# Patient Record
Sex: FEMALE | Race: BLACK OR AFRICAN AMERICAN | NOT HISPANIC OR LATINO | Employment: OTHER | URBAN - METROPOLITAN AREA
[De-identification: names, ages, dates, MRNs, and addresses within clinical notes are randomized per-mention and may not be internally consistent; named-entity substitution may affect disease eponyms.]

---

## 2017-02-06 ENCOUNTER — LAB VISIT (OUTPATIENT)
Dept: LAB | Facility: HOSPITAL | Age: 67
End: 2017-02-06
Attending: INTERNAL MEDICINE
Payer: COMMERCIAL

## 2017-02-06 DIAGNOSIS — E55.9 VITAMIN D INSUFFICIENCY: ICD-10-CM

## 2017-02-06 DIAGNOSIS — Z13.6 SCREENING FOR CARDIOVASCULAR CONDITION: ICD-10-CM

## 2017-02-06 DIAGNOSIS — R74.8 ELEVATED ALKALINE PHOSPHATASE LEVEL: ICD-10-CM

## 2017-02-06 LAB
25(OH)D3+25(OH)D2 SERPL-MCNC: 28 NG/ML
ALBUMIN SERPL BCP-MCNC: 3.3 G/DL
ALP SERPL-CCNC: 199 U/L
ALT SERPL W/O P-5'-P-CCNC: 21 U/L
ANION GAP SERPL CALC-SCNC: 7 MMOL/L
AST SERPL-CCNC: 41 U/L
BILIRUB SERPL-MCNC: 1 MG/DL
BUN SERPL-MCNC: 14 MG/DL
CALCIUM SERPL-MCNC: 8.9 MG/DL
CHLORIDE SERPL-SCNC: 109 MMOL/L
CHOLEST/HDLC SERPL: 3.2 {RATIO}
CO2 SERPL-SCNC: 26 MMOL/L
CREAT SERPL-MCNC: 0.8 MG/DL
ERYTHROCYTE [DISTWIDTH] IN BLOOD BY AUTOMATED COUNT: 14.8 %
EST. GFR  (AFRICAN AMERICAN): >60 ML/MIN/1.73 M^2
EST. GFR  (NON AFRICAN AMERICAN): >60 ML/MIN/1.73 M^2
GGT SERPL-CCNC: 21 U/L
GLUCOSE SERPL-MCNC: 92 MG/DL
HCT VFR BLD AUTO: 35.6 %
HDL/CHOLESTEROL RATIO: 31.2 %
HDLC SERPL-MCNC: 170 MG/DL
HDLC SERPL-MCNC: 53 MG/DL
HGB BLD-MCNC: 11.2 G/DL
LDLC SERPL CALC-MCNC: 108.6 MG/DL
MCH RBC QN AUTO: 27.8 PG
MCHC RBC AUTO-ENTMCNC: 31.5 %
MCV RBC AUTO: 88 FL
NONHDLC SERPL-MCNC: 117 MG/DL
PLATELET # BLD AUTO: 193 K/UL
PMV BLD AUTO: 11.2 FL
POTASSIUM SERPL-SCNC: 3.7 MMOL/L
PROT SERPL-MCNC: 6.8 G/DL
RBC # BLD AUTO: 4.03 M/UL
SODIUM SERPL-SCNC: 142 MMOL/L
TRIGL SERPL-MCNC: 42 MG/DL
WBC # BLD AUTO: 4.02 K/UL

## 2017-02-06 PROCEDURE — 82306 VITAMIN D 25 HYDROXY: CPT

## 2017-02-06 PROCEDURE — 85027 COMPLETE CBC AUTOMATED: CPT

## 2017-02-06 PROCEDURE — 36415 COLL VENOUS BLD VENIPUNCTURE: CPT

## 2017-02-06 PROCEDURE — 80061 LIPID PANEL: CPT

## 2017-02-06 PROCEDURE — 80053 COMPREHEN METABOLIC PANEL: CPT

## 2017-02-06 PROCEDURE — 82977 ASSAY OF GGT: CPT

## 2017-02-07 ENCOUNTER — TELEPHONE (OUTPATIENT)
Dept: INTERNAL MEDICINE | Facility: CLINIC | Age: 67
End: 2017-02-07

## 2017-02-13 ENCOUNTER — TELEPHONE (OUTPATIENT)
Dept: INTERNAL MEDICINE | Facility: CLINIC | Age: 67
End: 2017-02-13

## 2017-02-13 NOTE — TELEPHONE ENCOUNTER
----- Message from Kerline Galeana sent at 2/13/2017  8:12 AM CST -----  Contact: self/[phone]  Pt called in regards to rescheduling her appointment. She did not want the first available.        Please advise

## 2017-03-30 ENCOUNTER — HOSPITAL ENCOUNTER (OUTPATIENT)
Dept: RADIOLOGY | Facility: HOSPITAL | Age: 67
Discharge: HOME | End: 2017-03-30
Attending: INTERNAL MEDICINE
Payer: COMMERCIAL

## 2017-03-30 ENCOUNTER — TELEPHONE (OUTPATIENT)
Dept: INTERNAL MEDICINE | Facility: CLINIC | Age: 67
End: 2017-03-30

## 2017-03-30 ENCOUNTER — OFFICE VISIT (OUTPATIENT)
Dept: INTERNAL MEDICINE | Facility: CLINIC | Age: 67
End: 2017-03-30
Payer: COMMERCIAL

## 2017-03-30 VITALS
SYSTOLIC BLOOD PRESSURE: 116 MMHG | HEIGHT: 63 IN | HEART RATE: 66 BPM | WEIGHT: 155.19 LBS | BODY MASS INDEX: 27.5 KG/M2 | DIASTOLIC BLOOD PRESSURE: 66 MMHG

## 2017-03-30 DIAGNOSIS — Z84.89 FAMILY HISTORY OF HIP FRACTURE: ICD-10-CM

## 2017-03-30 DIAGNOSIS — M25.562 CHRONIC PAIN OF LEFT KNEE: ICD-10-CM

## 2017-03-30 DIAGNOSIS — L81.9 SKIN HYPOPIGMENTATION: ICD-10-CM

## 2017-03-30 DIAGNOSIS — Z78.0 ASYMPTOMATIC POSTMENOPAUSAL STATUS: ICD-10-CM

## 2017-03-30 DIAGNOSIS — L81.8 IDIOPATHIC GUTTATE HYPOMELANOSIS: ICD-10-CM

## 2017-03-30 DIAGNOSIS — R74.8 ELEVATED ALKALINE PHOSPHATASE LEVEL: ICD-10-CM

## 2017-03-30 DIAGNOSIS — Z00.00 ANNUAL PHYSICAL EXAM: Primary | ICD-10-CM

## 2017-03-30 DIAGNOSIS — G89.29 CHRONIC PAIN OF LEFT KNEE: ICD-10-CM

## 2017-03-30 PROCEDURE — 73560 X-RAY EXAM OF KNEE 1 OR 2: CPT | Mod: 26,59,RT, | Performed by: RADIOLOGY

## 2017-03-30 PROCEDURE — 73560 X-RAY EXAM OF KNEE 1 OR 2: CPT | Mod: TC,59,RT

## 2017-03-30 PROCEDURE — 99999 PR PBB SHADOW E&M-EST. PATIENT-LVL III: CPT | Mod: PBBFAC,,, | Performed by: INTERNAL MEDICINE

## 2017-03-30 PROCEDURE — 99396 PREV VISIT EST AGE 40-64: CPT | Mod: S$GLB,,, | Performed by: INTERNAL MEDICINE

## 2017-03-30 PROCEDURE — 73562 X-RAY EXAM OF KNEE 3: CPT | Mod: 26,LT,, | Performed by: RADIOLOGY

## 2017-03-30 NOTE — PROGRESS NOTES
Subjective:       Patient ID: Sharri Cline is a 66 y.o. female.    Chief Complaint: Annual Exam    HPI    Last visit with me 10/2016. Since then seen by Orthopedics. Notes right leg is improved. Having stiffness and pain in left leg, feels heavy. Doing exercise 3-5 days/week. Working with . Leg not giving out. Tried Aleve and it helped.    Reviewed PMH, PSH, SH, FH, allergies, and medications.     Review of Systems   All other systems reviewed and are negative.      Objective:      Physical Exam   Constitutional: She is oriented to person, place, and time. No distress.   -American woman whose Body mass index is 27.49 kg/(m^2).    HENT:   Head: Atraumatic.   Right Ear: Tympanic membrane normal.   Left Ear: Tympanic membrane normal.   Mouth/Throat: Oropharynx is clear and moist. No oropharyngeal exudate.   Eyes: Pupils are equal, round, and reactive to light. Right eye exhibits no discharge. Left eye exhibits no discharge.   Neck: Normal range of motion. No thyromegaly present.   Cardiovascular: Normal rate and regular rhythm.    Murmur heard.   Systolic (RUSB) murmur is present with a grade of 1/6   Pulmonary/Chest: Effort normal and breath sounds normal. No stridor. She has no wheezes. She has no rales.   Abdominal: Soft. She exhibits no distension and no mass. There is no hepatosplenomegaly. There is no tenderness. There is no guarding and negative Don's sign.   Musculoskeletal: She exhibits no edema or tenderness.   Mild effusion in left knee without tenderness to palpation or erythema.   Lymphadenopathy:     She has no cervical adenopathy.   Neurological: She is alert and oriented to person, place, and time.   Skin: Skin is warm and dry.   Hypopigmented macules diffusely throughout bilateral lower extremities distal to knee.   Psychiatric: She has a normal mood and affect. Her behavior is normal.   Nursing note and vitals reviewed.      Vitals:    03/30/17 1336   BP: 116/66   BP Location:  "Right arm   Patient Position: Sitting   BP Method: Manual   Pulse: 66   Weight: 70.4 kg (155 lb 3.3 oz)   Height: 5' 3" (1.6 m)     Body mass index is 27.49 kg/(m^2).    RESULTS: Reviewed labs from last 2 months    The 10-year ASCVD risk score (Santos DA SILVA Jr, et al., 2013) is: 5%    Values used to calculate the score:      Age: 66 years      Sex: Female      Is Non- : Yes      Diabetic: No      Tobacco smoker: No      Systolic Blood Pressure: 116 mmHg      Is BP treated: No      HDL Cholesterol: 53 mg/dL      Total Cholesterol: 170 mg/dL     Assessment:       1. Annual physical exam    2. Elevated alkaline phosphatase level    3. Chronic pain of left knee    4. Family history of hip fracture    5. Asymptomatic postmenopausal status    6. Skin hypopigmentation    7. Idiopathic guttate hypomelanosis        Plan:   Sharri was seen today for annual exam.    Diagnoses and all orders for this visit:    Annual physical exam:  Age-appropriate health screening reviewed, indicated tests ordered.   -     Comprehensive metabolic panel; Future  -     CBC Without Differential; Future  -     Vitamin D; Future  -     TSH; Future    Elevated alkaline phosphatase level:  Relatively stable last 5 years, refer back to Endocrinology for further eval and recommendations.  -     Ambulatory Referral to Endocrinology  -     DXA Bone Density Spine And Hip_Axial Skeleton; Future    Chronic pain of left knee:  Likely osteoarthritis, bursitis, and/or tendinitis. Check on xray, refer to Orthopedics.  -     X-ray Knee Ortho Left; Future    Family history of hip fracture  -     DXA Bone Density Spine And Hip_Axial Skeleton; Future    Asymptomatic postmenopausal status  -     DXA Bone Density Spine And Hip_Axial Skeleton; Future    Skin hypopigmentation:  Seen by Dermatology in past, dx with idiopathic guttate hypomelanosis. continue to monitor.    Return in about 1 year (around 3/30/2018) for EPP annual exam, fasting labs 1 " week prior.  Jhon Arndt MD  Internal Medicine    Portions of this note were completed using Medlert dictation software. Please excuse typographical or syntax errors.

## 2017-03-30 NOTE — TELEPHONE ENCOUNTER
Xray shows some mild degeneration that may be causing some osteoarthritis. Please keep follow up with Orthopedics as planned.

## 2017-03-30 NOTE — MR AVS SNAPSHOT
[facility] - Internal Medicine  [address]  [facility] [address]  Phone: [phone]  Fax: [phone]                  Sharri Cline   3/30/2017 1:20 PM   Office Visit    Description:  Female : 1950   Provider:  Jhon Arndt MD   Department:  [facility]           Reason for Visit     Annual Exam           Diagnoses this Visit        Comments    Annual physical exam    -  Primary     Elevated alkaline phosphatase level         Chronic pain of left knee         Family history of hip fracture         Asymptomatic postmenopausal status         Skin hypopigmentation                To Do List           Goals (5 Years of Data)     None      Follow-Up and Disposition     Return in about 1 year (around 3/30/2018) for EPP annual exam, fasting labs 1 week prior.      [facility]     [facility] On Call Nurse Care Line -  Assistance  Unless otherwise directed by your provider, please contact [facility] On[facility], our nurse care line that is available for  assistance.     Registered nurses in the [facility] On Call Center provide: appointment scheduling, clinical advisement, health education, and other advisory services.  Call: [phone] (toll free)               Medications           Message regarding Medications     Verify the changes and/or additions to your medication regime listed below are the same as discussed with your clinician today.  If any of these changes or additions are incorrect, please notify your healthcare provider.             Verify that the below list of medications is an accurate representation of the medications you are currently taking.  If none reported, the list may be blank. If incorrect, please contact your healthcare provider. Carry this list with you in case of emergency.           Current Medications     fish oil-omega-3 fatty acids 300-1,000 mg capsule Take 2 g by mouth once daily.    multivitamin with minerals tablet Take 1 tablet by mouth  "once daily.      b complex vitamins tablet Take 1 tablet by mouth once daily.    calcium carbonate-vit D3-min 600 mg calcium- 400 unit Tab Take 1 tablet by mouth 2 (two) times daily.           Clinical Reference Information           Your Vitals Were     BP Pulse Height Weight BMI    116/66 (BP Location: Right arm, Patient Position: Sitting, BP Method: Manual) 66 5' 3" (1.6 m) 70.4 kg (155 lb 3.3 oz) 27.49 kg/m2      Blood Pressure          Most Recent Value    BP  116/66      Allergies as of 3/30/2017     No Known Allergies      Immunizations Administered on Date of Encounter - 3/30/2017     None      Orders Placed During Today's Visit      Normal Orders This Visit    Ambulatory Referral to Endocrinology     Future Labs/Procedures Expected by Expires    CBC Without Differential  3/30/2017 (Approximate) 5/29/2018    Comprehensive metabolic panel  3/30/2017 3/30/2018    DXA Bone Density Spine And Hip_Axial Skeleton  3/30/2017 6/28/2017    TSH  3/30/2017 (Approximate) 3/30/2018    Vitamin D  3/30/2017 (Approximate) 5/29/2018    X-ray Knee Ortho Left  3/30/2017 (Approximate) 3/31/2018      [facility] Sign-Up     Activating your [facility] account is as easy as 1-2-3!     1) Visit [email or web address], select Sign Up Now, enter this activation code and your date of birth, then select Next.  [email or web address]  Expires: 5/14/2017  2:09 PM      2) Create a username and password to use when you visit [facility] in the future and select a security question in case you lose your password and select Next.    3) Enter your e-mail address and click Sign Up!    Additional Information  If you have questions, please e-mail [email or web address] or call [phone] to talk to our [facility] staff. Remember, [facility] is NOT to be used for urgent needs. For medical emergencies, dial 911.         Language Assistance Services     ATTENTION: Language assistance services are available, free of charge. Please call [phone].      ATENCIÓN: Si " habla suellenañol, tiene a maynard disposición servicios gratuitos de asistencia lingüística. Vasile al [phone].     RIGOBERTO Ý: N?u b?n nói Ti?ng Vi?t, có các d?ch v? h? tr? ngôn ng? mi?n phí dành cho b?n. G?i s? [phone].         [facility] complies with applicable Federal civil rights laws and does not discriminate on the basis of race, color, national origin, age, disability, or sex.

## 2017-04-13 ENCOUNTER — HOSPITAL ENCOUNTER (OUTPATIENT)
Dept: RADIOLOGY | Facility: CLINIC | Age: 67
Discharge: HOME | End: 2017-04-13
Attending: INTERNAL MEDICINE
Payer: COMMERCIAL

## 2017-04-13 DIAGNOSIS — R74.8 ELEVATED ALKALINE PHOSPHATASE LEVEL: ICD-10-CM

## 2017-04-13 DIAGNOSIS — Z84.89 FAMILY HISTORY OF HIP FRACTURE: ICD-10-CM

## 2017-04-13 DIAGNOSIS — Z78.0 ASYMPTOMATIC POSTMENOPAUSAL STATUS: ICD-10-CM

## 2017-04-13 PROCEDURE — 77080 DXA BONE DENSITY AXIAL: CPT | Mod: TC

## 2017-04-13 PROCEDURE — 77080 DXA BONE DENSITY AXIAL: CPT | Mod: 26,,, | Performed by: INTERNAL MEDICINE

## 2017-04-21 ENCOUNTER — TELEPHONE (OUTPATIENT)
Dept: INTERNAL MEDICINE | Facility: CLINIC | Age: 67
End: 2017-04-21

## 2017-04-21 ENCOUNTER — OFFICE VISIT (OUTPATIENT)
Dept: ENDOCRINOLOGY | Facility: CLINIC | Age: 67
End: 2017-04-21
Payer: COMMERCIAL

## 2017-04-21 VITALS
HEIGHT: 63 IN | BODY MASS INDEX: 27.19 KG/M2 | SYSTOLIC BLOOD PRESSURE: 124 MMHG | DIASTOLIC BLOOD PRESSURE: 80 MMHG | WEIGHT: 153.44 LBS | HEART RATE: 74 BPM

## 2017-04-21 DIAGNOSIS — R74.8 ELEVATED ALKALINE PHOSPHATASE LEVEL: Primary | ICD-10-CM

## 2017-04-21 DIAGNOSIS — M85.80 OSTEOPENIA, UNSPECIFIED LOCATION: ICD-10-CM

## 2017-04-21 PROCEDURE — 1160F RVW MEDS BY RX/DR IN RCRD: CPT | Mod: S$GLB,,, | Performed by: INTERNAL MEDICINE

## 2017-04-21 PROCEDURE — 99999 PR PBB SHADOW E&M-EST. PATIENT-LVL III: CPT | Mod: PBBFAC,,, | Performed by: INTERNAL MEDICINE

## 2017-04-21 PROCEDURE — 99214 OFFICE O/P EST MOD 30 MIN: CPT | Mod: S$GLB,,, | Performed by: INTERNAL MEDICINE

## 2017-04-21 NOTE — PROGRESS NOTES
Subjective:      Patient ID: Sharri Cline is a 67 y.o. female.    Chief Complaint:  Abnormal test result       History of Present Illness  Ms. Cline is a very pleasant 67 year old female patient here today for follow up visit. She is a prior patient of Dr. Koenig with initial consultation in 06/2015    She has chronically elevated alk phos dating back to 2008   Initial endocrine work up showed an elevated liver specific isoenzyme   Bone specific isoenzyme was negative     She has denies bone pain   Denies family history of Paget's disease     Denies personal history of liver disease     She has osteopenia, based on BMD from 04/2017 , frax calculation does not support treatment   She reports eating a calcium rich diet   She also takes calcium +vitamin D supplement daily     Denies recent falls or fractures   Exercises a few times per week     Review of Systems   Constitutional: Negative for fatigue and unexpected weight change.   HENT: Negative for hearing loss, trouble swallowing and voice change.    Eyes: Negative for visual disturbance.   Respiratory: Negative for apnea, cough, chest tightness and shortness of breath.    Cardiovascular: Negative for chest pain and palpitations.   Gastrointestinal: Negative for abdominal pain, constipation, diarrhea, nausea and vomiting.   Endocrine: Negative for cold intolerance, heat intolerance, polydipsia, polyphagia and polyuria.   Genitourinary: Negative for dysuria, frequency, menstrual problem and vaginal discharge.   Musculoskeletal: Negative for arthralgias, back pain and gait problem.   Skin: Negative for rash.   Neurological: Negative for dizziness, tremors, weakness, numbness and headaches.   Psychiatric/Behavioral: Negative for sleep disturbance. The patient is not nervous/anxious.        Objective:   Physical Exam   Constitutional: She is oriented to person, place, and time. She appears well-developed and well-nourished. No distress.   HENT:   Head:  Normocephalic and atraumatic.   Eyes: EOM are normal. Pupils are equal, round, and reactive to light. No scleral icterus.   Neck: Normal range of motion. Neck supple. No thyromegaly present.   Cardiovascular: Normal rate and regular rhythm.    Pulmonary/Chest: Effort normal and breath sounds normal. She has no wheezes. She has no rales. She exhibits no tenderness.   Abdominal: Soft. Bowel sounds are normal. She exhibits no mass. There is no tenderness.   Musculoskeletal: Normal range of motion. She exhibits no edema or tenderness.   Lymphadenopathy:     She has no cervical adenopathy.   Neurological: She is alert and oriented to person, place, and time. She displays normal reflexes. No cranial nerve deficit. Coordination normal.   Skin: Skin is warm and dry. No rash noted.   Psychiatric: She has a normal mood and affect. Her behavior is normal.   Nursing note and vitals reviewed.      Lab Review:   Results for orders placed or performed in visit on 02/06/17   Vitamin D   Result Value Ref Range    Vit D, 25-Hydroxy 28 (L) 30 - 96 ng/mL   Lipid panel   Result Value Ref Range    Cholesterol 170 120 - 199 mg/dL    Triglycerides 42 30 - 150 mg/dL    HDL 53 40 - 75 mg/dL    LDL Cholesterol 108.6 63.0 - 159.0 mg/dL    HDL/Chol Ratio 31.2 20.0 - 50.0 %    Total Cholesterol/HDL Ratio 3.2 2.0 - 5.0    Non-HDL Cholesterol 117 mg/dL   Comprehensive metabolic panel   Result Value Ref Range    Sodium 142 136 - 145 mmol/L    Potassium 3.7 3.5 - 5.1 mmol/L    Chloride 109 95 - 110 mmol/L    CO2 26 23 - 29 mmol/L    Glucose 92 70 - 110 mg/dL    BUN, Bld 14 8 - 23 mg/dL    Creatinine 0.8 0.5 - 1.4 mg/dL    Calcium 8.9 8.7 - 10.5 mg/dL    Total Protein 6.8 6.0 - 8.4 g/dL    Albumin 3.3 (L) 3.5 - 5.2 g/dL    Total Bilirubin 1.0 0.1 - 1.0 mg/dL    Alkaline Phosphatase 199 (H) 55 - 135 U/L    AST 41 (H) 10 - 40 U/L    ALT 21 10 - 44 U/L    Anion Gap 7 (L) 8 - 16 mmol/L    eGFR if African American >60.0 >60 mL/min/1.73 m^2    eGFR if non  African American >60.0 >60 mL/min/1.73 m^2   CBC Without Differential   Result Value Ref Range    WBC 4.02 3.90 - 12.70 K/uL    RBC 4.03 4.00 - 5.40 M/uL    Hemoglobin 11.2 (L) 12.0 - 16.0 g/dL    Hematocrit 35.6 (L) 37.0 - 48.5 %    MCV 88 82 - 98 fL    MCH 27.8 27.0 - 31.0 pg    MCHC 31.5 (L) 32.0 - 36.0 %    RDW 14.8 (H) 11.5 - 14.5 %    Platelets 193 150 - 350 K/uL    MPV 11.2 9.2 - 12.9 fL   Gamma GT   Result Value Ref Range    GGT 21 8 - 55 U/L     Results for GABRIELLA SMITH (MRN [id number]) as of 4/21/2017 16:12   Ref. Range 6/16/2015 08:53   Liver, Alk Phos Latest Ref Range: 5 - 93 U/L 112 (H)     Results for GABRIELLA SMITH (MRN [id number]) as of 4/21/2017 16:12   Ref. Range 6/16/2015 08:53   Liver 1, Alk Phos Latest Ref Range: 44 - 84 % 73     Results for GABRIELLA SMITH (MRN [id number]) as of 4/21/2017 16:12   Ref. Range 6/16/2015 08:53   Alkaline Phosphatase, Total Latest Ref Range: 33 - 130 U/L 154 (H)     Results for GABRIELLA SMITH (MRN [id number]) as of 4/21/2017 16:12   Ref. Range 6/16/2015 08:53   Bone %, Alk Phos Latest Ref Range: 16 - 56 % 27     Assessment:     1. Elevated alk phos  - chronically elevated   - prior work up from 2015 revealed elevated liver specific isoenzymes   - bone specific alk phos was negative   - we suspect elevated alk phos is due to the liver  - PCP may consider referral to Hepatology for further evaluation     2. Osteopenia   - RDA of calcium and vitamin D discussed, calcium from food sources are preferred   - continue weight bearing exercises as tolerated   - emphasized fall safety and fall precautions     Plan:   Discuss with PCP to determine if Hepatology referral is indicated         Case discussed in consultation with Dr. Sinha   Recommendations were discussed with the patient in detail   The patient verbalized understanding and agrees with the treatment plan as outlined above

## 2017-04-21 NOTE — LETTER
April 21, 2017      Jhon Arndt MD  [address]  [facility] [address]           [facility] End[facility]/Diab/Metab  [address]  [facility] [address]  Phone: [phone]  Fax: [phone]          Patient: Sharri Cline   MR Number: [id number]   YOB: 1950   Date of Visit: 4/21/2017       Dear Dr. Jhon Arndt:    Thank you for referring Sharri Cline to me for evaluation. Attached you will find relevant portions of my assessment and plan of care.    If you have questions, please do not hesitate to call me. I look forward to following Sharri Cline along with you.    Sincerely,    Mariluz Phelps, LEEANNA    Enclosure  CC:  No Recipients    If you would like to receive this communication electronically, please contact [email or web address] or [phone] to request more information on [employer] Link access.    For providers and/or their staff who would like to refer a patient to [facility], please contact us through our one-stop-shop provider referral line, [facility], at [phone].    If you feel you have received this communication in error or would no longer like to receive these types of communications, please e-mail [email or web address]

## 2017-04-21 NOTE — MR AVS SNAPSHOT
[facility] - Endo/Diab/Metab  [address]  [facility] [address]  Phone: [phone]  Fax: [phone]                  Sharri Cline   2017 11:30 AM   Office Visit    Description:  Female : 1950   Provider:  Mariluz Phelps NP   Department:  [facility] - Endo/Diab/Metab           Reason for Visit     Thyroid Problem                To Do List           Future Appointments        Provider Department Dept Phone    2017 8:00 AM Logan Yip MD [facility] - Orthopedics [phone]      Goals (5 Years of Data)     None      Follow-Up and Disposition     Return if symptoms worsen or fail to improve, for Follow up with Primary Care Provider as Instructed.      [facility] Call     [facility] On Call Nurse Care Line -  Assistance  Unless otherwise directed by your provider, please contact [facility], our nurse care line that is available for  assistance.     Registered nurses in the [facility] On Call Center provide: appointment scheduling, clinical advisement, health education, and other advisory services.  Call: [phone] (toll free)               Medications           Message regarding Medications     Verify the changes and/or additions to your medication regime listed below are the same as discussed with your clinician today.  If any of these changes or additions are incorrect, please notify your healthcare provider.             Verify that the below list of medications is an accurate representation of the medications you are currently taking.  If none reported, the list may be blank. If incorrect, please contact your healthcare provider. Carry this list with you in case of emergency.           Current Medications     b complex vitamins tablet Take 1 tablet by mouth once daily.    calcium carbonate-vit D3-min 600 mg calcium- 400 unit Tab Take 1 tablet by mouth 2 (two) times daily.    fish oil-omega-3 fatty acids 300-1,000 mg capsule Take 2 g by mouth once  "daily.    multivitamin with minerals tablet Take 1 tablet by mouth once daily.             Clinical Reference Information           Your Vitals Were     BP Pulse Height Weight BMI    124/80 74 5' 3" (1.6 m) 69.6 kg (153 lb 7 oz) 27.18 kg/m2      Blood Pressure          Most Recent Value    BP  124/80      Allergies as of 4/21/2017     No Known Allergies      Immunizations Administered on Date of Encounter - 4/21/2017     None      [facility] Sign-Up     Activating your [facility] account is as easy as 1-2-3!     1) Visit [email or web address], select Sign Up Now, enter this activation code and your date of birth, then select Next.  [email or web address]  Expires: 5/14/2017  2:09 PM      2) Create a username and password to use when you visit [facility] in the future and select a security question in case you lose your password and select Next.    3) Enter your e-mail address and click Sign Up!    Additional Information  If you have questions, please e-mail [email or web address] or call [phone] to talk to our [facility] staff. Remember, [facility] is NOT to be used for urgent needs. For medical emergencies, dial 911.         Language Assistance Services     ATTENTION: Language assistance services are available, free of charge. Please call [phone].      ATENCIÓN: Si habla español, tiene a maynard disposición servicios gratuitos de asistencia lingüística. Llame al [phone].     CHÚ Ý: N?u b?n nói Ti?ng Vi?t, có các d?ch v? h? tr? ngôn ng? mi?n phí dành cho b?n. G?i s? [phone].         [facility] complies with applicable Federal civil rights laws and does not discriminate on the basis of race, color, national origin, age, disability, or sex.        "

## 2017-04-22 NOTE — TELEPHONE ENCOUNTER
Please call the patient to notify that I reviewed the note from Endocrinology. They believe the elevated levels may be from the liver, so I am referring to Hepatology for further evaluation. Please schedule this appointment with the patient or provide the number for the scheduling desk.

## 2017-04-25 NOTE — TELEPHONE ENCOUNTER
Dr Alex I have tried pt twice yesterday and once today and had to leave a mssg   Do you want to send her a letter?    Flavio

## 2017-04-26 ENCOUNTER — DOCUMENTATION ONLY (OUTPATIENT)
Dept: TRANSPLANT | Facility: CLINIC | Age: 67
End: 2017-04-26

## 2017-04-26 NOTE — LETTER
April 26, 2017    Sharri Cline  [address]  [facility] [address]      Dear Sharri Cline:    Your doctor has referred you to the [facility]. You will be contacted by our office and an initial appointment will then be scheduled for you.    We look forward to seeing you soon. If you have any further questions, please contact us at [phone].       Sincerely,        [facility]   [address] [place] [id number] [phone]

## 2017-04-26 NOTE — LETTER
April 26, 2017    Jhon Arndt MD  [address]  [place] [address]      Dear Dr. Arndt    Patient: Sharri Cline   MR Number: [id number]   YOB: 1950     Thank you for the referral of Sharri Cline to the [facility]. An initial appointment will be scheduled for your patient with one of our Hepatologists.      Thank you again for your trust in our program.  If there is anything we can do for you or your staff, please feel free to contact us.        Sincerely,        [facility]  [employer] [place], [place] [id number] [phone]

## 2017-04-26 NOTE — NURSING
Pt records reviewed.   Pt will be referred to Hepatology.  Elevated alkaline phosphatase level  Initial referral received  from the workque.   Referring Provider/diagnosis  Jhon Arndt MD  Referral letter sent to provider and patient.

## 2017-04-27 ENCOUNTER — HOSPITAL ENCOUNTER (OUTPATIENT)
Dept: RADIOLOGY | Facility: HOSPITAL | Age: 67
Discharge: HOME | End: 2017-04-27
Attending: ORTHOPAEDIC SURGERY
Payer: COMMERCIAL

## 2017-04-27 ENCOUNTER — OFFICE VISIT (OUTPATIENT)
Dept: ORTHOPEDICS | Facility: CLINIC | Age: 67
End: 2017-04-27
Payer: COMMERCIAL

## 2017-04-27 VITALS — HEIGHT: 63 IN | BODY MASS INDEX: 27.19 KG/M2 | WEIGHT: 153.44 LBS

## 2017-04-27 DIAGNOSIS — M25.561 PAIN IN BOTH KNEES, UNSPECIFIED CHRONICITY: ICD-10-CM

## 2017-04-27 DIAGNOSIS — M17.0 BILATERAL PRIMARY OSTEOARTHRITIS OF KNEE: ICD-10-CM

## 2017-04-27 DIAGNOSIS — M25.562 PAIN IN BOTH KNEES, UNSPECIFIED CHRONICITY: Primary | ICD-10-CM

## 2017-04-27 DIAGNOSIS — M25.562 PAIN IN BOTH KNEES, UNSPECIFIED CHRONICITY: ICD-10-CM

## 2017-04-27 DIAGNOSIS — M25.561 PAIN IN BOTH KNEES, UNSPECIFIED CHRONICITY: Primary | ICD-10-CM

## 2017-04-27 PROCEDURE — 73560 X-RAY EXAM OF KNEE 1 OR 2: CPT | Mod: 26,50,, | Performed by: RADIOLOGY

## 2017-04-27 PROCEDURE — 73560 X-RAY EXAM OF KNEE 1 OR 2: CPT | Mod: 50,TC

## 2017-04-27 PROCEDURE — 99213 OFFICE O/P EST LOW 20 MIN: CPT | Mod: S$GLB,,, | Performed by: ORTHOPAEDIC SURGERY

## 2017-04-27 PROCEDURE — 99999 PR PBB SHADOW E&M-EST. PATIENT-LVL III: CPT | Mod: PBBFAC,,, | Performed by: ORTHOPAEDIC SURGERY

## 2017-04-27 PROCEDURE — 1160F RVW MEDS BY RX/DR IN RCRD: CPT | Mod: S$GLB,,, | Performed by: ORTHOPAEDIC SURGERY

## 2017-04-27 RX ORDER — DICLOFENAC SODIUM 10 MG/G
2 GEL TOPICAL 4 TIMES DAILY
Qty: 1 TUBE | Refills: 6 | Status: SHIPPED | OUTPATIENT
Start: 2017-04-27 | End: 2018-07-26

## 2017-04-27 NOTE — LETTER
April 27, 2017      Jhon Arndt MD  [address]  [place] [address]           [facility] - Orthopedics  [address]  [place] [address]  Phone: [phone]          Patient: Sahrri Cline   MR Number: [id number]   YOB: 1950   Date of Visit: 4/27/2017       Dear Dr. Jhon Arndt:    Thank you for referring Sharri Cline to me for evaluation. Attached you will find relevant portions of my assessment and plan of care.    If you have questions, please do not hesitate to call me. I look forward to following Sharri Cline along with you.    Sincerely,    Logan Yip MD    Enclosure  CC:  No Recipients    If you would like to receive this communication electronically, please contact [email or web address] or [phone] to request more information on [employer] Link access.    For providers and/or their staff who would like to refer a patient to [facility], please contact us through our one-stop-shop provider referral line, [facility] , at [phone].    If you feel you have received this communication in error or would no longer like to receive these types of communications, please e-mail [email or web address]

## 2017-04-27 NOTE — PROGRESS NOTES
CC:   leftt knee pain  HPI:  67 y.o. yo female who presents with left knee pain.    Located medially.  Started 2 months ago.  Mild and intermittent.  She has not been taking medications for relief.  Improving since onset.  Denies injury or other precipitating factors.  No apparent exacerbating or relieving factors.  Able to exercise effectively without pain or limitation.    Seen in clinic 10/2016 for similar mild R knee pain that resolved with stretching exercises.    PAST MEDICAL HISTORY:   Past Medical History:   Diagnosis Date    Helicobacter pylori antibody positive 5/27/13    treated with clarithromycin, metronidazole, and omeprazole x 14 days  (5/27-6/3); EGD normal in July 2013     PAST SURGICAL HISTORY:   Past Surgical History:   Procedure Laterality Date    TUBAL LIGATION       FAMILY HISTORY:   Family History   Problem Relation Age of Onset    Diabetes Father     Hypertension Mother     Miscarriages / Stillbirths Mother     ANGEL disease Mother     Eczema Mother     Parkinsonism Mother     Diabetes Brother     Diabetes Brother     Colon cancer Maternal Aunt     No Known Problems Sister     No Known Problems Maternal Uncle     No Known Problems Paternal Aunt     No Known Problems Paternal Uncle     No Known Problems Maternal Grandmother     No Known Problems Maternal Grandfather     No Known Problems Paternal Grandmother     No Known Problems Paternal Grandfather     Celiac disease Neg Hx     Cirrhosis Neg Hx     Colon polyps Neg Hx     Crohn's disease Neg Hx     Cystic fibrosis Neg Hx     Esophageal cancer Neg Hx     Hemochromatosis Neg Hx     Inflammatory bowel disease Neg Hx     Irritable bowel syndrome Neg Hx     Liver cancer Neg Hx     Liver disease Neg Hx     Rectal cancer Neg Hx     Stomach cancer Neg Hx     Ulcerative colitis Neg Hx     Anthony's disease Neg Hx     Psoriasis Neg Hx     Ovarian cancer Neg Hx     Breast cancer Neg Hx     Amblyopia Neg Hx      "Blindness Neg Hx     Cancer Neg Hx     Cataracts Neg Hx     Glaucoma Neg Hx     Macular degeneration Neg Hx     Retinal detachment Neg Hx     Strabismus Neg Hx     Stroke Neg Hx     Thyroid disease Neg Hx      SOCIAL HISTORY:   Social History     Social History    Marital status:      Spouse name: N/A    Number of children: N/A    Years of education: N/A     Occupational History     worker [employer]     Social History Main Topics    Smoking status: Never Smoker    Smokeless tobacco: Never Used    Alcohol use Yes      Comment: Social    Drug use: No    Sexual activity: Yes     Partners: Male     Birth control/ protection: Post-menopausal     Other Topics Concern    Are You Pregnant Or Think You May Be? No    Breast-Feeding No     Social History Narrative       MEDICATIONS:   Current Outpatient Prescriptions:     b complex vitamins tablet, Take 1 tablet by mouth once daily., Disp: , Rfl:     calcium carbonate-vit D3-min 600 mg calcium- 400 unit Tab, Take 1 tablet by mouth 2 (two) times daily., Disp: 60 tablet, Rfl: 3    fish oil-omega-3 fatty acids 300-1,000 mg capsule, Take 2 g by mouth once daily., Disp: , Rfl:     multivitamin with minerals tablet, Take 1 tablet by mouth once daily.  , Disp: , Rfl:     diclofenac sodium (VOLTAREN) 1 % Gel, Apply 2 g topically 4 (four) times daily. Apply to painful area up to four times a day., Disp: 1 Tube, Rfl: 6  ALLERGIES: Review of patient's allergies indicates:  No Known Allergies    VITAL SIGNS:   Ht 5' 3" (1.6 m)  Wt 69.6 kg (153 lb 7 oz)  BMI 27.18 kg/m2     Review of Systems   Constitution: Negative. Negative for chills, fever and night sweats.   HENT: Negative for congestion and headaches.    Eyes: Negative for blurred vision, left vision loss and right vision loss.   Cardiovascular: Negative for chest pain and syncope.   Respiratory: Negative for cough and shortness of breath.    Endocrine: Negative for polydipsia, " polyphagia and polyuria.   Hematologic/Lymphatic: Negative for bleeding problem. Does not bruise/bleed easily.   Skin: Negative for dry skin, itching and rash.   Musculoskeletal: Negative for falls and muscle weakness.  right knee pain  Gastrointestinal: Negative for abdominal pain and bowel incontinence.   Genitourinary: Negative for bladder incontinence and nocturia.   Neurological: Negative for disturbances in coordination, loss of balance and seizures.   Psychiatric/Behavioral: Negative for depression. The patient does not have insomnia.    Allergic/Immunologic: Negative for hives and persistent infections.       Physical Exam     General appearance    This is a well-developed, Well nourished patient No obvious acute distress.  Orientation: Patient is alert and oriented x4    Mood and affect: Normal, pleasant and conversant  Gait is coordinated. Patient can toe walk and heel walk without difficulty.    Cardiovascular: swelling or varicosities present.     Lymphatic: Negative for adenopathy     Deep Tendon Reflexes are normal; negative babinki  Coordination and Balance: Normal   Musculoskeletal   Neck    ROM shows normal flexion and extension and lateral rotation    Palpation: Non-tender    Stability is normal    Strength is normal    Skin is normal without masses and lesions    Sensation intact to light touch   Back    ROM shows normal flexion, extension and rotation    Palpation shows no masses    Stability is normal    Strength to flexion and extension well maintained     Core strength is diminished    Skin shows no rashes or cafe au lait spots    Sensation intact to light touch     Left Lower Extremity    Alignment: Neutral    ROM to 120    Palpation shows no masses;     Tenderness: mild medial joint line    Hip ROM: Normal without pain    Knee stability: Stable to varus and valgus force    Skin shows no rashes, lesions or cafe au lait spots    Sensation intact to light touch     Right Lower Extremity      Alignment: Neutral    ROM due to 120    Palpation shows no masses    Tenderness: none    Hip ROM: Normal without pain    Knee stability: Stable to varus and valgus force    Skin shows no rashes, lesions or cafe au lait spots    Sensation intact to light touch        Xrays:  Flexion AP X-rays the bilateral knees were ordered and reviewed and show mild DJD of both knees.  There are no osteophytes.  There is very mild joint space narrowing.  There is no subchondral sclerosis and no subchondral cysts.    Assessment:  Encounter Diagnoses   Name Primary?    Pain in both knees, unspecified chronicity Yes    Bilateral primary osteoarthritis of knee        Plan:    Orders Placed This Encounter    X-Ray Knee 1 or 2 View Bilateral    diclofenac sodium (VOLTAREN) 1 % Gel       Return in about 3 months (around 7/27/2017), or if symptoms worsen or fail to improve.      I called in a Voltaren cream.  She'll continue with this.  She will follow-up as needed.

## 2017-04-27 NOTE — MR AVS SNAPSHOT
[facility]  [address]  [facility] [address]  Phone: [phone]                  Sharri Cline   2017 8:00 AM   Office Visit    Description:  Female : 1950   Provider:  Logan Yip MD   Department:  [facility]           Reason for Visit     left knee           Diagnoses this Visit        Comments    Pain in both knees, unspecified chronicity    -  Primary            To Do List           Future Appointments        Provider Department Dept Phone    2017 2:20 PM MD [facility] [phone]      Goals (5 Years of Data)     None       These Medications        Disp Refills Start End    diclofenac sodium (VOLTAREN) 1 % Gel 1 Tube 6 2017    Apply 2 g topically 4 (four) times daily. Apply to painful area up to four times a day. - Topical    Pharmacy: [facility] [facility] [address] [place] [address] AT [facility] Ph #: [phone]         [facility]     [facility] Nurse Care Line -  Assistance  Unless otherwise directed by your provider, please contact [facility], our nurse care line that is available for  assistance.     Registered nurses in the [facility] provide: appointment scheduling, clinical advisement, health education, and other advisory services.  Call: [phone] (toll free)               Medications           Message regarding Medications     Verify the changes and/or additions to your medication regime listed below are the same as discussed with your clinician today.  If any of these changes or additions are incorrect, please notify your healthcare provider.        START taking these NEW medications        Refills    diclofenac sodium (VOLTAREN) 1 % Gel 6    Sig: Apply 2 g topically 4 (four) times daily. Apply to painful area up to four times a day.    Class: Normal    Route: Topical           Verify that the below list of  "medications is an accurate representation of the medications you are currently taking.  If none reported, the list may be blank. If incorrect, please contact your healthcare provider. Carry this list with you in case of emergency.           Current Medications     b complex vitamins tablet Take 1 tablet by mouth once daily.    calcium carbonate-vit D3-min 600 mg calcium- 400 unit Tab Take 1 tablet by mouth 2 (two) times daily.    fish oil-omega-3 fatty acids 300-1,000 mg capsule Take 2 g by mouth once daily.    multivitamin with minerals tablet Take 1 tablet by mouth once daily.      diclofenac sodium (VOLTAREN) 1 % Gel Apply 2 g topically 4 (four) times daily. Apply to painful area up to four times a day.           Clinical Reference Information           Your Vitals Were     Height Weight BMI          5' 3" (1.6 m) 69.6 kg (153 lb 7 oz) 27.18 kg/m2        Allergies as of 4/27/2017     No Known Allergies      Immunizations Administered on Date of Encounter - 4/27/2017     None      Orders Placed During Today's Visit     Future Labs/Procedures Expected by Expires    X-Ray Knee 1 or 2 View Bilateral  4/27/2017 4/27/2018      [facility] Sign-Up     Activating your [facility] account is as easy as 1-2-3!     1) Visit [email or web address], select Sign Up Now, enter this activation code and your date of birth, then select Next.  [email or web address]  Expires: 5/14/2017  2:09 PM      2) Create a username and password to use when you visit [facility] in the future and select a security question in case you lose your password and select Next.    3) Enter your e-mail address and click Sign Up!    Additional Information  If you have questions, please e-mail [email or web address] or call [phone] to talk to our [facility] staff. Remember, [facility] is NOT to be used for urgent needs. For medical emergencies, dial 911.         Language Assistance Services     ATTENTION: Language assistance services are available, free of charge. Please " call [phone].      ATENCIÓN: Si habla español, tiene a maynard disposición servicios gratuitos de asistencia lingüística. Llame al [phone].     CHÚ Ý: N?u b?n nói Ti?ng Vi?t, có các d?ch v? h? tr? ngôn ng? mi?n phí dành cho b?n. G?i s? [phone].         [facility] complies with applicable Federal civil rights laws and does not discriminate on the basis of race, color, national origin, age, disability, or sex.

## 2017-05-28 NOTE — LETTER
May 28, 2017    Sharri Cline  [id number]  [facility] [address]      Dear Sharri Cline:         We are sorry that you missed your appointment in the Hepatology Clinic.  Your health and follow-up medical care are important to us. Please call our office at [phone] as  soon as possible so that we may reschedule your appointment. If you have already rescheduled your appointment, please disregard this letter.     Sincerely,         Hepatology Staff

## 2017-06-19 ENCOUNTER — PROCEDURE VISIT (OUTPATIENT)
Dept: HEPATOLOGY | Facility: CLINIC | Age: 67
End: 2017-06-19
Attending: INTERNAL MEDICINE
Payer: COMMERCIAL

## 2017-06-19 ENCOUNTER — OFFICE VISIT (OUTPATIENT)
Dept: HEPATOLOGY | Facility: CLINIC | Age: 67
End: 2017-06-19
Payer: COMMERCIAL

## 2017-06-19 ENCOUNTER — LAB VISIT (OUTPATIENT)
Dept: LAB | Facility: HOSPITAL | Age: 67
End: 2017-06-19
Attending: INTERNAL MEDICINE
Payer: COMMERCIAL

## 2017-06-19 VITALS
BODY MASS INDEX: 27.7 KG/M2 | TEMPERATURE: 97 F | DIASTOLIC BLOOD PRESSURE: 63 MMHG | HEART RATE: 68 BPM | SYSTOLIC BLOOD PRESSURE: 136 MMHG | HEIGHT: 63 IN | WEIGHT: 156.31 LBS | OXYGEN SATURATION: 99 %

## 2017-06-19 DIAGNOSIS — R79.89 ELEVATED LIVER FUNCTION TESTS: ICD-10-CM

## 2017-06-19 DIAGNOSIS — R79.89 ELEVATED LIVER FUNCTION TESTS: Primary | ICD-10-CM

## 2017-06-19 LAB
ALBUMIN SERPL BCP-MCNC: 3.5 G/DL
ALP SERPL-CCNC: 203 U/L
ALT SERPL W/O P-5'-P-CCNC: 14 U/L
AST SERPL-CCNC: 23 U/L
BILIRUB DIRECT SERPL-MCNC: 0.5 MG/DL
BILIRUB SERPL-MCNC: 1.4 MG/DL
PROT SERPL-MCNC: 7.4 G/DL

## 2017-06-19 PROCEDURE — 82164 ANGIOTENSIN I ENZYME TEST: CPT

## 2017-06-19 PROCEDURE — 91200 LIVER ELASTOGRAPHY: CPT | Mod: S$GLB,,, | Performed by: INTERNAL MEDICINE

## 2017-06-19 PROCEDURE — 80076 HEPATIC FUNCTION PANEL: CPT

## 2017-06-19 PROCEDURE — 99204 OFFICE O/P NEW MOD 45 MIN: CPT | Mod: S$GLB,,, | Performed by: INTERNAL MEDICINE

## 2017-06-19 PROCEDURE — 99999 PR PBB SHADOW E&M-EST. PATIENT-LVL III: CPT | Mod: PBBFAC,,, | Performed by: INTERNAL MEDICINE

## 2017-06-19 NOTE — PROGRESS NOTES
Hepatology Consult Note    Referring provider: Dr. Jhon Arndt    Chief complaint: elevated liver tests     HPI:  Sharri Cline is a 67 y.o. female that presents to hepatology clinic for consultation of elevated liver enzymes. She is alone.    The patient has had mild elevations of AP since 2012 but reports that she was first aware of this abnormality last year.  Patient has had isoenzymes which show elevation of liver component and normal fractionation of bone despite issues with osteopenia.  AMA negative.  The patient has had no symptoms of chronic liver disease during this time.  There is no family history of liver disease.  She only takes vitamin D, beta-carotene in addition to listed medications.  No recent changes or herbal meds.  She has had 20 lb intentional weight loss over the last 8 months with change in diet.  The patient reports minimal alcohol consumption.      US from 2015 performed for abnormal liver tests and unremarkable.        Patient Active Problem List   Diagnosis    Menopause    Atrophic vaginitis    Elevated alkaline phosphatase level    GERD (gastroesophageal reflux disease)    Idiopathic guttate hypomelanosis    Osteopenia       Past Medical History:   Diagnosis Date    Helicobacter pylori antibody positive 5/27/13    treated with clarithromycin, metronidazole, and omeprazole x 14 days  (5/27-6/3); EGD normal in July 2013       Past Surgical History:   Procedure Laterality Date    TUBAL LIGATION         Family History   Problem Relation Age of Onset    Diabetes Father     Hypertension Mother     Miscarriages / Stillbirths Mother     ANGEL disease Mother     Eczema Mother     Parkinsonism Mother     Diabetes Brother     Diabetes Brother     Colon cancer Maternal Aunt     No Known Problems Sister     No Known Problems Maternal Uncle     No Known Problems Paternal Aunt     No Known Problems Paternal Uncle     No Known Problems Maternal Grandmother     No Known  Problems Maternal Grandfather     No Known Problems Paternal Grandmother     No Known Problems Paternal Grandfather     Celiac disease Neg Hx     Cirrhosis Neg Hx     Colon polyps Neg Hx     Crohn's disease Neg Hx     Cystic fibrosis Neg Hx     Esophageal cancer Neg Hx     Hemochromatosis Neg Hx     Inflammatory bowel disease Neg Hx     Irritable bowel syndrome Neg Hx     Liver cancer Neg Hx     Liver disease Neg Hx     Rectal cancer Neg Hx     Stomach cancer Neg Hx     Ulcerative colitis Neg Hx     Anthony's disease Neg Hx     Psoriasis Neg Hx     Ovarian cancer Neg Hx     Breast cancer Neg Hx     Amblyopia Neg Hx     Blindness Neg Hx     Cancer Neg Hx     Cataracts Neg Hx     Glaucoma Neg Hx     Macular degeneration Neg Hx     Retinal detachment Neg Hx     Strabismus Neg Hx     Stroke Neg Hx     Thyroid disease Neg Hx        Social History     Social History    Marital status:      Spouse name: N/A    Number of children: N/A    Years of education: N/A     Occupational History     worker [employer]     Social History Main Topics    Smoking status: Never Smoker    Smokeless tobacco: Never Used    Alcohol use Yes      Comment: Social    Drug use: No    Sexual activity: Yes     Partners: Male     Birth control/ protection: Post-menopausal     Other Topics Concern    Are You Pregnant Or Think You May Be? No    Breast-Feeding No     Social History Narrative    No narrative on file       Current Outpatient Prescriptions   Medication Sig Dispense Refill    b complex vitamins tablet Take 1 tablet by mouth once daily.      calcium carbonate-vit D3-min 600 mg calcium- 400 unit Tab Take 1 tablet by mouth 2 (two) times daily. 60 tablet 3    fish oil-omega-3 fatty acids 300-1,000 mg capsule Take 2 g by mouth once daily.      multivitamin with minerals tablet Take 1 tablet by mouth once daily.        diclofenac sodium (VOLTAREN) 1 % Gel Apply 2 g topically 4  "(four) times daily. Apply to painful area up to four times a day. 1 Tube 6     No current facility-administered medications for this visit.        Review of patient's allergies indicates:  No Known Allergies    Review of Systems   Constitutional: Negative for chills, fever, malaise/fatigue and weight loss.   Eyes: Negative.    Respiratory: Negative for cough and shortness of breath.    Cardiovascular: Negative for chest pain and leg swelling.   Gastrointestinal: Negative for abdominal pain, heartburn, nausea and vomiting.   Musculoskeletal: Negative for joint pain and myalgias.   Skin: Negative for itching and rash.   Neurological: Negative for dizziness, focal weakness and headaches.   Endo/Heme/Allergies: Does not bruise/bleed easily.   Psychiatric/Behavioral: Negative for depression.       Vitals:    06/19/17 0915   BP: 136/63   Pulse: 68   Temp: 96.6 °F (35.9 °C)   TempSrc: Oral   SpO2: 99%   Weight: 70.9 kg (156 lb 4.9 oz)   Height: 5' 3" (1.6 m)       Physical Exam   Constitutional: She is oriented to person, place, and time. She appears well-developed and well-nourished. No distress.   HENT:   Head: Normocephalic and atraumatic.   Mouth/Throat: Oropharynx is clear and moist.   Eyes: EOM are normal. Pupils are equal, round, and reactive to light. No scleral icterus.   Neck: Normal range of motion. Neck supple. No thyromegaly present.   Cardiovascular: Normal rate, regular rhythm and normal heart sounds.  Exam reveals no gallop and no friction rub.    No murmur heard.  Pulmonary/Chest: Effort normal. No respiratory distress. She has no wheezes. She has no rales.   Abdominal: Soft. Bowel sounds are normal. She exhibits no distension. There is no tenderness.   Musculoskeletal: Normal range of motion. She exhibits no edema.   Lymphadenopathy:     She has no cervical adenopathy.   Neurological: She is alert and oriented to person, place, and time.   Skin: Skin is warm and dry. No rash noted.   Psychiatric: She has " a normal mood and affect. Her behavior is normal.   Vitals reviewed.      Lab Results   Component Value Date    ALT 21 02/06/2017    AST 41 (H) 02/06/2017    GGT 21 02/06/2017    ALKPHOS 199 (H) 02/06/2017    BILITOT 1.0 02/06/2017       Lab Results   Component Value Date    WBC 4.02 02/06/2017    HGB 11.2 (L) 02/06/2017    HCT 35.6 (L) 02/06/2017    MCV 88 02/06/2017     02/06/2017       Lab Results   Component Value Date     02/06/2017    K 3.7 02/06/2017     02/06/2017    CO2 26 02/06/2017    BUN 14 02/06/2017    CREATININE 0.8 02/06/2017    CALCIUM 8.9 02/06/2017    ANIONGAP 7 (L) 02/06/2017    ESTGFRAFRICA >60.0 02/06/2017    EGFRNONAA >60.0 02/06/2017       Imaging: US report reviewed with patient     Assessment:  67 y.o. female presenting with elevated liver tests for unclear etiology.  No evidence of PBC or BARRAZA based on prior work-up.  Given that US done for same indication, will proceed with MRI with MRCP for evaluation of biliary tree in consideration of PSC.  We will also obtain ACE level with consideration of sarcoidosis.  Fibroscan for fibrosis staging today given chronicity of elevations.  If no etiology determined and suggestion of significant fibrosis, would consider liver biopsy.  Discussed indications, risks and benefits of the procedure with the patient and she is amenable if deemed necessary.      RTC to be determined by results

## 2017-06-19 NOTE — PATIENT INSTRUCTIONS
You have elevated liver tests for unclear cause.  We will obtain lab work, MRI and fibroscan.    Fibroscan for today.      We will determine if liver biopsy is necessary.      Return to clinic based on test results.

## 2017-06-19 NOTE — LETTER
June 19, 2017      Jhon Arndt MD  [address]  [place] [address]           [facility] - Hepatology  [address]  [facility] [address]  Phone: [phone]  Fax: [phone]          Patient: Sharri Cline   MR Number: [id number]   YOB: 1950   Date of Visit: 6/19/2017       Dear Dr. Jhon Arndt:    Thank you for referring Sharri Cline to me for evaluation. Attached you will find relevant portions of my assessment and plan of care.    If you have questions, please do not hesitate to call me. I look forward to following Sharri Cline along with you.    Sincerely,    Mai Villanueva MD    Enclosure  CC:  No Recipients    If you would like to receive this communication electronically, please contact [email or web address] or [phone] to request more information on [employer] access.    For providers and/or their staff who would like to refer a patient to [facility], please contact us through our one-stop-shop provider referral line, [facility], at [phone].    If you feel you have received this communication in error or would no longer like to receive these types of communications, please e-mail [email or web address]

## 2017-06-20 LAB — ACE SERPL-CCNC: 37 U/L

## 2017-06-20 NOTE — PROCEDURES
Procedures     Fibroscan Procedure     Name: Sharri Cline  Date of Procedure : 2017   :: Mai Villanueva MD  Diagnosis: CRYPTOGENIC  Probe: M    Fibroscan readin.6 KPa    IQR/med:9 %    Fibrosis:F 0-1

## 2017-06-22 ENCOUNTER — TELEPHONE (OUTPATIENT)
Dept: HEPATOLOGY | Facility: CLINIC | Age: 67
End: 2017-06-22

## 2017-06-22 NOTE — TELEPHONE ENCOUNTER
Called patient to discuss results.  Fibroscan consistent with minimal to no fibrosis but liver tests more elevated.  We will proceed with MRI as planned.  If there is no explanation, would repeat liver tests in 1 month and if there is persistent rise, recommendation for liver biopsy.  Patient expressed understanding and no questions at this time.

## 2017-07-05 ENCOUNTER — TELEPHONE (OUTPATIENT)
Dept: HEPATOLOGY | Facility: CLINIC | Age: 67
End: 2017-07-05

## 2017-07-05 ENCOUNTER — HOSPITAL ENCOUNTER (OUTPATIENT)
Dept: RADIOLOGY | Facility: HOSPITAL | Age: 67
Discharge: HOME | End: 2017-07-05
Attending: INTERNAL MEDICINE
Payer: COMMERCIAL

## 2017-07-05 DIAGNOSIS — R79.89 ELEVATED LIVER FUNCTION TESTS: ICD-10-CM

## 2017-07-05 LAB
CREAT SERPL-MCNC: 0.7 MG/DL (ref 0.5–1.4)
SAMPLE: NORMAL

## 2017-07-05 PROCEDURE — 74183 MRI ABD W/O CNTR FLWD CNTR: CPT | Mod: TC

## 2017-07-05 PROCEDURE — 74183 MRI ABD W/O CNTR FLWD CNTR: CPT | Mod: 26,,, | Performed by: RADIOLOGY

## 2017-07-05 PROCEDURE — A9585 GADOBUTROL INJECTION: HCPCS | Performed by: INTERNAL MEDICINE

## 2017-07-05 PROCEDURE — 76376 3D RENDER W/INTRP POSTPROCES: CPT | Mod: TC

## 2017-07-05 PROCEDURE — 76376 3D RENDER W/INTRP POSTPROCES: CPT | Mod: 26,,, | Performed by: RADIOLOGY

## 2017-07-05 PROCEDURE — 25500020 PHARM REV CODE 255: Performed by: INTERNAL MEDICINE

## 2017-07-05 RX ORDER — GADOBUTROL 604.72 MG/ML
10 INJECTION INTRAVENOUS
Status: COMPLETED | OUTPATIENT
Start: 2017-07-05 | End: 2017-07-05

## 2017-07-05 RX ADMIN — GADOBUTROL 10 ML: 604.72 INJECTION INTRAVENOUS at 09:07

## 2017-07-05 NOTE — TELEPHONE ENCOUNTER
MA attempted to call patient to inform her of her MRI results. Patient unable to reached left her VM to please give us a callback. KATHY

## 2017-07-05 NOTE — TELEPHONE ENCOUNTER
----- Message from Mai Villanueva MD sent at 7/5/2017 11:26 AM CDT -----  Please inform the patient that MRI is normal.  At this time we will continue to monitor liver tests and she should return to clinic in 3 months for follow-up.

## 2017-07-05 NOTE — TELEPHONE ENCOUNTER
Patient called back, relay message from MD about her MRI results. Patient understood and verbalized understanding.Adan

## 2017-08-02 ENCOUNTER — TELEPHONE (OUTPATIENT)
Dept: INTERNAL MEDICINE | Facility: CLINIC | Age: 67
End: 2017-08-02

## 2017-08-02 DIAGNOSIS — Z11.1 SCREENING FOR TUBERCULOSIS: Primary | ICD-10-CM

## 2017-08-02 NOTE — TELEPHONE ENCOUNTER
----- Message from Louis Newell sent at 8/2/2017  2:28 PM CDT -----  Contact: self [phone]  Patient would like a call back to discuss getting an order for TB test . Please call and advise , Thanks !

## 2017-08-03 NOTE — TELEPHONE ENCOUNTER
----- Message from Karrie Rincon sent at 8/2/2017  4:19 PM CDT -----  Contact: Self/ [phone]   Type: Returning a call    Who left a message? Nuvia     When did the practice call? 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 Please call and advise     Thank you

## 2017-08-14 ENCOUNTER — TELEPHONE (OUTPATIENT)
Dept: INTERNAL MEDICINE | Facility: CLINIC | Age: 67
End: 2017-08-14

## 2017-08-14 NOTE — TELEPHONE ENCOUNTER
----- Message from Leslye Olson sent at 8/14/2017 11:14 AM CDT -----  Contact: Self/[phone] cell   Pt said that she is calling in regards to needing to schedule a Tb skin test for work. Please call and advise                Thanks!!!!

## 2017-08-15 ENCOUNTER — CLINICAL SUPPORT (OUTPATIENT)
Dept: INTERNAL MEDICINE | Facility: CLINIC | Age: 67
End: 2017-08-15
Payer: COMMERCIAL

## 2017-08-15 PROCEDURE — 86580 TB INTRADERMAL TEST: CPT | Mod: S$GLB,,, | Performed by: INTERNAL MEDICINE

## 2017-08-17 ENCOUNTER — CLINICAL SUPPORT (OUTPATIENT)
Dept: INTERNAL MEDICINE | Facility: CLINIC | Age: 67
End: 2017-08-17
Payer: COMMERCIAL

## 2018-03-14 ENCOUNTER — TELEPHONE (OUTPATIENT)
Dept: INTERNAL MEDICINE | Facility: CLINIC | Age: 68
End: 2018-03-14

## 2018-03-14 NOTE — TELEPHONE ENCOUNTER
----- Message from Florina Saldana sent at 3/14/2018  8:48 AM CDT -----  Contact: Patient [phone]  Patient has a Physical scheduled on: 05/30/2018    Please call and advise for lab appt.    Thank you

## 2018-03-16 ENCOUNTER — TELEPHONE (OUTPATIENT)
Dept: INTERNAL MEDICINE | Facility: CLINIC | Age: 68
End: 2018-03-16

## 2018-03-16 NOTE — TELEPHONE ENCOUNTER
----- Message from Kathryn Roa sent at 3/14/2018  3:17 PM CDT -----  Contact: self/[phone]  Pt is returning a call from someone in the office. Please call and advise.        Thank You

## 2018-07-26 ENCOUNTER — OFFICE VISIT (OUTPATIENT)
Dept: INTERNAL MEDICINE | Facility: CLINIC | Age: 68
End: 2018-07-26
Payer: MEDICARE

## 2018-07-26 VITALS
SYSTOLIC BLOOD PRESSURE: 106 MMHG | WEIGHT: 145.5 LBS | BODY MASS INDEX: 25.78 KG/M2 | DIASTOLIC BLOOD PRESSURE: 62 MMHG | HEART RATE: 68 BPM | HEIGHT: 63 IN

## 2018-07-26 DIAGNOSIS — Z01.00 ROUTINE EYE EXAM: ICD-10-CM

## 2018-07-26 DIAGNOSIS — Z00.00 ANNUAL PHYSICAL EXAM: Primary | ICD-10-CM

## 2018-07-26 DIAGNOSIS — M17.12 PRIMARY OSTEOARTHRITIS OF LEFT KNEE: ICD-10-CM

## 2018-07-26 DIAGNOSIS — Z23 NEED FOR 23-POLYVALENT PNEUMOCOCCAL POLYSACCHARIDE VACCINE: ICD-10-CM

## 2018-07-26 DIAGNOSIS — H61.21 IMPACTED CERUMEN OF RIGHT EAR: ICD-10-CM

## 2018-07-26 DIAGNOSIS — R20.2 PARESTHESIA OF UPPER EXTREMITY: ICD-10-CM

## 2018-07-26 DIAGNOSIS — Z01.419 WELL WOMAN EXAM: ICD-10-CM

## 2018-07-26 DIAGNOSIS — K21.9 GASTROESOPHAGEAL REFLUX DISEASE, ESOPHAGITIS PRESENCE NOT SPECIFIED: ICD-10-CM

## 2018-07-26 DIAGNOSIS — R74.8 ELEVATED ALKALINE PHOSPHATASE LEVEL: ICD-10-CM

## 2018-07-26 PROCEDURE — 99999 PR PBB SHADOW E&M-EST. PATIENT-LVL III: CPT | Mod: PBBFAC,,, | Performed by: INTERNAL MEDICINE

## 2018-07-26 PROCEDURE — 99397 PER PM REEVAL EST PAT 65+ YR: CPT | Mod: S$GLB,,, | Performed by: INTERNAL MEDICINE

## 2018-07-26 NOTE — PROGRESS NOTES
Subjective:       Patient ID: Sharri Cline is a 68 y.o. female.    Chief Complaint: Annual Exam    HPI    Last visit with me one year ago.  Since then seen by Endocrinology, Orthopedics, and hepatology.    Pins and needles sensations in both arms for last 2-3 mo. Initially more often. L>R. Lasts about 1 min then goes away. No loss of . Past elbows. No similar sensation in legs. No pain in neck or shoulder.    Reports the left knee is still hurting. Going to return to Orthopedics.    Reviewed PMH, PSH, SH, FH, allergies, and medications.     Review of Systems   All other systems reviewed and are negative.      Objective:      Physical Exam   Constitutional: She is oriented to person, place, and time. No distress.   HENT:   Head: Atraumatic.   Right Ear: No tenderness.   Left Ear: Tympanic membrane normal. No tenderness.   Mouth/Throat: Oropharynx is clear and moist. No oropharyngeal exudate.   tympanic membrane obscured by cerumen on right    Eyes: Pupils are equal, round, and reactive to light. Right eye exhibits no discharge. Left eye exhibits no discharge.   Neck: Normal range of motion. No thyromegaly present.   Cardiovascular: Normal rate, regular rhythm and normal heart sounds.    Pulses:       Radial pulses are 2+ on the right side, and 2+ on the left side.   Pulmonary/Chest: Effort normal and breath sounds normal. No stridor. She has no wheezes. She has no rales.   Abdominal: Soft. She exhibits no distension and no mass. There is no tenderness. There is no guarding.   Musculoskeletal: She exhibits no edema (in bilateral ankles) or tenderness (in bilateral calf muscles).        Cervical back: She exhibits normal range of motion, no tenderness and no bony tenderness.   Lymphadenopathy:     She has no cervical adenopathy.   Neurological: She is alert and oriented to person, place, and time.   Reflex Scores:       Bicep reflexes are 2+ on the right side and 2+ on the left side.  Skin: Skin is warm and  "dry. No rash noted.   Skin on hands normal bilaterally    Psychiatric: She has a normal mood and affect. Her behavior is normal.   Nursing note and vitals reviewed.      Vitals:    07/26/18 1459   BP: 106/62   BP Location: Right arm   Patient Position: Sitting   BP Method: Large (Manual)   Pulse: 68   Weight: 66 kg (145 lb 8.1 oz)   Height: 5' 3" (1.6 m)     Body mass index is 25.77 kg/m².    RESULTS: Reviewed labs from last 18 months    The 10-year ASCVD risk score ([place] DA SILVA Jr., et al., 2013) is: 4.9%    Values used to calculate the score:      Age: 68 years      Sex: Female      Is Non- : Yes      Diabetic: No      Tobacco smoker: No      Systolic Blood Pressure: 106 mmHg      Is BP treated: No      HDL Cholesterol: 53 mg/dL      Total Cholesterol: 170 mg/dL     Assessment:       1. Annual physical exam    2. Need for 23-polyvalent pneumococcal polysaccharide vaccine    3. Primary osteoarthritis of left knee    4. Paresthesia of upper extremity    5. Well woman exam    6. Routine eye exam    7. Elevated alkaline phosphatase level    8. Gastroesophageal reflux disease, esophagitis presence not specified    9. Impacted cerumen of right ear        Plan:   Sharri was seen today for annual exam.    Diagnoses and all orders for this visit:    Annual physical exam:  Age-appropriate health screening reviewed, indicated tests ordered.   -     Comprehensive metabolic panel; Future  -     CBC Without Differential; Future  -     Vitamin B12; Future  -     TSH; Future    Need for 23-polyvalent pneumococcal polysaccharide vaccine    Primary osteoarthritis of left knee:  Prior dx, still active, continue follow up with Orthopedics.  -     Ambulatory referral to Orthopedics    Paresthesia of upper extremity:  New problem, bilateral upper extremities distal to elbows but not in ulnar distribution. possibly pulling of brachial plexus due to posture, exercise back muscles and watch posture, Please notify the " office if the symptoms persist or worsen.   -     Comprehensive metabolic panel; Future  -     CBC Without Differential; Future  -     Vitamin B12; Future  -     TSH; Future    Well woman exam    Routine eye exam  -     Ambulatory Referral to Optometry    Elevated alkaline phosphatase level:  Prior dx, stable, seen by Endocrinology and Hepatology in past.  -     Comprehensive metabolic panel; Future    Gastroesophageal reflux disease, esophagitis presence not specified  -     CBC Without Differential; Future    Impacted cerumen of right ear:  recommended oil followed by water irrigation.    Follow-up in about 1 year (around 7/26/2019) for EPP annual exam. labs today.  Jhon Arndt MD  Internal Medicine    Portions of this note were completed using medical dictation software. Please excuse typographical or syntax errors that were missed on review.

## 2018-08-09 ENCOUNTER — TELEPHONE (OUTPATIENT)
Dept: INTERNAL MEDICINE | Facility: CLINIC | Age: 68
End: 2018-08-09

## 2018-08-09 DIAGNOSIS — R79.89 LOW TSH LEVEL: Primary | ICD-10-CM

## 2018-08-10 NOTE — TELEPHONE ENCOUNTER
----- Message from Monik Lemus sent at 8/10/2018 12:52 PM CDT -----  Contact: self /[phone]      ----- Message -----  From: Kathryn Roa  Sent: 8/10/2018  12:13 PM  To: [facility] Staff    Type: Returning a call    Who left a message? Nuvia    When did the practice call? Today    Comments: Please advise.        Thanks

## 2018-08-10 NOTE — TELEPHONE ENCOUNTER
Please call the patient to notify that I reviewed her labs.  Her thyroid is overactive.  I recommend we pursue an ultrasound to see if there any thyroid abnormalities.  Please schedule this with the patient or refer to scheduling desk.  All her other labs are within normal limits.  I have sent the result in a letter.

## 2018-08-20 ENCOUNTER — HOSPITAL ENCOUNTER (OUTPATIENT)
Dept: RADIOLOGY | Facility: HOSPITAL | Age: 68
Discharge: HOME | End: 2018-08-20
Attending: INTERNAL MEDICINE
Payer: MEDICARE

## 2018-08-20 DIAGNOSIS — R79.89 LOW TSH LEVEL: ICD-10-CM

## 2018-08-20 PROCEDURE — 76536 US EXAM OF HEAD AND NECK: CPT | Mod: TC

## 2018-08-20 PROCEDURE — 76536 US EXAM OF HEAD AND NECK: CPT | Mod: 26,,, | Performed by: INTERNAL MEDICINE

## 2018-08-26 ENCOUNTER — TELEPHONE (OUTPATIENT)
Dept: INTERNAL MEDICINE | Facility: CLINIC | Age: 68
End: 2018-08-26

## 2018-08-26 DIAGNOSIS — E04.1 THYROID NODULE: Primary | ICD-10-CM

## 2018-08-26 NOTE — TELEPHONE ENCOUNTER
Please call the patient to notify that there is a thyroid nodule present on ultrasound that requires biopsy for further evaluation. I am referring to Endocrinology to coordinate with the biopsy. Please schedule this appointment with the patient or provide the number for the scheduling desk.

## 2018-08-28 ENCOUNTER — TELEPHONE (OUTPATIENT)
Dept: ENDOCRINOLOGY | Facility: CLINIC | Age: 68
End: 2018-08-28

## 2018-08-28 NOTE — TELEPHONE ENCOUNTER
----- Message from Robina Fontenot sent at 8/28/2018  2:20 PM CDT -----  Pt is in need of an apt with Mariluz Armendariz

## 2018-08-30 ENCOUNTER — OFFICE VISIT (OUTPATIENT)
Dept: ORTHOPEDICS | Facility: CLINIC | Age: 68
End: 2018-08-30
Payer: MEDICARE

## 2018-08-30 ENCOUNTER — HOSPITAL ENCOUNTER (OUTPATIENT)
Dept: RADIOLOGY | Facility: HOSPITAL | Age: 68
Discharge: HOME | End: 2018-08-30
Attending: PHYSICIAN ASSISTANT
Payer: MEDICARE

## 2018-08-30 VITALS
HEIGHT: 63 IN | RESPIRATION RATE: 18 BRPM | TEMPERATURE: 97 F | WEIGHT: 155.63 LBS | DIASTOLIC BLOOD PRESSURE: 77 MMHG | HEART RATE: 75 BPM | BODY MASS INDEX: 27.57 KG/M2 | SYSTOLIC BLOOD PRESSURE: 126 MMHG

## 2018-08-30 DIAGNOSIS — M25.562 PAIN IN BOTH KNEES, UNSPECIFIED CHRONICITY: Primary | ICD-10-CM

## 2018-08-30 DIAGNOSIS — M25.562 PAIN IN BOTH KNEES, UNSPECIFIED CHRONICITY: ICD-10-CM

## 2018-08-30 DIAGNOSIS — M17.0 BILATERAL PRIMARY OSTEOARTHRITIS OF KNEE: ICD-10-CM

## 2018-08-30 DIAGNOSIS — M25.561 PAIN IN BOTH KNEES, UNSPECIFIED CHRONICITY: ICD-10-CM

## 2018-08-30 DIAGNOSIS — M25.561 PAIN IN BOTH KNEES, UNSPECIFIED CHRONICITY: Primary | ICD-10-CM

## 2018-08-30 PROCEDURE — 99213 OFFICE O/P EST LOW 20 MIN: CPT | Mod: S$GLB,,, | Performed by: PHYSICIAN ASSISTANT

## 2018-08-30 PROCEDURE — 73562 X-RAY EXAM OF KNEE 3: CPT | Mod: 26,LT,, | Performed by: RADIOLOGY

## 2018-08-30 PROCEDURE — 73562 X-RAY EXAM OF KNEE 3: CPT | Mod: TC,50

## 2018-08-30 PROCEDURE — 99999 PR PBB SHADOW E&M-EST. PATIENT-LVL III: CPT | Mod: PBBFAC,,, | Performed by: PHYSICIAN ASSISTANT

## 2018-08-30 PROCEDURE — 73562 X-RAY EXAM OF KNEE 3: CPT | Mod: 26,RT,, | Performed by: RADIOLOGY

## 2018-08-30 NOTE — LETTER
August 30, 2018      Jhon Arndt MD  [address]  [facility] [address]           [facility] Orthoped[facility]  [address]  [facility] [address]  Phone: [phone]          Patient: Sharri Cline   MR Number: [id number]   YOB: 1950   Date of Visit: 8/30/2018       Dear Dr. Jhon Arndt:    Thank you for referring Sharri Cline to me for evaluation. Attached you will find relevant portions of my assessment and plan of care.    If you have questions, please do not hesitate to call me. I look forward to following Sharri Cline along with you.    Sincerely,    Keron Holcomb PA-C    Enclosure  CC:  No Recipients    If you would like to receive this communication electronically, please contact [email or web address] or [phone] to request more information on [employer] access.    For providers and/or their staff who would like to refer a patient to [facility], please contact us through our one-stop-shop provider referral line, [facility], at [phone].    If you feel you have received this communication in error or would no longer like to receive these types of communications, please e-mail [email or web address]

## 2018-08-30 NOTE — PROGRESS NOTES
SUBJECTIVE:     Chief Complaint & History of Present Illness:  Sharri Cline is a Established patient 68 y.o. female who is seen here today with a complaint of  bilateral knee pain .  She is here today for evaluation treatment of pain and soreness in bilateral knees for the past several months.  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  She complains specifically of start-up pain especially associated after periods of prolonged riding in the car.  She has tried on both oral and topical anti-inflammatories activity modification diet modification physical therapy and exercise but continues to struggle with soreness and pain in the knees  On a scale of 1-10, with 10 being worst pain imaginable, he rates this pain as 2 on good days and 3 on bad days.  she describes the pain as a sore and achy.    Review of patient's allergies indicates:  No Known Allergies      Current Outpatient Medications   Medication Sig Dispense Refill    b complex vitamins tablet Take 1 tablet by mouth once daily.      calcium carbonate-vit D3-min 600 mg calcium- 400 unit Tab Take 1 tablet by mouth 2 (two) times daily. 60 tablet 3    fish oil-omega-3 fatty acids 300-1,000 mg capsule Take 2 g by mouth once daily.      multivitamin with minerals tablet Take 1 tablet by mouth once daily.         No current facility-administered medications for this visit.        Past Medical History:   Diagnosis Date    Helicobacter pylori antibody positive 5/27/13    treated with clarithromycin, metronidazole, and omeprazole x 14 days  (5/27-6/3); EGD normal in July 2013       Past Surgical History:   Procedure Laterality Date    TUBAL LIGATION         Vital Signs (Most Recent)  Vitals:    08/30/18 1507   BP: 126/77   Pulse: 75   Resp: 18   Temp: 97.3 °F (36.3 °C)           Review of  "Systems:  ROS:  Constitutional: no fever or chills  Eyes: no visual changes  ENT: no nasal congestion or sore throat  Respiratory: no cough or shortness of breath  Cardiovascular: no chest pain or palpitations  Gastrointestinal: no nausea or vomiting, tolerating diet, Positive for GERD  Genitourinary: no hematuria or dysuria  Integument/Breast: no rash or pruritis  Hematologic/Lymphatic: no easy bruising or lymphadenopathy  Musculoskeletal: no arthralgias or myalgias  Neurological: no seizures or tremors  Behavioral/Psych: no auditory or visual hallucinations  Endocrine: no heat or cold intolerance                OBJECTIVE:     PHYSICAL EXAM:  Height: 5' 3" (160 cm) Weight: 70.6 kg (155 lb 10.3 oz), General Appearance: Well nourished, well developed, in no acute distress.  Neurological: Mood & affect are normal.  left  Knee Exam:  Knee Range of Motion:0-120 degrees flexion   Effusion:none  Condition of skin:intact  Location of tenderness:Medial joint line   Strength:5 of 5  Stability:  Lachman: stable, LCL: stable, MCL: stable, PCL: stable and posteromedial (dial): stable  Varus /Valgus stress:  normal  Aleyda:   negative/negative    right  Knee Exam:  Knee Range of Motion:0-120 degrees flexion   Effusion:none  Condition of skin:intact  Location of tenderness:Medial joint line   Strength:5 of 5  Stability:  Lachman: stable, LCL: stable, MCL: stable, PCL: stable and posteromedial (dial): stable  Varus /Valgus stress:  normal  Aleyda:   negative/negative      Hip Examination:  normal    RADIOGRAPHS:  X-rays taken today films reviewed by me demonstrate mild medial joint space narrowing bilateral knees with early sclerotic changes no osteophytic spurring fracture dislocation or other bony abnormalities    ASSESSMENT/PLAN:     Plan: We discussed with the patient at length all the different treatment options available for  the knee including anti-inflammatories, acetaminophen, rest, ice, knee strengthening exercise, " occasional cortisone injections for temporary relief, Viscosupplimentation injections, arthroscopic debridement osteotomy, and finally knee arthroplasty.   The patient like to begin with conservative care she is going to adjust her diet and begin a period of low impact exercise.  She continues to have problems and soreness she may want to consider viscosupplementation injections in the future

## 2018-09-12 ENCOUNTER — OFFICE VISIT (OUTPATIENT)
Dept: OPTOMETRY | Facility: CLINIC | Age: 68
End: 2018-09-12
Payer: MEDICARE

## 2018-09-12 DIAGNOSIS — H25.13 NUCLEAR SCLEROTIC CATARACT OF BOTH EYES: Primary | ICD-10-CM

## 2018-09-12 DIAGNOSIS — H04.123 BILATERAL DRY EYES: ICD-10-CM

## 2018-09-12 DIAGNOSIS — D31.01 NEVUS OF CONJUNCTIVA, RIGHT: ICD-10-CM

## 2018-09-12 DIAGNOSIS — H52.4 HYPEROPIA WITH ASTIGMATISM AND PRESBYOPIA, BILATERAL: ICD-10-CM

## 2018-09-12 DIAGNOSIS — H52.203 HYPEROPIA WITH ASTIGMATISM AND PRESBYOPIA, BILATERAL: ICD-10-CM

## 2018-09-12 DIAGNOSIS — H52.03 HYPEROPIA WITH ASTIGMATISM AND PRESBYOPIA, BILATERAL: ICD-10-CM

## 2018-09-12 PROCEDURE — 99213 OFFICE O/P EST LOW 20 MIN: CPT | Mod: PBBFAC | Performed by: OPTOMETRIST

## 2018-09-12 PROCEDURE — 92015 DETERMINE REFRACTIVE STATE: CPT | Mod: ,,, | Performed by: OPTOMETRIST

## 2018-09-12 PROCEDURE — 99999 PR PBB SHADOW E&M-EST. PATIENT-LVL III: CPT | Mod: PBBFAC,,, | Performed by: OPTOMETRIST

## 2018-09-12 PROCEDURE — 92004 COMPRE OPH EXAM NEW PT 1/>: CPT | Mod: S$PBB,,, | Performed by: OPTOMETRIST

## 2018-09-12 NOTE — PROGRESS NOTES
HPI     Ms. Sharri Cline was referred by Jhon Arndt MD for a routine eye   exam.    Patient reports no complaints about eyes, vision, or glasses today.   Would patient like a refraction today? yes    (-)drops  (-)flashes  (-)floaters  (-)diplopia    Diabetic no   Hemoglobin A1C       Date                     Value               Ref Range             Status           07/22/2014               5.5                 4.5 - 6.2 %         Final    HTN: no  BP Readings from Last 3 Encounters:  08/30/18 : 126/77  07/26/18 : 106/62  06/19/17 : 136/63      OCULAR HISTORY  Last Eye Exam: 04/10/15 with Dr. Kellogg  (-)eye surgery   Cataracts OU  Dry eyes OU  Vitreous floaters OU    FAMILY HISTORY  (-)Glaucoma none         Last edited by Magi Aguila, OD on 9/12/2018  4:06 PM. (History)            Assessment /Plan     For exam results, see Encounter Report.    Nuclear sclerotic cataract of both eyes   Mild and asymptomatic. Monitor.    Bilateral dry eyes   Recommended artificial tears BID OU.    Nevus of conjunctiva, right   Longstanding, stable, and asymptomatic per pt. Monitor.    Hyperopia with astigmatism and presbyopia, bilateral   Relatively stable OU. New glasses prescription released, adaptation expected.  New glasses optional.   Eyeglass Final Rx     Eyeglass Final Rx       Sphere Cylinder Axis Add    Right +0.75 +1.00 100 +2.50    Left +0.75 +0.50 075 +2.50    Expiration Date:  9/13/2019                 RTC 1 year

## 2018-09-12 NOTE — LETTER
September 12, 2018      Jhon Arndt MD  [address]  [place] [address]           [facility]  [address]  [place] [address]  Phone: [phone]          Patient: Sharri Cline   MR Number: [id number]   YOB: 1950   Date of Visit: 9/12/2018       Dear Dr. Jhon Arndt:    Thank you for referring Sharri Cline to me for evaluation. Attached you will find relevant portions of my assessment and plan of care.    If you have questions, please do not hesitate to call me. I look forward to following Sharri Cline along with you.    Sincerely,    Magi Aguila, OD    Enclosure  CC:  No Recipients    If you would like to receive this communication electronically, please contact [email or web address] or [phone] to request more information on [email or web address] Link access.    For providers and/or their staff who would like to refer a patient to [facility], please contact us through our one-stop-shop provider referral line, [facility], at [phone].    If you feel you have received this communication in error or would no longer like to receive these types of communications, please e-mail [email or web address]

## 2018-09-26 ENCOUNTER — OFFICE VISIT (OUTPATIENT)
Dept: ENDOCRINOLOGY | Facility: CLINIC | Age: 68
End: 2018-09-26
Payer: MEDICARE

## 2018-09-26 ENCOUNTER — TELEPHONE (OUTPATIENT)
Dept: ENDOCRINOLOGY | Facility: CLINIC | Age: 68
End: 2018-09-26

## 2018-09-26 VITALS
HEIGHT: 63 IN | WEIGHT: 152.56 LBS | BODY MASS INDEX: 27.03 KG/M2 | RESPIRATION RATE: 16 BRPM | HEART RATE: 60 BPM | DIASTOLIC BLOOD PRESSURE: 68 MMHG | SYSTOLIC BLOOD PRESSURE: 112 MMHG

## 2018-09-26 DIAGNOSIS — N95.8 OTHER SPECIFIED MENOPAUSAL AND PERIMENOPAUSAL DISORDERS: ICD-10-CM

## 2018-09-26 DIAGNOSIS — M85.80 OSTEOPENIA, UNSPECIFIED LOCATION: ICD-10-CM

## 2018-09-26 DIAGNOSIS — R79.89 ABNORMAL TSH: ICD-10-CM

## 2018-09-26 DIAGNOSIS — E04.1 THYROID NODULE: Primary | ICD-10-CM

## 2018-09-26 DIAGNOSIS — R74.8 ELEVATED ALKALINE PHOSPHATASE LEVEL: ICD-10-CM

## 2018-09-26 PROCEDURE — 1101F PT FALLS ASSESS-DOCD LE1/YR: CPT | Mod: CPTII,,, | Performed by: INTERNAL MEDICINE

## 2018-09-26 PROCEDURE — 99999 PR PBB SHADOW E&M-EST. PATIENT-LVL III: CPT | Mod: PBBFAC,,, | Performed by: INTERNAL MEDICINE

## 2018-09-26 PROCEDURE — 99213 OFFICE O/P EST LOW 20 MIN: CPT | Mod: PBBFAC | Performed by: INTERNAL MEDICINE

## 2018-09-26 PROCEDURE — 99214 OFFICE O/P EST MOD 30 MIN: CPT | Mod: S$PBB,,, | Performed by: INTERNAL MEDICINE

## 2018-09-26 NOTE — PROGRESS NOTES
Subjective:      Patient ID: Sharri Cline is a 68 y.o. female.    Chief Complaint:  Abnormal test result       History of Present Illness  Ms. Cline is a very pleasant 67 year old female patient here today for follow up visit. Last office visit in 04/2017     She has chronically elevated alk phos dating back to 2008   Initial endocrine work up showed an elevated liver specific isoenzyme   Bone specific isoenzyme was negative     She denies bone pain   Denies family history of Paget's disease     Denies personal history of liver disease     Evaluated by Hepatology in 2017   Had MRI of abdomen in July 2017 which noted:   Liver normal in size, contour, and parenchymal signal. Portal vein is patent     Patient was instructed to RTC in 3 months for follow up   She is overdue for follow up at this time       She has osteopenia, based on BMD from 04/2017 , frax calculation does not support treatment   She reports eating a calcium rich diet   She stopped taking calcium and vitamin D supplements recently     Denies recent falls or fractures   Exercises a few times per week     Patient had abnormal TSH result in July 2018 per PCP which then prompted a thyroid USS. This was done on 08/20/2018:   Impression       There is a 3.2 cm right lobe nodule that meets TIRADS-4 criteria and FNA biopsy is recommended.    There is a subcentimeter left lobe nodule that does not meet criteria for FNA biopsy or follow-up as detailed above.     The patient denies dysphagia   She endorses occasional hoarseness of voice   Denies shortness of breath in the recumbent position     She denies family history of  thyroid cancer   States her younger brother had Hyperthyroidism     Denies personal history of radiation exposure to her head, face or neck     She denies anterior neck pain or pressure     Denies changes in her neck size     The patient endorses occasional palpitations  She denies heat intolerance, diarrhea, unexpected weight loss,  fatigue or tremors     Review of Systems   Constitutional: Negative for fatigue and unexpected weight change.   HENT: Positive for voice change. Negative for hearing loss and trouble swallowing.    Eyes: Negative for visual disturbance.   Respiratory: Negative for shortness of breath.    Cardiovascular: Positive for palpitations. Negative for chest pain.   Gastrointestinal: Negative for abdominal pain, constipation, diarrhea and nausea.   Endocrine: Negative for cold intolerance and heat intolerance.   Musculoskeletal: Negative for gait problem.   Skin: Negative for rash.   Neurological: Negative for tremors, weakness and headaches.   Psychiatric/Behavioral: Negative for sleep disturbance. The patient is not nervous/anxious.      Objective:   Physical Exam   Constitutional: She is oriented to person, place, and time. She appears well-developed and well-nourished. No distress.   Eyes: No scleral icterus.   Neck: Normal range of motion. Neck supple. No thyromegaly present.   Right thyroid nodule    Cardiovascular: Normal rate.   Pulmonary/Chest: Effort normal.   Musculoskeletal: Normal range of motion. She exhibits no edema.   Lymphadenopathy:     She has no cervical adenopathy.   Neurological: She is alert and oriented to person, place, and time. She displays normal reflexes.   Skin: Skin is warm and dry. No rash noted.   Psychiatric: She has a normal mood and affect.   Nursing note and vitals reviewed.      Lab Review:   Results for orders placed or performed in visit on 07/26/18   Comprehensive metabolic panel   Result Value Ref Range    Sodium 140 136 - 145 mmol/L    Potassium 3.7 3.5 - 5.1 mmol/L    Chloride 105 95 - 110 mmol/L    CO2 27 23 - 29 mmol/L    Glucose 88 70 - 110 mg/dL    BUN, Bld 7 (L) 8 - 23 mg/dL    Creatinine 0.9 0.5 - 1.4 mg/dL    Calcium 10.1 8.7 - 10.5 mg/dL    Total Protein 7.7 6.0 - 8.4 g/dL    Albumin 4.0 3.5 - 5.2 g/dL    Total Bilirubin 1.7 (H) 0.1 - 1.0 mg/dL    Alkaline Phosphatase 170  (H) 55 - 135 U/L    AST 30 10 - 40 U/L    ALT 11 10 - 44 U/L    Anion Gap 8 8 - 16 mmol/L    eGFR if African American >60.0 >60 mL/min/1.73 m^2    eGFR if non African American >60.0 >60 mL/min/1.73 m^2   CBC Without Differential   Result Value Ref Range    WBC 4.05 3.90 - 12.70 K/uL    RBC 4.40 4.00 - 5.40 M/uL    Hemoglobin 12.5 12.0 - 16.0 g/dL    Hematocrit 38.4 37.0 - 48.5 %    MCV 87 82 - 98 fL    MCH 28.4 27.0 - 31.0 pg    MCHC 32.6 32.0 - 36.0 g/dL    RDW 13.8 11.5 - 14.5 %    Platelets 149 (L) 150 - 350 K/uL    MPV 10.7 9.2 - 12.9 fL   Vitamin B12   Result Value Ref Range    Vitamin B-12 428 210 - 950 pg/mL   TSH   Result Value Ref Range    TSH 0.079 (L) 0.400 - 4.000 uIU/mL   T4, free   Result Value Ref Range    Free T4 0.98 0.71 - 1.51 ng/dL     BMD from 04/2017:   Impression       Osteopenia of the lumbar spine, normal BMD of the hip.  The BMDs of the lumbar spine and hip have decreased -8.2% and -10.0% respectively since 2009.  FRAX calculation does not support treatment for osteoporosis      RECOMMENDATIONS:  1. Adequate Calcium and Vitamin D, 1500 mg/day and 400-800 units/days, respectively.  2. Repeat BMD in 2 years.     Assessment:     1. Thyroid nodule     2. Abnormal TSH     3. Elevated alkaline phosphatase level     4. Osteopenia, unspecified location       Plan:     1. Thyroid nodule   -- reviewed thyroid USS images   -- discussed indications for FNA: size, calcification  -- reviewed possible FNA biopsy results including benign, malignancy, suspicious for malignancy, FLUS or non diagnostic   -- explained that FLUS or non diagnostic results will require repeat FNA   -- all of the patient's questions were answered   -- will proceed with FNA biopsy if TSH is normal     2. Abnormal TSH   -- repeat labs today   -- if TSH remains suppressed, will plan for NM thyroid uptake and scan first to determine if the dominant nodule is hot or cold     3. Elevated alk phos  -- chronically elevated   -- continue  to follow up with Hepatology as advised     4. Osteopenia   -- reassuring she is not fracturing   -- repeat BMD due 04/2019   -- RDA of calcium and vitamin D discussed, calcium from food sources are preferred   -- continue weight bearing exercises as tolerated   -- emphasized fall safety and fall precautions

## 2018-09-26 NOTE — LETTER
September 26, 2018      Jhon Arndt MD  [address]  [place] [address]           [facility]  [address]  [facility] [address]  Phone: [phone]          Patient: Sharri Cline   MR Number: [id number]   YOB: 1950   Date of Visit: 9/26/2018       Dear Dr. Jhon Arndt:    Thank you for referring Sharri Cline to me for evaluation. Attached you will find relevant portions of my assessment and plan of care.    If you have questions, please do not hesitate to call me. I look forward to following Sharri Cline along with you.    Sincerely,    Mariluz Phelps, NP    Enclosure  CC:  No Recipients    If you would like to receive this communication electronically, please contact [email or web address] or [phone] to request more information on [employer] access.    For providers and/or their staff who would like to refer a patient to [facility], please contact us through our one-stop-shop provider referral line, [facility], at [phone].    If you feel you have received this communication in error or would no longer like to receive these types of communications, please e-mail [email or web address]

## 2018-10-18 ENCOUNTER — TELEPHONE (OUTPATIENT)
Dept: ENDOCRINOLOGY | Facility: CLINIC | Age: 68
End: 2018-10-18

## 2018-10-18 NOTE — TELEPHONE ENCOUNTER
----- Message from Latoya Ledesma sent at 10/18/2018 12:27 PM CDT -----  Contact: patient  Called to reschedule 10.19.18 biopsy    She can be reached at [phone]    Thanks  KB

## 2018-10-19 ENCOUNTER — HOSPITAL ENCOUNTER (OUTPATIENT)
Dept: ENDOCRINOLOGY | Facility: CLINIC | Age: 68
Discharge: HOME | End: 2018-10-19
Attending: INTERNAL MEDICINE
Payer: COMMERCIAL

## 2018-10-19 DIAGNOSIS — E04.1 THYROID NODULE: ICD-10-CM

## 2018-10-19 PROCEDURE — 10022 US FINE NEEDLE ASPIRATION WITH IMAGING: CPT | Mod: ,,, | Performed by: INTERNAL MEDICINE

## 2018-10-19 PROCEDURE — 88173 CYTOPATH EVAL FNA REPORT: CPT | Performed by: PATHOLOGY

## 2018-10-19 PROCEDURE — 76942 ECHO GUIDE FOR BIOPSY: CPT | Mod: 26,,, | Performed by: INTERNAL MEDICINE

## 2018-10-24 ENCOUNTER — TELEPHONE (OUTPATIENT)
Dept: ENDOCRINOLOGY | Facility: CLINIC | Age: 68
End: 2018-10-24

## 2018-10-24 ENCOUNTER — TELEPHONE (OUTPATIENT)
Dept: HEPATOLOGY | Facility: CLINIC | Age: 68
End: 2018-10-24

## 2018-10-24 ENCOUNTER — OFFICE VISIT (OUTPATIENT)
Dept: HEPATOLOGY | Facility: CLINIC | Age: 68
End: 2018-10-24
Payer: COMMERCIAL

## 2018-10-24 VITALS
SYSTOLIC BLOOD PRESSURE: 162 MMHG | BODY MASS INDEX: 27.89 KG/M2 | OXYGEN SATURATION: 94 % | HEIGHT: 63 IN | WEIGHT: 157.44 LBS | HEART RATE: 81 BPM | DIASTOLIC BLOOD PRESSURE: 66 MMHG

## 2018-10-24 DIAGNOSIS — E04.1 THYROID NODULE: Primary | ICD-10-CM

## 2018-10-24 DIAGNOSIS — R79.89 ELEVATED LIVER FUNCTION TESTS: Primary | ICD-10-CM

## 2018-10-24 PROCEDURE — 99214 OFFICE O/P EST MOD 30 MIN: CPT | Mod: S$PBB,,, | Performed by: INTERNAL MEDICINE

## 2018-10-24 PROCEDURE — 1101F PT FALLS ASSESS-DOCD LE1/YR: CPT | Mod: CPTII,,, | Performed by: INTERNAL MEDICINE

## 2018-10-24 PROCEDURE — 99214 OFFICE O/P EST MOD 30 MIN: CPT | Mod: PBBFAC | Performed by: INTERNAL MEDICINE

## 2018-10-24 PROCEDURE — 99999 PR PBB SHADOW E&M-EST. PATIENT-LVL IV: CPT | Mod: PBBFAC,,, | Performed by: INTERNAL MEDICINE

## 2018-10-24 NOTE — TELEPHONE ENCOUNTER
Patient seen in clinic today 10/24/18 with Dr Mai Villanueva. The patient will need an U/S Guided Liver Biopsy. Pt agreed to do the Biopsy.  Pre and Post Procedure teaching done.  Written instructions given to the patient.  Stopped Fish Oil and Vitamin on today 10/24/18 and will resume after the biopsy.    Pre Procedure labs scheduled for today 10/24/185  U/S scheduled for Sat 10/27/18.  The patient would like to do the Liver Biopsy on Mon 11/19/18 early am.  Verbalized understanding and agreed.

## 2018-10-24 NOTE — PATIENT INSTRUCTIONS
Your liver tests remain elevated.  The cause is not clear.    We will proceed with liver biopsy.      US and labs within 1 month with plan for biopsy the week of Thanksgiving.    Return to clinic in early December 2018 following biopsy.

## 2018-10-24 NOTE — TELEPHONE ENCOUNTER
Patient notified via phone to discuss thyroid FNA biopsy results. She did not answer. I recorded a voicemail message stating the thyroid biopsy is benign. Only normal thyroid cells identified. Also stated that we recommend a repeat thyroid USS and TSH in 1 year. Patient advised to return my call if she has any additional questions or concerns. Office number provided.

## 2018-10-24 NOTE — PROGRESS NOTES
Hepatology Follow-up Note    Chief complaint: elevated liver tests     HPI:  Sharri Cline is a 68 y.o. female that presents to hepatology clinic for follow-up of elevated liver enzymes. She is alone.    The patient was last seen in clinic on 6/19/2017.  She has had clinical stability during this time.      The patient has had mild elevations of AP since 2012 but reports that she was first aware of this abnormality 2 years ago.  Patient has had isoenzymes which show elevation of liver component and normal fractionation of bone despite issues with osteopenia.  AMA negative.  The patient has had no symptoms of chronic liver disease during this time.  There is no family history of liver disease.  She only takes vitamin D, beta-carotene in addition to listed medications.  No recent changes or herbal meds.  She has had 20 lb intentional weight loss over the last 8 months with change in diet.  The patient reports minimal alcohol consumption.      US with reported hepatic steatosis, no other abnormalities.  Patient without significant risk factors for BARRAZA.        Patient Active Problem List   Diagnosis    Menopause    Atrophic vaginitis    Elevated alkaline phosphatase level    GERD (gastroesophageal reflux disease)    Idiopathic guttate hypomelanosis    Osteopenia    Thyroid nodule    Abnormal TSH       Past Medical History:   Diagnosis Date    Helicobacter pylori antibody positive 5/27/13    treated with clarithromycin, metronidazole, and omeprazole x 14 days  (5/27-6/3); EGD normal in July 2013       Past Surgical History:   Procedure Laterality Date    COLONOSCOPY N/A 7/9/2013    Performed by Ha [facility]rington [facility] at [facility] ENDO (4TH FLR)    EGD (ESOPHAGOGASTRODUODENOSCOPY) N/A 7/9/2013    Performed by Ha [facility]rington [facility] at [facility] ENDO (4TH FLR)    EXCISION-LYMPH NODES Right 1/8/2016    Performed by Sanya Farah MD at [facility] OR 2ND FLR    PAROTIDECTOMY Right 1/8/2016    Performed by  [facility] at [facility] OR [facility]    TUBAL LIGATION         Family History   Problem Relation Age of Onset    Diabetes Father     Hypertension Mother     Miscarriages / Stillbirths Mother     ANGEL disease Mother     Eczema Mother     Parkinsonism Mother     Diabetes Brother     Diabetes Brother     Colon cancer Maternal Aunt     No Known Problems Sister     No Known Problems Maternal Uncle     No Known Problems Paternal Aunt     No Known Problems Paternal Uncle     No Known Problems Maternal Grandmother     No Known Problems Maternal Grandfather     No Known Problems Paternal Grandmother     No Known Problems Paternal Grandfather     Celiac disease Neg Hx     Cirrhosis Neg Hx     Colon polyps Neg Hx     Crohn's disease Neg Hx     Cystic fibrosis Neg Hx     Esophageal cancer Neg Hx     Hemochromatosis Neg Hx     Inflammatory bowel disease Neg Hx     Irritable bowel syndrome Neg Hx     Liver cancer Neg Hx     Liver disease Neg Hx     Rectal cancer Neg Hx     Stomach cancer Neg Hx     Ulcerative colitis Neg Hx     Anthony's disease Neg Hx     Psoriasis Neg Hx     Ovarian cancer Neg Hx     Breast cancer Neg Hx     Amblyopia Neg Hx     Blindness Neg Hx     Cancer Neg Hx     Cataracts Neg Hx     Glaucoma Neg Hx     Macular degeneration Neg Hx     Retinal detachment Neg Hx     Strabismus Neg Hx     Stroke Neg Hx     Thyroid disease Neg Hx        Social History     Socioeconomic History    Marital status:      Spouse name: None    Number of children: None    Years of education: None    Highest education level: None   Social Needs    Financial resource strain: None    Food insecurity - worry: None    Food insecurity - inability: None    Transportation needs - medical: None    Transportation needs - non-medical: None   Occupational History    Occupation:  worker     Employer: [employer]   Tobacco Use    Smoking status: Never Smoker     "Smokeless tobacco: Never Used   Substance and Sexual Activity    Alcohol use: Yes     Comment: Social    Drug use: No    Sexual activity: Yes     Partners: Male     Birth control/protection: Post-menopausal   Other Topics Concern    Are you pregnant or think you may be? No    Breast-feeding No   Social History Narrative    None       Current Outpatient Medications   Medication Sig Dispense Refill    b complex vitamins tablet Take 1 tablet by mouth once daily.      calcium carbonate-vit D3-min 600 mg calcium- 400 unit Tab Take 1 tablet by mouth 2 (two) times daily. 60 tablet 3    fish oil-omega-3 fatty acids 300-1,000 mg capsule Take 2 g by mouth once daily.      multivitamin with minerals tablet Take 1 tablet by mouth once daily.         No current facility-administered medications for this visit.        Review of patient's allergies indicates:  No Known Allergies    Review of Systems   Constitutional: Negative for chills, fever, malaise/fatigue and weight loss.   Eyes: Negative.    Respiratory: Negative for cough and shortness of breath.    Cardiovascular: Negative for chest pain and leg swelling.   Gastrointestinal: Negative for abdominal pain, heartburn, nausea and vomiting.   Musculoskeletal: Negative for joint pain and myalgias.   Skin: Negative for itching and rash.   Neurological: Negative for dizziness, focal weakness and headaches.   Endo/Heme/Allergies: Does not bruise/bleed easily.   Psychiatric/Behavioral: Negative for depression.       Vitals:    10/24/18 1519   BP: (!) 162/66   Pulse: 81   SpO2: (!) 94%   Weight: 71.4 kg (157 lb 6.5 oz)   Height: 5' 3" (1.6 m)       Physical Exam   Constitutional: She is oriented to person, place, and time. She appears well-developed and well-nourished. No distress.   HENT:   Head: Normocephalic and atraumatic.   Mouth/Throat: Oropharynx is clear and moist.   Eyes: EOM are normal. Pupils are equal, round, and reactive to light. No scleral icterus.   Neck: " Normal range of motion. Neck supple. No thyromegaly present.   Cardiovascular: Normal rate, regular rhythm and normal heart sounds. Exam reveals no gallop and no friction rub.   No murmur heard.  Pulmonary/Chest: Effort normal. No respiratory distress. She has no wheezes. She has no rales.   Abdominal: Soft. Bowel sounds are normal. She exhibits no distension. There is no tenderness.   Musculoskeletal: Normal range of motion. She exhibits no edema.   Lymphadenopathy:     She has no cervical adenopathy.   Neurological: She is alert and oriented to person, place, and time.   Skin: Skin is warm and dry. No rash noted.   Psychiatric: She has a normal mood and affect. Her behavior is normal.   Vitals reviewed.      Lab Results   Component Value Date    ALT 11 07/26/2018    AST 30 07/26/2018    GGT 21 02/06/2017    ALKPHOS 170 (H) 07/26/2018    BILITOT 1.7 (H) 07/26/2018       Lab Results   Component Value Date    WBC 4.05 07/26/2018    HGB 12.5 07/26/2018    HCT 38.4 07/26/2018    MCV 87 07/26/2018     (L) 07/26/2018       Lab Results   Component Value Date     07/26/2018    K 3.7 07/26/2018     07/26/2018    CO2 27 07/26/2018    BUN 7 (L) 07/26/2018    CREATININE 0.9 07/26/2018    CALCIUM 10.1 07/26/2018    ANIONGAP 8 07/26/2018    ESTGFRAFRICA >60.0 07/26/2018    EGFRNONAA >60.0 07/26/2018       Imaging: EMR reviewed     Assessment:  68 y.o. female presenting with elevated AP and intermittent t bili without clear etiology.  Given negative serologic work-up with persistent abnormality, recommend liver biopsy.  Patient amenable.    *  Updated labs and US  *  Percutaneous liver biopsy within 1 month     RTC in early December 2018 to review results

## 2018-10-25 ENCOUNTER — TELEPHONE (OUTPATIENT)
Dept: HEPATOLOGY | Facility: CLINIC | Age: 68
End: 2018-10-25

## 2018-10-25 NOTE — TELEPHONE ENCOUNTER
----- Message from Mai Villanueva MD sent at 10/25/2018  7:52 AM CDT -----  Please inform patient that labs mildly improved but remain abnormal.  Proceed to liver biopsy as discussed.

## 2018-10-27 ENCOUNTER — HOSPITAL ENCOUNTER (OUTPATIENT)
Dept: RADIOLOGY | Facility: HOSPITAL | Age: 68
Discharge: HOME | End: 2018-10-27
Attending: INTERNAL MEDICINE
Payer: COMMERCIAL

## 2018-10-27 DIAGNOSIS — R79.89 ELEVATED LIVER FUNCTION TESTS: ICD-10-CM

## 2018-10-27 PROCEDURE — 76705 ECHO EXAM OF ABDOMEN: CPT | Mod: 26,XS,, | Performed by: INTERNAL MEDICINE

## 2018-10-27 PROCEDURE — 93975 VASCULAR STUDY: CPT | Mod: TC

## 2018-10-27 PROCEDURE — 93975 VASCULAR STUDY: CPT | Mod: 26,,, | Performed by: INTERNAL MEDICINE

## 2018-10-29 ENCOUNTER — TELEPHONE (OUTPATIENT)
Dept: HEPATOLOGY | Facility: CLINIC | Age: 68
End: 2018-10-29

## 2018-10-29 NOTE — TELEPHONE ENCOUNTER
Patient completed her Pre Procedure testing for the U/S Guided Liver Biopsy.   Request faxed to Radiology for an appt date. The patient is requesting Mon 11/19/18 with a 7 am check in time.

## 2018-10-30 ENCOUNTER — TELEPHONE (OUTPATIENT)
Dept: HEPATOLOGY | Facility: CLINIC | Age: 68
End: 2018-10-30

## 2018-10-30 NOTE — TELEPHONE ENCOUNTER
Patient called this morning and message left with the patient from Dr Villanueva that your recent Labs and U/S are stable and proceed with the Liver Biopsy within 1 month.    Radiology called this morning. Request was faxed on 10/25/18 for an appointment for the Liver Biopsy. The pt requested Mon 11/19/18 at 7 am.  [facility] stated they do not have the schedule for the 19th yet.  Pt informed I will get back with her about the appt date.

## 2018-10-30 NOTE — TELEPHONE ENCOUNTER
----- Message from Mai Villanueva MD sent at 10/29/2018  3:50 PM CDT -----  Ultrasound and labs stable, proceed with liver biopsy within 1 month

## 2018-11-12 ENCOUNTER — TELEPHONE (OUTPATIENT)
Dept: HEPATOLOGY | Facility: CLINIC | Age: 68
End: 2018-11-12

## 2018-11-12 DIAGNOSIS — R79.89 ELEVATED LIVER FUNCTION TESTS: Primary | ICD-10-CM

## 2018-11-12 NOTE — TELEPHONE ENCOUNTER
Received call from Radiology with a approval of the patients requested date for the U/S Guided Liver Biopsy. The date is for Monday 11/19/18 with Dosc at 7 am and Procedure at 8 am.    Patient called. Date and time is acceptable.  Patient started Holding the Omega 3 Fatty Acid, Fish Oil and Vitamin E on 10/24/18.  May resume after the procedure.  Pre and Post Procedure teaching done with the patient.   F/U appt with Dr Villanueva will be on 12/12/18 at 4 pm.  Verbalized understanding and agreed.

## 2018-11-19 ENCOUNTER — HOSPITAL ENCOUNTER (OUTPATIENT)
Facility: HOSPITAL | Age: 68
Discharge: HOME | End: 2018-11-19
Attending: INTERNAL MEDICINE | Admitting: INTERNAL MEDICINE
Payer: COMMERCIAL

## 2018-11-19 VITALS
BODY MASS INDEX: 27.46 KG/M2 | TEMPERATURE: 98 F | HEART RATE: 64 BPM | DIASTOLIC BLOOD PRESSURE: 64 MMHG | WEIGHT: 155 LBS | OXYGEN SATURATION: 100 % | SYSTOLIC BLOOD PRESSURE: 127 MMHG | RESPIRATION RATE: 16 BRPM | HEIGHT: 63 IN

## 2018-11-19 DIAGNOSIS — R79.89 ELEVATED LIVER FUNCTION TESTS: ICD-10-CM

## 2018-11-19 DIAGNOSIS — R79.89 ELEVATED LFTS: ICD-10-CM

## 2018-11-19 PROCEDURE — 88313 SPECIAL STAINS GROUP 2: CPT | Mod: 26,,, | Performed by: PATHOLOGY

## 2018-11-19 PROCEDURE — 88307 TISSUE EXAM BY PATHOLOGIST: CPT | Performed by: PATHOLOGY

## 2018-11-19 PROCEDURE — 88307 TISSUE EXAM BY PATHOLOGIST: CPT | Mod: 26,,, | Performed by: PATHOLOGY

## 2018-11-19 PROCEDURE — 63600175 PHARM REV CODE 636 W HCPCS: Performed by: STUDENT IN AN ORGANIZED HEALTH CARE EDUCATION/TRAINING PROGRAM

## 2018-11-19 PROCEDURE — 25000003 PHARM REV CODE 250: Performed by: STUDENT IN AN ORGANIZED HEALTH CARE EDUCATION/TRAINING PROGRAM

## 2018-11-19 PROCEDURE — 88313 SPECIAL STAINS GROUP 2: CPT | Performed by: PATHOLOGY

## 2018-11-19 RX ORDER — MIDAZOLAM HYDROCHLORIDE 1 MG/ML
INJECTION INTRAMUSCULAR; INTRAVENOUS CODE/TRAUMA/SEDATION MEDICATION
Status: COMPLETED | OUTPATIENT
Start: 2018-11-19 | End: 2018-11-19

## 2018-11-19 RX ORDER — FENTANYL CITRATE 50 UG/ML
INJECTION, SOLUTION INTRAMUSCULAR; INTRAVENOUS CODE/TRAUMA/SEDATION MEDICATION
Status: COMPLETED | OUTPATIENT
Start: 2018-11-19 | End: 2018-11-19

## 2018-11-19 RX ORDER — SODIUM CHLORIDE 9 MG/ML
INJECTION, SOLUTION INTRAVENOUS CONTINUOUS
Status: DISCONTINUED | OUTPATIENT
Start: 2018-11-19 | End: 2018-11-19 | Stop reason: HOSPADM

## 2018-11-19 RX ADMIN — SODIUM CHLORIDE 500 ML: 0.9 INJECTION, SOLUTION INTRAVENOUS at 08:11

## 2018-11-19 RX ADMIN — FENTANYL CITRATE 25 MCG: 50 INJECTION, SOLUTION INTRAMUSCULAR; INTRAVENOUS at 08:11

## 2018-11-19 RX ADMIN — FENTANYL CITRATE 50 MCG: 50 INJECTION, SOLUTION INTRAMUSCULAR; INTRAVENOUS at 08:11

## 2018-11-19 RX ADMIN — MIDAZOLAM HYDROCHLORIDE 0.5 MG: 1 INJECTION, SOLUTION INTRAMUSCULAR; INTRAVENOUS at 08:11

## 2018-11-19 RX ADMIN — MIDAZOLAM HYDROCHLORIDE 1 MG: 1 INJECTION, SOLUTION INTRAMUSCULAR; INTRAVENOUS at 08:11

## 2018-11-19 NOTE — DISCHARGE SUMMARY
Radiology Discharge Summary      Hospital Course: No complications    Admit Date: 11/19/2018  Discharge Date: 11/19/2018     Instructions Given to Patient: Yes  Diet: Resume prior diet  Activity: activity as tolerated and no driving for today    Description of Condition on Discharge: Stable  Vital Signs (Most Recent): Temp: 97.7 °F (36.5 °C) (11/19/18 0752)  Pulse: (!) 59 (11/19/18 0900)  Resp: 16 (11/19/18 0900)  BP: 139/79 (11/19/18 0900)  SpO2: 100 % (11/19/18 0900)    Discharge Disposition: Home    Discharge Diagnosis: Concern for liver dysfunction    Procedure performed: Liver biopsy    Followup: No dedicated follow up with radiology is required. Patient instructed to call if there is any complication, post procedural pain, or signs of infection. Return to PCP for results, as previously scheduled.      Roberto Robertson MD  Radiology

## 2018-11-19 NOTE — PROCEDURES
Radiology Post-Procedure Note    Pre Op Diagnosis: Abnormal LFTs    Post Op Diagnosis: Same    Procedure: Ultrasound guided liver biopsy    Procedure performed by: Ria Perea MD    Written Informed Consent Obtained: Yes    Specimen Removed: Yes: Single 16 gauge core biopsy of liver    Estimated Blood Loss: Minimal    Findings: Moderate sedation and local anesthesia were used.    A 16-gauge Monopty biopsy device was used to remove 1 random left hepatic lobe specimen.  This specimen was sent to pathology for further evaluation.      The patient tolerated the procedure well and there were no complications.  Please see Imaging report for further details.      Roberto Robertson MD  Radiology

## 2018-11-19 NOTE — DISCHARGE INSTRUCTIONS
For scheduling: Call Alisia at [phone]    For questions or concerns call: MATEO MON-FRI 8 AM- 5PM [phone].   Radiology resident on call [phone].    For immediate concerns that are not emergent, you may call our radiology clinic at: [phone]

## 2018-11-19 NOTE — PROGRESS NOTES
Patient arrived in [facility] bay 4 via stretcher post procedure.  Patient denies pain or discomfort. Dressing to right abdomen is clean, dry and intact.

## 2018-11-19 NOTE — PROGRESS NOTES
US guided liver biopsy procedure complete. Patient tolerated well, no acute signs of distress noted. VSS. Dressing clean, dry and intact. Sandbag in place until 0950. Patient ready for transfer to [facility].

## 2018-11-19 NOTE — H&P
Radiology History & Physical      SUBJECTIVE:     Chief Complaint: Concern for liver dysfunction    History of Present Illness:  Sharri Cline is a 68 y.o. female who presents for Liver biopsy  Past Medical History:   Diagnosis Date    Helicobacter pylori antibody positive 5/27/13    treated with clarithromycin, metronidazole, and omeprazole x 14 days  (5/27-6/3); EGD normal in July 2013     Past Surgical History:   Procedure Laterality Date    COLONOSCOPY N/A 7/9/2013    [facility] (4TH FLR)    EGD (ESOPHAGOGASTRODUODENOSCOPY) N/A 7/9/2013    Performed by Ha Harrington MD at [facility] ENDO (4TH FLR)    EXCISION-LYMPH NODES Right 1/8/2016    Performed by Sanya [facility]ah MD at [facility] OR 2ND FLR    PAROTIDECTOMY Right 1/8/2016    Performed by S[facility] MD at [facility] OR 2ND FLR    TUBAL LIGATION         Home Meds:   Prior to Admission medications    Medication Sig Start Date End Date Taking? Authorizing Provider   b complex vitamins tablet Take 1 tablet by mouth once daily.    Historical Provider, MD   calcium carbonate-vit D3-min 600 mg calcium- 400 unit Tab Take 1 tablet by mouth 2 (two) times daily. 11/26/12   nAdre Owens MD   fish oil-omega-3 fatty acids 300-1,000 mg capsule Take 2 g by mouth once daily.    Historical Provider, MD   multivitamin with minerals tablet Take 1 tablet by mouth once daily.      Historical Provider, MD     Anticoagulants/Antiplatelets: no anticoagulation. Fish oil held for more than 5 days.    Allergies: Review of patient's allergies indicates:  No Known Allergies  Sedation History:  no adverse reactions    Review of Systems:   Hematological: no known coagulopathies  Respiratory: no shortness of breath  Cardiovascular: no chest pain  Gastrointestinal: no abdominal pain  Genito-Urinary: no dysuria  Musculoskeletal: negative  Neurological: no TIA or stroke symptoms         OBJECTIVE:     Vital Signs (Most  Recent)       Physical Exam:  ASA: 3  Mallampati: 3    General: no acute distress  Mental Status: alert and oriented to person, place and time  HEENT: normocephalic, atraumatic  Chest: unlabored breathing  Heart: no heaves  Abdomen: nondistended  Extremity: moves all extremities    Laboratory  Lab Results   Component Value Date    INR 1.1 10/24/2018       Lab Results   Component Value Date    WBC 5.20 10/24/2018    HGB 12.0 10/24/2018    HCT 37.7 10/24/2018    MCV 91 10/24/2018     10/24/2018      Lab Results   Component Value Date    GLU 70 10/24/2018     10/24/2018    K 4.2 10/24/2018     10/24/2018    CO2 23 10/24/2018    BUN 17 10/24/2018    CREATININE 0.9 10/24/2018    CALCIUM 9.4 10/24/2018    ALT 13 10/24/2018    AST 25 10/24/2018    ALBUMIN 3.5 10/24/2018    BILITOT 1.1 (H) 10/24/2018    BILIDIR 0.5 (H) 06/19/2017       ASSESSMENT/PLAN:     Sedation Plan: moderate  Patient will undergo US guided liver biopsy.    Roberto Robertson MD  Radiology

## 2018-11-27 ENCOUNTER — TELEPHONE (OUTPATIENT)
Dept: HEPATOLOGY | Facility: CLINIC | Age: 68
End: 2018-11-27

## 2018-12-12 ENCOUNTER — OFFICE VISIT (OUTPATIENT)
Dept: HEPATOLOGY | Facility: CLINIC | Age: 68
End: 2018-12-12
Payer: COMMERCIAL

## 2018-12-12 VITALS
RESPIRATION RATE: 18 BRPM | OXYGEN SATURATION: 100 % | HEIGHT: 63 IN | HEART RATE: 69 BPM | WEIGHT: 157.63 LBS | BODY MASS INDEX: 27.93 KG/M2 | DIASTOLIC BLOOD PRESSURE: 64 MMHG | SYSTOLIC BLOOD PRESSURE: 134 MMHG

## 2018-12-12 DIAGNOSIS — R79.89 ELEVATED LIVER FUNCTION TESTS: Primary | ICD-10-CM

## 2018-12-12 PROCEDURE — 99999 PR PBB SHADOW E&M-EST. PATIENT-LVL IV: CPT | Mod: PBBFAC,,, | Performed by: INTERNAL MEDICINE

## 2018-12-12 PROCEDURE — 1101F PT FALLS ASSESS-DOCD LE1/YR: CPT | Mod: CPTII,S$GLB,, | Performed by: INTERNAL MEDICINE

## 2018-12-12 PROCEDURE — 99214 OFFICE O/P EST MOD 30 MIN: CPT | Mod: S$GLB,,, | Performed by: INTERNAL MEDICINE

## 2018-12-12 NOTE — PATIENT INSTRUCTIONS
Your liver biopsy results are reassuring.      There is no fatty liver or significant damage.  There is no scar tissue in your liver.    We will continue to monitor liver tests at least once per year.    Return to clinic in 12 months with liver tests for the same day

## 2018-12-12 NOTE — PROGRESS NOTES
Hepatology Follow-up Note    Chief complaint: elevated liver tests     HPI:  Sharri Cline is a 68 y.o. female that presents to hepatology clinic for follow-up of elevated liver enzymes. She is alone.    The patient was last seen in clinic on 10/2018.  She has had clinical stability during this time.  Comes in to discuss liver biopsy results.      The patient has had mild elevations of AP since 2012 but reports that she was first aware of this abnormality 2 years ago.  Patient has had isoenzymes which show elevation of liver component and normal fractionation of bone despite issues with osteopenia.  AMA negative.  The patient has had no symptoms of chronic liver disease during this time.  There is no family history of liver disease.  She only takes vitamin D, beta-carotene in addition to listed medications.  No recent changes or herbal meds.  She has had 20 lb intentional weight loss over the last 8 months with change in diet.  The patient reports minimal alcohol consumption.      US with reported hepatic steatosis, no other abnormalities.  Patient without significant risk factors for BARRAZA.        Patient Active Problem List   Diagnosis    Menopause    Atrophic vaginitis    Elevated alkaline phosphatase level    GERD (gastroesophageal reflux disease)    Idiopathic guttate hypomelanosis    Osteopenia    Thyroid nodule    Abnormal TSH    Elevated LFTs       Past Medical History:   Diagnosis Date    Helicobacter pylori antibody positive 5/27/13    treated with clarithromycin, metronidazole, and omeprazole x 14 days  (5/27-6/3); EGD normal in July 2013       Past Surgical History:   Procedure Laterality Date    BIOPSY OF LIVER WITH ULTRASOUND GUIDANCE N/A 11/19/2018    Procedure: BIOPSY, LIVER, WITH US GUIDANCE;  Surgeon: Gordon Diagnostic Provider;  Location: [facility] OR [address] [place] [address];  Service: Radiology;  Laterality: N/A;  U/S GUIDED LIVER ([id number]).  11/19/2018  DOSC 7 AM  PROCEDURE 8 AM.  DR CARITO LAGOS.  DB  11/12/18 3:55P    BIOPSY, LIVER, WITH US GUIDANCE N/A 11/19/2018    Performed by [facility] at [facility] OR 2ND FLR    COLONOSCOPY N/A 7/9/2013    Performed by Ha Harrington MD at [facility] ENDO (4TH FLR)    EGD (ESOPHAGOGASTRODUODENOSCOPY) N/A 7/9/2013    Performed by Ha Harrington MD at [facility] ENDO (4TH FLR)    EXCISION-LYMPH NODES Right 1/8/2016    Performed by Sanya Farah MD at [facility] OR 2ND FLR    PAROTIDECTOMY Right 1/8/2016    Performed by Sanya Farah MD at [facility] OR 2ND FLR    TUBAL LIGATION         Family History   Problem Relation Age of Onset    Diabetes Father     Hypertension Mother     Miscarriages / Stillbirths Mother     ANGEL disease Mother     Eczema Mother     Parkinsonism Mother     Diabetes Brother     Diabetes Brother     Colon cancer Maternal Aunt     No Known Problems Sister     No Known Problems Maternal Uncle     No Known Problems Paternal Aunt     No Known Problems Paternal Uncle     No Known Problems Maternal Grandmother     No Known Problems Maternal Grandfather     No Known Problems Paternal Grandmother     No Known Problems Paternal Grandfather     Celiac disease Neg Hx     Cirrhosis Neg Hx     Colon polyps Neg Hx     Crohn's disease Neg Hx     Cystic fibrosis Neg Hx     Esophageal cancer Neg Hx     Hemochromatosis Neg Hx     Inflammatory bowel disease Neg Hx     Irritable bowel syndrome Neg Hx     Liver cancer Neg Hx     Liver disease Neg Hx     Rectal cancer Neg Hx     Stomach cancer Neg Hx     Ulcerative colitis Neg Hx     Anthony's disease Neg Hx     Psoriasis Neg Hx     Ovarian cancer Neg Hx     Breast cancer Neg Hx     Amblyopia Neg Hx     Blindness Neg Hx     Cancer Neg Hx     Cataracts Neg Hx     Glaucoma Neg Hx     Macular degeneration Neg Hx     Retinal detachment Neg Hx     Strabismus Neg Hx     Stroke Neg Hx     Thyroid disease Neg Hx        Social History     Socioeconomic History    Marital  status:      Spouse name: None    Number of children: None    Years of education: None    Highest education level: None   Social Needs    Financial resource strain: None    Food insecurity - worry: None    Food insecurity - inability: None    Transportation needs - medical: None    Transportation needs - non-medical: None   Occupational History    Occupation:  worker     Employer: [employer]   Tobacco Use    Smoking status: Never Smoker    Smokeless tobacco: Never Used   Substance and Sexual Activity    Alcohol use: Yes     Comment: Social    Drug use: No    Sexual activity: Yes     Partners: Male     Birth control/protection: Post-menopausal   Other Topics Concern    Are you pregnant or think you may be? No    Breast-feeding No   Social History Narrative    None       Current Outpatient Medications   Medication Sig Dispense Refill    b complex vitamins tablet Take 1 tablet by mouth once daily.      calcium carbonate-vit D3-min 600 mg calcium- 400 unit Tab Take 1 tablet by mouth 2 (two) times daily. 60 tablet 3    fish oil-omega-3 fatty acids 300-1,000 mg capsule Take 2 g by mouth once daily.      multivitamin with minerals tablet Take 1 tablet by mouth once daily.         No current facility-administered medications for this visit.        Review of patient's allergies indicates:  No Known Allergies    Review of Systems   Constitutional: Negative for chills, fever, malaise/fatigue and weight loss.   Eyes: Negative.    Respiratory: Negative for cough and shortness of breath.    Cardiovascular: Negative for chest pain and leg swelling.   Gastrointestinal: Negative for abdominal pain, heartburn, nausea and vomiting.   Musculoskeletal: Negative for joint pain and myalgias.   Skin: Negative for itching and rash.   Neurological: Negative for dizziness, focal weakness and headaches.   Endo/Heme/Allergies: Does not bruise/bleed easily.   Psychiatric/Behavioral: Negative for  "depression.       Vitals:    12/12/18 1538   BP: 134/64   Pulse: 69   Resp: 18   SpO2: 100%   Weight: 71.5 kg (157 lb 10.1 oz)   Height: 5' 3" (1.6 m)       Physical Exam   Constitutional: She is oriented to person, place, and time. She appears well-developed and well-nourished. No distress.   HENT:   Head: Normocephalic and atraumatic.   Mouth/Throat: Oropharynx is clear and moist.   Eyes: EOM are normal. Pupils are equal, round, and reactive to light. No scleral icterus.   Neck: Normal range of motion. Neck supple. No thyromegaly present.   Cardiovascular: Normal rate, regular rhythm and normal heart sounds. Exam reveals no gallop and no friction rub.   No murmur heard.  Pulmonary/Chest: Effort normal. No respiratory distress. She has no wheezes. She has no rales.   Abdominal: Soft. Bowel sounds are normal. She exhibits no distension. There is no tenderness.   Musculoskeletal: Normal range of motion. She exhibits no edema.   Lymphadenopathy:     She has no cervical adenopathy.   Neurological: She is alert and oriented to person, place, and time.   Skin: Skin is warm and dry. No rash noted.   Psychiatric: She has a normal mood and affect. Her behavior is normal.   Vitals reviewed.      Lab Results   Component Value Date    ALT 13 10/24/2018    AST 25 10/24/2018    GGT 21 02/06/2017    ALKPHOS 155 (H) 10/24/2018    BILITOT 1.1 (H) 10/24/2018       Lab Results   Component Value Date    WBC 5.20 10/24/2018    HGB 12.0 10/24/2018    HCT 37.7 10/24/2018    MCV 91 10/24/2018     10/24/2018       Lab Results   Component Value Date     10/24/2018    K 4.2 10/24/2018     10/24/2018    CO2 23 10/24/2018    BUN 17 10/24/2018    CREATININE 0.9 10/24/2018    CALCIUM 9.4 10/24/2018    ANIONGAP 9 10/24/2018    ESTGFRAFRICA >60.0 10/24/2018    EGFRNONAA >60.0 10/24/2018       Imaging: EMR reviewed     Assessment:  68 y.o. female presenting with elevated AP and intermittent t bili without clear etiology.  Given " negative serologic work-up with persistent abnormality, recommended liver biopsy.  Mild portal inflammation with no other significant abnormalities.  No clear diagnosis at this time but reassuring that despite 6 years of elevated tests, there is absence of fibrosis.    *  No evidence of steatosis/fatty liver  *  Patient reassured based on current biopsy results   *  Monitor liver tests in 1 year    RTC in 12 months with LFTs for same day

## 2019-03-25 ENCOUNTER — PES CALL (OUTPATIENT)
Dept: ADMINISTRATIVE | Facility: CLINIC | Age: 69
End: 2019-03-25

## 2019-04-26 ENCOUNTER — HOSPITAL ENCOUNTER (OUTPATIENT)
Dept: RADIOLOGY | Facility: CLINIC | Age: 69
Discharge: HOME | End: 2019-04-26
Attending: INTERNAL MEDICINE
Payer: MEDICARE

## 2019-04-26 DIAGNOSIS — N95.8 OTHER SPECIFIED MENOPAUSAL AND PERIMENOPAUSAL DISORDERS: ICD-10-CM

## 2019-04-26 PROCEDURE — 77080 DEXA BONE DENSITY SPINE HIP: ICD-10-PCS | Mod: 26,,, | Performed by: INTERNAL MEDICINE

## 2019-04-26 PROCEDURE — 77080 DXA BONE DENSITY AXIAL: CPT | Mod: 26,,, | Performed by: INTERNAL MEDICINE

## 2019-04-26 PROCEDURE — 77080 DXA BONE DENSITY AXIAL: CPT | Mod: TC

## 2019-05-10 ENCOUNTER — OFFICE VISIT (OUTPATIENT)
Dept: INTERNAL MEDICINE | Facility: CLINIC | Age: 69
End: 2019-05-10
Payer: MEDICARE

## 2019-05-10 VITALS
OXYGEN SATURATION: 100 % | BODY MASS INDEX: 27.69 KG/M2 | DIASTOLIC BLOOD PRESSURE: 72 MMHG | HEART RATE: 74 BPM | WEIGHT: 156.31 LBS | SYSTOLIC BLOOD PRESSURE: 124 MMHG

## 2019-05-10 DIAGNOSIS — R79.89 ELEVATED LFTS: ICD-10-CM

## 2019-05-10 DIAGNOSIS — M85.80 OSTEOPENIA, UNSPECIFIED LOCATION: ICD-10-CM

## 2019-05-10 DIAGNOSIS — Z12.39 BREAST CANCER SCREENING: ICD-10-CM

## 2019-05-10 DIAGNOSIS — L81.8 IDIOPATHIC GUTTATE HYPOMELANOSIS: ICD-10-CM

## 2019-05-10 DIAGNOSIS — E04.1 THYROID NODULE: ICD-10-CM

## 2019-05-10 DIAGNOSIS — Z00.00 ENCOUNTER FOR PREVENTIVE HEALTH EXAMINATION: Primary | ICD-10-CM

## 2019-05-10 PROCEDURE — 99499 RISK ADDL DX/OHS AUDIT: ICD-10-PCS | Mod: S$GLB,,, | Performed by: PHYSICIAN ASSISTANT

## 2019-05-10 PROCEDURE — G0439 PPPS, SUBSEQ VISIT: HCPCS | Mod: S$GLB,,, | Performed by: PHYSICIAN ASSISTANT

## 2019-05-10 PROCEDURE — 99999 PR PBB SHADOW E&M-EST. PATIENT-LVL III: ICD-10-PCS | Mod: PBBFAC,,, | Performed by: PHYSICIAN ASSISTANT

## 2019-05-10 PROCEDURE — 99499 UNLISTED E&M SERVICE: CPT | Mod: S$GLB,,, | Performed by: PHYSICIAN ASSISTANT

## 2019-05-10 PROCEDURE — 99999 PR PBB SHADOW E&M-EST. PATIENT-LVL III: CPT | Mod: PBBFAC,,, | Performed by: PHYSICIAN ASSISTANT

## 2019-05-10 PROCEDURE — G0439 PR MEDICARE ANNUAL WELLNESS SUBSEQUENT VISIT: ICD-10-PCS | Mod: S$GLB,,, | Performed by: PHYSICIAN ASSISTANT

## 2019-05-10 NOTE — PATIENT INSTRUCTIONS
Counseling and Referral of Other Preventative  (Italic type indicates deductible and co-insurance are waived)    Patient Name: Sharri Cline  Today's Date: 5/10/2019    Health Maintenance       Date Due Completion Date    Shingles Vaccine (2 of 3) 05/19/2015 - Check with local pharmacy 3/24/2015    Mammogram 10/12/2017 -Ordered today 10/12/2016    Influenza Vaccine 08/01/2019 10/12/2016    Override on 10/1/2014: Done    Override on 11/26/2012: Done    DEXA SCAN 04/26/2021 4/26/2019    Lipid Panel 02/06/2022 2/6/2017    Override on 11/22/2003: Done (Repeat recommended in seven years)    Colonoscopy 07/09/2023 7/9/2013    TETANUS VACCINE 05/17/2026 5/17/2016        No orders of the defined types were placed in this encounter.    The following information is provided to all patients.  This information is to help you find resources for any of the problems found today that may be affecting your health:                Living healthy guide: www.[facility].[place].gov      Understanding Diabetes: www.diabetes.org      Eating healthy: www.cdc.gov/healthyweight      CDC home safety checklist: www.cdc.gov/steadi/patient.html      Agency on Aging: www.goea.[place].gov      Alcoholics anonymous (AA): www.aa.org      Physical Activity: www.marichuy.nih.gov/tl7xhfy      Tobacco use: www.quitwithusla.org

## 2019-05-10 NOTE — PROGRESS NOTES
Sharri Cline presented for a  Medicare AWV and comprehensive Health Risk Assessment today. The following components were reviewed and updated:    · Medical history  · Family History  · Social history  · Allergies and Current Medications  · Health Risk Assessment  · Health Maintenance  · Care Team     ** See Completed Assessments for Annual Wellness Visit within the encounter summary.**       The following assessments were completed:  · Living Situation  · CAGE  · Depression Screening  · Timed Get Up and Go  · Whisper Test  · Cognitive Function Screening    · Nutrition Screening  · ADL Screening  · PAQ Screening    Vitals:    05/10/19 0917   BP: 124/72   Pulse: 74   SpO2: 100%   Weight: 70.9 kg (156 lb 4.9 oz)     Body mass index is 27.69 kg/m².  Physical Exam   Constitutional: She appears well-developed and well-nourished. No distress.   HENT:   Head: Normocephalic and atraumatic.   Right Ear: Tympanic membrane, external ear and ear canal normal.   Left Ear: Tympanic membrane, external ear and ear canal normal.   Mouth/Throat: Oropharynx is clear and moist.   Eyes: Pupils are equal, round, and reactive to light.   Cardiovascular: Normal rate and regular rhythm. Exam reveals no friction rub.   No murmur heard.  Pulmonary/Chest: Effort normal and breath sounds normal. She has no wheezes. She has no rales.   Abdominal: Soft. Bowel sounds are normal. There is no tenderness.   Lymphadenopathy:     She has no cervical adenopathy.   Neurological: She is alert.   Skin: Skin is warm and dry. Capillary refill takes less than 2 seconds. No rash noted.   Psychiatric: She has a normal mood and affect.   Vitals reviewed.        Diagnoses and health risks identified today and associated recommendations/orders:    1. Encounter for preventive health examination  Exam and assessment performed  Chart review complete  Health Maintenance reviewed    2. Breast cancer screening  - Mammo Digital Screening Bilateral With CAD; Future    3.  Thyroid nodule  Stable  S/p FNA (benign)  Followed by Endo    4. Osteopenia, unspecified location  Stable  On OTC Calcium -Vitamin D supplements  Last Dexa 4/2019    5. Idiopathic guttate hypomelanosis  Stable  Has been eval'd by Dermatology    6. Elevated LFTs  Stable  Followed by Hepatology      Provided Sharri with a 5-10 year written screening schedule and personal prevention plan. Recommendations were developed using the USPSTF age appropriate recommendations. Education, counseling, and referrals were provided as needed. After Visit Summary printed and given to patient which includes a list of additional screenings\tests needed.    Follow up in about 10 days (around 5/20/2019) for PCP follow up and 1 year for next Medicare AWV.    Destiny Sow PA-C     Future Appointments   Date Time Provider Department Center   5/15/2019  8:15 AM [facility]MAMMO[facility] MAMMO[facility] Imaging Ctr   5/20/2019  2:00 PM Jhon Arndt MD [facility] [place] [facility]AL

## 2019-05-15 ENCOUNTER — TELEPHONE (OUTPATIENT)
Dept: INTERNAL MEDICINE | Facility: CLINIC | Age: 69
End: 2019-05-15

## 2019-05-15 ENCOUNTER — HOSPITAL ENCOUNTER (OUTPATIENT)
Dept: RADIOLOGY | Facility: HOSPITAL | Age: 69
Discharge: HOME | End: 2019-05-15
Attending: PHYSICIAN ASSISTANT
Payer: MEDICARE

## 2019-05-15 DIAGNOSIS — Z12.39 BREAST CANCER SCREENING: ICD-10-CM

## 2019-05-15 DIAGNOSIS — R92.8 ABNORMAL SCREENING MAMMOGRAM: Primary | ICD-10-CM

## 2019-05-15 PROCEDURE — 77063 BREAST TOMOSYNTHESIS BI: CPT | Mod: 26,,, | Performed by: RADIOLOGY

## 2019-05-15 PROCEDURE — 77063 MAMMO DIGITAL SCREENING BILAT WITH TOMOSYNTHESIS_CAD: ICD-10-PCS | Mod: 26,,, | Performed by: RADIOLOGY

## 2019-05-15 PROCEDURE — 77067 SCR MAMMO BI INCL CAD: CPT | Mod: TC

## 2019-05-15 PROCEDURE — 77067 SCR MAMMO BI INCL CAD: CPT | Mod: 26,,, | Performed by: RADIOLOGY

## 2019-05-15 PROCEDURE — 77067 MAMMO DIGITAL SCREENING BILAT WITH TOMOSYNTHESIS_CAD: ICD-10-PCS | Mod: 26,,, | Performed by: RADIOLOGY

## 2019-05-15 NOTE — TELEPHONE ENCOUNTER
Mammo Digital Screening 5/15/19    Impression:  Left  Focal Asymmetry: Left breast focal asymmetry. Assessment: 0 - Incomplete. Diagnostic Mammogram and/or Ultrasound is recommended.      Right  Mass: Right breast mass at the lower inner position. Assessment: 0 - Incomplete. Diagnostic Mammogram and/or Ultrasound is recommended.      BI-RADS Category:   Overall: 0 - Incomplete: Needs Additional Imaging Evaluation     Recommendation:  Diagnostic mammogram with possible ultrasound (if indicated) is recommended.  Diagnostic mammogram with possible ultrasound (if indicated) is recommended.        Discussed abnormal screening MMG with patient  Diagnostic MMG ordered.   Informed that MMG/Radiology dept with contact her to schedule  Pt voiced understanding.       Destiny Sow PA-C

## 2019-05-16 ENCOUNTER — TELEPHONE (OUTPATIENT)
Dept: RADIOLOGY | Facility: HOSPITAL | Age: 69
End: 2019-05-16

## 2019-05-16 ENCOUNTER — HOSPITAL ENCOUNTER (OUTPATIENT)
Dept: RADIOLOGY | Facility: HOSPITAL | Age: 69
Discharge: HOME | End: 2019-05-16
Attending: PHYSICIAN ASSISTANT
Payer: MEDICARE

## 2019-05-16 DIAGNOSIS — R92.8 ABNORMAL MAMMOGRAM: ICD-10-CM

## 2019-05-16 PROCEDURE — 76642 ULTRASOUND BREAST LIMITED: CPT | Mod: 26,RT,, | Performed by: RADIOLOGY

## 2019-05-16 PROCEDURE — 77066 MAMMO DIGITAL DIAGNOSTIC BILAT WITH TOMOSYNTHESIS_CAD: ICD-10-PCS | Mod: 26,,, | Performed by: RADIOLOGY

## 2019-05-16 PROCEDURE — 77066 DX MAMMO INCL CAD BI: CPT | Mod: 26,,, | Performed by: RADIOLOGY

## 2019-05-16 PROCEDURE — 77062 MAMMO DIGITAL DIAGNOSTIC BILAT WITH TOMOSYNTHESIS_CAD: ICD-10-PCS | Mod: 26,,, | Performed by: RADIOLOGY

## 2019-05-16 PROCEDURE — 76642 US BREAST RIGHT LIMITED: ICD-10-PCS | Mod: 26,RT,, | Performed by: RADIOLOGY

## 2019-05-16 PROCEDURE — 77062 BREAST TOMOSYNTHESIS BI: CPT | Mod: TC,PO

## 2019-05-16 PROCEDURE — 77062 BREAST TOMOSYNTHESIS BI: CPT | Mod: 26,,, | Performed by: RADIOLOGY

## 2019-05-16 PROCEDURE — 76642 ULTRASOUND BREAST LIMITED: CPT | Mod: TC,PO,RT

## 2019-05-16 NOTE — TELEPHONE ENCOUNTER
Called patient in reference to her breast imaging and scheduling a abnormal mammogram follow up. No answer. Left the patient a message with my direct phone number.

## 2019-05-16 NOTE — TELEPHONE ENCOUNTER
Spoke with patient and explained mammogram findings.Patient expressed understanding of results. Patient scheduled abnormal mammogram follow up appointment at [facility] [place] on 5/16/2019.

## 2019-05-17 ENCOUNTER — TELEPHONE (OUTPATIENT)
Dept: RADIOLOGY | Facility: HOSPITAL | Age: 69
End: 2019-05-17

## 2019-05-17 NOTE — TELEPHONE ENCOUNTER
Spoke with patient. Reviewed breast biopsy procedure and reviewed instructions for breast biopsy. Patient expressed understanding and all questions were answered. Provided patient with my phone number to call for any further concerns or questions.   Patient scheduled breast biopsy at the [facility] on 5/24/2019.

## 2019-05-20 ENCOUNTER — HOSPITAL ENCOUNTER (OUTPATIENT)
Dept: RADIOLOGY | Facility: HOSPITAL | Age: 69
Discharge: HOME | End: 2019-05-20
Attending: INTERNAL MEDICINE
Payer: MEDICARE

## 2019-05-20 ENCOUNTER — TELEPHONE (OUTPATIENT)
Dept: INTERNAL MEDICINE | Facility: CLINIC | Age: 69
End: 2019-05-20

## 2019-05-20 ENCOUNTER — OFFICE VISIT (OUTPATIENT)
Dept: INTERNAL MEDICINE | Facility: CLINIC | Age: 69
End: 2019-05-20
Payer: MEDICARE

## 2019-05-20 VITALS
HEIGHT: 63 IN | WEIGHT: 156 LBS | BODY MASS INDEX: 27.64 KG/M2 | HEART RATE: 78 BPM | SYSTOLIC BLOOD PRESSURE: 104 MMHG | DIASTOLIC BLOOD PRESSURE: 66 MMHG

## 2019-05-20 DIAGNOSIS — E04.1 THYROID NODULE: ICD-10-CM

## 2019-05-20 DIAGNOSIS — M25.471 ANKLE EDEMA, BILATERAL: ICD-10-CM

## 2019-05-20 DIAGNOSIS — M79.672 LEFT FOOT PAIN: Primary | ICD-10-CM

## 2019-05-20 DIAGNOSIS — R79.89 ELEVATED LFTS: ICD-10-CM

## 2019-05-20 DIAGNOSIS — R74.8 ELEVATED ALKALINE PHOSPHATASE LEVEL: ICD-10-CM

## 2019-05-20 DIAGNOSIS — R79.89 ABNORMAL TSH: ICD-10-CM

## 2019-05-20 DIAGNOSIS — M79.672 LEFT FOOT PAIN: ICD-10-CM

## 2019-05-20 DIAGNOSIS — M25.472 ANKLE EDEMA, BILATERAL: ICD-10-CM

## 2019-05-20 PROCEDURE — 99999 PR PBB SHADOW E&M-EST. PATIENT-LVL III: CPT | Mod: PBBFAC,,, | Performed by: INTERNAL MEDICINE

## 2019-05-20 PROCEDURE — 73630 X-RAY EXAM OF FOOT: CPT | Mod: TC,LT

## 2019-05-20 PROCEDURE — 1101F PT FALLS ASSESS-DOCD LE1/YR: CPT | Mod: CPTII,S$GLB,, | Performed by: INTERNAL MEDICINE

## 2019-05-20 PROCEDURE — 1101F PR PT FALLS ASSESS DOC 0-1 FALLS W/OUT INJ PAST YR: ICD-10-PCS | Mod: CPTII,S$GLB,, | Performed by: INTERNAL MEDICINE

## 2019-05-20 PROCEDURE — 73630 X-RAY EXAM OF FOOT: CPT | Mod: 26,LT,, | Performed by: RADIOLOGY

## 2019-05-20 PROCEDURE — 73630 XR FOOT COMPLETE 3 VIEW LEFT: ICD-10-PCS | Mod: 26,LT,, | Performed by: RADIOLOGY

## 2019-05-20 PROCEDURE — 99999 PR PBB SHADOW E&M-EST. PATIENT-LVL III: ICD-10-PCS | Mod: PBBFAC,,, | Performed by: INTERNAL MEDICINE

## 2019-05-20 PROCEDURE — 99214 PR OFFICE/OUTPT VISIT, EST, LEVL IV, 30-39 MIN: ICD-10-PCS | Mod: S$GLB,,, | Performed by: INTERNAL MEDICINE

## 2019-05-20 PROCEDURE — 99214 OFFICE O/P EST MOD 30 MIN: CPT | Mod: S$GLB,,, | Performed by: INTERNAL MEDICINE

## 2019-05-20 NOTE — PROGRESS NOTES
Subjective:       Patient ID: Sharri Cline is a 69 y.o. female.    Chief Complaint: Follow-up    HPI    Last visit with me July 2018. Since then patient seen by Endocrinology, Orthopedics, hepatology, and Internal Medicine.  Patient underwent FNA October 2018 and liver biopsy November 2018, both negative for malignancy or other concerning pathology.    Patient reports for the last few weeks having pain in the dorsum of the midfoot on her left side.  She has been more active with walking and jumping jacks.  Denies any trauma.  She is able to ambulate without limitation, but later after resting she experiences pain in the foot.  Has not noted any skin change.    Patient reports swelling in the bilateral ankles.  Improves with elevating her legs.  No tenderness or erythema noted. No shortness of breath.    Patient has been doing regular walking exercise without lightheadedness or orthostasis.    Review of Systems   All other systems reviewed and are negative.      Objective:      Physical Exam   Constitutional: She is oriented to person, place, and time. No distress.   Cardiovascular: Normal rate, regular rhythm and normal heart sounds.   Pulmonary/Chest: Effort normal and breath sounds normal. No stridor. She has no wheezes. She has no rales.   Abdominal: Soft. There is no tenderness. There is no guarding.   Musculoskeletal: She exhibits edema (mild nonpitting edema in b/L ankles) and tenderness (to deep palpation in dorsum and sides of midfoot on L).        Right ankle: She exhibits normal range of motion, no ecchymosis and no deformity. No tenderness.        Left ankle: She exhibits normal range of motion, no ecchymosis and no deformity. No tenderness.        Right foot: There is normal range of motion, no tenderness and no bony tenderness.   Neurological: She is alert and oriented to person, place, and time.   Nursing note and vitals reviewed.      Vitals:    05/20/19 1402   BP: 104/66   BP Location: Right  "arm   Patient Position: Sitting   BP Method: Large (Manual)   Pulse: 78   Weight: 70.8 kg (156 lb)   Height: 5' 3" (1.6 m)     Body mass index is 27.63 kg/m².    RESULTS: Reviewed labs from last 12 months    Assessment:       1. Left foot pain    2. Elevated alkaline phosphatase level    3. Thyroid nodule    4. Abnormal TSH    5. Elevated LFTs    6. Ankle edema, bilateral        Plan:   Sharri was seen today for follow-up.    Diagnoses and all orders for this visit:    Left foot pain:  New problem, stable. Likely tendinopathy or arthralgia from exercise, no trauma. rule out fracture and/or DJD on xray. Modify exercise and use ice PRN.  -     X-Ray Foot Complete Left; Future  -     Uric acid; Future    Elevated alkaline phosphatase level:  Prior dx, improved last check, continue follow up with Hepatology.  -     Comprehensive metabolic panel; Future  -     CBC Without Differential; Future    Thyroid nodule:  Prior dx, FNA normal in Nov, pt is asymptomatic.  -     TSH; Future  -     T4, free; Future    Abnormal TSH:  Prior dx, normal at recheck, reassess along with FT4 levels.  -     TSH; Future  -     T4, free; Future    Elevated LFTs:  Prior dx, resolved. continue follow up with Hepatology.    Ankle edema, bilateral:  New problem, consistent with chronic venous insufficiency, at this time I do not think CHF or rheumatologic conditions are going on.    Follow up in about 1 year (around 5/20/2020) for EPP annual exam. Labs and xray today.  Jhon Arndt MD  Internal Medicine    Portions of this note were completed using medical dictation software. Please excuse typographical or syntax errors that were missed on review.    "

## 2019-05-20 NOTE — TELEPHONE ENCOUNTER
Please call the patient to notify that there is some osteoarthritis in the foot that may be causing some of her pain, but no other abnormal bone findings. No sign of fracture. If pain worsens in future please notify the office.

## 2019-05-21 ENCOUNTER — TELEPHONE (OUTPATIENT)
Dept: INTERNAL MEDICINE | Facility: CLINIC | Age: 69
End: 2019-05-21

## 2019-05-21 NOTE — TELEPHONE ENCOUNTER
Please call the patient to notify that her thyroid levels are normal and her liver function is stable. I have sent the results in a letter.

## 2019-05-24 ENCOUNTER — HOSPITAL ENCOUNTER (OUTPATIENT)
Dept: RADIOLOGY | Facility: HOSPITAL | Age: 69
Discharge: HOME | End: 2019-05-24
Attending: PHYSICIAN ASSISTANT
Payer: MEDICARE

## 2019-05-24 DIAGNOSIS — R92.8 ABNORMAL MAMMOGRAM: ICD-10-CM

## 2019-05-24 DIAGNOSIS — N63.0 BREAST MASS: Primary | ICD-10-CM

## 2019-05-24 PROCEDURE — 27201068 US BREAST BIOPSY WITH IMAGING 1ST SITE RIGHT: Mod: PO

## 2019-05-24 PROCEDURE — 19083 US BREAST BIOPSY WITH IMAGING 1ST SITE RIGHT: ICD-10-PCS | Mod: RT,,, | Performed by: RADIOLOGY

## 2019-05-24 PROCEDURE — 77065 MAMMO DIGITAL DIAGNOSTIC RIGHT WITH CAD: ICD-10-PCS | Mod: 26,RT,, | Performed by: RADIOLOGY

## 2019-05-24 PROCEDURE — 88305 TISSUE EXAM BY PATHOLOGIST: CPT | Performed by: PATHOLOGY

## 2019-05-24 PROCEDURE — 77065 DX MAMMO INCL CAD UNI: CPT | Mod: TC,PO,RT

## 2019-05-24 PROCEDURE — 88360 TISSUE SPECIMEN TO PATHOLOGY, RADIOLOGY: ICD-10-PCS | Mod: 26,,, | Performed by: PATHOLOGY

## 2019-05-24 PROCEDURE — 77065 DX MAMMO INCL CAD UNI: CPT | Mod: 26,RT,, | Performed by: RADIOLOGY

## 2019-05-24 PROCEDURE — 88305 TISSUE EXAM BY PATHOLOGIST: CPT | Mod: 26,,, | Performed by: PATHOLOGY

## 2019-05-24 PROCEDURE — 88305 TISSUE SPECIMEN TO PATHOLOGY, RADIOLOGY: ICD-10-PCS | Mod: 26,,, | Performed by: PATHOLOGY

## 2019-05-24 PROCEDURE — 19083 BX BREAST 1ST LESION US IMAG: CPT | Mod: RT,,, | Performed by: RADIOLOGY

## 2019-05-24 PROCEDURE — 88360 TUMOR IMMUNOHISTOCHEM/MANUAL: CPT | Mod: 26,,, | Performed by: PATHOLOGY

## 2019-05-24 PROCEDURE — 25000003 PHARM REV CODE 250: Mod: PO | Performed by: PHYSICIAN ASSISTANT

## 2019-05-24 PROCEDURE — 88360 TUMOR IMMUNOHISTOCHEM/MANUAL: CPT | Performed by: PATHOLOGY

## 2019-05-24 RX ORDER — LIDOCAINE HYDROCHLORIDE 10 MG/ML
1.5 INJECTION, SOLUTION EPIDURAL; INFILTRATION; INTRACAUDAL; PERINEURAL ONCE
Status: COMPLETED | OUTPATIENT
Start: 2019-05-24 | End: 2019-05-24

## 2019-05-24 RX ORDER — LIDOCAINE HYDROCHLORIDE AND EPINEPHRINE 20; 10 MG/ML; UG/ML
6 INJECTION, SOLUTION INFILTRATION; PERINEURAL ONCE
Status: COMPLETED | OUTPATIENT
Start: 2019-05-24 | End: 2019-05-24

## 2019-05-24 RX ADMIN — LIDOCAINE HYDROCHLORIDE,EPINEPHRINE BITARTRATE 6 ML: 20; .01 INJECTION, SOLUTION INFILTRATION; PERINEURAL at 01:05

## 2019-05-24 RX ADMIN — LIDOCAINE HYDROCHLORIDE 1.5 ML: 10 INJECTION, SOLUTION EPIDURAL; INFILTRATION; INTRACAUDAL; PERINEURAL at 01:05

## 2019-05-28 ENCOUNTER — TELEPHONE (OUTPATIENT)
Dept: SURGERY | Facility: CLINIC | Age: 69
End: 2019-05-28

## 2019-05-29 ENCOUNTER — TELEPHONE (OUTPATIENT)
Dept: SURGERY | Facility: CLINIC | Age: 69
End: 2019-05-29

## 2019-05-29 NOTE — TELEPHONE ENCOUNTER
Patient returned my call, discussed her biopsy results. Explained that biopsy showed invasive breast cancer. We discussed what this means, and that the next step is to meet with a breast surgeon to discuss treatment options. Patient would like to wait until next week, traveling to [place] this weekend to see her granddaughter graduate from college. She will not be back until 6/4. Scheduled for June 4th with Dr. Hernandez. Reviewed the location of the breast center, patient verbalized understanding of all information

## 2019-06-04 ENCOUNTER — OFFICE VISIT (OUTPATIENT)
Dept: SURGERY | Facility: CLINIC | Age: 69
End: 2019-06-04
Payer: MEDICARE

## 2019-06-04 VITALS
WEIGHT: 157.31 LBS | HEART RATE: 68 BPM | SYSTOLIC BLOOD PRESSURE: 140 MMHG | DIASTOLIC BLOOD PRESSURE: 78 MMHG | TEMPERATURE: 98 F | BODY MASS INDEX: 27.87 KG/M2 | HEIGHT: 63 IN

## 2019-06-04 DIAGNOSIS — C50.311 MALIGNANT NEOPLASM OF LOWER-INNER QUADRANT OF RIGHT BREAST OF FEMALE, ESTROGEN RECEPTOR NEGATIVE: Primary | ICD-10-CM

## 2019-06-04 DIAGNOSIS — Z17.1 MALIGNANT NEOPLASM OF LOWER-INNER QUADRANT OF RIGHT BREAST OF FEMALE, ESTROGEN RECEPTOR NEGATIVE: Primary | ICD-10-CM

## 2019-06-04 PROCEDURE — 99205 OFFICE O/P NEW HI 60 MIN: CPT | Mod: S$GLB,,, | Performed by: SURGERY

## 2019-06-04 PROCEDURE — 99999 PR PBB SHADOW E&M-EST. PATIENT-LVL III: ICD-10-PCS | Mod: PBBFAC,,, | Performed by: SURGERY

## 2019-06-04 PROCEDURE — 99205 PR OFFICE/OUTPT VISIT, NEW, LEVL V, 60-74 MIN: ICD-10-PCS | Mod: S$GLB,,, | Performed by: SURGERY

## 2019-06-04 PROCEDURE — 1101F PR PT FALLS ASSESS DOC 0-1 FALLS W/OUT INJ PAST YR: ICD-10-PCS | Mod: CPTII,S$GLB,, | Performed by: SURGERY

## 2019-06-04 PROCEDURE — 1101F PT FALLS ASSESS-DOCD LE1/YR: CPT | Mod: CPTII,S$GLB,, | Performed by: SURGERY

## 2019-06-04 PROCEDURE — 99999 PR PBB SHADOW E&M-EST. PATIENT-LVL III: CPT | Mod: PBBFAC,,, | Performed by: SURGERY

## 2019-06-04 NOTE — H&P (VIEW-ONLY)
Breast Surgery  [facility]  Department of Surgery      REFERRING PROVIDER: Destiny Sow PA-C  [address] [place] [address]    Chief Complaint: Breast Cancer      Subjective:      Patient ID: Sharri Cline is a 69 y.o. 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     Follow-up mammogram (19) and ultrasound (19) showed [id number] irregularly shaped hypoechoic mass with spiculated margins seen in right breast at 4 oclock 5cm from nipple . A ultrasound guided biopsy was performed on 19 with pathology revealing infiltrating ductal carcinoma of the breast. Has lost 40 pounds over last year with diet and exercise.     Patient does routinely do self breast exams.  Patient has not noted a change on breast exam.  Patient denies nipple discharge. Patient denies to previous breast biopsy. Patient denies a personal history of breast cancer.    Findings at that time were the following:   Tumor size: 1.5cm x 1.1cm x 1.2cm  Tumor thgthrthathdtheth:th th4th Estrogen Receptor: negative   Progesterone Receptor: negative   Her-2 cash: negative   Lymph node status: negative   Lymphatic invasion: negative     GYN History:  Age of menarche was 13. Age of menopause was [age].  Last menstrual period was 58. Patient denies hormonal therapy. Patient is . Age of first live birth was 20. Patient did not breast feed.    Past Medical History:   Diagnosis Date    Helicobacter pylori antibody positive 13    treated with clarithromycin, metronidazole, and omeprazole x 14 days  (-6/3); EGD normal in 2013     Past Surgical History:   Procedure Laterality Date    BIOPSY, LIVER, WITH US GUIDANCE N/A 2018    Performed by [facility] OR 2ND FLR    COLONOSCOPY N/A 2013    Performed by Ha Harrington [facility] at [facility] ENDO (4TH FLR)    EGD  (ESOPHAGOGASTRODUODENOSCOPY) N/A 7/9/2013    Performed by [facility] ENDO (4TH FLR)    EXCISION-LYMPH NODES Right 1/8/2016    Performed by Sanya [facility]ah [facility] at [facility] OR 2ND FLR    PAROTIDECTOMY Right 1/8/2016    Performed by [facility] at [facility] OR 2ND FLR    TUBAL LIGATION       Current Outpatient Medications on File Prior to Visit   Medication Sig Dispense Refill    b complex vitamins tablet Take 1 tablet by mouth once daily.      calcium carbonate-vit D3-min 600 mg calcium- 400 unit Tab Take 1 tablet by mouth 2 (two) times daily. 60 tablet 3    fish oil-omega-3 fatty acids 300-1,000 mg capsule Take 2 g by mouth once daily.      multivitamin with minerals tablet Take 1 tablet by mouth once daily.         No current facility-administered medications on file prior to visit.      Social History     Socioeconomic History    Marital status:      Spouse name: Not on file    Number of children: Not on file    Years of education: Not on file    Highest education level: Not on file   Occupational History    Occupation:  worker     Employer: [employer]   Social Needs    Financial resource strain: Not on file    Food insecurity:     Worry: Not on file     Inability: Not on file    Transportation needs:     Medical: Not on file     Non-medical: Not on file   Tobacco Use    Smoking status: Never Smoker    Smokeless tobacco: Never Used   Substance and Sexual Activity    Alcohol use: Yes     Comment: Social    Drug use: No    Sexual activity: Yes     Partners: Male     Birth control/protection: Post-menopausal   Lifestyle    Physical activity:     Days per week: Not on file     Minutes per session: Not on file    Stress: Not on file   Relationships    Social connections:     Talks on phone: Not on file     Gets together: Not on file     Attends [employer] service: Not on file     Active member of club or organization: Not on file     Attends  meetings of clubs or organizations: Not on file     Relationship status: Not on file   Other Topics Concern    Are you pregnant or think you may be? No    Breast-feeding No   Social History Narrative    Not on file     Family History   Problem Relation Age of Onset    Diabetes Father     Hypertension Mother     Miscarriages / Stillbirths Mother     ANGEL disease Mother     Eczema Mother     Parkinsonism Mother     Diabetes Brother     Diabetes Brother     Colon cancer Maternal Aunt     No Known Problems Sister     No Known Problems Maternal Uncle     No Known Problems Paternal Aunt     No Known Problems Paternal Uncle     No Known Problems Maternal Grandmother     No Known Problems Maternal Grandfather     No Known Problems Paternal Grandmother     No Known Problems Paternal Grandfather     Celiac disease Neg Hx     Cirrhosis Neg Hx     Colon polyps Neg Hx     Crohn's disease Neg Hx     Cystic fibrosis Neg Hx     Esophageal cancer Neg Hx     Hemochromatosis Neg Hx     Inflammatory bowel disease Neg Hx     Irritable bowel syndrome Neg Hx     Liver cancer Neg Hx     Liver disease Neg Hx     Rectal cancer Neg Hx     Stomach cancer Neg Hx     Ulcerative colitis Neg Hx     Anthony's disease Neg Hx     Psoriasis Neg Hx     Ovarian cancer Neg Hx     Breast cancer Neg Hx     Amblyopia Neg Hx     Blindness Neg Hx     Cancer Neg Hx     Cataracts Neg Hx     Glaucoma Neg Hx     Macular degeneration Neg Hx     Retinal detachment Neg Hx     Strabismus Neg Hx     Stroke Neg Hx     Thyroid disease Neg Hx       Review of Systems   Constitutional: Negative for chills and fever.   HENT: Negative for sore throat.    Eyes: Negative for redness.   Respiratory: Negative for shortness of breath.    Cardiovascular: Negative for chest pain.   Gastrointestinal: Negative for abdominal pain.   Endocrine: Negative for polyuria.   Genitourinary: Negative for dysuria.   Musculoskeletal: Negative for  "back pain.   Skin: Negative for wound.   Neurological: Negative for headaches.   Psychiatric/Behavioral: Negative for agitation.     Objective:   BP (!) 140/78 (BP Location: Right arm, Patient Position: Sitting, BP Method: Medium (Automatic))   Pulse 68   Temp 98.1 °F (36.7 °C) (Oral)   Ht 5' 3" (1.6 m)   Wt 71.3 kg (157 lb 4.8 oz)   BMI 27.86 kg/m²     Physical Exam   Nursing note and vitals reviewed.  Constitutional: She appears well-developed and well-nourished. No distress.   HENT:   Head: Normocephalic and atraumatic.   Eyes: Conjunctivae are normal. No scleral icterus.   Neck: Normal range of motion. Neck supple. No tracheal deviation present.   Cardiovascular: Normal rate and regular rhythm.    Pulmonary/Chest: Effort normal and breath sounds normal. No respiratory distress. Right breast exhibits mass. Right breast exhibits no inverted nipple, no nipple discharge and no skin change. Left breast exhibits no inverted nipple, no mass, no nipple discharge and no skin change.       Abdominal: Soft. She exhibits no mass. There is no tenderness.   Musculoskeletal: She exhibits no edema.   Lymphadenopathy:     She has no cervical adenopathy.   Neurological: She is alert.   Skin: Skin is warm and dry. No rash noted. No erythema.     Psychiatric: She has a normal mood and affect. Her behavior is normal.     Radiology review: Images personally reviewed by me in the clinic.   Mammogram and US  05/16/19  Findings:  This procedure was performed using tomosynthesis.  Computer-aided detection was utilized in the interpretation of this examination.  The breasts have scattered areas of fibroglandular density.      Right  Mammo Digital Diagnostic Bilat w/ Elias  There is an equal density, irregularly shaped mass with spiculated margins seen in the lower inner quadrant of the right breast. The mass correlates with the screening mammogram finding.      US Breast Right Limited  There is a 15 mm x 11 mm x 12 mm irregularly " shaped, hypoechoic mass with spiculated margins seen in the right breast at 4 o'clock, 5 cm from the nipple. The mass correlates with the mammogram finding.      Normal lymph nodes seen in the right axilla.      Left  Mammo Digital Diagnostic Bilat w/ Elias  There is no evidence of suspicious masses, calcifications, or other abnormal findings. Additional evaluation was performed for the focal asymmetry  in the left breast, lower central region seen on 05/15/2019 exam. On today's exam , the focal asymmetry effaces to normal fibroglandular tissue and duct ectasia, unchanged dating back to 2012 exam.     Impression:  Right  Mass: Right breast mass at the lower inner position. Assessment: 4 - Suspicious finding. Biopsy is recommended.      Left  There is no mammographic or sonographic evidence of malignancy.     BI-RADS Category:   Overall: 4 - Suspicious    Pathology Review:  FINAL PATHOLOGIC DIAGNOSIS  BREAST, RIGHT, 04:00 MASS, ULTRASOUND-GUIDED BIOPSY:  Invasive ductal carcinoma of breast.  Size of invasive carcinoma: 7 MM in greatest linear dimension within a single core biopsy fragment.  Garcia Histologic Score: Grade 3 of 3.  Tubule Formation: 3  Nuclear Pleomorphism: 3  Mitotic Activity: 2  No associated in-situ component identified.  Focus suspicious for lymphovascular invasion.  Microcalcifications: Not identified.  Breast biomarkers are pending and will be included in a supplemental report.    Assessment:       69-year-old female with triple negative invasive ductal carcinoma of the right breast.       Plan:     Options for management were discussed with the patient and her family. We reviewed the existing data noting the equivalency of breast conserving surgery with radiation therapy and mastectomy. We also reviewed the guidelines of the National Comprehensive Cancer Network for Stage 1B breast carcinoma. We discussed the need for lumpectomy margins to be negative for carcinoma, the necessity for  postoperative radiation therapy after breast conservation in most cases, the possibility of a failed or false negative sentinel lymph node biopsy and the potential need for complete lymphadenectomy for a failed or positive sentinel lymph node biopsy were fully discussed. In the setting of mastectomy, delayed or immediate reconstruction options are available and were discussed.     In the setting of lumpectomy, radiation therapy would be recommended majority of the time.  The duration and treatment side effects were discussed with the patient.  This will coordinated with the radiation oncologist pending final pathology.    We also discussed the role of systemic therapy in the treatment of early stage breast cancer.  We discussed that this is based on tumor biology and jl status and will be determined based on final pathology.  We discussed that if the cancer is hormone positive, endocrine therapy would be recommended in most cases and its use can reduce the risk of recurrence as well as improve survival. Side effects of treatment were briefly discussed. We also discussed the potential role for chemotherapy based on a number of factors such as tumor phenotype (ER+ vs. triple negative vs. Xso0acp+) and this would be determined in coordination with the medical oncologist.    The patient, in consultation with her family, has elected to first meet with medical oncology to discuss neoadjuvant chemotherapy. Will need breast MRI first to track response to chemotherapy if we proceed neoadjuvantly.   WIll need a port as well.  Following chemotherapy patient will return to clinic to discuss further surgical planning, sooner if she desires.  Informed DNA    Patient was educated on breast cancer, receptors, wire localization lumpectomy, mastectomy, sentinel lymph node mapping and biopsy, axillary lymph node dissection, reconstruction, breast prosthesis with post-mastectomy bra and radiation therapy. Patient was given patient  information binder [facility].  All her questions were answered.    Total time spent with the patient: 60 minutes.  45 minutes of face to face consultation and 15 minutes of chart review and coordination of care.

## 2019-06-04 NOTE — PROGRESS NOTES
Breast Surgery  [facility]  Department of Surgery      REFERRING PROVIDER: Destiny Sow PA-C  [address] [place] [address]    Chief Complaint: Breast Cancer      Subjective:      Patient ID: Sharri Cline is a 69 y.o. 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     Follow-up mammogram (19) and ultrasound (19) showed [id number] irregularly shaped hypoechoic mass with spiculated margins seen in right breast at 4 oclock 5cm from nipple . A ultrasound guided biopsy was performed on 19 with pathology revealing infiltrating ductal carcinoma of the breast. Has lost 40 pounds over last year with diet and exercise.     Patient does routinely do self breast exams.  Patient has not noted a change on breast exam.  Patient denies nipple discharge. Patient denies to previous breast biopsy. Patient denies a personal history of breast cancer.    Findings at that time were the following:   Tumor size: 1.5cm x 1.1cm x 1.2cm  Tumor thgthrthathdtheth:th th4th Estrogen Receptor: negative   Progesterone Receptor: negative   Her-2 cash: negative   Lymph node status: negative   Lymphatic invasion: negative     GYN History:  Age of menarche was 13. Age of menopause was [age].  Last menstrual period was 58. Patient denies hormonal therapy. Patient is . Age of first live birth was 20. Patient did not breast feed.    Past Medical History:   Diagnosis Date    Helicobacter pylori antibody positive 13    treated with clarithromycin, metronidazole, and omeprazole x 14 days  (-6/3); EGD normal in 2013     Past Surgical History:   Procedure Laterality Date    BIOPSY, LIVER, WITH US GUIDANCE N/A 2018    Performed by [facility] Provider at [facility] OR 2ND FLR    COLONOSCOPY N/A 2013    Performed by Ha Harrington MD at [facility] ENDO (4TH FLR)    EGD  (ESOPHAGOGASTRODUODENOSCOPY) N/A 7/9/2013    Performed by [facility] ENDO (4TH FLR)    EXCISION-LYMPH NODES Right 1/8/2016    Performed by S[facility]ah [facility] at [facility] OR 2ND FLR    PAROTIDECTOMY Right 1/8/2016    Performed by [facility] at [facility] OR 2ND FLR    TUBAL LIGATION       Current Outpatient Medications on File Prior to Visit   Medication Sig Dispense Refill    b complex vitamins tablet Take 1 tablet by mouth once daily.      calcium carbonate-vit D3-min 600 mg calcium- 400 unit Tab Take 1 tablet by mouth 2 (two) times daily. 60 tablet 3    fish oil-omega-3 fatty acids 300-1,000 mg capsule Take 2 g by mouth once daily.      multivitamin with minerals tablet Take 1 tablet by mouth once daily.         No current facility-administered medications on file prior to visit.      Social History     Socioeconomic History    Marital status:      Spouse name: Not on file    Number of children: Not on file    Years of education: Not on file    Highest education level: Not on file   Occupational History    Occupation:  worker     Employer: [employer]   Social Needs    Financial resource strain: Not on file    Food insecurity:     Worry: Not on file     Inability: Not on file    Transportation needs:     Medical: Not on file     Non-medical: Not on file   Tobacco Use    Smoking status: Never Smoker    Smokeless tobacco: Never Used   Substance and Sexual Activity    Alcohol use: Yes     Comment: Social    Drug use: No    Sexual activity: Yes     Partners: Male     Birth control/protection: Post-menopausal   Lifestyle    Physical activity:     Days per week: Not on file     Minutes per session: Not on file    Stress: Not on file   Relationships    Social connections:     Talks on phone: Not on file     Gets together: Not on file     Attends Tenriism service: Not on file     Active member of club or organization: Not on file     Attends  meetings of clubs or organizations: Not on file     Relationship status: Not on file   Other Topics Concern    Are you pregnant or think you may be? No    Breast-feeding No   Social History Narrative    Not on file     Family History   Problem Relation Age of Onset    Diabetes Father     Hypertension Mother     Miscarriages / Stillbirths Mother     ANGEL disease Mother     Eczema Mother     Parkinsonism Mother     Diabetes Brother     Diabetes Brother     Colon cancer Maternal Aunt     No Known Problems Sister     No Known Problems Maternal Uncle     No Known Problems Paternal Aunt     No Known Problems Paternal Uncle     No Known Problems Maternal Grandmother     No Known Problems Maternal Grandfather     No Known Problems Paternal Grandmother     No Known Problems Paternal Grandfather     Celiac disease Neg Hx     Cirrhosis Neg Hx     Colon polyps Neg Hx     Crohn's disease Neg Hx     Cystic fibrosis Neg Hx     Esophageal cancer Neg Hx     Hemochromatosis Neg Hx     Inflammatory bowel disease Neg Hx     Irritable bowel syndrome Neg Hx     Liver cancer Neg Hx     Liver disease Neg Hx     Rectal cancer Neg Hx     Stomach cancer Neg Hx     Ulcerative colitis Neg Hx     Anthony's disease Neg Hx     Psoriasis Neg Hx     Ovarian cancer Neg Hx     Breast cancer Neg Hx     Amblyopia Neg Hx     Blindness Neg Hx     Cancer Neg Hx     Cataracts Neg Hx     Glaucoma Neg Hx     Macular degeneration Neg Hx     Retinal detachment Neg Hx     Strabismus Neg Hx     Stroke Neg Hx     Thyroid disease Neg Hx       Review of Systems   Constitutional: Negative for chills and fever.   HENT: Negative for sore throat.    Eyes: Negative for redness.   Respiratory: Negative for shortness of breath.    Cardiovascular: Negative for chest pain.   Gastrointestinal: Negative for abdominal pain.   Endocrine: Negative for polyuria.   Genitourinary: Negative for dysuria.   Musculoskeletal: Negative for  "back pain.   Skin: Negative for wound.   Neurological: Negative for headaches.   Psychiatric/Behavioral: Negative for agitation.     Objective:   BP (!) 140/78 (BP Location: Right arm, Patient Position: Sitting, BP Method: Medium (Automatic))   Pulse 68   Temp 98.1 °F (36.7 °C) (Oral)   Ht 5' 3" (1.6 m)   Wt 71.3 kg (157 lb 4.8 oz)   BMI 27.86 kg/m²     Physical Exam   Nursing note and vitals reviewed.  Constitutional: She appears well-developed and well-nourished. No distress.   HENT:   Head: Normocephalic and atraumatic.   Eyes: Conjunctivae are normal. No scleral icterus.   Neck: Normal range of motion. Neck supple. No tracheal deviation present.   Cardiovascular: Normal rate and regular rhythm.    Pulmonary/Chest: Effort normal and breath sounds normal. No respiratory distress. Right breast exhibits mass. Right breast exhibits no inverted nipple, no nipple discharge and no skin change. Left breast exhibits no inverted nipple, no mass, no nipple discharge and no skin change.       Abdominal: Soft. She exhibits no mass. There is no tenderness.   Musculoskeletal: She exhibits no edema.   Lymphadenopathy:     She has no cervical adenopathy.   Neurological: She is alert.   Skin: Skin is warm and dry. No rash noted. No erythema.     Psychiatric: She has a normal mood and affect. Her behavior is normal.     Radiology review: Images personally reviewed by me in the clinic.   Mammogram and US  05/16/19  Findings:  This procedure was performed using tomosynthesis.  Computer-aided detection was utilized in the interpretation of this examination.  The breasts have scattered areas of fibroglandular density.      Right  Mammo Digital Diagnostic Bilat w/ Elias  There is an equal density, irregularly shaped mass with spiculated margins seen in the lower inner quadrant of the right breast. The mass correlates with the screening mammogram finding.      US Breast Right Limited  There is a 15 mm x 11 mm x 12 mm irregularly " shaped, hypoechoic mass with spiculated margins seen in the right breast at 4 o'clock, 5 cm from the nipple. The mass correlates with the mammogram finding.      Normal lymph nodes seen in the right axilla.      Left  Mammo Digital Diagnostic Bilat w/ Elias  There is no evidence of suspicious masses, calcifications, or other abnormal findings. Additional evaluation was performed for the focal asymmetry  in the left breast, lower central region seen on 05/15/2019 exam. On today's exam , the focal asymmetry effaces to normal fibroglandular tissue and duct ectasia, unchanged dating back to 2012 exam.     Impression:  Right  Mass: Right breast mass at the lower inner position. Assessment: 4 - Suspicious finding. Biopsy is recommended.      Left  There is no mammographic or sonographic evidence of malignancy.     BI-RADS Category:   Overall: 4 - Suspicious    Pathology Review:  FINAL PATHOLOGIC DIAGNOSIS  BREAST, RIGHT, 04:00 MASS, ULTRASOUND-GUIDED BIOPSY:  Invasive ductal carcinoma of breast.  Size of invasive carcinoma: 7 MM in greatest linear dimension within a single core biopsy fragment.  Garcia Histologic Score: Grade 3 of 3.  Tubule Formation: 3  Nuclear Pleomorphism: 3  Mitotic Activity: 2  No associated in-situ component identified.  Focus suspicious for lymphovascular invasion.  Microcalcifications: Not identified.  Breast biomarkers are pending and will be included in a supplemental report.    Assessment:       69-year-old female with triple negative invasive ductal carcinoma of the right breast.       Plan:     Options for management were discussed with the patient and her family. We reviewed the existing data noting the equivalency of breast conserving surgery with radiation therapy and mastectomy. We also reviewed the guidelines of the National Comprehensive Cancer Network for Stage 1B breast carcinoma. We discussed the need for lumpectomy margins to be negative for carcinoma, the necessity for  postoperative radiation therapy after breast conservation in most cases, the possibility of a failed or false negative sentinel lymph node biopsy and the potential need for complete lymphadenectomy for a failed or positive sentinel lymph node biopsy were fully discussed. In the setting of mastectomy, delayed or immediate reconstruction options are available and were discussed.     In the setting of lumpectomy, radiation therapy would be recommended majority of the time.  The duration and treatment side effects were discussed with the patient.  This will coordinated with the radiation oncologist pending final pathology.    We also discussed the role of systemic therapy in the treatment of early stage breast cancer.  We discussed that this is based on tumor biology and jl status and will be determined based on final pathology.  We discussed that if the cancer is hormone positive, endocrine therapy would be recommended in most cases and its use can reduce the risk of recurrence as well as improve survival. Side effects of treatment were briefly discussed. We also discussed the potential role for chemotherapy based on a number of factors such as tumor phenotype (ER+ vs. triple negative vs. [id number]+) and this would be determined in coordination with the medical oncologist.    The patient, in consultation with her family, has elected to first meet with medical oncology to discuss neoadjuvant chemotherapy. Will need breast MRI first to track response to chemotherapy if we proceed neoadjuvantly.   WIll need a port as well.  Following chemotherapy patient will return to clinic to discuss further surgical planning, sooner if she desires.  Informed DNA    Patient was educated on breast cancer, receptors, wire localization lumpectomy, mastectomy, sentinel lymph node mapping and biopsy, axillary lymph node dissection, reconstruction, breast prosthesis with post-mastectomy bra and radiation therapy. Patient was given patient  information binder including [facility].  All her questions were answered.    Total time spent with the patient: 60 minutes.  45 minutes of face to face consultation and 15 minutes of chart review and coordination of care.

## 2019-06-04 NOTE — LETTER
Indigo 10, 2019      Destiny Sow PA-C  [address]  [facility] [address]           [facility]  [address]  [facility] [address]  Phone: [phone]  Fax: [phone]          Patient: Sharri Cline   MR Number: [id number]   YOB: 1950   Date of Visit: 6/4/2019       Dear Destiny Sow:    Thank you for referring Sharri Cline to me for evaluation. Attached you will find relevant portions of my assessment and plan of care.    If you have questions, please do not hesitate to call me. I look forward to following Sharri Cline along with you.    Sincerely,    Libertad Hernandez MD    Enclosure  CC:  No Recipients    If you would like to receive this communication electronically, please contact [email or web address] or [phone] to request more information on [employer] Link access.    For providers and/or their staff who would like to refer a patient to [facility], please contact us through our one-stop-shop provider referral line, [facility] [place], at [phone].    If you feel you have received this communication in error or would no longer like to receive these types of communications, please e-mail [email or web address]

## 2019-06-05 ENCOUNTER — TELEPHONE (OUTPATIENT)
Dept: HEMATOLOGY/ONCOLOGY | Facility: CLINIC | Age: 69
End: 2019-06-05

## 2019-06-05 ENCOUNTER — TELEPHONE (OUTPATIENT)
Dept: SURGERY | Facility: CLINIC | Age: 69
End: 2019-06-05

## 2019-06-05 ENCOUNTER — RESEARCH ENCOUNTER (OUTPATIENT)
Dept: RESEARCH | Facility: HOSPITAL | Age: 69
End: 2019-06-05

## 2019-06-05 ENCOUNTER — OFFICE VISIT (OUTPATIENT)
Dept: HEMATOLOGY/ONCOLOGY | Facility: CLINIC | Age: 69
End: 2019-06-05
Payer: MEDICARE

## 2019-06-05 ENCOUNTER — CLINICAL SUPPORT (OUTPATIENT)
Dept: HEMATOLOGY/ONCOLOGY | Facility: CLINIC | Age: 69
End: 2019-06-05
Payer: MEDICARE

## 2019-06-05 VITALS — WEIGHT: 155.63 LBS | BODY MASS INDEX: 27.57 KG/M2 | HEIGHT: 63 IN

## 2019-06-05 VITALS
DIASTOLIC BLOOD PRESSURE: 69 MMHG | TEMPERATURE: 98 F | BODY MASS INDEX: 27.57 KG/M2 | WEIGHT: 155.63 LBS | HEIGHT: 63 IN | HEART RATE: 72 BPM | RESPIRATION RATE: 20 BRPM | SYSTOLIC BLOOD PRESSURE: 155 MMHG

## 2019-06-05 DIAGNOSIS — Z17.1 MALIGNANT NEOPLASM OF LOWER-INNER QUADRANT OF RIGHT BREAST OF FEMALE, ESTROGEN RECEPTOR NEGATIVE: Primary | ICD-10-CM

## 2019-06-05 DIAGNOSIS — C50.919 BREAST CANCER IN FEMALE: Primary | ICD-10-CM

## 2019-06-05 DIAGNOSIS — R74.8 ELEVATED ALKALINE PHOSPHATASE LEVEL: ICD-10-CM

## 2019-06-05 DIAGNOSIS — C50.311 MALIGNANT NEOPLASM OF LOWER-INNER QUADRANT OF RIGHT BREAST OF FEMALE, ESTROGEN RECEPTOR NEGATIVE: Primary | ICD-10-CM

## 2019-06-05 DIAGNOSIS — R17 ELEVATED BILIRUBIN: Chronic | ICD-10-CM

## 2019-06-05 DIAGNOSIS — Z71.3 NUTRITIONAL COUNSELING: Primary | ICD-10-CM

## 2019-06-05 PROBLEM — C50.411 MALIGNANT NEOPLASM OF UPPER-OUTER QUADRANT OF RIGHT BREAST IN FEMALE, ESTROGEN RECEPTOR NEGATIVE: Status: ACTIVE | Noted: 2019-06-05

## 2019-06-05 PROCEDURE — 99999 PR PBB SHADOW E&M-EST. PATIENT-LVL III: ICD-10-PCS | Mod: PBBFAC,,, | Performed by: INTERNAL MEDICINE

## 2019-06-05 PROCEDURE — 99999 PR PBB SHADOW E&M-EST. PATIENT-LVL III: CPT | Mod: PBBFAC,,, | Performed by: INTERNAL MEDICINE

## 2019-06-05 PROCEDURE — 1101F PR PT FALLS ASSESS DOC 0-1 FALLS W/OUT INJ PAST YR: ICD-10-PCS | Mod: CPTII,S$GLB,, | Performed by: INTERNAL MEDICINE

## 2019-06-05 PROCEDURE — 99499 UNLISTED E&M SERVICE: CPT | Mod: S$GLB,,, | Performed by: INTERNAL MEDICINE

## 2019-06-05 PROCEDURE — 97802 MEDICAL NUTRITION INDIV IN: CPT | Mod: S$GLB,,, | Performed by: DIETITIAN, REGISTERED

## 2019-06-05 PROCEDURE — 99999 PR PBB SHADOW E&M-EST. PATIENT-LVL II: ICD-10-PCS | Mod: PBBFAC,,, | Performed by: DIETITIAN, REGISTERED

## 2019-06-05 PROCEDURE — 99499 RISK ADDL DX/OHS AUDIT: ICD-10-PCS | Mod: S$GLB,,, | Performed by: INTERNAL MEDICINE

## 2019-06-05 PROCEDURE — 99205 OFFICE O/P NEW HI 60 MIN: CPT | Mod: S$GLB,,, | Performed by: INTERNAL MEDICINE

## 2019-06-05 PROCEDURE — 99999 PR PBB SHADOW E&M-EST. PATIENT-LVL II: CPT | Mod: PBBFAC,,, | Performed by: DIETITIAN, REGISTERED

## 2019-06-05 PROCEDURE — 97802 PR MED NUTR THER, 1ST, INDIV, EA 15 MIN: ICD-10-PCS | Mod: S$GLB,,, | Performed by: DIETITIAN, REGISTERED

## 2019-06-05 PROCEDURE — 1101F PT FALLS ASSESS-DOCD LE1/YR: CPT | Mod: CPTII,S$GLB,, | Performed by: INTERNAL MEDICINE

## 2019-06-05 PROCEDURE — 99205 PR OFFICE/OUTPT VISIT, NEW, LEVL V, 60-74 MIN: ICD-10-PCS | Mod: S$GLB,,, | Performed by: INTERNAL MEDICINE

## 2019-06-05 RX ORDER — DEXAMETHASONE 4 MG/1
TABLET ORAL
Qty: 12 TABLET | Refills: 1 | Status: SHIPPED | OUTPATIENT
Start: 2019-06-05 | End: 2019-09-05

## 2019-06-05 RX ORDER — LIDOCAINE AND PRILOCAINE 25; 25 MG/G; MG/G
CREAM TOPICAL
Qty: 30 G | Refills: 0 | Status: SHIPPED | OUTPATIENT
Start: 2019-06-05 | End: 2019-07-08 | Stop reason: SDUPTHER

## 2019-06-05 RX ORDER — ONDANSETRON 8 MG/1
8 TABLET, ORALLY DISINTEGRATING ORAL EVERY 8 HOURS PRN
Qty: 45 TABLET | Refills: 1 | Status: SHIPPED | OUTPATIENT
Start: 2019-06-05 | End: 2020-04-27

## 2019-06-05 NOTE — LETTER
June 5, 2019      Libertad Hernandez MD  [address]  [facility] [address]           [facility] Hematology Oncology  [address]  [facility] [address]  Phone: [phone]          Patient: Sharri Cline   MR Number: [id number]   YOB: 1950   Date of Visit: 6/5/2019       Dear Dr. Libertad Hernandez:    Thank you for referring Sharir Cline to me for evaluation. Attached you will find relevant portions of my assessment and plan of care.    If you have questions, please do not hesitate to call me. I look forward to following Sharri Cline along with you.    Sincerely,    Dominique Ayala MD    Enclosure  CC:  No Recipients    If you would like to receive this communication electronically, please contact [email or web address] or [phone] to request more information on [employer] Link access.    For providers and/or their staff who would like to refer a patient to [facility], please contact us through our one-stop-shop provider referral line, [facility], at [phone].    If you feel you have received this communication in error or would no longer like to receive these types of communications, please e-mail [email or web address]

## 2019-06-05 NOTE — PLAN OF CARE
START ON PATHWAY REGIMEN - Breast    [id number]        Doxorubicin (Adriamycin(R))       Cyclophosphamide (Cytoxan(R))       [id number]-xxxx     **Always confirm dose/schedule in your pharmacy ordering system**        Paclitaxel (Taxol(R))     **Always confirm dose/schedule in your pharmacy ordering system**    Patient Characteristics:  Preoperative or Nonsurgical Candidate (Clinical Staging), Neoadjuvant Therapy   followed by Surgery, Invasive Disease, Chemotherapy, HER2   Negative/Unknown/Equivocal, ER Negative/Unknown, Platinum Therapy Not Indicated  Therapeutic Status: Preoperative or Nonsurgical Candidate (Clinical Staging)  AJCC M Category: cM0  AJCC Grade: G3  Breast Surgical Plan: Neoadjuvant Therapy followed by Surgery  ER Status: Negative (-)  AJCC 8 Stage Grouping: IB  HER2 Status: Negative (-)  AJCC T Category: cT1c  AJCC N Category: cN0  TX Status: Negative (-)  Type of Therapy: Platinum Therapy Not Indicated  Intent of Therapy:  Curative Intent, Discussed with Patient

## 2019-06-05 NOTE — TELEPHONE ENCOUNTER
Call to pt to schedule port placement requested by Dr Ayala. Pt scheduled to have port on 6/13/19. General preop instructions given to pt to be NPO past midnight and to stop any aspirin 7 days prior to surgery. Pt to shower with an antibacterial soap the night before surgery ans the morning of surgery prior to arrival to hospital for port placement. Pt to arrange to have someone with her to drive her home. Pt verbalized understanding.

## 2019-06-05 NOTE — PROGRESS NOTES
Pt and  were approached in Hematology Oncology clinic regarding participation in IRB protocol #2015.101.C, breast cancer study. Pt was agreeable.     The Informed Consent Form (ICF) was reviewed with pt. The discussion included:   - participation is voluntary  - pt can change her mind about participating  - pt was informed that participation in this study would not preclude her from participating in any other research if offered  - specimens may be used by [facility] researchers, community researchers or research companies  - specimens collected include only those discussed with the patient at the time of consent and are indicated on the ICF   - specimens may be used for DNA, RNA or protein studies investigating biomarkers for diagnostic, prognostic or treatment purposes  - blood specimen will be collected from Heme Onc. lab after routine collections have been conducted  - all specimens released to researchers will be stripped of identifiers  - no personal medical information will be released to any parties outside of this research study  - there will be no other physical risks outside of those involved in standard of care procedure     Dr. Ayala approved of patient's participation in the Biobank study.  Pt did not have any questions. Pt willingly and independently signed ICF.    A copy of signed ICF was given to pt with instructions to call with any questions that may arise or if she should change her mind regarding participation in Biobank study.

## 2019-06-05 NOTE — PROGRESS NOTES
History:     Reason For Consultation:   1. Stage 1B ([id number]) invasive ductal carcinoma of right breast, lower outer quadrant, ER neg, VT neg, Her2 neg, Grade 3, Ki67 70%    Referring Provider:   Libertad Hernandez MD  [address] [place] [address]    Records Obtained: Records of the patients history including those obtained from the referring provider were reviewed and summarized in detail.    HPI:   Sharri Cline presents for consultation breast cancer. She is postmenopausal. She presented for screening mammo in 5/15/2019 which showed focal asymmetries in both left and right breast. Diagnostic mammogram and US showed no significant abn of left breast, and right breast 15 mm x 11 mm x 12 mm mass at 4:00, 5 CFN. Biopsy on 5/24/19 showed grade 3 IDC, triple negative, Ki67 70%.     Labs on 5/20 showed increased alk phos but this has been chronic along with chronically elevated total bilirubin since 2012. She follows with hepatology and had a liver biopsy showed portal inflammation.     She has lost 45 lbs through exercise and diet. Feels well. No bony pain. Recently started working at [employer] three days per week.       Past Medical   Past Medical History:   Diagnosis Date    Helicobacter pylori antibody positive 5/27/13    treated with clarithromycin, metronidazole, and omeprazole x 14 days  (5/27-6/3); EGD normal in July 2013     Patient Active Problem List   Diagnosis    Menopause    Atrophic vaginitis    Elevated alkaline phosphatase level    GERD (gastroesophageal reflux disease)    Idiopathic guttate hypomelanosis    Osteopenia    Thyroid nodule    Abnormal TSH    Elevated LFTs    Malignant neoplasm of lower-inner quadrant of right breast of female, estrogen receptor negative    Elevated bilirubin     Social History   Social History     Socioeconomic History    Marital status:      Spouse name: Not on file    Number of children: Not on file    Years of education: Not on file     Highest education level: Not on file   Occupational History    Occupation:  worker     Employer: [employer]   Social Needs    Financial resource strain: Not on file    Food insecurity:     Worry: Not on file     Inability: Not on file    Transportation needs:     Medical: Not on file     Non-medical: Not on file   Tobacco Use    Smoking status: Never Smoker    Smokeless tobacco: Never Used   Substance and Sexual Activity    Alcohol use: Yes     Comment: Social    Drug use: No    Sexual activity: Yes     Partners: Male     Birth control/protection: Post-menopausal   Lifestyle    Physical activity:     Days per week: Not on file     Minutes per session: Not on file    Stress: Not on file   Relationships    Social connections:     Talks on phone: Not on file     Gets together: Not on file     Attends Zoroastrianism service: Not on file     Active member of club or organization: Not on file     Attends meetings of clubs or organizations: Not on file     Relationship status: Not on file   Other Topics Concern    Are you pregnant or think you may be? No    Breast-feeding No   Social History Narrative    Not on file     Family History  Family History   Problem Relation Age of Onset    Diabetes Father     Hypertension Mother     Miscarriages / Stillbirths Mother     ANGEL disease Mother     Eczema Mother     Parkinsonism Mother     Diabetes Brother     Diabetes Brother     Colon cancer Maternal Aunt     No Known Problems Sister     No Known Problems Maternal Uncle     No Known Problems Paternal Aunt     No Known Problems Paternal Uncle     No Known Problems Maternal Grandmother     No Known Problems Maternal Grandfather     No Known Problems Paternal Grandmother     No Known Problems Paternal Grandfather     Diabetes Brother     No Known Problems Son     No Known Problems Son     Celiac disease Neg Hx     Cirrhosis Neg Hx     Colon polyps Neg Hx     Crohn's disease Neg  Hx     Cystic fibrosis Neg Hx     Esophageal cancer Neg Hx     Hemochromatosis Neg Hx     Inflammatory bowel disease Neg Hx     Irritable bowel syndrome Neg Hx     Liver cancer Neg Hx     Liver disease Neg Hx     Rectal cancer Neg Hx     Stomach cancer Neg Hx     Ulcerative colitis Neg Hx     Anthony's disease Neg Hx     Psoriasis Neg Hx     Ovarian cancer Neg Hx     Breast cancer Neg Hx     Amblyopia Neg Hx     Blindness Neg Hx     Cancer Neg Hx     Cataracts Neg Hx     Glaucoma Neg Hx     Macular degeneration Neg Hx     Retinal detachment Neg Hx     Strabismus Neg Hx     Stroke Neg Hx     Thyroid disease Neg Hx      Medications    Current Outpatient Medications:     b complex vitamins tablet, Take 1 tablet by mouth once daily., Disp: , Rfl:     calcium carbonate-vit D3-min 600 mg calcium- 400 unit Tab, Take 1 tablet by mouth 2 (two) times daily., Disp: 60 tablet, Rfl: 3    fish oil-omega-3 fatty acids 300-1,000 mg capsule, Take 2 g by mouth once daily., Disp: , Rfl:     multivitamin with minerals tablet, Take 1 tablet by mouth once daily.  , Disp: , Rfl:     dexamethasone (DECADRON) 4 MG Tab, Take one tablet twice a day days 2,3, and 4 of each chemotherapy cycle., Disp: 12 tablet, Rfl: 1    lidocaine-prilocaine (EMLA) cream, Apply topically as needed. 45 minutes before port access, Disp: 30 g, Rfl: 0    ondansetron (ZOFRAN-ODT) 8 MG TbDL, Take 1 tablet (8 mg total) by mouth every 8 (eight) hours as needed (nausea)., Disp: 45 tablet, Rfl: 1  Allergies  Review of patient's allergies indicates:  No Known Allergies  Review of Systems  Review of Systems   Constitutional: Negative for activity change, appetite change, chills, fatigue, fever and unexpected weight change.   HENT: Negative for congestion, hearing loss, nosebleeds, sinus pressure and trouble swallowing.    Eyes: Negative for pain, discharge and itching.   Respiratory: Negative for cough, chest tightness and shortness of breath.  "   Cardiovascular: Negative for chest pain, palpitations and leg swelling.   Gastrointestinal: Negative for abdominal distention, abdominal pain, blood in stool, diarrhea, nausea, rectal pain and vomiting.   Endocrine: Negative for heat intolerance and polydipsia.   Genitourinary: Negative for difficulty urinating, dysuria, flank pain, frequency, hematuria and urgency.   Musculoskeletal: Negative for arthralgias, back pain and neck pain.   Skin: Negative for color change, pallor and rash.   Neurological: Negative for dizziness, numbness and headaches.   Hematological: Negative for adenopathy. Does not bruise/bleed easily.   Psychiatric/Behavioral: Negative for confusion and decreased concentration. The patient is not nervous/anxious.         Objective     Objective:     Vitals:    06/05/19 0953   BP: (!) 155/69   BP Location: Right arm   Patient Position: Sitting   Pulse: 72   Resp: 20   Temp: 98.1 °F (36.7 °C)   TempSrc: Oral   Weight: 70.6 kg (155 lb 10.3 oz)   Height: 5' 3" (1.6 m)     Body surface area is 1.77 meters squared.  Physical Exam   Constitutional: She is oriented to person, place, and time. She appears well-developed and well-nourished. No distress.   HENT:   Head: Normocephalic and atraumatic.   Mouth/Throat: Oropharynx is clear and moist and mucous membranes are normal. No oral lesions.   Eyes: Conjunctivae and EOM are normal. Right eye exhibits no discharge. Left eye exhibits no discharge. No scleral icterus.   Neck: Normal range of motion. Neck supple. No thyroid mass and no thyromegaly present.   Cardiovascular: Normal rate, regular rhythm, S1 normal, S2 normal and normal heart sounds.   Pulmonary/Chest: Effort normal and breath sounds normal. No respiratory distress. She has no wheezes. She has no rhonchi. She has no rales. Chest wall is not dull to percussion.   Right breast with indention of skin at 4:00 with palpable 2 x 1.5 cm mass, freely mobile   Abdominal: Soft. Normal appearance and " bowel sounds are normal. There is no hepatosplenomegaly. There is no tenderness.   Lymphadenopathy:     She has no cervical adenopathy.     She has no axillary adenopathy.        Right: No inguinal and no supraclavicular adenopathy present.        Left: No inguinal and no supraclavicular adenopathy present.   Neurological: She is alert and oriented to person, place, and time. She has normal strength.   Skin: Skin is warm, dry and intact. No pallor.   Psychiatric: Her behavior is normal. Thought content normal.         Labs and Imaging  Results for orders placed or performed in visit on 05/20/19   TSH   Result Value Ref Range    TSH 0.481 0.400 - 4.000 uIU/mL   Comprehensive metabolic panel   Result Value Ref Range    Sodium 142 136 - 145 mmol/L    Potassium 4.2 3.5 - 5.1 mmol/L    Chloride 108 95 - 110 mmol/L    CO2 28 23 - 29 mmol/L    Glucose 83 70 - 110 mg/dL    BUN, Bld 11 8 - 23 mg/dL    Creatinine 0.8 0.5 - 1.4 mg/dL    Calcium 9.9 8.7 - 10.5 mg/dL    Total Protein 7.9 6.0 - 8.4 g/dL    Albumin 4.1 3.5 - 5.2 g/dL    Total Bilirubin 1.2 (H) 0.1 - 1.0 mg/dL    Alkaline Phosphatase 184 (H) 55 - 135 U/L    AST 23 10 - 40 U/L    ALT 11 10 - 44 U/L    Anion Gap 6 (L) 8 - 16 mmol/L    eGFR if African American >60 >60 mL/min/1.73 m^2    eGFR if non African American >60 >60 mL/min/1.73 m^2   CBC Without Differential   Result Value Ref Range    WBC 4.70 3.90 - 12.70 K/uL    RBC 4.43 4.00 - 5.40 M/uL    Hemoglobin 12.5 12.0 - 16.0 g/dL    Hematocrit 40.4 37.0 - 48.5 %    Mean Corpuscular Volume 91 82 - 98 fL    Mean Corpuscular Hemoglobin 28.2 27.0 - 31.0 pg    Mean Corpuscular Hemoglobin Conc 30.9 (L) 32.0 - 36.0 g/dL    RDW 14.3 11.5 - 14.5 %    Platelets 198 150 - 350 K/uL    MPV 11.0 9.2 - 12.9 fL   T4, free   Result Value Ref Range    Free T4 0.80 0.71 - 1.51 ng/dL   Uric acid   Result Value Ref Range    Uric Acid 5.0 2.4 - 5.7 mg/dL     Breast pathology and imaging as above.     Assessment     Assessment:      Problem List Items Addressed This Visit        Oncology    Malignant neoplasm of lower-inner quadrant of right breast of female, estrogen receptor negative - Primary    Overview     1. Stage 1B ([id number]) invasive ductal carcinoma of right breast, lower outer quadrant, ER neg, [place] neg, Her2 neg, Grade 3, Ki67 70%   * Plan neoadjuvant chemotherapy with four cycles of dose-dense Adriamycin and cyclophosphamide with Neulasta support followed by twelve doses of weekly paclitaxel.           Current Assessment & Plan      * We discussed the prognosis for her breast cancer, particularly in terms of risks of local and distant recurrences. We reviewed treatment recommendations as detailed below.    * We discussed neoadjuvant chemotherapy with four cycles of dose-dense Adriamycin and cyclophosphamide with Neulasta support followed by twelve doses of weekly paclitaxel. We reviewed the risks, benefits and significant side effects of chemotherapy, including but not limited to cytopenias, nausea/emesis, peripheral neuropathy, cardiotoxicity, arthralgias, alopecia and rarely secondary malignancies and death. Patient asked multiple appropriate questions which I answered to patient satisfaction. Consent signed.   * Needs echo, port, chemo education,.    * Anti-emetics sent to pharmacy         Relevant Medications    ondansetron (ZOFRAN-ODT) 8 MG TbDL    dexamethasone (DECADRON) 4 MG Tab    lidocaine-prilocaine (EMLA) cream    Other Relevant Orders    Transthoracic echo (TTE) 2D with Color Flow       GI    Elevated bilirubin (Chronic)    Overview     Chronic, prior liver biopsy showed mild portal inflammation         Current Assessment & Plan     Monitor with chemo.             Other    Elevated alkaline phosphatase level    Overview     Chronic; follows with Hepatology; prior liver biopsy 11/2018 with mild portal inflammation           Current Assessment & Plan     Since chronic in nature and without bony pain, I do not think  staging scans for her breast cancer are needed.                Plan:     1. Echo, port, chemo education, meds to pharmacy.   2. Start neoadjuvant ddAC followed by Taxol.      If postmenopausal with high risk features, based on ASCO guidelines, we will consider adding adjuvant Zometa every 6 months for two years based on data that has shown improvement in disease free survival.       Follow-up in 1.5 to 2 weeks to start chemotherapy. I asked the patient to call for any questions, concerns, or new symptoms.    Distress Screening Results: Psychosocial Distress screening score of Distress Score: 0 noted and reviewed. No intervention indicated.   I spent 65 minutes with patient and family with 60 spent face to face counseling on diagnosis, goals of therapy, prognosis, side effects.

## 2019-06-05 NOTE — ASSESSMENT & PLAN NOTE
* We discussed the prognosis for her breast cancer, particularly in terms of risks of local and distant recurrences. We reviewed treatment recommendations as detailed below.    * We discussed neoadjuvant chemotherapy with four cycles of dose-dense Adriamycin and cyclophosphamide with Neulasta support followed by twelve doses of weekly paclitaxel. We reviewed the risks, benefits and significant side effects of chemotherapy, including but not limited to cytopenias, nausea/emesis, peripheral neuropathy, cardiotoxicity, arthralgias, alopecia and rarely secondary malignancies and death. Patient asked multiple appropriate questions which I answered to patient satisfaction. Consent signed.   * Needs echo, port, chemo education,.    * Anti-emetics sent to pharmacy

## 2019-06-05 NOTE — TELEPHONE ENCOUNTER
Called patient to schedule chemo class and echo for Monday 6/10. Mailed appt slip.          ----- Message from Dominique Ayala MD sent at 6/5/2019 10:31 AM CDT -----  1. Echo  2. Prior auth for ddAC with Neulasta followed by Taxol  3. MD or NP on first day of chemo with cbc, cmp from port

## 2019-06-05 NOTE — Clinical Note
1. Echo2. Prior auth for ddAC with Neulasta followed by Taxol3. MD or NP on first day of chemo with cbc, cmp from port

## 2019-06-05 NOTE — ASSESSMENT & PLAN NOTE
Since chronic in nature and without bony pain, I do not think staging scans for her breast cancer are needed.

## 2019-06-05 NOTE — PROGRESS NOTES
"Medical Nutrition Therapy Oncology Progress Note    Name: Sharri Cline MRN: [id number]  : 1950    Age: 69 y.o.  Ethnicity: /Black Language: English    Diagnosis: No diagnosis found.    Chemo Regimen: AC--Taxol   Referring MD: Dr. Hernandez Frequency: Every 2 weeks--weekly Radiation:            Goal of Cancer treatment n/a         Nutrition Assessment     Chief Complaint:   Chief Complaint   Patient presents with    Nutrition Counseling    Breast Cancer        Anthropometric Measurements:  Height: 5' 3" (1.6 m)  Current Weight: 70.6 kg (155 lb 10.3 oz)  Ideal Body Weight: 115#  Percent Ideal Body Weight:: 134%  BMI: Body mass index is 27.57 kg/m².     Weight History:   Wt Readings from Last 8 Encounters:   19 70.6 kg (155 lb 10.3 oz)   19 70.6 kg (155 lb 10.3 oz)   19 71.3 kg (157 lb 4.8 oz)   19 70.8 kg (156 lb)   05/10/19 70.9 kg (156 lb 4.9 oz)   18 71.5 kg (157 lb 10.1 oz)   18 70.3 kg (155 lb)   10/24/18 71.4 kg (157 lb 6.5 oz)     Medical Health History:  Feeding tube placed: No  Pre-treatment: Yes    Past Surgical History:   Procedure Laterality Date    BIOPSY, LIVER, WITH US GUIDANCE N/A 2018    Performed by [facility] at [facility] OR 2ND FLR    COLONOSCOPY N/A 2013    Performed by Ha Harrington MD at [facility] ENDO (4TH FLR)    EGD (ESOPHAGOGASTRODUODENOSCOPY) N/A 2013    Performed by Ha [facility]rington MD at [facility] ENDO (4TH FLR)    EXCISION-LYMPH NODES Right 2016    Performed by Sanya Farah MD at [facility] OR 2ND FLR    PAROTIDECTOMY Right 2016    Performed by Sanya Farah MD at [facility] OR 2ND FLR    TUBAL LIGATION          Medications:   Current Outpatient Medications:     b complex vitamins tablet, Take 1 tablet by mouth once daily., Disp: , Rfl:     calcium carbonate-vit D3-min 600 mg calcium- 400 unit Tab, Take 1 tablet by mouth 2 (two) times daily., Disp: 60 tablet, Rfl: 3    " dexamethasone (DECADRON) 4 MG Tab, Take one tablet twice a day days 2,3, and 4 of each chemotherapy cycle., Disp: 12 tablet, Rfl: 1    fish oil-omega-3 fatty acids 300-1,000 mg capsule, Take 2 g by mouth once daily., Disp: , Rfl:     lidocaine-prilocaine (EMLA) cream, Apply topically as needed. 45 minutes before port access, Disp: 30 g, Rfl: 0    multivitamin with minerals tablet, Take 1 tablet by mouth once daily.  , Disp: , Rfl:     ondansetron (ZOFRAN-ODT) 8 MG TbDL, Take 1 tablet (8 mg total) by mouth every 8 (eight) hours as needed (nausea)., Disp: 45 tablet, Rfl: 1    Last Labs:  Last Labs:  Glucose   Date Value Ref Range Status   05/20/2019 83 70 - 110 mg/dL Final   10/24/2018 70 70 - 110 mg/dL Final     BUN, Bld   Date Value Ref Range Status   05/20/2019 11 8 - 23 mg/dL Final   10/24/2018 17 8 - 23 mg/dL Final     Creatinine   Date Value Ref Range Status   05/20/2019 0.8 0.5 - 1.4 mg/dL Final   10/24/2018 0.9 0.5 - 1.4 mg/dL Final     Sodium   Date Value Ref Range Status   05/20/2019 142 136 - 145 mmol/L Final   10/24/2018 141 136 - 145 mmol/L Final     Potassium   Date Value Ref Range Status   05/20/2019 4.2 3.5 - 5.1 mmol/L Final   10/24/2018 4.2 3.5 - 5.1 mmol/L Final     Phosphorus   Date Value Ref Range Status   06/16/2015 3.5 2.7 - 4.5 mg/dL Final     Calcium   Date Value Ref Range Status   05/20/2019 9.9 8.7 - 10.5 mg/dL Final   10/24/2018 9.4 8.7 - 10.5 mg/dL Final     No results found for: PREALBUMIN  Total Protein   Date Value Ref Range Status   05/20/2019 7.9 6.0 - 8.4 g/dL Final   10/24/2018 7.2 6.0 - 8.4 g/dL Final     Cholesterol   Date Value Ref Range Status   02/06/2017 170 120 - 199 mg/dL Final     Comment:     The National Cholesterol Education Program (NCEP) has set the  following guidelines (reference ranges) for Cholesterol:  Optimal.....................<200 mg/dL  Borderline High.............200-239 mg/dL  High........................> or = 240 mg/dL     07/22/2014 176 120 - 199  mg/dL Final     Comment:     The National Cholesterol Education Program (NCEP) has set the  following guidelines (reference ranges) for Cholesterol:  Optimal.....................<200 mg/dL  Borderline High.............200-239 mg/dL  High........................> or = 240 mg/dL       Hemoglobin A1C   Date Value Ref Range Status   07/22/2014 5.5 4.5 - 6.2 % Final     Hemoglobin   Date Value Ref Range Status   05/20/2019 12.5 12.0 - 16.0 g/dL Final   10/24/2018 12.0 12.0 - 16.0 g/dL Final     Hematocrit   Date Value Ref Range Status   05/20/2019 40.4 37.0 - 48.5 % Final   10/24/2018 37.7 37.0 - 48.5 % Final     No results found for: IRON  No components found for: FROLATE  Vit D, 25-Hydroxy   Date Value Ref Range Status   09/26/2018 20 (L) 30 - 96 ng/mL Final     Comment:     Vitamin D deficiency.........<10 ng/mL                              Vitamin D insufficiency......10-29 ng/mL       Vitamin D sufficiency........> or equal to 30 ng/mL  Vitamin D toxicity............>100 ng/mL     02/06/2017 28 (L) 30 - 96 ng/mL Final     Comment:     Vitamin D deficiency.........<10 ng/mL                              Vitamin D insufficiency......10-29 ng/mL       Vitamin D sufficiency........> or equal to 30 ng/mL  Vitamin D toxicity............>100 ng/mL       WBC   Date Value Ref Range Status   05/20/2019 4.70 3.90 - 12.70 K/uL Final   10/24/2018 5.20 3.90 - 12.70 K/uL Final         Client History/Food Access:    Living Situation: Lives with spouse   Who: Shops for Groceries? Patient and Spouse   Who : Prepares meals? Patient and Spouse   Who: Fills prescriptions? Patient and Spouse   Are there financial difficulties purchasing food? No   Personal History (occupation, physical activity level, exercise): Stays active around the house     Baseline for Outcomes Monitoring  Food and Nutrition History: Pt here with  for nutrition counseling before starting chemo for breast cancer. Current weight of 155# which is stable. Pt  states she intentionally lost 30# over the past 2 years and likes to weigh in the 140s. Discussed difference between intentional vs unintentional weight loss as related to cancer/chemo. Discussed possible nutrition related side effects of treatment and ways to manage those side effects. Stressed importance of adequate intake and symptom management. Encouraged pt to continue healthy diet with whole grains, lean protein, and fruits and vegetables. Pt eats 2 meals/day and tries not to eat past 3pm. Explained that throughout treatment, pt will need to eat more frequently throughout the day. Reviewed medications, supplement/herbal intake and no food/med interactions noted. Lab results noted. Compliance is good. Comprehension is good.  24-hour recall:  Breakfast: waffle with syrup, avocado  Lunch: salmon with vegetables  Dinner: skips    Nutritional Needs:  Estimated Needs Method Use    1700 kcal/day [] Predictive Equation: Cohn-Billings   [x]  30 kcal/kg (adjusted)   Protein 70 g 1.2 gm/kg/day   Fluid 1700 ml 30 ml/kg/day     Food/Nutrient Intake (oral, enteral or parenteral) and Patient Behaviors     Calorie intake: Inadequate   Protein intake: Inadequate     Yes/No    N/A Uses medical food supplements   Yes Cooking techniques to minimize fatigue   N/A Currently modifying food textures   Yes Able to maintain usual physical actiivty     Nutrition Diagnosis     Nutrition Diagnosis Related to (Etiology) As Evidenced By (Signs/Symptoms)   Inadequate energy intake Food and nutrition related knowledge deficit 2 meals/day and not eating past 3pm with intentional weight loss                 Nutritional Intervention and Prescription        Nutrition Intervention General/healthful diet   Goals/Expected Outcomes Pt to follow healthy diet with whole grains, lean protein, fruits and vegetables.   Progress Progressing towards goal     Nutrition Intervention Strategies  Self-monitoring   Goals/Expected Outcomes Pt to manage  nutrition related side effects of treatment with strategies discussed today.   Progress Initial     Pt needs education? yes (see intervention)    Coordination of Nutrition Care: Comments:   Collaboration with other providers MD         Monitoring and Evaluation     Monitor: diet education needs    Next Visit: PRN    Materials Provided:  1. RD contact information 2. Meal planning for cancer patients   3. Araceli Mcclure recommendations            Total time: 30 minutes    Nutrition Score:      ©2010 [employer] Oncology Toolkit

## 2019-06-10 ENCOUNTER — CLINICAL SUPPORT (OUTPATIENT)
Dept: HEMATOLOGY/ONCOLOGY | Facility: CLINIC | Age: 69
End: 2019-06-10
Payer: MEDICARE

## 2019-06-10 ENCOUNTER — HOSPITAL ENCOUNTER (OUTPATIENT)
Dept: CARDIOLOGY | Facility: CLINIC | Age: 69
Discharge: HOME | End: 2019-06-10
Attending: INTERNAL MEDICINE
Payer: MEDICARE

## 2019-06-10 VITALS
HEART RATE: 60 BPM | WEIGHT: 156 LBS | DIASTOLIC BLOOD PRESSURE: 80 MMHG | SYSTOLIC BLOOD PRESSURE: 140 MMHG | HEIGHT: 63 IN | BODY MASS INDEX: 27.64 KG/M2

## 2019-06-10 DIAGNOSIS — Z17.1 MALIGNANT NEOPLASM OF LOWER-INNER QUADRANT OF RIGHT BREAST OF FEMALE, ESTROGEN RECEPTOR NEGATIVE: ICD-10-CM

## 2019-06-10 DIAGNOSIS — C50.311 MALIGNANT NEOPLASM OF LOWER-INNER QUADRANT OF RIGHT BREAST OF FEMALE, ESTROGEN RECEPTOR NEGATIVE: ICD-10-CM

## 2019-06-10 LAB
ASCENDING AORTA: 2.57 CM
AV INDEX (PROSTH): 0.72
AV MEAN GRADIENT: 4.79 MMHG
AV PEAK GRADIENT: 9.36 MMHG
AV VALVE AREA: 2.27 CM2
AV VELOCITY RATIO: 0.73
BSA FOR ECHO PROCEDURE: 1.77 M2
CV ECHO LV RWT: 0.33 CM
DOP CALC AO PEAK VEL: 1.53 M/S
DOP CALC AO VTI: 34.46 CM
DOP CALC LVOT AREA: 3.17 CM2
DOP CALC LVOT DIAMETER: 2.01 CM
DOP CALC LVOT PEAK VEL: 1.11 M/S
DOP CALC LVOT STROKE VOLUME: 78.24 CM3
DOP CALCLVOT PEAK VEL VTI: 24.67 CM
E WAVE DECELERATION TIME: 257.56 MSEC
E/A RATIO: 1.01
E/E' RATIO: 7
ECHO LV POSTERIOR WALL: 0.69 CM (ref 0.6–1.1)
FRACTIONAL SHORTENING: 30 % (ref 28–44)
INTERVENTRICULAR SEPTUM: 0.88 CM (ref 0.6–1.1)
LA MAJOR: 4.53 CM
LA MINOR: 4.53 CM
LA WIDTH: 3.14 CM
LEFT ATRIUM SIZE: 3.12 CM
LEFT ATRIUM VOLUME INDEX: 21.7 ML/M2
LEFT ATRIUM VOLUME: 37.72 CM3
LEFT INTERNAL DIMENSION IN SYSTOLE: 2.91 CM (ref 2.1–4)
LEFT VENTRICLE DIASTOLIC VOLUME INDEX: 44.56 ML/M2
LEFT VENTRICLE DIASTOLIC VOLUME: 77.53 ML
LEFT VENTRICLE MASS INDEX: 56.3 G/M2
LEFT VENTRICLE SYSTOLIC VOLUME INDEX: 18.6 ML/M2
LEFT VENTRICLE SYSTOLIC VOLUME: 32.41 ML
LEFT VENTRICULAR INTERNAL DIMENSION IN DIASTOLE: 4.18 CM (ref 3.5–6)
LEFT VENTRICULAR MASS: 98.01 G
LV LATERAL E/E' RATIO: 5.83
LV SEPTAL E/E' RATIO: 8.75
MV PEAK A VEL: 0.69 M/S
MV PEAK E VEL: 0.7 M/S
PISA TR MAX VEL: 2.18 M/S
PULM VEIN S/D RATIO: 1.64
PV PEAK D VEL: 0.33 M/S
PV PEAK S VEL: 0.54 M/S
RA MAJOR: 4.24 CM
RA PRESSURE: 3 MMHG
RA WIDTH: 3.06 CM
RIGHT VENTRICULAR END-DIASTOLIC DIMENSION: 2.81 CM
SINUS: 2.75 CM
STJ: 2.63 CM
TDI LATERAL: 0.12
TDI SEPTAL: 0.08
TDI: 0.1
TR MAX PG: 19.01 MMHG
TRICUSPID ANNULAR PLANE SYSTOLIC EXCURSION: 2.25 CM
TV REST PULMONARY ARTERY PRESSURE: 22 MMHG

## 2019-06-10 PROCEDURE — 93306 TRANSTHORACIC ECHO (TTE) COMPLETE (CUPID ONLY): ICD-10-PCS | Mod: S$GLB,,, | Performed by: INTERNAL MEDICINE

## 2019-06-10 PROCEDURE — 93306 TTE W/DOPPLER COMPLETE: CPT | Mod: S$GLB,,, | Performed by: INTERNAL MEDICINE

## 2019-06-10 NOTE — PROGRESS NOTES
Patient completed chemo class:  She received binder that has medication information specific to the patient, participated in Q&A.    Speakers included the oncology dietician (Jaclyn).   Navigator had one-on-one discussion of patient's treatment regimen.  The group was oriented to the [facility].

## 2019-06-12 ENCOUNTER — ANESTHESIA EVENT (OUTPATIENT)
Dept: SURGERY | Facility: HOSPITAL | Age: 69
End: 2019-06-12
Payer: MEDICARE

## 2019-06-12 ENCOUNTER — TELEPHONE (OUTPATIENT)
Dept: SURGERY | Facility: CLINIC | Age: 69
End: 2019-06-12

## 2019-06-12 NOTE — ANESTHESIA PREPROCEDURE EVALUATION
[facility]  Anesthesia Pre-Operative Evaluation         Patient Name: Sharri Cline  YOB: 1950  MRN: [id number]    SUBJECTIVE:     Pre-operative evaluation for Procedure(s) (LRB):  XJKERHLCO-WKKW-N-CATH-neck or chest Fluoro needed (Left)     06/12/2019    Sharri Cline is a 69 y.o. female w/ a significant PMHx of DM, diabetic retinopathy, HTN, sickle cell trait/anemia. 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 she will need a port placed for chemotherapy.     Patient now presents for the above procedure(s).      LDA: None documented.       Peripheral IV - Single Lumen 01/08/16 0641 Left Forearm (Active)   Number of days: 1251            Peripheral IV - Single Lumen 11/19/18 0812 Left Forearm (Active)   Number of days: 205            Closed/Suction Drain 01/08/16 Right;Medial Neck Bulb (Active)   Number of days: 1251       Prev airway:   Direct laryngoscopy  Endotracheal Tube; Mask Ventilation: Easy - oral; Intubated: Postinduction; Blade: Gonzalez #2; Airway Device Size: 8.0; Style: Cuffed; Cuff Inflation: Minimal occlusive pressure; Inflation Amount: 7; Placement Verified By: Auscultation, Capnometry; Grade: Grade I; Complicating Factors: None;  Depth of Insertion: 22; Securment: Lips; Complications: None; Breath Sounds: Equal Bilateral; Insertion Attempts: 1; Removal Date: 01/08/16;  Removal Time: 0959    Drips: None documented.      Patient Active Problem List   Diagnosis    Menopause    Atrophic vaginitis    Elevated alkaline phosphatase level    GERD (gastroesophageal reflux disease)    Idiopathic guttate hypomelanosis    Osteopenia    Thyroid nodule    Abnormal TSH    Elevated LFTs    Malignant neoplasm of lower-inner quadrant of right breast of female, estrogen receptor negative    Elevated bilirubin       Review of patient's allergies indicates:  No  Known Allergies    Current Inpatient Medications:      No current facility-administered medications on file prior to encounter.      Current Outpatient Medications on File Prior to Encounter   Medication Sig Dispense Refill    b complex vitamins tablet Take 1 tablet by mouth once daily.      calcium carbonate-vit D3-min 600 mg calcium- 400 unit Tab Take 1 tablet by mouth 2 (two) times daily. 60 tablet 3    dexamethasone (DECADRON) 4 MG Tab Take one tablet twice a day days 2,3, and 4 of each chemotherapy cycle. 12 tablet 1    fish oil-omega-3 fatty acids 300-1,000 mg capsule Take 2 g by mouth once daily.      lidocaine-prilocaine (EMLA) cream Apply topically as needed. 45 minutes before port access 30 g 0    multivitamin with minerals tablet Take 1 tablet by mouth once daily.        ondansetron (ZOFRAN-ODT) 8 MG TbDL Take 1 tablet (8 mg total) by mouth every 8 (eight) hours as needed (nausea). 45 tablet 1       Past Surgical History:   Procedure Laterality Date    BIOPSY, LIVER, WITH US GUIDANCE N/A 11/19/2018    Performed by [facility] at [facility] OR 2ND FLR    COLONOSCOPY N/A 7/9/2013    Performed by Ha Harrington MD at [facility] ENDO (4TH FLR)    EGD (ESOPHAGOGASTRODUODENOSCOPY) N/A 7/9/2013    Performed by Ha Harrington MD at [facility] ENDO (4TH FLR)    EXCISION-LYMPH NODES Right 1/8/2016    Performed by Sanya Farah MD at [facility] OR 2ND FLR    PAROTIDECTOMY Right 1/8/2016    Performed by Sanya Farah MD at [facility] OR 2ND FLR    TUBAL LIGATION         Social History     Socioeconomic History    Marital status:      Spouse name: Not on file    Number of children: Not on file    Years of education: Not on file    Highest education level: Not on file   Occupational History    Occupation:  worker     Employer: [employer]   Social Needs    Financial resource strain: Not on file    Food insecurity:     Worry: Not on file     Inability: Not on file     Transportation needs:     Medical: Not on file     Non-medical: Not on file   Tobacco Use    Smoking status: Never Smoker    Smokeless tobacco: Never Used   Substance and Sexual Activity    Alcohol use: Yes     Comment: Social    Drug use: No    Sexual activity: Yes     Partners: Male     Birth control/protection: Post-menopausal   Lifestyle    Physical activity:     Days per week: Not on file     Minutes per session: Not on file    Stress: Not on file   Relationships    Social connections:     Talks on phone: Not on file     Gets together: Not on file     Attends Shinto service: Not on file     Active member of club or organization: Not on file     Attends meetings of clubs or organizations: Not on file     Relationship status: Not on file   Other Topics Concern    Are you pregnant or think you may be? No    Breast-feeding No   Social History Narrative    Not on file       OBJECTIVE:     Vital Signs Range (Last 24H):         Significant Labs:  Lab Results   Component Value Date    WBC 4.70 05/20/2019    HGB 12.5 05/20/2019    HCT 40.4 05/20/2019     05/20/2019    CHOL 170 02/06/2017    TRIG 42 02/06/2017    HDL 53 02/06/2017    ALT 11 05/20/2019    AST 23 05/20/2019     05/20/2019    K 4.2 05/20/2019     05/20/2019    CREATININE 0.8 05/20/2019    BUN 11 05/20/2019    CO2 28 05/20/2019    TSH 0.481 05/20/2019    INR 1.1 10/24/2018    HGBA1C 5.5 07/22/2014       Diagnostic Studies: No relevant studies.    EKG: No recent studies available.    2D ECHO:  · Normal left ventricular systolic function. The estimated ejection fraction is 55%  · No wall motion abnormalities.  · Strain imaging performed in left ventricle. Global longitudinal strain is -18%.  · Normal LV diastolic function.  · Normal right ventricular systolic function.  · Mild mitral regurgitation.  · The estimated PA systolic pressure is 22 mm Hg  · Normal central venous pressure (3 mm Hg).      ASSESSMENT/PLAN:                                                                                                                 06/12/2019  Sharri Cline is a 69 y.o., female.    Anesthesia Evaluation    I have reviewed the Patient Summary Reports.    I have reviewed the Nursing Notes.   I have reviewed the Medications.     Review of Systems  Anesthesia Hx:  No problems with previous Anesthesia Denies Hx of Anesthetic complications  History of prior surgery of interest to airway management or planning: Denies Family Hx of Anesthesia complications.   Denies Personal Hx of Anesthesia complications.   Hematology/Oncology:  Hematology Normal   Oncology Normal     EENT/Dental:EENT/Dental Normal   Cardiovascular:   Exercise tolerance: good Denies Hypertension.  Denies MI.    Denies Angina.  Functional Capacity good / => 4 METS  Carotoid Artery Disease    Pulmonary:  Pulmonary Normal    Renal/:  Renal/ Normal     Hepatic/GI:   GERD, well controlled    Neurological:   Denies Headaches.  Denies Pain    Endocrine:  Endocrine Normal  Diabetes    Psych:  Psychiatric Normal   Phobia and Claustrophobia.         Physical Exam   Airway/Jaw/Neck:  Airway Findings: Mouth Opening: Normal Tongue: Normal  General Airway Assessment: Adult  Mallampati: I  TM Distance: Normal, at least 6 cm  Jaw/Neck Findings:  Neck ROM: Normal ROM      Dental:  Dental Findings: Edentulous   Chest/Lungs:  Chest/Lungs Findings: Clear to auscultation, Normal Respiratory Rate     Heart/Vascular:  Heart Findings: Rate: Normal  Rhythm: Regular Rhythm  Sounds: Normal             Anesthesia Plan  Type of Anesthesia, risks & benefits discussed:  Anesthesia Type:  general  Patient's Preference:   Intra-op Monitoring Plan: standard ASA monitors  Intra-op Monitoring Plan Comments:   Post Op Pain Control Plan: multimodal analgesia and per primary service following discharge from PACU  Post Op Pain Control Plan Comments:   Induction:   IV  Beta Blocker:  Patient is not currently on a  Beta-Blocker (No further documentation required).       Informed Consent: Patient understands risks and agrees with Anesthesia plan.  Questions answered. Anesthesia consent signed with patient.  ASA Score: 2     Day of Surgery Review of History & Physical:    H&P update referred to the surgeon.         Ready For Surgery From Anesthesia Perspective.

## 2019-06-13 ENCOUNTER — ANESTHESIA (OUTPATIENT)
Dept: SURGERY | Facility: HOSPITAL | Age: 69
End: 2019-06-13
Payer: MEDICARE

## 2019-06-13 ENCOUNTER — HOSPITAL ENCOUNTER (OUTPATIENT)
Facility: HOSPITAL | Age: 69
Discharge: HOME | End: 2019-06-13
Attending: SURGERY | Admitting: SURGERY
Payer: MEDICARE

## 2019-06-13 VITALS
WEIGHT: 155 LBS | HEART RATE: 58 BPM | SYSTOLIC BLOOD PRESSURE: 146 MMHG | BODY MASS INDEX: 27.46 KG/M2 | DIASTOLIC BLOOD PRESSURE: 73 MMHG | HEIGHT: 63 IN | OXYGEN SATURATION: 100 % | TEMPERATURE: 97 F | RESPIRATION RATE: 17 BRPM

## 2019-06-13 DIAGNOSIS — Z17.1 MALIGNANT NEOPLASM OF LOWER-OUTER QUADRANT OF RIGHT BREAST OF FEMALE, ESTROGEN RECEPTOR NEGATIVE: Primary | ICD-10-CM

## 2019-06-13 DIAGNOSIS — Z17.1 MALIGNANT NEOPLASM OF LOWER-INNER QUADRANT OF RIGHT BREAST OF FEMALE, ESTROGEN RECEPTOR NEGATIVE: ICD-10-CM

## 2019-06-13 DIAGNOSIS — C50.311 MALIGNANT NEOPLASM OF LOWER-INNER QUADRANT OF RIGHT BREAST OF FEMALE, ESTROGEN RECEPTOR NEGATIVE: ICD-10-CM

## 2019-06-13 DIAGNOSIS — C50.511 MALIGNANT NEOPLASM OF LOWER-OUTER QUADRANT OF RIGHT BREAST OF FEMALE, ESTROGEN RECEPTOR NEGATIVE: Primary | ICD-10-CM

## 2019-06-13 PROCEDURE — 77001 CHG FLUOROGUIDE CNTRL VEN ACCESS,PLACE,REPLACE,REMOVE: ICD-10-PCS | Mod: 26,,, | Performed by: SURGERY

## 2019-06-13 PROCEDURE — 36561 PR INSERT TUNNELED CV CATH WITH PORT: ICD-10-PCS | Mod: RT,,, | Performed by: SURGERY

## 2019-06-13 PROCEDURE — 37000008 HC ANESTHESIA 1ST 15 MINUTES: Performed by: SURGERY

## 2019-06-13 PROCEDURE — 77001 FLUOROGUIDE FOR VEIN DEVICE: CPT | Mod: 26,,, | Performed by: SURGERY

## 2019-06-13 PROCEDURE — 36000706: Performed by: SURGERY

## 2019-06-13 PROCEDURE — 25000003 PHARM REV CODE 250: Performed by: STUDENT IN AN ORGANIZED HEALTH CARE EDUCATION/TRAINING PROGRAM

## 2019-06-13 PROCEDURE — 25000003 PHARM REV CODE 250: Performed by: SURGERY

## 2019-06-13 PROCEDURE — D9220A PRA ANESTHESIA: ICD-10-PCS | Mod: ,,, | Performed by: ANESTHESIOLOGY

## 2019-06-13 PROCEDURE — C1788 PORT, INDWELLING, IMP: HCPCS | Performed by: SURGERY

## 2019-06-13 PROCEDURE — 63600175 PHARM REV CODE 636 W HCPCS: Performed by: SURGERY

## 2019-06-13 PROCEDURE — D9220A PRA ANESTHESIA: Mod: ,,, | Performed by: ANESTHESIOLOGY

## 2019-06-13 PROCEDURE — 71000015 HC POSTOP RECOV 1ST HR: Performed by: SURGERY

## 2019-06-13 PROCEDURE — 71000044 HC DOSC ROUTINE RECOVERY FIRST HOUR: Performed by: SURGERY

## 2019-06-13 PROCEDURE — 36000707: Performed by: SURGERY

## 2019-06-13 PROCEDURE — 36561 INSERT TUNNELED CV CATH: CPT | Mod: RT,,, | Performed by: SURGERY

## 2019-06-13 PROCEDURE — 63600175 PHARM REV CODE 636 W HCPCS: Performed by: STUDENT IN AN ORGANIZED HEALTH CARE EDUCATION/TRAINING PROGRAM

## 2019-06-13 PROCEDURE — 37000009 HC ANESTHESIA EA ADD 15 MINS: Performed by: SURGERY

## 2019-06-13 PROCEDURE — 27200651 HC AIRWAY, LMA: Performed by: STUDENT IN AN ORGANIZED HEALTH CARE EDUCATION/TRAINING PROGRAM

## 2019-06-13 DEVICE — KIT POWERPORT SINGLE 8FR: Type: IMPLANTABLE DEVICE | Site: CHEST | Status: FUNCTIONAL

## 2019-06-13 RX ORDER — BUPIVACAINE HYDROCHLORIDE 2.5 MG/ML
INJECTION, SOLUTION EPIDURAL; INFILTRATION; INTRACAUDAL
Status: DISCONTINUED | OUTPATIENT
Start: 2019-06-13 | End: 2019-06-13 | Stop reason: HOSPADM

## 2019-06-13 RX ORDER — SODIUM CHLORIDE 9 MG/ML
INJECTION, SOLUTION INTRAVENOUS CONTINUOUS
Status: DISCONTINUED | OUTPATIENT
Start: 2019-06-13 | End: 2019-06-17 | Stop reason: HOSPADM

## 2019-06-13 RX ORDER — PHENYLEPHRINE HYDROCHLORIDE 10 MG/ML
INJECTION INTRAVENOUS
Status: DISCONTINUED | OUTPATIENT
Start: 2019-06-13 | End: 2019-06-13

## 2019-06-13 RX ORDER — MIDAZOLAM HYDROCHLORIDE 1 MG/ML
INJECTION, SOLUTION INTRAMUSCULAR; INTRAVENOUS
Status: DISCONTINUED | OUTPATIENT
Start: 2019-06-13 | End: 2019-06-13

## 2019-06-13 RX ORDER — SODIUM CHLORIDE 0.9 % (FLUSH) 0.9 %
10 SYRINGE (ML) INJECTION
Status: DISCONTINUED | OUTPATIENT
Start: 2019-06-13 | End: 2019-06-17 | Stop reason: HOSPADM

## 2019-06-13 RX ORDER — FENTANYL CITRATE 50 UG/ML
INJECTION, SOLUTION INTRAMUSCULAR; INTRAVENOUS
Status: DISCONTINUED | OUTPATIENT
Start: 2019-06-13 | End: 2019-06-13

## 2019-06-13 RX ORDER — EPHEDRINE SULFATE 50 MG/ML
INJECTION, SOLUTION INTRAVENOUS
Status: DISCONTINUED | OUTPATIENT
Start: 2019-06-13 | End: 2019-06-13

## 2019-06-13 RX ORDER — PROPOFOL 10 MG/ML
VIAL (ML) INTRAVENOUS
Status: DISCONTINUED | OUTPATIENT
Start: 2019-06-13 | End: 2019-06-13

## 2019-06-13 RX ORDER — HEPARIN SODIUM (PORCINE) LOCK FLUSH IV SOLN 100 UNIT/ML 100 UNIT/ML
SOLUTION INTRAVENOUS
Status: DISCONTINUED | OUTPATIENT
Start: 2019-06-13 | End: 2019-06-13 | Stop reason: HOSPADM

## 2019-06-13 RX ORDER — CEFAZOLIN SODIUM 1 G/3ML
2 INJECTION, POWDER, FOR SOLUTION INTRAMUSCULAR; INTRAVENOUS
Status: COMPLETED | OUTPATIENT
Start: 2019-06-13 | End: 2019-06-13

## 2019-06-13 RX ORDER — LIDOCAINE HCL/PF 100 MG/5ML
SYRINGE (ML) INTRAVENOUS
Status: DISCONTINUED | OUTPATIENT
Start: 2019-06-13 | End: 2019-06-13

## 2019-06-13 RX ORDER — ONDANSETRON 2 MG/ML
INJECTION INTRAMUSCULAR; INTRAVENOUS
Status: DISCONTINUED | OUTPATIENT
Start: 2019-06-13 | End: 2019-06-13

## 2019-06-13 RX ORDER — DEXAMETHASONE SODIUM PHOSPHATE 4 MG/ML
INJECTION, SOLUTION INTRA-ARTICULAR; INTRALESIONAL; INTRAMUSCULAR; INTRAVENOUS; SOFT TISSUE
Status: DISCONTINUED | OUTPATIENT
Start: 2019-06-13 | End: 2019-06-13

## 2019-06-13 RX ORDER — LIDOCAINE HYDROCHLORIDE 10 MG/ML
1 INJECTION, SOLUTION EPIDURAL; INFILTRATION; INTRACAUDAL; PERINEURAL ONCE
Status: DISCONTINUED | OUTPATIENT
Start: 2019-06-13 | End: 2019-06-17 | Stop reason: HOSPADM

## 2019-06-13 RX ADMIN — PHENYLEPHRINE HYDROCHLORIDE 100 MCG: 10 INJECTION INTRAVENOUS at 12:06

## 2019-06-13 RX ADMIN — PROPOFOL 100 MG: 10 INJECTION, EMULSION INTRAVENOUS at 12:06

## 2019-06-13 RX ADMIN — SODIUM CHLORIDE: 0.9 INJECTION, SOLUTION INTRAVENOUS at 11:06

## 2019-06-13 RX ADMIN — FENTANYL CITRATE 25 MCG: 50 INJECTION, SOLUTION INTRAMUSCULAR; INTRAVENOUS at 12:06

## 2019-06-13 RX ADMIN — ONDANSETRON 4 MG: 2 INJECTION INTRAMUSCULAR; INTRAVENOUS at 12:06

## 2019-06-13 RX ADMIN — CEFAZOLIN 2 G: 330 INJECTION, POWDER, FOR SOLUTION INTRAMUSCULAR; INTRAVENOUS at 12:06

## 2019-06-13 RX ADMIN — EPHEDRINE SULFATE 10 MG: 50 INJECTION, SOLUTION INTRAMUSCULAR; INTRAVENOUS; SUBCUTANEOUS at 12:06

## 2019-06-13 RX ADMIN — SODIUM CHLORIDE, SODIUM GLUCONATE, SODIUM ACETATE, POTASSIUM CHLORIDE, MAGNESIUM CHLORIDE, SODIUM PHOSPHATE, DIBASIC, AND POTASSIUM PHOSPHATE: .53; .5; .37; .037; .03; .012; .00082 INJECTION, SOLUTION INTRAVENOUS at 12:06

## 2019-06-13 RX ADMIN — MIDAZOLAM HYDROCHLORIDE 1 MG: 1 INJECTION, SOLUTION INTRAMUSCULAR; INTRAVENOUS at 11:06

## 2019-06-13 RX ADMIN — LIDOCAINE HYDROCHLORIDE 100 MG: 20 INJECTION, SOLUTION INTRAVENOUS at 12:06

## 2019-06-13 RX ADMIN — DEXAMETHASONE SODIUM PHOSPHATE 4 MG: 4 INJECTION, SOLUTION INTRAMUSCULAR; INTRAVENOUS at 12:06

## 2019-06-13 NOTE — PLAN OF CARE
Patient tolerated procedure/anesthesia well, vss, no complications. Patient denies pain, patient denies nausea, and tolerates PO intake. Consents with chart. RN reviewed discharge instructions with patient and spouse at bedside, verbalized understanding.

## 2019-06-13 NOTE — BRIEF OP NOTE
[facility]  Brief Operative Note     SUMMARY     Surgery Date: 6/13/2019     Surgeon(s) and Role:     * Libertad Hernandez MD - Primary    Assisting Surgeon: None    Pre-op Diagnosis:  Malignant neoplasm of lower-inner quadrant of right breast of female, estrogen receptor negative [C50.311, Z17.1]    Post-op Diagnosis:  Post-Op Diagnosis Codes:     * Malignant neoplasm of lower-inner quadrant of right breast of female, estrogen receptor negative [C50.311, Z17.1]    Procedure(s) (LRB):  [id number]-[id number]-V-CATH - CHEST (Left)    Anesthesia: General    Description of the findings of the procedure: left IJ port placed under US and fluoro guidance.    Findings/Key Components: Adequate position of catheter on fluoro.    Estimated Blood Loss: minimal         Specimens:   Specimen (12h ago, onward)    None          Discharge Note    SUMMARY     Admit Date: 6/13/2019    Discharge Date and Time:  06/13/2019 1:43 PM    Hospital Course (synopsis of major diagnoses, care, treatment, and services provided during the course of the hospital stay): TO OR for procedure then to recovery. Discharged when criteria met.     Final Diagnosis: Post-Op Diagnosis Codes:     * Malignant neoplasm of lower-inner quadrant of right breast of female, estrogen receptor negative [C50.311, Z17.1]    Disposition: Home or Self Care    Follow Up/Patient Instructions:     Medications:  Reconciled Home Medications:      Medication List      ASK your doctor about these medications    b complex vitamins tablet  Take 1 tablet by mouth once daily.     calcium carbonate-vit D3-min 600 mg calcium- 400 unit Tab  Take 1 tablet by mouth 2 (two) times daily.     dexAMETHasone 4 MG Tab  Commonly known as:  DECADRON  Take one tablet twice a day days 2,3, and 4 of each chemotherapy cycle.     fish oil-omega-3 fatty acids 300-1,000 mg capsule  Take 2 g by mouth once daily.     lidocaine-prilocaine cream  Commonly known as:  EMLA  Apply topically as needed.  45 minutes before port access     multivitamin with minerals tablet  Take 1 tablet by mouth once daily.     ondansetron 8 MG Tbdl  Commonly known as:  ZOFRAN-ODT  Take 1 tablet (8 mg total) by mouth every 8 (eight) hours as needed (nausea).          No discharge procedures on file.     Return to Dr. Hernandez at completion of chemotherapy.  Follow up with oncology.

## 2019-06-13 NOTE — TRANSFER OF CARE
"Anesthesia Transfer of Care Note    Patient: Sharri Cline    Procedure(s) Performed: Procedure(s) (LRB):  KESEOLCJQ-WFSF-G-CATH - CHEST (Left)    Patient location: [facility]    Anesthesia Type: general    Transport from OR: Transported from OR on 6-10 L/min O2 by face mask with adequate spontaneous ventilation    Post pain: adequate analgesia    Post assessment: no apparent anesthetic complications    Post vital signs: stable    Level of consciousness: awake    Nausea/Vomiting: no nausea/vomiting    Complications: none    Transfer of care protocol was followed      Last vitals:   Visit Vitals  /76 (BP Location: Left arm, Patient Position: Lying)   Pulse 72   Temp 36.4 °C (97.5 °F) (Temporal)   Resp 15   Ht 5' 3" (1.6 m)   Wt 70.3 kg (155 lb)   SpO2 100%   Breastfeeding? No   BMI 27.46 kg/m²     "

## 2019-06-13 NOTE — DISCHARGE INSTRUCTIONS
Discharge Instructions: Changing the Dressing on Your Central Line  You are going home with a central line. Its also called a central venous access device (CVAD) or central venous catheter (CVC). A small, soft tube called a catheter has been put in a vein in that leads to your heart. This will be used  for a short time (temporary). It provides medicine during your treatment. Where the catheter enters your body, its covered with a bandage (dressing). The dressing is often made of clear (transparent) plastic. This helps keep the area germ-free (sterile). To prevent infection, you need to keep the dressing clean and dry. Only change the dressing if you or a caregiver have been told to do so. This sheet explains the process. Also follow any specific instructions from your healthcare provider.  Prevent infection with good hand hygiene  A central line can let germs into your body. This can lead to serious and sometimes fatal infections. To prevent infection, its very important that you, your caregivers, and others around you use good hand hygiene. This means washing your hands well with soap and water, and cleaning them with alcohol-based hand gel as directed. Never touch the central line or dressing without first using one of these methods.  To wash your hands with soap and water:  1. Wet your hands with warm water. (Avoid hot water. It can cause skin irritation when you wash your hands often.)  2. Apply enough soap to cover the entire surface of your hands, including your fingers.  3. Rub your hands together briskly for at least 15 seconds. Make sure to rub the front and back of each hand up to the wrist, your fingers and fingernails, between the fingers, and each thumb.  4. Rinse your hands with warm water.  5. Dry your hands completely with a new, unused paper towel. Dont use a cloth towel or other reusable towel. These can harbor germs.  6. Use the paper towel to turn off the faucet, then throw it away. If  youre in a bathroom, also use a paper towel to open the door instead of touching the handle.  When you dont have access to soap and water: Use alcohol-based hand gel to clean your hands. The gel should have at least 60% alcohol. Follow the instructions on the package. Your healthcare team can answer any questions you have about when to use hand gel, or when its better to wash with soap and water.   When to change the dressing  · If you have a transparent bandage (dressing), change it every 7 days (or more often if instructed).  · If you have a gauze dressing, change it every 2 days. This includes gauze under a transparent dressing.  · If the dressing becomes loose, dirty, or wet, change it right away. Report this to your healthcare provider as soon as possible.  Avoiding infection while changing the dressing  The central line provides a direct path into your bloodstream. So the chance of infection is high as you change the dressing. Dont touch the catheter where it enters the skin. And be very careful to keep your work area and supplies clean. Following the steps on this sheet will help. Keep in mind that some supplies come in sterile (germ free) packaging. Make sure to keep these sterile during the dressing change.  Supplies for changing the dressing  A general list of supplies is below. Your healthcare team will provide you with a list of specific items and brands to use. Or you may get a kit that has everything you need. Your supplies may include:  · Gauze dressing  · Transparent dressing  · Antimicrobial sponge disc  · Antiseptic supplies to clean the skin and around the catheter exit site (such as chlorhexidine plus alcohol)  · Sterile gloves  · Non-sterile gloves and a mask  · Medical tape, adhesive strips, or a securement device  Before you start, review the steps below and make sure you understand them. If youre not sure what to do or how to use your supplies, ask a member of your healthcare team before  you try to change the dressing.  Step 1. Wash your hands  Wash your hands well with soap and water. Use the method described above.  Step 2. Prepare your work area  · Choose an area with a hard, flat surface where you can easily spread out the supplies, such as a desk or table. Dont use the bathroom. There are too many germs.  · Put pets and children out of the work area. Keep them out until the dressing change is done.  · Clean washable surfaces with soap and water. Dry with a clean, unused paper towel. Then throw the paper towel away.  · Spread clean, unused paper towels over your work surface. Use as many as you need to cover it.  · If you need to cough or sneeze, move away from your work surface first.  Step 3. Lay out your supplies  · Clean your hands with soap and water or hand gel. Do this before you lay out your supplies on the work surface.  · After cleaning your hands, only touch your supplies. If you do touch anything else, such as furniture or your clothes, clean your hands again. This is very important for preventing infection.  · Place your supplies on the cleaned and dried work surface. Lay them out in the order you will be using them.  · If youre using a kit with a sterile tray, take off the plastic covering. Remove the covering only. Don't open the tray. Keep the plastic covering to dispose of the old dressing.  Step 4. Remove the old dressing  · Put on clean, nonsterile gloves and a mask. Anyone who is helping you change the dressing should do this, too. (Dont use your sterile gloves for this step. Sterile gloves are used in Step 5.)  · Take off the old dressing by gently pulling the edges. Carefully peel off the dressing in the direction of the catheter site. While doing this, hold the end of the catheter (the lumen) so it doesnt pull out of your body.  · If a securement device is in place, carefully remove it using the method your nurse showed you.  · Check the catheter site for signs of  infection such as redness, swelling, drainage, or a bad odor. If you notice any, call your healthcare team when youre finished changing the dressing.  · Wrap the old dressing in plastic (if available) and put it aside.  Step 5. Prepare sterile supplies  · Remove your nonsterile gloves. Clean your hands with hand gel. You should still be wearing the mask.  · Open any sterile supplies, such as the transparent dressing and sterile gloves. Follow the directions on the package and your healthcare teams instructions.  · Put on sterile gloves. Follow the directions on the package or as you were shown in the hospital.  Step 6. Clean the catheter site  · If the skin under the dressing has dried blood or a lot of drainage, clean it as directed by your healthcare team.  · Clean the catheter exit site using the antiseptic supplies your healthcare team recommends. Its very important to follow the package directions and your providers instructions exactly.  Step 7. Apply the new dressing  · If youre using a gauze dressing, apply it over the catheter exit site the way your nurse showed you.  · If youre using an antimicrobial sponge disc, place it around the catheter with the correct side touching your skin. Line up the slit on the sponge disc with the catheter.  · Apply the transparent dressing over the catheter exit site and over the gauze or sponge disc (if used). Put the top end down first. Then smooth out the rest across the area where the catheter exits your skin. Make sure the dressing covers the entire catheter.  · Take off and discard the sterile gloves and mask.  · Tape the end of the catheter (lumen) to your skin.  · Throw away the old dressing and used supplies.  · Wash your hands.  Changing the injection caps  The catheters injection caps need to be changed every 3 to 7 days. This is often done at the same time as the dressing change. Your healthcare provider will give you instructions.  Risk of blood clot  If a  blood clot forms, it can block blood flow through the vein where the catheter is placed. Signs of a blood clot include pain or swelling in your neck, face, check, or arm. If you have any of these symptoms, call your healthcare provider right away. You may need an ultrasound exam to find the blood clot and be treated with a blood thinner.  When to seek medical care  Call your healthcare provider right away if you have any of the following:  · Pain or burning in your shoulder, chest, back, arm, or leg  · Fever of 100.4°F (38.0°C) or higher  · Chills  · Signs of infection at the catheter site (pain, redness, drainage, burning, or stinging)  · Coughing, wheezing, or shortness of breath  · A racing or irregular heartbeat  · Muscle stiffness or trouble moving  · Gurgling noises coming from the catheter  · The catheter falls out, breaks, cracks, leaks, or has other damage   Date Last Reviewed: 7/1/2016  © 9712-5143 [employer]. [address] [place] [address]. All rights reserved. This information is not intended as a substitute for professional medical care. Always follow your healthcare professional's instructions.                Recovery After Procedural Sedation (Adult)  You have been given medicine by vein to make you sleep during your surgery. This may have included both a pain medicine and sleeping medicine. Most of the effects have worn off. But you may still have some drowsiness for the next 6 to 8 hours.  Home care  Follow these guidelines when you get home:  · For the next 8 hours, you should be watched by a responsible adult. This person should make sure your condition is not getting worse.  · Don't drink any alcohol for the next 24 hours.  · Don't drive, operate dangerous machinery, or make important business or personal decisions during the next 24 hours.  Note: Your healthcare provider may tell you not to take any medicine by mouth for pain or sleep in the next 4 hours. These  medicines may react with the medicines you were given in the hospital. This could cause a much stronger response than usual.  Follow-up care  Follow up with your healthcare provider if you are not alert and back to your usual level of activity within 12 hours.  When to seek medical advice  Call your healthcare provider right away if any of these occur:  · Drowsiness gets worse  · Weakness or dizziness gets worse  · Repeated vomiting  · You can't be awakened   Date Last Reviewed: 10/18/2016  © 1555-3642 [employer]. [address] [place] [address]. All rights reserved. This information is not intended as a substitute for professional medical care. Always follow your healthcare professional's instructions.      Caring for Your Central Vein Access    Its important to care for your central venous access device properly. If you dont, it may become infected. Then it cant be used. The tube (catheter) will have to be removed and a new one placed in another vein. Your nurse will show you how to care for your access to help it last.  Follow these tips  · Wash your hands often.  · Try not to touch the catheter.  · Don't let anything (such as clothing) rub or pull on the catheter.  · Don't get your catheter wet.  Watching for problems  Call your healthcare provider right away if you have any of these problems:  · You see a break in the tubing.   · The skin around your access bleeds, oozes, or becomes red or sore.  · You have a fever of 100.4°F (38.0°C) or higher.  · The stitches (sutures) become loose or the catheter falls out.  · An arm becomes swollen.  Remember. . .  A central vein access is often used only for a short time. Take good care of it.      Important numbers  Healthcare provider:  Name _______________________________ Phone _______________________________  Nurse:  Name _______________________________ Phone _______________________________   Date Last Reviewed: 7/1/2016 © 2000-2017 [employer]. [address] [place] [address], [place] [address]. All rights reserved. This information is not intended as a substitute for professional medical care. Always follow your healthcare professional's instructions.

## 2019-06-13 NOTE — OP NOTE
Central Line Placement Procedure Note        Physician: Surgeon(s) and Role:     * Libertad Hernandez MD - Primary    Procedure:   1.Central Line Placement port a cath left with ultrasound and fluoroscopy    Date: 6/13/2019    Location: left internal jugular vein    Anesthesia: General with LMA and local anesthetic    Pre-Op Diagnosis:  Right malignant neoplasm, breast, estrogen negative, lower inner quadrant    Post-Op Diagnosis: Same      Full Drape Used: Yes    Procedure:  After informed consent and timeout was performed, the patient was taken to the operating room. Patient was placed supine on the table and arms were tucked by her side. Ultrasound was used to ensure the left internal jugular vein was patent. Next, local anesthesia was injected and a small nick was made in the skin in the left neck. Ultrasound was used for guidance and an 18-gauge hollow needle was used to access the vessel. Wire was inserted through the access needle and fluoroscopy and ultrasound were used to confirm the wire was in the correct position. Next, a counter incision was made.  Following this local anesthetic was injected on the anterior left chest.  A small incision was made and the port pocket was created using electrocautery. Next,  the catheter was tunneled from the anterior left chest. Then over the wire, the vessel was dilated and the catheter was passed into the vessel via the Seldinger technique under direct fluoroscopic guidance. The catheter was confirmed to be in proper position in the SVC using fluoroscopy. The catheter was aspirated and blood return was good. Next, the catheter was cut to the appropriate length and attached to the port. Again, blood return was noted, and we then flushed the catheter with heparinized saline. The port was sutured in place using a 2-0 vicryl. The port incision was closed using interuppted 3-0 vicryl followed by running 4-0 vicryl subcuticular.  All skin incisions were closed with a 4-0 vicryl.  Dermabond was applied and all counts were correct at the end of the procedure. Patient tolerated the procedure well. A chest x-ray will be performed in the recovery room.    Confirmation: Fluoroscopy intraop    Complications/Comments:  none    Estimated Blood Loss: 2 cc    Disposition: PACU in stable condition    Attestation: I was present for the entire procedure assisted by a qualified resident.

## 2019-06-13 NOTE — PROGRESS NOTES
RN placed 3rd a call to  contact Dr. Hernandez. At 1439 RN received a call back from and said he would relay the message to Dr. Hernandez.

## 2019-06-14 DIAGNOSIS — C50.311 MALIGNANT NEOPLASM OF LOWER-INNER QUADRANT OF RIGHT BREAST OF FEMALE, ESTROGEN RECEPTOR NEGATIVE: Primary | ICD-10-CM

## 2019-06-14 DIAGNOSIS — Z17.1 MALIGNANT NEOPLASM OF LOWER-INNER QUADRANT OF RIGHT BREAST OF FEMALE, ESTROGEN RECEPTOR NEGATIVE: Primary | ICD-10-CM

## 2019-06-15 NOTE — ANESTHESIA POSTPROCEDURE EVALUATION
Anesthesia Post Evaluation    Patient: Sharri Cline    Procedure(s) Performed: Procedure(s) (LRB):  [id number] - CHEST (Left)    Final Anesthesia Type: general  Patient location during evaluation: OPS (by phone)  Patient participation: Yes- Able to Participate  Level of consciousness: awake and alert  Post-procedure vital signs: reviewed and stable  Pain management: adequate  Airway patency: patent  PONV status at discharge: No PONV  Anesthetic complications: no      Cardiovascular status: stable  Respiratory status: unassisted  Hydration status: euvolemic  Follow-up not needed.          Vitals Value Taken Time   /73 6/13/2019  1:51 PM   Temp 36.3 °C (97.3 °F) 6/13/2019  1:51 PM   Pulse 58 6/13/2019  1:51 PM   Resp 17 6/13/2019  1:51 PM   SpO2 100 % 6/13/2019  1:51 PM         No case tracking events are documented in the log.      Pain/Debby Score: No data recorded

## 2019-06-19 ENCOUNTER — TELEPHONE (OUTPATIENT)
Dept: HEMATOLOGY/ONCOLOGY | Facility: CLINIC | Age: 69
End: 2019-06-19

## 2019-06-19 NOTE — TELEPHONE ENCOUNTER
Left message that I received the approval for the chemo. Number provided to call the clinic.          ----- Message from Shikha Topete sent at 6/19/2019  7:42 AM CDT -----  She is approved now!    ----- Message -----  From: Shikha Topete  Sent: 6/5/2019  10:33 AM  To: MD Shikha Mccormick         1. Echo   2. Prior auth for ddAC with Neulasta followed by Taxol   3. MD or NP on first day of chemo with cbc, cmp from port

## 2019-06-20 ENCOUNTER — TELEPHONE (OUTPATIENT)
Dept: HEMATOLOGY/ONCOLOGY | Facility: CLINIC | Age: 69
End: 2019-06-20

## 2019-06-20 NOTE — TELEPHONE ENCOUNTER
----- Message from Doris Rajput sent at 6/20/2019 12:34 PM CDT -----  Type:  Patient Returning Call    Who Called: pt   Who Left Message for Patient: Diana Ibarra   Does the patient know what this is regarding?: appt   Best Call Back Number:[phone]  Additional Information:  Thank You  SAM Rajput

## 2019-06-21 ENCOUNTER — TELEPHONE (OUTPATIENT)
Dept: HEMATOLOGY/ONCOLOGY | Facility: CLINIC | Age: 69
End: 2019-06-21

## 2019-06-21 DIAGNOSIS — Z51.11 ENCOUNTER FOR CHEMOTHERAPY MANAGEMENT: Primary | ICD-10-CM

## 2019-06-21 NOTE — TELEPHONE ENCOUNTER
"Left message to call the office about scheduling.          ----- Message from Rosy Roque sent at 6/20/2019  4:06 PM CDT -----  Pt missed the scheduling call, please attempt to contact her again at [phone]  "Thank you for all that you do for our patients'"    "

## 2019-06-21 NOTE — TELEPHONE ENCOUNTER
Left message to call the office about scheduling infusion.  Number was provided.    ----- Message from Diana Ibarra sent at 6/5/2019 11:47 AM CDT -----  Regarding: Pending chemo 6/5  MD Diana Lester         1. Echo ~ 6/10 & Chemo class~ 6/10    2. Prior auth for ddAC with Neulasta followed by Taxol     3. MD or NP on first day of chemo with cbc, cmp from port

## 2019-06-21 NOTE — PROGRESS NOTES
OUTPATIENT PHYSICAL THERAPY  PRE-OP EVALUATION    Name: Sharri Cline  Clinic Number: [id number]    Therapy Diagnosis:   Encounter Diagnoses   Name Primary?    Malignant neoplasm of lower-outer quadrant of right breast of female, estrogen receptor negative Yes    Malignant neoplasm of lower-inner quadrant of right breast of female, estrogen receptor negative     At risk for lymphedema      Physician: Libertad Hernandez MD    Physician Orders: PT Eval and Treat   Medical Diagnosis: Right breast cancer  Evaluation Date: 6/24/2019  Authorization period Expiration: 8/24/19  Plan of Care Certification Period: 6/24/19  Insurance: [employer]  Authorization #: [id number] (per Gladys Pat)    Visit #: 1/ Visits authorized: 1  Time In:8:35 AM  Time Out: 9:25 AM  Total Billable Time: 50 minutes    Precautions: cancer    History   History of Present Illness: Sharri is a 69 y.o. female that presents to  [facility] at the [facility] secondary to dx of right breast cancer.    Dx: Right breast IDC, Stage 1B, Grade 3, Triple negative  Surgery date: 6/13/19 for insertion of Port-A-Cath  Chemotherapy: Demetrius-adjuvant chemotherapy with AC and paclitaxel begins 7/3/19      Pt presents today for baseline measurements to aid in the early detection of lymphedema, UE muscle testing, postural and ROM assessment along with education of risk of lymphedema and surgical precautions post surgery. Circumferential measurements will be taken today of BL UEs for early detection of lymphedema post surgery. Pt will also be instructed in exercises to perform pre and post-surgery to insure best outcomes.     Past Medical History:   Past Medical History:   Diagnosis Date    Helicobacter pylori antibody positive 5/27/13    treated with clarithromycin, metronidazole, and omeprazole x 14 days  (5/27-6/3); EGD normal in July 2013       Past Surgical History:   Sharri Cline  has a past surgical  "history that includes Tubal ligation; Biopsy of liver with ultrasound guidance (N/A, 11/19/2018); and Insertion of tunneled central venous catheter (CVC) with subcutaneous port (Left, 6/13/2019).    Medications:  Sharri has a current medication list which includes the following prescription(s): b complex vitamins, calcium carbonate-vit d3-min, dexamethasone, fish oil-omega-3 fatty acids, lidocaine-prilocaine, multivitamin with minerals, and ondansetron.    Allergies:  Review of patient's allergies indicates:  No Known Allergies       Hand dominance: Right handed  Prior Therapy: for her back  Nutrition:  Normal  Social History: Lives with   Place of Residence (Steps/Adaptations): one story home  Current functional status:  Independent  Exercise routine prior to onset : Walking 5 days a week  DME owned: None  Work:  At [employer]                         Subjective   Pt states: having some soreness in port site  Pain: 1/10 on VAS.     Objective   Mental status :oriented    Posture/Alignment   Postural examination/scapula alignment: Forward head and shoulders  Joint integrity: WFLs  Skin integrity: intact  Edema: none noted    Sensation: Light Touch: Intact           Proprioception: Intact  - appearance: well groomed     ROM:   UPPER EXTREMITY--AROM/PROM  (R) UE: WNLs  (L) UE: WNLs     Shoulder Range of Motion:   ACTIVE ROM LEFT RIGHT   Flexion 180 180   Abduction 180 180   Horizontal Abd 50 50   Extension 60 60   IR/90deg 85 85   ER/90deg 90 90     Strength: manual muscle test grades below   Upper Extremity Strength   (L) UE (R) UE   Shoulder flexion: 5/5 5/5   Shoulder Abduction: 5/5 5/5   Shoulder IR 5/5 5/5   Shoulder ER 5/5 5/5   Elbow flexion: 5/5 5/5   Elbow extension: 5/5 5/5   Lower Trap: 5/5 5/5   Middle Trap: 5/5 5/5    5/5 5/5       Baseline Measurements of BL UE's for early detection of Lymphedema:     LANDMARK RIGHT UE LEFT UE DIFFERENCE   E + 8" 34 cm 34 cm 0 cm   E + 6" 31 cm 32 " "cm 1 cm   E + 4" 28.5 cm 29.5 cm 1 cm   E + 2" 27 cm 27.5 cm 0.5 cm   Elbow 26 cm 26 cm 0 cm   W+ 8" 25.5 cm 25 cm 0.5 cm   W +  6" 23.5 cm 23 cm 0.5 cm   W + 4" 19.5 cm 19 cm 0.5 cm   Wrist 16.5 cm 17 cm 0.5 cm   DPC 19 cm 20 cm 1 cm   IP Thumb 7 cm 7 cm 0 cm         Coordination:   - fine motor: WFL  - UE coordination: intact     - LE coordination:  Not tested     Functional Mobility (Bed mobility, transfers)  Bed mobility: I =  independent   Roll to left: I  Roll to right: I  Supine to prone: I  Scooting to edge of bed: I  Supine to sit: I  Sit to supine: I  Transfers to bed: I  Transfers to toilet: I  Sit to stand:  I  Stand pivot:  I  Car transfers: I      ADL's:  Feeding: I = independent   Grooming: I  Hygiene: I  UB Dressing: I  LB Dressing: I  Toileting: I  Bathing: I    Gait Assessment:   - AD used: none  - Assistance: independent  - Distance: community distances       Endurance Deficit: none      Patient Education   - role of PT in multi - disciplinary team, goals for PT  - Pt was educated in lymphedema etiology and management plans.    - Pt was provided with written risk reductions and precautions for managing lymphedema.   - Reviewed FABIANA drain care instructions.     ROM/lifting Precautions post surgery discussed -  until drains have been removed:  - do not lift affected arm above 90 degrees of shoulder flexion  - do not lift over 5 lbs  - do not pull or push heavy objects  - do not sleep on your stomach or surgery side     Written Home Exercises Provided and Patient Education: Handouts given   Pt was instructed in and performed therapeutic exercise for postural correction and alignment, stretching and soft tissue mobility, and strengthening.     Exercises included: handout given    - exaggerated deep breathing and relaxation  - scapular retractions  - wrist circles  - elbow flexion/extension    Pt was able to demonstrate and report understanding and performance    Pt has no cultural, educational or " language barriers to learning provided.    Functional Limitations Reporting     Category: Carrying anf lifting  Score: 0% Limitation   Current/ : CH = 0 % impaired, limited or restricted  Goal/ : CH = 0 % impaired, limited or restricted   Discharge/: CH = 0% impaired, limited or resricted       Modifier  Impairment Limitation Restriction    CH  0 % impaired, limited or restricted    CI  @ least 1% but less than 20% impaired, limited or restricted    CJ  @ least 20%<40% impaired, limited or restricted    CK  @ least 40%<60% impaired, limited or restricted    CL  @ least 60% <80% impaired, limited or restricted    CM  @ least 80%<100% impaired limited or restricted    CN  100% impaired, limited or restricted     Assessment   This is a 69 y.o. female referred to outpatient physical therapy and presents with a medical diagnosis of right breast cancer and was seen today pre-operatively to assess strength and ROM of BL UEs, to take baseline circumferential measurements of BL UEs to aid in the early detection of lymphedema and provide pt education on exercises/precations post breast surgery. Pt does not exhibit any ROM impairments  Pt educated in lymphedema risks/precautions as well as ROM/lifting precautions post surgery - pt demonstrated/verbalized understanding. No goals established this visit as goals for PT will be established post surgery at follow up.      Anticipated barriers to physical therapy: None     Pt's spiritual, cultural and educational needs considered and pt agreeable to plan of care and goals as stated below:     Medical necessity is demonstrated by the following IMPAIRMENTS/PROMBLEM LIST:  History  Co-morbidities and personal factors that may impact the plan of care Co-morbidities:   history of cancer    Personal Factors:   no deficits     moderate   Examination  Body Structures and Functions, activity limitations and participation restrictions that may impact the plan of care Body  Regions:   No Deficits    Body Systems:    No Deficits    Participation Restrictions:   No Deficits    Activity limitations:   Learning and applying knowledge  no deficits    General Tasks and Commands  no deficits    Communication  no deficits    Mobility  no deficits    Self care  no deficits    Domestic Life  no deficits    Interactions/Relationships  no deficits    Life Areas  no deficits    Community and Social Life  no deficits         low   Clinical Presentation stable and uncomplicated low   Decision Making/ Complexity Score: low           Plan   Schedule patient for follow up with Physical therapy post surgery. Goals for therapy post surgery will be established at that time.   Patient is discharged from physical therapy at this time.    Therapist: Katia Peterson, PT  6/24/2019

## 2019-06-21 NOTE — TELEPHONE ENCOUNTER
Returned call to patient scheduled the infusion for 7/3 the follow up with [facility] on 7/2 with labs.  Scheduled the cycle 2 with DR Ayala.  Mailed appt slips.      ----- Message from Diana Ibarra sent at 6/5/2019 11:47 AM CDT -----  Regarding: Pending chemo 6/5  MD Diana Lester         1. Echo ~ 6/10 & Chemo class~ 6/10    2. Prior auth for ddAC with Neulasta followed by Taxol     3. MD or NP on first day of chemo with cbc, cmp from port

## 2019-06-24 ENCOUNTER — CLINICAL SUPPORT (OUTPATIENT)
Dept: REHABILITATION | Facility: HOSPITAL | Age: 69
End: 2019-06-24
Payer: MEDICARE

## 2019-06-24 DIAGNOSIS — Z17.1 MALIGNANT NEOPLASM OF LOWER-INNER QUADRANT OF RIGHT BREAST OF FEMALE, ESTROGEN RECEPTOR NEGATIVE: ICD-10-CM

## 2019-06-24 DIAGNOSIS — Z91.89 AT RISK FOR LYMPHEDEMA: ICD-10-CM

## 2019-06-24 DIAGNOSIS — C50.311 MALIGNANT NEOPLASM OF LOWER-INNER QUADRANT OF RIGHT BREAST OF FEMALE, ESTROGEN RECEPTOR NEGATIVE: ICD-10-CM

## 2019-06-24 DIAGNOSIS — C50.511 MALIGNANT NEOPLASM OF LOWER-OUTER QUADRANT OF RIGHT BREAST OF FEMALE, ESTROGEN RECEPTOR NEGATIVE: Primary | ICD-10-CM

## 2019-06-24 DIAGNOSIS — Z17.1 MALIGNANT NEOPLASM OF LOWER-OUTER QUADRANT OF RIGHT BREAST OF FEMALE, ESTROGEN RECEPTOR NEGATIVE: Primary | ICD-10-CM

## 2019-06-24 PROCEDURE — G8986 CARRY D/C STATUS: HCPCS | Mod: CH,PO | Performed by: PHYSICAL MEDICINE & REHABILITATION

## 2019-06-24 PROCEDURE — 97161 PT EVAL LOW COMPLEX 20 MIN: CPT | Mod: PO | Performed by: PHYSICAL MEDICINE & REHABILITATION

## 2019-06-24 PROCEDURE — G8985 CARRY GOAL STATUS: HCPCS | Mod: CH,PO | Performed by: PHYSICAL MEDICINE & REHABILITATION

## 2019-06-24 PROCEDURE — G8984 CARRY CURRENT STATUS: HCPCS | Mod: CH,PO | Performed by: PHYSICAL MEDICINE & REHABILITATION

## 2019-06-24 NOTE — PATIENT INSTRUCTIONS
PRE/POST OP PATIENT EDUCATION    Post Operative Instructions     Range of Motion/lifting Precautions post surgery  The following activities should be avoided:  - do not lift over 5 lbs  - do not pull or push heavy objects  - do not sleep on your stomach or surgery side       After surgery, you may begin self-care tasks including grooming, dressing, feeding and simple hygiene as soon as you feel up to it.    Schedule your post-op therapy follow-up after your drains have been removed     When to call your doctor   - if any part of your affected arm or axilla feels hot, is reddened or has increased swelling   - if you develop a temperature over 101 degrees Fahrenheit      Lymphedema - Identification and Prevention     Lymphedema - is the swelling of a body area or extremity caused by the accumulation of lymphatic fluid.  There is a risk for lymphedema with the removal of lymph nodes, trauma or radiation therapy.  Treatment of breast cancer often involves surgery: mastectomy or lumpectomy. Some of the lymph nodes in the underarm (called axillary lymph nodes) may be removed and checked to see if they contain cancer cells.     During breast surgery when axillary lymph nodes are removed (with sentinel node biopsy or axillary dissection) or are treated with radiation therapy, the lymphatic system may become impaired. This may prevent lymphatic fluid from leaving the area therefore, causing lymphedema.     Lymphatic fluid is a normal part of the circulatory system. Its function is to remove waste products and to produce cells vital to fighting infection. Swelling occurs when the vessels become restricted and the lymphatic fluid is unable to freely flow through them.  If lymphedema is left untreated, the affected limb could progressively become more swollen, which could lead to hardening of the skin, bulkiness in the limb, infection and impaired wound healing.         There are things  you can do to decrease the chance of developing lymphedema.                                          [email or web address]                                                                                                                                            The information presented is intended for general information and educational purposes. It is not intended to replace the  advice of your health care provider. Contact your health care provider if you believe you have a health problem.                                                    POST OP EXERCISES - SAFE TO DO THE FIRST 2 WEEKS AFTER SURGERY UNTIL YOUR FOLLOW UP APPOINTMENT WITH PHYSICAL/OCCUPATIONAL THERAPY    Scapular Retraction (Standing)    With arms at sides, squeeze shoulder blades together. Do not shrug shoulders and do not hold your breath. Hold 5 seconds. Repeat 10 times 1 sessions 1-2 x day.       Exaggerated Breathing and Relaxation      Practice deep breathing frequently in the first few days following surgery even before you begin exercising. This exercise helps with tissue extensibility in the chest wall.  Inhale slowly and deeply through the nose and exhale through pursed lips. Concentrate on relaxing as you let the air out of your lungs. Repeat three (3) to four (4) times, remembering to breath in deeply and then relaxing. This exercise helps to ease the sensation of pulling and discomfort that may be experienced while exercising.      Ball Squeeze OR Hand pumps       Perform this exercise three (3) times a day for 1-3 minutes each time.    The ball squeeze or hand pumps helps to prevent or reduce temporary swelling that may occur in the affected arm. This exercise may be performed standing, sitting or while lying in bed. During this exercise the affected arm should be slightly bent and held upward. Support your arm with a pillow if you are uncomfortable holding it up.    a. Hold a rubber ball in your hand on the affected side  and lift your arm upward.  b. Alternate squeezing and relaxing the ball.              AROM: Elbow Flexion / Extension        With left hand palm up, gently bend elbow as far as possible. Then straighten arm as far as possible. Do this in standing.   Repeat 10 times per set. Do 1 sets per session. Do 1-3 sessions per day.    Contract / Relax: External Rotation    Place right arm, elbow bent, beside head on pillow. Use 1-3 pillows to be comfortable.  REST AND RELAX. Hold 30 seconds. Repeat 5 x.  Do 1-2 times per day.

## 2019-07-02 ENCOUNTER — OFFICE VISIT (OUTPATIENT)
Dept: HEMATOLOGY/ONCOLOGY | Facility: CLINIC | Age: 69
End: 2019-07-02
Payer: MEDICARE

## 2019-07-02 ENCOUNTER — INFUSION (OUTPATIENT)
Dept: INFUSION THERAPY | Facility: HOSPITAL | Age: 69
End: 2019-07-02
Attending: INTERNAL MEDICINE
Payer: MEDICARE

## 2019-07-02 VITALS
DIASTOLIC BLOOD PRESSURE: 73 MMHG | RESPIRATION RATE: 18 BRPM | OXYGEN SATURATION: 100 % | TEMPERATURE: 99 F | HEART RATE: 70 BPM | WEIGHT: 156.5 LBS | SYSTOLIC BLOOD PRESSURE: 153 MMHG | BODY MASS INDEX: 27.73 KG/M2 | HEIGHT: 63 IN

## 2019-07-02 DIAGNOSIS — C50.511 MALIGNANT NEOPLASM OF LOWER-OUTER QUADRANT OF RIGHT BREAST OF FEMALE, ESTROGEN RECEPTOR NEGATIVE: ICD-10-CM

## 2019-07-02 DIAGNOSIS — C50.311 MALIGNANT NEOPLASM OF LOWER-INNER QUADRANT OF RIGHT BREAST OF FEMALE, ESTROGEN RECEPTOR NEGATIVE: Primary | ICD-10-CM

## 2019-07-02 DIAGNOSIS — Z17.1 MALIGNANT NEOPLASM OF LOWER-INNER QUADRANT OF RIGHT BREAST OF FEMALE, ESTROGEN RECEPTOR NEGATIVE: Primary | ICD-10-CM

## 2019-07-02 DIAGNOSIS — R74.8 ELEVATED ALKALINE PHOSPHATASE LEVEL: ICD-10-CM

## 2019-07-02 DIAGNOSIS — D64.9 ANEMIA, UNSPECIFIED TYPE: ICD-10-CM

## 2019-07-02 DIAGNOSIS — Z17.1 MALIGNANT NEOPLASM OF LOWER-OUTER QUADRANT OF RIGHT BREAST OF FEMALE, ESTROGEN RECEPTOR NEGATIVE: ICD-10-CM

## 2019-07-02 LAB
ALBUMIN SERPL BCP-MCNC: 3.7 G/DL (ref 3.5–5.2)
ALP SERPL-CCNC: 178 U/L (ref 55–135)
ALT SERPL W/O P-5'-P-CCNC: 11 U/L (ref 10–44)
ANION GAP SERPL CALC-SCNC: 9 MMOL/L (ref 8–16)
AST SERPL-CCNC: 21 U/L (ref 10–40)
BILIRUB SERPL-MCNC: 0.9 MG/DL (ref 0.1–1)
BUN SERPL-MCNC: 12 MG/DL (ref 8–23)
CALCIUM SERPL-MCNC: 9.5 MG/DL (ref 8.7–10.5)
CHLORIDE SERPL-SCNC: 108 MMOL/L (ref 95–110)
CO2 SERPL-SCNC: 24 MMOL/L (ref 23–29)
CREAT SERPL-MCNC: 0.7 MG/DL (ref 0.5–1.4)
ERYTHROCYTE [DISTWIDTH] IN BLOOD BY AUTOMATED COUNT: 13.4 % (ref 11.5–14.5)
EST. GFR  (AFRICAN AMERICAN): >60 ML/MIN/1.73 M^2
EST. GFR  (NON AFRICAN AMERICAN): >60 ML/MIN/1.73 M^2
GLUCOSE SERPL-MCNC: 91 MG/DL (ref 70–110)
HCT VFR BLD AUTO: 36.8 % (ref 37–48.5)
HGB BLD-MCNC: 11.2 G/DL (ref 12–16)
IMM GRANULOCYTES # BLD AUTO: 0.01 K/UL (ref 0–0.04)
MCH RBC QN AUTO: 27.9 PG (ref 27–31)
MCHC RBC AUTO-ENTMCNC: 30.4 G/DL (ref 32–36)
MCV RBC AUTO: 92 FL (ref 82–98)
NEUTROPHILS # BLD AUTO: 1.8 K/UL (ref 1.8–7.7)
PLATELET # BLD AUTO: 183 K/UL (ref 150–350)
PMV BLD AUTO: 10.2 FL (ref 9.2–12.9)
POTASSIUM SERPL-SCNC: 3.9 MMOL/L (ref 3.5–5.1)
PROT SERPL-MCNC: 7.2 G/DL (ref 6–8.4)
RBC # BLD AUTO: 4.02 M/UL (ref 4–5.4)
SODIUM SERPL-SCNC: 141 MMOL/L (ref 136–145)
WBC # BLD AUTO: 4.63 K/UL (ref 3.9–12.7)

## 2019-07-02 PROCEDURE — 36591 DRAW BLOOD OFF VENOUS DEVICE: CPT

## 2019-07-02 PROCEDURE — 25000003 PHARM REV CODE 250: Performed by: INTERNAL MEDICINE

## 2019-07-02 PROCEDURE — A4216 STERILE WATER/SALINE, 10 ML: HCPCS | Performed by: INTERNAL MEDICINE

## 2019-07-02 PROCEDURE — 63600175 PHARM REV CODE 636 W HCPCS: Performed by: INTERNAL MEDICINE

## 2019-07-02 PROCEDURE — 80053 COMPREHEN METABOLIC PANEL: CPT

## 2019-07-02 PROCEDURE — 99214 PR OFFICE/OUTPT VISIT, EST, LEVL IV, 30-39 MIN: ICD-10-PCS | Mod: S$GLB,,, | Performed by: NURSE PRACTITIONER

## 2019-07-02 PROCEDURE — 99214 OFFICE O/P EST MOD 30 MIN: CPT | Mod: S$GLB,,, | Performed by: NURSE PRACTITIONER

## 2019-07-02 PROCEDURE — 1101F PT FALLS ASSESS-DOCD LE1/YR: CPT | Mod: CPTII,S$GLB,, | Performed by: NURSE PRACTITIONER

## 2019-07-02 PROCEDURE — 99999 PR PBB SHADOW E&M-EST. PATIENT-LVL III: ICD-10-PCS | Mod: PBBFAC,,, | Performed by: NURSE PRACTITIONER

## 2019-07-02 PROCEDURE — 1101F PR PT FALLS ASSESS DOC 0-1 FALLS W/OUT INJ PAST YR: ICD-10-PCS | Mod: CPTII,S$GLB,, | Performed by: NURSE PRACTITIONER

## 2019-07-02 PROCEDURE — 85027 COMPLETE CBC AUTOMATED: CPT

## 2019-07-02 PROCEDURE — 99999 PR PBB SHADOW E&M-EST. PATIENT-LVL III: CPT | Mod: PBBFAC,,, | Performed by: NURSE PRACTITIONER

## 2019-07-02 RX ORDER — SODIUM CHLORIDE 0.9 % (FLUSH) 0.9 %
10 SYRINGE (ML) INJECTION
Status: CANCELLED | OUTPATIENT
Start: 2019-07-02

## 2019-07-02 RX ORDER — SODIUM CHLORIDE 0.9 % (FLUSH) 0.9 %
10 SYRINGE (ML) INJECTION
Status: COMPLETED | OUTPATIENT
Start: 2019-07-02 | End: 2019-07-02

## 2019-07-02 RX ORDER — HEPARIN 100 UNIT/ML
500 SYRINGE INTRAVENOUS
Status: COMPLETED | OUTPATIENT
Start: 2019-07-02 | End: 2019-07-02

## 2019-07-02 RX ORDER — HEPARIN 100 UNIT/ML
500 SYRINGE INTRAVENOUS
Status: CANCELLED | OUTPATIENT
Start: 2019-07-02

## 2019-07-02 RX ADMIN — Medication 10 ML: at 03:07

## 2019-07-02 RX ADMIN — HEPARIN 500 UNITS: 100 SYRINGE at 03:07

## 2019-07-02 NOTE — NURSING
Patient arrived for lab draw from port. Tolerated well. Labs drawn for study also. Patient discharged to MD appt. Plan for first chemo tomorrow.

## 2019-07-02 NOTE — PROGRESS NOTES
Distress Screening Results: Psychosocial Distress screening score of Distress Score: 2 noted and reviewed. No intervention indicated.     History:       Sharri Cline is a 68 yo female with Stage 1B ([id number]) invasive ductal carcinoma of right breast, lower outer quadrant, ER neg, [place] neg, Her2 neg, Grade 3, Ki67 70% who presents for a follow up and to start chemotherapy.   Feels good today.   No complaints.  No pain.     Oncology History:    68 yo postmenopausal female. She presented for screening mammo in 5/15/2019 which showed focal asymmetries in both left and right breast. Diagnostic mammogram and US showed no significant abn of left breast, and right breast 15 mm x 11 mm x 12 mm mass at 4:00, 5 CFN. Biopsy on 5/24/19 showed grade 3 IDC, triple negative, Ki67 70%.     Labs on 5/20 showed increased alk phos but this has been chronic along with chronically elevated total bilirubin since 2012. She follows with hepatology and had a liver biopsy showed portal inflammation.     She has lost 45 lbs through exercise and diet.  Recently started working at [employer] three days per week.     Review of Systems  Review of Systems   Constitutional: Negative for activity change, appetite change, chills, fatigue, fever and unexpected weight change.   HENT: Negative for congestion, hearing loss, nosebleeds, sinus pressure and trouble swallowing.    Eyes: Negative for pain, discharge and itching.   Respiratory: Negative for cough, chest tightness and shortness of breath.    Cardiovascular: Negative for chest pain, palpitations and leg swelling.   Gastrointestinal: Negative for abdominal distention, abdominal pain, blood in stool, diarrhea, nausea, rectal pain and vomiting.   Endocrine: Negative for heat intolerance and polydipsia.   Genitourinary: Negative for difficulty urinating, dysuria, flank pain, frequency, hematuria and urgency.   Musculoskeletal: Negative for arthralgias, back pain and neck pain.   Skin: Negative for  "color change, pallor and rash.   Neurological: Negative for dizziness, numbness and headaches.   Hematological: Negative for adenopathy. Does not bruise/bleed easily.   Psychiatric/Behavioral: Negative for confusion and decreased concentration. The patient is not nervous/anxious.             Objective:     Vitals:    07/02/19 1536   BP: (!) 153/73   BP Location: Right arm   Patient Position: Sitting   BP Method: Large (Automatic)   Pulse: 70   Resp: 18   Temp: 98.6 °F (37 °C)   TempSrc: Oral   SpO2: 100%   Weight: 71 kg (156 lb 8.4 oz)   Height: 5' 3" (1.6 m)     Body surface area is 1.78 meters squared.     Physical Exam   Constitutional: She is oriented to person, place, and time. She appears well-developed and well-nourished. No distress.   Presents with .   HENT:   Head: Normocephalic and atraumatic.   Mouth/Throat: Oropharynx is clear and moist and mucous membranes are normal. No oral lesions.   Eyes: Conjunctivae and EOM are normal. Right eye exhibits no discharge. Left eye exhibits no discharge. No scleral icterus.   Neck: Normal range of motion. Neck supple. No thyroid mass and no thyromegaly present.   Cardiovascular: Normal rate, regular rhythm, S1 normal, S2 normal and normal heart sounds.   Pulmonary/Chest: Effort normal and breath sounds normal. No respiratory distress. She has no wheezes. She has no rhonchi. She has no rales. Chest wall is not dull to percussion.   Right breast with indention of skin at 4:00 with palpable 2 x 1.5 cm mass, freely mobile   Abdominal: Soft. Normal appearance and bowel sounds are normal. There is no hepatosplenomegaly. There is no tenderness.   Lymphadenopathy:     She has no cervical adenopathy.     She has no axillary adenopathy. No inguinal adenopathy noted on the right or left side.        Right: No supraclavicular adenopathy present.        Left: No supraclavicular adenopathy present.   Neurological: She is alert and oriented to person, place, and time. She has " normal strength.   Skin: Skin is warm, dry and intact. No pallor.   Psychiatric: Her behavior is normal. Thought content normal.         Labs and Imaging  Results for orders placed or performed in visit on 07/02/19   CBC Oncology   Result Value Ref Range    WBC 4.63 3.90 - 12.70 K/uL    RBC 4.02 4.00 - 5.40 M/uL    Hemoglobin 11.2 (L) 12.0 - 16.0 g/dL    Hematocrit 36.8 (L) 37.0 - 48.5 %    Mean Corpuscular Volume 92 82 - 98 fL    Mean Corpuscular Hemoglobin 27.9 27.0 - 31.0 pg    Mean Corpuscular Hemoglobin Conc 30.4 (L) 32.0 - 36.0 g/dL    RDW 13.4 11.5 - 14.5 %    Platelets 183 150 - 350 K/uL    MPV 10.2 9.2 - 12.9 fL    Gran # (ANC) 1.8 1.8 - 7.7 K/uL    Immature Grans (Abs) 0.01 0.00 - 0.04 K/uL   Comprehensive metabolic panel   Result Value Ref Range    Sodium 141 136 - 145 mmol/L    Potassium 3.9 3.5 - 5.1 mmol/L    Chloride 108 95 - 110 mmol/L    CO2 24 23 - 29 mmol/L    Glucose 91 70 - 110 mg/dL    BUN, Bld 12 8 - 23 mg/dL    Creatinine 0.7 0.5 - 1.4 mg/dL    Calcium 9.5 8.7 - 10.5 mg/dL    Total Protein 7.2 6.0 - 8.4 g/dL    Albumin 3.7 3.5 - 5.2 g/dL    Total Bilirubin 0.9 0.1 - 1.0 mg/dL    Alkaline Phosphatase 178 (H) 55 - 135 U/L    AST 21 10 - 40 U/L    ALT 11 10 - 44 U/L    Anion Gap 9 8 - 16 mmol/L    eGFR if African American >60.0 >60 mL/min/1.73 m^2    eGFR if non African American >60.0 >60 mL/min/1.73 m^2     Breast pathology and imaging as above.     ECHO 6/10/19:  · Normal left ventricular systolic function. The estimated ejection fraction is 55%  · No wall motion abnormalities.  · Strain imaging performed in left ventricle. Global longitudinal strain is -18%.  · Normal LV diastolic function.  · Normal right ventricular systolic function.  · Mild mitral regurgitation.  · The estimated PA systolic pressure is 22 mm Hg  · Normal central venous pressure (3 mm Hg).         Assessment:     Problem List Items Addressed This Visit        Oncology    Malignant neoplasm of lower-inner quadrant of right  breast of female, estrogen receptor negative - Primary    Overview     1. Stage 1B ([id number]) invasive ductal carcinoma of right breast, lower outer quadrant, ER neg, [place] neg, Her2 neg, Grade 3, Ki67 70%   * Plan neoadjuvant chemotherapy with four cycles of dose-dense Adriamycin and cyclophosphamide with Neulasta support followed by twelve doses of weekly paclitaxel.           Anemia, unspecified       Other    Elevated alkaline phosphatase level    Overview     Chronic; follows with Hepatology; prior liver biopsy 11/2018 with mild portal inflammation                 Plan:     Doing well, clinically stable.   Labs reviewed and adequate to proceed.   Anemia parameters noted- will monitor.  Proceed with C1 neoadjuvant ddAC.   Discussed dexamethasone, Zofran, Claritin.   RTC in 2 weeks to see Dr. Ayala with labs and for C2 ddAC as scheduled.        Patient is in agreement with the proposed treatment plan. All questions were answered to the patient's satisfaction. Pt knows to call clinic for any new or worsening symptoms and if anything is needed before the next clinic visit.      Maria Liu, LITAP-C  Hematology & Oncology  [facility] [place] [address]  ph. [phone]  Fax. [phone]     I spent 30 minutes (face to face) with the patient, more than 50% was in counseling and coordination of care as detailed above.

## 2019-07-03 ENCOUNTER — TELEPHONE (OUTPATIENT)
Dept: HEMATOLOGY/ONCOLOGY | Facility: CLINIC | Age: 69
End: 2019-07-03

## 2019-07-03 ENCOUNTER — INFUSION (OUTPATIENT)
Dept: INFUSION THERAPY | Facility: HOSPITAL | Age: 69
End: 2019-07-03
Attending: INTERNAL MEDICINE
Payer: MEDICARE

## 2019-07-03 VITALS
RESPIRATION RATE: 18 BRPM | HEART RATE: 59 BPM | DIASTOLIC BLOOD PRESSURE: 70 MMHG | TEMPERATURE: 98 F | SYSTOLIC BLOOD PRESSURE: 150 MMHG

## 2019-07-03 DIAGNOSIS — C50.311 MALIGNANT NEOPLASM OF LOWER-INNER QUADRANT OF RIGHT BREAST OF FEMALE, ESTROGEN RECEPTOR NEGATIVE: Primary | ICD-10-CM

## 2019-07-03 DIAGNOSIS — Z17.1 MALIGNANT NEOPLASM OF LOWER-INNER QUADRANT OF RIGHT BREAST OF FEMALE, ESTROGEN RECEPTOR NEGATIVE: Primary | ICD-10-CM

## 2019-07-03 PROCEDURE — 63600175 PHARM REV CODE 636 W HCPCS: Performed by: INTERNAL MEDICINE

## 2019-07-03 PROCEDURE — 96411 CHEMO IV PUSH ADDL DRUG: CPT

## 2019-07-03 PROCEDURE — 96413 CHEMO IV INFUSION 1 HR: CPT

## 2019-07-03 PROCEDURE — 63600175 PHARM REV CODE 636 W HCPCS: Mod: JG | Performed by: INTERNAL MEDICINE

## 2019-07-03 PROCEDURE — 96367 TX/PROPH/DG ADDL SEQ IV INF: CPT

## 2019-07-03 PROCEDURE — 25000003 PHARM REV CODE 250: Performed by: INTERNAL MEDICINE

## 2019-07-03 PROCEDURE — 96377 APPLICATON ON-BODY INJECTOR: CPT

## 2019-07-03 RX ORDER — HEPARIN 100 UNIT/ML
500 SYRINGE INTRAVENOUS
Status: DISCONTINUED | OUTPATIENT
Start: 2019-07-03 | End: 2019-07-03 | Stop reason: HOSPADM

## 2019-07-03 RX ORDER — SODIUM CHLORIDE 0.9 % (FLUSH) 0.9 %
10 SYRINGE (ML) INJECTION
Status: DISCONTINUED | OUTPATIENT
Start: 2019-07-03 | End: 2019-07-03 | Stop reason: HOSPADM

## 2019-07-03 RX ORDER — DOXORUBICIN HYDROCHLORIDE 2 MG/ML
60 INJECTION, SOLUTION INTRAVENOUS
Status: COMPLETED | OUTPATIENT
Start: 2019-07-03 | End: 2019-07-03

## 2019-07-03 RX ORDER — DOXORUBICIN HYDROCHLORIDE 2 MG/ML
60 INJECTION, SOLUTION INTRAVENOUS
Status: CANCELLED | OUTPATIENT
Start: 2019-07-03

## 2019-07-03 RX ORDER — SODIUM CHLORIDE 0.9 % (FLUSH) 0.9 %
10 SYRINGE (ML) INJECTION
Status: CANCELLED | OUTPATIENT
Start: 2019-07-03

## 2019-07-03 RX ORDER — HEPARIN 100 UNIT/ML
500 SYRINGE INTRAVENOUS
Status: CANCELLED | OUTPATIENT
Start: 2019-07-03

## 2019-07-03 RX ADMIN — SODIUM CHLORIDE: 0.9 INJECTION, SOLUTION INTRAVENOUS at 03:07

## 2019-07-03 RX ADMIN — HEPARIN 500 UNITS: 100 SYRINGE at 06:07

## 2019-07-03 RX ADMIN — APREPITANT 130 MG: 130 INJECTION, EMULSION INTRAVENOUS at 04:07

## 2019-07-03 RX ADMIN — PALONOSETRON: 0.05 INJECTION, SOLUTION INTRAVENOUS at 04:07

## 2019-07-03 RX ADMIN — DOXORUBICIN HYDROCHLORIDE 106 MG: 2 INJECTION, SOLUTION INTRAVENOUS at 04:07

## 2019-07-03 RX ADMIN — PEGFILGRASTIM 6 MG: KIT SUBCUTANEOUS at 05:07

## 2019-07-03 RX ADMIN — CYCLOPHOSPHAMIDE 1060 MG: 1 INJECTION, POWDER, FOR SOLUTION INTRAVENOUS; ORAL at 05:07

## 2019-07-03 NOTE — TELEPHONE ENCOUNTER
Returned call to pt.   Pt stated she wasn't sure which 3rd medication she was supposed to get- pt stated has anti-nausea, steroid already.   Informed pt per notes yesterday needs claritin as well- informed pt can get OTC.   Pt verbalized understanding and thanked nurse.           ----- Message from Rosy Roque sent at 7/3/2019 12:57 PM CDT -----  Pt requesting to speak with a NP, please contact her at [phone].  Prescriptions she was expecting to  needs a pre-authorization and she's not sure which one it is.

## 2019-07-03 NOTE — PLAN OF CARE
Problem: Anemia (Chemotherapy Effects)  Goal: Anemia Symptom Improvement    Intervention: Monitor and Manage Anemia  Pt educated on medications administered. Literature printed from [facility] signs and symptom reviewed viewed neulasta obi instructional video. All questions answered to pt satisfaction. Tolerated infusion well no adverse reactions noted, vitals remained stable. PAC flushed hep locked de accessed, site covered with band aide, leaves clinic ambulatory with  no distress. neulasta obi applied scheduled to remove device at approx. 10pm instructions given to pt and care given on proper disposal and use of device. Instructed to contact MD office with questions or concerns.

## 2019-07-08 DIAGNOSIS — C50.311 MALIGNANT NEOPLASM OF LOWER-INNER QUADRANT OF RIGHT BREAST OF FEMALE, ESTROGEN RECEPTOR NEGATIVE: ICD-10-CM

## 2019-07-08 DIAGNOSIS — Z17.1 MALIGNANT NEOPLASM OF LOWER-INNER QUADRANT OF RIGHT BREAST OF FEMALE, ESTROGEN RECEPTOR NEGATIVE: ICD-10-CM

## 2019-07-08 RX ORDER — LIDOCAINE AND PRILOCAINE 25; 25 MG/G; MG/G
CREAM TOPICAL
Qty: 30 G | Refills: 0 | Status: ON HOLD | OUTPATIENT
Start: 2019-07-08 | End: 2019-09-02 | Stop reason: HOSPADM

## 2019-07-12 ENCOUNTER — TELEPHONE (OUTPATIENT)
Dept: HEMATOLOGY/ONCOLOGY | Facility: CLINIC | Age: 69
End: 2019-07-12

## 2019-07-12 ENCOUNTER — TELEPHONE (OUTPATIENT)
Dept: SURGERY | Facility: CLINIC | Age: 69
End: 2019-07-12

## 2019-07-12 NOTE — TELEPHONE ENCOUNTER
Return call to pt. Informed pt that any problems while on chemo should be addressed with her medical oncologist, Dr Ayala. Pt states she was not sure who to call. Pt to contact Dr Ayala for any further recommendations.

## 2019-07-12 NOTE — TELEPHONE ENCOUNTER
----- Message from Magi Johnson sent at 7/12/2019  9:22 AM CDT -----  Contact: Pt.Self   Needs Advice    Reason for call:  Pt. States she would like to speak with nurse in reference to finding out what medication she can take for cold/cough         Communication Preference:  [phone]     Additional Information:

## 2019-07-12 NOTE — TELEPHONE ENCOUNTER
----- Message from Rajwinder Quiroga sent at 7/12/2019  1:12 PM CDT -----  Contact: Patient  Pt says that she woke up this morning with a cold, wants to know what she can take.      Contact:: [phone]

## 2019-07-12 NOTE — TELEPHONE ENCOUNTER
"Contacted patient to discuss her symptoms. Patient reports a cough that developed this morning after awakening. She reports that the cough is productive with light green mucous. She denies any change in respiratory efforts, localized chest pain, and currently denies fevers.   Advised patient to trend temperatures a couple times through out day or more specifically if she felt feverish (I.e. Chills, body aches, malaise.) educated patient that if one temp >101 or temp >100.4 for 4 hours that she should report to her nearest emergency room (however, to stress "actively on chemo with last infusion on 7/3")  Also advised patient to avoid antipyretic medications, tylenol and ibuprofen to prevent masking of temperature.   Discussed symptom management by advising vaseline/neosporin on nostril nares for rawness prevention, vicks vapor rub on chest for congestion and sinus opening, mucinex for expectoration and decongestion, claritin for sinus control. Patient voiced understanding.   Advised patient if any other questions needing answers, to contact our clinic number to reach on call oncology provider over the weekend.   She voiced understanding and expressed gratitude.   No further questions/concerns at this time.     "

## 2019-07-16 ENCOUNTER — TELEPHONE (OUTPATIENT)
Dept: HEMATOLOGY/ONCOLOGY | Facility: CLINIC | Age: 69
End: 2019-07-16

## 2019-07-16 NOTE — PROGRESS NOTES
History:     Reason For Follow Up/Onc History:   1. Stage 1B ([id number]) invasive ductal carcinoma of right breast, lower outer quadrant, ER neg, [place] neg, Her2 neg, Grade 3, Ki67 70%      HPI:   Sharri Cline presents for follow up of breast cancer and continuation of neoadjuvant ddAC. She tolerated her first dose quite well. Minimal nausea. Complaints of light headedness, worse last week after doing jumping jacks. Appetite is down and she's changed how she eats.  Constipation after she stopped senna S.       Onc history:   - She presented for screening mammo in 5/15/2019 which showed focal asymmetries in both left and right breast. Diagnostic mammogram and US showed no significant abn of left breast, and right breast 15 mm x 11 mm x 12 mm mass at 4:00, 5 CFN. Biopsy on 5/24/19 showed grade 3 IDC, triple negative, Ki67 70%.    - 7/3/19 started neoadjuvant chemotherapy with ddAC to be followed by Taxol.     History:  I reviewed, confirmed and updated history (past medical, social, family) as necessary.    Medications    Current Outpatient Medications:     b complex vitamins tablet, Take 1 tablet by mouth once daily., Disp: , Rfl:     calcium carbonate-vit D3-min 600 mg calcium- 400 unit Tab, Take 1 tablet by mouth 2 (two) times daily., Disp: 60 tablet, Rfl: 3    dexamethasone (DECADRON) 4 MG Tab, Take one tablet twice a day days 2,3, and 4 of each chemotherapy cycle., Disp: 12 tablet, Rfl: 1    fish oil-omega-3 fatty acids 300-1,000 mg capsule, Take 2 g by mouth once daily., Disp: , Rfl:     lidocaine-prilocaine (EMLA) cream, Apply topically as needed 45 minutes before port access, Disp: 30 g, Rfl: 0    multivitamin with minerals tablet, Take 1 tablet by mouth once daily.  , Disp: , Rfl:     ondansetron (ZOFRAN-ODT) 8 MG TbDL, Take 1 tablet (8 mg total) by mouth every 8 (eight) hours as needed (nausea)., Disp: 45 tablet, Rfl: 1  No current facility-administered medications for this visit.  "  Allergies  Review of patient's allergies indicates:  No Known Allergies  Review of Systems  Review of Systems   Constitutional: Positive for appetite change. Negative for activity change, chills, fatigue, fever and unexpected weight change.   HENT: Negative for congestion, hearing loss, nosebleeds, sinus pressure and trouble swallowing.    Eyes: Negative for pain, discharge and itching.   Respiratory: Negative for cough, chest tightness and shortness of breath.    Cardiovascular: Negative for chest pain, palpitations and leg swelling.   Gastrointestinal: Negative for abdominal distention, abdominal pain, blood in stool, diarrhea, nausea, rectal pain and vomiting.   Endocrine: Negative for heat intolerance and polydipsia.   Genitourinary: Negative for difficulty urinating, dysuria, flank pain, frequency, hematuria and urgency.   Musculoskeletal: Negative for arthralgias, back pain and neck pain.   Skin: Negative for color change, pallor and rash.   Neurological: Positive for light-headedness. Negative for dizziness, numbness and headaches.   Hematological: Negative for adenopathy. Does not bruise/bleed easily.   Psychiatric/Behavioral: Negative for confusion and decreased concentration. The patient is not nervous/anxious.         Objective     Objective:     Vitals:    07/17/19 1341   BP: 120/65   BP Location: Right arm   Patient Position: Sitting   BP Method: Large (Automatic)   Pulse: 77   Resp: 16   Temp: 98.2 °F (36.8 °C)   TempSrc: Oral   SpO2: 100%   Weight: 69.3 kg (152 lb 12.5 oz)   Height: 5' 3" (1.6 m)     Body surface area is 1.76 meters squared.  Physical Exam   Constitutional: She is oriented to person, place, and time. She appears well-developed and well-nourished. No distress.   HENT:   Head: Normocephalic and atraumatic.   Mouth/Throat: Oropharynx is clear and moist and mucous membranes are normal. No oral lesions.   Eyes: Conjunctivae and EOM are normal. Right eye exhibits no discharge. Left eye " exhibits no discharge. No scleral icterus.   Neck: Normal range of motion. Neck supple. No thyroid mass and no thyromegaly present.   Cardiovascular: Normal rate, regular rhythm, S1 normal, S2 normal and normal heart sounds.   Pulmonary/Chest: Effort normal and breath sounds normal. No respiratory distress. She has no wheezes. She has no rhonchi. She has no rales. Chest wall is not dull to percussion.   Right breast with indention of skin at 4:00 with palpable 2 x 1.5 cm mass, freely mobile unchanged  mediport   Abdominal: Soft. Normal appearance and bowel sounds are normal. There is no hepatosplenomegaly. There is no tenderness.   Lymphadenopathy:     She has no cervical adenopathy.     She has no axillary adenopathy. No inguinal adenopathy noted on the right or left side.        Right: No supraclavicular adenopathy present.        Left: No supraclavicular adenopathy present.   Neurological: She is alert and oriented to person, place, and time. She has normal strength.   Skin: Skin is warm, dry and intact. No pallor.   Psychiatric: Her behavior is normal. Thought content normal.     Labs and Imaging  Results for orders placed or performed in visit on 07/17/19   CBC Oncology   Result Value Ref Range    WBC 9.50 3.90 - 12.70 K/uL    RBC 3.50 (L) 4.00 - 5.40 M/uL    Hemoglobin 9.8 (L) 12.0 - 16.0 g/dL    Hematocrit 32.8 (L) 37.0 - 48.5 %    Mean Corpuscular Volume 94 82 - 98 fL    Mean Corpuscular Hemoglobin 28.0 27.0 - 31.0 pg    Mean Corpuscular Hemoglobin Conc 29.9 (L) 32.0 - 36.0 g/dL    RDW 13.7 11.5 - 14.5 %    Platelets 181 150 - 350 K/uL    MPV 10.9 9.2 - 12.9 fL    Gran # (ANC) 6.1 1.8 - 7.7 K/uL    Immature Grans (Abs) 0.50 (H) 0.00 - 0.04 K/uL     Breast pathology and imaging as above.     Assessment     Assessment:     1. Stage 1B ([id number]) invasive ductal carcinoma of right breast, lower outer quadrant, ER neg, [place] neg, Her2 neg, Grade 3, Ki67 70%   * 7/3/19 started neoadjuvant chemotherapy with four  cycles of dose-dense Adriamycin and cyclophosphamide with Neulasta support followed by twelve doses of weekly paclitaxel.   * Can consider adding Zometa every 6 months x 2 years in adjuvant setting.    * Cycle 2 neoadjuvant ddAC with neulasta    2. Chronically elevated alk phos and bilirubin   * Chronic; follows with Hepatology; prior liver biopsy 11/2018 with mild portal inflammation    3. Chemo anemia   * Monitor.    * Likely contributing to light headedness. Monitor.     4. Drug constipation   * Encouraged Senna S two nightly.     Plan:     1. Cycle 2 neoadjuvant ddAC with Neualsta.  2. Continue antiemetics.   3. Senna s  4. Referral to [facility] [employer] with [facility]  5. Continue exercising     RTC every 2 weeks for treatment.  I asked the patient to call for any questions, concerns, or new symptoms.  In addition to chemo monitoring, I eval'd tumor response.    Distress Screening Results: Psychosocial Distress screening score of Distress Score: 0 noted and reviewed. No intervention indicated.   Future Appointments   Date Time Provider Department Center   7/17/2019  2:00 PM Dominique Ayala MD [facility] HEM ONC [facility]   7/17/2019  3:00 PM [facility], CHEMO [facility] CHEMO [facility]   7/31/2019  2:00 PM [facility], [facility]

## 2019-07-16 NOTE — TELEPHONE ENCOUNTER
Contacted patient and verbally confirmed tomorrow afternoon's appointment. She agreed and expressed gratitude.

## 2019-07-17 ENCOUNTER — OFFICE VISIT (OUTPATIENT)
Dept: HEMATOLOGY/ONCOLOGY | Facility: CLINIC | Age: 69
End: 2019-07-17
Payer: MEDICARE

## 2019-07-17 ENCOUNTER — INFUSION (OUTPATIENT)
Dept: INFUSION THERAPY | Facility: HOSPITAL | Age: 69
End: 2019-07-17
Attending: INTERNAL MEDICINE
Payer: MEDICARE

## 2019-07-17 VITALS
DIASTOLIC BLOOD PRESSURE: 65 MMHG | OXYGEN SATURATION: 100 % | TEMPERATURE: 98 F | WEIGHT: 152.75 LBS | BODY MASS INDEX: 27.07 KG/M2 | SYSTOLIC BLOOD PRESSURE: 120 MMHG | HEIGHT: 63 IN | RESPIRATION RATE: 16 BRPM | HEART RATE: 77 BPM

## 2019-07-17 VITALS
DIASTOLIC BLOOD PRESSURE: 71 MMHG | SYSTOLIC BLOOD PRESSURE: 123 MMHG | TEMPERATURE: 98 F | RESPIRATION RATE: 18 BRPM | HEART RATE: 72 BPM

## 2019-07-17 DIAGNOSIS — Z51.11 ENCOUNTER FOR CHEMOTHERAPY MANAGEMENT: Primary | ICD-10-CM

## 2019-07-17 DIAGNOSIS — Z17.1 MALIGNANT NEOPLASM OF LOWER-INNER QUADRANT OF RIGHT BREAST OF FEMALE, ESTROGEN RECEPTOR NEGATIVE: Primary | ICD-10-CM

## 2019-07-17 DIAGNOSIS — Z17.1 MALIGNANT NEOPLASM OF LOWER-INNER QUADRANT OF RIGHT BREAST OF FEMALE, ESTROGEN RECEPTOR NEGATIVE: ICD-10-CM

## 2019-07-17 DIAGNOSIS — Z17.1 MALIGNANT NEOPLASM OF LOWER-OUTER QUADRANT OF RIGHT BREAST OF FEMALE, ESTROGEN RECEPTOR NEGATIVE: ICD-10-CM

## 2019-07-17 DIAGNOSIS — C50.311 MALIGNANT NEOPLASM OF LOWER-INNER QUADRANT OF RIGHT BREAST OF FEMALE, ESTROGEN RECEPTOR NEGATIVE: Primary | ICD-10-CM

## 2019-07-17 DIAGNOSIS — C50.511 MALIGNANT NEOPLASM OF LOWER-OUTER QUADRANT OF RIGHT BREAST OF FEMALE, ESTROGEN RECEPTOR NEGATIVE: ICD-10-CM

## 2019-07-17 DIAGNOSIS — R74.8 ELEVATED ALKALINE PHOSPHATASE LEVEL: ICD-10-CM

## 2019-07-17 DIAGNOSIS — T45.1X5A ANTINEOPLASTIC CHEMOTHERAPY INDUCED ANEMIA: ICD-10-CM

## 2019-07-17 DIAGNOSIS — D64.81 ANTINEOPLASTIC CHEMOTHERAPY INDUCED ANEMIA: ICD-10-CM

## 2019-07-17 DIAGNOSIS — C50.311 MALIGNANT NEOPLASM OF LOWER-INNER QUADRANT OF RIGHT BREAST OF FEMALE, ESTROGEN RECEPTOR NEGATIVE: ICD-10-CM

## 2019-07-17 LAB
ALBUMIN SERPL BCP-MCNC: 3.3 G/DL (ref 3.5–5.2)
ALP SERPL-CCNC: 181 U/L (ref 55–135)
ALT SERPL W/O P-5'-P-CCNC: 9 U/L (ref 10–44)
ANION GAP SERPL CALC-SCNC: 8 MMOL/L (ref 8–16)
AST SERPL-CCNC: 19 U/L (ref 10–40)
BILIRUB SERPL-MCNC: 0.4 MG/DL (ref 0.1–1)
BUN SERPL-MCNC: 5 MG/DL (ref 8–23)
CALCIUM SERPL-MCNC: 9.3 MG/DL (ref 8.7–10.5)
CHLORIDE SERPL-SCNC: 106 MMOL/L (ref 95–110)
CO2 SERPL-SCNC: 28 MMOL/L (ref 23–29)
CREAT SERPL-MCNC: 0.7 MG/DL (ref 0.5–1.4)
ERYTHROCYTE [DISTWIDTH] IN BLOOD BY AUTOMATED COUNT: 13.7 % (ref 11.5–14.5)
EST. GFR  (AFRICAN AMERICAN): >60 ML/MIN/1.73 M^2
EST. GFR  (NON AFRICAN AMERICAN): >60 ML/MIN/1.73 M^2
GLUCOSE SERPL-MCNC: 93 MG/DL (ref 70–110)
HCT VFR BLD AUTO: 32.8 % (ref 37–48.5)
HGB BLD-MCNC: 9.8 G/DL (ref 12–16)
IMM GRANULOCYTES # BLD AUTO: 0.5 K/UL (ref 0–0.04)
MCH RBC QN AUTO: 28 PG (ref 27–31)
MCHC RBC AUTO-ENTMCNC: 29.9 G/DL (ref 32–36)
MCV RBC AUTO: 94 FL (ref 82–98)
NEUTROPHILS # BLD AUTO: 6.1 K/UL (ref 1.8–7.7)
PLATELET # BLD AUTO: 181 K/UL (ref 150–350)
PMV BLD AUTO: 10.9 FL (ref 9.2–12.9)
POTASSIUM SERPL-SCNC: 3.9 MMOL/L (ref 3.5–5.1)
PROT SERPL-MCNC: 6.7 G/DL (ref 6–8.4)
RBC # BLD AUTO: 3.5 M/UL (ref 4–5.4)
SODIUM SERPL-SCNC: 142 MMOL/L (ref 136–145)
WBC # BLD AUTO: 9.5 K/UL (ref 3.9–12.7)

## 2019-07-17 PROCEDURE — 99215 PR OFFICE/OUTPT VISIT, EST, LEVL V, 40-54 MIN: ICD-10-PCS | Mod: S$GLB,,, | Performed by: INTERNAL MEDICINE

## 2019-07-17 PROCEDURE — 63600175 PHARM REV CODE 636 W HCPCS: Performed by: INTERNAL MEDICINE

## 2019-07-17 PROCEDURE — 36593 DECLOT VASCULAR DEVICE: CPT

## 2019-07-17 PROCEDURE — 25000003 PHARM REV CODE 250: Performed by: INTERNAL MEDICINE

## 2019-07-17 PROCEDURE — 1101F PR PT FALLS ASSESS DOC 0-1 FALLS W/OUT INJ PAST YR: ICD-10-PCS | Mod: CPTII,S$GLB,, | Performed by: INTERNAL MEDICINE

## 2019-07-17 PROCEDURE — 99499 RISK ADDL DX/OHS AUDIT: ICD-10-PCS | Mod: S$GLB,,, | Performed by: INTERNAL MEDICINE

## 2019-07-17 PROCEDURE — 99215 OFFICE O/P EST HI 40 MIN: CPT | Mod: S$GLB,,, | Performed by: INTERNAL MEDICINE

## 2019-07-17 PROCEDURE — 1101F PT FALLS ASSESS-DOCD LE1/YR: CPT | Mod: CPTII,S$GLB,, | Performed by: INTERNAL MEDICINE

## 2019-07-17 PROCEDURE — 96411 CHEMO IV PUSH ADDL DRUG: CPT

## 2019-07-17 PROCEDURE — 99999 PR PBB SHADOW E&M-EST. PATIENT-LVL III: CPT | Mod: PBBFAC,,, | Performed by: INTERNAL MEDICINE

## 2019-07-17 PROCEDURE — 63600175 PHARM REV CODE 636 W HCPCS: Mod: JG | Performed by: INTERNAL MEDICINE

## 2019-07-17 PROCEDURE — 85027 COMPLETE CBC AUTOMATED: CPT

## 2019-07-17 PROCEDURE — 96367 TX/PROPH/DG ADDL SEQ IV INF: CPT

## 2019-07-17 PROCEDURE — 99999 PR PBB SHADOW E&M-EST. PATIENT-LVL III: ICD-10-PCS | Mod: PBBFAC,,, | Performed by: INTERNAL MEDICINE

## 2019-07-17 PROCEDURE — 99499 UNLISTED E&M SERVICE: CPT | Mod: S$GLB,,, | Performed by: INTERNAL MEDICINE

## 2019-07-17 PROCEDURE — 96413 CHEMO IV INFUSION 1 HR: CPT

## 2019-07-17 PROCEDURE — A4216 STERILE WATER/SALINE, 10 ML: HCPCS | Performed by: INTERNAL MEDICINE

## 2019-07-17 PROCEDURE — 96377 APPLICATON ON-BODY INJECTOR: CPT

## 2019-07-17 PROCEDURE — 80053 COMPREHEN METABOLIC PANEL: CPT

## 2019-07-17 RX ORDER — SODIUM CHLORIDE 0.9 % (FLUSH) 0.9 %
10 SYRINGE (ML) INJECTION
Status: CANCELLED | OUTPATIENT
Start: 2019-07-17

## 2019-07-17 RX ORDER — HEPARIN 100 UNIT/ML
500 SYRINGE INTRAVENOUS
Status: COMPLETED | OUTPATIENT
Start: 2019-07-17 | End: 2019-07-17

## 2019-07-17 RX ORDER — DOXORUBICIN HYDROCHLORIDE 2 MG/ML
60 INJECTION, SOLUTION INTRAVENOUS
Status: CANCELLED | OUTPATIENT
Start: 2019-07-17

## 2019-07-17 RX ORDER — HEPARIN 100 UNIT/ML
500 SYRINGE INTRAVENOUS
Status: CANCELLED | OUTPATIENT
Start: 2019-07-17

## 2019-07-17 RX ORDER — SODIUM CHLORIDE 0.9 % (FLUSH) 0.9 %
10 SYRINGE (ML) INJECTION
Status: COMPLETED | OUTPATIENT
Start: 2019-07-17 | End: 2019-07-17

## 2019-07-17 RX ORDER — DOXORUBICIN HYDROCHLORIDE 2 MG/ML
60 INJECTION, SOLUTION INTRAVENOUS
Status: COMPLETED | OUTPATIENT
Start: 2019-07-17 | End: 2019-07-17

## 2019-07-17 RX ORDER — SODIUM CHLORIDE 0.9 % (FLUSH) 0.9 %
10 SYRINGE (ML) INJECTION
Status: DISCONTINUED | OUTPATIENT
Start: 2019-07-17 | End: 2019-07-17 | Stop reason: HOSPADM

## 2019-07-17 RX ORDER — HEPARIN 100 UNIT/ML
500 SYRINGE INTRAVENOUS
Status: DISCONTINUED | OUTPATIENT
Start: 2019-07-17 | End: 2019-07-17 | Stop reason: HOSPADM

## 2019-07-17 RX ADMIN — PEGFILGRASTIM 6 MG: KIT SUBCUTANEOUS at 05:07

## 2019-07-17 RX ADMIN — APREPITANT 130 MG: 130 INJECTION, EMULSION INTRAVENOUS at 03:07

## 2019-07-17 RX ADMIN — DEXAMETHASONE SODIUM PHOSPHATE: 4 INJECTION, SOLUTION INTRAMUSCULAR; INTRAVENOUS at 03:07

## 2019-07-17 RX ADMIN — Medication 10 ML: at 12:07

## 2019-07-17 RX ADMIN — CYCLOPHOSPHAMIDE 1060 MG: 1 INJECTION, POWDER, FOR SOLUTION INTRAVENOUS; ORAL at 04:07

## 2019-07-17 RX ADMIN — DOXORUBICIN HYDROCHLORIDE 106 MG: 2 INJECTION, SOLUTION INTRAVENOUS at 04:07

## 2019-07-17 RX ADMIN — HEPARIN 500 UNITS: 100 SYRINGE at 05:07

## 2019-07-17 RX ADMIN — SODIUM CHLORIDE: 0.9 INJECTION, SOLUTION INTRAVENOUS at 03:07

## 2019-07-17 RX ADMIN — HEPARIN 500 UNITS: 100 SYRINGE at 12:07

## 2019-07-17 RX ADMIN — ALTEPLASE 2 MG: 2.2 INJECTION, POWDER, LYOPHILIZED, FOR SOLUTION INTRAVENOUS at 01:07

## 2019-07-17 NOTE — Clinical Note
1. MD in 2 and 4 weeks with ddAc, cbc, cmp. Please add her to my schedule on 7/31 - ok to overbook 2. Referral to [employer] [facility] program (see email)

## 2019-07-17 NOTE — PLAN OF CARE
Problem: Adult Inpatient Plan of Care  Goal: Plan of Care Review  Outcome: Ongoing (interventions implemented as appropriate)  Pt tolerated AC with no complications. VSS. Pt instructed to call MD with any problems. Neulasta OBI in place with green flashing light. NAD. Pt discharged home independently.

## 2019-07-17 NOTE — NURSING
Pt here today for lab draw from port. Port flushed easily, does not draw back blood. Repositioned several times, flushed several times, no blood return, cath-flow instilled at 1315. Labs obtained via venipuncture from L. AC. Pt discharged to Physicians office and will return for cath-flow removal with chemo appointment. No questions at this time.

## 2019-07-25 ENCOUNTER — TELEPHONE (OUTPATIENT)
Dept: HEMATOLOGY/ONCOLOGY | Facility: CLINIC | Age: 69
End: 2019-07-25

## 2019-07-25 NOTE — TELEPHONE ENCOUNTER
Call to pt and informed to stay hydrated, rest, and monitor symptoms for worsening/fevers (and if does to contact office immediately) and that will keep scheduled 7/31 appt.   Pt verbalized understanding.       Dominique Ayala MD  You 3 minutes ago (10:40 AM)      Yes, thanks    Routing comment       You  Dominique Ayala MD 56 minutes ago (9:48 AM)      Do you want to monitor symptoms for now and if fever starts have her call asap? Sees us again on 7/31    Routing comment       You 58 minutes ago (9:45 AM)

## 2019-07-25 NOTE — TELEPHONE ENCOUNTER
Returned call to pt.   Pt stated that she has the following symptoms:  -Productive cough with clear/white sputum  -Sore throat   -Fatigue  -Tongue tingling (not painful)    Pt denies any fevers or mouth sores at this time.   Pt has been gargling with warm salt water.     Informed pt that would check with Dr. Ayala for recommendations and f/u.   Pt verbalized understanding.           ----- Message from Rosy Roque sent at 7/25/2019  9:32 AM CDT -----  Pt having issue with sitting up she can be reached at [phone], she also is experiencing coughs and no energy. Thank you

## 2019-07-29 NOTE — PROGRESS NOTES
History:     Reason For Follow Up/Onc History:   1. Stage 1B ([id number]) invasive ductal carcinoma of right breast, lower outer quadrant, ER neg, [place] neg, Her2 neg, Grade 3, Ki67 70%      HPI:   Sharri Cline presents for follow up of breast cancer and continuation of neoadjuvant ddAC. She's due for cycle 3 ddAC.   Constipation is still present - forgets to take nightly senna S  Light headedness is improving but she reports it more when she's out in the hear  Complaints of coughing and white sputum production. Afebrile. Lost voice, but its improving.     Onc history:   - She presented for screening mammo in 5/15/2019 which showed focal asymmetries in both left and right breast. Diagnostic mammogram and US showed no significant abn of left breast, and right breast 15 mm x 11 mm x 12 mm mass at 4:00, 5 CFN. Biopsy on 5/24/19 showed grade 3 IDC, triple negative, Ki67 70%.    - 7/3/19 started neoadjuvant chemotherapy with ddAC to be followed by Taxol.     History:  I reviewed, confirmed and updated history (past medical, social, family) as necessary.    Medications    Current Outpatient Medications:     b complex vitamins tablet, Take 1 tablet by mouth once daily., Disp: , Rfl:     calcium carbonate-vit D3-min 600 mg calcium- 400 unit Tab, Take 1 tablet by mouth 2 (two) times daily., Disp: 60 tablet, Rfl: 3    dexamethasone (DECADRON) 4 MG Tab, Take one tablet twice a day days 2,3, and 4 of each chemotherapy cycle., Disp: 12 tablet, Rfl: 1    fish oil-omega-3 fatty acids 300-1,000 mg capsule, Take 2 g by mouth once daily., Disp: , Rfl:     lidocaine-prilocaine (EMLA) cream, Apply topically as needed 45 minutes before port access, Disp: 30 g, Rfl: 0    multivitamin with minerals tablet, Take 1 tablet by mouth once daily.  , Disp: , Rfl:     ondansetron (ZOFRAN-ODT) 8 MG TbDL, Take 1 tablet (8 mg total) by mouth every 8 (eight) hours as needed (nausea)., Disp: 45 tablet, Rfl: 1  No current facility-administered  medications for this visit.     Facility-Administered Medications Ordered in Other Visits:     alteplase injection 2 mg, 2 mg, Intra-Catheter, PRN, Dominique Ayala MD, 2 mg at 07/31/19 1253    heparin, porcine (PF) 100 unit/mL injection flush 500 Units, 500 Units, Intravenous, 1 time in Clinic/HOD, Dominique Ayala MD    sodium chloride 0.9% flush 10 mL, 10 mL, Intravenous, 1 time in Clinic/HOD, Dominique Ayala MD  Allergies  Review of patient's allergies indicates:  No Known Allergies  Review of Systems  Review of Systems   Constitutional: Positive for appetite change. Negative for activity change, chills, fatigue, fever and unexpected weight change.   HENT: Negative for congestion, hearing loss, nosebleeds, sinus pressure and trouble swallowing.    Eyes: Negative for pain, discharge and itching.   Respiratory: Positive for cough. Negative for chest tightness and shortness of breath.    Cardiovascular: Negative for chest pain, palpitations and leg swelling.   Gastrointestinal: Negative for abdominal distention, abdominal pain, blood in stool, diarrhea, nausea, rectal pain and vomiting.   Endocrine: Negative for heat intolerance and polydipsia.   Genitourinary: Negative for difficulty urinating, dysuria, flank pain, frequency, hematuria and urgency.   Musculoskeletal: Negative for arthralgias, back pain and neck pain.   Skin: Negative for color change, pallor and rash.   Neurological: Positive for light-headedness. Negative for dizziness, numbness and headaches.   Hematological: Negative for adenopathy. Does not bruise/bleed easily.   Psychiatric/Behavioral: Negative for confusion and decreased concentration. The patient is not nervous/anxious.         Objective     Objective:     Vitals:    07/31/19 1305   BP: 123/68   BP Location: Right arm   Patient Position: Sitting   BP Method: Large (Automatic)   Pulse: 90   Resp: 16   Temp: 97.7 °F (36.5 °C)   TempSrc: Oral   SpO2: 100%   Weight: 65.1 kg (143 lb 8.3 oz)  "  Height: 5' 3" (1.6 m)     Body surface area is 1.7 meters squared.  Physical Exam   Constitutional: She is oriented to person, place, and time. She appears well-developed and well-nourished. No distress.   HENT:   Head: Normocephalic and atraumatic.   Mouth/Throat: Oropharynx is clear and moist and mucous membranes are normal. No oral lesions.   Eyes: Conjunctivae and EOM are normal. Right eye exhibits no discharge. Left eye exhibits no discharge. No scleral icterus.   Neck: Normal range of motion. Neck supple. No thyroid mass and no thyromegaly present.   Cardiovascular: Normal rate, regular rhythm, S1 normal, S2 normal and normal heart sounds.   Pulmonary/Chest: Effort normal and breath sounds normal. No respiratory distress. She has no wheezes. She has no rhonchi. She has no rales. Chest wall is not dull to percussion.   Right breast with indention of skin at 4:00 with palpable 1 x 1.5 cm mass, freely mobile  mediport   Abdominal: Soft. Normal appearance and bowel sounds are normal. There is no hepatosplenomegaly. There is no tenderness.   Lymphadenopathy:     She has no cervical adenopathy.     She has no axillary adenopathy. No inguinal adenopathy noted on the right or left side.        Right: No supraclavicular adenopathy present.        Left: No supraclavicular adenopathy present.   Neurological: She is alert and oriented to person, place, and time. She has normal strength.   Skin: Skin is warm, dry and intact. No pallor.   Psychiatric: Her behavior is normal. Thought content normal.     Labs and Imaging  Results for orders placed or performed in visit on 07/31/19   CBC Oncology   Result Value Ref Range    WBC 9.80 3.90 - 12.70 K/uL    RBC 3.11 (L) 4.00 - 5.40 M/uL    Hemoglobin 8.8 (L) 12.0 - 16.0 g/dL    Hematocrit 28.0 (L) 37.0 - 48.5 %    Mean Corpuscular Volume 90 82 - 98 fL    Mean Corpuscular Hemoglobin 28.3 27.0 - 31.0 pg    Mean Corpuscular Hemoglobin Conc 31.4 (L) 32.0 - 36.0 g/dL    RDW 13.6 11.5 " - 14.5 %    Platelets 268 150 - 350 K/uL    MPV 10.0 9.2 - 12.9 fL    Gran # (ANC) 6.4 1.8 - 7.7 K/uL    Immature Grans (Abs) 0.62 (H) 0.00 - 0.04 K/uL     Breast pathology and imaging as above.     Assessment     Assessment:     1. Stage 1B ([id number]) invasive ductal carcinoma of right breast, lower outer quadrant, ER neg, [place] neg, Her2 neg, Grade 3, Ki67 70%   * 7/3/19 started neoadjuvant chemotherapy with four cycles of dose-dense Adriamycin and cyclophosphamide with Neulasta support followed by twelve doses of weekly paclitaxel.   * Can consider adding Zometa every 6 months x 2 years in adjuvant setting.    * Cycle 3 neoadjuvant ddAC with Neulasta   * Tumor is shrinking.     2. Chronically elevated alk phos and bilirubin   * Chronic; follows with Hepatology; prior liver biopsy 11/2018 with mild portal inflammation    3. Chemo anemia   * Monitor.    * Likely contributing to light headedness.   * Progressive, monitor.     4. Drug constipation   * Encouraged Senna S two nightly.     5. Cough - suspect she's recovering from viral URI. Monitor  6. Hypokalemia,. KCL with chemo today  Plan:     1. Cycle 3 neoadjuvant ddAC with Neualsta.  2. Continue antiemetics.   3. Senna s  4. Continue exercising     RTC every 2 weeks for treatment.  I asked the patient to call for any questions, concerns, or new symptoms.  In addition to chemo monitoring, I eval'd tumor response.      Future Appointments   Date Time Provider Department Center   7/31/2019  2:00 PM [facility]   8/14/2019 12:30 PM [facility]JE[facility]   8/14/2019  1:30 PM Dominique Ayala MD [facility] HEM ONC [facility]   8/14/2019  2:30 PM [facility]       Distress Screening Results: Psychosocial Distress screening score of Distress Score: 3 noted and reviewed. No intervention indicated.

## 2019-07-31 ENCOUNTER — INFUSION (OUTPATIENT)
Dept: INFUSION THERAPY | Facility: HOSPITAL | Age: 69
End: 2019-07-31
Attending: INTERNAL MEDICINE
Payer: MEDICARE

## 2019-07-31 ENCOUNTER — OFFICE VISIT (OUTPATIENT)
Dept: HEMATOLOGY/ONCOLOGY | Facility: CLINIC | Age: 69
End: 2019-07-31
Payer: MEDICARE

## 2019-07-31 VITALS
HEIGHT: 63 IN | WEIGHT: 143.5 LBS | OXYGEN SATURATION: 100 % | RESPIRATION RATE: 16 BRPM | SYSTOLIC BLOOD PRESSURE: 123 MMHG | TEMPERATURE: 98 F | HEART RATE: 90 BPM | BODY MASS INDEX: 25.43 KG/M2 | DIASTOLIC BLOOD PRESSURE: 68 MMHG

## 2019-07-31 VITALS
SYSTOLIC BLOOD PRESSURE: 131 MMHG | TEMPERATURE: 98 F | DIASTOLIC BLOOD PRESSURE: 65 MMHG | RESPIRATION RATE: 18 BRPM | HEART RATE: 83 BPM

## 2019-07-31 DIAGNOSIS — Z17.1 MALIGNANT NEOPLASM OF LOWER-INNER QUADRANT OF RIGHT BREAST OF FEMALE, ESTROGEN RECEPTOR NEGATIVE: ICD-10-CM

## 2019-07-31 DIAGNOSIS — D64.81 ANTINEOPLASTIC CHEMOTHERAPY INDUCED ANEMIA: ICD-10-CM

## 2019-07-31 DIAGNOSIS — C50.311 MALIGNANT NEOPLASM OF LOWER-INNER QUADRANT OF RIGHT BREAST OF FEMALE, ESTROGEN RECEPTOR NEGATIVE: ICD-10-CM

## 2019-07-31 DIAGNOSIS — R05.9 COUGH: ICD-10-CM

## 2019-07-31 DIAGNOSIS — T45.1X5A ANTINEOPLASTIC CHEMOTHERAPY INDUCED ANEMIA: ICD-10-CM

## 2019-07-31 DIAGNOSIS — K59.00 CONSTIPATION, UNSPECIFIED CONSTIPATION TYPE: ICD-10-CM

## 2019-07-31 DIAGNOSIS — E87.6 HYPOKALEMIA: ICD-10-CM

## 2019-07-31 DIAGNOSIS — Z51.11 ENCOUNTER FOR CHEMOTHERAPY MANAGEMENT: Primary | ICD-10-CM

## 2019-07-31 DIAGNOSIS — Z17.1 MALIGNANT NEOPLASM OF LOWER-INNER QUADRANT OF RIGHT BREAST OF FEMALE, ESTROGEN RECEPTOR NEGATIVE: Primary | ICD-10-CM

## 2019-07-31 DIAGNOSIS — R74.8 ELEVATED ALKALINE PHOSPHATASE LEVEL: ICD-10-CM

## 2019-07-31 DIAGNOSIS — E87.6 HYPOKALEMIA: Primary | ICD-10-CM

## 2019-07-31 DIAGNOSIS — C50.311 MALIGNANT NEOPLASM OF LOWER-INNER QUADRANT OF RIGHT BREAST OF FEMALE, ESTROGEN RECEPTOR NEGATIVE: Primary | ICD-10-CM

## 2019-07-31 LAB
ALBUMIN SERPL BCP-MCNC: 3.3 G/DL (ref 3.5–5.2)
ALP SERPL-CCNC: 138 U/L (ref 55–135)
ALT SERPL W/O P-5'-P-CCNC: 16 U/L (ref 10–44)
ANION GAP SERPL CALC-SCNC: 11 MMOL/L (ref 8–16)
AST SERPL-CCNC: 31 U/L (ref 10–40)
BILIRUB SERPL-MCNC: 0.4 MG/DL (ref 0.1–1)
BUN SERPL-MCNC: 9 MG/DL (ref 8–23)
CALCIUM SERPL-MCNC: 9.3 MG/DL (ref 8.7–10.5)
CHLORIDE SERPL-SCNC: 99 MMOL/L (ref 95–110)
CO2 SERPL-SCNC: 25 MMOL/L (ref 23–29)
CREAT SERPL-MCNC: 0.7 MG/DL (ref 0.5–1.4)
ERYTHROCYTE [DISTWIDTH] IN BLOOD BY AUTOMATED COUNT: 13.6 % (ref 11.5–14.5)
EST. GFR  (AFRICAN AMERICAN): >60 ML/MIN/1.73 M^2
EST. GFR  (NON AFRICAN AMERICAN): >60 ML/MIN/1.73 M^2
GLUCOSE SERPL-MCNC: 101 MG/DL (ref 70–110)
HCT VFR BLD AUTO: 28 % (ref 37–48.5)
HGB BLD-MCNC: 8.8 G/DL (ref 12–16)
IMM GRANULOCYTES # BLD AUTO: 0.62 K/UL (ref 0–0.04)
MCH RBC QN AUTO: 28.3 PG (ref 27–31)
MCHC RBC AUTO-ENTMCNC: 31.4 G/DL (ref 32–36)
MCV RBC AUTO: 90 FL (ref 82–98)
NEUTROPHILS # BLD AUTO: 6.4 K/UL (ref 1.8–7.7)
PLATELET # BLD AUTO: 268 K/UL (ref 150–350)
PMV BLD AUTO: 10 FL (ref 9.2–12.9)
POTASSIUM SERPL-SCNC: 3.2 MMOL/L (ref 3.5–5.1)
PROT SERPL-MCNC: 6.9 G/DL (ref 6–8.4)
RBC # BLD AUTO: 3.11 M/UL (ref 4–5.4)
SODIUM SERPL-SCNC: 135 MMOL/L (ref 136–145)
WBC # BLD AUTO: 9.8 K/UL (ref 3.9–12.7)

## 2019-07-31 PROCEDURE — 96413 CHEMO IV INFUSION 1 HR: CPT

## 2019-07-31 PROCEDURE — 96367 TX/PROPH/DG ADDL SEQ IV INF: CPT

## 2019-07-31 PROCEDURE — 99999 PR PBB SHADOW E&M-EST. PATIENT-LVL III: ICD-10-PCS | Mod: PBBFAC,,, | Performed by: INTERNAL MEDICINE

## 2019-07-31 PROCEDURE — 63600175 PHARM REV CODE 636 W HCPCS: Mod: JG | Performed by: INTERNAL MEDICINE

## 2019-07-31 PROCEDURE — 1101F PR PT FALLS ASSESS DOC 0-1 FALLS W/OUT INJ PAST YR: ICD-10-PCS | Mod: CPTII,S$GLB,, | Performed by: INTERNAL MEDICINE

## 2019-07-31 PROCEDURE — 25000003 PHARM REV CODE 250: Performed by: INTERNAL MEDICINE

## 2019-07-31 PROCEDURE — 85027 COMPLETE CBC AUTOMATED: CPT

## 2019-07-31 PROCEDURE — 63600175 PHARM REV CODE 636 W HCPCS: Performed by: INTERNAL MEDICINE

## 2019-07-31 PROCEDURE — A4216 STERILE WATER/SALINE, 10 ML: HCPCS | Performed by: INTERNAL MEDICINE

## 2019-07-31 PROCEDURE — 36593 DECLOT VASCULAR DEVICE: CPT

## 2019-07-31 PROCEDURE — 80053 COMPREHEN METABOLIC PANEL: CPT

## 2019-07-31 PROCEDURE — 96411 CHEMO IV PUSH ADDL DRUG: CPT

## 2019-07-31 PROCEDURE — 99999 PR PBB SHADOW E&M-EST. PATIENT-LVL III: CPT | Mod: PBBFAC,,, | Performed by: INTERNAL MEDICINE

## 2019-07-31 PROCEDURE — 1101F PT FALLS ASSESS-DOCD LE1/YR: CPT | Mod: CPTII,S$GLB,, | Performed by: INTERNAL MEDICINE

## 2019-07-31 PROCEDURE — 96377 APPLICATON ON-BODY INJECTOR: CPT | Mod: 59

## 2019-07-31 PROCEDURE — 99215 OFFICE O/P EST HI 40 MIN: CPT | Mod: S$GLB,,, | Performed by: INTERNAL MEDICINE

## 2019-07-31 PROCEDURE — 99215 PR OFFICE/OUTPT VISIT, EST, LEVL V, 40-54 MIN: ICD-10-PCS | Mod: S$GLB,,, | Performed by: INTERNAL MEDICINE

## 2019-07-31 RX ORDER — SODIUM CHLORIDE 0.9 % (FLUSH) 0.9 %
10 SYRINGE (ML) INJECTION
Status: DISCONTINUED | OUTPATIENT
Start: 2019-07-31 | End: 2019-07-31 | Stop reason: HOSPADM

## 2019-07-31 RX ORDER — POTASSIUM CHLORIDE 750 MG/1
40 TABLET, EXTENDED RELEASE ORAL
Status: CANCELLED
Start: 2019-07-31

## 2019-07-31 RX ORDER — DOXORUBICIN HYDROCHLORIDE 2 MG/ML
60 INJECTION, SOLUTION INTRAVENOUS
Status: COMPLETED | OUTPATIENT
Start: 2019-07-31 | End: 2019-07-31

## 2019-07-31 RX ORDER — SODIUM CHLORIDE 0.9 % (FLUSH) 0.9 %
10 SYRINGE (ML) INJECTION
Status: CANCELLED | OUTPATIENT
Start: 2019-07-31

## 2019-07-31 RX ORDER — HEPARIN 100 UNIT/ML
500 SYRINGE INTRAVENOUS
Status: DISCONTINUED | OUTPATIENT
Start: 2019-07-31 | End: 2019-07-31 | Stop reason: HOSPADM

## 2019-07-31 RX ORDER — HEPARIN 100 UNIT/ML
500 SYRINGE INTRAVENOUS
Status: CANCELLED | OUTPATIENT
Start: 2019-07-31

## 2019-07-31 RX ORDER — POTASSIUM CHLORIDE 20 MEQ/1
40 TABLET, EXTENDED RELEASE ORAL
Status: COMPLETED | OUTPATIENT
Start: 2019-07-31 | End: 2019-07-31

## 2019-07-31 RX ORDER — DOXORUBICIN HYDROCHLORIDE 2 MG/ML
60 INJECTION, SOLUTION INTRAVENOUS
Status: CANCELLED | OUTPATIENT
Start: 2019-07-31

## 2019-07-31 RX ADMIN — POTASSIUM CHLORIDE 40 MEQ: 1500 TABLET, EXTENDED RELEASE ORAL at 02:07

## 2019-07-31 RX ADMIN — APREPITANT 130 MG: 130 INJECTION, EMULSION INTRAVENOUS at 03:07

## 2019-07-31 RX ADMIN — ALTEPLASE 2 MG: 2.2 INJECTION, POWDER, LYOPHILIZED, FOR SOLUTION INTRAVENOUS at 02:07

## 2019-07-31 RX ADMIN — DOXORUBICIN HYDROCHLORIDE 106 MG: 2 INJECTION, SOLUTION INTRAVENOUS at 03:07

## 2019-07-31 RX ADMIN — PEGFILGRASTIM 6 MG: KIT SUBCUTANEOUS at 04:07

## 2019-07-31 RX ADMIN — CYCLOPHOSPHAMIDE 1060 MG: 1 INJECTION, POWDER, FOR SOLUTION INTRAVENOUS; ORAL at 03:07

## 2019-07-31 RX ADMIN — ALTEPLASE 2 MG: 2.2 INJECTION, POWDER, LYOPHILIZED, FOR SOLUTION INTRAVENOUS at 12:07

## 2019-07-31 RX ADMIN — DEXAMETHASONE SODIUM PHOSPHATE: 4 INJECTION, SOLUTION INTRAMUSCULAR; INTRAVENOUS at 02:07

## 2019-08-08 ENCOUNTER — TELEPHONE (OUTPATIENT)
Dept: HEMATOLOGY/ONCOLOGY | Facility: CLINIC | Age: 69
End: 2019-08-08

## 2019-08-08 ENCOUNTER — HOSPITAL ENCOUNTER (INPATIENT)
Facility: HOSPITAL | Age: 69
LOS: 4 days | Discharge: HOME | DRG: 809 | End: 2019-08-12
Attending: EMERGENCY MEDICINE | Admitting: INTERNAL MEDICINE
Payer: MEDICARE

## 2019-08-08 DIAGNOSIS — R00.0 TACHYCARDIA: ICD-10-CM

## 2019-08-08 DIAGNOSIS — R50.9 FEVER: ICD-10-CM

## 2019-08-08 DIAGNOSIS — C50.919 MALIGNANT NEOPLASM OF FEMALE BREAST, UNSPECIFIED ESTROGEN RECEPTOR STATUS, UNSPECIFIED LATERALITY, UNSPECIFIED SITE OF BREAST: ICD-10-CM

## 2019-08-08 DIAGNOSIS — D70.9 NEUTROPENIC FEVER: Primary | ICD-10-CM

## 2019-08-08 DIAGNOSIS — R50.81 NEUTROPENIC FEVER: Primary | ICD-10-CM

## 2019-08-08 PROBLEM — E87.1 HYPONATREMIA: Status: ACTIVE | Noted: 2019-08-08

## 2019-08-08 PROBLEM — T45.1X5A ANTINEOPLASTIC CHEMOTHERAPY INDUCED ANEMIA: Status: ACTIVE | Noted: 2019-08-08

## 2019-08-08 PROBLEM — D69.59 DRUG-INDUCED THROMBOCYTOPENIA: Status: ACTIVE | Noted: 2019-08-08

## 2019-08-08 PROBLEM — D64.81 ANTINEOPLASTIC CHEMOTHERAPY INDUCED ANEMIA: Status: ACTIVE | Noted: 2019-08-08

## 2019-08-08 PROBLEM — T45.1X5A PANCYTOPENIA DUE TO ANTINEOPLASTIC CHEMOTHERAPY: Status: ACTIVE | Noted: 2019-08-08

## 2019-08-08 PROBLEM — D61.810 PANCYTOPENIA DUE TO ANTINEOPLASTIC CHEMOTHERAPY: Status: ACTIVE | Noted: 2019-08-08

## 2019-08-08 PROBLEM — T50.905A DRUG-INDUCED THROMBOCYTOPENIA: Status: ACTIVE | Noted: 2019-08-08

## 2019-08-08 LAB
ALBUMIN SERPL BCP-MCNC: 3 G/DL (ref 3.5–5.2)
ALP SERPL-CCNC: 112 U/L (ref 55–135)
ALT SERPL W/O P-5'-P-CCNC: 17 U/L (ref 10–44)
ANION GAP SERPL CALC-SCNC: 13 MMOL/L (ref 8–16)
ANISOCYTOSIS BLD QL SMEAR: SLIGHT
AST SERPL-CCNC: 25 U/L (ref 10–40)
BASOPHILS # BLD AUTO: ABNORMAL K/UL (ref 0–0.2)
BASOPHILS NFR BLD: 0 % (ref 0–1.9)
BILIRUB DIRECT SERPL-MCNC: 0.7 MG/DL (ref 0.1–0.3)
BILIRUB SERPL-MCNC: 1.3 MG/DL (ref 0.1–1)
BILIRUB UR QL STRIP: NEGATIVE
BUN SERPL-MCNC: 16 MG/DL (ref 8–23)
CALCIUM SERPL-MCNC: 9.9 MG/DL (ref 8.7–10.5)
CHLORIDE SERPL-SCNC: 101 MMOL/L (ref 95–110)
CLARITY UR REFRACT.AUTO: ABNORMAL
CO2 SERPL-SCNC: 20 MMOL/L (ref 23–29)
COLOR UR AUTO: YELLOW
CREAT SERPL-MCNC: 0.7 MG/DL (ref 0.5–1.4)
DEPRECATED S PYO AG THROAT QL EIA: NEGATIVE
DIFFERENTIAL METHOD: ABNORMAL
EOSINOPHIL # BLD AUTO: ABNORMAL K/UL (ref 0–0.5)
EOSINOPHIL NFR BLD: 0 % (ref 0–8)
ERYTHROCYTE [DISTWIDTH] IN BLOOD BY AUTOMATED COUNT: 14.6 % (ref 11.5–14.5)
EST. GFR  (AFRICAN AMERICAN): >60 ML/MIN/1.73 M^2
EST. GFR  (NON AFRICAN AMERICAN): >60 ML/MIN/1.73 M^2
GLUCOSE SERPL-MCNC: 116 MG/DL (ref 70–110)
GLUCOSE UR QL STRIP: NEGATIVE
HCT VFR BLD AUTO: 24.9 % (ref 37–48.5)
HGB BLD-MCNC: 7.8 G/DL (ref 12–16)
HGB UR QL STRIP: NEGATIVE
IMM GRANULOCYTES # BLD AUTO: ABNORMAL K/UL (ref 0–0.04)
IMM GRANULOCYTES NFR BLD AUTO: ABNORMAL % (ref 0–0.5)
INFLUENZA A, MOLECULAR: NEGATIVE
INFLUENZA B, MOLECULAR: NEGATIVE
KETONES UR QL STRIP: ABNORMAL
LACTATE SERPL-SCNC: 0.8 MMOL/L (ref 0.5–2.2)
LACTATE SERPL-SCNC: 1.7 MMOL/L (ref 0.5–2.2)
LEUKOCYTE ESTERASE UR QL STRIP: NEGATIVE
LYMPHOCYTES # BLD AUTO: ABNORMAL K/UL (ref 1–4.8)
LYMPHOCYTES NFR BLD: 70 % (ref 18–48)
MCH RBC QN AUTO: 28.7 PG (ref 27–31)
MCHC RBC AUTO-ENTMCNC: 31.3 G/DL (ref 32–36)
MCV RBC AUTO: 92 FL (ref 82–98)
MONOCYTES # BLD AUTO: ABNORMAL K/UL (ref 0.3–1)
MONOCYTES NFR BLD: 10 % (ref 4–15)
NEUTROPHILS NFR BLD: 20 % (ref 38–73)
NITRITE UR QL STRIP: NEGATIVE
NRBC BLD-RTO: 0 /100 WBC
PH UR STRIP: 5 [PH] (ref 5–8)
PLATELET # BLD AUTO: 94 K/UL (ref 150–350)
PLATELET BLD QL SMEAR: ABNORMAL
PMV BLD AUTO: 11.2 FL (ref 9.2–12.9)
POCT GLUCOSE: 91 MG/DL (ref 70–110)
POTASSIUM SERPL-SCNC: 3.6 MMOL/L (ref 3.5–5.1)
PROT SERPL-MCNC: 7.2 G/DL (ref 6–8.4)
PROT UR QL STRIP: NEGATIVE
RBC # BLD AUTO: 2.72 M/UL (ref 4–5.4)
SODIUM SERPL-SCNC: 134 MMOL/L (ref 136–145)
SP GR UR STRIP: 1.02 (ref 1–1.03)
SPECIMEN SOURCE: NORMAL
URN SPEC COLLECT METH UR: ABNORMAL
WBC # BLD AUTO: 0.22 K/UL (ref 3.9–12.7)

## 2019-08-08 PROCEDURE — 93010 ELECTROCARDIOGRAM REPORT: CPT | Mod: ,,, | Performed by: INTERNAL MEDICINE

## 2019-08-08 PROCEDURE — 83605 ASSAY OF LACTIC ACID: CPT | Mod: 91

## 2019-08-08 PROCEDURE — 63600175 PHARM REV CODE 636 W HCPCS: Performed by: STUDENT IN AN ORGANIZED HEALTH CARE EDUCATION/TRAINING PROGRAM

## 2019-08-08 PROCEDURE — 63600175 PHARM REV CODE 636 W HCPCS: Performed by: PHYSICIAN ASSISTANT

## 2019-08-08 PROCEDURE — 87502 INFLUENZA DNA AMP PROBE: CPT

## 2019-08-08 PROCEDURE — 85007 BL SMEAR W/DIFF WBC COUNT: CPT

## 2019-08-08 PROCEDURE — 85027 COMPLETE CBC AUTOMATED: CPT

## 2019-08-08 PROCEDURE — 96365 THER/PROPH/DIAG IV INF INIT: CPT

## 2019-08-08 PROCEDURE — 82248 BILIRUBIN DIRECT: CPT

## 2019-08-08 PROCEDURE — 93010 EKG 12-LEAD: ICD-10-PCS | Mod: ,,, | Performed by: INTERNAL MEDICINE

## 2019-08-08 PROCEDURE — 99285 EMERGENCY DEPT VISIT HI MDM: CPT | Mod: 25

## 2019-08-08 PROCEDURE — 96366 THER/PROPH/DIAG IV INF ADDON: CPT

## 2019-08-08 PROCEDURE — 99223 1ST HOSP IP/OBS HIGH 75: CPT | Mod: AI,GC,, | Performed by: INTERNAL MEDICINE

## 2019-08-08 PROCEDURE — 87081 CULTURE SCREEN ONLY: CPT

## 2019-08-08 PROCEDURE — 87040 BLOOD CULTURE FOR BACTERIA: CPT

## 2019-08-08 PROCEDURE — 80053 COMPREHEN METABOLIC PANEL: CPT

## 2019-08-08 PROCEDURE — 96361 HYDRATE IV INFUSION ADD-ON: CPT

## 2019-08-08 PROCEDURE — 99223 PR INITIAL HOSPITAL CARE,LEVL III: ICD-10-PCS | Mod: AI,GC,, | Performed by: INTERNAL MEDICINE

## 2019-08-08 PROCEDURE — 93005 ELECTROCARDIOGRAM TRACING: CPT

## 2019-08-08 PROCEDURE — 96367 TX/PROPH/DG ADDL SEQ IV INF: CPT

## 2019-08-08 PROCEDURE — 20600001 HC STEP DOWN PRIVATE ROOM

## 2019-08-08 PROCEDURE — 25000003 PHARM REV CODE 250: Performed by: PHYSICIAN ASSISTANT

## 2019-08-08 PROCEDURE — 99285 PR EMERGENCY DEPT VISIT,LEVEL V: ICD-10-PCS | Mod: ,,, | Performed by: PHYSICIAN ASSISTANT

## 2019-08-08 PROCEDURE — 87880 STREP A ASSAY W/OPTIC: CPT

## 2019-08-08 PROCEDURE — 99285 EMERGENCY DEPT VISIT HI MDM: CPT | Mod: ,,, | Performed by: PHYSICIAN ASSISTANT

## 2019-08-08 PROCEDURE — 81003 URINALYSIS AUTO W/O SCOPE: CPT

## 2019-08-08 RX ORDER — ONDANSETRON 8 MG/1
8 TABLET, ORALLY DISINTEGRATING ORAL EVERY 8 HOURS PRN
Status: DISCONTINUED | OUTPATIENT
Start: 2019-08-08 | End: 2019-08-12 | Stop reason: HOSPADM

## 2019-08-08 RX ORDER — IBUPROFEN 200 MG
24 TABLET ORAL
Status: DISCONTINUED | OUTPATIENT
Start: 2019-08-08 | End: 2019-08-12 | Stop reason: HOSPADM

## 2019-08-08 RX ORDER — SODIUM CHLORIDE 0.9 % (FLUSH) 0.9 %
10 SYRINGE (ML) INJECTION
Status: DISCONTINUED | OUTPATIENT
Start: 2019-08-08 | End: 2019-08-12 | Stop reason: HOSPADM

## 2019-08-08 RX ORDER — PROCHLORPERAZINE EDISYLATE 5 MG/ML
5 INJECTION INTRAMUSCULAR; INTRAVENOUS EVERY 6 HOURS PRN
Status: DISCONTINUED | OUTPATIENT
Start: 2019-08-08 | End: 2019-08-12 | Stop reason: HOSPADM

## 2019-08-08 RX ORDER — VANCOMYCIN 2 GRAM/500 ML IN 0.9 % SODIUM CHLORIDE INTRAVENOUS
2000
Status: COMPLETED | OUTPATIENT
Start: 2019-08-08 | End: 2019-08-08

## 2019-08-08 RX ORDER — VANCOMYCIN HCL IN 5 % DEXTROSE 1G/250ML
1000 PLASTIC BAG, INJECTION (ML) INTRAVENOUS
Status: DISCONTINUED | OUTPATIENT
Start: 2019-08-09 | End: 2019-08-11

## 2019-08-08 RX ORDER — ACETAMINOPHEN 500 MG
1000 TABLET ORAL
Status: COMPLETED | OUTPATIENT
Start: 2019-08-08 | End: 2019-08-08

## 2019-08-08 RX ORDER — AMOXICILLIN 250 MG
2 CAPSULE ORAL 2 TIMES DAILY
Status: DISCONTINUED | OUTPATIENT
Start: 2019-08-08 | End: 2019-08-12 | Stop reason: HOSPADM

## 2019-08-08 RX ORDER — GLUCAGON 1 MG
1 KIT INJECTION
Status: DISCONTINUED | OUTPATIENT
Start: 2019-08-08 | End: 2019-08-12 | Stop reason: HOSPADM

## 2019-08-08 RX ORDER — ACETAMINOPHEN 325 MG/1
650 TABLET ORAL EVERY 8 HOURS PRN
Status: DISCONTINUED | OUTPATIENT
Start: 2019-08-08 | End: 2019-08-12 | Stop reason: HOSPADM

## 2019-08-08 RX ORDER — IBUPROFEN 200 MG
16 TABLET ORAL
Status: DISCONTINUED | OUTPATIENT
Start: 2019-08-08 | End: 2019-08-12 | Stop reason: HOSPADM

## 2019-08-08 RX ORDER — ENOXAPARIN SODIUM 100 MG/ML
40 INJECTION SUBCUTANEOUS EVERY 24 HOURS
Status: DISCONTINUED | OUTPATIENT
Start: 2019-08-08 | End: 2019-08-10

## 2019-08-08 RX ADMIN — PIPERACILLIN AND TAZOBACTAM 4.5 G: 4; .5 INJECTION, POWDER, LYOPHILIZED, FOR SOLUTION INTRAVENOUS; PARENTERAL at 01:08

## 2019-08-08 RX ADMIN — ACETAMINOPHEN 1000 MG: 500 TABLET ORAL at 11:08

## 2019-08-08 RX ADMIN — PIPERACILLIN AND TAZOBACTAM 4.5 G: 4; .5 INJECTION, POWDER, LYOPHILIZED, FOR SOLUTION INTRAVENOUS; PARENTERAL at 10:08

## 2019-08-08 RX ADMIN — ENOXAPARIN SODIUM 40 MG: 100 INJECTION SUBCUTANEOUS at 04:08

## 2019-08-08 RX ADMIN — SODIUM CHLORIDE 1905 ML: 0.9 INJECTION, SOLUTION INTRAVENOUS at 12:08

## 2019-08-08 RX ADMIN — VANCOMYCIN HYDROCHLORIDE 2000 MG: 100 INJECTION, POWDER, LYOPHILIZED, FOR SOLUTION INTRAVENOUS at 01:08

## 2019-08-08 NOTE — ED NOTES
Neutropenic precautions maintained. Sign on the door. Pt resting comfortably and independently repositioned in stretcher with bed locked in lowest position for safety. NAD noted at this time. Respirations even and unlabored and visible chest rise noted. Pt instructed to call if assistance is needed. Pt on continuous cardiac, BP, and O2 monitoring. Call light within reach. Family at bedside. No needs at this time. Will continue to monitor.

## 2019-08-08 NOTE — ED NOTES
Pt instructed that RN will have I/O cath her. Pt resting comfortably and independently repositioned in stretcher with bed locked in lowest position for safety. NAD noted at this time. Respirations even and unlabored and visible chest rise noted.  Pt instructed to call if assistance is needed. Pt on continuous cardiac, BP, and O2 monitoring. Call light within reach. Family at bedside. No needs at this time. Will continue to monitor.

## 2019-08-08 NOTE — TELEPHONE ENCOUNTER
Spoke to patient's niece, discussed symptoms. /69, recent temperature 101.2. Patient has had decreased PO intake and increased fatigue over the last 3 days. Niece states that she does not think she has been running a fever till now. Let her know I discussed this with Dr. Ayala and she would like for the patient to go to the ED for work up, she may be neutropenic. Niece states that they will go to ED.

## 2019-08-08 NOTE — ED NOTES
Patient identifiers verified and correct for Sharri Cline.    LOC: The patient is awake, alert and aware of environment with an appropriate affect, the patient is oriented x 3 and speaking appropriately with a hoarse voice.  APPEARANCE: Patient resting comfortably and in no acute distress, patient is clean and well groomed, patient's clothing is properly fastened.  SKIN: The skin is warm and dry, color consistent with ethnicity, patient has normal skin turgor and moist mucus membranes, skin intact, no breakdown or bruising noted.  MUSCULOSKELETAL: Patient moving all extremities spontaneously, no obvious swelling or deformities noted. Pt c/o generalized weakness.  RESPIRATORY: Airway is open and patent, respirations are spontaneous, patient has a normal effort and rate, no accessory muscle use noted, bilateral breath sounds diminished bilaterally.  Pt has a sore throat and is coughing up clear mucus.  CARDIAC: Patient has a normal rate and regular rhythm, no periphreal edema noted, capillary refill < 3 seconds.  ABDOMEN: Soft and non tender to palpation, no distention noted, normoactive bowel sounds present in all four quadrants.  NEUROLOGIC: PERRL, eyes open spontaneously, behavior appropriate to situation, follows commands, facial expression symmetrical, bilateral hand grasp equal and even, purposeful motor response noted, normal sensation in all extremities when touched with a finger. Febrile.

## 2019-08-08 NOTE — ED NOTES
Pt resting comfortably and independently repositioned in stretcher with bed locked in lowest position for safety. NAD noted at this time. Respirations even and unlabored and visible chest rise noted.  Patient offered bathroom assistance and denies need at this time. Pt states that she can not urinate at this time. Pt instructed to call if assistance is needed. Pt on continuous cardiac, BP, and O2 monitoring. Call light within reach. Family at bedside. No needs at this time. Will continue to monitor.

## 2019-08-08 NOTE — ED NOTES
Neutropenic precautions maintained. Sign on the door. Pt resting comfortably and independently repositioned in stretcher with bed locked in lowest position for safety. NAD noted at this time. Respirations even and unlabored and visible chest rise noted.  Patient offered bathroom assistance and denies need at this time. Pt instructed to call if assistance is needed. Pt on continuous cardiac, BP, and O2 monitoring. Call light within reach. Family at bedside. No needs at this time. Will continue to monitor.

## 2019-08-08 NOTE — ED TRIAGE NOTES
Sharri Cline, a 69 y.o. female presents to the ED w/ complaint of generalized weakness, sore throat and fever of 101 since Tuesday.  Pt denies CP, SOB, and dysuria. Last chemo July 31st. MD stated to come in to ER.       Triage note:  Chief Complaint   Patient presents with    Fever Post Chemo     Review of patient's allergies indicates:  No Known Allergies  Past Medical History:   Diagnosis Date    Helicobacter pylori antibody positive 5/27/13    treated with clarithromycin, metronidazole, and omeprazole x 14 days  (5/27-6/3); EGD normal in July 2013

## 2019-08-08 NOTE — HOSPITAL COURSE
70 yo F with invasive ductal carcinoma of R breast (currently on chemo, last cycle 7/31) admitted to Medical Oncology for neutropenic fever. On admission, her temp was 102.3F. Her ANC was <100. CXR negative for acute infectious process. UCx and BCx pending. She was started on vanc and zosyn.    Ms. Sharri Cline is a 69 year old female with stage 1B invasive ductal carcinoma of the R breast (ER neg, [place] neg, HER2 neg) that presents with cough, sore throat, and fever (Tmax 101). She started neoadjuvant chemotherapy with dose-dense adriamycin and cyclophosphamide followed by paclitaxel. She completed cycle 3 on 7/31/19. She first noticed a productive cough within a couple of days after the infusion. She has had this cough for about a week. The past 2 days, she has experienced fevers (Tmax 101 at home), chills, fatigue, and sore throat.  In the ED, she was febrile (Tmax 102.3), slightly tachycardic, tachypnic, but blood pressure was stable. Sepsis order set was initiated and was given IVFs. UA, UCx, and BCx were ordered. On admission, she was found to be pancytopenic ( with ANC 40, Hb 7.8, PLT 94). Lactate was normal. CXR was negative for any acute infectious causes. EKG was negative for acute ischemic changes. The patient was admitted for eval/management of neutropenic fever. She was started on vanc and zosyn. Subjectively she started feeling better but struggled with PO intake. She was able to swallow liquids. Denied odynophagia. Ordered CT neck. Constipated for 5 days now. Enema is contraindicated with neutropenia. XR abdo negative for obstruction. CT neck negative for any obstruction. Advanced to full liquid today. She was transitioned to doxycycline 8/12. The patient UA, UCx, BCx were all negative, CT neck and chest showed no obstruction, swallow study with SLP functional, her dysphagia and throat pain improved on [facility]'s soln and fluconazole and she is swallowing fluids well. The patient WBC and ANC have  improved over the last 48 hours. The patient ANC today is 3000. The patient is scheduled for chemotherapy on 8/14 but requests delaying the appointment because of the hospital visit as she feels not ready yet. The patient feels well 8/12 and will be discharged.

## 2019-08-08 NOTE — TELEPHONE ENCOUNTER
----- Message from Rajwinder Quiroga sent at 8/8/2019  9:31 AM CDT -----  Contact: Kezia garcia)  Who is calling:: Kezia  Provider treating:: Dr Ayala  Current symptom:: Fatigued, sore throat, w/ a temp of 101.2, Bp 14/69, Pulse 102  Are you currently receiving treatment for your diagnosis:: Yes   When was your last treatment:: 07/31  How long have you had the symptom:: 3 day  Contact:: [phone]

## 2019-08-08 NOTE — SUBJECTIVE & OBJECTIVE
Oncology Treatment Plan:   OP BREAST DOSE-DENSE AC - DOXORUBICIN CYCLOPHOSPHAMIDE Q2W    Medications:  Continuous Infusions:  Scheduled Meds:   enoxaparin  40 mg Subcutaneous Daily    senna-docusate 8.6-50 mg  2 tablet Oral BID     PRN Meds:acetaminophen, Dextrose 10% Bolus, Dextrose 10% Bolus, glucagon (human recombinant), glucose, glucose, ondansetron, prochlorperazine, sodium chloride 0.9%     Review of patient's allergies indicates:  No Known Allergies     Past Medical History:   Diagnosis Date    Helicobacter pylori antibody positive 5/27/13    treated with clarithromycin, metronidazole, and omeprazole x 14 days  (5/27-6/3); EGD normal in July 2013     Past Surgical History:   Procedure Laterality Date    BIOPSY, LIVER, WITH US GUIDANCE N/A 11/19/2018    Performed by [facility] Provider at [facility] OR 2ND FLR    COLONOSCOPY N/A 7/9/2013    Performed by Ha Harrington MD at [facility] ENDO (4TH FLR)    EGD (ESOPHAGOGASTRODUODENOSCOPY) N/A 7/9/2013    Performed by Ha Harrington MD at [facility] ENDO (4TH FLR)    EXCISION-LYMPH NODES Right 1/8/2016    Performed by Sanya Farah MD at [facility] OR 2ND FLR    EXTOWIAUF-ABND-I-CATH - CHEST Left 6/13/2019    Performed by Liebrtad Hernandez MD at [facility] OR 2ND FLR    PAROTIDECTOMY Right 1/8/2016    Performed by Sanya Farah MD at [facility] OR 2ND FLR    TUBAL LIGATION       Family History     Problem Relation (Age of Onset)    Colon cancer Maternal Aunt    Diabetes Father, Brother, Brother, Brother    Eczema Mother    ANGEL disease Mother    Hypertension Mother    Miscarriages / Stillbirths Mother    No Known Problems Sister, Maternal Uncle, Paternal Aunt, Paternal Uncle, Maternal Grandmother, Maternal Grandfather, Paternal Grandmother, Paternal Grandfather, Son, Son    Parkinsonism Mother        Tobacco Use    Smoking status: Never Smoker    Smokeless tobacco: Never Used   Substance and Sexual Activity    Alcohol use: Yes     Comment: Social    Drug  use: No    Sexual activity: Yes     Partners: Male     Birth control/protection: Post-menopausal       Review of Systems   Constitutional: Positive for activity change (decreased), appetite change (decreased), chills and fever (Tmax 102.3).   HENT: Positive for sore throat, trouble swallowing and voice change. Negative for congestion and ear pain.    Eyes: Negative for visual disturbance.   Respiratory: Positive for cough (productive) and shortness of breath (mild). Negative for wheezing.    Cardiovascular: Negative for chest pain, palpitations and leg swelling.   Gastrointestinal: Positive for constipation (chronic, last BM was 5 days ago). Negative for abdominal pain, blood in stool, diarrhea, nausea and vomiting.   Genitourinary: Positive for decreased urine volume. Negative for dysuria and hematuria.   Musculoskeletal: Negative for arthralgias and myalgias.   Skin: Negative for rash.   Allergic/Immunologic: Positive for immunocompromised state.   Neurological: Negative for dizziness, light-headedness, numbness and headaches.   Psychiatric/Behavioral: Negative for agitation and confusion.     Objective:     Vital Signs (Most Recent):  Temp: 99.1 °F (37.3 °C) (08/08/19 1359)  Pulse: 94 (08/08/19 1429)  Resp: (!) 30 (08/08/19 1044)  BP: (!) 122/59 (08/08/19 1429)  SpO2: 100 % (08/08/19 1429) Vital Signs (24h Range):  Temp:  [99.1 °F (37.3 °C)-102.3 °F (39.1 °C)] 99.1 °F (37.3 °C)  Pulse:  [] 94  Resp:  [30] 30  SpO2:  [99 %-100 %] 100 %  BP: (118-137)/(56-63) 122/59     Weight: 63.5 kg (140 lb)  Body mass index is 24.8 kg/m².  Body surface area is 1.68 meters squared.      Intake/Output Summary (Last 24 hours) at 8/8/2019 1713  Last data filed at 8/8/2019 1543  Gross per 24 hour   Intake 2505 ml   Output --   Net 2505 ml       Physical Exam   Constitutional: She appears well-developed and well-nourished. She appears distressed.   Ill-appearing  L chest port site without erythema or tenderness to palpation    HENT:   Head: Normocephalic and atraumatic.   Mouth/Throat: No oropharyngeal exudate.   Mild pharyngeal erythema   Eyes: Conjunctivae are normal.   Neck: Normal range of motion. Neck supple.   Cardiovascular: Normal rate and regular rhythm.   Murmur (systolic murmur at LSB in 2nd IC space) heard.  Pulmonary/Chest: Effort normal and breath sounds normal. No respiratory distress. She has no wheezes. She has no rales.   RR 30   Abdominal: Soft. Bowel sounds are normal. She exhibits no distension. There is no tenderness. There is no guarding.   Musculoskeletal: She exhibits no edema or tenderness.   Neurological: She is alert. She exhibits normal muscle tone.   Skin: Skin is warm and dry. No rash noted.   Psychiatric: She has a normal mood and affect. Her behavior is normal.       Significant Labs:   CBC:   Recent Labs   Lab 08/08/19  1147   WBC 0.22*   HGB 7.8*   HCT 24.9*   PLT 94*    and CMP:   Recent Labs   Lab 08/08/19  1147   *   K 3.6      CO2 20*   *   BUN 16   CREATININE 0.7   CALCIUM 9.9   PROT 7.2   ALBUMIN 3.0*   BILITOT 1.3*   ALKPHOS 112   AST 25   ALT 17   ANIONGAP 13   EGFRNONAA >60.0       Diagnostic Results:    XR Chest  Left-sided chest port is in place with the catheter tip in the SVC.  Heart size and pulmonary vascularity are within normal limits.  Mild tortuosity of the thoracic aorta with atherosclerotic calcification in the wall of the aortic arch.  Lungs are satisfactorily expanded and appear free of active disease.  No pleural fluid or pneumothorax.  Soft tissues and osseous structures are unremarkable  Impression: No active cardiopulmonary disease

## 2019-08-08 NOTE — HPI
Ms. Sharri Cline is a 69 year old female with stage 1B invasive ductal carcinoma of the R breast (ER neg, [place] neg, HER2 neg) that presents with cough, sore throat, and fever (Tmax 101). She started neoadjuvant chemotherapy with dose-dense adriamycin and cyclophosphamide followed by paclitaxel. She completed cycle 3 on 7/31/19. She first noticed a productive cough within a couple of days after the infusion. She has had this cough for about a week. The past 2 days, she has experienced fevers (Tmax 101 at home), chills, fatigue, and sore throat. Today, she has felt a little SOB. She has not taken anything to alleviate the cough or fevers.     In the ED, she was febrile (Tmax 102.3), slightly tachycardic, tachypnic, but blood pressure was stable. Sepsis order set was initiated and was given IVFs. UA, UCx, and BCx were ordered. On admission, she was found to be pancytopenic ( with ANC 40, Hb 7.8, PLT 94). Lactate was normal. CXR was negative for any acute infectious causes. EKG negative for acute ischemic changes.       Oncology History:  Oncologist = Dr. Ayala  Presented for screening mammo in 5/15/2019 which showed focal asymmetries in both left and right breast  - Diagnostic mammogram and US showed no significant abn of left breast, and right breast 15 mm x 11 mm x 12 mm mass at 4:00, 5 CFN.   - Biopsy on 5/24/19 showed grade 3 IDC, triple negative, Ki67 70%.   7/3/19 started neoadjuvant chemotherapy with ddAC to be followed by Taxol.

## 2019-08-08 NOTE — ED PROVIDER NOTES
"Encounter Date: 8/8/2019       History     Chief Complaint   Patient presents with    Fever Post Chemo     Ms Cline is a 69yoF who presents with fever on chemotherapy; pertinent PMHx primary breast cancer.  Last chemotherapy infusion 07/31/19.  She presents today for 10 days of productive cough of white sputum with associated pleuritic chest pain while coughing.  Also reports "losing her voice" today with mild throat irritation.  First reported fever last night of 101° F. also reports urinary urgency. She has not taken any antipyretics PTA.  She denies ear pain, sinus pain, chest pain not while coughing, abdominal pain, nausea/vomiting/diarrhea, dysuria, hematuria, focal weakness or numbness, neck pain, headache, vision change, new back pain. History constipation with chemotherapy, last BM 5 days prior.  The patients available PMH, PSH, Social History, medications, allergies, and triage vital signs were reviewed just prior to their medical evaluation.  A ten point review of systems was completed and is negative except as documented above.  Patient denies any other acute medical complaint.    Please be advised this text was dictated with [employer] software and may contain errors due to translation.           Review of patient's allergies indicates:  No Known Allergies  Past Medical History:   Diagnosis Date    Helicobacter pylori antibody positive 5/27/13    treated with clarithromycin, metronidazole, and omeprazole x 14 days  (5/27-6/3); EGD normal in July 2013     Past Surgical History:   Procedure Laterality Date    BIOPSY, LIVER, WITH US GUIDANCE N/A 11/19/2018    Performed by [facility] Diagnostic Provider at [facility] OR 2ND FLR    COLONOSCOPY N/A 7/9/2013    Performed by Ha Harrington MD at [facility] ENDO (4TH FLR)    EGD (ESOPHAGOGASTRODUODENOSCOPY) N/A 7/9/2013    Performed by Ha Harrington MD at [facility] ENDO (4TH FLR)    EXCISION-LYMPH NODES Right 1/8/2016    Performed by Sanya Farah MD at [facility] " OR 2ND FLR    IVRMHYYNM-LCCI-M-CATH - CHEST Left 6/13/2019    Performed by Libertad Hernandez MD at [facility] OR 2ND FLR    PAROTIDECTOMY Right 1/8/2016    Performed by Sanya Farah MD at [facility] OR 2ND FLR    TUBAL LIGATION       Family History   Problem Relation Age of Onset    Diabetes Father     Hypertension Mother     Miscarriages / Stillbirths Mother     ANGEL disease Mother     Eczema Mother     Parkinsonism Mother     Diabetes Brother     Diabetes Brother     Colon cancer Maternal Aunt     No Known Problems Sister     No Known Problems Maternal Uncle     No Known Problems Paternal Aunt     No Known Problems Paternal Uncle     No Known Problems Maternal Grandmother     No Known Problems Maternal Grandfather     No Known Problems Paternal Grandmother     No Known Problems Paternal Grandfather     Diabetes Brother     No Known Problems Son     No Known Problems Son     Celiac disease Neg Hx     Cirrhosis Neg Hx     Colon polyps Neg Hx     Crohn's disease Neg Hx     Cystic fibrosis Neg Hx     Esophageal cancer Neg Hx     Hemochromatosis Neg Hx     Inflammatory bowel disease Neg Hx     Irritable bowel syndrome Neg Hx     Liver cancer Neg Hx     Liver disease Neg Hx     Rectal cancer Neg Hx     Stomach cancer Neg Hx     Ulcerative colitis Neg Hx     Anthony's disease Neg Hx     Psoriasis Neg Hx     Ovarian cancer Neg Hx     Breast cancer Neg Hx     Amblyopia Neg Hx     Blindness Neg Hx     Cancer Neg Hx     Cataracts Neg Hx     Glaucoma Neg Hx     Macular degeneration Neg Hx     Retinal detachment Neg Hx     Strabismus Neg Hx     Stroke Neg Hx     Thyroid disease Neg Hx      Social History     Tobacco Use    Smoking status: Never Smoker    Smokeless tobacco: Never Used   Substance Use Topics    Alcohol use: Yes     Comment: Social    Drug use: No     Review of Systems   Constitutional: Positive for fatigue and fever. Negative for chills.   HENT: Positive for sore  throat and voice change. Negative for congestion, ear pain, facial swelling, postnasal drip, sinus pressure, sinus pain and trouble swallowing.    Eyes: Negative for visual disturbance.   Respiratory: Positive for cough. Negative for chest tightness, shortness of breath, wheezing and stridor.    Cardiovascular: Negative for palpitations and leg swelling. Chest pain: while coughing.   Gastrointestinal: Positive for constipation. Negative for abdominal pain, blood in stool, diarrhea, nausea and vomiting.   Genitourinary: Positive for urgency. Negative for dysuria, flank pain, frequency and hematuria.   Musculoskeletal: Negative for back pain and neck pain.   Skin: Negative for pallor, rash and wound.   Neurological: Positive for weakness (Global). Negative for dizziness, light-headedness and numbness.   Psychiatric/Behavioral: Negative for confusion.       Physical Exam     Initial Vitals [08/08/19 1044]   BP Pulse Resp Temp SpO2   (!) 118/56 103 (!) 30 (!) 102.3 °F (39.1 °C) 100 %      MAP       --         Physical Exam    Vitals reviewed.  Constitutional: She appears well-developed and well-nourished. She is not diaphoretic. No distress.   Chronically ill-appearing female in NAD, VSS, T-max 102.3, 99% on RA.     HENT:   Head: Normocephalic and atraumatic.   Right Ear: Tympanic membrane and external ear normal.   Left Ear: Tympanic membrane and external ear normal.   Nose: Nose normal.   Mouth/Throat: Uvula is midline and mucous membranes are normal. Posterior oropharyngeal erythema (Mild) present. No oropharyngeal exudate, posterior oropharyngeal edema or tonsillar abscesses.   Eyes: Conjunctivae and EOM are normal. Pupils are equal, round, and reactive to light. No scleral icterus.   Neck: Normal range of motion. Neck supple.   Cardiovascular: Normal rate, regular rhythm and intact distal pulses.   Pulmonary/Chest: No stridor. No respiratory distress. She has no wheezes. She has rhonchi ( left lung). She has no  rales.   Abdominal: Soft. There is no tenderness. There is no rebound and no guarding.   Musculoskeletal: Normal range of motion. She exhibits no edema.   Lymphadenopathy:     She has no cervical adenopathy.   Neurological: She is alert and oriented to person, place, and time. She has normal strength. No cranial nerve deficit or sensory deficit.   Skin: Skin is warm and dry. Capillary refill takes less than 2 seconds. No rash noted. No erythema. No pallor.   Psychiatric: She has a normal mood and affect. Her behavior is normal. Judgment and thought content normal.         ED Course   Procedures  Labs Reviewed   CBC W/ AUTO DIFFERENTIAL - Abnormal; Notable for the following components:       Result Value    WBC 0.22 (*)     RBC 2.72 (*)     Hemoglobin 7.8 (*)     Hematocrit 24.9 (*)     Mean Corpuscular Hemoglobin Conc 31.3 (*)     RDW 14.6 (*)     Platelets 94 (*)     Gran% 20.0 (*)     Lymph% 70.0 (*)     Platelet Estimate Decreased (*)     All other components within normal limits    Narrative:     WBC critical result(s) called and verbal readback obtained from   Nya Samuel RN, 08/08/2019 12:47   COMPREHENSIVE METABOLIC PANEL - Abnormal; Notable for the following components:    Sodium 134 (*)     CO2 20 (*)     Glucose 116 (*)     Albumin 3.0 (*)     Total Bilirubin 1.3 (*)     All other components within normal limits   URINALYSIS, REFLEX TO URINE CULTURE - Abnormal; Notable for the following components:    Appearance, UA Hazy (*)     Ketones, UA 1+ (*)     All other components within normal limits    Narrative:     Preferred Collection Type->Urine, Clean Catch   BILIRUBIN, DIRECT - Abnormal; Notable for the following components:    Bilirubin, Direct 0.7 (*)     All other components within normal limits    Narrative:     ADD-ON BILID #[id number] PER MADAY CARBAJAL DO 13:32  08/08/2019    INFLUENZA A & B BY MOLECULAR   THROAT SCREEN, RAPID   CULTURE, BLOOD   CULTURE, BLOOD   CULTURE, STREP A,  THROAT    LACTIC ACID, PLASMA   BILIRUBIN, DIRECT   LACTIC ACID, PLASMA   POCT RAPID STREP A        ECG Results          EKG 12-lead (Final result)  Result time 08/08/19 12:52:34    Final result by Interface, Lab In [facility] (08/08/19 12:52:34)                 Narrative:    Test Reason : R00.0,    Vent. Rate : 096 BPM     Atrial Rate : 093 BPM     P-R Int : 128 ms          QRS Dur : 064 ms      QT Int : 332 ms       P-R-T Axes : -51 063 042 degrees     QTc Int : 420 ms    Normal sinus rhythm  Abnormal ECG  When compared with ECG of 28-DEC-2015 07:37,  No significant change was found  Confirmed by WHIT ROJO MD ([id number]) on 8/8/2019 12:52:24 PM    Referred By: [email or web address]   SELF           Confirmed By:WHIT ROJO MD                            Imaging Results          X-Ray Chest PA And Lateral (Final result)  Result time 08/08/19 12:11:05    Final result by Olman Durand MD (08/08/19 12:11:05)                 Impression:      No active cardiopulmonary disease      Electronically signed by: Olman Durand MD  Date:    08/08/2019  Time:    12:11             Narrative:    EXAMINATION:  XR CHEST PA AND LATERAL    CLINICAL HISTORY:  Fever, unspecified    TECHNIQUE:  PA and lateral views of the chest were performed.    COMPARISON:  None    FINDINGS:  Left-sided chest port is in place with the catheter tip in the SVC.  Heart size and pulmonary vascularity are within normal limits.  Mild tortuosity of the thoracic aorta with atherosclerotic calcification in the wall of the aortic arch.  Lungs are satisfactorily expanded and appear free of active disease.  No pleural fluid or pneumothorax.  Soft tissues and osseous structures are unremarkable.                                 Medical Decision Making:   History:   Old Medical Records: I decided to obtain old medical records.  Old Records Summarized: records from previous admission(s) and records from clinic visits.  Initial Assessment:   Patient primary breast cancer presents for fever  7 days post chemo infusion, + rhonchi in left lung field, mild erythema posterior oropharynx, exam otherwise unremarkable for infectious source, T-max 102.3°, mild tachycardia  Differential Diagnosis:   DDx PNA, bronchitis/laryngitis, cystitis. Physical exam and history taking lower clinical suspicion for bacterial pharyngitis, bacterial sinusitis, otitis media/externa, acute abdomen, meningitis, septic shock.  Independently Interpreted Test(s):   I have ordered and independently interpreted X-rays - see prior notes.  I have ordered and independently interpreted EKG Reading(s) - see prior notes  Clinical Tests:   Lab Tests: Ordered and Reviewed  Radiological Study: Ordered and Reviewed  Medical Tests: Ordered and Reviewed  ED Management:  Sepsis order set initiated, given IVF bolus.  Exam is consistent with LRI source, will treat with antibiotics tailored towards PNA with healthcare exposure, started on vancomycin and Zosyn.  Labs show acute pancytopenia, direct hyperbilirubinemia, mild metabolic acidosis, BC pending.  Lactic WNL-clinically I do not suspect shock.  X-rays unremarkable for acute findings, suspect she may need repeat imaging per exam.  EKG without acute ischemic changes, normal interval, normal axis.  Oncology paged and will see the patient at bedside.  Given Tylenol for fever.  UA unremarkable for infection.  Update:  Oncology to admit for neutropenic fever.  Patient has been work wiring about possible immune boosters for neutropenia, will defer to Oncology. Patient agreed to plan of care and voiced understanding.  Vital stable throughout ED stay.    Melody Chaparro PA-C  08/08/2019    I discussed the following case, diagnosis and plan of care with attending physician.    Other:   I have discussed this case with another health care provider.                      Clinical Impression:       ICD-10-CM ICD-9-CM   1. Neutropenic fever D70.9 288.00    R50.81 780.61   2. Tachycardia R00.0 785.0   3. Fever  R50.9 780.60   4. Malignant neoplasm of female breast, unspecified estrogen receptor status, unspecified laterality, unspecified site of breast C50.919 174.9         Disposition:   Disposition: Admitted  Condition: Serious                        Melody Chaparro PA-C  08/08/19 163

## 2019-08-09 PROBLEM — K59.00 CONSTIPATION: Status: ACTIVE | Noted: 2019-08-09

## 2019-08-09 PROBLEM — R13.10 DYSPHAGIA: Status: ACTIVE | Noted: 2019-08-09

## 2019-08-09 LAB
ABO + RH BLD: NORMAL
ALBUMIN SERPL BCP-MCNC: 2.2 G/DL (ref 3.5–5.2)
ALP SERPL-CCNC: 86 U/L (ref 55–135)
ALT SERPL W/O P-5'-P-CCNC: 12 U/L (ref 10–44)
ANION GAP SERPL CALC-SCNC: 9 MMOL/L (ref 8–16)
ANISOCYTOSIS BLD QL SMEAR: SLIGHT
AST SERPL-CCNC: 15 U/L (ref 10–40)
BASOPHILS # BLD AUTO: 0 K/UL (ref 0–0.2)
BASOPHILS NFR BLD: 0 % (ref 0–1.9)
BILIRUB SERPL-MCNC: 0.8 MG/DL (ref 0.1–1)
BLD GP AB SCN CELLS X3 SERPL QL: NORMAL
BLD PROD TYP BPU: NORMAL
BLOOD UNIT EXPIRATION DATE: NORMAL
BLOOD UNIT TYPE CODE: 6200
BLOOD UNIT TYPE: NORMAL
BUN SERPL-MCNC: 12 MG/DL (ref 8–23)
CALCIUM SERPL-MCNC: 8.6 MG/DL (ref 8.7–10.5)
CHLORIDE SERPL-SCNC: 109 MMOL/L (ref 95–110)
CO2 SERPL-SCNC: 20 MMOL/L (ref 23–29)
CODING SYSTEM: NORMAL
CREAT SERPL-MCNC: 0.6 MG/DL (ref 0.5–1.4)
DIFFERENTIAL METHOD: ABNORMAL
DISPENSE STATUS: NORMAL
EOSINOPHIL # BLD AUTO: 0 K/UL (ref 0–0.5)
EOSINOPHIL NFR BLD: 0 % (ref 0–8)
ERYTHROCYTE [DISTWIDTH] IN BLOOD BY AUTOMATED COUNT: 14.6 % (ref 11.5–14.5)
EST. GFR  (AFRICAN AMERICAN): >60 ML/MIN/1.73 M^2
EST. GFR  (NON AFRICAN AMERICAN): >60 ML/MIN/1.73 M^2
GLUCOSE SERPL-MCNC: 84 MG/DL (ref 70–110)
HCT VFR BLD AUTO: 20 % (ref 37–48.5)
HGB BLD-MCNC: 6.4 G/DL (ref 12–16)
IMM GRANULOCYTES # BLD AUTO: 0.01 K/UL (ref 0–0.04)
IMM GRANULOCYTES NFR BLD AUTO: 4.5 % (ref 0–0.5)
LYMPHOCYTES # BLD AUTO: 0.1 K/UL (ref 1–4.8)
LYMPHOCYTES NFR BLD: 59.1 % (ref 18–48)
MAGNESIUM SERPL-MCNC: 2.3 MG/DL (ref 1.6–2.6)
MCH RBC QN AUTO: 28.8 PG (ref 27–31)
MCHC RBC AUTO-ENTMCNC: 32 G/DL (ref 32–36)
MCV RBC AUTO: 90 FL (ref 82–98)
MONOCYTES # BLD AUTO: 0 K/UL (ref 0.3–1)
MONOCYTES NFR BLD: 9.1 % (ref 4–15)
NEUTROPHILS # BLD AUTO: 0.1 K/UL (ref 1.8–7.7)
NEUTROPHILS NFR BLD: 27.3 % (ref 38–73)
NRBC BLD-RTO: 0 /100 WBC
OVALOCYTES BLD QL SMEAR: ABNORMAL
PHOSPHATE SERPL-MCNC: 2.2 MG/DL (ref 2.7–4.5)
PLATELET # BLD AUTO: 67 K/UL (ref 150–350)
PLATELET BLD QL SMEAR: ABNORMAL
PMV BLD AUTO: 11.4 FL (ref 9.2–12.9)
POIKILOCYTOSIS BLD QL SMEAR: SLIGHT
POTASSIUM SERPL-SCNC: 3.1 MMOL/L (ref 3.5–5.1)
PROT SERPL-MCNC: 5.6 G/DL (ref 6–8.4)
RBC # BLD AUTO: 2.22 M/UL (ref 4–5.4)
SODIUM SERPL-SCNC: 138 MMOL/L (ref 136–145)
TRANS ERYTHROCYTES VOL PATIENT: NORMAL ML
WBC # BLD AUTO: 0.22 K/UL (ref 3.9–12.7)

## 2019-08-09 PROCEDURE — 86901 BLOOD TYPING SEROLOGIC RH(D): CPT

## 2019-08-09 PROCEDURE — 99233 PR SUBSEQUENT HOSPITAL CARE,LEVL III: ICD-10-PCS | Mod: GC,,, | Performed by: INTERNAL MEDICINE

## 2019-08-09 PROCEDURE — 36415 COLL VENOUS BLD VENIPUNCTURE: CPT

## 2019-08-09 PROCEDURE — 36430 TRANSFUSION BLD/BLD COMPNT: CPT

## 2019-08-09 PROCEDURE — 80053 COMPREHEN METABOLIC PANEL: CPT

## 2019-08-09 PROCEDURE — 85025 COMPLETE CBC W/AUTO DIFF WBC: CPT

## 2019-08-09 PROCEDURE — 63600175 PHARM REV CODE 636 W HCPCS: Performed by: INTERNAL MEDICINE

## 2019-08-09 PROCEDURE — P9021 RED BLOOD CELLS UNIT: HCPCS

## 2019-08-09 PROCEDURE — 83735 ASSAY OF MAGNESIUM: CPT

## 2019-08-09 PROCEDURE — 99233 SBSQ HOSP IP/OBS HIGH 50: CPT | Mod: GC,,, | Performed by: INTERNAL MEDICINE

## 2019-08-09 PROCEDURE — 84100 ASSAY OF PHOSPHORUS: CPT

## 2019-08-09 PROCEDURE — 63600175 PHARM REV CODE 636 W HCPCS: Performed by: STUDENT IN AN ORGANIZED HEALTH CARE EDUCATION/TRAINING PROGRAM

## 2019-08-09 PROCEDURE — 25000003 PHARM REV CODE 250: Performed by: STUDENT IN AN ORGANIZED HEALTH CARE EDUCATION/TRAINING PROGRAM

## 2019-08-09 PROCEDURE — 20600001 HC STEP DOWN PRIVATE ROOM

## 2019-08-09 PROCEDURE — 86920 COMPATIBILITY TEST SPIN: CPT

## 2019-08-09 RX ORDER — SODIUM,POTASSIUM PHOSPHATES 280-250MG
2 POWDER IN PACKET (EA) ORAL
Status: DISPENSED | OUTPATIENT
Start: 2019-08-09 | End: 2019-08-09

## 2019-08-09 RX ORDER — FLUCONAZOLE 40 MG/ML
200 POWDER, FOR SUSPENSION ORAL DAILY
Status: DISCONTINUED | OUTPATIENT
Start: 2019-08-10 | End: 2019-08-12 | Stop reason: HOSPADM

## 2019-08-09 RX ORDER — POTASSIUM CHLORIDE 20 MEQ/15ML
40 SOLUTION ORAL
Status: DISPENSED | OUTPATIENT
Start: 2019-08-09 | End: 2019-08-09

## 2019-08-09 RX ORDER — HYDROCODONE BITARTRATE AND ACETAMINOPHEN 500; 5 MG/1; MG/1
TABLET ORAL
Status: DISCONTINUED | OUTPATIENT
Start: 2019-08-09 | End: 2019-08-12 | Stop reason: HOSPADM

## 2019-08-09 RX ADMIN — PIPERACILLIN AND TAZOBACTAM 4.5 G: 4; .5 INJECTION, POWDER, LYOPHILIZED, FOR SOLUTION INTRAVENOUS; PARENTERAL at 05:08

## 2019-08-09 RX ADMIN — POTASSIUM CHLORIDE 40 MEQ: 20 SOLUTION ORAL at 09:08

## 2019-08-09 RX ADMIN — Medication 10 ML: at 06:08

## 2019-08-09 RX ADMIN — PIPERACILLIN AND TAZOBACTAM 4.5 G: 4; .5 INJECTION, POWDER, LYOPHILIZED, FOR SOLUTION INTRAVENOUS; PARENTERAL at 11:08

## 2019-08-09 RX ADMIN — VANCOMYCIN HYDROCHLORIDE 1000 MG: 1 INJECTION, POWDER, LYOPHILIZED, FOR SOLUTION INTRAVENOUS at 03:08

## 2019-08-09 RX ADMIN — POTASSIUM & SODIUM PHOSPHATES POWDER PACK 280-160-250 MG 2 PACKET: 280-160-250 PACK at 09:08

## 2019-08-09 RX ADMIN — ENOXAPARIN SODIUM 40 MG: 100 INJECTION SUBCUTANEOUS at 05:08

## 2019-08-09 RX ADMIN — PIPERACILLIN AND TAZOBACTAM 4.5 G: 4; .5 INJECTION, POWDER, LYOPHILIZED, FOR SOLUTION INTRAVENOUS; PARENTERAL at 03:08

## 2019-08-09 RX ADMIN — PIPERACILLIN AND TAZOBACTAM 4.5 G: 4; .5 INJECTION, POWDER, LYOPHILIZED, FOR SOLUTION INTRAVENOUS; PARENTERAL at 09:08

## 2019-08-09 NOTE — ASSESSMENT & PLAN NOTE
Stage 1B ([id number]) invasive ductal carcinoma of right breast, lower outer quadrant, ER neg, [place] neg, Her2 neg  Started neoadjuvant chemotherapy with dose-dense adriamycin and cyclophosphamide followed by paclitaxel   Completed cycle 3/4 on 7/31/19  - Continue care with Dr. Ayala as outpatient

## 2019-08-09 NOTE — PLAN OF CARE
MDRs completed with Dr. Baumann and the team. Patient of Dr. Ayala with a history of breast cancer. Patient completed cycle 3 of ddAC on 7/31/19. Patient admitted on 8/8/19 with c/o productive cough, sore throat, and fever. T max in the ED was 102.3. Infectious work-up started in ED. Cultures pending. Rapid strep (-) and Influenza (-). Labs today: ANC 44, WBC 0.22, Hgb 6.4 (down from 7.8 yesterday), Plts 67. MD plans to transfuse a unit of PRBCs today. VSS. Patient is currently afebrile. O2 sats % on room air. Patient is currently receiving IV Zosyn and IV Vancomycin. Patient c/o difficulty swallowing and episodes of emesis during rounds. MD to order Xray abdomen flat and erect, CT neck, and bedside swallow study. Electrolytes being replaced as needed. MD discussed the plan of care with the patient and the patient's family. Discharge needs to be determined. CM to continue to follow with the team.    Future Appointments   Date Time Provider Department Center   8/14/2019 12:30 PM INJECTION, [facility] INFUSION [facility]   8/14/2019  1:30 PM Dominique Ayala MD [facility] HEM ONC [facility]   8/14/2019  2:30 PM [facility], [facility]   8/29/2019  2:00 PM LAB, HEMON CANCER DG [facility]   8/29/2019  3:00 PM Dominique Ayala MD [facility] HEM ONC [facility]   8/29/2019  3:30 PM [facility], CHEMO [facility]     Michelle San, RN, BSN, CM  [facility]  Nurse - Med Onc/Gyn Onc

## 2019-08-09 NOTE — ASSESSMENT & PLAN NOTE
Unable to tolerate PO liquids or medications  Describes sensation as severe acid reflux, denies odynophagia    Plan:  - NPO  - CT neck/chest  - Bedside swallow study, may advance to barium if well tolerated

## 2019-08-09 NOTE — ASSESSMENT & PLAN NOTE
Patient with productive cough, fever/chills, and sore throat in setting of recent chemo (7/31)  On admission, Tmax 102.3 and ANC <100  CXR negative for acute infectious process  UA negative, UCx and BCx pending  Influenza negative, rapid strep negative  Received Neulasta with last chemo, so doesn't qualify for Granix    Plan:  - Started on vanc and zosyn  - Tylenol for fevers  - F/u on UCx and BCx

## 2019-08-09 NOTE — PROGRESS NOTES
[facility]  Hematology/Oncology  Progress Note    Patient Name: Sharri Cline  Admission Date: 8/8/2019  Hospital Length of Stay: 1 days  Code Status: Full Code     Subjective:     HPI:  Ms. Sharri Cline is a 69 year old female with stage 1B invasive ductal carcinoma of the R breast (ER neg, [place] neg, HER2 neg) that presents with cough, sore throat, and fever (Tmax 101). She started neoadjuvant chemotherapy with dose-dense adriamycin and cyclophosphamide followed by paclitaxel. She completed cycle 3 on 7/31/19. She first noticed a productive cough within a couple of days after the infusion. She has had this cough for about a week. The past 2 days, she has experienced fevers (Tmax 101 at home), chills, fatigue, and sore throat. Today, she has felt a little SOB. She has not taken anything to alleviate the cough or fevers.     In the ED, she was febrile (Tmax 102.3), slightly tachycardic, tachypnic, but blood pressure was stable. Sepsis order set was initiated and was given IVFs. UA, UCx, and BCx were ordered. On admission, she was found to be pancytopenic ( with ANC 40, Hb 7.8, PLT 94). Lactate was normal. CXR was negative for any acute infectious causes. EKG negative for acute ischemic changes.       Oncology History:  Oncologist = Dr. Ayala  Presented for screening mammo in 5/15/2019 which showed focal asymmetries in both left and right breast  - Diagnostic mammogram and US showed no significant abn of left breast, and right breast 15 mm x 11 mm x 12 mm mass at 4:00, 5 CFN.   - Biopsy on 5/24/19 showed grade 3 IDC, triple negative, Ki67 70%.   7/3/19 started neoadjuvant chemotherapy with ddAC to be followed by Taxol.    Interval History: No acute events overnight. Subjectively, she feels much better today. Her cough has improved, still productive. She tried to drink some water but gagged on it. Unable to take PO medications. Denies odynophagia. She describes the sensation as severe acid  reflux. She continues to have loss of voice. She has not had a BM since admission. She has no abdominal pain, continues to pass gas intermittently.     Oncology Treatment Plan:   OP BREAST DOSE-DENSE AC - DOXORUBICIN CYCLOPHOSPHAMIDE Q2W    Medications:  Continuous Infusions:  Scheduled Meds:   enoxaparin  40 mg Subcutaneous Daily    piperacillin-tazobactam (ZOSYN) IVPB  4.5 g Intravenous Q6H    senna-docusate 8.6-50 mg  2 tablet Oral BID    vancomycin (VANCOCIN) IVPB  1,000 mg Intravenous Q24H     PRN Meds:acetaminophen, Dextrose 10% Bolus, Dextrose 10% Bolus, glucagon (human recombinant), glucose, glucose, ondansetron, prochlorperazine, sodium chloride 0.9%     Review of Systems   Constitutional: Positive for activity change (decreased), appetite change (decreased) and fatigue. Negative for chills and fever (Tmax 99.9).   HENT: Positive for sore throat, trouble swallowing and voice change. Negative for congestion and ear pain.    Eyes: Negative for visual disturbance.   Respiratory: Positive for cough (productive, improved from yesterday). Negative for shortness of breath and wheezing.    Cardiovascular: Negative for chest pain, palpitations and leg swelling.   Gastrointestinal: Positive for constipation (chronic, last BM was 6 days ago). Negative for abdominal pain, blood in stool, diarrhea, nausea and vomiting.   Genitourinary: Positive for decreased urine volume. Negative for dysuria and hematuria.   Musculoskeletal: Negative for arthralgias and myalgias.   Skin: Negative for rash.   Allergic/Immunologic: Positive for immunocompromised state.   Neurological: Negative for dizziness, light-headedness, numbness and headaches.   Psychiatric/Behavioral: Negative for agitation and confusion.     Objective:     Vital Signs (Most Recent):  Temp: 99.4 °F (37.4 °C) (08/09/19 0300)  Pulse: 80 (08/09/19 0300)  Resp: (!) 24 (08/09/19 0300)  BP: (!) 101/58 (08/09/19 0300)  SpO2: 100 % (08/09/19 0300) Vital Signs (24h  Range):  Temp:  [98.9 °F (37.2 °C)-102.3 °F (39.1 °C)] 99.4 °F (37.4 °C)  Pulse:  [] 80  Resp:  [24-30] 24  SpO2:  [95 %-100 %] 100 %  BP: (101-137)/(56-71) 101/58     Weight: 64.8 kg (142 lb 13.7 oz)  Body mass index is 25.31 kg/m².  Body surface area is 1.7 meters squared.      Intake/Output Summary (Last 24 hours) at 8/9/2019 0717  Last data filed at 8/9/2019 0400  Gross per 24 hour   Intake 2705 ml   Output 0 ml   Net 2705 ml       Physical Exam   Constitutional: She appears well-developed and well-nourished. No distress.   L chest port site without erythema or tenderness to palpation   HENT:   Head: Normocephalic and atraumatic.   Mouth/Throat: No oropharyngeal exudate.   No thrush or ulcers. Difficulty visualizing posterior pharynx   Eyes: Conjunctivae are normal.   Neck: Normal range of motion. Neck supple.   Cardiovascular: Normal rate and regular rhythm.   Murmur (systolic murmur at LSB in 2nd IC space) heard.  Pulmonary/Chest: Effort normal and breath sounds normal. No respiratory distress. She has no wheezes. She has no rales.   Abdominal: Soft. Bowel sounds are normal. She exhibits no distension. There is no tenderness. There is no guarding.   Musculoskeletal: She exhibits no edema or tenderness.   Neurological: She is alert. She exhibits normal muscle tone.   Skin: Skin is warm and dry. No rash noted.   Psychiatric: She has a normal mood and affect. Her behavior is normal.       Significant Labs:   CBC:   Recent Labs   Lab 08/08/19  1147 08/09/19  0631   WBC 0.22* 0.22*   HGB 7.8* 6.4*   HCT 24.9* 20.0*   PLT 94* 67*   , CMP:   Recent Labs   Lab 08/08/19  1147 08/09/19  0631   * 138   K 3.6 3.1*    109   CO2 20* 20*   * 84   BUN 16 12   CREATININE 0.7 0.6   CALCIUM 9.9 8.6*   PROT 7.2 5.6*   ALBUMIN 3.0* 2.2*   BILITOT 1.3* 0.8   ALKPHOS 112 86   AST 25 15   ALT 17 12   ANIONGAP 13 9   EGFRNONAA >60.0 >60.0    and Uric Acid No results for input(s): URICACID in the last 48  hours.    Diagnostic Results:  I have reviewed all pertinent imaging results/findings within the past 24 hours.    Assessment/Plan:     * Neutropenic fever  Patient with productive cough, fever/chills, and sore throat in setting of recent chemo (7/31)  On admission, Tmax 102.3 and ANC <100  CXR negative for acute infectious process  UA negative, UCx and BCx pending  Influenza negative, rapid strep negative  Received Neulasta with last chemo, so doesn't qualify for Granix  Today, Tmax 99.9, ANC < 100    Plan:  - Continue vanc and zosyn  - Tylenol for fevers  - F/u on UCx and BCx    Malignant neoplasm of lower-inner quadrant of right breast of female, estrogen receptor negative  Stage 1B ([id number]) invasive ductal carcinoma of right breast, lower outer quadrant, ER neg, [place] neg, Her2 neg  Started neoadjuvant chemotherapy with dose-dense adriamycin and cyclophosphamide followed by paclitaxel   Completed cycle 3/4 on 7/31/19  - Continue care with Dr. Ayala as outpatient     Constipation  Last BM was 5 days prior to admission in setting of decreased PO intake  Non-compliant with home bowel regimen  Enema contraindicated with neutropenia    Plan:  - XR abdomen flat/erect to r/o obstruction  - Senna-docusate    Dysphagia  Unable to tolerate PO liquids or medications  Describes sensation as severe acid reflux, denies odynophagia    Plan:  - NPO  - CT neck/chest  - Bedside swallow study, may advance to barium if well tolerated    Pancytopenia due to antineoplastic chemotherapy  On admission,  with ANC 40, Hb 7.8, PLT 94  - CBCs daily  - Hb 6.4 today, will transfuse 1 unit of pRBCs  - Hold lovenox if PLT < 50    Hyponatremia  Na 134 on admission  Today, Na 138  - CMPs daily           Chico Sarmiento MD  Hematology/Oncology  [facility]    STAFF NOTE:  I have personally taken the history and examined this patient and agree with residents Note as stated above

## 2019-08-09 NOTE — ASSESSMENT & PLAN NOTE
Last BM was 5 days prior to admission in setting of decreased PO intake  Non-compliant with home bowel regimen  Enema contraindicated with neutropenia    Plan:  - XR abdomen flat/erect to r/o obstruction  - Senna-docusate

## 2019-08-09 NOTE — PROGRESS NOTES
Notified Torsten ZAMORA of the following:  WBC 0.22  Patient is having difficulty swallowing anything but water, unable to take potassium and phos supplement.   Will continue to monitor.

## 2019-08-09 NOTE — ASSESSMENT & PLAN NOTE
Patient with productive cough, fever/chills, and sore throat in setting of recent chemo (7/31)  On admission, Tmax 102.3 and ANC <100  CXR negative for acute infectious process  UA negative, UCx and BCx pending  Influenza negative, rapid strep negative  Received Neulasta with last chemo, so doesn't qualify for Granix  Today, Tmax 99.9, ANC < 100    Plan:  - Continue vanc and zosyn  - Tylenol for fevers  - F/u on UCx and BCx

## 2019-08-09 NOTE — ASSESSMENT & PLAN NOTE
Stage 1B ([id number]) invasive ductal carcinoma of right breast, lower outer quadrant, ER neg, [place] neg, Her2 neg  Started neoadjuvant chemotherapy with dose-dense adriamycin and cyclophosphamide followed by paclitaxel   Completed cycle 3/4 on 7/31/19  - Continue care with Dr. Ayala as outpatient

## 2019-08-09 NOTE — ASSESSMENT & PLAN NOTE
On admission,  with ANC 40, Hb 7.8, PLT 94  - CBCs daily  - Hb 6.4 today, will transfuse 1 unit of pRBCs  - Hold lovenox if PLT < 50

## 2019-08-09 NOTE — SUBJECTIVE & OBJECTIVE
Interval History: No acute events overnight. Subjectively, she feels much better today. Her cough has improved, still productive. She tried to drink some water but gagged on it. Unable to take PO medications. Denies odynophagia. She describes the sensation as severe acid reflux. She continues to have loss of voice. She has not had a BM since admission. She has no abdominal pain, continues to pass gas intermittently.     Oncology Treatment Plan:   OP BREAST DOSE-DENSE AC - DOXORUBICIN CYCLOPHOSPHAMIDE Q2W    Medications:  Continuous Infusions:  Scheduled Meds:   enoxaparin  40 mg Subcutaneous Daily    piperacillin-tazobactam (ZOSYN) IVPB  4.5 g Intravenous Q6H    senna-docusate 8.6-50 mg  2 tablet Oral BID    vancomycin (VANCOCIN) IVPB  1,000 mg Intravenous Q24H     PRN Meds:acetaminophen, Dextrose 10% Bolus, Dextrose 10% Bolus, glucagon (human recombinant), glucose, glucose, ondansetron, prochlorperazine, sodium chloride 0.9%     Review of Systems   Constitutional: Positive for activity change (decreased), appetite change (decreased) and fatigue. Negative for chills and fever (Tmax 99.9).   HENT: Positive for sore throat, trouble swallowing and voice change. Negative for congestion and ear pain.    Eyes: Negative for visual disturbance.   Respiratory: Positive for cough (productive, improved from yesterday). Negative for shortness of breath and wheezing.    Cardiovascular: Negative for chest pain, palpitations and leg swelling.   Gastrointestinal: Positive for constipation (chronic, last BM was 6 days ago). Negative for abdominal pain, blood in stool, diarrhea, nausea and vomiting.   Genitourinary: Positive for decreased urine volume. Negative for dysuria and hematuria.   Musculoskeletal: Negative for arthralgias and myalgias.   Skin: Negative for rash.   Allergic/Immunologic: Positive for immunocompromised state.   Neurological: Negative for dizziness, light-headedness, numbness and headaches.    Psychiatric/Behavioral: Negative for agitation and confusion.     Objective:     Vital Signs (Most Recent):  Temp: 99.4 °F (37.4 °C) (08/09/19 0300)  Pulse: 80 (08/09/19 0300)  Resp: (!) 24 (08/09/19 0300)  BP: (!) 101/58 (08/09/19 0300)  SpO2: 100 % (08/09/19 0300) Vital Signs (24h Range):  Temp:  [98.9 °F (37.2 °C)-102.3 °F (39.1 °C)] 99.4 °F (37.4 °C)  Pulse:  [] 80  Resp:  [24-30] 24  SpO2:  [95 %-100 %] 100 %  BP: (101-137)/(56-71) 101/58     Weight: 64.8 kg (142 lb 13.7 oz)  Body mass index is 25.31 kg/m².  Body surface area is 1.7 meters squared.      Intake/Output Summary (Last 24 hours) at 8/9/2019 0717  Last data filed at 8/9/2019 0400  Gross per 24 hour   Intake 2705 ml   Output 0 ml   Net 2705 ml       Physical Exam   Constitutional: She appears well-developed and well-nourished. No distress.   L chest port site without erythema or tenderness to palpation   HENT:   Head: Normocephalic and atraumatic.   Mouth/Throat: No oropharyngeal exudate.   No thrush or ulcers. Difficulty visualizing posterior pharynx   Eyes: Conjunctivae are normal.   Neck: Normal range of motion. Neck supple.   Cardiovascular: Normal rate and regular rhythm.   Murmur (systolic murmur at LSB in 2nd IC space) heard.  Pulmonary/Chest: Effort normal and breath sounds normal. No respiratory distress. She has no wheezes. She has no rales.   Abdominal: Soft. Bowel sounds are normal. She exhibits no distension. There is no tenderness. There is no guarding.   Musculoskeletal: She exhibits no edema or tenderness.   Neurological: She is alert. She exhibits normal muscle tone.   Skin: Skin is warm and dry. No rash noted.   Psychiatric: She has a normal mood and affect. Her behavior is normal.       Significant Labs:   CBC:   Recent Labs   Lab 08/08/19  1147 08/09/19  0631   WBC 0.22* 0.22*   HGB 7.8* 6.4*   HCT 24.9* 20.0*   PLT 94* 67*   , CMP:   Recent Labs   Lab 08/08/19  1147 08/09/19  0631   * 138   K 3.6 3.1*    109    CO2 20* 20*   * 84   BUN 16 12   CREATININE 0.7 0.6   CALCIUM 9.9 8.6*   PROT 7.2 5.6*   ALBUMIN 3.0* 2.2*   BILITOT 1.3* 0.8   ALKPHOS 112 86   AST 25 15   ALT 17 12   ANIONGAP 13 9   EGFRNONAA >60.0 >60.0    and Uric Acid No results for input(s): URICACID in the last 48 hours.    Diagnostic Results:  I have reviewed all pertinent imaging results/findings within the past 24 hours.

## 2019-08-09 NOTE — PROGRESS NOTES
Pharmacokinetic Initial Assessment: IV Vancomycin    Assessment/Plan:    Initiate intravenous vancomycin with loading dose of 2000 mg x1 (already given in ED at 13:45 today.  followed by a maintenance dose of vancomycin 1000mg IV every 24 hours  Desired empiric serum trough concentration is 15 to 20 mcg/mL  Draw vancomycin trough level 30 min prior to third dose on 8/10/19 at approximately 13:15  Pharmacy will continue to follow and monitor vancomycin.      Please contact pharmacy at extension [phone] with any questions regarding this assessment.     Thank you for the consult,   Juliano Terrell       Patient brief summary:  Sharri Cline is a 69 y.o. female initiated on antimicrobial therapy with IV Vancomycin for treatment of suspected neutropenic fever    Drug Allergies:   Review of patient's allergies indicates:  No Known Allergies    Actual Body Weight:   63.5 kg    Renal Function:   Estimated Creatinine Clearance: 69.9 mL/min (based on SCr of 0.7 mg/dL).,     Dialysis Method (if applicable):  none    CBC (last 72 hours):  Recent Labs   Lab Result Units 08/08/19  1147   WBC K/uL 0.22*   Hemoglobin g/dL 7.8*   Hematocrit % 24.9*   Platelets K/uL 94*   Gran% % 20.0*   Lymph% % 70.0*   Mono% % 10.0   Eosinophil% % 0.0   Basophil% % 0.0   Differential Method  Manual       Metabolic Panel (last 72 hours):  Recent Labs   Lab Result Units 08/08/19  1147 08/08/19  1400   Sodium mmol/L 134*  --    Potassium mmol/L 3.6  --    Chloride mmol/L 101  --    CO2 mmol/L 20*  --    Glucose mg/dL 116*  --    Glucose, UA   --  Negative   BUN, Bld mg/dL 16  --    Creatinine mg/dL 0.7  --    Albumin g/dL 3.0*  --    Total Bilirubin mg/dL 1.3*  --    Alkaline Phosphatase U/L 112  --    AST U/L 25  --    ALT U/L 17  --        Drug levels (last 3 results):  No results for input(s): VANCOMYCINRA, VANCOMYCINPE, VANCOMYCINTR in the last 72 hours.    Microbiologic Results:  Microbiology Results (last 7 days)     Procedure Component Value Units  Date/Time    Blood culture x two cultures. Draw prior to antibiotics. [[id number]] Collected:  08/08/19 1140    Order Status:  Completed Specimen:  Blood from Peripheral, Forearm, Right Updated:  08/08/19 1945     Blood Culture, Routine No Growth to date    Narrative:       Aerobic and anaerobic    Blood culture x two cultures. Draw prior to antibiotics. [[id number]] Collected:  08/08/19 1216    Order Status:  Completed Specimen:  Blood from Peripheral, Hand, Right Updated:  08/08/19 1945     Blood Culture, Routine No Growth to date    Narrative:       Aerobic and anaerobic    Strep A culture, throat [[id number]] Collected:  08/08/19 1433    Order Status:  No result Specimen:  Throat Updated:  08/08/19 1454    Rapid strep screen [[id number]] Collected:  08/08/19 1433    Order Status:  Completed Specimen:  Throat Updated:  08/08/19 1454     Rapid Strep A Screen Negative     Comment: See Micro for reflexed Strep culture.       Influenza A & B by Molecular [[id number]] Collected:  08/08/19 1246    Order Status:  Completed Specimen:  Nasopharyngeal Swab Updated:  08/08/19 1313     Influenza A, Molecular Negative     Influenza B, Molecular Negative     Flu A & B Source NP

## 2019-08-09 NOTE — PLAN OF CARE
"Problem: Adult Inpatient Plan of Care  Goal: Plan of Care Review  Outcome: Ongoing (interventions implemented as appropriate)  Patient AAO today. T max 100.0. Patient complaining of "burning" when swallowing, Currently NPO except ice chips.  Abd xray complete, awaiting CT of neck.  Patient currently receiving 1 unit PRBC, tolerating well. WBC 0.22 same as yesterday, neutropenic precautions maintained.  Vitals stable.      "

## 2019-08-09 NOTE — PLAN OF CARE
Problem: Adult Inpatient Plan of Care  Goal: Plan of Care Review  Outcome: Ongoing (interventions implemented as appropriate)  AAOx4, VSS, tmax- 99.7; neutropenic precautions in place. WBC= 0.22 ANC <100  IV abx zoysn + vanc continued. Strep test, influenza, UA= negative. BC + UC pending. Lactic levels WNL.   Pt remains hoarse + coughing up thick white sputum. Pt reports to nurse that she is having trouble swallowing since admission. MD Sarmiento notified + patient placed NPO overnight.   Pt reports not having BM since last week but cannot swallow PO medication until swallow issue resolves. Needs suppository vs enema?  No acute changes overnight, sleeping between care. Sister to remain @ bedside. Bed in lowest position, non skid socks worn, call light within reach. Will cont to monitor.

## 2019-08-10 PROBLEM — B37.0 ORAL CANDIDIASIS: Status: ACTIVE | Noted: 2019-08-10

## 2019-08-10 LAB
ALBUMIN SERPL BCP-MCNC: 2.2 G/DL (ref 3.5–5.2)
ALP SERPL-CCNC: 84 U/L (ref 55–135)
ALT SERPL W/O P-5'-P-CCNC: 13 U/L (ref 10–44)
ANION GAP SERPL CALC-SCNC: 10 MMOL/L (ref 8–16)
ANISOCYTOSIS BLD QL SMEAR: SLIGHT
AST SERPL-CCNC: 14 U/L (ref 10–40)
BACTERIA THROAT CULT: NORMAL
BASOPHILS NFR BLD: 0 % (ref 0–1.9)
BILIRUB SERPL-MCNC: 1.3 MG/DL (ref 0.1–1)
BUN SERPL-MCNC: 13 MG/DL (ref 8–23)
CALCIUM SERPL-MCNC: 8.9 MG/DL (ref 8.7–10.5)
CHLORIDE SERPL-SCNC: 107 MMOL/L (ref 95–110)
CO2 SERPL-SCNC: 24 MMOL/L (ref 23–29)
CREAT SERPL-MCNC: 0.5 MG/DL (ref 0.5–1.4)
DIFFERENTIAL METHOD: ABNORMAL
DOHLE BOD BLD QL SMEAR: PRESENT
EOSINOPHIL NFR BLD: 0 % (ref 0–8)
ERYTHROCYTE [DISTWIDTH] IN BLOOD BY AUTOMATED COUNT: 14.6 % (ref 11.5–14.5)
EST. GFR  (AFRICAN AMERICAN): >60 ML/MIN/1.73 M^2
EST. GFR  (NON AFRICAN AMERICAN): >60 ML/MIN/1.73 M^2
GLUCOSE SERPL-MCNC: 89 MG/DL (ref 70–110)
HCT VFR BLD AUTO: 25.1 % (ref 37–48.5)
HGB BLD-MCNC: 7.8 G/DL (ref 12–16)
IMM GRANULOCYTES # BLD AUTO: ABNORMAL K/UL (ref 0–0.04)
IMM GRANULOCYTES NFR BLD AUTO: ABNORMAL % (ref 0–0.5)
LYMPHOCYTES NFR BLD: 31.4 % (ref 18–48)
MAGNESIUM SERPL-MCNC: 2.3 MG/DL (ref 1.6–2.6)
MCH RBC QN AUTO: 28.4 PG (ref 27–31)
MCHC RBC AUTO-ENTMCNC: 31.1 G/DL (ref 32–36)
MCV RBC AUTO: 91 FL (ref 82–98)
METAMYELOCYTES NFR BLD MANUAL: 3.9 %
MONOCYTES NFR BLD: 5.9 % (ref 4–15)
MYELOCYTES NFR BLD MANUAL: 1.9 %
NEUTROPHILS NFR BLD: 56.9 % (ref 38–73)
NRBC BLD-RTO: 0 /100 WBC
PHOSPHATE SERPL-MCNC: 2.2 MG/DL (ref 2.7–4.5)
PLATELET # BLD AUTO: 45 K/UL (ref 150–350)
PLATELET BLD QL SMEAR: ABNORMAL
PMV BLD AUTO: 10.6 FL (ref 9.2–12.9)
POIKILOCYTOSIS BLD QL SMEAR: SLIGHT
POTASSIUM SERPL-SCNC: 2.9 MMOL/L (ref 3.5–5.1)
PROT SERPL-MCNC: 5.8 G/DL (ref 6–8.4)
RBC # BLD AUTO: 2.75 M/UL (ref 4–5.4)
SODIUM SERPL-SCNC: 141 MMOL/L (ref 136–145)
VANCOMYCIN TROUGH SERPL-MCNC: 3.7 UG/ML (ref 10–22)
VANCOMYCIN TROUGH SERPL-MCNC: 3.8 UG/ML (ref 10–22)
WBC # BLD AUTO: 0.8 K/UL (ref 3.9–12.7)

## 2019-08-10 PROCEDURE — 85027 COMPLETE CBC AUTOMATED: CPT

## 2019-08-10 PROCEDURE — 25500020 PHARM REV CODE 255: Performed by: INTERNAL MEDICINE

## 2019-08-10 PROCEDURE — 63600175 PHARM REV CODE 636 W HCPCS: Performed by: STUDENT IN AN ORGANIZED HEALTH CARE EDUCATION/TRAINING PROGRAM

## 2019-08-10 PROCEDURE — 36415 COLL VENOUS BLD VENIPUNCTURE: CPT

## 2019-08-10 PROCEDURE — 84100 ASSAY OF PHOSPHORUS: CPT

## 2019-08-10 PROCEDURE — 85007 BL SMEAR W/DIFF WBC COUNT: CPT

## 2019-08-10 PROCEDURE — 80202 ASSAY OF VANCOMYCIN: CPT

## 2019-08-10 PROCEDURE — 99233 PR SUBSEQUENT HOSPITAL CARE,LEVL III: ICD-10-PCS | Mod: GC,,, | Performed by: INTERNAL MEDICINE

## 2019-08-10 PROCEDURE — 63600175 PHARM REV CODE 636 W HCPCS: Performed by: INTERNAL MEDICINE

## 2019-08-10 PROCEDURE — 25000003 PHARM REV CODE 250: Performed by: STUDENT IN AN ORGANIZED HEALTH CARE EDUCATION/TRAINING PROGRAM

## 2019-08-10 PROCEDURE — 80053 COMPREHEN METABOLIC PANEL: CPT

## 2019-08-10 PROCEDURE — 83735 ASSAY OF MAGNESIUM: CPT

## 2019-08-10 PROCEDURE — 20600001 HC STEP DOWN PRIVATE ROOM

## 2019-08-10 PROCEDURE — 99233 SBSQ HOSP IP/OBS HIGH 50: CPT | Mod: GC,,, | Performed by: INTERNAL MEDICINE

## 2019-08-10 RX ORDER — SODIUM,POTASSIUM PHOSPHATES 280-250MG
2 POWDER IN PACKET (EA) ORAL EVERY 4 HOURS
Status: ACTIVE | OUTPATIENT
Start: 2019-08-10 | End: 2019-08-11

## 2019-08-10 RX ORDER — POLYETHYLENE GLYCOL 3350 17 G/17G
17 POWDER, FOR SOLUTION ORAL DAILY
Status: DISCONTINUED | OUTPATIENT
Start: 2019-08-10 | End: 2019-08-12 | Stop reason: HOSPADM

## 2019-08-10 RX ORDER — LACTULOSE 10 G/15ML
10 SOLUTION ORAL EVERY 6 HOURS
Status: DISCONTINUED | OUTPATIENT
Start: 2019-08-10 | End: 2019-08-11

## 2019-08-10 RX ORDER — POTASSIUM CHLORIDE 7.45 MG/ML
10 INJECTION INTRAVENOUS
Status: COMPLETED | OUTPATIENT
Start: 2019-08-10 | End: 2019-08-10

## 2019-08-10 RX ORDER — ONDANSETRON HYDROCHLORIDE 4 MG/5ML
8 SOLUTION ORAL EVERY 6 HOURS
Status: DISCONTINUED | OUTPATIENT
Start: 2019-08-10 | End: 2019-08-12 | Stop reason: HOSPADM

## 2019-08-10 RX ADMIN — POTASSIUM CHLORIDE 10 MEQ: 10 INJECTION, SOLUTION INTRAVENOUS at 08:08

## 2019-08-10 RX ADMIN — POTASSIUM CHLORIDE 10 MEQ: 10 INJECTION, SOLUTION INTRAVENOUS at 09:08

## 2019-08-10 RX ADMIN — PIPERACILLIN AND TAZOBACTAM 4.5 G: 4; .5 INJECTION, POWDER, LYOPHILIZED, FOR SOLUTION INTRAVENOUS; PARENTERAL at 11:08

## 2019-08-10 RX ADMIN — POTASSIUM CHLORIDE 10 MEQ: 10 INJECTION, SOLUTION INTRAVENOUS at 07:08

## 2019-08-10 RX ADMIN — LACTULOSE 10 G: 20 SOLUTION ORAL at 05:08

## 2019-08-10 RX ADMIN — POTASSIUM CHLORIDE 10 MEQ: 10 INJECTION, SOLUTION INTRAVENOUS at 10:08

## 2019-08-10 RX ADMIN — FLUCONAZOLE 200 MG: 40 POWDER, FOR SUSPENSION ORAL at 09:08

## 2019-08-10 RX ADMIN — POTASSIUM & SODIUM PHOSPHATES POWDER PACK 280-160-250 MG 2 PACKET: 280-160-250 PACK at 08:08

## 2019-08-10 RX ADMIN — LACTULOSE 10 G: 20 SOLUTION ORAL at 12:08

## 2019-08-10 RX ADMIN — Medication 10 ML: at 08:08

## 2019-08-10 RX ADMIN — POTASSIUM CHLORIDE 10 MEQ: 10 INJECTION, SOLUTION INTRAVENOUS at 02:08

## 2019-08-10 RX ADMIN — PIPERACILLIN AND TAZOBACTAM 4.5 G: 4; .5 INJECTION, POWDER, LYOPHILIZED, FOR SOLUTION INTRAVENOUS; PARENTERAL at 02:08

## 2019-08-10 RX ADMIN — POTASSIUM & SODIUM PHOSPHATES POWDER PACK 280-160-250 MG 2 PACKET: 280-160-250 PACK at 05:08

## 2019-08-10 RX ADMIN — POLYETHYLENE GLYCOL 3350 17 G: 17 POWDER, FOR SOLUTION ORAL at 12:08

## 2019-08-10 RX ADMIN — ONDANSETRON HYDROCHLORIDE 8 MG: 4 SOLUTION ORAL at 12:08

## 2019-08-10 RX ADMIN — POTASSIUM CHLORIDE 10 MEQ: 10 INJECTION, SOLUTION INTRAVENOUS at 04:08

## 2019-08-10 RX ADMIN — VANCOMYCIN HYDROCHLORIDE 1000 MG: 1 INJECTION, POWDER, LYOPHILIZED, FOR SOLUTION INTRAVENOUS at 02:08

## 2019-08-10 RX ADMIN — POTASSIUM CHLORIDE 10 MEQ: 10 INJECTION, SOLUTION INTRAVENOUS at 03:08

## 2019-08-10 RX ADMIN — PIPERACILLIN AND TAZOBACTAM 4.5 G: 4; .5 INJECTION, POWDER, LYOPHILIZED, FOR SOLUTION INTRAVENOUS; PARENTERAL at 05:08

## 2019-08-10 RX ADMIN — POTASSIUM CHLORIDE 10 MEQ: 10 INJECTION, SOLUTION INTRAVENOUS at 05:08

## 2019-08-10 RX ADMIN — IOHEXOL 100 ML: 350 INJECTION, SOLUTION INTRAVENOUS at 09:08

## 2019-08-10 RX ADMIN — ONDANSETRON HYDROCHLORIDE 8 MG: 4 SOLUTION ORAL at 05:08

## 2019-08-10 NOTE — PT/OT/SLP PROGRESS
Speech Language Pathology      Sharri Cline  MRN: [id number]    SLP Swallow Assessment orders received.  Patient not seen today secondary to patient BALDO for testing upon SLP attempt. Patient reviewed with RN.  SLP unable to re-attempt later service day. ST to continue to monitor and re-attempt 8/11/19.    ALESSIO Kohler., [facility]-SLP  Speech-Language Pathology  Pager: [phone]  8/10/2019

## 2019-08-10 NOTE — ASSESSMENT & PLAN NOTE
Unable to tolerate PO liquids or medications  Describes sensation as severe acid reflux, denies odynophagia  CT neck/chest negative for obstruction    Plan:  - NPO  - Bedside swallow study, may advance to barium if well tolerated

## 2019-08-10 NOTE — ASSESSMENT & PLAN NOTE
Stage 1B ([id number]) invasive ductal carcinoma of right breast, lower outer quadrant, ER neg, OR neg, Her2 neg  Started neoadjuvant chemotherapy with dose-dense adriamycin and cyclophosphamide followed by paclitaxel   Completed cycle 3/4 on 7/31/19  - Continue care with Dr. Ayala as outpatient

## 2019-08-10 NOTE — PLAN OF CARE
Problem: Adult Inpatient Plan of Care  Goal: Plan of Care Review  Outcome: Ongoing (interventions implemented as appropriate)  Pt has call bell in reach, non slip socks on, and bedrails up x2. Pt encouraged to wash hands. Pt temps being monitored.

## 2019-08-10 NOTE — ASSESSMENT & PLAN NOTE
Patient with productive cough, fever/chills, and sore throat in setting of recent chemo (7/31)  On admission, Tmax 102.3 and ANC <100  CXR negative for acute infectious process  UA negative, TCx and BCx negative  Influenza negative, rapid strep negative  Received Neulasta with last chemo, so doesn't qualify for Granix  Today, Tmax 100.1, ANC < 455    Plan:  - Continue vanc and zosyn  - Tylenol for fevers

## 2019-08-10 NOTE — PROGRESS NOTES
"Pharmacokinetic Assessment Follow Up: IV Vancomycin    Vancomycin serum concentration assessment(s):    The vancomycin trough resulted as 3.7/3.8 mcg/ml  This doesn't seem to be an accurate level, but patient did receive her scheduled dose at 14:27 so any level drawn now would be closer to a "peak".  Will draw level with morning labs and dose from there.    Vancomycin Regimen Plan:    Will draw level with morning labs and dose from there.    Drug levels (last 3 results):  Recent Labs   Lab Result Units 08/10/19  1328   Vancomycin-Trough ug/mL 3.7*  3.8*       Pharmacy will continue to follow and monitor vancomycin.    Please contact pharmacy at extension [phone] for questions regarding this assessment.    Thank you for the consult,   Vivian Bravo       Patient brief summary:  Sharri Cline is a 69 y.o. female initiated on antimicrobial therapy with IV Vancomycin for treatment of neutropenic fever.     The patient's current regimen is 1000 mg q24h.    Drug Allergies:   Review of patient's allergies indicates:  No Known Allergies    Actual Body Weight:   65 kg    Renal Function:   Estimated Creatinine Clearance: 98.9 mL/min (based on SCr of 0.5 mg/dL).,     CBC (last 72 hours):  Recent Labs   Lab Result Units 08/08/19  1147 08/09/19  0631 08/10/19  0640   WBC K/uL 0.22* 0.22* 0.80*   Hemoglobin g/dL 7.8* 6.4* 7.8*   Hematocrit % 24.9* 20.0* 25.1*   Platelets K/uL 94* 67* 45*   Gran% % 20.0* 27.3* 56.9   Lymph% % 70.0* 59.1* 31.4   Mono% % 10.0 9.1 5.9   Eosinophil% % 0.0 0.0 0.0   Basophil% % 0.0 0.0 0.0   Differential Method  Manual Automated Manual       Metabolic Panel (last 72 hours):  Recent Labs   Lab Result Units 08/08/19  1147 08/08/19  1400 08/09/19  0631 08/10/19  0634   Sodium mmol/L 134*  --  138 141   Potassium mmol/L 3.6  --  3.1* 2.9*   Chloride mmol/L 101  --  109 107   CO2 mmol/L 20*  --  20* 24   Glucose mg/dL 116*  --  84 89   Glucose, UA   --  Negative  --   --    BUN, Bld mg/dL 16  --  12 13 "   Creatinine mg/dL 0.7  --  0.6 0.5   Albumin g/dL 3.0*  --  2.2* 2.2*   Total Bilirubin mg/dL 1.3*  --  0.8 1.3*   Alkaline Phosphatase U/L 112  --  86 84   AST U/L 25  --  15 14   ALT U/L 17  --  12 13   Magnesium mg/dL  --   --  2.3 2.3   Phosphorus mg/dL  --   --  2.2* 2.2*       Vancomycin Administrations:  vancomycin given in the last 96 hours                   vancomycin in dextrose 5 % 1 gram/250 mL IVPB 1,000 mg (mg) 1,000 mg New Bag 08/10/19 1427     1,000 mg New Bag 08/09/19 1511                Microbiologic Results:  Microbiology Results (last 7 days)     Procedure Component Value Units Date/Time    Blood culture x two cultures. Draw prior to antibiotics. [[id number]] Collected:  08/08/19 1216    Order Status:  Completed Specimen:  Blood from Peripheral, Hand, Right Updated:  08/10/19 1412     Blood Culture, Routine No Growth to date      No Growth to date      No Growth to date    Narrative:       Aerobic and anaerobic    Blood culture x two cultures. Draw prior to antibiotics. [[id number]] Collected:  08/08/19 1140    Order Status:  Completed Specimen:  Blood from Peripheral, Forearm, Right Updated:  08/10/19 1412     Blood Culture, Routine No Growth to date      No Growth to date      No Growth to date    Narrative:       Aerobic and anaerobic    Strep A culture, throat [[id number]] Collected:  08/08/19 1433    Order Status:  Completed Specimen:  Throat Updated:  08/10/19 1125     Strep A Culture No  Group A  Streptococcus isolated    Rapid strep screen [[id number]] Collected:  08/08/19 1433    Order Status:  Completed Specimen:  Throat Updated:  08/08/19 1454     Rapid Strep A Screen Negative     Comment: See Micro for reflexed Strep culture.       Influenza A & B by Molecular [[id number]] Collected:  08/08/19 1246    Order Status:  Completed Specimen:  Nasopharyngeal Swab Updated:  08/08/19 1313     Influenza A, Molecular Negative     Influenza B, Molecular Negative     Flu A & B Source NP

## 2019-08-10 NOTE — SUBJECTIVE & OBJECTIVE
Interval History: No acute events overnight. She is still unable to tolerate PO intake. Flat/erect abdominal XRs were negative for any obstruction. She remains constipated. Last BM was 7 days ago. CT neck did not show any obstruction or acute findings. She was able to tolerate [employer]'s soln and water this morning. She was afebrile overnight (Tmax 100.1).    Oncology Treatment Plan:   OP BREAST DOSE-DENSE AC - DOXORUBICIN CYCLOPHOSPHAMIDE Q2W    Medications:  Continuous Infusions:  Scheduled Meds:   duke's soln (benadryl 30 mL, mylanta 30 mL, lidocaine 30 mL, nystatin 30 mL) 120 mL  10 mL Oral QID    enoxaparin  40 mg Subcutaneous Daily    fluconazole 40 mg/ml  200 mg Oral Daily    piperacillin-tazobactam (ZOSYN) IVPB  4.5 g Intravenous Q8H    senna-docusate 8.6-50 mg  2 tablet Oral BID    vancomycin (VANCOCIN) IVPB  1,000 mg Intravenous Q24H     PRN Meds:sodium chloride, acetaminophen, Dextrose 10% Bolus, Dextrose 10% Bolus, glucagon (human recombinant), glucose, glucose, ondansetron, prochlorperazine, sodium chloride 0.9%     Review of Systems   Constitutional: Positive for activity change (decreased), appetite change (decreased) and fatigue. Negative for chills and fever (Tmax 100.1).   HENT: Positive for sore throat, trouble swallowing and voice change (improved from yesterday). Negative for congestion and ear pain.    Eyes: Negative for visual disturbance.   Respiratory: Positive for cough (productive, improved from yesterday). Negative for shortness of breath and wheezing.    Cardiovascular: Negative for chest pain, palpitations and leg swelling.   Gastrointestinal: Positive for constipation (chronic, last BM was 7 days ago). Negative for abdominal pain, blood in stool, diarrhea, nausea and vomiting.   Genitourinary: Positive for decreased urine volume. Negative for dysuria and hematuria.   Musculoskeletal: Negative for arthralgias and myalgias.   Skin: Negative for rash.   Allergic/Immunologic: Positive  for immunocompromised state.   Neurological: Negative for dizziness, light-headedness, numbness and headaches.   Psychiatric/Behavioral: Negative for agitation and confusion.     Objective:     Vital Signs (Most Recent):  Temp: 99.7 °F (37.6 °C) (08/09/19 2000)  Pulse: 63 (08/10/19 0400)  Resp: 20 (08/10/19 0400)  BP: 106/61 (08/10/19 0400)  SpO2: 98 % (08/10/19 0400) Vital Signs (24h Range):  Temp:  [98.6 °F (37 °C)-100.1 °F (37.8 °C)] 99.7 °F (37.6 °C)  Pulse:  [63-90] 63  Resp:  [11-28] 20  SpO2:  [98 %-100 %] 98 %  BP: (104-128)/(60-74) 106/61     Weight: 65 kg (143 lb 4.8 oz)  Body mass index is 25.38 kg/m².  Body surface area is 1.7 meters squared.      Intake/Output Summary (Last 24 hours) at 8/10/2019 0643  Last data filed at 8/10/2019 0600  Gross per 24 hour   Intake 670 ml   Output 952 ml   Net -282 ml       Physical Exam   Constitutional: She appears well-developed and well-nourished. No distress.   L chest port site without erythema or tenderness to palpation   HENT:   Head: Normocephalic and atraumatic.   Mouth/Throat: No oropharyngeal exudate.   Mild thrush on posterior hard palate. No ulcers.    Eyes: Conjunctivae are normal.   Neck: Normal range of motion. Neck supple.   L submandibular tenderness   Cardiovascular: Normal rate and regular rhythm.   Murmur (systolic murmur at LSB in 2nd IC space) heard.  Pulmonary/Chest: Effort normal and breath sounds normal. No respiratory distress. She has no wheezes. She has no rales.   Abdominal: Soft. Bowel sounds are normal. She exhibits no distension. There is no tenderness. There is no guarding.   Musculoskeletal: She exhibits no edema or tenderness.   Neurological: She is alert. She exhibits normal muscle tone.   Skin: Skin is warm and dry. No rash noted.   Psychiatric: She has a normal mood and affect. Her behavior is normal.       Significant Labs:   CBC:   Recent Labs   Lab 08/08/19  1147 08/09/19  0631 08/10/19  0640   WBC 0.22* 0.22* 0.80*   HGB 7.8*  6.4* 7.8*   HCT 24.9* 20.0* 25.1*   PLT 94* 67* 45*    and CMP:   Recent Labs   Lab 08/08/19  1147 08/09/19  0631 08/10/19  0634   * 138 141   K 3.6 3.1* 2.9*    109 107   CO2 20* 20* 24   * 84 89   BUN 16 12 13   CREATININE 0.7 0.6 0.5   CALCIUM 9.9 8.6* 8.9   PROT 7.2 5.6* 5.8*   ALBUMIN 3.0* 2.2* 2.2*   BILITOT 1.3* 0.8 1.3*   ALKPHOS 112 86 84   AST 25 15 14   ALT 17 12 13   ANIONGAP 13 9 10   EGFRNONAA >60.0 >60.0 >60.0       Diagnostic Results:  I have reviewed all pertinent imaging results/findings within the past 24 hours.     CT Neck with Contrast (8/10)  The visualized intracranial content is unremarkable.  There is postoperative change with multiple surgical clips within the expected region of the right parotid gland.  There are several subcentimeter hypodensities within the right lobe of the thyroid too small to characterize.  There is no lymphadenopathy.  The visualized portion of the lungs is unremarkable.  The osseous structures are unremarkable.  Impression: No acute findings.    XR Abdomen (8/9)  Lung bases are clear.  No obstruction, ileus, or perforation seen.

## 2019-08-10 NOTE — PROGRESS NOTES
[facility]  Hematology/Oncology  Progress Note    Patient Name: Sharri Cline  Admission Date: 8/8/2019  Hospital Length of Stay: 2 days  Code Status: Full Code     Subjective:     HPI:  Ms. Sharri Cline is a 69 year old female with stage 1B invasive ductal carcinoma of the R breast (ER neg, PA neg, HER2 neg) that presents with cough, sore throat, and fever (Tmax 101). She started neoadjuvant chemotherapy with dose-dense adriamycin and cyclophosphamide followed by paclitaxel. She completed cycle 3 on 7/31/19. She first noticed a productive cough within a couple of days after the infusion. She has had this cough for about a week. The past 2 days, she has experienced fevers (Tmax 101 at home), chills, fatigue, and sore throat. Today, she has felt a little SOB. She has not taken anything to alleviate the cough or fevers.     In the ED, she was febrile (Tmax 102.3), slightly tachycardic, tachypnic, but blood pressure was stable. Sepsis order set was initiated and was given IVFs. UA, UCx, and BCx were ordered. On admission, she was found to be pancytopenic ( with ANC 40, Hb 7.8, PLT 94). Lactate was normal. CXR was negative for any acute infectious causes. EKG negative for acute ischemic changes.       Oncology History:  Oncologist = Dr. Ayala  Presented for screening mammo in 5/15/2019 which showed focal asymmetries in both left and right breast  - Diagnostic mammogram and US showed no significant abn of left breast, and right breast 15 mm x 11 mm x 12 mm mass at 4:00, 5 CFN.   - Biopsy on 5/24/19 showed grade 3 IDC, triple negative, Ki67 70%.   7/3/19 started neoadjuvant chemotherapy with ddAC to be followed by Taxol.    Interval History: No acute events overnight. She is still unable to tolerate PO intake. Flat/erect abdominal XRs were negative for any obstruction. She remains constipated. Last BM was 7 days ago. CT neck did not show any obstruction or acute findings. She was able to  tolerate Duke's soln and water this morning. She was afebrile overnight (Tmax 100.1).    Oncology Treatment Plan:   OP BREAST DOSE-DENSE AC - DOXORUBICIN CYCLOPHOSPHAMIDE Q2W    Medications:  Continuous Infusions:  Scheduled Meds:   duke's soln (benadryl 30 mL, mylanta 30 mL, lidocaine 30 mL, nystatin 30 mL) 120 mL  10 mL Oral QID    enoxaparin  40 mg Subcutaneous Daily    fluconazole 40 mg/ml  200 mg Oral Daily    piperacillin-tazobactam (ZOSYN) IVPB  4.5 g Intravenous Q8H    senna-docusate 8.6-50 mg  2 tablet Oral BID    vancomycin (VANCOCIN) IVPB  1,000 mg Intravenous Q24H     PRN Meds:sodium chloride, acetaminophen, Dextrose 10% Bolus, Dextrose 10% Bolus, glucagon (human recombinant), glucose, glucose, ondansetron, prochlorperazine, sodium chloride 0.9%     Review of Systems   Constitutional: Positive for activity change (decreased), appetite change (decreased) and fatigue. Negative for chills and fever (Tmax 100.1).   HENT: Positive for sore throat, trouble swallowing and voice change (improved from yesterday). Negative for congestion and ear pain.    Eyes: Negative for visual disturbance.   Respiratory: Positive for cough (productive, improved from yesterday). Negative for shortness of breath and wheezing.    Cardiovascular: Negative for chest pain, palpitations and leg swelling.   Gastrointestinal: Positive for constipation (chronic, last BM was 7 days ago). Negative for abdominal pain, blood in stool, diarrhea, nausea and vomiting.   Genitourinary: Positive for decreased urine volume. Negative for dysuria and hematuria.   Musculoskeletal: Negative for arthralgias and myalgias.   Skin: Negative for rash.   Allergic/Immunologic: Positive for immunocompromised state.   Neurological: Negative for dizziness, light-headedness, numbness and headaches.   Psychiatric/Behavioral: Negative for agitation and confusion.     Objective:     Vital Signs (Most Recent):  Temp: 99.7 °F (37.6 °C) (08/09/19 2000)  Pulse:  63 (08/10/19 0400)  Resp: 20 (08/10/19 0400)  BP: 106/61 (08/10/19 0400)  SpO2: 98 % (08/10/19 0400) Vital Signs (24h Range):  Temp:  [98.6 °F (37 °C)-100.1 °F (37.8 °C)] 99.7 °F (37.6 °C)  Pulse:  [63-90] 63  Resp:  [11-28] 20  SpO2:  [98 %-100 %] 98 %  BP: (104-128)/(60-74) 106/61     Weight: 65 kg (143 lb 4.8 oz)  Body mass index is 25.38 kg/m².  Body surface area is 1.7 meters squared.      Intake/Output Summary (Last 24 hours) at 8/10/2019 0643  Last data filed at 8/10/2019 0600  Gross per 24 hour   Intake 670 ml   Output 952 ml   Net -282 ml       Physical Exam   Constitutional: She appears well-developed and well-nourished. No distress.   L chest port site without erythema or tenderness to palpation   HENT:   Head: Normocephalic and atraumatic.   Mouth/Throat: No oropharyngeal exudate.   Mild thrush on posterior hard palate. No ulcers.    Eyes: Conjunctivae are normal.   Neck: Normal range of motion. Neck supple.   L submandibular tenderness   Cardiovascular: Normal rate and regular rhythm.   Murmur (systolic murmur at LSB in 2nd IC space) heard.  Pulmonary/Chest: Effort normal and breath sounds normal. No respiratory distress. She has no wheezes. She has no rales.   Abdominal: Soft. Bowel sounds are normal. She exhibits no distension. There is no tenderness. There is no guarding.   Musculoskeletal: She exhibits no edema or tenderness.   Neurological: She is alert. She exhibits normal muscle tone.   Skin: Skin is warm and dry. No rash noted.   Psychiatric: She has a normal mood and affect. Her behavior is normal.       Significant Labs:   CBC:   Recent Labs   Lab 08/08/19  1147 08/09/19  0631 08/10/19  0640   WBC 0.22* 0.22* 0.80*   HGB 7.8* 6.4* 7.8*   HCT 24.9* 20.0* 25.1*   PLT 94* 67* 45*    and CMP:   Recent Labs   Lab 08/08/19  1147 08/09/19  0631 08/10/19  0634   * 138 141   K 3.6 3.1* 2.9*    109 107   CO2 20* 20* 24   * 84 89   BUN 16 12 13   CREATININE 0.7 0.6 0.5   CALCIUM 9.9  8.6* 8.9   PROT 7.2 5.6* 5.8*   ALBUMIN 3.0* 2.2* 2.2*   BILITOT 1.3* 0.8 1.3*   ALKPHOS 112 86 84   AST 25 15 14   ALT 17 12 13   ANIONGAP 13 9 10   EGFRNONAA >60.0 >60.0 >60.0       Diagnostic Results:  I have reviewed all pertinent imaging results/findings within the past 24 hours.     CT Neck with Contrast (8/10)  The visualized intracranial content is unremarkable.  There is postoperative change with multiple surgical clips within the expected region of the right parotid gland.  There are several subcentimeter hypodensities within the right lobe of the thyroid too small to characterize.  There is no lymphadenopathy.  The visualized portion of the lungs is unremarkable.  The osseous structures are unremarkable.  Impression: No acute findings.    XR Abdomen (8/9)  Lung bases are clear.  No obstruction, ileus, or perforation seen.      Assessment/Plan:     * Neutropenic fever  Patient with productive cough, fever/chills, and sore throat in setting of recent chemo (7/31)  On admission, Tmax 102.3 and ANC <100  CXR negative for acute infectious process  UA negative, TCx and BCx negative  Influenza negative, rapid strep negative  Received Neulasta with last chemo, so doesn't qualify for Granix  Today, Tmax 100.1, ANC < 455    Plan:  - Continue vanc and zosyn  - Tylenol for fevers    Malignant neoplasm of lower-inner quadrant of right breast of female, estrogen receptor negative  Stage 1B ([id number]) invasive ductal carcinoma of right breast, lower outer quadrant, ER neg, CO neg, Her2 neg  Started neoadjuvant chemotherapy with dose-dense adriamycin and cyclophosphamide followed by paclitaxel   Completed cycle 3/4 on 7/31/19  - Continue care with Dr. Ayala as outpatient     Constipation  Last BM was 5 days prior to admission in setting of decreased PO intake  Non-compliant with home bowel regimen  Enema contraindicated with neutropenia  XR abdomen negative for obstruction    Plan:  - Miralax q4hr  - Lactulose 15mL q6hr  until BM    Dysphagia  Unable to tolerate PO liquids or medications  Describes sensation as severe acid reflux, denies odynophagia  CT neck/chest negative for obstruction    Plan:  - NPO  - Bedside swallow study, may advance to barium if well tolerated    Pancytopenia due to antineoplastic chemotherapy  On admission,  with ANC 40, Hb 7.8, PLT 94  - CBCs daily  - Hold lovenox as PLT 45 today    Hyponatremia  Na 134 on admission  Today, Na 141  - CMPs daily    Oral candidiasis  Duke's soln and PO fluconazole liquid suspension             Chico Sarmiento MD  Hematology/Oncology  [facility]    STAFF NOTE:  I have personally taken the history and examined this patient and agree with residents Note as stated above

## 2019-08-10 NOTE — ASSESSMENT & PLAN NOTE
Last BM was 5 days prior to admission in setting of decreased PO intake  Non-compliant with home bowel regimen  Enema contraindicated with neutropenia  XR abdomen negative for obstruction    Plan:  - Miralax q4hr  - Lactulose 15mL q6hr until BM

## 2019-08-10 NOTE — PLAN OF CARE
Problem: Adult Inpatient Plan of Care  Goal: Plan of Care Review  Outcome: Ongoing (interventions implemented as appropriate)  Patient AAO I today. Voice seems stronger today, patient able to take in a little water without discomfort, no pills, speech consult in, diet changed to clear liquid this afternoon. Treating empirically for bacterial and fungal infection. Vanc trough 3.7 today. Patient responded to to 1 unit of PRBC given yesterday with a rise of H/H.  WBC also improved today to 0.8, neutropenic precautions maintained. K replaced per IV K, placed on tele K 2.9 today. CT of neck performed today. Lactulose given for bm this afternoon to encourage bm.

## 2019-08-11 PROBLEM — K59.00 CONSTIPATION: Status: RESOLVED | Noted: 2019-08-09 | Resolved: 2019-08-11

## 2019-08-11 PROBLEM — E87.1 HYPONATREMIA: Status: RESOLVED | Noted: 2019-08-08 | Resolved: 2019-08-11

## 2019-08-11 LAB
ALBUMIN SERPL BCP-MCNC: 2.3 G/DL (ref 3.5–5.2)
ALP SERPL-CCNC: 88 U/L (ref 55–135)
ALT SERPL W/O P-5'-P-CCNC: 14 U/L (ref 10–44)
ANION GAP SERPL CALC-SCNC: 7 MMOL/L (ref 8–16)
ANISOCYTOSIS BLD QL SMEAR: SLIGHT
AST SERPL-CCNC: 13 U/L (ref 10–40)
BASOPHILS NFR BLD: 0 % (ref 0–1.9)
BILIRUB SERPL-MCNC: 0.8 MG/DL (ref 0.1–1)
BUN SERPL-MCNC: 8 MG/DL (ref 8–23)
CALCIUM SERPL-MCNC: 8.9 MG/DL (ref 8.7–10.5)
CHLORIDE SERPL-SCNC: 105 MMOL/L (ref 95–110)
CO2 SERPL-SCNC: 25 MMOL/L (ref 23–29)
CREAT SERPL-MCNC: 0.6 MG/DL (ref 0.5–1.4)
DIFFERENTIAL METHOD: ABNORMAL
DOHLE BOD BLD QL SMEAR: PRESENT
EOSINOPHIL NFR BLD: 0 % (ref 0–8)
ERYTHROCYTE [DISTWIDTH] IN BLOOD BY AUTOMATED COUNT: 14.7 % (ref 11.5–14.5)
EST. GFR  (AFRICAN AMERICAN): >60 ML/MIN/1.73 M^2
EST. GFR  (NON AFRICAN AMERICAN): >60 ML/MIN/1.73 M^2
GLUCOSE SERPL-MCNC: 81 MG/DL (ref 70–110)
HCT VFR BLD AUTO: 25.2 % (ref 37–48.5)
HGB BLD-MCNC: 8.2 G/DL (ref 12–16)
IMM GRANULOCYTES # BLD AUTO: ABNORMAL K/UL (ref 0–0.04)
IMM GRANULOCYTES NFR BLD AUTO: ABNORMAL % (ref 0–0.5)
LYMPHOCYTES NFR BLD: 20 % (ref 18–48)
MAGNESIUM SERPL-MCNC: 1.9 MG/DL (ref 1.6–2.6)
MCH RBC QN AUTO: 29.4 PG (ref 27–31)
MCHC RBC AUTO-ENTMCNC: 32.5 G/DL (ref 32–36)
MCV RBC AUTO: 90 FL (ref 82–98)
MONOCYTES NFR BLD: 13 % (ref 4–15)
NEUTROPHILS NFR BLD: 62 % (ref 38–73)
NEUTS BAND NFR BLD MANUAL: 5 %
NRBC BLD-RTO: 0 /100 WBC
OVALOCYTES BLD QL SMEAR: ABNORMAL
PHOSPHATE SERPL-MCNC: 2.1 MG/DL (ref 2.7–4.5)
PLATELET # BLD AUTO: 40 K/UL (ref 150–350)
PLATELET BLD QL SMEAR: ABNORMAL
PMV BLD AUTO: 11.4 FL (ref 9.2–12.9)
POIKILOCYTOSIS BLD QL SMEAR: ABNORMAL
POTASSIUM SERPL-SCNC: 4.1 MMOL/L (ref 3.5–5.1)
PROT SERPL-MCNC: 5.7 G/DL (ref 6–8.4)
RBC # BLD AUTO: 2.79 M/UL (ref 4–5.4)
SODIUM SERPL-SCNC: 137 MMOL/L (ref 136–145)
VANCOMYCIN SERPL-MCNC: 4.6 UG/ML
WBC # BLD AUTO: 2.21 K/UL (ref 3.9–12.7)

## 2019-08-11 PROCEDURE — 80053 COMPREHEN METABOLIC PANEL: CPT

## 2019-08-11 PROCEDURE — 99233 PR SUBSEQUENT HOSPITAL CARE,LEVL III: ICD-10-PCS | Mod: GC,,, | Performed by: INTERNAL MEDICINE

## 2019-08-11 PROCEDURE — 63600175 PHARM REV CODE 636 W HCPCS: Performed by: INTERNAL MEDICINE

## 2019-08-11 PROCEDURE — 25000003 PHARM REV CODE 250: Performed by: STUDENT IN AN ORGANIZED HEALTH CARE EDUCATION/TRAINING PROGRAM

## 2019-08-11 PROCEDURE — 92610 EVALUATE SWALLOWING FUNCTION: CPT

## 2019-08-11 PROCEDURE — 85007 BL SMEAR W/DIFF WBC COUNT: CPT

## 2019-08-11 PROCEDURE — 99233 SBSQ HOSP IP/OBS HIGH 50: CPT | Mod: GC,,, | Performed by: INTERNAL MEDICINE

## 2019-08-11 PROCEDURE — 20600001 HC STEP DOWN PRIVATE ROOM

## 2019-08-11 PROCEDURE — 80202 ASSAY OF VANCOMYCIN: CPT

## 2019-08-11 PROCEDURE — 83735 ASSAY OF MAGNESIUM: CPT

## 2019-08-11 PROCEDURE — 85027 COMPLETE CBC AUTOMATED: CPT

## 2019-08-11 PROCEDURE — 84100 ASSAY OF PHOSPHORUS: CPT

## 2019-08-11 RX ORDER — DOXYCYCLINE 50 MG/1
100 CAPSULE ORAL EVERY 12 HOURS
Status: DISCONTINUED | OUTPATIENT
Start: 2019-08-11 | End: 2019-08-12 | Stop reason: HOSPADM

## 2019-08-11 RX ORDER — SODIUM,POTASSIUM PHOSPHATES 280-250MG
2 POWDER IN PACKET (EA) ORAL EVERY 4 HOURS
Status: COMPLETED | OUTPATIENT
Start: 2019-08-11 | End: 2019-08-11

## 2019-08-11 RX ADMIN — ONDANSETRON HYDROCHLORIDE 8 MG: 4 SOLUTION ORAL at 12:08

## 2019-08-11 RX ADMIN — LACTULOSE 10 G: 20 SOLUTION ORAL at 05:08

## 2019-08-11 RX ADMIN — DOXYCYCLINE 100 MG: 50 CAPSULE ORAL at 12:08

## 2019-08-11 RX ADMIN — Medication 10 ML: at 08:08

## 2019-08-11 RX ADMIN — POTASSIUM & SODIUM PHOSPHATES POWDER PACK 280-160-250 MG 2 PACKET: 280-160-250 PACK at 12:08

## 2019-08-11 RX ADMIN — POLYETHYLENE GLYCOL 3350 17 G: 17 POWDER, FOR SOLUTION ORAL at 10:08

## 2019-08-11 RX ADMIN — DOXYCYCLINE 100 MG: 50 CAPSULE ORAL at 08:08

## 2019-08-11 RX ADMIN — ONDANSETRON HYDROCHLORIDE 8 MG: 4 SOLUTION ORAL at 06:08

## 2019-08-11 RX ADMIN — POTASSIUM & SODIUM PHOSPHATES POWDER PACK 280-160-250 MG 2 PACKET: 280-160-250 PACK at 08:08

## 2019-08-11 RX ADMIN — POTASSIUM & SODIUM PHOSPHATES POWDER PACK 280-160-250 MG 2 PACKET: 280-160-250 PACK at 04:08

## 2019-08-11 RX ADMIN — PIPERACILLIN AND TAZOBACTAM 4.5 G: 4; .5 INJECTION, POWDER, LYOPHILIZED, FOR SOLUTION INTRAVENOUS; PARENTERAL at 05:08

## 2019-08-11 RX ADMIN — FLUCONAZOLE 200 MG: 40 POWDER, FOR SUSPENSION ORAL at 10:08

## 2019-08-11 NOTE — PT/OT/SLP EVAL
Speech Language Pathology Evaluation  Bedside Swallow  Discharge Summary    Patient Name:  Sharri Cline   MRN:  [id number]  Admitting Diagnosis: Neutropenic fever    Recommendations:                 General Recommendations:  Follow-up not indicated  Diet recommendations:  Dental Soft, Thin   Aspiration Precautions: Meds whole 1 at a time and Standard aspiration precautions   General Precautions: Standard, aspiration, fall  Communication strategies:  none    History:     Past Medical History:   Diagnosis Date    Helicobacter pylori antibody positive 5/27/13    treated with clarithromycin, metronidazole, and omeprazole x 14 days  (5/27-6/3); EGD normal in July 2013       Past Surgical History:   Procedure Laterality Date    BIOPSY, LIVER, WITH US GUIDANCE N/A 11/19/2018    Performed by [facility] OR 2ND FLR    COLONOSCOPY N/A 7/9/2013    Performed by Ha Harrington MD at [facility] ENDO (4TH FLR)    EGD (ESOPHAGOGASTRODUODENOSCOPY) N/A 7/9/2013    Performed by Ha Harrington MD at [facility] ENDO (4TH FLR)    EXCISION-LYMPH NODES Right 1/8/2016    Performed by Sanya Farah MD at [facility] OR 2ND FLR    [id number]-CATH - CHEST Left 6/13/2019    Performed by Libertad Hernandez MD at [facility] OR 2ND FLR    PAROTIDECTOMY Right 1/8/2016    Performed by Sanya Farah MD at [facility] OR 2ND FLR    TUBAL LIGATION         Social History: Patient lives with family.    Prior Intubation HX:  None this admission    Modified Barium Swallow: None this admission    Chest X-Rays: 8/8/19:  No active cardiopulmonary disease    Prior diet: regular with thin liquids.    Occupation/hobbies/homemaking: none expressed.    Subjective     Pt was awake and alert seated upright seen eating lunch meal.  Patient goals: none expressed     Pain/Comfort:  · Pain Rating 1: 0/10  · Pain Rating Post-Intervention 1: 0/10    Objective:     Oral Musculature Evaluation  · Oral Musculature: WNL  · Dentition: uses  dentures to eat, edentulous  · Secretion Management: adequate  · Mucosal Quality: adequate  · Mandibular Strength and Mobility: WNL  · Oral Labial Strength and Mobility: WNL  · Lingual Strength and Mobility: WNL  · Velar Elevation: WNL  · Buccal Strength and Mobility: WNL  · Volitional Cough: adequate  · Volitional Swallow: present  · Voice Prior to PO Intake: clear    Bedside Swallow Eval:   Consistencies Assessed:  · Thin liquids tsp, cup and straw sips  · Puree tsp bites  · Soft solids self fed bite x1     Oral Phase:   · WNL    Pharyngeal Phase:   · WNL  · no overt clinical signs/symptoms of aspiration  · no overt clinical signs/symptoms of pharyngeal dysphagia    Compensatory Strategies  · None    Treatment: Education was provided to pt re: SLP role, eval results, diet recs and basic aspiration precautions.  She indicated good understanding and agreed with recommendations.    Assessment:     Sharri Cline is a 69 y.o. female with an SLP diagnosis of Dysphagia.  She presents with a functional oropharyngeal swallow at this time.  Further Skilled Speech services are not indicated at this time.    Goals:   Multidisciplinary Problems     SLP Goals     Not on file          Multidisciplinary Problems (Resolved)        Problem: SLP Goal    Goal Priority Disciplines Outcome   SLP Goal   (Resolved)     SLP Outcome(s) achieved                   Plan:     · Patient to be seen:      · Plan of Care expires:     · Plan of Care reviewed with:  patient, sibling   · SLP Follow-Up:  No       Discharge recommendations:  home   Barriers to Discharge:  None    Time Tracking:     SLP Treatment Date:   08/11/19  Speech Start Time:  1233  Speech Stop Time:  1247     Speech Total Time (min):  14 min    Billable Minutes: Eval Swallow and Oral Function 14    Mary Grace Ware CCC-SLP  08/11/2019

## 2019-08-11 NOTE — ASSESSMENT & PLAN NOTE
Unable to tolerate PO liquids or medications on admission  Describes sensation as severe acid reflux, denies odynophagia  CT neck/chest negative for obstruction  Tolerating CLD    Plan:  - Full liquid diet, advance as tolerated  - Bedside swallow study, may advance to barium if well tolerated

## 2019-08-11 NOTE — SUBJECTIVE & OBJECTIVE
"Interval History: No acute events overnight. She remained afebrile. She continues to improve subjectively. CT neck was negative for any obstruction. She is currently on clear liquid diet. She has only been able to tolerate water. She does state that her swallowing has improved since yesterday. Her constipation was resolved with lactulose. She has had 2 BMs in last 12 hours.    Oncology Treatment Plan:   OP BREAST DOSE-DENSE AC - DOXORUBICIN CYCLOPHOSPHAMIDE Q2W    Medications:  Continuous Infusions:  Scheduled Meds:   duke's soln (benadryl 30 mL, mylanta 30 mL, lidocaine 30 mL, nystatin 30 mL) 120 mL  10 mL Oral QID    fluconazole 40 mg/ml  200 mg Oral Daily    lactulose  10 g Oral Q6H    ondansetron  8 mg Oral Q6H    piperacillin-tazobactam (ZOSYN) IVPB  4.5 g Intravenous Q8H    polyethylene glycol  17 g Oral Daily    senna-docusate 8.6-50 mg  2 tablet Oral BID    vancomycin (VANCOCIN) IVPB  1,000 mg Intravenous Q24H     PRN Meds:sodium chloride, acetaminophen, Dextrose 10% Bolus, Dextrose 10% Bolus, glucagon (human recombinant), glucose, glucose, ondansetron, prochlorperazine, sodium chloride 0.9%     Review of Systems   Constitutional: Positive for activity change (decreased), appetite change (decreased) and fatigue. Negative for chills and fever (Tmax 100.1).   HENT: Positive for sore throat, trouble swallowing and voice change (improved from yesterday). Negative for congestion and ear pain.         "sore gums"   Eyes: Negative for visual disturbance.   Respiratory: Positive for cough (productive, continues to improve). Negative for shortness of breath and wheezing.    Cardiovascular: Negative for chest pain, palpitations and leg swelling.   Gastrointestinal: Negative for abdominal pain, blood in stool, constipation, diarrhea, nausea and vomiting.   Genitourinary: Negative for decreased urine volume, dysuria and hematuria.   Musculoskeletal: Negative for arthralgias and myalgias.   Skin: Negative for " rash.   Allergic/Immunologic: Positive for immunocompromised state.   Neurological: Negative for dizziness, light-headedness, numbness and headaches.   Psychiatric/Behavioral: Negative for agitation and confusion.     Objective:     Vital Signs (Most Recent):  Temp: 98.5 °F (36.9 °C) (08/10/19 1945)  Pulse: 87 (08/11/19 0400)  Resp: (!) 25 (08/11/19 0400)  BP: 133/61 (08/11/19 0400)  SpO2: 100 % (08/11/19 0400) Vital Signs (24h Range):  Temp:  [98.5 °F (36.9 °C)-99.1 °F (37.3 °C)] 98.5 °F (36.9 °C)  Pulse:  [] 87  Resp:  [17-25] 25  SpO2:  [100 %] 100 %  BP: (110-137)/(59-65) 133/61     Weight: 65 kg (143 lb 4.8 oz)  Body mass index is 25.38 kg/m².  Body surface area is 1.7 meters squared.      Intake/Output Summary (Last 24 hours) at 8/11/2019 0620  Last data filed at 8/10/2019 2203  Gross per 24 hour   Intake 1670 ml   Output 250 ml   Net 1420 ml       Physical Exam   Constitutional: She appears well-developed and well-nourished. No distress.   L chest port site without erythema or tenderness to palpation   HENT:   Head: Normocephalic and atraumatic.   Mouth/Throat: No oropharyngeal exudate.   Mild thrush on posterior hard palate. No ulcers.    Eyes: Conjunctivae are normal.   Neck: Normal range of motion. Neck supple.   L submandibular tenderness   Cardiovascular: Normal rate and regular rhythm.   Murmur (systolic murmur at LSB in 2nd IC space) heard.  Pulmonary/Chest: Effort normal and breath sounds normal. No respiratory distress. She has no wheezes. She has no rales.   Abdominal: Soft. Bowel sounds are normal. She exhibits no distension. There is no tenderness. There is no guarding.   Musculoskeletal: She exhibits no edema or tenderness.   Neurological: She is alert. She exhibits normal muscle tone.   Skin: Skin is warm and dry. No rash noted.   Psychiatric: She has a normal mood and affect. Her behavior is normal.       Significant Labs:   CBC:   Recent Labs   Lab 08/10/19  0640 08/11/19  0727   WBC  0.80* 2.21*   HGB 7.8* 8.2*   HCT 25.1* 25.2*   PLT 45* 40*    and CMP:   Recent Labs   Lab 08/10/19  0634 08/11/19  0727    137   K 2.9* 4.1    105   CO2 24 25   GLU 89 81   BUN 13 8   CREATININE 0.5 0.6   CALCIUM 8.9 8.9   PROT 5.8* 5.7*   ALBUMIN 2.2* 2.3*   BILITOT 1.3* 0.8   ALKPHOS 84 88   AST 14 13   ALT 13 14   ANIONGAP 10 7*   EGFRNONAA >60.0 >60.0       Diagnostic Results:  I have reviewed all pertinent imaging results/findings within the past 24 hours.     CT Neck (8/11/19)  The visualized intracranial content is unremarkable.  There is postoperative change with multiple surgical clips within the expected region of the right parotid gland.  There are several subcentimeter hypodensities within the right lobe of the thyroid too small to characterize.  There is no lymphadenopathy.  The visualized portion of the lungs is unremarkable.  The osseous structures are unremarkable.  Impression: No acute findings.

## 2019-08-11 NOTE — PROGRESS NOTES
[facility]  Hematology/Oncology  Progress Note    Patient Name: Sharri Cline  Admission Date: 8/8/2019  Hospital Length of Stay: 3 days  Code Status: Full Code     Subjective:     HPI:  Ms. Sharri Cline is a 69 year old female with stage 1B invasive ductal carcinoma of the R breast (ER neg, [place] neg, HER2 neg) that presents with cough, sore throat, and fever (Tmax 101). She started neoadjuvant chemotherapy with dose-dense adriamycin and cyclophosphamide followed by paclitaxel. She completed cycle 3 on 7/31/19. She first noticed a productive cough within a couple of days after the infusion. She has had this cough for about a week. The past 2 days, she has experienced fevers (Tmax 101 at home), chills, fatigue, and sore throat. Today, she has felt a little SOB. She has not taken anything to alleviate the cough or fevers.     In the ED, she was febrile (Tmax 102.3), slightly tachycardic, tachypnic, but blood pressure was stable. Sepsis order set was initiated and was given IVFs. UA, UCx, and BCx were ordered. On admission, she was found to be pancytopenic ( with ANC 40, Hb 7.8, PLT 94). Lactate was normal. CXR was negative for any acute infectious causes. EKG negative for acute ischemic changes.       Oncology History:  Oncologist = Dr. Ayala  Presented for screening mammo in 5/15/2019 which showed focal asymmetries in both left and right breast  - Diagnostic mammogram and US showed no significant abn of left breast, and right breast 15 mm x 11 mm x 12 mm mass at 4:00, 5 CFN.   - Biopsy on 5/24/19 showed grade 3 IDC, triple negative, Ki67 70%.   7/3/19 started neoadjuvant chemotherapy with ddAC to be followed by Taxol.    Interval History: No acute events overnight. She remained afebrile. She continues to improve subjectively. CT neck was negative for any obstruction. She is currently on clear liquid diet. She has only been able to tolerate water. She does state that her swallowing has  "improved since yesterday. Her constipation was resolved with lactulose. She has had 2 BMs in last 12 hours.    Oncology Treatment Plan:   OP BREAST DOSE-DENSE AC - DOXORUBICIN CYCLOPHOSPHAMIDE Q2W    Medications:  Continuous Infusions:  Scheduled Meds:   duke's soln (benadryl 30 mL, mylanta 30 mL, lidocaine 30 mL, nystatin 30 mL) 120 mL  10 mL Oral QID    fluconazole 40 mg/ml  200 mg Oral Daily    lactulose  10 g Oral Q6H    ondansetron  8 mg Oral Q6H    piperacillin-tazobactam (ZOSYN) IVPB  4.5 g Intravenous Q8H    polyethylene glycol  17 g Oral Daily    senna-docusate 8.6-50 mg  2 tablet Oral BID    vancomycin (VANCOCIN) IVPB  1,000 mg Intravenous Q24H     PRN Meds:sodium chloride, acetaminophen, Dextrose 10% Bolus, Dextrose 10% Bolus, glucagon (human recombinant), glucose, glucose, ondansetron, prochlorperazine, sodium chloride 0.9%     Review of Systems   Constitutional: Positive for activity change (decreased), appetite change (decreased) and fatigue. Negative for chills and fever (Tmax 100.1).   HENT: Positive for sore throat, trouble swallowing and voice change (improved from yesterday). Negative for congestion and ear pain.         "sore gums"   Eyes: Negative for visual disturbance.   Respiratory: Positive for cough (productive, continues to improve). Negative for shortness of breath and wheezing.    Cardiovascular: Negative for chest pain, palpitations and leg swelling.   Gastrointestinal: Negative for abdominal pain, blood in stool, constipation, diarrhea, nausea and vomiting.   Genitourinary: Negative for decreased urine volume, dysuria and hematuria.   Musculoskeletal: Negative for arthralgias and myalgias.   Skin: Negative for rash.   Allergic/Immunologic: Positive for immunocompromised state.   Neurological: Negative for dizziness, light-headedness, numbness and headaches.   Psychiatric/Behavioral: Negative for agitation and confusion.     Objective:     Vital Signs (Most Recent):  Temp: 98.5 " °F (36.9 °C) (08/10/19 1945)  Pulse: 87 (08/11/19 0400)  Resp: (!) 25 (08/11/19 0400)  BP: 133/61 (08/11/19 0400)  SpO2: 100 % (08/11/19 0400) Vital Signs (24h Range):  Temp:  [98.5 °F (36.9 °C)-99.1 °F (37.3 °C)] 98.5 °F (36.9 °C)  Pulse:  [] 87  Resp:  [17-25] 25  SpO2:  [100 %] 100 %  BP: (110-137)/(59-65) 133/61     Weight: 65 kg (143 lb 4.8 oz)  Body mass index is 25.38 kg/m².  Body surface area is 1.7 meters squared.      Intake/Output Summary (Last 24 hours) at 8/11/2019 0620  Last data filed at 8/10/2019 2203  Gross per 24 hour   Intake 1670 ml   Output 250 ml   Net 1420 ml       Physical Exam   Constitutional: She appears well-developed and well-nourished. No distress.   L chest port site without erythema or tenderness to palpation   HENT:   Head: Normocephalic and atraumatic.   Mouth/Throat: No oropharyngeal exudate.   Mild thrush on posterior hard palate. No ulcers.    Eyes: Conjunctivae are normal.   Neck: Normal range of motion. Neck supple.   L submandibular tenderness   Cardiovascular: Normal rate and regular rhythm.   Murmur (systolic murmur at LSB in 2nd IC space) heard.  Pulmonary/Chest: Effort normal and breath sounds normal. No respiratory distress. She has no wheezes. She has no rales.   Abdominal: Soft. Bowel sounds are normal. She exhibits no distension. There is no tenderness. There is no guarding.   Musculoskeletal: She exhibits no edema or tenderness.   Neurological: She is alert. She exhibits normal muscle tone.   Skin: Skin is warm and dry. No rash noted.   Psychiatric: She has a normal mood and affect. Her behavior is normal.       Significant Labs:   CBC:   Recent Labs   Lab 08/10/19  0640 08/11/19  0727   WBC 0.80* 2.21*   HGB 7.8* 8.2*   HCT 25.1* 25.2*   PLT 45* 40*    and CMP:   Recent Labs   Lab 08/10/19  0634 08/11/19  0727    137   K 2.9* 4.1    105   CO2 24 25   GLU 89 81   BUN 13 8   CREATININE 0.5 0.6   CALCIUM 8.9 8.9   PROT 5.8* 5.7*   ALBUMIN 2.2* 2.3*    BILITOT 1.3* 0.8   ALKPHOS 84 88   AST 14 13   ALT 13 14   ANIONGAP 10 7*   EGFRNONAA >60.0 >60.0       Diagnostic Results:  I have reviewed all pertinent imaging results/findings within the past 24 hours.     CT Neck (8/11/19)  The visualized intracranial content is unremarkable.  There is postoperative change with multiple surgical clips within the expected region of the right parotid gland.  There are several subcentimeter hypodensities within the right lobe of the thyroid too small to characterize.  There is no lymphadenopathy.  The visualized portion of the lungs is unremarkable.  The osseous structures are unremarkable.  Impression: No acute findings.    Assessment/Plan:     * Neutropenic fever  Patient with productive cough, fever/chills, and sore throat in setting of recent chemo (7/31)  On admission, Tmax 102.3 and ANC <100  CXR negative for acute infectious process  UA negative, TCx and BCx negative  Influenza negative, rapid strep negative  Received Neulasta with last chemo, so doesn't qualify for Granix  Today, Tmax 99.1, ANC 1370    Plan:  - Transition to doxycycline   - Tylenol for fevers    Malignant neoplasm of lower-inner quadrant of right breast of female, estrogen receptor negative  Stage 1B ([id number]) invasive ductal carcinoma of right breast, lower outer quadrant, ER neg, [place] neg, Her2 neg  Started neoadjuvant chemotherapy with dose-dense adriamycin and cyclophosphamide followed by paclitaxel   Completed cycle 3/4 on 7/31/19  - Continue care with Dr. Ayala as outpatient     Dysphagia  Unable to tolerate PO liquids or medications on admission  Describes sensation as severe acid reflux, denies odynophagia  CT neck/chest negative for obstruction  Tolerating CLD    Plan:  - Full liquid diet, advance as tolerated  - Bedside swallow study, may advance to barium if well tolerated    Pancytopenia due to antineoplastic chemotherapy  On admission,  with ANC 40, Hb 7.8, PLT 94  - CBCs daily  - Hold  lovenox as PLT 40 today    Oral candidiasis  Duke's soln and PO fluconazole liquid suspension          Chico Sarmiento MD  Hematology/Oncology  [facility]    STAFF NOTE:  I have personally taken the history and examined this patient and agree with residents Note as stated above

## 2019-08-11 NOTE — ASSESSMENT & PLAN NOTE
Stage 1B (T3sL4R3) invasive ductal carcinoma of right breast, lower outer quadrant, ER neg, [place] neg, Her2 neg  Started neoadjuvant chemotherapy with dose-dense adriamycin and cyclophosphamide followed by paclitaxel   Completed cycle 3/4 on 7/31/19  - Continue care with Dr. Ayala as outpatient

## 2019-08-11 NOTE — PLAN OF CARE
"Problem: Adult Inpatient Plan of Care  Goal: Plan of Care Review  Outcome: Ongoing (interventions implemented as appropriate)  Patient AAO I with ambulation. Afebrile today, WBC improved to 2.21, IV anti bxs dcd transitioned to oral.  H/H improving. Phos replaced. Vitals stable. Voice seems even stronger today, seen by speech, okay to transition from full clear to pureed to dental soft.  Patient states she "feels much better".  Patients  at the bedside attentive to patient involved in patients care.      "

## 2019-08-11 NOTE — PLAN OF CARE
Problem: Adult Inpatient Plan of Care  Goal: Plan of Care Review  Outcome: Ongoing (interventions implemented as appropriate)  Pt has call bell in reach, non slip socks on, and bedrails up x2. Pt encouraged to wash hands. Pt temps being monitored. Pt receiving iv potassium replacement.

## 2019-08-11 NOTE — ASSESSMENT & PLAN NOTE
Patient with productive cough, fever/chills, and sore throat in setting of recent chemo (7/31)  On admission, Tmax 102.3 and ANC <100  CXR negative for acute infectious process  UA negative, TCx and BCx negative  Influenza negative, rapid strep negative  Received Neulasta with last chemo, so doesn't qualify for Granix  Today, Tmax 99.1, ANC 1370    Plan:  - Transition to doxycycline   - Tylenol for fevers

## 2019-08-11 NOTE — PLAN OF CARE
Problem: SLP Goal  Goal: SLP Goal  Outcome: Outcome(s) achieved Date Met: 08/11/19  Clinical Swallow Evaluation completed.  Pt with normal oropharyngeal swallow at this time.  REC:  Pt cont with current diet advanced as tolerated to Dental Soft with thin liquids, oral meds whole, aspiration precautions.  Recs reviewed w/ RN.  Further Skilled Speech services are not indicated at this time.  ST to s/o.  Thank you.    Mary Grace Ware, SELAM-SLP  8/11/2019

## 2019-08-12 VITALS
SYSTOLIC BLOOD PRESSURE: 124 MMHG | OXYGEN SATURATION: 100 % | HEIGHT: 63 IN | WEIGHT: 143.94 LBS | RESPIRATION RATE: 20 BRPM | HEART RATE: 85 BPM | TEMPERATURE: 99 F | DIASTOLIC BLOOD PRESSURE: 61 MMHG | BODY MASS INDEX: 25.5 KG/M2

## 2019-08-12 LAB
ALBUMIN SERPL BCP-MCNC: 2.5 G/DL (ref 3.5–5.2)
ALP SERPL-CCNC: 93 U/L (ref 55–135)
ALT SERPL W/O P-5'-P-CCNC: 13 U/L (ref 10–44)
ANION GAP SERPL CALC-SCNC: 12 MMOL/L (ref 8–16)
ANISOCYTOSIS BLD QL SMEAR: SLIGHT
AST SERPL-CCNC: 14 U/L (ref 10–40)
BASOPHILS # BLD AUTO: 0.05 K/UL (ref 0–0.2)
BASOPHILS NFR BLD: 1.1 % (ref 0–1.9)
BILIRUB SERPL-MCNC: 0.5 MG/DL (ref 0.1–1)
BUN SERPL-MCNC: 7 MG/DL (ref 8–23)
CALCIUM SERPL-MCNC: 9.4 MG/DL (ref 8.7–10.5)
CHLORIDE SERPL-SCNC: 103 MMOL/L (ref 95–110)
CO2 SERPL-SCNC: 22 MMOL/L (ref 23–29)
CREAT SERPL-MCNC: 0.6 MG/DL (ref 0.5–1.4)
DIFFERENTIAL METHOD: ABNORMAL
EOSINOPHIL # BLD AUTO: 0 K/UL (ref 0–0.5)
EOSINOPHIL NFR BLD: 0 % (ref 0–8)
ERYTHROCYTE [DISTWIDTH] IN BLOOD BY AUTOMATED COUNT: 14.9 % (ref 11.5–14.5)
EST. GFR  (AFRICAN AMERICAN): >60 ML/MIN/1.73 M^2
EST. GFR  (NON AFRICAN AMERICAN): >60 ML/MIN/1.73 M^2
GLUCOSE SERPL-MCNC: 73 MG/DL (ref 70–110)
HCT VFR BLD AUTO: 28 % (ref 37–48.5)
HGB BLD-MCNC: 8.8 G/DL (ref 12–16)
IMM GRANULOCYTES # BLD AUTO: 0.22 K/UL (ref 0–0.04)
IMM GRANULOCYTES NFR BLD AUTO: 4.8 % (ref 0–0.5)
LYMPHOCYTES # BLD AUTO: 0.6 K/UL (ref 1–4.8)
LYMPHOCYTES NFR BLD: 13.6 % (ref 18–48)
MAGNESIUM SERPL-MCNC: 1.7 MG/DL (ref 1.6–2.6)
MCH RBC QN AUTO: 29.2 PG (ref 27–31)
MCHC RBC AUTO-ENTMCNC: 31.4 G/DL (ref 32–36)
MCV RBC AUTO: 93 FL (ref 82–98)
MONOCYTES # BLD AUTO: 0.6 K/UL (ref 0.3–1)
MONOCYTES NFR BLD: 13.6 % (ref 4–15)
NEUTROPHILS # BLD AUTO: 3.1 K/UL (ref 1.8–7.7)
NEUTROPHILS NFR BLD: 66.9 % (ref 38–73)
NRBC BLD-RTO: 0 /100 WBC
OVALOCYTES BLD QL SMEAR: ABNORMAL
PHOSPHATE SERPL-MCNC: 2.8 MG/DL (ref 2.7–4.5)
PLATELET # BLD AUTO: 58 K/UL (ref 150–350)
PLATELET BLD QL SMEAR: ABNORMAL
PMV BLD AUTO: 12.8 FL (ref 9.2–12.9)
POIKILOCYTOSIS BLD QL SMEAR: SLIGHT
POTASSIUM SERPL-SCNC: 3.8 MMOL/L (ref 3.5–5.1)
PROT SERPL-MCNC: 6 G/DL (ref 6–8.4)
RBC # BLD AUTO: 3.01 M/UL (ref 4–5.4)
SODIUM SERPL-SCNC: 137 MMOL/L (ref 136–145)
WBC # BLD AUTO: 4.56 K/UL (ref 3.9–12.7)

## 2019-08-12 PROCEDURE — 25000003 PHARM REV CODE 250: Performed by: STUDENT IN AN ORGANIZED HEALTH CARE EDUCATION/TRAINING PROGRAM

## 2019-08-12 PROCEDURE — 84100 ASSAY OF PHOSPHORUS: CPT

## 2019-08-12 PROCEDURE — 99239 HOSP IP/OBS DSCHRG MGMT >30: CPT | Mod: GC,,, | Performed by: INTERNAL MEDICINE

## 2019-08-12 PROCEDURE — 36415 COLL VENOUS BLD VENIPUNCTURE: CPT

## 2019-08-12 PROCEDURE — 80053 COMPREHEN METABOLIC PANEL: CPT

## 2019-08-12 PROCEDURE — 85025 COMPLETE CBC W/AUTO DIFF WBC: CPT

## 2019-08-12 PROCEDURE — 83735 ASSAY OF MAGNESIUM: CPT

## 2019-08-12 PROCEDURE — 99239 PR HOSPITAL DISCHARGE DAY,>30 MIN: ICD-10-PCS | Mod: GC,,, | Performed by: INTERNAL MEDICINE

## 2019-08-12 RX ORDER — AMOXICILLIN 250 MG
2 CAPSULE ORAL 2 TIMES DAILY
Status: ON HOLD | COMMUNITY
Start: 2019-08-12 | End: 2019-09-02 | Stop reason: HOSPADM

## 2019-08-12 RX ORDER — FLUCONAZOLE 40 MG/ML
200 POWDER, FOR SUSPENSION ORAL DAILY
Qty: 35 ML | Refills: 0 | Status: SHIPPED | OUTPATIENT
Start: 2019-08-14 | End: 2019-08-21

## 2019-08-12 RX ORDER — POLYETHYLENE GLYCOL 3350 17 G/17G
17 POWDER, FOR SOLUTION ORAL DAILY
Qty: 238 G | Refills: 0 | Status: ON HOLD | OUTPATIENT
Start: 2019-08-13 | End: 2019-09-02 | Stop reason: HOSPADM

## 2019-08-12 RX ORDER — DOXYCYCLINE 100 MG/1
100 CAPSULE ORAL EVERY 12 HOURS
Qty: 14 CAPSULE | Refills: 0 | Status: SHIPPED | OUTPATIENT
Start: 2019-08-12 | End: 2019-08-19

## 2019-08-12 RX ADMIN — DOXYCYCLINE 100 MG: 50 CAPSULE ORAL at 08:08

## 2019-08-12 RX ADMIN — POLYETHYLENE GLYCOL 3350 17 G: 17 POWDER, FOR SOLUTION ORAL at 08:08

## 2019-08-12 RX ADMIN — FLUCONAZOLE 200 MG: 40 POWDER, FOR SUSPENSION ORAL at 08:08

## 2019-08-12 NOTE — NURSING
AVS given and explained to patient. Pt had no questions and is aware of upcoming appointments and new medications. Pt with no signs of acute distress. PIV dcd, port deaccessed, visi dcd. Pt left unit with spouse. All pt belongings sent with pt.

## 2019-08-12 NOTE — PLAN OF CARE
Future Appointments   Date Time Provider Department Center   8/21/2019  1:15 PM INJECTION, [facility]   8/21/2019  2:30 PM Dominique Ayala MD [facility] HEM ONC [facility]   8/21/2019  3:00 PM [facility], [facility]     Appointment rescheduled as listed above. CM added these appointments to the patient's AVS.    Michelle San, RN, BSN, CM [facility]  Nurse - Med Onc/Gyn Onc

## 2019-08-12 NOTE — PLAN OF CARE
MDRs completed with Dr. Baumann and the team. Plans for patient to discharge home today on oral doxycycline and oral fluconazole. VSS. Patient is currently afebrile. Patient is tolerating her diet. MD discussed the discharge plan with the patient. Patient requested to have her next visit with Dr. Ayala with chemo delayed by one week (scheduled for 8/14/19). Dr. Baumann spoke with Dr. Ayala and Dr. Ayala ok'd the delay.  messaged Dr. Ayala's clinic and requested the appointment change. No other discharge needs identified. Discharge and follow-up instructions to be completed by the bedside nurse.    Future Appointments   Date Time Provider Department Center   8/14/2019 12:30 PM INJECTION, [facility]   8/14/2019  2:30 PM [facility], [facility]   8/21/2019  2:30 PM Dominique Ayala MD [facility] HEM ONC [facility]   8/29/2019  2:00 PM LAB, HEMONC CANCER BLDG [facility] LAB HO [facility]   8/29/2019  3:00 PM Dominique Ayala MD [facility] HEM ONC [facility]   8/29/2019  3:30 PM [facility], [facility]MO [facility]        08/12/19 1035   Final Note   Assessment Type Final Discharge Note   Anticipated Discharge Disposition Home   What phone number can be called within the next 1-3 days to see how you are doing after discharge?   ([phone])   Hospital Follow Up  Appt(s) scheduled? Yes   Discharge plans and expectations educations in teach back method with documentation complete? Yes  (per bedside nurse)

## 2019-08-12 NOTE — ASSESSMENT & PLAN NOTE
On admission,  with ANC 40, Hb 7.8, PLT 94  - CBCs daily  - hosptial stay VTE prophylaxis lovenox held as PLT 40 yesterday and 58 today

## 2019-08-12 NOTE — ASSESSMENT & PLAN NOTE
Patient with productive cough, fever/chills, and sore throat in setting of recent chemo (7/31)  On admission, Tmax 102.3 and ANC <100  CXR negative for acute infectious process  UA negative, TCx and BCx negative  Influenza negative, rapid strep negative  Received Neulasta with last chemo, so doesn't qualify for Granix  Today, Tmax 98.9, ANC 3000    Plan:  - Tylenol for fevers  - Doxycycline 100 mg po bid x 7 days

## 2019-08-12 NOTE — ASSESSMENT & PLAN NOTE
Stage 1B (O2kM1H0) invasive ductal carcinoma of right breast, lower outer quadrant, ER neg, [place] neg, Her2 neg  Started neoadjuvant chemotherapy with dose-dense adriamycin and cyclophosphamide followed by paclitaxel   Completed cycle 3/4 on 7/31/19  - Continue care with Dr. Ayala as outpatient

## 2019-08-12 NOTE — DISCHARGE SUMMARY
[facility]  Hematology/Oncology  Discharge Summary      Patient Name: Sharri Cline  MRN: [id number]  Admission Date: 8/8/2019  Hospital Length of Stay: 4 days  Discharge Date and Time:  08/12/2019 12:06 PM  Attending Physician: Nayeli Baumann MD   Discharging Provider: Ahsan Chambers DO  Primary Care Provider: Jhon Arndt MD    HPI: Ms. Sharri Cline is a 69 year old female with stage 1B invasive ductal carcinoma of the R breast (ER neg, [place] neg, HER2 neg) that presents with cough, sore throat, and fever (Tmax 101). She started neoadjuvant chemotherapy with dose-dense adriamycin and cyclophosphamide followed by paclitaxel. She completed cycle 3 on 7/31/19. She first noticed a productive cough within a couple of days after the infusion. She has had this cough for about a week. The past 2 days, she has experienced fevers (Tmax 101 at home), chills, fatigue, and sore throat. Today, she has felt a little SOB. She has not taken anything to alleviate the cough or fevers.     In the ED, she was febrile (Tmax 102.3), slightly tachycardic, tachypnic, but blood pressure was stable. Sepsis order set was initiated and was given IVFs. UA, UCx, and BCx were ordered. On admission, she was found to be pancytopenic ( with ANC 40, Hb 7.8, PLT 94). Lactate was normal. CXR was negative for any acute infectious causes. EKG negative for acute ischemic changes.       Oncology History:  Oncologist = Dr. Ayala  Presented for screening mammo in 5/15/2019 which showed focal asymmetries in both left and right breast  - Diagnostic mammogram and US showed no significant abn of left breast, and right breast 15 mm x 11 mm x 12 mm mass at 4:00, 5 CFN.   - Biopsy on 5/24/19 showed grade 3 IDC, triple negative, Ki67 70%.   7/3/19 started neoadjuvant chemotherapy with ddAC to be followed by Taxol.    * No surgery found *     Hospital Course: 70 yo F with invasive ductal carcinoma of R breast (currently on chemo, last cycle  7/31) admitted to Medical Oncology for neutropenic fever. On admission, her temp was 102.3F. Her ANC was <100. CXR negative for acute infectious process. UCx and BCx pending. She was started on vanc and zosyn.    Ms. Sharri Cline is a 69 year old female with stage 1B invasive ductal carcinoma of the R breast (ER neg, [place] neg, HER2 neg) that presents with  cough, sore throat, and fever (Tmax 101). She started neoadjuvant chemotherapy with dose-dense adriamycin and cyclophosphamide followed by paclitaxel. She completed cycle 3 on 7/31/19. She first noticed a productive cough within a couple of days after the infusion. She has had this cough for about a week. The past 2 days, she has experienced fevers (Tmax 101 at home), chills, fatigue, and sore throat.  In the ED, she was febrile (Tmax 102.3), slightly tachycardic, tachypnic, but blood pressure was stable. Sepsis order set was initiated and was given IVFs. UA, UCx, and BCx were ordered. On admission, she was found to be pancytopenic ( with ANC 40, Hb 7.8, PLT 94). Lactate was normal. CXR was negative for any acute infectious causes. EKG was negative for acute ischemic changes. The patient was admitted for eval/management of neutropenic fever. She was started on vanc and zosyn. Subjectively she started feeling better but struggled with PO intake. She was able to swallow liquids. Denied odynophagia. Ordered CT neck. Constipated for 5 days now. Enema is contraindicated with neutropenia. XR abdo negative for obstruction. CT neck negative for any obstruction. Advanced to full liquid today. She was transitioned to doxycycline 8/12. The patient UA, UCx, BCx were all negative, CT neck and chest showed no obstruction, swallow study with SLP functional, her dysphagia and throat pain improved on Duke's soln and fluconazole and she is swallowing fluids well. The patient WBC and ANC have improved over the last 48 hours. The patient ANC today is 3000. The patient is  scheduled for chemotherapy on 8/14 but requests delaying the appointment because of the hospital visit as she feels not ready yet. The patient feels well 8/12 and will be discharged to follow up with Dr. Ayala on 8/21/19. The patient is table for discharge.    Consults:   Consults (From admission, onward)        Status Ordering Provider     Inpatient consult to Oncology  Once     Provider:  (Not yet assigned)    Acknowledged ELIZ MENDOZA        Review of Systems   Constitutional: Negative for chills and fever.   HENT: Negative for ear pain and sore throat.    Eyes: Negative for blurred vision and double vision.   Respiratory: Negative for cough and shortness of breath.    Cardiovascular: Negative for chest pain and palpitations.   Gastrointestinal: Negative for heartburn and nausea.   Musculoskeletal: Negative for falls and myalgias.   Skin: Negative for itching and rash.   Neurological: Negative for dizziness and headaches.   Psychiatric/Behavioral: Negative for hallucinations. The patient is not nervous/anxious.          Physical Exam   Constitutional: She is well-developed, well-nourished, and in no distress. No distress.   HENT:   Head: Normocephalic and atraumatic.   Eyes: Conjunctivae and EOM are normal.   Neck: Neck supple. No tracheal deviation present. No thyromegaly present.   Cardiovascular: Normal rate and regular rhythm.   Pulmonary/Chest: Effort normal and breath sounds normal. She has no wheezes.   Abdominal: Soft. Bowel sounds are normal. She exhibits no distension. There is no tenderness.   Musculoskeletal: She exhibits no edema or deformity.   Neurological: She is alert. She exhibits normal muscle tone.         Significant Diagnostic Studies: Labs:   BMP:   Recent Labs   Lab 08/11/19  0727 08/12/19  0638   GLU 81 73    137   K 4.1 3.8    103   CO2 25 22*   BUN 8 7*   CREATININE 0.6 0.6   CALCIUM 8.9 9.4   MG 1.9 1.7   , CMP   Recent Labs   Lab 08/11/19  0727 08/12/19  0638     137   K 4.1 3.8    103   CO2 25 22*   GLU 81 73   BUN 8 7*   CREATININE 0.6 0.6   CALCIUM 8.9 9.4   PROT 5.7* 6.0   ALBUMIN 2.3* 2.5*   BILITOT 0.8 0.5   ALKPHOS 88 93   AST 13 14   ALT 14 13   ANIONGAP 7* 12   ESTGFRAFRICA >60.0 >60.0   EGFRNONAA >60.0 >60.0   , CBC   Recent Labs   Lab 08/11/19  0727 08/12/19  0638   WBC 2.21* 4.56   HGB 8.2* 8.8*   HCT 25.2* 28.0*   PLT 40* 58*   , INR   Lab Results   Component Value Date    INR 1.1 10/24/2018   , Lipid Panel   Lab Results   Component Value Date    CHOL 170 02/06/2017    HDL 53 02/06/2017    LDLCALC 108.6 02/06/2017    TRIG 42 02/06/2017    CHOLHDL 31.2 02/06/2017   , Troponin No results for input(s): TROPONINI in the last 168 hours., A1C: No results for input(s): HGBA1C in the last 4320 hours. and All labs within the past 24 hours have been reviewed  Radiology: CT scan: CT ABDOMEN PELVIS WITH CONTRAST: No results found for this visit on 08/08/19. and CT ABDOMEN PELVIS WITHOUT CONTRAST: No results found for this visit on 08/08/19.    Pending Diagnostic Studies:     None        Final Active Diagnoses:    Diagnosis Date Noted POA    PRINCIPAL PROBLEM:  Neutropenic fever [D70.9, R50.81] 08/08/2019 Yes    Oral candidiasis [B37.0] 08/10/2019 Clinically Undetermined    Dysphagia [R13.10] 08/09/2019 Yes    Pancytopenia due to antineoplastic chemotherapy [D61.810, T45.1X5A] 08/08/2019 Yes    Malignant neoplasm of lower-inner quadrant of right breast of female, estrogen receptor negative [C50.311, Z17.1] 06/05/2019 Not Applicable      Problems Resolved During this Admission:    Diagnosis Date Noted Date Resolved POA    Constipation [K59.00] 08/09/2019 08/11/2019 Yes    Hyponatremia [E87.1] 08/08/2019 08/11/2019 Yes      Discharged Condition: stable    Disposition: Home or Self Care    Follow Up:  Follow-up Information     INJECTION, [facility] INFUSION. Go on 8/21/2019.    Why:  Labs at 1:15 PM           Dominique Ayala MD. Go on 8/21/2019.    Specialty:  Hematology  and Oncology  Why:  Follow-Up Appointment at 2:30 PM  Contact information:  [address]  [facility] [address]  [phone]             [facility], CHEMO. Go on 8/21/2019.    Why:  Chemo at 3:00 PM               Patient Instructions:   No discharge procedures on file.  Medications:  Reconciled Home Medications:      Medication List      START taking these medications    doxycycline 100 MG capsule  Commonly known as:  MONODOX  Take 1 capsule (100 mg total) by mouth every 12 (twelve) hours for 7 days     fluconazole 40 mg/ml 40 mg/mL suspension  Commonly known as:  DIFLUCAN  Take 5 mLs (200 mg total) by mouth once daily for 7 days  Start taking on:  8/14/2019     magic mouthwash diphen/antac/lidoc/nysta  Take 10 mLs by mouth 4 (four) times daily.     polyethylene glycol 17 gram/dose powder  Commonly known as:  GLYCOLAX  Mix 1 capful (17 g) with liquid and take by mouth once daily.  Start taking on:  8/13/2019     senna-docusate 8.6-50 mg 8.6-50 mg per tablet  Commonly known as:  PERICOLACE  Take 2 tablets by mouth 2 (two) times daily.        CONTINUE taking these medications    b complex vitamins tablet  Take 1 tablet by mouth once daily.     calcium carbonate-vit D3-min 600 mg calcium- 400 unit Tab  Take 1 tablet by mouth 2 (two) times daily.     dexAMETHasone 4 MG Tab  Commonly known as:  DECADRON  Take one tablet twice a day days 2,3, and 4 of each chemotherapy cycle.     fish oil-omega-3 fatty acids 300-1,000 mg capsule  Take 2 g by mouth once daily.     lidocaine-prilocaine cream  Commonly known as:  EMLA  Apply topically as needed 45 minutes before port access     multivitamin with minerals tablet  Take 1 tablet by mouth once daily.     ondansetron 8 MG Tbdl  Commonly known as:  ZOFRAN-ODT  Take 1 tablet (8 mg total) by mouth every 8 (eight) hours as needed (nausea).            Ahsan Chambers DO  Hematology/Oncology  [facility]    STAFF NOTE:  I have personally taken the history and examined  this patient and agree with residents Note as stated above

## 2019-08-12 NOTE — ASSESSMENT & PLAN NOTE
Unable to tolerate PO liquids or medications on admission  Describes sensation as severe acid reflux, denies odynophagia  CT neck/chest negative for obstruction  Tolerating CLD  Bedside swallow study showed functional oropharyngeal swallowing    Plan:  - advance diet as tolerated at home

## 2019-08-13 LAB
BACTERIA BLD CULT: NORMAL
BACTERIA BLD CULT: NORMAL

## 2019-08-21 ENCOUNTER — INFUSION (OUTPATIENT)
Dept: INFUSION THERAPY | Facility: HOSPITAL | Age: 69
End: 2019-08-21
Attending: INTERNAL MEDICINE
Payer: MEDICARE

## 2019-08-21 ENCOUNTER — OFFICE VISIT (OUTPATIENT)
Dept: HEMATOLOGY/ONCOLOGY | Facility: CLINIC | Age: 69
End: 2019-08-21
Payer: MEDICARE

## 2019-08-21 VITALS
DIASTOLIC BLOOD PRESSURE: 72 MMHG | HEART RATE: 75 BPM | RESPIRATION RATE: 16 BRPM | TEMPERATURE: 98 F | SYSTOLIC BLOOD PRESSURE: 134 MMHG

## 2019-08-21 VITALS
HEART RATE: 73 BPM | OXYGEN SATURATION: 100 % | HEIGHT: 63 IN | RESPIRATION RATE: 18 BRPM | WEIGHT: 132.94 LBS | TEMPERATURE: 98 F | DIASTOLIC BLOOD PRESSURE: 76 MMHG | SYSTOLIC BLOOD PRESSURE: 126 MMHG | BODY MASS INDEX: 23.55 KG/M2

## 2019-08-21 DIAGNOSIS — T45.1X5A ANTINEOPLASTIC CHEMOTHERAPY INDUCED ANEMIA: ICD-10-CM

## 2019-08-21 DIAGNOSIS — Z17.1 MALIGNANT NEOPLASM OF LOWER-INNER QUADRANT OF RIGHT BREAST OF FEMALE, ESTROGEN RECEPTOR NEGATIVE: Primary | ICD-10-CM

## 2019-08-21 DIAGNOSIS — R50.81 NEUTROPENIC FEVER: ICD-10-CM

## 2019-08-21 DIAGNOSIS — Z51.11 ENCOUNTER FOR CHEMOTHERAPY MANAGEMENT: Primary | ICD-10-CM

## 2019-08-21 DIAGNOSIS — C50.311 MALIGNANT NEOPLASM OF LOWER-INNER QUADRANT OF RIGHT BREAST OF FEMALE, ESTROGEN RECEPTOR NEGATIVE: Primary | ICD-10-CM

## 2019-08-21 DIAGNOSIS — D70.9 NEUTROPENIC FEVER: ICD-10-CM

## 2019-08-21 DIAGNOSIS — Z17.1 MALIGNANT NEOPLASM OF LOWER-INNER QUADRANT OF RIGHT BREAST OF FEMALE, ESTROGEN RECEPTOR NEGATIVE: ICD-10-CM

## 2019-08-21 DIAGNOSIS — C50.311 MALIGNANT NEOPLASM OF LOWER-INNER QUADRANT OF RIGHT BREAST OF FEMALE, ESTROGEN RECEPTOR NEGATIVE: ICD-10-CM

## 2019-08-21 DIAGNOSIS — D64.81 ANTINEOPLASTIC CHEMOTHERAPY INDUCED ANEMIA: ICD-10-CM

## 2019-08-21 DIAGNOSIS — R42 LIGHT HEADEDNESS: ICD-10-CM

## 2019-08-21 PROBLEM — D64.9 ANEMIA, UNSPECIFIED: Status: RESOLVED | Noted: 2019-07-02 | Resolved: 2019-08-21

## 2019-08-21 LAB
ALBUMIN SERPL BCP-MCNC: 3.3 G/DL (ref 3.5–5.2)
ALP SERPL-CCNC: 102 U/L (ref 55–135)
ALT SERPL W/O P-5'-P-CCNC: 17 U/L (ref 10–44)
ANION GAP SERPL CALC-SCNC: 12 MMOL/L (ref 8–16)
AST SERPL-CCNC: 35 U/L (ref 10–40)
BILIRUB SERPL-MCNC: 0.5 MG/DL (ref 0.1–1)
BUN SERPL-MCNC: 8 MG/DL (ref 8–23)
CALCIUM SERPL-MCNC: 9.8 MG/DL (ref 8.7–10.5)
CHLORIDE SERPL-SCNC: 105 MMOL/L (ref 95–110)
CO2 SERPL-SCNC: 21 MMOL/L (ref 23–29)
CREAT SERPL-MCNC: 0.7 MG/DL (ref 0.5–1.4)
ERYTHROCYTE [DISTWIDTH] IN BLOOD BY AUTOMATED COUNT: 16.7 % (ref 11.5–14.5)
EST. GFR  (AFRICAN AMERICAN): >60 ML/MIN/1.73 M^2
EST. GFR  (NON AFRICAN AMERICAN): >60 ML/MIN/1.73 M^2
GLUCOSE SERPL-MCNC: 83 MG/DL (ref 70–110)
HCT VFR BLD AUTO: 31.1 % (ref 37–48.5)
HGB BLD-MCNC: 10 G/DL (ref 12–16)
IMM GRANULOCYTES # BLD AUTO: 0.06 K/UL (ref 0–0.04)
MCH RBC QN AUTO: 29.8 PG (ref 27–31)
MCHC RBC AUTO-ENTMCNC: 32.2 G/DL (ref 32–36)
MCV RBC AUTO: 93 FL (ref 82–98)
NEUTROPHILS # BLD AUTO: 2.8 K/UL (ref 1.8–7.7)
PLATELET # BLD AUTO: 374 K/UL (ref 150–350)
PMV BLD AUTO: 10 FL (ref 9.2–12.9)
POTASSIUM SERPL-SCNC: 4.3 MMOL/L (ref 3.5–5.1)
PROT SERPL-MCNC: 6.7 G/DL (ref 6–8.4)
RBC # BLD AUTO: 3.36 M/UL (ref 4–5.4)
SODIUM SERPL-SCNC: 138 MMOL/L (ref 136–145)
WBC # BLD AUTO: 5.31 K/UL (ref 3.9–12.7)

## 2019-08-21 PROCEDURE — 96411 CHEMO IV PUSH ADDL DRUG: CPT

## 2019-08-21 PROCEDURE — 36593 DECLOT VASCULAR DEVICE: CPT

## 2019-08-21 PROCEDURE — 99999 PR PBB SHADOW E&M-EST. PATIENT-LVL III: CPT | Mod: PBBFAC,,, | Performed by: INTERNAL MEDICINE

## 2019-08-21 PROCEDURE — 96413 CHEMO IV INFUSION 1 HR: CPT

## 2019-08-21 PROCEDURE — 80053 COMPREHEN METABOLIC PANEL: CPT

## 2019-08-21 PROCEDURE — 63600175 PHARM REV CODE 636 W HCPCS: Mod: JG | Performed by: INTERNAL MEDICINE

## 2019-08-21 PROCEDURE — 99215 OFFICE O/P EST HI 40 MIN: CPT | Mod: S$GLB,,, | Performed by: INTERNAL MEDICINE

## 2019-08-21 PROCEDURE — 1101F PT FALLS ASSESS-DOCD LE1/YR: CPT | Mod: CPTII,S$GLB,, | Performed by: INTERNAL MEDICINE

## 2019-08-21 PROCEDURE — 36415 COLL VENOUS BLD VENIPUNCTURE: CPT

## 2019-08-21 PROCEDURE — A4216 STERILE WATER/SALINE, 10 ML: HCPCS | Performed by: INTERNAL MEDICINE

## 2019-08-21 PROCEDURE — 99215 PR OFFICE/OUTPT VISIT, EST, LEVL V, 40-54 MIN: ICD-10-PCS | Mod: S$GLB,,, | Performed by: INTERNAL MEDICINE

## 2019-08-21 PROCEDURE — 99999 PR PBB SHADOW E&M-EST. PATIENT-LVL III: ICD-10-PCS | Mod: PBBFAC,,, | Performed by: INTERNAL MEDICINE

## 2019-08-21 PROCEDURE — 96377 APPLICATON ON-BODY INJECTOR: CPT | Mod: 59

## 2019-08-21 PROCEDURE — 96367 TX/PROPH/DG ADDL SEQ IV INF: CPT

## 2019-08-21 PROCEDURE — 25000003 PHARM REV CODE 250: Performed by: INTERNAL MEDICINE

## 2019-08-21 PROCEDURE — 85027 COMPLETE CBC AUTOMATED: CPT

## 2019-08-21 PROCEDURE — 1101F PR PT FALLS ASSESS DOC 0-1 FALLS W/OUT INJ PAST YR: ICD-10-PCS | Mod: CPTII,S$GLB,, | Performed by: INTERNAL MEDICINE

## 2019-08-21 RX ORDER — HEPARIN 100 UNIT/ML
500 SYRINGE INTRAVENOUS
Status: DISCONTINUED | OUTPATIENT
Start: 2019-08-21 | End: 2019-08-21 | Stop reason: HOSPADM

## 2019-08-21 RX ORDER — SODIUM CHLORIDE 0.9 % (FLUSH) 0.9 %
10 SYRINGE (ML) INJECTION
Status: CANCELLED | OUTPATIENT
Start: 2019-08-21

## 2019-08-21 RX ORDER — DOXORUBICIN HYDROCHLORIDE 2 MG/ML
60 INJECTION, SOLUTION INTRAVENOUS
Status: CANCELLED | OUTPATIENT
Start: 2019-08-21

## 2019-08-21 RX ORDER — DOXORUBICIN HYDROCHLORIDE 2 MG/ML
60 INJECTION, SOLUTION INTRAVENOUS
Status: COMPLETED | OUTPATIENT
Start: 2019-08-21 | End: 2019-08-21

## 2019-08-21 RX ORDER — HEPARIN 100 UNIT/ML
500 SYRINGE INTRAVENOUS
Status: CANCELLED | OUTPATIENT
Start: 2019-08-21

## 2019-08-21 RX ORDER — SODIUM CHLORIDE 0.9 % (FLUSH) 0.9 %
10 SYRINGE (ML) INJECTION
Status: DISCONTINUED | OUTPATIENT
Start: 2019-08-21 | End: 2019-08-21 | Stop reason: HOSPADM

## 2019-08-21 RX ORDER — SODIUM CHLORIDE 0.9 % (FLUSH) 0.9 %
10 SYRINGE (ML) INJECTION
Status: COMPLETED | OUTPATIENT
Start: 2019-08-21 | End: 2019-08-21

## 2019-08-21 RX ORDER — HEPARIN 100 UNIT/ML
500 SYRINGE INTRAVENOUS
Status: COMPLETED | OUTPATIENT
Start: 2019-08-21 | End: 2019-08-21

## 2019-08-21 RX ADMIN — PEGFILGRASTIM 6 MG: KIT SUBCUTANEOUS at 05:08

## 2019-08-21 RX ADMIN — DEXAMETHASONE SODIUM PHOSPHATE: 4 INJECTION, SOLUTION INTRA-ARTICULAR; INTRALESIONAL; INTRAMUSCULAR; INTRAVENOUS; SOFT TISSUE at 03:08

## 2019-08-21 RX ADMIN — SODIUM CHLORIDE: 0.9 INJECTION, SOLUTION INTRAVENOUS at 03:08

## 2019-08-21 RX ADMIN — APREPITANT 130 MG: 130 INJECTION, EMULSION INTRAVENOUS at 03:08

## 2019-08-21 RX ADMIN — HEPARIN 500 UNITS: 100 SYRINGE at 05:08

## 2019-08-21 RX ADMIN — HEPARIN SODIUM (PORCINE) LOCK FLUSH IV SOLN 100 UNIT/ML 500 UNITS: 100 SOLUTION at 01:08

## 2019-08-21 RX ADMIN — Medication 10 ML: at 01:08

## 2019-08-21 RX ADMIN — DOXORUBICIN HYDROCHLORIDE 106 MG: 2 INJECTION, SOLUTION INTRAVENOUS at 04:08

## 2019-08-21 RX ADMIN — CYCLOPHOSPHAMIDE 1060 MG: 1 INJECTION, POWDER, FOR SOLUTION INTRAVENOUS; ORAL at 04:08

## 2019-08-21 RX ADMIN — ALTEPLASE 2 MG: 2.2 INJECTION, POWDER, LYOPHILIZED, FOR SOLUTION INTRAVENOUS at 01:08

## 2019-08-21 NOTE — PROGRESS NOTES
History:     Reason For Follow Up/Onc History:   1. Stage 1B ([id number]) invasive ductal carcinoma of right breast, lower outer quadrant, ER neg, MA neg, Her2 neg, Grade 3, Ki67 70%      HPI:   Sharri Cline presents for follow up of breast cancer and continuation of neoadjuvant ddAC. She's due for cycle 4 ddAC. She was admitted for neutropenic fever after cycle 3. I discussed the case with Dr. Jerez and we agreed to delay her last dose of dose dense Adriamycin Cytoxan 1 week.   Her imaging and laboratory evaluation was unremarkable for source for her neutropenic fever. Feeling better since discharge; energy improved; minimal coughing; reports sputum that she spits up that is thick and white. Afebrile. + light headedness when going from sitting to standing; worse with heat.     Onc history:   - She presented for screening mammo in 5/15/2019 which showed focal asymmetries in both left and right breast. Diagnostic mammogram and US showed no significant abn of left breast, and right breast 15 mm x 11 mm x 12 mm mass at 4:00, 5 CFN. Biopsy on 5/24/19 showed grade 3 IDC, triple negative, Ki67 70%.    - 7/3/19 started neoadjuvant chemotherapy with ddAC to be followed by Taxol.   - 8/8/19 admitted for neutropenic fever     History: I reviewed, confirmed and updated history (past medical, social, family) as necessary.    Medications    Current Outpatient Medications:     b complex vitamins tablet, Take 1 tablet by mouth once daily., Disp: , Rfl:     calcium carbonate-vit D3-min 600 mg calcium- 400 unit Tab, Take 1 tablet by mouth 2 (two) times daily., Disp: 60 tablet, Rfl: 3    dexamethasone (DECADRON) 4 MG Tab, Take one tablet twice a day days 2,3, and 4 of each chemotherapy cycle., Disp: 12 tablet, Rfl: 1    fish oil-omega-3 fatty acids 300-1,000 mg capsule, Take 2 g by mouth once daily., Disp: , Rfl:     lidocaine-prilocaine (EMLA) cream, Apply topically as needed 45 minutes before port access, Disp: 30 g, Rfl:  0    magic mouthwash diphen/antac/lidoc/nysta, Take 10 mLs by mouth 4 (four) times daily., Disp: 120 mL, Rfl: 0    multivitamin with minerals tablet, Take 1 tablet by mouth once daily.  , Disp: , Rfl:     ondansetron (ZOFRAN-ODT) 8 MG TbDL, Take 1 tablet (8 mg total) by mouth every 8 (eight) hours as needed (nausea)., Disp: 45 tablet, Rfl: 1    polyethylene glycol (GLYCOLAX) 17 gram/dose powder, Mix 1 capful (17 g) with liquid and take by mouth once daily., Disp: 238 g, Rfl: 0    senna-docusate 8.6-50 mg (PERICOLACE) 8.6-50 mg per tablet, Take 2 tablets by mouth 2 (two) times daily., Disp: , Rfl:   No current facility-administered medications for this visit.     Facility-Administered Medications Ordered in Other Visits:     alteplase injection 2 mg, 2 mg, Intra-Catheter, PRN, Dominique Ayala MD, 2 mg at 08/21/19 1329  Allergies  Review of patient's allergies indicates:  No Known Allergies  Review of Systems  Review of Systems   Constitutional: Positive for fatigue. Negative for activity change, chills, fever and unexpected weight change.   HENT: Negative for congestion, hearing loss, nosebleeds, sinus pressure and trouble swallowing.         Dry mouth   Eyes: Negative for pain, discharge and itching.   Respiratory: Negative for cough, chest tightness and shortness of breath.    Cardiovascular: Negative for chest pain, palpitations and leg swelling.   Gastrointestinal: Negative for abdominal distention, abdominal pain, blood in stool, diarrhea, nausea, rectal pain and vomiting.   Endocrine: Negative for heat intolerance and polydipsia.   Genitourinary: Negative for difficulty urinating, dysuria, flank pain, frequency, hematuria and urgency.   Musculoskeletal: Negative for arthralgias, back pain and neck pain.   Skin: Negative for color change, pallor and rash.   Neurological: Positive for light-headedness. Negative for dizziness, numbness and headaches.   Hematological: Negative for adenopathy. Does not  "bruise/bleed easily.   Psychiatric/Behavioral: Negative for confusion and decreased concentration. The patient is not nervous/anxious.         Objective     Objective:     Vitals:    08/21/19 1349   BP: 126/76   BP Location: Right arm   Patient Position: Sitting   BP Method: Large (Automatic)   Pulse: 73   Resp: 18   Temp: 97.6 °F (36.4 °C)   TempSrc: Oral   SpO2: 100%   Weight: 60.3 kg (132 lb 15 oz)   Height: 5' 3" (1.6 m)     Body surface area is 1.64 meters squared.  Physical Exam   Constitutional: She is oriented to person, place, and time. She appears well-developed and well-nourished. No distress.   HENT:   Head: Normocephalic and atraumatic.   Mouth/Throat: Oropharynx is clear and moist and mucous membranes are normal. No oral lesions.   Eyes: Conjunctivae and EOM are normal. Right eye exhibits no discharge. Left eye exhibits no discharge. No scleral icterus.   Neck: Normal range of motion. Neck supple. No thyroid mass and no thyromegaly present.   Cardiovascular: Normal rate, regular rhythm, S1 normal, S2 normal and normal heart sounds.   Pulmonary/Chest: Effort normal and breath sounds normal. No respiratory distress. She has no wheezes. She has no rhonchi. She has no rales. Chest wall is not dull to percussion.   Right breast with indention of skin at 4:00 but without palpable mass where previously felt;  mediport   Abdominal: Soft. Normal appearance and bowel sounds are normal. There is no hepatosplenomegaly. There is no tenderness.   Musculoskeletal: She exhibits no tenderness.   Lymphadenopathy: No inguinal adenopathy noted on the right or left side.   Neurological: She is alert and oriented to person, place, and time. She has normal strength.   Skin: Skin is warm, dry and intact. No pallor.   Psychiatric: Her behavior is normal. Thought content normal.     Labs and Imaging  Results for orders placed or performed in visit on 08/21/19   CBC Oncology   Result Value Ref Range    WBC 5.31 3.90 - 12.70 K/uL "    RBC 3.36 (L) 4.00 - 5.40 M/uL    Hemoglobin 10.0 (L) 12.0 - 16.0 g/dL    Hematocrit 31.1 (L) 37.0 - 48.5 %    Mean Corpuscular Volume 93 82 - 98 fL    Mean Corpuscular Hemoglobin 29.8 27.0 - 31.0 pg    Mean Corpuscular Hemoglobin Conc 32.2 32.0 - 36.0 g/dL    RDW 16.7 (H) 11.5 - 14.5 %    Platelets 374 (H) 150 - 350 K/uL    MPV 10.0 9.2 - 12.9 fL    Gran # (ANC) 2.8 1.8 - 7.7 K/uL    Immature Grans (Abs) 0.06 (H) 0.00 - 0.04 K/uL   Comprehensive metabolic panel   Result Value Ref Range    Sodium 138 136 - 145 mmol/L    Potassium 4.3 3.5 - 5.1 mmol/L    Chloride 105 95 - 110 mmol/L    CO2 21 (L) 23 - 29 mmol/L    Glucose 83 70 - 110 mg/dL    BUN, Bld 8 8 - 23 mg/dL    Creatinine 0.7 0.5 - 1.4 mg/dL    Calcium 9.8 8.7 - 10.5 mg/dL    Total Protein 6.7 6.0 - 8.4 g/dL    Albumin 3.3 (L) 3.5 - 5.2 g/dL    Total Bilirubin 0.5 0.1 - 1.0 mg/dL    Alkaline Phosphatase 102 55 - 135 U/L    AST 35 10 - 40 U/L    ALT 17 10 - 44 U/L    Anion Gap 12 8 - 16 mmol/L    eGFR if African American >60.0 >60 mL/min/1.73 m^2    eGFR if non African American >60.0 >60 mL/min/1.73 m^2       Assessment     Assessment:     1. Stage 1B ([id number]) invasive ductal carcinoma of right breast, lower outer quadrant, ER neg, IN neg, Her2 neg, Grade 3, Ki67 70%   * 7/3/19 started neoadjuvant chemotherapy with four cycles of dose-dense Adriamycin and cyclophosphamide with Neulasta support followed by twelve doses of weekly paclitaxel.   * Can consider adding Zometa every 6 months x 2 years in adjuvant setting.    * Cycle 4 neoadjuvant ddAC with Neulasta   * Tumor is shrinking.     2. Chronically elevated alk phos and bilirubin   * Chronic; follows with Hepatology; prior liver biopsy 11/2018 with mild portal inflammation    3. Chemo anemia   * Monitor.    * Likely contributing to light headedness.   * Improved.     4. Drug constipation   * Encouraged Senna S two nightly.     5. Neutropenia with neutropenic fever after cycle 3 ddAC despite Neulasta.    *  Call if any fevers. Neutropenia improved.     6. Lightheadedness   * suspect due to dehydration - offered IV hydration, but she prefers to try to increase oral fluids.     Plan:     1. Cycle 4 neoadjuvant ddAC with Neualsta. RTC in 2 weeks to start Taxol.   2. Continue antiemetics.   3. Senna s  4. Increase hydration.      RTC every 2 weeks for treatment for weekly Taxol.   In addition to chemo monitoring, I eval'd tumor response and reviewed hospital notes.       Future Appointments   Date Time Provider Department Center   8/21/2019  2:30 PM Dominique Ayala MD [facility] HEM ONC [facility]   8/21/2019  3:00 PM [facility]   8/29/2019  2:00 PM LAB, HEMON CANCER BLDG [facility]   8/29/2019  3:00 PM Dominique Ayala MD [facility] HEM ONC [facility]   8/29/2019  3:30 PM [facility]

## 2019-08-21 NOTE — PLAN OF CARE
Problem: Adult Inpatient Plan of Care  Goal: Plan of Care Review  Outcome: Outcome(s) achieved Date Met: 08/21/19  Patient tolerated AC regimen with no complications. VS stable. Labs reviewed. Port flushed, heparin locked, and de-accessed prior to discharge. Discussed future chemotherapy appointments that include a new drug - Taxol. AVS provided. Discharged home.

## 2019-08-30 ENCOUNTER — HOSPITAL ENCOUNTER (INPATIENT)
Facility: HOSPITAL | Age: 69
LOS: 3 days | Discharge: HOME | DRG: 808 | End: 2019-09-02
Attending: EMERGENCY MEDICINE | Admitting: INTERNAL MEDICINE
Payer: MEDICARE

## 2019-08-30 ENCOUNTER — TELEPHONE (OUTPATIENT)
Dept: HEMATOLOGY/ONCOLOGY | Facility: CLINIC | Age: 69
End: 2019-08-30

## 2019-08-30 DIAGNOSIS — R40.20 LOC (LOSS OF CONSCIOUSNESS): ICD-10-CM

## 2019-08-30 DIAGNOSIS — Z00.00 ROUTINE MEDICAL EXAM: ICD-10-CM

## 2019-08-30 DIAGNOSIS — D70.9 NEUTROPENIC FEVER: Primary | ICD-10-CM

## 2019-08-30 DIAGNOSIS — Z91.89 AT RISK FOR LYMPHEDEMA: ICD-10-CM

## 2019-08-30 DIAGNOSIS — R55 SYNCOPE, UNSPECIFIED SYNCOPE TYPE: ICD-10-CM

## 2019-08-30 DIAGNOSIS — R74.8 ELEVATED ALKALINE PHOSPHATASE LEVEL: ICD-10-CM

## 2019-08-30 DIAGNOSIS — R07.9 CHEST PAIN: ICD-10-CM

## 2019-08-30 DIAGNOSIS — E87.3 METABOLIC ALKALOSIS: ICD-10-CM

## 2019-08-30 DIAGNOSIS — D69.6 THROMBOCYTOPENIA: ICD-10-CM

## 2019-08-30 DIAGNOSIS — A41.9 SEPSIS, DUE TO UNSPECIFIED ORGANISM: ICD-10-CM

## 2019-08-30 DIAGNOSIS — R50.81 NEUTROPENIC FEVER: Primary | ICD-10-CM

## 2019-08-30 LAB
ABO + RH BLD: NORMAL
ALBUMIN SERPL BCP-MCNC: 3.2 G/DL (ref 3.5–5.2)
ALLENS TEST: ABNORMAL
ALLENS TEST: ABNORMAL
ALP SERPL-CCNC: 102 U/L (ref 55–135)
ALT SERPL W/O P-5'-P-CCNC: 9 U/L (ref 10–44)
ANION GAP SERPL CALC-SCNC: 13 MMOL/L (ref 8–16)
ANISOCYTOSIS BLD QL SMEAR: SLIGHT
ANISOCYTOSIS BLD QL SMEAR: SLIGHT
APTT BLDCRRT: 27 SEC (ref 21–32)
AST SERPL-CCNC: 19 U/L (ref 10–40)
BASOPHILS # BLD AUTO: ABNORMAL K/UL (ref 0–0.2)
BASOPHILS # BLD AUTO: ABNORMAL K/UL (ref 0–0.2)
BASOPHILS NFR BLD: 0 % (ref 0–1.9)
BASOPHILS NFR BLD: 4 % (ref 0–1.9)
BILIRUB SERPL-MCNC: 1 MG/DL (ref 0.1–1)
BILIRUB UR QL STRIP: NEGATIVE
BLD GP AB SCN CELLS X3 SERPL QL: NORMAL
BLD PROD TYP BPU: NORMAL
BLOOD UNIT EXPIRATION DATE: NORMAL
BLOOD UNIT TYPE CODE: 6200
BLOOD UNIT TYPE: NORMAL
BNP SERPL-MCNC: 17 PG/ML (ref 0–99)
BUN SERPL-MCNC: 10 MG/DL (ref 8–23)
CALCIUM SERPL-MCNC: 9.2 MG/DL (ref 8.7–10.5)
CHLORIDE SERPL-SCNC: 99 MMOL/L (ref 95–110)
CLARITY UR REFRACT.AUTO: CLEAR
CO2 SERPL-SCNC: 22 MMOL/L (ref 23–29)
CODING SYSTEM: NORMAL
COLOR UR AUTO: YELLOW
CREAT SERPL-MCNC: 0.6 MG/DL (ref 0.5–1.4)
DACRYOCYTES BLD QL SMEAR: ABNORMAL
DACRYOCYTES BLD QL SMEAR: ABNORMAL
DELSYS: ABNORMAL
DELSYS: ABNORMAL
DIFFERENTIAL METHOD: ABNORMAL
DIFFERENTIAL METHOD: ABNORMAL
DISPENSE STATUS: NORMAL
EOSINOPHIL # BLD AUTO: ABNORMAL K/UL (ref 0–0.5)
EOSINOPHIL # BLD AUTO: ABNORMAL K/UL (ref 0–0.5)
EOSINOPHIL NFR BLD: 0 % (ref 0–8)
EOSINOPHIL NFR BLD: 4 % (ref 0–8)
ERYTHROCYTE [DISTWIDTH] IN BLOOD BY AUTOMATED COUNT: 15.6 % (ref 11.5–14.5)
ERYTHROCYTE [DISTWIDTH] IN BLOOD BY AUTOMATED COUNT: 15.7 % (ref 11.5–14.5)
ERYTHROCYTE [DISTWIDTH] IN BLOOD BY AUTOMATED COUNT: 15.9 % (ref 11.5–14.5)
EST. GFR  (AFRICAN AMERICAN): >60 ML/MIN/1.73 M^2
EST. GFR  (NON AFRICAN AMERICAN): >60 ML/MIN/1.73 M^2
ESTIMATED AVG GLUCOSE: 100 MG/DL (ref 68–131)
GIANT PLATELETS BLD QL SMEAR: PRESENT
GIANT PLATELETS BLD QL SMEAR: PRESENT
GLUCOSE SERPL-MCNC: 138 MG/DL (ref 70–110)
GLUCOSE UR QL STRIP: NEGATIVE
HBA1C MFR BLD HPLC: 5.1 % (ref 4–5.6)
HCO3 UR-SCNC: 23.2 MMOL/L (ref 24–28)
HCO3 UR-SCNC: 25.6 MMOL/L (ref 24–28)
HCT VFR BLD AUTO: 18.4 % (ref 37–48.5)
HCT VFR BLD AUTO: 20 % (ref 37–48.5)
HCT VFR BLD AUTO: 25.6 % (ref 37–48.5)
HGB BLD-MCNC: 6.1 G/DL (ref 12–16)
HGB BLD-MCNC: 6.8 G/DL (ref 12–16)
HGB BLD-MCNC: 8.4 G/DL (ref 12–16)
HGB UR QL STRIP: NEGATIVE
HYPOCHROMIA BLD QL SMEAR: ABNORMAL
IMM GRANULOCYTES # BLD AUTO: 0 K/UL (ref 0–0.04)
IMM GRANULOCYTES # BLD AUTO: ABNORMAL K/UL (ref 0–0.04)
IMM GRANULOCYTES # BLD AUTO: ABNORMAL K/UL (ref 0–0.04)
IMM GRANULOCYTES NFR BLD AUTO: ABNORMAL % (ref 0–0.5)
IMM GRANULOCYTES NFR BLD AUTO: ABNORMAL % (ref 0–0.5)
INR PPP: 1.1 (ref 0.8–1.2)
KETONES UR QL STRIP: ABNORMAL
LACTATE SERPL-SCNC: 0.5 MMOL/L (ref 0.5–2.2)
LACTATE SERPL-SCNC: 1.5 MMOL/L (ref 0.5–2.2)
LACTATE SERPL-SCNC: 1.5 MMOL/L (ref 0.5–2.2)
LEUKOCYTE ESTERASE UR QL STRIP: NEGATIVE
LYMPHOCYTES # BLD AUTO: ABNORMAL K/UL (ref 1–4.8)
LYMPHOCYTES # BLD AUTO: ABNORMAL K/UL (ref 1–4.8)
LYMPHOCYTES NFR BLD: 86 % (ref 18–48)
LYMPHOCYTES NFR BLD: 92 % (ref 18–48)
MAGNESIUM SERPL-MCNC: 1.9 MG/DL (ref 1.6–2.6)
MCH RBC QN AUTO: 30 PG (ref 27–31)
MCH RBC QN AUTO: 30.2 PG (ref 27–31)
MCH RBC QN AUTO: 30.2 PG (ref 27–31)
MCHC RBC AUTO-ENTMCNC: 32.8 G/DL (ref 32–36)
MCHC RBC AUTO-ENTMCNC: 33.2 G/DL (ref 32–36)
MCHC RBC AUTO-ENTMCNC: 34 G/DL (ref 32–36)
MCV RBC AUTO: 89 FL (ref 82–98)
MCV RBC AUTO: 91 FL (ref 82–98)
MCV RBC AUTO: 92 FL (ref 82–98)
MODE: ABNORMAL
MODE: ABNORMAL
MONOCYTES # BLD AUTO: ABNORMAL K/UL (ref 0.3–1)
MONOCYTES # BLD AUTO: ABNORMAL K/UL (ref 0.3–1)
MONOCYTES NFR BLD: 2 % (ref 4–15)
MONOCYTES NFR BLD: 4 % (ref 4–15)
NEUTROPHILS # BLD AUTO: 0.1 K/UL (ref 1.8–7.7)
NEUTROPHILS NFR BLD: 0 % (ref 38–73)
NEUTROPHILS NFR BLD: 8 % (ref 38–73)
NITRITE UR QL STRIP: NEGATIVE
NRBC BLD-RTO: 0 /100 WBC
NRBC BLD-RTO: 0 /100 WBC
OVALOCYTES BLD QL SMEAR: ABNORMAL
PCO2 BLDA: 23 MMHG (ref 35–45)
PCO2 BLDA: 25 MMHG (ref 35–45)
PH SMN: 7.61 [PH] (ref 7.35–7.45)
PH SMN: 7.62 [PH] (ref 7.35–7.45)
PH UR STRIP: 6 [PH] (ref 5–8)
PLATELET # BLD AUTO: 18 K/UL (ref 150–350)
PLATELET # BLD AUTO: 18 K/UL (ref 150–350)
PLATELET # BLD AUTO: 27 K/UL (ref 150–350)
PLATELET BLD QL SMEAR: ABNORMAL
PLATELET BLD QL SMEAR: ABNORMAL
PMV BLD AUTO: 10.9 FL (ref 9.2–12.9)
PMV BLD AUTO: 12.6 FL (ref 9.2–12.9)
PMV BLD AUTO: 12.9 FL (ref 9.2–12.9)
PO2 BLDA: 12 MMHG (ref 40–60)
PO2 BLDA: 12 MMHG (ref 40–60)
POC BE: 2 MMOL/L
POC BE: 4 MMOL/L
POC SATURATED O2: 19 % (ref 95–100)
POC SATURATED O2: 20 % (ref 95–100)
POC TCO2: 24 MMOL/L (ref 24–29)
POC TCO2: 26 MMOL/L (ref 24–29)
POIKILOCYTOSIS BLD QL SMEAR: SLIGHT
POIKILOCYTOSIS BLD QL SMEAR: SLIGHT
POTASSIUM SERPL-SCNC: 2.8 MMOL/L (ref 3.5–5.1)
PROCALCITONIN SERPL IA-MCNC: 0.54 NG/ML
PROT SERPL-MCNC: 7 G/DL (ref 6–8.4)
PROT UR QL STRIP: NEGATIVE
PROTHROMBIN TIME: 11.3 SEC (ref 9–12.5)
RBC # BLD AUTO: 2.03 M/UL (ref 4–5.4)
RBC # BLD AUTO: 2.25 M/UL (ref 4–5.4)
RBC # BLD AUTO: 2.78 M/UL (ref 4–5.4)
SAMPLE: ABNORMAL
SAMPLE: ABNORMAL
SITE: ABNORMAL
SITE: ABNORMAL
SODIUM SERPL-SCNC: 134 MMOL/L (ref 136–145)
SP GR UR STRIP: 1.01 (ref 1–1.03)
T4 FREE SERPL-MCNC: 0.68 NG/DL (ref 0.71–1.51)
TRANS ERYTHROCYTES VOL PATIENT: NORMAL ML
TROPONIN I SERPL DL<=0.01 NG/ML-MCNC: 0.02 NG/ML (ref 0–0.03)
TSH SERPL DL<=0.005 MIU/L-ACNC: 0.39 UIU/ML (ref 0.4–4)
URN SPEC COLLECT METH UR: ABNORMAL
WBC # BLD AUTO: 0.23 K/UL (ref 3.9–12.7)
WBC # BLD AUTO: 0.27 K/UL (ref 3.9–12.7)
WBC # BLD AUTO: 0.31 K/UL (ref 3.9–12.7)

## 2019-08-30 PROCEDURE — 36430 TRANSFUSION BLD/BLD COMPNT: CPT

## 2019-08-30 PROCEDURE — 99223 1ST HOSP IP/OBS HIGH 75: CPT | Mod: AI,GC,, | Performed by: INTERNAL MEDICINE

## 2019-08-30 PROCEDURE — 84484 ASSAY OF TROPONIN QUANT: CPT

## 2019-08-30 PROCEDURE — P9021 RED BLOOD CELLS UNIT: HCPCS

## 2019-08-30 PROCEDURE — 86920 COMPATIBILITY TEST SPIN: CPT

## 2019-08-30 PROCEDURE — 99291 CRITICAL CARE FIRST HOUR: CPT | Mod: 25

## 2019-08-30 PROCEDURE — 96375 TX/PRO/DX INJ NEW DRUG ADDON: CPT | Mod: 59

## 2019-08-30 PROCEDURE — 99900035 HC TECH TIME PER 15 MIN (STAT)

## 2019-08-30 PROCEDURE — 85027 COMPLETE CBC AUTOMATED: CPT | Mod: 91

## 2019-08-30 PROCEDURE — 99291 CRITICAL CARE FIRST HOUR: CPT | Mod: ,,, | Performed by: EMERGENCY MEDICINE

## 2019-08-30 PROCEDURE — 82803 BLOOD GASES ANY COMBINATION: CPT

## 2019-08-30 PROCEDURE — 84145 PROCALCITONIN (PCT): CPT

## 2019-08-30 PROCEDURE — 63600175 PHARM REV CODE 636 W HCPCS: Performed by: STUDENT IN AN ORGANIZED HEALTH CARE EDUCATION/TRAINING PROGRAM

## 2019-08-30 PROCEDURE — 85730 THROMBOPLASTIN TIME PARTIAL: CPT

## 2019-08-30 PROCEDURE — 84439 ASSAY OF FREE THYROXINE: CPT

## 2019-08-30 PROCEDURE — 84443 ASSAY THYROID STIM HORMONE: CPT

## 2019-08-30 PROCEDURE — 83735 ASSAY OF MAGNESIUM: CPT

## 2019-08-30 PROCEDURE — 96365 THER/PROPH/DIAG IV INF INIT: CPT | Mod: 59

## 2019-08-30 PROCEDURE — 63600175 PHARM REV CODE 636 W HCPCS: Performed by: INTERNAL MEDICINE

## 2019-08-30 PROCEDURE — 96361 HYDRATE IV INFUSION ADD-ON: CPT | Mod: 59

## 2019-08-30 PROCEDURE — 36415 COLL VENOUS BLD VENIPUNCTURE: CPT

## 2019-08-30 PROCEDURE — 99291 PR CRITICAL CARE, E/M 30-74 MINUTES: ICD-10-PCS | Mod: ,,, | Performed by: EMERGENCY MEDICINE

## 2019-08-30 PROCEDURE — 83036 HEMOGLOBIN GLYCOSYLATED A1C: CPT

## 2019-08-30 PROCEDURE — 81003 URINALYSIS AUTO W/O SCOPE: CPT

## 2019-08-30 PROCEDURE — 93010 ELECTROCARDIOGRAM REPORT: CPT | Mod: ,,, | Performed by: INTERNAL MEDICINE

## 2019-08-30 PROCEDURE — 85610 PROTHROMBIN TIME: CPT

## 2019-08-30 PROCEDURE — 83605 ASSAY OF LACTIC ACID: CPT | Mod: 91

## 2019-08-30 PROCEDURE — 87040 BLOOD CULTURE FOR BACTERIA: CPT | Mod: 59

## 2019-08-30 PROCEDURE — 63600175 PHARM REV CODE 636 W HCPCS: Performed by: EMERGENCY MEDICINE

## 2019-08-30 PROCEDURE — 25000003 PHARM REV CODE 250: Performed by: EMERGENCY MEDICINE

## 2019-08-30 PROCEDURE — 93010 EKG 12-LEAD: ICD-10-PCS | Mod: ,,, | Performed by: INTERNAL MEDICINE

## 2019-08-30 PROCEDURE — 86901 BLOOD TYPING SEROLOGIC RH(D): CPT

## 2019-08-30 PROCEDURE — 83880 ASSAY OF NATRIURETIC PEPTIDE: CPT

## 2019-08-30 PROCEDURE — 93005 ELECTROCARDIOGRAM TRACING: CPT

## 2019-08-30 PROCEDURE — 85007 BL SMEAR W/DIFF WBC COUNT: CPT

## 2019-08-30 PROCEDURE — 20600001 HC STEP DOWN PRIVATE ROOM

## 2019-08-30 PROCEDURE — 99223 PR INITIAL HOSPITAL CARE,LEVL III: ICD-10-PCS | Mod: AI,GC,, | Performed by: INTERNAL MEDICINE

## 2019-08-30 PROCEDURE — 80053 COMPREHEN METABOLIC PANEL: CPT

## 2019-08-30 RX ORDER — HYDROCODONE BITARTRATE AND ACETAMINOPHEN 500; 5 MG/1; MG/1
TABLET ORAL
Status: DISCONTINUED | OUTPATIENT
Start: 2019-08-30 | End: 2019-09-02 | Stop reason: HOSPADM

## 2019-08-30 RX ORDER — ACETAMINOPHEN 500 MG
1000 TABLET ORAL
Status: COMPLETED | OUTPATIENT
Start: 2019-08-30 | End: 2019-08-30

## 2019-08-30 RX ORDER — IBUPROFEN 200 MG
16 TABLET ORAL
Status: DISCONTINUED | OUTPATIENT
Start: 2019-08-30 | End: 2019-09-02 | Stop reason: HOSPADM

## 2019-08-30 RX ORDER — POLYETHYLENE GLYCOL 3350 17 G/17G
17 POWDER, FOR SOLUTION ORAL DAILY
Status: DISCONTINUED | OUTPATIENT
Start: 2019-08-31 | End: 2019-09-02 | Stop reason: HOSPADM

## 2019-08-30 RX ORDER — GLUCAGON 1 MG
1 KIT INJECTION
Status: DISCONTINUED | OUTPATIENT
Start: 2019-08-30 | End: 2019-09-02 | Stop reason: HOSPADM

## 2019-08-30 RX ORDER — POTASSIUM CHLORIDE 29.8 MG/ML
40 INJECTION INTRAVENOUS EVERY 4 HOURS
Status: DISCONTINUED | OUTPATIENT
Start: 2019-08-30 | End: 2019-08-30

## 2019-08-30 RX ORDER — POTASSIUM CHLORIDE 7.45 MG/ML
40 INJECTION INTRAVENOUS 3 TIMES DAILY
Status: DISCONTINUED | OUTPATIENT
Start: 2019-08-30 | End: 2019-08-30

## 2019-08-30 RX ORDER — ACETAMINOPHEN 160 MG/5ML
650 SOLUTION ORAL
Status: COMPLETED | OUTPATIENT
Start: 2019-08-30 | End: 2019-08-30

## 2019-08-30 RX ORDER — POTASSIUM CHLORIDE 14.9 MG/ML
40 INJECTION INTRAVENOUS EVERY 4 HOURS
Status: COMPLETED | OUTPATIENT
Start: 2019-08-30 | End: 2019-08-31

## 2019-08-30 RX ORDER — POTASSIUM CHLORIDE 7.45 MG/ML
10 INJECTION INTRAVENOUS ONCE
Status: DISCONTINUED | OUTPATIENT
Start: 2019-08-31 | End: 2019-08-30

## 2019-08-30 RX ORDER — POTASSIUM CHLORIDE 7.45 MG/ML
10 INJECTION INTRAVENOUS
Status: DISCONTINUED | OUTPATIENT
Start: 2019-08-30 | End: 2019-08-30

## 2019-08-30 RX ORDER — POTASSIUM CHLORIDE 7.45 MG/ML
10 INJECTION INTRAVENOUS
Status: COMPLETED | OUTPATIENT
Start: 2019-08-30 | End: 2019-08-30

## 2019-08-30 RX ORDER — ACETAMINOPHEN 650 MG/1
650 SUPPOSITORY RECTAL
Status: DISCONTINUED | OUTPATIENT
Start: 2019-08-30 | End: 2019-08-30

## 2019-08-30 RX ORDER — POTASSIUM CHLORIDE 20 MEQ/1
40 TABLET, EXTENDED RELEASE ORAL 2 TIMES DAILY
Status: DISCONTINUED | OUTPATIENT
Start: 2019-08-30 | End: 2019-08-30

## 2019-08-30 RX ORDER — SODIUM CHLORIDE 0.9 % (FLUSH) 0.9 %
10 SYRINGE (ML) INJECTION
Status: DISCONTINUED | OUTPATIENT
Start: 2019-08-30 | End: 2019-09-02 | Stop reason: HOSPADM

## 2019-08-30 RX ORDER — IBUPROFEN 200 MG
24 TABLET ORAL
Status: DISCONTINUED | OUTPATIENT
Start: 2019-08-30 | End: 2019-09-02 | Stop reason: HOSPADM

## 2019-08-30 RX ORDER — ONDANSETRON 8 MG/1
8 TABLET, ORALLY DISINTEGRATING ORAL EVERY 8 HOURS PRN
Status: DISCONTINUED | OUTPATIENT
Start: 2019-08-30 | End: 2019-09-02 | Stop reason: HOSPADM

## 2019-08-30 RX ADMIN — VANCOMYCIN HYDROCHLORIDE 500 MG: 1 INJECTION, POWDER, LYOPHILIZED, FOR SOLUTION INTRAVENOUS at 04:08

## 2019-08-30 RX ADMIN — POTASSIUM CHLORIDE 10 MEQ: 10 INJECTION, SOLUTION INTRAVENOUS at 05:08

## 2019-08-30 RX ADMIN — ACETAMINOPHEN 649.6 MG: 160 SUSPENSION ORAL at 12:08

## 2019-08-30 RX ADMIN — POTASSIUM CHLORIDE 40 MEQ: 29.8 INJECTION, SOLUTION INTRAVENOUS at 08:08

## 2019-08-30 RX ADMIN — VANCOMYCIN HYDROCHLORIDE 1250 MG: 1.25 INJECTION, POWDER, LYOPHILIZED, FOR SOLUTION INTRAVENOUS at 02:08

## 2019-08-30 RX ADMIN — ACETAMINOPHEN 1000 MG: 500 TABLET ORAL at 11:08

## 2019-08-30 RX ADMIN — PIPERACILLIN AND TAZOBACTAM 4.5 G: 4; .5 INJECTION, POWDER, LYOPHILIZED, FOR SOLUTION INTRAVENOUS; PARENTERAL at 01:08

## 2019-08-30 RX ADMIN — POTASSIUM CHLORIDE 10 MEQ: 10 INJECTION, SOLUTION INTRAVENOUS at 01:08

## 2019-08-30 RX ADMIN — Medication 10 ML: at 12:08

## 2019-08-30 RX ADMIN — POTASSIUM CHLORIDE 40 MEQ: 200 INJECTION, SOLUTION INTRAVENOUS at 09:08

## 2019-08-30 RX ADMIN — SODIUM CHLORIDE 1836 ML: 0.9 INJECTION, SOLUTION INTRAVENOUS at 11:08

## 2019-08-30 RX ADMIN — PIPERACILLIN AND TAZOBACTAM 4.5 G: 4; .5 INJECTION, POWDER, LYOPHILIZED, FOR SOLUTION INTRAVENOUS; PARENTERAL at 09:08

## 2019-08-30 NOTE — SUBJECTIVE & OBJECTIVE
Oncology Treatment Plan:   OP BREAST DOSE-DENSE AC - DOXORUBICIN CYCLOPHOSPHAMIDE Q2W    Medications:  Continuous Infusions:  Scheduled Meds:   magic mouthwash diphen/antac/lidoc/nysta  10 mL Oral QID    piperacillin-tazobactam (ZOSYN) IVPB  4.5 g Intravenous Q8H    potassium chloride in water  40 mEq Intravenous TID    vancomycin (VANCOCIN) IVPB  500 mg Intravenous ED 1 Time    vancomycin (VANCOCIN) IVPB  20 mg/kg Intravenous ED 1 Time    [START ON 8/31/2019] vancomycin (VANCOCIN) IVPB  750 mg Intravenous Q12H     PRN Meds:Dextrose 10% Bolus, Dextrose 10% Bolus, glucagon (human recombinant), glucose, glucose, ondansetron, sodium chloride 0.9%     Review of patient's allergies indicates:  No Known Allergies     Past Medical History:   Diagnosis Date    Helicobacter pylori antibody positive 5/27/13    treated with clarithromycin, metronidazole, and omeprazole x 14 days  (5/27-6/3); EGD normal in July 2013     Past Surgical History:   Procedure Laterality Date    BIOPSY, LIVER, WITH US GUIDANCE N/A 11/19/2018    Performed by [facility] at [facility] OR 2ND FLR    COLONOSCOPY N/A 7/9/2013    Performed by Ha Harrington MD at [facility] ENDO (4TH FLR)    EGD (ESOPHAGOGASTRODUODENOSCOPY) N/A 7/9/2013    Performed by Ha Harrington MD at [facility] ENDO (4TH FLR)    EXCISION-LYMPH NODES Right 1/8/2016    Performed by Sanya Farah MD at [facility] OR 2ND FLR    BFUFAIGXV-FCSK-O-CATH - CHEST Left 6/13/2019    Performed by Libertad Hernandez MD at [facility] OR 2ND FLR    PAROTIDECTOMY Right 1/8/2016    Performed by Sanya Farah MD at [facility] OR 2ND FLR    TUBAL LIGATION       Family History     Problem Relation (Age of Onset)    Colon cancer Maternal Aunt    Diabetes Father, Brother, Brother, Brother    Eczema Mother    ANGEL disease Mother    Hypertension Mother    Miscarriages / Stillbirths Mother    No Known Problems Sister, Maternal Uncle, Paternal Aunt, Paternal Uncle, Maternal Grandmother,  Maternal Grandfather, Paternal Grandmother, Paternal Grandfather, Son, Son    Parkinsonism Mother        Tobacco Use    Smoking status: Never Smoker    Smokeless tobacco: Never Used   Substance and Sexual Activity    Alcohol use: Yes     Comment: Social    Drug use: No    Sexual activity: Yes     Partners: Male     Birth control/protection: Post-menopausal       Review of Systems   Constitutional: Positive for appetite change and fever.   HENT: Positive for rhinorrhea and trouble swallowing. Negative for drooling.    Eyes: Positive for pain, discharge and redness. Negative for visual disturbance.   Respiratory: Negative for chest tightness and shortness of breath.    Cardiovascular: Negative for chest pain and leg swelling.   Gastrointestinal: Positive for constipation. Negative for abdominal pain.   Genitourinary: Negative for difficulty urinating and dysuria.   Musculoskeletal: Negative for arthralgias and myalgias.   Neurological: Positive for light-headedness. Negative for numbness.   Psychiatric/Behavioral: Negative for agitation and behavioral problems.     Objective:     Vital Signs (Most Recent):  Temp: (!) 100.6 °F (38.1 °C) (08/30/19 1327)  Pulse: 90 (08/30/19 1325)  Resp: 20 (08/30/19 1325)  BP: 108/60 (08/30/19 1325)  SpO2: 100 % (08/30/19 1046) Vital Signs (24h Range):  Temp:  [100.6 °F (38.1 °C)-101.3 °F (38.5 °C)] 100.6 °F (38.1 °C)  Pulse:  [] 90  Resp:  [18-30] 20  SpO2:  [100 %] 100 %  BP: (108-139)/(59-81) 108/60     Weight: 61.2 kg (135 lb)  Body mass index is 23.91 kg/m².  Body surface area is 1.65 meters squared.      Intake/Output Summary (Last 24 hours) at 8/30/2019 1503  Last data filed at 8/30/2019 1410  Gross per 24 hour   Intake 100 ml   Output --   Net 100 ml       Physical Exam   Constitutional: She is oriented to person, place, and time. She appears well-developed and well-nourished.   HENT:   Head: Normocephalic and atraumatic.   Eyes: Pupils are equal, round, and reactive  to light. EOM are normal.   Neck: Normal range of motion. Neck supple.   Cardiovascular: Normal rate and regular rhythm.   Pulmonary/Chest: Effort normal and breath sounds normal.   Abdominal: Bowel sounds are normal. There is no tenderness.   Musculoskeletal: Normal range of motion.   Neurological: She is alert and oriented to person, place, and time.   Skin: Skin is warm and dry.   Psychiatric: She has a normal mood and affect.       Significant Labs:   All pertinent labs from the last 24 hours have been reviewed.    Diagnostic Results:  I have reviewed all pertinent imaging results/findings within the past 24 hours.

## 2019-08-30 NOTE — PROGRESS NOTES
Pharmacokinetic Initial Assessment: IV Vancomycin    Assessment/Plan:    Vancomycin 1250 mg IVPB x 1 given in ED.  Administer additional vancomycin 500 mg IVPB x 1 for 1750 mg total load dose.  Continue with vancomycin 750 mg IVPB every 12 hours.  Desired empiric serum trough concentration is 10 to 20 mcg/mL  Draw vancomycin trough level prior to 4th dose on 09/01/2019 at 0200.  Pharmacy will continue to follow and monitor vancomycin.      Please contact pharmacy at extension [phone] with any questions regarding this assessment.     Thank you for the consult,   Ramirez Galindo       Patient brief summary:  Sharri Cline is a 69 y.o. female initiated on antimicrobial therapy with IV Vancomycin for treatment of suspected neutropenic fever.    Drug Allergies:   Review of patient's allergies indicates:  No Known Allergies    Actual Body Weight:   61.2 kg    Renal Function:   Estimated Creatinine Clearance: 75.3 mL/min (based on SCr of 0.6 mg/dL).,     CBC (last 72 hours):  Recent Labs   Lab Result Units 08/30/19  1137   WBC K/uL 0.27*   Hemoglobin g/dL 8.4*   Hematocrit % 25.6*   Platelets K/uL 27*   Gran% % 0.0*   Lymph% % 92.0*   Mono% % 4.0   Eosinophil% % 4.0   Basophil% % 0.0   Differential Method  Manual       Metabolic Panel (last 72 hours):  Recent Labs   Lab Result Units 08/30/19  1137   Sodium mmol/L 134*   Potassium mmol/L 2.8*   Chloride mmol/L 99   CO2 mmol/L 22*   Glucose mg/dL 138*   BUN, Bld mg/dL 10   Creatinine mg/dL 0.6   Albumin g/dL 3.2*   Total Bilirubin mg/dL 1.0   Alkaline Phosphatase U/L 102   AST U/L 19   ALT U/L 9*   Magnesium mg/dL 1.9       Drug levels (last 3 results):  No results for input(s): VANCOMYCINRA, VANCOMYCINPE, VANCOMYCINTR in the last 72 hours.    Microbiologic Results:  Microbiology Results (last 7 days)     Procedure Component Value Units Date/Time    Blood culture x two cultures. Draw prior to antibiotics. [[id number]] Collected:  08/30/19 7211    Order Status:  Sent  Specimen:  Blood from Peripheral, Hand, Right Updated:  08/30/19 1202    Blood culture x two cultures. Draw prior to antibiotics. [[id number]] Collected:  08/30/19 1137    Order Status:  Sent Specimen:  Blood from Peripheral, Antecubital, Left Updated:  08/30/19 1148

## 2019-08-30 NOTE — ED NOTES
Pt placed on continuous cardiac and pulse ox monitoring with blood pressure to cycle every 30 minutes.  ST rate.  Bed locked in lowest position; side rails up and locked x 2; call light, bedside table, and personal belongings within reach.  Pt instructed to alert nurse for assistance before attempting to get out of bed; verbalizes understanding.  Will continue to monitor pt.  at bedside.

## 2019-08-30 NOTE — TELEPHONE ENCOUNTER
"Returned call to pt.   Pt stated that she has been lightheaded lately and this morning was lightheaded and while standing up she "passed out and ended on the floor."   Pt stated she has been drinking fluids and eating okay.   Pt denies any lightheadedness/dizziness at this time but states that she is laying down currently.   Pt placed on hold to s/w MD.   Per MD, if pt passed out with recent lightheadedness needs to go to the ED.   Pt stated she currently does not have transportation to get to the ED but will go there for workup.       ----- Message from Apryl Walls sent at 8/30/2019  9:13 AM CDT -----  Contact: pt  Reason: Pt states she woke up this morning feeling a little light headed and almost passed out she ask if she should be concerned    Communication:[phone]    "

## 2019-08-30 NOTE — ED PROVIDER NOTES
Encounter Date: 8/30/2019       History     Chief Complaint   Patient presents with    Loss of Consciousness     denies injury    Fever Post Chemo     69 year old female with stage 1B invasive ductal carcinoma of the R breast on chemo presents with chief complaint of syncope.  Patient had a syncopal episode this morning at 6:45 a.m..  Patient was in the bathroom in the morning, felt lightheaded and then woke up on the floor.  Her  heard noise and saw her immediately.  Patient denies any pain from the fall.  No seizure activity.  No history of seizures. No loss of bowel or bladder control.  She denied any prodrome of palpitations, dyspnea, or chest pain. From an oncologic perspective, patient completed cycle 4 of dose dense Adriamycin and cyclophosphamide on August 14th.  She was recently hospitalized for neutropenic fever from August 8th to 12th.  Patient has no history of syncopal episodes.  She reports 1 week of conjunctival injection and foreign body sensation, right greater than left.  She has no changes in vision.  She reports drainage from the eye in the morning.  She also reports 2 days of dysuria.  She reports a productive cough for the past few weeks, intermittent of white sputum.  Patient was noted to be febrile in the ED but denied any fever prior to this.  She reports decreased p.o. intake since being on chemotherapy.    Patient reports dysphagia to solids and her deep throat but is able tolerate liquids without difficulty.        Review of patient's allergies indicates:  No Known Allergies  Past Medical History:   Diagnosis Date    Helicobacter pylori antibody positive 5/27/13    treated with clarithromycin, metronidazole, and omeprazole x 14 days  (5/27-6/3); EGD normal in July 2013     Past Surgical History:   Procedure Laterality Date    BIOPSY, LIVER, WITH US GUIDANCE N/A 11/19/2018    Performed by [facility] Diagnostic Provider at [facility] OR [address] [place] [address]    COLONOSCOPY N/A 7/9/2013    Performed by  Ha Harrington MD at [facility] ENDO (4TH FLR)    EGD (ESOPHAGOGASTRODUODENOSCOPY) N/A 7/9/2013    Performed by Ha Harrington MD at [facility] ENDO (4TH FLR)    EXCISION-LYMPH NODES Right 1/8/2016    Performed by Sanya Farah MD at [facility] OR 2ND FLR    [id number]-PUFG-Y-CATH - CHEST Left 6/13/2019    Performed by Libertad Hernandez MD at [facility] OR 2ND FLR    PAROTIDECTOMY Right 1/8/2016    Performed by Sanya Farah MD at [facility] OR 2ND FLR    TUBAL LIGATION       Family History   Problem Relation Age of Onset    Diabetes Father     Hypertension Mother     Miscarriages / Stillbirths Mother     ANGEL disease Mother     Eczema Mother     Parkinsonism Mother     Diabetes Brother     Diabetes Brother     Colon cancer Maternal Aunt     No Known Problems Sister     No Known Problems Maternal Uncle     No Known Problems Paternal Aunt     No Known Problems Paternal Uncle     No Known Problems Maternal Grandmother     No Known Problems Maternal Grandfather     No Known Problems Paternal Grandmother     No Known Problems Paternal Grandfather     Diabetes Brother     No Known Problems Son     No Known Problems Son     Celiac disease Neg Hx     Cirrhosis Neg Hx     Colon polyps Neg Hx     Crohn's disease Neg Hx     Cystic fibrosis Neg Hx     Esophageal cancer Neg Hx     Hemochromatosis Neg Hx     Inflammatory bowel disease Neg Hx     Irritable bowel syndrome Neg Hx     Liver cancer Neg Hx     Liver disease Neg Hx     Rectal cancer Neg Hx     Stomach cancer Neg Hx     Ulcerative colitis Neg Hx     Anthony's disease Neg Hx     Psoriasis Neg Hx     Ovarian cancer Neg Hx     Breast cancer Neg Hx     Amblyopia Neg Hx     Blindness Neg Hx     Cancer Neg Hx     Cataracts Neg Hx     Glaucoma Neg Hx     Macular degeneration Neg Hx     Retinal detachment Neg Hx     Strabismus Neg Hx     Stroke Neg Hx     Thyroid disease Neg Hx      Social History     Tobacco Use    Smoking  status: Never Smoker    Smokeless tobacco: Never Used   Substance Use Topics    Alcohol use: Yes     Comment: Social    Drug use: No     Review of Systems   Constitutional: Positive for fatigue and fever.   HENT: Negative for congestion.    Eyes: Negative for discharge.   Respiratory: Positive for cough. Negative for shortness of breath.    Cardiovascular: Negative for chest pain.   Gastrointestinal: Negative for abdominal pain.   Endocrine: Negative for polyuria.   Genitourinary: Positive for dysuria.   Musculoskeletal: Negative for back pain.   Skin: Negative for wound.   Allergic/Immunologic: Negative for immunocompromised state.   Neurological: Positive for syncope. Negative for headaches.   Hematological: Does not bruise/bleed easily.   Psychiatric/Behavioral: Negative for confusion.       Physical Exam     Initial Vitals [08/30/19 1046]   BP Pulse Resp Temp SpO2   (!) 113/59 103 (!) 24 (!) 100.8 °F (38.2 °C) 100 %      MAP       --         Physical Exam    Nursing note and vitals reviewed.  Constitutional: She appears well-developed. She is not diaphoretic. No distress.   thin   HENT:   Head: Normocephalic and atraumatic.   Dry mucous membranes  Some hyperemia to soft palate but unable to visualize posterior oropharynx secondary to patient intolerance   Eyes: EOM are normal. Pupils are equal, round, and reactive to light. Right eye exhibits chemosis. Right eye exhibits no discharge. Left eye exhibits chemosis. Left eye exhibits no discharge. Right conjunctiva is injected. Left conjunctiva is injected. No scleral icterus.   Neck: Normal range of motion. Neck supple. No JVD present.   Cardiovascular: Regular rhythm and intact distal pulses. Tachycardia present.  Exam reveals no gallop and no friction rub.    No murmur heard.  Pulmonary/Chest: Breath sounds normal. No respiratory distress. She has no wheezes. She has no rhonchi. She has no rales. She exhibits no tenderness.   Abdominal: Soft. Bowel sounds are  normal. She exhibits no distension and no mass. There is no tenderness. There is no rebound and no guarding.   Musculoskeletal: Normal range of motion. She exhibits no edema or tenderness.   Lymphadenopathy:     She has no cervical adenopathy.   Neurological: She is alert and oriented to person, place, and time. She has normal strength. No sensory deficit.   Skin: Skin is warm and dry. Capillary refill takes more than 3 seconds.   Psychiatric: She has a normal mood and affect.         ED Course   Procedures  Labs Reviewed   CBC W/ AUTO DIFFERENTIAL - Abnormal; Notable for the following components:       Result Value    WBC 0.27 (*)     RBC 2.78 (*)     Hemoglobin 8.4 (*)     Hematocrit 25.6 (*)     RDW 15.6 (*)     Platelets 27 (*)     Gran% 0.0 (*)     Lymph% 92.0 (*)     Platelet Estimate Decreased (*)     All other components within normal limits    Narrative:     PLT critical result(s) called and verbal readback obtained from NICOLASA OROZCO RN, 08/30/2019 13:49     plt critical result(s) called and verbal readback obtained from   nicolasa orozco rn, 08/30/2019 12:11   COMPREHENSIVE METABOLIC PANEL - Abnormal; Notable for the following components:    Sodium 134 (*)     Potassium 2.8 (*)     CO2 22 (*)     Glucose 138 (*)     Albumin 3.2 (*)     ALT 9 (*)     All other components within normal limits   PROCALCITONIN - Abnormal; Notable for the following components:    Procalcitonin 0.54 (*)     All other components within normal limits   TSH - Abnormal; Notable for the following components:    TSH 0.389 (*)     All other components within normal limits   T4, FREE - Abnormal; Notable for the following components:    Free T4 0.68 (*)     All other components within normal limits   CBC ONCOLOGY - Abnormal; Notable for the following components:    WBC 0.31 (*)     RBC 2.03 (*)     Hemoglobin 6.1 (*)     Hematocrit 18.4 (*)     RDW 15.7 (*)     All other components within normal limits    Narrative:     2nd lactic was  ran too early. Please repeat once pt received  all her IVF  WBC HCT   critical result(s) called and verbal readback obtained from    Serenity Matias RN, 08/30/2019 16:11   ISTAT PROCEDURE - Abnormal; Notable for the following components:    POC PH 7.612 (*)     POC PCO2 23.0 (*)     POC PO2 12 (*)     POC HCO3 23.2 (*)     POC SATURATED O2 20 (*)     All other components within normal limits   ISTAT PROCEDURE - Abnormal; Notable for the following components:    POC PH 7.620 (*)     POC PCO2 25.0 (*)     POC PO2 12 (*)     POC SATURATED O2 19 (*)     All other components within normal limits   CULTURE, BLOOD   CULTURE, BLOOD   APTT   LACTIC ACID, PLASMA   LACTIC ACID, PLASMA   PROTIME-INR   TROPONIN I   B-TYPE NATRIURETIC PEPTIDE   MAGNESIUM   LACTIC ACID, PLASMA    Narrative:     2nd lactic was ran too early. Please repeat once pt received  all her IVF   HEMOGLOBIN A1C   HEMOGLOBIN A1C    Narrative:     ADD ON HEMOGLOBIN A1C PER DR GARFIELD SALDANA  08/30/2019  15:26    URINALYSIS, REFLEX TO URINE CULTURE   TYPE & SCREEN     EKG Readings: (Independently Interpreted)   Rhythm: Sinus Tachycardia. Heart Rate: 102. ST Segments: Normal ST Segments. T Waves: Normal. Axis: Normal.     ECG Results          EKG 12-lead (In process)  Result time 08/30/19 10:55:59    In process by Interface, Lab In [facility] (08/30/19 10:55:59)                 Narrative:    Test Reason : R40.20,    Vent. Rate : 102 BPM     Atrial Rate : 102 BPM     P-R Int : 124 ms          QRS Dur : 064 ms      QT Int : 332 ms       P-R-T Axes : -07 050 053 degrees     QTc Int : 432 ms    Sinus tachycardia  Otherwise normal ECG  When compared with ECG of 08-AUG-2019 11:57,  No significant change was found    Referred By:             Confirmed By:                             Imaging Results          X-Ray Chest PA And Lateral (Final result)  Result time 08/30/19 12:31:29    Final result by Fercho Caldwell III, MD (08/30/19 12:31:29)                 Impression:       No acute process seen.      Electronically signed by: Fercho Caldwell MD  Date:    08/30/2019  Time:    12:31             Narrative:    EXAMINATION:  XR CHEST PA AND LATERAL    CLINICAL HISTORY:  Sepsis;    FINDINGS:  Central line mid SVC.  Heart mediastinum are normal lungs are clear and the bones show no abnormality.                                 Medical Decision Making:   Initial Assessment:   69 year old female with stage 1B invasive ductal carcinoma of the R breast on chemo presents with chief complaint of syncope.   Differential Diagnosis:   Syncope, vasovagal syncope, dehydration, sepsis, UTI, pneumonia, electrolyte abnormalities, infectious issues, arrhythmia, ACS, neutropenic fever  Clinical Tests:   Lab Tests: Ordered and Reviewed  Radiological Study: Ordered and Reviewed  Medical Tests: Ordered and Reviewed  Sepsis Perfusion Assessment: I attest, a sepsis perfusion exam was performed within 6 hours of Septic Shock presentation, following fluid resuscitation.  ED Management:  No symptoms of shortness of breath or signs of DVT to suggest PE.  Given 3/4 sirs criteria, will investigate for sepsis.  Will obtain blood work, urine, and cultures.  Will administer 30 cc/kg bolus.  Will administer Tylenol.  Will obtain chest x-ray.  Patient is mentating well and does not appear to be in shock.    Reassessment:  Initial VBG with severe alkalosis - possibly erroneous, will repeat.  Patient was unable to tolerate Tylenol pills secondary to dysphagia, will administer Magic mouthwash and liquid Tylenol.    Reassessment:  Patient with a critically low white blood cell count of 0.27.  Patient's presentation is concerning for neutropenic fever.  This being the case, will initiate coverage with vanc and Zosyn.  Heme Onc consulted for admission.  Patient also noted to have thrombocytopenia 27,000. Repeat VBG with a persistent alkalosis of 7.62.  Possibly contraction.  Will repeat VBG after fluid resuscitation.  Lactic  acid 1.5.    Reassessment:  CMP with hyponatremia of 134 and hypokalemia of 2.8.  Will replace IV.  Troponin normal. Procalcitonin elevated at 0.54.  TSH low.  Chest x-ray without acute process or evidence of infection.  Urinalysis pending.  Patient will be admitted to the Heme-Onc service.    Reassessment:  Patient remains normotensive.  Repeat lactic 0.5.  Patient will be admitted to Heme-Onc.              Attending Attestation:         Attending Critical Care:   Critical Care Times:   Direct Patient Care (initial evaluation, reassessments, and time considering the case)................................................................20 minutes.   Additional History from reviewing old medical records or taking additional history from the family, EMS, PCP, etc.......................5 minutes.   Ordering, Reviewing, and Interpreting Diagnostic Studies...............................................................................................................5 minutes.   Documentation..................................................................................................................................................................................5 minutes.   Consultation with other Physicians. .................................................................................................................................................5 minutes.   ==============================================================  · Total Critical Care Time - exclusive of procedural time: 40 minutes.  ==============================================================  Critical care was necessary to treat or prevent imminent or life-threatening deterioration of the following conditions: sepsis.                  Clinical Impression:       ICD-10-CM ICD-9-CM   1. Neutropenic fever D70.9 288.00    R50.81 780.61   2. LOC (loss of consciousness) R40.20 780.09   3. Metabolic alkalosis E87.3 276.3   4. Thrombocytopenia D69.6 287.5    5. Sepsis, due to unspecified organism A41.9 038.9     995.91   6. Elevated alkaline phosphatase level R74.8 790.5   7. At risk for lymphedema Z91.89 V49.89   8. Chest pain R07.9 786.50   9. Syncope, unspecified syncope type R55 780.2                                Axel Nino MD  08/30/19 1266

## 2019-08-30 NOTE — ASSESSMENT & PLAN NOTE
Patient was brought in with a new onset fever of 101.3 and a WBC count of 270.    Plan  -Vancomycin 750mg Q12  -Zosyn 4.5g q8h

## 2019-08-30 NOTE — ED TRIAGE NOTES
To the ED with c/o passing out earlier this am in her bathroom.  Pt states she felt weak and got herself to the floor.  Did not hit her head or injury to her body.  No pain.   Also states she has completed chemo on the 14 th and currently has a fever of 101.3.

## 2019-08-30 NOTE — ED NOTES
LOC: The patient is awake, alert, and oriented to place, time, situation. Affect is appropriate.  Speech is appropriate and clear.     APPEARANCE: Patient resting uncomfortably, reporting eye dryness,  fever, lightheadedness. Patient is clean and well groomed.    SKIN: The skin is warm and dry; color consistent with ethnicity.  Patient has normal skin turgor and dry mucus membranes.  Skin intact; no breakdown or bruising noted.  Eyes with redness and dryness  MUSCULOSKELETAL: Patient moving upper and lower extremities without difficulty.  Reports dizziness and weakness.      RESPIRATORY: Airway is open and patent. Respirations spontaneous, and non-labored.  Patient has a normal effort and rate.  No accessory muscle use noted. Productive cough.     CARDIAC:  Normal rhythm and rate noted.  No peripheral edema noted. No complaints of chest pain.      ABDOMEN: Soft and non tender to palpation.  No distention noted.     NEUROLOGIC: Eyes open spontaneously. Eyes with redness and dryness.   Behavior appropriate to situation.  Follows commands; facial expression symmetrical.  Purposeful motor response noted; normal sensation in all extremities.

## 2019-08-30 NOTE — ASSESSMENT & PLAN NOTE
Patient had potassium of 2.8 on admission. She began receiving IV potassium in the ED.    Plan  -administer 120mEq of K+  -continue to monitor levels

## 2019-08-30 NOTE — ASSESSMENT & PLAN NOTE
Patient presented with platelet count of 24K but no active bleeding    Plan  -monitor cell count and administer platelets if <10k

## 2019-08-30 NOTE — HPI
69 year old female with stage 1B invasive ductal carcinoma of the R breast on chemo presents with chief complaint of syncope.  Patient had a syncopal episode this morning at 6:45 a.m..  Patient was in the bathroom in the morning, felt lightheaded and then woke up on the floor.  Her  heard noise and saw her immediately.  Patient denies any pain from the fall.  No seizure activity.  No history of seizures. No loss of bowel or bladder control.  She denied any prodrome of palpitations, dyspnea, or chest pain. From an oncologic perspective, patient completed cycle 4 of dose dense Adriamycin and cyclophosphamide on August 14th.  She was recently hospitalized for neutropenic fever from August 8th to 12th.  Patient has no history of syncopal episodes.  She reports 1 week of conjunctival injection and foreign body sensation, right greater than left.  She has no changes in vision.  She reports drainage from the eye in the morning.  She also reports 2 days of dysuria.  She reports a productive cough for the past few weeks, intermittent of white sputum.  Patient was noted to be febrile in the ED but denied any fever prior to this.  She reports decreased p.o. intake since being on chemotherapy. Patient reports dysphagia to solids and her deep throat but is able tolerate liquids without difficulty.

## 2019-08-31 PROBLEM — H10.9 CONJUNCTIVITIS: Status: ACTIVE | Noted: 2019-08-31

## 2019-08-31 LAB
ALBUMIN SERPL BCP-MCNC: 2.4 G/DL (ref 3.5–5.2)
ALP SERPL-CCNC: 85 U/L (ref 55–135)
ALT SERPL W/O P-5'-P-CCNC: 11 U/L (ref 10–44)
ANION GAP SERPL CALC-SCNC: 6 MMOL/L (ref 8–16)
ANISOCYTOSIS BLD QL SMEAR: SLIGHT
AST SERPL-CCNC: 21 U/L (ref 10–40)
BASOPHILS # BLD AUTO: 0 K/UL (ref 0–0.2)
BASOPHILS NFR BLD: 0 % (ref 0–1.9)
BILIRUB SERPL-MCNC: 2.5 MG/DL (ref 0.1–1)
BLD PROD TYP BPU: NORMAL
BLOOD UNIT EXPIRATION DATE: NORMAL
BLOOD UNIT TYPE CODE: 6200
BLOOD UNIT TYPE: NORMAL
BUN SERPL-MCNC: 5 MG/DL (ref 8–23)
BURR CELLS BLD QL SMEAR: ABNORMAL
CALCIUM SERPL-MCNC: 8.3 MG/DL (ref 8.7–10.5)
CHLORIDE SERPL-SCNC: 108 MMOL/L (ref 95–110)
CO2 SERPL-SCNC: 23 MMOL/L (ref 23–29)
CODING SYSTEM: NORMAL
CREAT SERPL-MCNC: 0.5 MG/DL (ref 0.5–1.4)
DACRYOCYTES BLD QL SMEAR: ABNORMAL
DIFFERENTIAL METHOD: ABNORMAL
DISPENSE STATUS: NORMAL
EOSINOPHIL # BLD AUTO: 0 K/UL (ref 0–0.5)
EOSINOPHIL NFR BLD: 3.3 % (ref 0–8)
ERYTHROCYTE [DISTWIDTH] IN BLOOD BY AUTOMATED COUNT: 15.9 % (ref 11.5–14.5)
EST. GFR  (AFRICAN AMERICAN): >60 ML/MIN/1.73 M^2
EST. GFR  (NON AFRICAN AMERICAN): >60 ML/MIN/1.73 M^2
GLUCOSE SERPL-MCNC: 80 MG/DL (ref 70–110)
HCT VFR BLD AUTO: 24.8 % (ref 37–48.5)
HGB BLD-MCNC: 8.3 G/DL (ref 12–16)
IMM GRANULOCYTES # BLD AUTO: 0 K/UL (ref 0–0.04)
IMM GRANULOCYTES NFR BLD AUTO: 0 % (ref 0–0.5)
LYMPHOCYTES # BLD AUTO: 0.3 K/UL (ref 1–4.8)
LYMPHOCYTES NFR BLD: 83.3 % (ref 18–48)
MAGNESIUM SERPL-MCNC: 1.9 MG/DL (ref 1.6–2.6)
MCH RBC QN AUTO: 29.9 PG (ref 27–31)
MCHC RBC AUTO-ENTMCNC: 33.5 G/DL (ref 32–36)
MCV RBC AUTO: 89 FL (ref 82–98)
MONOCYTES # BLD AUTO: 0 K/UL (ref 0.3–1)
MONOCYTES NFR BLD: 3.3 % (ref 4–15)
NEUTROPHILS # BLD AUTO: 0 K/UL (ref 1.8–7.7)
NEUTROPHILS NFR BLD: 10.1 % (ref 38–73)
NRBC BLD-RTO: 0 /100 WBC
OVALOCYTES BLD QL SMEAR: ABNORMAL
PHOSPHATE SERPL-MCNC: 2.1 MG/DL (ref 2.7–4.5)
PLATELET # BLD AUTO: 16 K/UL (ref 150–350)
PLATELET BLD QL SMEAR: ABNORMAL
PMV BLD AUTO: 13.2 FL (ref 9.2–12.9)
POIKILOCYTOSIS BLD QL SMEAR: SLIGHT
POTASSIUM SERPL-SCNC: 4.1 MMOL/L (ref 3.5–5.1)
PROT SERPL-MCNC: 5.6 G/DL (ref 6–8.4)
RBC # BLD AUTO: 2.78 M/UL (ref 4–5.4)
SCHISTOCYTES BLD QL SMEAR: ABNORMAL
SODIUM SERPL-SCNC: 137 MMOL/L (ref 136–145)
TRANS PLATPHERESIS VOL PATIENT: NORMAL ML
WBC # BLD AUTO: 0.3 K/UL (ref 3.9–12.7)

## 2019-08-31 PROCEDURE — 25000003 PHARM REV CODE 250: Performed by: STUDENT IN AN ORGANIZED HEALTH CARE EDUCATION/TRAINING PROGRAM

## 2019-08-31 PROCEDURE — 20600001 HC STEP DOWN PRIVATE ROOM

## 2019-08-31 PROCEDURE — 63600175 PHARM REV CODE 636 W HCPCS: Performed by: INTERNAL MEDICINE

## 2019-08-31 PROCEDURE — 36430 TRANSFUSION BLD/BLD COMPNT: CPT

## 2019-08-31 PROCEDURE — 63600175 PHARM REV CODE 636 W HCPCS: Performed by: STUDENT IN AN ORGANIZED HEALTH CARE EDUCATION/TRAINING PROGRAM

## 2019-08-31 PROCEDURE — 99232 SBSQ HOSP IP/OBS MODERATE 35: CPT | Mod: GC,,, | Performed by: INTERNAL MEDICINE

## 2019-08-31 PROCEDURE — P9035 PLATELET PHERES LEUKOREDUCED: HCPCS

## 2019-08-31 PROCEDURE — 99232 PR SUBSEQUENT HOSPITAL CARE,LEVL II: ICD-10-PCS | Mod: GC,,, | Performed by: INTERNAL MEDICINE

## 2019-08-31 PROCEDURE — 85025 COMPLETE CBC W/AUTO DIFF WBC: CPT

## 2019-08-31 PROCEDURE — 80053 COMPREHEN METABOLIC PANEL: CPT

## 2019-08-31 PROCEDURE — 83735 ASSAY OF MAGNESIUM: CPT

## 2019-08-31 PROCEDURE — 84100 ASSAY OF PHOSPHORUS: CPT

## 2019-08-31 RX ORDER — CARBOXYMETHYLCELLULOSE SODIUM 10 MG/ML
GEL OPHTHALMIC NIGHTLY
Status: DISCONTINUED | OUTPATIENT
Start: 2019-08-31 | End: 2019-09-02 | Stop reason: HOSPADM

## 2019-08-31 RX ORDER — HYDROCODONE BITARTRATE AND ACETAMINOPHEN 500; 5 MG/1; MG/1
TABLET ORAL
Status: DISCONTINUED | OUTPATIENT
Start: 2019-08-31 | End: 2019-09-02 | Stop reason: HOSPADM

## 2019-08-31 RX ADMIN — VANCOMYCIN HYDROCHLORIDE 750 MG: 750 INJECTION, POWDER, LYOPHILIZED, FOR SOLUTION INTRAVENOUS at 02:08

## 2019-08-31 RX ADMIN — POLYETHYLENE GLYCOL 3350 17 G: 17 POWDER, FOR SOLUTION ORAL at 10:08

## 2019-08-31 RX ADMIN — PIPERACILLIN AND TAZOBACTAM 4.5 G: 4; .5 INJECTION, POWDER, LYOPHILIZED, FOR SOLUTION INTRAVENOUS; PARENTERAL at 12:08

## 2019-08-31 RX ADMIN — POTASSIUM CHLORIDE 40 MEQ: 200 INJECTION, SOLUTION INTRAVENOUS at 02:08

## 2019-08-31 RX ADMIN — PIPERACILLIN AND TAZOBACTAM 4.5 G: 4; .5 INJECTION, POWDER, LYOPHILIZED, FOR SOLUTION INTRAVENOUS; PARENTERAL at 09:08

## 2019-08-31 RX ADMIN — CARBOXYMETHYLCELLULOSE SODIUM: 10 GEL OPHTHALMIC at 09:08

## 2019-08-31 RX ADMIN — PIPERACILLIN AND TAZOBACTAM 4.5 G: 4; .5 INJECTION, POWDER, LYOPHILIZED, FOR SOLUTION INTRAVENOUS; PARENTERAL at 05:08

## 2019-08-31 RX ADMIN — HYPROMELLOSE 2910 1 DROP: 5 SOLUTION OPHTHALMIC at 09:08

## 2019-08-31 NOTE — NURSING TRANSFER
Nursing Transfer Note      8/31/2019     Transfer Pt was transferred form [facility] to [facility]    Transfer via Stretcher    Transfer with personal belonging    Transported by Tech    Medicines sent: N/A    Chart send with patient: In epic    Notified: Dr. Connell    Patient reassessed at: 19:30 8/30/29    Upon arrival to floor: Pt ambulated from stretcher to bed. The patient was alert. Admission completed. Potassium replacements initiated. Dr. Connell contacted.

## 2019-08-31 NOTE — PLAN OF CARE
Problem: Adult Inpatient Plan of Care  Goal: Plan of Care Review  Outcome: Ongoing (interventions implemented as appropriate)  Patient remained AAOx3 throughout the shift. Assessment completed & no alarming findings found.VS remained stable. All schedules/PRN medications administered as ordered. NPO diet. Activity done independently; with minimal assistance. Voids per toilet. One BM noted today.  side rails up x2; call bell in place; bed in lowest, locked position; skid proof socks on; no evidence of skin breakdown; care plan explained to patient; no additional complaints at this time. Will continue routine plan of care.

## 2019-08-31 NOTE — NURSING
Called into room by staff nurse- noted leaking at lurelock connection in patients line. Staff nurse reported vancomycin leak during infusion- unaware of amount /if any that patient received. - Called Pharmacy - spoke with Samra/pharmacist - since infused amount can not be determined, she suggest we give next dose as ordered for patient safety. I spoke with Dr Orantes, I documented not admined as per pharmacy and informed the staff nurse. Next dose of vancomycin will be given at 2:20 pm  Today.

## 2019-08-31 NOTE — NURSING
Dr. Connell notified. Pt stated she has not had a BM in 10 days. Does not appear to be having difficulties at this time.

## 2019-08-31 NOTE — CONSULTS
[facility]  Ophthalmology  Consult Note    Patient Name: Sharri Cline  MRN: [id number]  Admission Date: 8/30/2019  Hospital Length of Stay: 1 days  Attending Provider: Ahsan Alexandra MD   Primary Care Physician: Jhon Arndt MD  Principal Problem:<principal problem not specified>    Subjective:     HPI:     70yo female with PMH of invasive ductal carcinoma of R breast on chemotherapy who was admitted after episode of syncope. Ophthalmology consulted to evaluate red, irritated eyes.    The patient states she started noticing redness, grittiness, burning, tearing after starting chemotherapy in July. She states it initially was worse in the left eye, but now both eyes are equally involved. She has been using refresh artificial tears intermittently with relief of her symptoms. She Today, she continues to endorse redness, irritation, foreign body sensation/grittiness, tearing. She denies vision changes, photophobia, new floaters, flashes of light, curtain like loss of vision, diplopia, mucopurulent discharge.     Past Ocular History: wears glasses, old floater in R eye    Ocular Meds: refresh PRN    Family Ocular History: brother went blind from diabetes    Base Eye Exam     Visual Acuity       Right Left    Near cc 20/20 20/20          Tonometry (Tonopen, 12:04 PM)       Right Left    Pressure 8 8          Pupils       Dark Light Shape React APD    Right 3 2 Round Brisk None    Left 3 2 Round Brisk None          Visual Fields       Right Left     Full Full          Extraocular Movement       Right Left     Full, Ortho Full, Ortho          Dilation     Both eyes:  1.0% Mydriacyl, 2.5% Phenylephrine @ 11:55 AM            Slit Lamp and Fundus Exam     External Exam       Right Left    External Normal Normal          Pen Light Exam       Right Left    Lids/Lashes Normal Normal    Conjunctiva/Sclera diffuse injection, nevus in medial caruncle diffuse injection    Cornea scattered punctate epithelial  erosions scattered punctate epithelial erosions    Anterior Chamber Deep and formed Deep and formed    Iris Round and reactive Round and reactive    Lens NSC with cortical spoking NSC with cortical spoking    Vitreous clear media Clear media          Fundus Exam       Right Left    Disc Normal Normal    C/D Ratio 0.3 0.3    Macula Normal Normal    Vessels Normal Normal    Periphery Normal Normal              Assessment and Plan:     No notes have been filed under this hospital service.  Service: Ophthalmology    1. Dry Eye Syndrome both eyes  Likely secondary to chemotherapy. No signs of infectious process on exam today.  Recommendations:  - Start artificial tears QID in both eyes  - Start lubifresh ointment QHS in both eyes  - Patient instructed to follow up in [facility] on discharge to re-assess      Patient Phone Number: [phone]     Patient discussed with Dr. Forde PGY-3.    Chano Tomas MD  Ophthalmology PGY-2  [facility]

## 2019-08-31 NOTE — PROGRESS NOTES
This note also relates to the following rows which could not be included:  Rate - Cannot attach notes to extension rows  Line - Cannot attach notes to extension rows       08/31/19 1646   Vitals   Transfusion Vitals Start (pre-transfusion)   /77   Temp (!) 100.8 °F (38.2 °C)   Temp src Oral   Pulse 97   Resp 18   SpO2 98 %   Pulse Oximetry Type Intermittent   O2 Device (Oxygen Therapy) room air   Pre-Transfusion Documentation   Previous Transfusion? Yes   Pre-Meds Given? No  (not given per order by Jared REYNA MD)   Informed Consent Obtained Yes   Patient Education Patient informed of transfusion reaction signs/symptoms   Blood Tubing Primed? No   Transfuse Platelets 1 Dose   Volume 288       Reported Vital signs to Servando Knott MD prior to administering platelets. Per verbal order from Servando Knott MD one unit of platelets was administered despite the patients elevated temperature.

## 2019-08-31 NOTE — PLAN OF CARE
Problem: Adult Inpatient Plan of Care  Goal: Plan of Care Review  Outcome: Ongoing (interventions implemented as appropriate)  Plan of care reviewed with the patient upon arrival to the unit. The patient is alert and oriented. GCS 15. Denying complaints at this time. Potassium was replaced during the night. 1 unit of blood administered. ABX continued. Tmax 100.3. Unsustained. VSS. Bed in low locked position. Call bell within reach. Will continue to monitor.

## 2019-08-31 NOTE — HOSPITAL COURSE
Patient presented with syncopal episode and neutropenic fever. Pancytopenic secondary to chemotherapy. Cultures sent, started on broad spec abx. CXR with no acute process seen. Hb low at 6.2, transfused 1U PRBC (8/30). Tmax overnight 100.3. Cultures with no growth. D/c vanc. Patient complaining of bilateral conjunctival injection. Ophthalmology not concerned for infectious process and will follow o/p. 1U platelet transfused (8/31). Patient then continued on IV antibiotics vanc and zosyn, and was eventually switched to only zosyn. Patient continued to have some dysphagia and was evaluated by speech and placed on a pureed and nectar thick liquid diet. Patient began feeling much better by 9/2 and was transitioned to oral levofloxacin and prepared for discharge.

## 2019-09-01 LAB
ALBUMIN SERPL BCP-MCNC: 2.3 G/DL (ref 3.5–5.2)
ALP SERPL-CCNC: 80 U/L (ref 55–135)
ALT SERPL W/O P-5'-P-CCNC: 10 U/L (ref 10–44)
ANION GAP SERPL CALC-SCNC: 8 MMOL/L (ref 8–16)
ANISOCYTOSIS BLD QL SMEAR: SLIGHT
AST SERPL-CCNC: 18 U/L (ref 10–40)
BASOPHILS # BLD AUTO: 0.01 K/UL (ref 0–0.2)
BASOPHILS NFR BLD: 2.9 % (ref 0–1.9)
BILIRUB SERPL-MCNC: 0.9 MG/DL (ref 0.1–1)
BUN SERPL-MCNC: 5 MG/DL (ref 8–23)
CALCIUM SERPL-MCNC: 8.5 MG/DL (ref 8.7–10.5)
CHLORIDE SERPL-SCNC: 107 MMOL/L (ref 95–110)
CO2 SERPL-SCNC: 23 MMOL/L (ref 23–29)
CREAT SERPL-MCNC: 0.5 MG/DL (ref 0.5–1.4)
DIFFERENTIAL METHOD: ABNORMAL
EOSINOPHIL # BLD AUTO: 0 K/UL (ref 0–0.5)
EOSINOPHIL NFR BLD: 2.9 % (ref 0–8)
ERYTHROCYTE [DISTWIDTH] IN BLOOD BY AUTOMATED COUNT: 16.1 % (ref 11.5–14.5)
EST. GFR  (AFRICAN AMERICAN): >60 ML/MIN/1.73 M^2
EST. GFR  (NON AFRICAN AMERICAN): >60 ML/MIN/1.73 M^2
GLUCOSE SERPL-MCNC: 65 MG/DL (ref 70–110)
HCT VFR BLD AUTO: 22.8 % (ref 37–48.5)
HGB BLD-MCNC: 7.3 G/DL (ref 12–16)
IMM GRANULOCYTES # BLD AUTO: 0 K/UL (ref 0–0.04)
IMM GRANULOCYTES NFR BLD AUTO: 0 % (ref 0–0.5)
LYMPHOCYTES # BLD AUTO: 0.2 K/UL (ref 1–4.8)
LYMPHOCYTES NFR BLD: 52.9 % (ref 18–48)
MAGNESIUM SERPL-MCNC: 1.9 MG/DL (ref 1.6–2.6)
MCH RBC QN AUTO: 29.1 PG (ref 27–31)
MCHC RBC AUTO-ENTMCNC: 32 G/DL (ref 32–36)
MCV RBC AUTO: 91 FL (ref 82–98)
MONOCYTES # BLD AUTO: 0 K/UL (ref 0.3–1)
MONOCYTES NFR BLD: 8.8 % (ref 4–15)
NEUTROPHILS # BLD AUTO: 0.1 K/UL (ref 1.8–7.7)
NEUTROPHILS NFR BLD: 32.5 % (ref 38–73)
NRBC BLD-RTO: 0 /100 WBC
PHOSPHATE SERPL-MCNC: 2.9 MG/DL (ref 2.7–4.5)
PLATELET # BLD AUTO: 61 K/UL (ref 150–350)
PLATELET BLD QL SMEAR: ABNORMAL
PMV BLD AUTO: 10.9 FL (ref 9.2–12.9)
POTASSIUM SERPL-SCNC: 3.3 MMOL/L (ref 3.5–5.1)
PROT SERPL-MCNC: 5.3 G/DL (ref 6–8.4)
RBC # BLD AUTO: 2.51 M/UL (ref 4–5.4)
SODIUM SERPL-SCNC: 138 MMOL/L (ref 136–145)
WBC # BLD AUTO: 0.34 K/UL (ref 3.9–12.7)

## 2019-09-01 PROCEDURE — 63600175 PHARM REV CODE 636 W HCPCS: Performed by: STUDENT IN AN ORGANIZED HEALTH CARE EDUCATION/TRAINING PROGRAM

## 2019-09-01 PROCEDURE — 99232 PR SUBSEQUENT HOSPITAL CARE,LEVL II: ICD-10-PCS | Mod: GC,,, | Performed by: INTERNAL MEDICINE

## 2019-09-01 PROCEDURE — 83735 ASSAY OF MAGNESIUM: CPT

## 2019-09-01 PROCEDURE — 84100 ASSAY OF PHOSPHORUS: CPT

## 2019-09-01 PROCEDURE — 99232 SBSQ HOSP IP/OBS MODERATE 35: CPT | Mod: GC,,, | Performed by: INTERNAL MEDICINE

## 2019-09-01 PROCEDURE — 25000003 PHARM REV CODE 250: Performed by: STUDENT IN AN ORGANIZED HEALTH CARE EDUCATION/TRAINING PROGRAM

## 2019-09-01 PROCEDURE — 80053 COMPREHEN METABOLIC PANEL: CPT

## 2019-09-01 PROCEDURE — 85025 COMPLETE CBC W/AUTO DIFF WBC: CPT

## 2019-09-01 PROCEDURE — 20600001 HC STEP DOWN PRIVATE ROOM

## 2019-09-01 PROCEDURE — 92610 EVALUATE SWALLOWING FUNCTION: CPT

## 2019-09-01 RX ORDER — SODIUM CHLORIDE 9 MG/ML
INJECTION, SOLUTION INTRAVENOUS CONTINUOUS
Status: ACTIVE | OUTPATIENT
Start: 2019-09-01 | End: 2019-09-01

## 2019-09-01 RX ORDER — POTASSIUM CHLORIDE 7.45 MG/ML
10 INJECTION INTRAVENOUS
Status: COMPLETED | OUTPATIENT
Start: 2019-09-01 | End: 2019-09-01

## 2019-09-01 RX ORDER — POTASSIUM CHLORIDE 750 MG/1
40 CAPSULE, EXTENDED RELEASE ORAL ONCE
Status: DISCONTINUED | OUTPATIENT
Start: 2019-09-01 | End: 2019-09-01

## 2019-09-01 RX ADMIN — HYPROMELLOSE 2910 1 DROP: 5 SOLUTION OPHTHALMIC at 12:09

## 2019-09-01 RX ADMIN — POTASSIUM CHLORIDE 10 MEQ: 10 INJECTION, SOLUTION INTRAVENOUS at 10:09

## 2019-09-01 RX ADMIN — HYPROMELLOSE 2910 1 DROP: 5 SOLUTION OPHTHALMIC at 08:09

## 2019-09-01 RX ADMIN — PIPERACILLIN AND TAZOBACTAM 4.5 G: 4; .5 INJECTION, POWDER, LYOPHILIZED, FOR SOLUTION INTRAVENOUS; PARENTERAL at 08:09

## 2019-09-01 RX ADMIN — HYPROMELLOSE 2910 1 DROP: 5 SOLUTION OPHTHALMIC at 06:09

## 2019-09-01 RX ADMIN — PIPERACILLIN AND TAZOBACTAM 4.5 G: 4; .5 INJECTION, POWDER, LYOPHILIZED, FOR SOLUTION INTRAVENOUS; PARENTERAL at 05:09

## 2019-09-01 RX ADMIN — Medication 10 ML: at 06:09

## 2019-09-01 RX ADMIN — Medication 10 ML: at 08:09

## 2019-09-01 RX ADMIN — SODIUM CHLORIDE: 0.9 INJECTION, SOLUTION INTRAVENOUS at 10:09

## 2019-09-01 RX ADMIN — CARBOXYMETHYLCELLULOSE SODIUM: 10 GEL OPHTHALMIC at 08:09

## 2019-09-01 RX ADMIN — Medication 10 ML: at 12:09

## 2019-09-01 RX ADMIN — POTASSIUM CHLORIDE 10 MEQ: 10 INJECTION, SOLUTION INTRAVENOUS at 12:09

## 2019-09-01 RX ADMIN — PIPERACILLIN AND TAZOBACTAM 4.5 G: 4; .5 INJECTION, POWDER, LYOPHILIZED, FOR SOLUTION INTRAVENOUS; PARENTERAL at 12:09

## 2019-09-01 RX ADMIN — POTASSIUM CHLORIDE 10 MEQ: 10 INJECTION, SOLUTION INTRAVENOUS at 11:09

## 2019-09-01 RX ADMIN — POTASSIUM CHLORIDE 10 MEQ: 10 INJECTION, SOLUTION INTRAVENOUS at 01:09

## 2019-09-01 NOTE — PLAN OF CARE
Problem: SLP Goal  Goal: SLP Goal  Swallow eval completed. Recommend pureed diet and nectar thick liquids. Strict aspiration precautions.     JUAN Soto, CCC-SLP  9/1/2019

## 2019-09-01 NOTE — ASSESSMENT & PLAN NOTE
Bilateral conjunctival injection with discharge x 2 weeks. Improved irritation on drops. Not concerning for infxn    PLAN  -- consulted ophthalmology - likely 2/2 dry eyes from chemotherapy, Appreciate recommendations  -- carboxymethylcellulose and artificial tear drops, per ophthal recs  -- Recommended following up with optho in clinic.

## 2019-09-01 NOTE — ASSESSMENT & PLAN NOTE
HgB 8.4 on admission  Hb dropped to 6.1 (8/30) - received 1U PRBC     Plan  -Hb stable. Continue to monitor  -Transfuse if below 7, or if bleeding and below 8

## 2019-09-01 NOTE — ASSESSMENT & PLAN NOTE
Nursing bedside evaluation to be performed. Patient had cough with some water. Will recommend second nursing evaluation this afternoon. Will advance diet as tolerated.     PLAN  -- Swallow study with speech ordered

## 2019-09-01 NOTE — ASSESSMENT & PLAN NOTE
Patient was brought in with a new onset fever of 101.3 and a WBC count of 270. Started on vanc/zosyn. Cultures sent. May be secondary to continued chemotherapy.  -Continuing to have low grade fevers (Tmax 100.8)  -CXR with no acute changes seen  -Cultures NGTD    Plan  -d/c vancomycin  -continue Zosyn 4.5g q8h

## 2019-09-01 NOTE — PROGRESS NOTES
[facility]  Hematology/Oncology  Progress Note    Patient Name: Sharri Cline  Admission Date: 8/30/2019  Hospital Length of Stay: 2 days  Code Status: Full Code     Subjective:     HPI:  69 year old female with stage 1B invasive ductal carcinoma of the R breast on chemo presents with chief complaint of syncope.  Patient had a syncopal episode this morning at 6:45 a.m..  Patient was in the bathroom in the morning, felt lightheaded and then woke up on the floor.  Her  heard noise and saw her immediately.  Patient denies any pain from the fall.  No seizure activity.  No history of seizures. No loss of bowel or bladder control.  She denied any prodrome of palpitations, dyspnea, or chest pain. From an oncologic perspective, patient completed cycle 4 of dose dense Adriamycin and cyclophosphamide on August 14th.  She was recently hospitalized for neutropenic fever from August 8th to 12th.  Patient has no history of syncopal episodes.  She reports 1 week of conjunctival injection and foreign body sensation, right greater than left.  She has no changes in vision.  She reports drainage from the eye in the morning.  She also reports 2 days of dysuria.  She reports a productive cough for the past few weeks, intermittent of white sputum.  Patient was noted to be febrile in the ED but denied any fever prior to this.  She reports decreased p.o. intake since being on chemotherapy. Patient reports dysphagia to solids and her deep throat but is able tolerate liquids without difficulty.    Interval History: Low grade fever 100.8 overnight. Patient is sitting up in chair and ambulating to the bathroom. Eye irritation has improved since drops started. SOB is about the same as on admission. BM yesterday, small and solid. Urinating without issue.     Oncology Treatment Plan:   OP BREAST DOSE-DENSE AC - DOXORUBICIN CYCLOPHOSPHAMIDE Q2W    Medications:  Continuous Infusions:  Scheduled Meds:   artificial  tears  1 drop Both Eyes QID    carboxymethylcellulose sodium   Both Eyes QHS    duke's soln (benadryl 30 mL, mylanta 30 mL, lidocaine 30 mL, nystatin 30 mL) 120 mL  10 mL Oral QID    piperacillin-tazobactam (ZOSYN) IVPB  4.5 g Intravenous Q8H    polyethylene glycol  17 g Oral Daily    potassium chloride  40 mEq Oral Once     PRN Meds:sodium chloride, sodium chloride, Dextrose 10% Bolus, Dextrose 10% Bolus, glucagon (human recombinant), glucose, glucose, ondansetron, sodium chloride 0.9%     Review of Systems   Constitutional: Negative for chills and fever.   Eyes: Positive for redness and itching (mild). Negative for photophobia, pain, discharge and visual disturbance.   Respiratory: Positive for cough (productive of white sputum) and shortness of breath (same as on admission ). Negative for chest tightness.    Cardiovascular: Negative for chest pain.   Gastrointestinal: Negative for abdominal pain, nausea and vomiting.   Genitourinary: Negative for difficulty urinating and dysuria.   Neurological: Negative for dizziness and headaches.     Objective:     Vital Signs (Most Recent):  Temp: 98.8 °F (37.1 °C) (09/01/19 0753)  Pulse: 80 (09/01/19 0753)  Resp: 19 (09/01/19 0753)  BP: (!) 107/53 (09/01/19 0753)  SpO2: 100 % (09/01/19 0753) Vital Signs (24h Range):  Temp:  [98.7 °F (37.1 °C)-100.8 °F (38.2 °C)] 98.8 °F (37.1 °C)  Pulse:  [78-97] 80  Resp:  [16-19] 19  SpO2:  [95 %-100 %] 100 %  BP: (107-134)/(53-83) 107/53     Weight: 62.3 kg (137 lb 5.6 oz)  Body mass index is 24.33 kg/m².  Body surface area is 1.66 meters squared.      Intake/Output Summary (Last 24 hours) at 9/1/2019 0813  Last data filed at 8/31/2019 1719  Gross per 24 hour   Intake 837.45 ml   Output 300 ml   Net 537.45 ml       Physical Exam   Constitutional: She is oriented to person, place, and time. She appears well-developed. No distress.   HENT:   Head: Normocephalic and atraumatic.   Eyes: Pupils are equal, round, and reactive to light. EOM  are normal.   Conjunctiva red bilaterally with clear discharge (R>L)   Cardiovascular: Normal rate, regular rhythm and intact distal pulses.   No murmur heard.  Pulmonary/Chest: Effort normal. No respiratory distress.   Abdominal: Soft. She exhibits no distension. There is no tenderness.   Neurological: She is alert and oriented to person, place, and time.   Skin: Skin is warm and dry. She is not diaphoretic.   Psychiatric: She has a normal mood and affect.       Significant Labs:   CBC:   Recent Labs   Lab 08/30/19 2054 08/31/19 0520 09/01/19  0448   WBC 0.23* 0.30* 0.34*   HGB 6.8* 8.3* 7.3*   HCT 20.0* 24.8* 22.8*   PLT 18* 16* 61*    and CMP:   Recent Labs   Lab 08/30/19  1137 08/31/19 0520 09/01/19  0448   * 137 138   K 2.8* 4.1 3.3*   CL 99 108 107   CO2 22* 23 23   * 80 65*   BUN 10 5* 5*   CREATININE 0.6 0.5 0.5   CALCIUM 9.2 8.3* 8.5*   PROT 7.0 5.6* 5.3*   ALBUMIN 3.2* 2.4* 2.3*   BILITOT 1.0 2.5* 0.9   ALKPHOS 102 85 80   AST 19 21 18   ALT 9* 11 10   ANIONGAP 13 6* 8   EGFRNONAA >60.0 >60.0 >60.0       Diagnostic Results:  I have reviewed all pertinent imaging results/findings within the past 24 hours.    Assessment/Plan:     Conjunctivitis  Bilateral conjunctival injection with discharge x 2 weeks. Improved irritation on drops. Not concerning for infxn    PLAN  -- consulted ophthalmology - likely 2/2 dry eyes from chemotherapy, Appreciate recommendations  -- carboxymethylcellulose and artificial tear drops, per ophthal recs  -- Recommended following up with optho in clinic.      Thrombocytopenia  Patient presented with platelet count of 24K but no active bleeding  Plt count trending downward to 16k. Given 1U platelet (8/31) and improved to 61k (9/1)    Plan  -monitor cell count and administer platelets if <10k    Neutropenic fever  Patient was brought in with a new onset fever of 101.3 and a WBC count of 270. Started on vanc/zosyn. Cultures sent. May be secondary to continued  chemotherapy.  -Continuing to have low grade fevers (Tmax 100.8)  -CXR with no acute changes seen, Repeat CXR ordered  -Cultures NGTD    Plan  -d/c vancomycin  -continue Zosyn 4.5g q8h    Dysphagia  Nursing bedside evaluation to be performed. Patient had cough with some water. Will recommend second nursing evaluation this afternoon. Will advance diet as tolerated.     PLAN  -- Swallow study with speech ordered    Hypokalemia  Patient had potassium of 2.8 on admission. She began receiving IV potassium in the ED.  Given 120mEq of K+ on admission    Plan  -continue to monitor levels    Anemia  HgB 8.4 on admission  Hb dropped to 6.1 (8/30) - received 1U PRBC     Plan  -Hb stable. Continue to monitor  -Transfuse if below 7, or if bleeding and below 8           Baltazar Tong MD  Hematology/Oncology  [facility]    Attending Attestation:  I have reviewed resident's history, examination, assessment and plan. Patient seen and examined. No thrush. Feeling better. Has dysphagia which resolved after taking magic mouthwash, likely 2/2 mucositis. Swallow eval then advanced diet as tolerated. IVF. C/w antibiotics. Monitor. F/u on cultures.

## 2019-09-01 NOTE — ASSESSMENT & PLAN NOTE
Patient was brought in with a new onset fever of 101.3 and a WBC count of 270. Started on vanc/zosyn. Cultures sent. May be secondary to continued chemotherapy.  -Continuing to have low grade fevers (Tmax 100.8)  -CXR with no acute changes seen, Repeat CXR ordered  -Cultures NGTD    Plan  -d/c vancomycin  -continue Zosyn 4.5g q8h

## 2019-09-01 NOTE — ASSESSMENT & PLAN NOTE
Patient was brought in with a new onset fever of 101.3 and a WBC count of 270. Started on vanc/zosyn. Cultures sent  -Continuing to have low grade fevers (Tmax 100.3)  -CXR with no acute changes seen  -Cultures NGTD    Plan  -d/c vancomycin  -continue Zosyn 4.5g q8h

## 2019-09-01 NOTE — ASSESSMENT & PLAN NOTE
Bilateral conjunctival injection with discharge x 2 weeks  -consulted ophthalmology - likely 2/2 dry eyes from chemotherapy  -carboxymethylcellulose and artificial tear drops, per ophthal recs

## 2019-09-01 NOTE — PLAN OF CARE
Problem: Adult Inpatient Plan of Care  Goal: Plan of Care Review  Outcome: Ongoing (interventions implemented as appropriate)  Pt involved in plan of care and communicating needs throughout shift.  Up in room independently; no c/o pain or discomfort today.  SLP eval done for pt with swallowing complaints and coughing after eating; dysphagia pureed diet with nectar thick liquids ordered for pt; pt tolerating small amounts with encouragement; poor appetite noted.  Pt is voiding without difficulty; had BM this afternoon.  1L NS infused over 4 hours this am while pt remained NPO; total of 40mEq KCl infused for K=3.3.  IV zosyn admin as ordered.  CXR done as ordered.  Neutropenic precautions maintained for WBC=0.34.  Telemetry monitoring maintained; NSR with HR 80s-90s.  Pt has remained afebrile throughout shift; All VSS; no acute events so far this shift.  Pt remaining free from falls or injury throughout shift; bed in lowest position; call light within reach.  Pt instructed to call for assistance as needed.  Q1H rounding done on pt.

## 2019-09-01 NOTE — PROGRESS NOTES
[facility]  Hematology/Oncology  Progress Note    Patient Name: Sharri Cline  Admission Date: 8/30/2019  Hospital Length of Stay: 1 days  Code Status: Full Code     Subjective:     HPI:  69 year old female with stage 1B invasive ductal carcinoma of the R breast on chemo presents with chief complaint of syncope.  Patient had a syncopal episode this morning at 6:45 a.m..  Patient was in the bathroom in the morning, felt lightheaded and then woke up on the floor.  Her  heard noise and saw her immediately.  Patient denies any pain from the fall.  No seizure activity.  No history of seizures. No loss of bowel or bladder control.  She denied any prodrome of palpitations, dyspnea, or chest pain. From an oncologic perspective, patient completed cycle 4 of dose dense Adriamycin and cyclophosphamide on August 14th.  She was recently hospitalized for neutropenic fever from August 8th to 12th.  Patient has no history of syncopal episodes.  She reports 1 week of conjunctival injection and foreign body sensation, right greater than left.  She has no changes in vision.  She reports drainage from the eye in the morning.  She also reports 2 days of dysuria.  She reports a productive cough for the past few weeks, intermittent of white sputum.  Patient was noted to be febrile in the ED but denied any fever prior to this.  She reports decreased p.o. intake since being on chemotherapy. Patient reports dysphagia to solids and her deep throat but is able tolerate liquids without difficulty.    Interval History:  Low grade fever to 100.3 overnight. Cultures with no growth. D/c vanc. Platelets trending downward to 16k. 1U platelet transfused. Pt reports feeling better/stronger today but c/o worsening bilateral conjunctival injection. Ophthalmology consulted.     Oncology Treatment Plan:   OP BREAST DOSE-DENSE AC - DOXORUBICIN CYCLOPHOSPHAMIDE Q2W    Medications:  Continuous Infusions:  Scheduled Meds:    artificial tears  1 drop Both Eyes QID    carboxymethylcellulose sodium   Both Eyes QHS    duke's soln (benadryl 30 mL, mylanta 30 mL, lidocaine 30 mL, nystatin 30 mL) 120 mL  10 mL Oral QID    piperacillin-tazobactam (ZOSYN) IVPB  4.5 g Intravenous Q8H    polyethylene glycol  17 g Oral Daily     PRN Meds:sodium chloride, sodium chloride, Dextrose 10% Bolus, Dextrose 10% Bolus, glucagon (human recombinant), glucose, glucose, ondansetron, sodium chloride 0.9%     Review of Systems   Constitutional: Negative for chills and fever.   Eyes: Positive for discharge, redness and itching (mild). Negative for photophobia, pain and visual disturbance.   Respiratory: Positive for cough (productive of white sputum). Negative for chest tightness and shortness of breath.    Cardiovascular: Negative for chest pain.   Gastrointestinal: Negative for abdominal pain, nausea and vomiting.   Neurological: Negative for dizziness and headaches.     Objective:     Vital Signs (Most Recent):  Temp: 99.9 °F (37.7 °C) (08/31/19 1719)  Pulse: 90 (08/31/19 1719)  Resp: 18 (08/31/19 1719)  BP: 131/63 (08/31/19 1719)  SpO2: 99 % (08/31/19 1719) Vital Signs (24h Range):  Temp:  [98.7 °F (37.1 °C)-100.8 °F (38.2 °C)] 99.9 °F (37.7 °C)  Pulse:  [78-97] 90  Resp:  [16-18] 18  SpO2:  [98 %-100 %] 99 %  BP: (120-134)/(58-77) 131/63     Weight: 61.2 kg (135 lb)  Body mass index is 23.91 kg/m².  Body surface area is 1.65 meters squared.      Intake/Output Summary (Last 24 hours) at 8/31/2019 1931  Last data filed at 8/31/2019 1719  Gross per 24 hour   Intake 837.45 ml   Output 300 ml   Net 537.45 ml       Physical Exam   Constitutional: She appears well-developed. No distress.   HENT:   Head: Normocephalic and atraumatic.   Eyes: Pupils are equal, round, and reactive to light. EOM are normal.   Conjunctiva red bilaterally with clear discharge (R>L)   Cardiovascular: Normal rate and regular rhythm.   Pulmonary/Chest: Effort normal. No respiratory  distress.   Abdominal: Soft. She exhibits no distension. There is no tenderness.   Neurological: She is alert.   Skin: She is not diaphoretic.   Psychiatric: She has a normal mood and affect.       Significant Labs:   All pertinent labs from the last 24 hours have been reviewed.    Diagnostic Results:  I have reviewed all pertinent imaging results/findings within the past 24 hours.    Assessment/Plan:     Conjunctivitis  Bilateral conjunctival injection with discharge x 2 weeks  -consulted ophthalmology - likely 2/2 dry eyes from chemotherapy  -carboxymethylcellulose and artificial tear drops, per ophthal recs      Thrombocytopenia  Patient presented with platelet count of 24K but no active bleeding  Plt count trending downward to 16k. Given 1U platelet (8/31)    Plan  -monitor cell count and administer platelets if <10k    Neutropenic fever  Patient was brought in with a new onset fever of 101.3 and a WBC count of 270. Started on vanc/zosyn. Cultures sent  -Continuing to have low grade fevers (Tmax 100.3)  -CXR with no acute changes seen  -Cultures NGTD    Plan  -d/c vancomycin  -continue Zosyn 4.5g q8h    Hypokalemia  Patient had potassium of 2.8 on admission. She began receiving IV potassium in the ED.  Given 120mEq of K+ on admission    Plan  -continue to monitor levels    Anemia  HgB 8.4 on admission  Hb dropped to 6.1 (8/30) - received 1U PRBC     Plan  -Hb stable. Continue to monitor  -Transfuse if below 7, or if bleeding and below 8             Hedy Al MD  Hematology/Oncology  [facility]

## 2019-09-01 NOTE — SUBJECTIVE & OBJECTIVE
Interval History:  Low grade fever to 100.3 overnight. Cultures with no growth. D/c vanc. Platelets trending downward to 16k. 1U platelet transfused. Pt reports feeling better/stronger today but c/o worsening bilateral conjunctival injection. Ophthalmology consulted.     Oncology Treatment Plan:   OP BREAST DOSE-DENSE AC - DOXORUBICIN CYCLOPHOSPHAMIDE Q2W    Medications:  Continuous Infusions:  Scheduled Meds:   artificial tears  1 drop Both Eyes QID    carboxymethylcellulose sodium   Both Eyes QHS    duke's soln (benadryl 30 mL, mylanta 30 mL, lidocaine 30 mL, nystatin 30 mL) 120 mL  10 mL Oral QID    piperacillin-tazobactam (ZOSYN) IVPB  4.5 g Intravenous Q8H    polyethylene glycol  17 g Oral Daily     PRN Meds:sodium chloride, sodium chloride, Dextrose 10% Bolus, Dextrose 10% Bolus, glucagon (human recombinant), glucose, glucose, ondansetron, sodium chloride 0.9%     Review of Systems   Constitutional: Negative for chills and fever.   Eyes: Positive for discharge, redness and itching (mild). Negative for photophobia, pain and visual disturbance.   Respiratory: Positive for cough (productive of white sputum). Negative for chest tightness and shortness of breath.    Cardiovascular: Negative for chest pain.   Gastrointestinal: Negative for abdominal pain, nausea and vomiting.   Neurological: Negative for dizziness and headaches.     Objective:     Vital Signs (Most Recent):  Temp: 99.9 °F (37.7 °C) (08/31/19 1719)  Pulse: 90 (08/31/19 1719)  Resp: 18 (08/31/19 1719)  BP: 131/63 (08/31/19 1719)  SpO2: 99 % (08/31/19 1719) Vital Signs (24h Range):  Temp:  [98.7 °F (37.1 °C)-100.8 °F (38.2 °C)] 99.9 °F (37.7 °C)  Pulse:  [78-97] 90  Resp:  [16-18] 18  SpO2:  [98 %-100 %] 99 %  BP: (120-134)/(58-77) 131/63     Weight: 61.2 kg (135 lb)  Body mass index is 23.91 kg/m².  Body surface area is 1.65 meters squared.      Intake/Output Summary (Last 24 hours) at 8/31/2019 1931  Last data filed at 8/31/2019 1719  Gross per  24 hour   Intake 837.45 ml   Output 300 ml   Net 537.45 ml       Physical Exam   Constitutional: She appears well-developed. No distress.   HENT:   Head: Normocephalic and atraumatic.   Eyes: Pupils are equal, round, and reactive to light. EOM are normal.   Conjunctiva red bilaterally with clear discharge (R>L)   Cardiovascular: Normal rate and regular rhythm.   Pulmonary/Chest: Effort normal. No respiratory distress.   Abdominal: Soft. She exhibits no distension. There is no tenderness.   Neurological: She is alert.   Skin: She is not diaphoretic.   Psychiatric: She has a normal mood and affect.       Significant Labs:   All pertinent labs from the last 24 hours have been reviewed.    Diagnostic Results:  I have reviewed all pertinent imaging results/findings within the past 24 hours.

## 2019-09-01 NOTE — ASSESSMENT & PLAN NOTE
Patient presented with platelet count of 24K but no active bleeding  Plt count trending downward to 16k. Given 1U platelet (8/31)    Plan  -monitor cell count and administer platelets if <10k

## 2019-09-01 NOTE — PLAN OF CARE
Problem: Adult Inpatient Plan of Care  Goal: Patient-Specific Goal (Individualization)  Outcome: Ongoing (interventions implemented as appropriate)  Plan of care reviewed with the patient at the beginning of shift. The patient is alert and oriented. GCS 15. Denying complaints at this time. The pt did complain of eye pain. Stated the pain was reduced with eye drops. VSS. Afebrile. Remained free from falls and injuries throughout shift. Bed in low locked position. Call bell within reach. Will continue to monitor.

## 2019-09-01 NOTE — ASSESSMENT & PLAN NOTE
Patient had potassium of 2.8 on admission. She began receiving IV potassium in the ED.  Given 120mEq of K+ on admission    Plan  -continue to monitor levels

## 2019-09-01 NOTE — PT/OT/SLP EVAL
Speech Language Pathology Evaluation  Bedside Swallow    Patient Name:  Sharri Cline   MRN:  [id number]  Admitting Diagnosis: <principal problem not specified>    Recommendations:                 General Recommendations:  Dysphagia therapy  Diet recommendations:  Puree, Nectar Thick   Aspiration Precautions: 1 bite/sip at a time, HOB to 90 degrees, Meds crushed in puree, No straws, Small bites/sips and Strict aspiration precautions   General Precautions: Standard, aspiration  Communication strategies:  none    History:     Past Medical History:   Diagnosis Date    Helicobacter pylori antibody positive 5/27/13    treated with clarithromycin, metronidazole, and omeprazole x 14 days  (5/27-6/3); EGD normal in July 2013       Past Surgical History:   Procedure Laterality Date    BIOPSY, LIVER, WITH US GUIDANCE N/A 11/19/2018    Performed by [facility] Diagnostic Provider at [facility] OR 2ND FLR    COLONOSCOPY N/A 7/9/2013    Performed by Ha Harrington MD at [facility] ENDO (4TH FLR)    EGD (ESOPHAGOGASTRODUODENOSCOPY) N/A 7/9/2013    Performed by Ha Harrington MD at [facility] ENDO (4TH FLR)    EXCISION-LYMPH NODES Right 1/8/2016    Performed by Sanya Farah MD at [facility] OR 2ND FLR    FZMNKVTCL-MBYN-T-CATH - CHEST Left 6/13/2019    Performed by Libertad Hernandez MD at [facility] OR 2ND FLR    PAROTIDECTOMY Right 1/8/2016    Performed by Sanya Farah MD at [facility] OR 2ND FLR    TUBAL LIGATION         Prior diet: regular/thin; but difficulty noted with solids        Subjective       Patient goals: to be able to eat    Pain/Comfort:  · Pain Rating 1: 0/10  · Pain Rating Post-Intervention 1: 0/10    Objective:     Oral Musculature Evaluation  · Oral Musculature: WFL  · Dentition: present and adequate  · Mucosal Quality: good  · Mandibular Strength and Mobility: WFL  · Oral Labial Strength and Mobility: WFL  · Lingual Strength and Mobility: WFL  · Volitional Cough: strong  · Voice Prior to PO Intake: hoarse/slightly  "wet    Bedside Swallow Eval:   Consistencies Assessed:  · Thin- cup sips  · Nectar- cup sips  · Puree teaspoon x4     Oral Phase:   · WFL    Pharyngeal Phase:   · wet vocal quality after swallow    Compensatory Strategies  · None    Treatment: Pt with hoarse and slightly wet vocal quality. Pt with bag next to her of tissues she has been using to spit out secretions. Pt reported loss of appetite from chemo and that her throat " feel funny" and was " scratchy/sore". She also reported " fear" when swallowing and food getting stuck. Initially she denied coughing with liquids but later stated she coughed frequently and it was hard to tell if it were just from swallowing. Nursing reported coughing after thin liquids with her. Pt did not cough at all during conversation prior to initiating po. Pt took very small sips of water from cup. No overt coughing but wet vocal quality noted. With nectar thick liquids, pt reported it felt "better" and improved vocal quality noted. Discussed options for po intake and pt felt more comfortable with pureed bolus at this time. Discussed being more conservative at this time and thickening liquids. Pt agreed to plan. Also discussed with MD who agreed. Pt to begin pureed diet and nectar thick liquids. Crush all meds. NO straws. Strict aspiration precautions. Will continue to follow for diet tolerance and to advance as tolerated. White board updated. Pt and nursing expressed understanding.     Assessment:     Sharri Cline is a 69 y.o. female with an SLP diagnosis of Dysphagia.  She presents with oralpharyngeal dysphagia  Goals:   Multidisciplinary Problems     SLP Goals        Problem: SLP Goal    Goal Priority Disciplines Outcome   SLP Goal     SLP    Description:  Goals to be met 9/8  1 pt will tolerate pureed diet and nectar thick liquids  2 pt will tolerate trials of regular and thin                    Plan:     · Patient to be seen:  4 x/week   · Plan of Care expires:     · Plan of " Care reviewed with:  patient   · SLP Follow-Up:  Yes       Discharge recommendations:  other (see comments)(tbd)       Time Tracking:     SLP Treatment Date:   09/01/19  Speech Start Time:  1044  Speech Stop Time:  1108     Speech Total Time (min):  24 min    Billable Minutes: Eval Swallow and Oral Function 24    JUAN Soto, CCC-SLP  09/01/2019

## 2019-09-01 NOTE — SUBJECTIVE & OBJECTIVE
Interval History: Low grade fever 100.8 overnight. Patient is sitting up in chair and ambulating to the bathroom. Eye irritation has improved since drops started. SOB is about the same as on admission. BM yesterday, small and solid. Urinating without issue.     Oncology Treatment Plan:   OP BREAST DOSE-DENSE AC - DOXORUBICIN CYCLOPHOSPHAMIDE Q2W    Medications:  Continuous Infusions:  Scheduled Meds:   artificial tears  1 drop Both Eyes QID    carboxymethylcellulose sodium   Both Eyes QHS    duke's soln (benadryl 30 mL, mylanta 30 mL, lidocaine 30 mL, nystatin 30 mL) 120 mL  10 mL Oral QID    piperacillin-tazobactam (ZOSYN) IVPB  4.5 g Intravenous Q8H    polyethylene glycol  17 g Oral Daily    potassium chloride  40 mEq Oral Once     PRN Meds:sodium chloride, sodium chloride, Dextrose 10% Bolus, Dextrose 10% Bolus, glucagon (human recombinant), glucose, glucose, ondansetron, sodium chloride 0.9%     Review of Systems   Constitutional: Negative for chills and fever.   Eyes: Positive for redness and itching (mild). Negative for photophobia, pain, discharge and visual disturbance.   Respiratory: Positive for cough (productive of white sputum) and shortness of breath (same as on admission ). Negative for chest tightness.    Cardiovascular: Negative for chest pain.   Gastrointestinal: Negative for abdominal pain, nausea and vomiting.   Genitourinary: Negative for difficulty urinating and dysuria.   Neurological: Negative for dizziness and headaches.     Objective:     Vital Signs (Most Recent):  Temp: 98.8 °F (37.1 °C) (09/01/19 0753)  Pulse: 80 (09/01/19 0753)  Resp: 19 (09/01/19 0753)  BP: (!) 107/53 (09/01/19 0753)  SpO2: 100 % (09/01/19 0753) Vital Signs (24h Range):  Temp:  [98.7 °F (37.1 °C)-100.8 °F (38.2 °C)] 98.8 °F (37.1 °C)  Pulse:  [78-97] 80  Resp:  [16-19] 19  SpO2:  [95 %-100 %] 100 %  BP: (107-134)/(53-83) 107/53     Weight: 62.3 kg (137 lb 5.6 oz)  Body mass index is 24.33 kg/m².  Body surface area  is 1.66 meters squared.      Intake/Output Summary (Last 24 hours) at 9/1/2019 0813  Last data filed at 8/31/2019 1719  Gross per 24 hour   Intake 837.45 ml   Output 300 ml   Net 537.45 ml       Physical Exam   Constitutional: She is oriented to person, place, and time. She appears well-developed. No distress.   HENT:   Head: Normocephalic and atraumatic.   Eyes: Pupils are equal, round, and reactive to light. EOM are normal.   Conjunctiva red bilaterally with clear discharge (R>L)   Cardiovascular: Normal rate, regular rhythm and intact distal pulses.   No murmur heard.  Pulmonary/Chest: Effort normal. No respiratory distress.   Abdominal: Soft. She exhibits no distension. There is no tenderness.   Neurological: She is alert and oriented to person, place, and time.   Skin: Skin is warm and dry. She is not diaphoretic.   Psychiatric: She has a normal mood and affect.       Significant Labs:   CBC:   Recent Labs   Lab 08/30/19  2054 08/31/19  0520 09/01/19  0448   WBC 0.23* 0.30* 0.34*   HGB 6.8* 8.3* 7.3*   HCT 20.0* 24.8* 22.8*   PLT 18* 16* 61*    and CMP:   Recent Labs   Lab 08/30/19  1137 08/31/19  0520 09/01/19  0448   * 137 138   K 2.8* 4.1 3.3*   CL 99 108 107   CO2 22* 23 23   * 80 65*   BUN 10 5* 5*   CREATININE 0.6 0.5 0.5   CALCIUM 9.2 8.3* 8.5*   PROT 7.0 5.6* 5.3*   ALBUMIN 3.2* 2.4* 2.3*   BILITOT 1.0 2.5* 0.9   ALKPHOS 102 85 80   AST 19 21 18   ALT 9* 11 10   ANIONGAP 13 6* 8   EGFRNONAA >60.0 >60.0 >60.0       Diagnostic Results:  I have reviewed all pertinent imaging results/findings within the past 24 hours.

## 2019-09-02 VITALS
HEIGHT: 63 IN | OXYGEN SATURATION: 100 % | TEMPERATURE: 98 F | HEART RATE: 79 BPM | WEIGHT: 137.38 LBS | RESPIRATION RATE: 17 BRPM | BODY MASS INDEX: 24.34 KG/M2 | DIASTOLIC BLOOD PRESSURE: 58 MMHG | SYSTOLIC BLOOD PRESSURE: 124 MMHG

## 2019-09-02 PROBLEM — E43 SEVERE MALNUTRITION: Status: ACTIVE | Noted: 2019-09-02

## 2019-09-02 LAB
ALBUMIN SERPL BCP-MCNC: 2.3 G/DL (ref 3.5–5.2)
ALP SERPL-CCNC: 80 U/L (ref 55–135)
ALT SERPL W/O P-5'-P-CCNC: 10 U/L (ref 10–44)
ANION GAP SERPL CALC-SCNC: 8 MMOL/L (ref 8–16)
ANISOCYTOSIS BLD QL SMEAR: SLIGHT
AST SERPL-CCNC: 16 U/L (ref 10–40)
BASOPHILS # BLD AUTO: 0.01 K/UL (ref 0–0.2)
BASOPHILS NFR BLD: 1.2 % (ref 0–1.9)
BILIRUB SERPL-MCNC: 0.7 MG/DL (ref 0.1–1)
BUN SERPL-MCNC: 3 MG/DL (ref 8–23)
CALCIUM SERPL-MCNC: 8.8 MG/DL (ref 8.7–10.5)
CHLORIDE SERPL-SCNC: 106 MMOL/L (ref 95–110)
CO2 SERPL-SCNC: 23 MMOL/L (ref 23–29)
CREAT SERPL-MCNC: 0.5 MG/DL (ref 0.5–1.4)
DACRYOCYTES BLD QL SMEAR: ABNORMAL
DIFFERENTIAL METHOD: ABNORMAL
EOSINOPHIL # BLD AUTO: 0 K/UL (ref 0–0.5)
EOSINOPHIL NFR BLD: 0 % (ref 0–8)
ERYTHROCYTE [DISTWIDTH] IN BLOOD BY AUTOMATED COUNT: 15.9 % (ref 11.5–14.5)
EST. GFR  (AFRICAN AMERICAN): >60 ML/MIN/1.73 M^2
EST. GFR  (NON AFRICAN AMERICAN): >60 ML/MIN/1.73 M^2
GLUCOSE SERPL-MCNC: 71 MG/DL (ref 70–110)
HCT VFR BLD AUTO: 24 % (ref 37–48.5)
HGB BLD-MCNC: 7.7 G/DL (ref 12–16)
HYPOCHROMIA BLD QL SMEAR: ABNORMAL
IMM GRANULOCYTES # BLD AUTO: 0.02 K/UL (ref 0–0.04)
IMM GRANULOCYTES NFR BLD AUTO: 2.4 % (ref 0–0.5)
LYMPHOCYTES # BLD AUTO: 0.3 K/UL (ref 1–4.8)
LYMPHOCYTES NFR BLD: 36.9 % (ref 18–48)
MAGNESIUM SERPL-MCNC: 1.9 MG/DL (ref 1.6–2.6)
MCH RBC QN AUTO: 29.5 PG (ref 27–31)
MCHC RBC AUTO-ENTMCNC: 32.1 G/DL (ref 32–36)
MCV RBC AUTO: 92 FL (ref 82–98)
MONOCYTES # BLD AUTO: 0.1 K/UL (ref 0.3–1)
MONOCYTES NFR BLD: 8.3 % (ref 4–15)
NEUTROPHILS # BLD AUTO: 0.4 K/UL (ref 1.8–7.7)
NEUTROPHILS NFR BLD: 51.2 % (ref 38–73)
NRBC BLD-RTO: 0 /100 WBC
OVALOCYTES BLD QL SMEAR: ABNORMAL
PHOSPHATE SERPL-MCNC: 2.9 MG/DL (ref 2.7–4.5)
PLATELET # BLD AUTO: 52 K/UL (ref 150–350)
PLATELET BLD QL SMEAR: ABNORMAL
PMV BLD AUTO: 11.2 FL (ref 9.2–12.9)
POIKILOCYTOSIS BLD QL SMEAR: SLIGHT
POLYCHROMASIA BLD QL SMEAR: ABNORMAL
POTASSIUM SERPL-SCNC: 3.4 MMOL/L (ref 3.5–5.1)
PROT SERPL-MCNC: 5.4 G/DL (ref 6–8.4)
RBC # BLD AUTO: 2.61 M/UL (ref 4–5.4)
SODIUM SERPL-SCNC: 137 MMOL/L (ref 136–145)
WBC # BLD AUTO: 0.84 K/UL (ref 3.9–12.7)

## 2019-09-02 PROCEDURE — 99239 HOSP IP/OBS DSCHRG MGMT >30: CPT | Mod: GC,,, | Performed by: INTERNAL MEDICINE

## 2019-09-02 PROCEDURE — 80053 COMPREHEN METABOLIC PANEL: CPT

## 2019-09-02 PROCEDURE — 97802 MEDICAL NUTRITION INDIV IN: CPT

## 2019-09-02 PROCEDURE — 99239 PR HOSPITAL DISCHARGE DAY,>30 MIN: ICD-10-PCS | Mod: GC,,, | Performed by: INTERNAL MEDICINE

## 2019-09-02 PROCEDURE — 85025 COMPLETE CBC W/AUTO DIFF WBC: CPT

## 2019-09-02 PROCEDURE — 84100 ASSAY OF PHOSPHORUS: CPT

## 2019-09-02 PROCEDURE — 25000003 PHARM REV CODE 250: Performed by: STUDENT IN AN ORGANIZED HEALTH CARE EDUCATION/TRAINING PROGRAM

## 2019-09-02 PROCEDURE — 63600175 PHARM REV CODE 636 W HCPCS: Performed by: INTERNAL MEDICINE

## 2019-09-02 PROCEDURE — 63600175 PHARM REV CODE 636 W HCPCS: Performed by: STUDENT IN AN ORGANIZED HEALTH CARE EDUCATION/TRAINING PROGRAM

## 2019-09-02 PROCEDURE — 83735 ASSAY OF MAGNESIUM: CPT

## 2019-09-02 RX ORDER — CARBOXYMETHYLCELLULOSE SODIUM 10 MG/ML
1 GEL OPHTHALMIC NIGHTLY
Refills: 0 | COMMUNITY
Start: 2019-09-02 | End: 2020-04-27

## 2019-09-02 RX ORDER — LEVOFLOXACIN 750 MG/1
750 TABLET ORAL DAILY
Qty: 6 TABLET | Refills: 0 | Status: SHIPPED | OUTPATIENT
Start: 2019-09-03 | End: 2019-09-05

## 2019-09-02 RX ORDER — HEPARIN 100 UNIT/ML
300 SYRINGE INTRAVENOUS
Status: DISCONTINUED | OUTPATIENT
Start: 2019-09-02 | End: 2019-09-02 | Stop reason: HOSPADM

## 2019-09-02 RX ADMIN — HYPROMELLOSE 2910 1 DROP: 5 SOLUTION OPHTHALMIC at 12:09

## 2019-09-02 RX ADMIN — Medication 10 ML: at 12:09

## 2019-09-02 RX ADMIN — Medication 10 ML: at 09:09

## 2019-09-02 RX ADMIN — PIPERACILLIN AND TAZOBACTAM 4.5 G: 4; .5 INJECTION, POWDER, LYOPHILIZED, FOR SOLUTION INTRAVENOUS; PARENTERAL at 04:09

## 2019-09-02 RX ADMIN — LEVOFLOXACIN 750 MG: 500 TABLET, FILM COATED ORAL at 12:09

## 2019-09-02 RX ADMIN — HYPROMELLOSE 2910 1 DROP: 5 SOLUTION OPHTHALMIC at 09:09

## 2019-09-02 RX ADMIN — HEPARIN 300 UNITS: 100 SYRINGE at 12:09

## 2019-09-02 NOTE — PLAN OF CARE
Problem: Adult Inpatient Plan of Care  Goal: Plan of Care Review    Recommendations    Pt meets severe malnutrition criteria.     1. Continue pureed diet as tolerated.   2. Add Boost Plus with texture per SLP to aid in caloric intake.   3. Encourage po intake.   4. RD following.     Goals: consume >50% of all meals  Nutrition Goal Status: new

## 2019-09-02 NOTE — CONSULTS
"  [facility]  Adult Nutrition  Consult Note    SUMMARY     Recommendations    Pt meets severe malnutrition criteria.     1. Continue pureed diet as tolerated.   2. Add Boost Plus with texture per SLP to aid in caloric intake.   3. Encourage po intake.   4. RD following.     Goals: consume >50% of all meals  Nutrition Goal Status: new  Communication of RD Recs: (POC)    Reason for Assessment    Reason For Assessment: consult  Diagnosis: cancer diagnosis/related complications(neutropenic fever)  Relevant Medical History: breast cancer on chemo, GERD, anemia  Interdisciplinary Rounds: did not attend  General Information Comments: Pt resting in bed with family member at bedside. Pt reports having little appetite and would like Boost with meals to aid in calorice intake. Denies N/V/D/C. Reports having decreased appetite since chemo began on . Noted wt loss. Drinks ~1-2 Boost/day at home. NFPE completed. Pt with mild-moderate muscle/fat wasting. Pt with meets severe malnutrition criteria at this time 2/2 po intake and wt loss.  Nutrition Discharge Planning: adequate po intake    Nutrition Risk Screen    Nutrition Risk Screen: no indicators present    Nutrition/Diet History    Spiritual, Cultural Beliefs, Roman Catholic Practices, Values that Affect Care: no  Factors Affecting Nutritional Intake: decreased appetite    Anthropometrics    Temp: 98.2 °F (36.8 °C)  Height Method: Stated  Height: 5' 3" (160 cm)  Height (inches): 63 in  Weight Method: Standard Scale  Weight: 62.3 kg (137 lb 5.6 oz)  Weight (lb): 137.35 lb  Ideal Body Weight (IBW), Female: 115 lb  % Ideal Body Weight, Female (lb): 117.39 lb  BMI (Calculated): 24  BMI Grade: 18.5-24.9 - normal  Weight Loss: unintentional  Usual Body Weight (UBW), k.3 kg(per chart review 19)  % Usual Body Weight: 86.06  % Weight Change From Usual Weight: -14.12 %     Lab/Procedures/Meds    Pertinent Labs Reviewed: reviewed  Pertinent Labs Comments: K " 3.4  Pertinent Medications Reviewed: reviewed    Estimated/Assessed Needs    Weight Used For Calorie Calculations: 62.3 kg (137 lb 5.6 oz)  Energy Calorie Requirements (kcal): 1869 kcal/day  Energy Need Method: Kcal/kg(30)  Protein Requirements: 75-87 gm/day(1.2-1.4 gm/kg)  Weight Used For Protein Calculations: 62.3 kg (137 lb 5.6 oz)  Fluid Requirements (mL): 1 mL/kcal or per MD     RDA Method (mL): 1869     Nutrition Prescription Ordered    Current Diet Order: Pureed  Nutrition Order Comments: Nectar Thick Liquids    Evaluation of Received Nutrient/Fluid Intake    I/O: +1.71L x 24 hrs, +4.24L since admit  Comments: LBM 9/1  Tolerance: tolerating  % Intake of Estimated Energy Needs: 0 - 25 %  % Meal Intake: 0 - 25 %    Nutrition Risk    Level of Risk/Frequency of Follow-up: (f/u 1 x wk)     Assessment and Plan    Severe malnutrition  Malnutrition in the context of Chronic Illness/Injury    Related to (etiology):  Breast Cancer on chemotherapy; Inadequate Energy Intake    Signs and Symptoms (as evidenced by):  Energy Intake: <75% of estimated energy requirement for >1 month  Body Fat Depletion: mild depletion of triceps   Muscle Mass Depletion: mild and moderate depletion of temples, clavicle region and lower extremities   Weight Loss: 14% x 3 months per chart review     Interventions:  Collaboration of nutrition care with other providers    Nutrition Diagnosis Status:  New             Monitor and Evaluation    Food and Nutrient Intake: energy intake, food and beverage intake  Food and Nutrient Adminstration: diet order  Physical Activity and Function: nutrition-related ADLs and IADLs  Anthropometric Measurements: weight, weight change, body mass index  Biochemical Data, Medical Tests and Procedures: electrolyte and renal panel, gastrointestinal profile, glucose/endocrine profile, inflammatory profile, lipid profile  Nutrition-Focused Physical Findings: overall appearance     Malnutrition Assessment  Malnutrition  Type: chronic illness          Weight Loss (Malnutrition): (14% x 3 months per chart review)  Energy Intake (Malnutrition): less than 75% for greater than or equal to 1 month   Upper Arm Region (Subcutaneous Fat Loss): mild depletion   Jehovah's witness Region (Muscle Loss): mild depletion  Clavicle Bone Region (Muscle Loss): moderate depletion  Clavicle and Acromion Bone Region (Muscle Loss): mild depletion  Posterior Calf Region (Muscle Loss): mild depletion                 Nutrition Follow-Up    RD Follow-up?: Yes

## 2019-09-02 NOTE — NURSING
Pt discharged to home at this time per MD order.  Med rec completed by MD prior to discharge and copy of current med list given to pt.  All discharge instructions, medications, prescriptions, and follow-up appointments reviewed with pt; copy given and all questions answered to pt's satisfaction.  LCW portacath flushed with normal saline and 100u/ml heparin flush and de-accessed per protocol; PIV d/c'd at this time.  Pt ambulating in room with steady gait; tolerating pureed diet; voiding without difficulty; no c/o pain or discomfort.  All VSS; O2 sats WNL on RA.  Pt left floor via wheelchair; accompanied by pt escort and family; no distress noted.

## 2019-09-02 NOTE — PROGRESS NOTES
Therapy with vancomycin complete and/or consult discontinued by provider.  Pharmacy will sign off, please re-consult as needed.      Thank you,    Monica Rosa, PharmD  EXT [phone]

## 2019-09-02 NOTE — ASSESSMENT & PLAN NOTE
Malnutrition in the context of Chronic Illness/Injury    Related to (etiology):  Breast Cancer on chemotherapy; Inadequate Energy Intake    Signs and Symptoms (as evidenced by):  Energy Intake: <75% of estimated energy requirement for >1 month  Body Fat Depletion: mild depletion of triceps   Muscle Mass Depletion: mild and moderate depletion of temples, clavicle region and lower extremities   Weight Loss: 14% x 3 months per chart review     Interventions:  Collaboration of nutrition care with other providers    Nutrition Diagnosis Status:  New

## 2019-09-02 NOTE — DISCHARGE SUMMARY
[facility]  Hematology/Oncology  Discharge Summary      Patient Name: Sharri Cline  MRN: [id number]  Admission Date: 8/30/2019  Hospital Length of Stay: 3 days  Discharge Date and Time:  09/02/2019 3:16 PM  Attending Physician: No att. providers found   Discharging Provider: Aretha Lemos MD  Primary Care Provider: Jhon Arndt MD    HPI: 69 year old female with stage 1B invasive ductal carcinoma of the R breast on chemo presents with chief complaint of syncope.  Patient had a syncopal episode this morning at 6:45 a.m..  Patient was in the bathroom in the morning, felt lightheaded and then woke up on the floor.  Her  heard noise and saw her immediately.  Patient denies any pain from the fall.  No seizure activity.  No history of seizures. No loss of bowel or bladder control.  She denied any prodrome of palpitations, dyspnea, or chest pain. From an oncologic perspective, patient completed cycle 4 of dose dense Adriamycin and cyclophosphamide on August 14th.  She was recently hospitalized for neutropenic fever from August 8th to 12th.  Patient has no history of syncopal episodes.  She reports 1 week of conjunctival injection and foreign body sensation, right greater than left.  She has no changes in vision.  She reports drainage from the eye in the morning.  She also reports 2 days of dysuria.  She reports a productive cough for the past few weeks, intermittent of white sputum.  Patient was noted to be febrile in the ED but denied any fever prior to this.  She reports decreased p.o. intake since being on chemotherapy. Patient reports dysphagia to solids and her deep throat but is able tolerate liquids without difficulty.    * No surgery found *     Hospital Course: Patient presented with syncopal episode and neutropenic fever. Pancytopenic secondary to chemotherapy. Cultures sent, started on broad spec abx. CXR with no acute process seen. Hb low at 6.2, transfused 1U PRBC (8/30). Tmax  overnight 100.3. Cultures with no growth. D/c vanc. Patient complaining of bilateral conjunctival injection. Ophthalmology not concerned for infectious process and will follow o/p. 1U platelet transfused (8/31). Patient then continued on IV antibiotics vanc and zosyn, and was eventually switched to only zosyn. Patient continued to have some dysphagia and was evaluated by speech and placed on a pureed and nectar thick liquid diet. Patient began feeling much better by 9/2 and was transitioned to oral levofloxacin and prepared for discharge.     Consults:   Consults (From admission, onward)        Status Ordering Provider     Inpatient consult to Hematology/Oncology  Once     Provider:  (Not yet assigned)    Completed GARFIELD SALDANA     Inpatient consult to Ophthalmology  Once     Provider:  (Not yet assigned)    Completed CHAVA DARLING     Inpatient consult to Registered Dietitian/Nutritionist  Once     Provider:  (Not yet assigned)    Completed CHAN JO          Significant Diagnostic Studies: Labs: All labs within the past 24 hours have been reviewed      Pending Diagnostic Studies:     None        Final Active Diagnoses:    Diagnosis Date Noted POA    PRINCIPAL PROBLEM:  Neutropenic fever [D70.9, R50.81] 08/30/2019 Yes    Severe malnutrition [E43] 09/02/2019 Yes    Conjunctivitis [H10.9] 08/31/2019 Unknown    Thrombocytopenia [D69.6] 08/30/2019 Unknown    Dysphagia [R13.10] 08/09/2019 Yes    Hypokalemia [E87.6] 07/31/2019 Yes    Anemia [D64.9] 07/02/2019 Yes      Problems Resolved During this Admission:      Discharged Condition: fair    Disposition: Home or Self Care    Follow Up:    Patient Instructions:   No discharge procedures on file.  Medications:  Reconciled Home Medications:      Medication List      START taking these medications    artificial tears 0.5 % ophthalmic solution  Commonly known as:  ISOPTO TEARS  Place 1 drop into both eyes 4 (four) times daily.     carboxymethylcellulose sodium  1 % Dpge  Place 1 drop into both eyes every evening.     levoFLOXacin 750 MG tablet  Commonly known as:  LEVAQUIN  Take 1 tablet (750 mg total) by mouth once daily.  Start taking on:  9/3/2019        CONTINUE taking these medications    dexAMETHasone 4 MG Tab  Commonly known as:  DECADRON  Take one tablet twice a day days 2,3, and 4 of each chemotherapy cycle.     fish oil-omega-3 fatty acids 300-1,000 mg capsule  Take 2 g by mouth once daily.     magic mouthwash diphen/antac/lidoc/nysta  Take 10 mLs by mouth 4 (four) times daily.     multivitamin with minerals tablet  Take 1 tablet by mouth once daily.     ondansetron 8 MG Tbdl  Commonly known as:  ZOFRAN-ODT  Take 1 tablet (8 mg total) by mouth every 8 (eight) hours as needed (nausea).        STOP taking these medications    b complex vitamins tablet     calcium carbonate-vit D3-min 600 mg calcium- 400 unit Tab     lidocaine-prilocaine cream  Commonly known as:  EMLA     polyethylene glycol 17 gram/dose powder  Commonly known as:  GLYCOLAX     senna-docusate 8.6-50 mg 8.6-50 mg per tablet  Commonly known as:  PERICOLACE            Aretha Lemos MD  Hematology/Oncology  [facility]

## 2019-09-02 NOTE — PLAN OF CARE
Problem: Adult Inpatient Plan of Care  Goal: Plan of Care Review  Outcome: Ongoing (interventions implemented as appropriate)  Patient is progressing and involved with plan of care. Frequent rounds made to assess pain and safety. Pt communicating needs throughout shift. Up ad lefty. Tolerating diet, voiding without difficulty. IV ABX continued as ordered. Eye drops given as ordered. No c/o pain. All vitals stable; no acute events this shift. Pt. Remaining free from falls or injury throughout shift; bed in lowest position; side rails up X2; call light within reach; pt instructed to call for assistance as needed - verbalized understanding. Q2h rounding on patient. Will continue to monitor.

## 2019-09-04 LAB
BACTERIA BLD CULT: NORMAL
BACTERIA BLD CULT: NORMAL

## 2019-09-05 ENCOUNTER — OFFICE VISIT (OUTPATIENT)
Dept: HEMATOLOGY/ONCOLOGY | Facility: CLINIC | Age: 69
End: 2019-09-05
Payer: MEDICARE

## 2019-09-05 ENCOUNTER — INFUSION (OUTPATIENT)
Dept: INFUSION THERAPY | Facility: HOSPITAL | Age: 69
End: 2019-09-05
Attending: INTERNAL MEDICINE
Payer: MEDICARE

## 2019-09-05 ENCOUNTER — TELEPHONE (OUTPATIENT)
Dept: HEMATOLOGY/ONCOLOGY | Facility: CLINIC | Age: 69
End: 2019-09-05

## 2019-09-05 VITALS
DIASTOLIC BLOOD PRESSURE: 69 MMHG | RESPIRATION RATE: 16 BRPM | HEART RATE: 85 BPM | BODY MASS INDEX: 23.48 KG/M2 | TEMPERATURE: 98 F | OXYGEN SATURATION: 100 % | SYSTOLIC BLOOD PRESSURE: 119 MMHG | WEIGHT: 132.5 LBS | HEIGHT: 63 IN

## 2019-09-05 DIAGNOSIS — T50.905A DRUG-INDUCED THROMBOCYTOPENIA: ICD-10-CM

## 2019-09-05 DIAGNOSIS — C50.311 MALIGNANT NEOPLASM OF LOWER-INNER QUADRANT OF RIGHT BREAST OF FEMALE, ESTROGEN RECEPTOR NEGATIVE: Primary | ICD-10-CM

## 2019-09-05 DIAGNOSIS — E87.6 HYPOKALEMIA: ICD-10-CM

## 2019-09-05 DIAGNOSIS — Z17.1 MALIGNANT NEOPLASM OF LOWER-INNER QUADRANT OF RIGHT BREAST OF FEMALE, ESTROGEN RECEPTOR NEGATIVE: Primary | ICD-10-CM

## 2019-09-05 DIAGNOSIS — Z17.1 MALIGNANT NEOPLASM OF LOWER-INNER QUADRANT OF RIGHT BREAST OF FEMALE, ESTROGEN RECEPTOR NEGATIVE: ICD-10-CM

## 2019-09-05 DIAGNOSIS — Z51.11 ENCOUNTER FOR CHEMOTHERAPY MANAGEMENT: Primary | ICD-10-CM

## 2019-09-05 DIAGNOSIS — T45.1X5A ANTINEOPLASTIC CHEMOTHERAPY INDUCED ANEMIA: ICD-10-CM

## 2019-09-05 DIAGNOSIS — C50.311 MALIGNANT NEOPLASM OF LOWER-INNER QUADRANT OF RIGHT BREAST OF FEMALE, ESTROGEN RECEPTOR NEGATIVE: ICD-10-CM

## 2019-09-05 DIAGNOSIS — D70.9 NEUTROPENIC FEVER: ICD-10-CM

## 2019-09-05 DIAGNOSIS — D64.81 ANTINEOPLASTIC CHEMOTHERAPY INDUCED ANEMIA: ICD-10-CM

## 2019-09-05 DIAGNOSIS — R50.81 NEUTROPENIC FEVER: ICD-10-CM

## 2019-09-05 DIAGNOSIS — D69.59 DRUG-INDUCED THROMBOCYTOPENIA: ICD-10-CM

## 2019-09-05 PROBLEM — D61.810 PANCYTOPENIA DUE TO ANTINEOPLASTIC CHEMOTHERAPY: Status: RESOLVED | Noted: 2019-08-08 | Resolved: 2019-09-05

## 2019-09-05 LAB
ALBUMIN SERPL BCP-MCNC: 3 G/DL (ref 3.5–5.2)
ALP SERPL-CCNC: 89 U/L (ref 55–135)
ALT SERPL W/O P-5'-P-CCNC: 9 U/L (ref 10–44)
ANION GAP SERPL CALC-SCNC: 10 MMOL/L (ref 8–16)
AST SERPL-CCNC: 18 U/L (ref 10–40)
BILIRUB SERPL-MCNC: 0.4 MG/DL (ref 0.1–1)
BUN SERPL-MCNC: 7 MG/DL (ref 8–23)
CALCIUM SERPL-MCNC: 8.9 MG/DL (ref 8.7–10.5)
CHLORIDE SERPL-SCNC: 103 MMOL/L (ref 95–110)
CO2 SERPL-SCNC: 25 MMOL/L (ref 23–29)
CREAT SERPL-MCNC: 0.6 MG/DL (ref 0.5–1.4)
ERYTHROCYTE [DISTWIDTH] IN BLOOD BY AUTOMATED COUNT: 15.9 % (ref 11.5–14.5)
EST. GFR  (AFRICAN AMERICAN): >60 ML/MIN/1.73 M^2
EST. GFR  (NON AFRICAN AMERICAN): >60 ML/MIN/1.73 M^2
GLUCOSE SERPL-MCNC: 91 MG/DL (ref 70–110)
HCT VFR BLD AUTO: 22.9 % (ref 37–48.5)
HGB BLD-MCNC: 7.5 G/DL (ref 12–16)
IMM GRANULOCYTES # BLD AUTO: 0.11 K/UL (ref 0–0.04)
MCH RBC QN AUTO: 29.3 PG (ref 27–31)
MCHC RBC AUTO-ENTMCNC: 32.8 G/DL (ref 32–36)
MCV RBC AUTO: 90 FL (ref 82–98)
NEUTROPHILS # BLD AUTO: 4.1 K/UL (ref 1.8–7.7)
PLATELET # BLD AUTO: 53 K/UL (ref 150–350)
PMV BLD AUTO: 11.4 FL (ref 9.2–12.9)
POTASSIUM SERPL-SCNC: 3.1 MMOL/L (ref 3.5–5.1)
PROT SERPL-MCNC: 6 G/DL (ref 6–8.4)
RBC # BLD AUTO: 2.56 M/UL (ref 4–5.4)
SODIUM SERPL-SCNC: 138 MMOL/L (ref 136–145)
WBC # BLD AUTO: 5.7 K/UL (ref 3.9–12.7)

## 2019-09-05 PROCEDURE — 99215 PR OFFICE/OUTPT VISIT, EST, LEVL V, 40-54 MIN: ICD-10-PCS | Mod: S$GLB,,, | Performed by: INTERNAL MEDICINE

## 2019-09-05 PROCEDURE — 80053 COMPREHEN METABOLIC PANEL: CPT

## 2019-09-05 PROCEDURE — 1101F PR PT FALLS ASSESS DOC 0-1 FALLS W/OUT INJ PAST YR: ICD-10-PCS | Mod: CPTII,S$GLB,, | Performed by: INTERNAL MEDICINE

## 2019-09-05 PROCEDURE — 85027 COMPLETE CBC AUTOMATED: CPT

## 2019-09-05 PROCEDURE — 99999 PR PBB SHADOW E&M-EST. PATIENT-LVL III: CPT | Mod: PBBFAC,,, | Performed by: INTERNAL MEDICINE

## 2019-09-05 PROCEDURE — 99499 RISK ADDL DX/OHS AUDIT: ICD-10-PCS | Mod: S$GLB,,, | Performed by: INTERNAL MEDICINE

## 2019-09-05 PROCEDURE — 99999 PR PBB SHADOW E&M-EST. PATIENT-LVL III: ICD-10-PCS | Mod: PBBFAC,,, | Performed by: INTERNAL MEDICINE

## 2019-09-05 PROCEDURE — 36591 DRAW BLOOD OFF VENOUS DEVICE: CPT

## 2019-09-05 PROCEDURE — 25000003 PHARM REV CODE 250: Performed by: INTERNAL MEDICINE

## 2019-09-05 PROCEDURE — A4216 STERILE WATER/SALINE, 10 ML: HCPCS | Performed by: INTERNAL MEDICINE

## 2019-09-05 PROCEDURE — 1101F PT FALLS ASSESS-DOCD LE1/YR: CPT | Mod: CPTII,S$GLB,, | Performed by: INTERNAL MEDICINE

## 2019-09-05 PROCEDURE — 63600175 PHARM REV CODE 636 W HCPCS: Performed by: INTERNAL MEDICINE

## 2019-09-05 PROCEDURE — 99499 UNLISTED E&M SERVICE: CPT | Mod: S$GLB,,, | Performed by: INTERNAL MEDICINE

## 2019-09-05 PROCEDURE — 99215 OFFICE O/P EST HI 40 MIN: CPT | Mod: S$GLB,,, | Performed by: INTERNAL MEDICINE

## 2019-09-05 RX ORDER — SODIUM CHLORIDE 0.9 % (FLUSH) 0.9 %
10 SYRINGE (ML) INJECTION
Status: CANCELLED | OUTPATIENT
Start: 2019-09-05

## 2019-09-05 RX ORDER — HEPARIN 100 UNIT/ML
500 SYRINGE INTRAVENOUS
Status: CANCELLED | OUTPATIENT
Start: 2019-09-05

## 2019-09-05 RX ORDER — POTASSIUM CHLORIDE 20 MEQ/1
40 TABLET, EXTENDED RELEASE ORAL DAILY
Qty: 60 TABLET | Refills: 0 | Status: SHIPPED | OUTPATIENT
Start: 2019-09-05 | End: 2019-10-01 | Stop reason: SDUPTHER

## 2019-09-05 RX ORDER — SODIUM CHLORIDE 0.9 % (FLUSH) 0.9 %
10 SYRINGE (ML) INJECTION
Status: COMPLETED | OUTPATIENT
Start: 2019-09-05 | End: 2019-09-05

## 2019-09-05 RX ORDER — HEPARIN 100 UNIT/ML
500 SYRINGE INTRAVENOUS
Status: COMPLETED | OUTPATIENT
Start: 2019-09-05 | End: 2019-09-05

## 2019-09-05 RX ADMIN — HEPARIN SODIUM (PORCINE) LOCK FLUSH IV SOLN 100 UNIT/ML 500 UNITS: 100 SOLUTION at 11:09

## 2019-09-05 RX ADMIN — Medication 10 ML: at 11:09

## 2019-09-05 NOTE — LETTER
September 5, 2019    Sharri Cline  [id number]  [place] [address]         [facility] Hematology Oncology  [address]  [place] [address]  Phone: [phone] September 5, 2019     Patient: Sharri Cline   YOB: 1950   Date of Visit: 9/5/2019       To Whom It May Concern:    It is my medical opinion that Sharri Cline should remain out of work until 9/30/19 and then we can re-evaluate. .    If you have any questions or concerns, please don't hesitate to call.    Sincerely,        Dominique Ayala MD

## 2019-09-05 NOTE — PROGRESS NOTES
History:     Reason For Follow Up/Onc History:   1. Stage 1B ([id number]) invasive ductal carcinoma of right breast, lower outer quadrant, ER neg, NV neg, Her2 neg, Grade 3, Ki67 70%      HPI:   Sharri Cline presents for follow up of breast cancer and continuation of neoadjuvant ddAC. Unfortunately she was admitted again after cycle 4 AC for neutropenic fever despite Neulasta. She was significantly anemic and thrombocytopenic as well and required blood and platelet transfusion.    *   Patient complains of the antibiotics making her lightheaded and nauseous.  She reports that she got potassium supplements while in the hospital but was not discharged on potassium.  The Magic mouthwash is helping her appetite is improving.  She remains fatigued but is starting to feel better.  No obvious bleeding.  No chest pain.    Onc history:   - She presented for screening mammo in 5/15/2019 which showed focal asymmetries in both left and right breast. Diagnostic mammogram and US showed no significant abn of left breast, and right breast 15 mm x 11 mm x 12 mm mass at 4:00, 5 CFN. Biopsy on 5/24/19 showed grade 3 IDC, triple negative, Ki67 70%.    - 7/3/19 started neoadjuvant chemotherapy with ddAC to be followed by Taxol.   - 8/8/19 admitted for neutropenic fever; admitted again with cycle 4.     History: I reviewed, confirmed and updated history (past medical, social, family) as necessary.    Medications    Current Outpatient Medications:     artificial tears (ISOPTO TEARS) 0.5 % ophthalmic solution, Place 1 drop into both eyes 4 (four) times daily., Disp: , Rfl:     carboxymethylcellulose sodium 1 % DpGe, Place 1 drop into both eyes every evening., Disp: , Rfl: 0    fish oil-omega-3 fatty acids 300-1,000 mg capsule, Take 2 g by mouth once daily., Disp: , Rfl:     magic mouthwash diphen/antac/lidoc/nysta, Take 10 mLs by mouth 4 (four) times daily., Disp: 120 mL, Rfl: 0    multivitamin with minerals tablet, Take 1 tablet  by mouth once daily.  , Disp: , Rfl:     ondansetron (ZOFRAN-ODT) 8 MG TbDL, Take 1 tablet (8 mg total) by mouth every 8 (eight) hours as needed (nausea)., Disp: 45 tablet, Rfl: 1    potassium chloride SA (K-DUR,KLOR-CON) 20 MEQ tablet, Take 2 tablets (40 mEq total) by mouth once daily., Disp: 60 tablet, Rfl: 0  No current facility-administered medications for this visit.   Allergies  Review of patient's allergies indicates:  No Known Allergies  Review of Systems  Review of Systems   Constitutional: Positive for fatigue. Negative for activity change, appetite change, chills, fever and unexpected weight change.   HENT: Positive for mouth sores. Negative for congestion, hearing loss, nosebleeds, sinus pressure and trouble swallowing.         Dry mouth   Eyes: Negative for pain, discharge, itching and visual disturbance.   Respiratory: Negative for cough, chest tightness and shortness of breath.    Cardiovascular: Negative for chest pain, palpitations and leg swelling.   Gastrointestinal: Positive for nausea. Negative for abdominal distention, abdominal pain, blood in stool, diarrhea, rectal pain and vomiting.   Endocrine: Negative for heat intolerance and polydipsia.   Genitourinary: Negative for difficulty urinating, dysuria, flank pain, frequency, hematuria and urgency.   Musculoskeletal: Negative for arthralgias, back pain and neck pain.   Skin: Negative for color change, pallor and rash.   Neurological: Positive for light-headedness. Negative for dizziness, numbness and headaches.   Hematological: Negative for adenopathy. Does not bruise/bleed easily.   Psychiatric/Behavioral: Negative for confusion and decreased concentration. The patient is not nervous/anxious.         Objective     Objective:     Vitals:    09/05/19 1257   BP: 119/69   BP Location: Right arm   Patient Position: Sitting   BP Method: Large (Automatic)   Pulse: 85   Resp: 16   Temp: 98.3 °F (36.8 °C)   TempSrc: Oral   SpO2: 100%   Weight: 60.1  "kg (132 lb 7.9 oz)   Height: 5' 3" (1.6 m)     Body surface area is 1.63 meters squared.  Physical Exam   Constitutional: She is oriented to person, place, and time. She appears well-developed and well-nourished. No distress.   HENT:   Head: Normocephalic and atraumatic.   Mouth/Throat: Oropharynx is clear and moist and mucous membranes are normal. No oral lesions.   Eyes: Conjunctivae and EOM are normal. Right eye exhibits no discharge. Left eye exhibits no discharge. No scleral icterus.   Neck: Normal range of motion. Neck supple. No thyroid mass and no thyromegaly present.   Cardiovascular: Normal rate, regular rhythm, S1 normal, S2 normal and normal heart sounds.   Pulmonary/Chest: Effort normal and breath sounds normal. No respiratory distress. She has no wheezes. She has no rhonchi. She has no rales. Chest wall is not dull to percussion.   Right breast without prior indention of skin at 4:00 and without mass  Mediport accessed   Abdominal: Soft. Normal appearance and bowel sounds are normal. There is no hepatosplenomegaly. There is no tenderness.   Musculoskeletal: She exhibits no tenderness.   Lymphadenopathy: No inguinal adenopathy noted on the right or left side.   Neurological: She is alert and oriented to person, place, and time. She has normal strength.   Skin: Skin is warm, dry and intact. No pallor.   Psychiatric: Her behavior is normal. Thought content normal.     Labs and Imaging  Results for orders placed or performed in visit on 09/05/19   CBC Oncology   Result Value Ref Range    WBC 5.70 3.90 - 12.70 K/uL    RBC 2.56 (L) 4.00 - 5.40 M/uL    Hemoglobin 7.5 (L) 12.0 - 16.0 g/dL    Hematocrit 22.9 (L) 37.0 - 48.5 %    Mean Corpuscular Volume 90 82 - 98 fL    Mean Corpuscular Hemoglobin 29.3 27.0 - 31.0 pg    Mean Corpuscular Hemoglobin Conc 32.8 32.0 - 36.0 g/dL    RDW 15.9 (H) 11.5 - 14.5 %    Platelets 53 (L) 150 - 350 K/uL    MPV 11.4 9.2 - 12.9 fL    Gran # (ANC) 4.1 1.8 - 7.7 K/uL    Immature " Grans (Abs) 0.11 (H) 0.00 - 0.04 K/uL   Comprehensive metabolic panel   Result Value Ref Range    Sodium 138 136 - 145 mmol/L    Potassium 3.1 (L) 3.5 - 5.1 mmol/L    Chloride 103 95 - 110 mmol/L    CO2 25 23 - 29 mmol/L    Glucose 91 70 - 110 mg/dL    BUN, Bld 7 (L) 8 - 23 mg/dL    Creatinine 0.6 0.5 - 1.4 mg/dL    Calcium 8.9 8.7 - 10.5 mg/dL    Total Protein 6.0 6.0 - 8.4 g/dL    Albumin 3.0 (L) 3.5 - 5.2 g/dL    Total Bilirubin 0.4 0.1 - 1.0 mg/dL    Alkaline Phosphatase 89 55 - 135 U/L    AST 18 10 - 40 U/L    ALT 9 (L) 10 - 44 U/L    Anion Gap 10 8 - 16 mmol/L    eGFR if African American >60.0 >60 mL/min/1.73 m^2    eGFR if non African American >60.0 >60 mL/min/1.73 m^2       Assessment     Assessment:     1. Stage 1B ([id number]) invasive ductal carcinoma of right breast, lower outer quadrant, ER neg, CT neg, Her2 neg, Grade 3, Ki67 70%   * 7/3/19 started neoadjuvant chemotherapy with four cycles of dose-dense Adriamycin and cyclophosphamide with Neulasta support followed by twelve doses of weekly paclitaxel.   * Can consider adding Zometa every 6 months x 2 years in adjuvant setting.    * Delay cycle 1 Taxol given recent hospitalizations. Restart in two weeks if labs better.     2. Chronically elevated alk phos and bilirubin   * Chronic; follows with Hepatology; prior liver biopsy 11/2018 with mild portal inflammation    3. Chemo anemia   * Monitor; required transfusion after cycle 4 ddAC.   * offered transfusion again today, but declined. Will call if symptoms progress.     4. Drug constipation   * Encouraged Senna S two nightly.     5. Neutropenia with neutropenic fever after cycle 3 and cycle 4 ddAC despite Neulasta.    * Improving. No further AC needed.    * OK to stop Levaquin - cultures negative and not tolerating well.     6. Chemo thrombocytopenia    * Improving. Monitor.     7. Hypokalemia   * Start KCl    Plan:     1. RTC in 2 weeks to start Taxol.   2. Continue antiemetics.   3. Senna  s  4. KCl  5. Call if symptoms worsen or if changes her mind about blood transfusion.         Future Appointments   Date Time Provider Department Center   9/18/2019 12:30 PM INJECTION, [facility] IN[facility] CHEMO Julian Martinez   9/18/2019  1:30 PM Dominique Ayala MD [facility] HEM ONC [facility] Martinez   9/19/2019  1:00 PM [facility], CHEMO [facility] CHEMO Julian Martinez

## 2019-09-05 NOTE — NURSING
Labs drawn after port access without difficulty. Pt tolerated well. Port left accessed for possible treatment. Pt seen MD and will hold treatment for another week. Port de accessed.

## 2019-09-05 NOTE — Clinical Note
1. Cancel chemo today/next week; cancel MD appt next week2. Add MD, port labs - cbc, cmp, and Taxol in 2 weeks.

## 2019-09-05 NOTE — TELEPHONE ENCOUNTER
Chemo cancelled with charge nurse, jeyson Heaton MD.     ----- Message from Dominique Ayala MD sent at 9/5/2019  8:01 AM CDT -----  Shikha -     We can cancel her Taxol for today. She was admitted and needs another week to recover. I still plan on seeing her later.     Thanks, Dominique

## 2019-09-08 NOTE — TELEPHONE ENCOUNTER
----- Message from Mai Villanueva MD sent at 11/27/2018  5:06 PM CST -----  Please inform the patient that there are no concerning findings on liver biopsy.  The diagnosis remains unclear but there is no evidence of damage to the liver.  We will discuss further during follow-up in 2 weeks.   I have reviewed and confirmed nurses' notes for patient's medications, allergies, medical history, and surgical history.

## 2019-09-17 ENCOUNTER — TELEPHONE (OUTPATIENT)
Dept: HEMATOLOGY/ONCOLOGY | Facility: CLINIC | Age: 69
End: 2019-09-17

## 2019-09-17 NOTE — TELEPHONE ENCOUNTER
----- Message from Doris Rajput sent at 9/17/2019 10:23 AM CDT -----  Type:  Patient Returning Call    Who Called: Pt Mrs Daoel Son   Who Left Message for Patient:Antoinette Segoviaon   Does the patient know what this is regarding? appt   Would the patient rather a call back or a response via [facility]? Callback   Best Call Back Number:[phone]  Additional Information:   Thank You  SAM Rajput

## 2019-09-17 NOTE — TELEPHONE ENCOUNTER
Returned call, spoke with patient and explained that the infusion center is at capacity tomorrow as far as how many appointments they can have. She voiced understanding and stated it was fine to keep as is.   I advised her that we would do our bext to put it back all on one day for the next visit.   She voiced appreciation

## 2019-09-17 NOTE — TELEPHONE ENCOUNTER
----- Message from Rajwinder Quiroga sent at 9/17/2019 10:05 AM CDT -----  Contact: Patient  Reschedule existing appt      Appt date rescheduling:: 09/19    Is appt today or next day appt:: No    Type of appt :: infusion    Provider:: Dr Ayala    Reason for rescheduling::    Contact:: [phone]    Addition info:: Pt wants to have chemo on 09/18 after appt with Dr Ayala.

## 2019-09-18 ENCOUNTER — INFUSION (OUTPATIENT)
Dept: INFUSION THERAPY | Facility: HOSPITAL | Age: 69
End: 2019-09-18
Attending: INTERNAL MEDICINE
Payer: MEDICARE

## 2019-09-18 ENCOUNTER — OFFICE VISIT (OUTPATIENT)
Dept: HEMATOLOGY/ONCOLOGY | Facility: CLINIC | Age: 69
End: 2019-09-18
Payer: MEDICARE

## 2019-09-18 ENCOUNTER — TELEPHONE (OUTPATIENT)
Dept: HEMATOLOGY/ONCOLOGY | Facility: CLINIC | Age: 69
End: 2019-09-18

## 2019-09-18 VITALS
BODY MASS INDEX: 24.38 KG/M2 | DIASTOLIC BLOOD PRESSURE: 70 MMHG | RESPIRATION RATE: 16 BRPM | SYSTOLIC BLOOD PRESSURE: 144 MMHG | HEIGHT: 63 IN | WEIGHT: 137.56 LBS | TEMPERATURE: 98 F

## 2019-09-18 DIAGNOSIS — D69.59 DRUG-INDUCED THROMBOCYTOPENIA: ICD-10-CM

## 2019-09-18 DIAGNOSIS — T50.905A DRUG-INDUCED THROMBOCYTOPENIA: ICD-10-CM

## 2019-09-18 DIAGNOSIS — Z17.1 MALIGNANT NEOPLASM OF LOWER-INNER QUADRANT OF RIGHT BREAST OF FEMALE, ESTROGEN RECEPTOR NEGATIVE: Primary | ICD-10-CM

## 2019-09-18 DIAGNOSIS — D64.81 ANTINEOPLASTIC CHEMOTHERAPY INDUCED ANEMIA: Primary | ICD-10-CM

## 2019-09-18 DIAGNOSIS — D64.81 ANTINEOPLASTIC CHEMOTHERAPY INDUCED ANEMIA: ICD-10-CM

## 2019-09-18 DIAGNOSIS — C50.311 MALIGNANT NEOPLASM OF LOWER-INNER QUADRANT OF RIGHT BREAST OF FEMALE, ESTROGEN RECEPTOR NEGATIVE: Primary | ICD-10-CM

## 2019-09-18 DIAGNOSIS — R74.8 ELEVATED ALKALINE PHOSPHATASE LEVEL: ICD-10-CM

## 2019-09-18 DIAGNOSIS — T45.1X5A ANTINEOPLASTIC CHEMOTHERAPY INDUCED ANEMIA: ICD-10-CM

## 2019-09-18 DIAGNOSIS — T45.1X5A ANTINEOPLASTIC CHEMOTHERAPY INDUCED ANEMIA: Primary | ICD-10-CM

## 2019-09-18 LAB
ABO + RH BLD: NORMAL
ALBUMIN SERPL BCP-MCNC: 3.2 G/DL (ref 3.5–5.2)
ALP SERPL-CCNC: 84 U/L (ref 55–135)
ALT SERPL W/O P-5'-P-CCNC: 7 U/L (ref 10–44)
ANION GAP SERPL CALC-SCNC: 7 MMOL/L (ref 8–16)
AST SERPL-CCNC: 17 U/L (ref 10–40)
BILIRUB SERPL-MCNC: 0.4 MG/DL (ref 0.1–1)
BLD GP AB SCN CELLS X3 SERPL QL: NORMAL
BUN SERPL-MCNC: 6 MG/DL (ref 8–23)
CALCIUM SERPL-MCNC: 8.8 MG/DL (ref 8.7–10.5)
CHLORIDE SERPL-SCNC: 110 MMOL/L (ref 95–110)
CO2 SERPL-SCNC: 24 MMOL/L (ref 23–29)
CREAT SERPL-MCNC: 0.6 MG/DL (ref 0.5–1.4)
ERYTHROCYTE [DISTWIDTH] IN BLOOD BY AUTOMATED COUNT: 18.6 % (ref 11.5–14.5)
ERYTHROCYTE [DISTWIDTH] IN BLOOD BY AUTOMATED COUNT: 18.6 % (ref 11.5–14.5)
EST. GFR  (AFRICAN AMERICAN): >60 ML/MIN/1.73 M^2
EST. GFR  (NON AFRICAN AMERICAN): >60 ML/MIN/1.73 M^2
GLUCOSE SERPL-MCNC: 81 MG/DL (ref 70–110)
HCT VFR BLD AUTO: 25.4 % (ref 37–48.5)
HCT VFR BLD AUTO: 28.2 % (ref 37–48.5)
HGB BLD-MCNC: 8 G/DL (ref 12–16)
HGB BLD-MCNC: 8.5 G/DL (ref 12–16)
IMM GRANULOCYTES # BLD AUTO: 0.01 K/UL (ref 0–0.04)
IMM GRANULOCYTES # BLD AUTO: 0.02 K/UL (ref 0–0.04)
MCH RBC QN AUTO: 30 PG (ref 27–31)
MCH RBC QN AUTO: 30 PG (ref 27–31)
MCHC RBC AUTO-ENTMCNC: 30.1 G/DL (ref 32–36)
MCHC RBC AUTO-ENTMCNC: 31.5 G/DL (ref 32–36)
MCV RBC AUTO: 100 FL (ref 82–98)
MCV RBC AUTO: 95 FL (ref 82–98)
NEUTROPHILS # BLD AUTO: 2 K/UL (ref 1.8–7.7)
NEUTROPHILS # BLD AUTO: 2.2 K/UL (ref 1.8–7.7)
PLATELET # BLD AUTO: 159 K/UL (ref 150–350)
PLATELET # BLD AUTO: 187 K/UL (ref 150–350)
PMV BLD AUTO: 10.1 FL (ref 9.2–12.9)
PMV BLD AUTO: 10.8 FL (ref 9.2–12.9)
POTASSIUM SERPL-SCNC: 3.8 MMOL/L (ref 3.5–5.1)
PROT SERPL-MCNC: 5.9 G/DL (ref 6–8.4)
RBC # BLD AUTO: 2.67 M/UL (ref 4–5.4)
RBC # BLD AUTO: 2.83 M/UL (ref 4–5.4)
SODIUM SERPL-SCNC: 141 MMOL/L (ref 136–145)
WBC # BLD AUTO: 3.27 K/UL (ref 3.9–12.7)
WBC # BLD AUTO: 3.77 K/UL (ref 3.9–12.7)

## 2019-09-18 PROCEDURE — 1101F PR PT FALLS ASSESS DOC 0-1 FALLS W/OUT INJ PAST YR: ICD-10-PCS | Mod: CPTII,S$GLB,, | Performed by: INTERNAL MEDICINE

## 2019-09-18 PROCEDURE — 25000003 PHARM REV CODE 250: Performed by: INTERNAL MEDICINE

## 2019-09-18 PROCEDURE — 99499 UNLISTED E&M SERVICE: CPT | Mod: S$GLB,,, | Performed by: INTERNAL MEDICINE

## 2019-09-18 PROCEDURE — 99215 OFFICE O/P EST HI 40 MIN: CPT | Mod: S$GLB,,, | Performed by: INTERNAL MEDICINE

## 2019-09-18 PROCEDURE — 36591 DRAW BLOOD OFF VENOUS DEVICE: CPT

## 2019-09-18 PROCEDURE — 86901 BLOOD TYPING SEROLOGIC RH(D): CPT

## 2019-09-18 PROCEDURE — 99499 RISK ADDL DX/OHS AUDIT: ICD-10-PCS | Mod: S$GLB,,, | Performed by: INTERNAL MEDICINE

## 2019-09-18 PROCEDURE — 99999 PR PBB SHADOW E&M-EST. PATIENT-LVL III: CPT | Mod: PBBFAC,,, | Performed by: INTERNAL MEDICINE

## 2019-09-18 PROCEDURE — 1101F PT FALLS ASSESS-DOCD LE1/YR: CPT | Mod: CPTII,S$GLB,, | Performed by: INTERNAL MEDICINE

## 2019-09-18 PROCEDURE — A4216 STERILE WATER/SALINE, 10 ML: HCPCS | Performed by: INTERNAL MEDICINE

## 2019-09-18 PROCEDURE — 63600175 PHARM REV CODE 636 W HCPCS: Performed by: INTERNAL MEDICINE

## 2019-09-18 PROCEDURE — 85027 COMPLETE CBC AUTOMATED: CPT | Mod: 91

## 2019-09-18 PROCEDURE — 99999 PR PBB SHADOW E&M-EST. PATIENT-LVL III: ICD-10-PCS | Mod: PBBFAC,,, | Performed by: INTERNAL MEDICINE

## 2019-09-18 PROCEDURE — 85027 COMPLETE CBC AUTOMATED: CPT

## 2019-09-18 PROCEDURE — 80053 COMPREHEN METABOLIC PANEL: CPT

## 2019-09-18 PROCEDURE — 99215 PR OFFICE/OUTPT VISIT, EST, LEVL V, 40-54 MIN: ICD-10-PCS | Mod: S$GLB,,, | Performed by: INTERNAL MEDICINE

## 2019-09-18 RX ORDER — FAMOTIDINE 10 MG/ML
20 INJECTION INTRAVENOUS
Status: CANCELLED | OUTPATIENT
Start: 2019-09-18

## 2019-09-18 RX ORDER — DIPHENHYDRAMINE HYDROCHLORIDE 50 MG/ML
50 INJECTION INTRAMUSCULAR; INTRAVENOUS ONCE AS NEEDED
Status: CANCELLED | OUTPATIENT
Start: 2019-09-18

## 2019-09-18 RX ORDER — EPINEPHRINE 0.3 MG/.3ML
0.3 INJECTION SUBCUTANEOUS ONCE AS NEEDED
Status: CANCELLED | OUTPATIENT
Start: 2019-09-25

## 2019-09-18 RX ORDER — HYDROCODONE BITARTRATE AND ACETAMINOPHEN 500; 5 MG/1; MG/1
TABLET ORAL ONCE
Status: CANCELLED | OUTPATIENT
Start: 2019-09-18 | End: 2019-09-18

## 2019-09-18 RX ORDER — HEPARIN 100 UNIT/ML
500 SYRINGE INTRAVENOUS
Status: COMPLETED | OUTPATIENT
Start: 2019-09-18 | End: 2019-09-18

## 2019-09-18 RX ORDER — HEPARIN 100 UNIT/ML
500 SYRINGE INTRAVENOUS
Status: CANCELLED | OUTPATIENT
Start: 2019-09-18

## 2019-09-18 RX ORDER — DIPHENHYDRAMINE HYDROCHLORIDE 50 MG/ML
50 INJECTION INTRAMUSCULAR; INTRAVENOUS ONCE AS NEEDED
Status: CANCELLED | OUTPATIENT
Start: 2019-09-25

## 2019-09-18 RX ORDER — HEPARIN 100 UNIT/ML
500 SYRINGE INTRAVENOUS
Status: CANCELLED | OUTPATIENT
Start: 2019-09-25

## 2019-09-18 RX ORDER — SODIUM CHLORIDE 0.9 % (FLUSH) 0.9 %
10 SYRINGE (ML) INJECTION
Status: CANCELLED | OUTPATIENT
Start: 2019-09-25

## 2019-09-18 RX ORDER — ACETAMINOPHEN 325 MG/1
650 TABLET ORAL
Status: CANCELLED | OUTPATIENT
Start: 2019-09-18

## 2019-09-18 RX ORDER — DIPHENHYDRAMINE HCL 25 MG
25 CAPSULE ORAL
Status: CANCELLED | OUTPATIENT
Start: 2019-09-18

## 2019-09-18 RX ORDER — FAMOTIDINE 10 MG/ML
20 INJECTION INTRAVENOUS
Status: CANCELLED | OUTPATIENT
Start: 2019-09-25

## 2019-09-18 RX ORDER — SODIUM CHLORIDE 0.9 % (FLUSH) 0.9 %
10 SYRINGE (ML) INJECTION
Status: COMPLETED | OUTPATIENT
Start: 2019-09-18 | End: 2019-09-18

## 2019-09-18 RX ORDER — SODIUM CHLORIDE 0.9 % (FLUSH) 0.9 %
10 SYRINGE (ML) INJECTION
Status: CANCELLED | OUTPATIENT
Start: 2019-09-18

## 2019-09-18 RX ORDER — EPINEPHRINE 0.3 MG/.3ML
0.3 INJECTION SUBCUTANEOUS ONCE AS NEEDED
Status: CANCELLED | OUTPATIENT
Start: 2019-09-18

## 2019-09-18 RX ADMIN — HEPARIN SODIUM (PORCINE) LOCK FLUSH IV SOLN 100 UNIT/ML 500 UNITS: 100 SOLUTION at 12:09

## 2019-09-18 RX ADMIN — Medication 10 ML: at 12:09

## 2019-09-18 NOTE — Clinical Note
1. Labs today - repeat cbc, type and cross2. Add 1 unit prbcs transfusion tomorrow to scheduled Taxol3. Add weekly Taxol x 8 doses with MD visit in 2, 4, 6, 8 weeks - can do Thursdays - please schedule MD and chemo on same day.

## 2019-09-18 NOTE — PROGRESS NOTES
History:     Reason For Follow Up/Onc History:   1. Stage 1B ([id number]) invasive ductal carcinoma of right breast, lower outer quadrant, ER neg, PA neg, Her2 neg, Grade 3, Ki67 70%      HPI:   Sharri Cline presents for follow up of breast cancer and continuation of neoadjuvant chemotherapy. She's due to start Taxol. She is felling well today. Energy improving. No shortness of breat, fevers, chills, night sweats.     Onc history:   - She presented for screening mammo in 5/15/2019 which showed focal asymmetries in both left and right breast. Diagnostic mammogram and US showed no significant abn of left breast, and right breast 15 mm x 11 mm x 12 mm mass at 4:00, 5 CFN. Biopsy on 5/24/19 showed grade 3 IDC, triple negative, Ki67 70%.    - 7/3/19 started neoadjuvant chemotherapy with ddAC to be followed by Taxol.   - 8/8/19 admitted for neutropenic fever; admitted again with cycle 4.     History: I reviewed, confirmed and updated history (past medical, social, family) as necessary.      Medications    Current Outpatient Medications:     artificial tears (ISOPTO TEARS) 0.5 % ophthalmic solution, Place 1 drop into both eyes 4 (four) times daily., Disp: , Rfl:     carboxymethylcellulose sodium 1 % DpGe, Place 1 drop into both eyes every evening., Disp: , Rfl: 0    fish oil-omega-3 fatty acids 300-1,000 mg capsule, Take 2 g by mouth once daily., Disp: , Rfl:     magic mouthwash diphen/antac/lidoc/nysta, Take 10 mLs by mouth 4 (four) times daily., Disp: 120 mL, Rfl: 0    multivitamin with minerals tablet, Take 1 tablet by mouth once daily.  , Disp: , Rfl:     ondansetron (ZOFRAN-ODT) 8 MG TbDL, Take 1 tablet (8 mg total) by mouth every 8 (eight) hours as needed (nausea)., Disp: 45 tablet, Rfl: 1    potassium chloride SA (K-DUR,KLOR-CON) 20 MEQ tablet, Take 2 tablets (40 mEq total) by mouth once daily., Disp: 60 tablet, Rfl: 0  No current facility-administered medications for this visit.   Allergies  Review of  "patient's allergies indicates:  No Known Allergies  Review of Systems  Review of Systems   Constitutional: Positive for fatigue. Negative for activity change, appetite change, chills, fever and unexpected weight change.   HENT: Negative for congestion, hearing loss, mouth sores, nosebleeds, sinus pressure and trouble swallowing.         Dry mouth   Eyes: Negative for pain, discharge, itching and visual disturbance.   Respiratory: Negative for cough, chest tightness and shortness of breath.    Cardiovascular: Negative for chest pain, palpitations and leg swelling.   Gastrointestinal: Positive for nausea. Negative for abdominal distention, abdominal pain, blood in stool, diarrhea, rectal pain and vomiting.   Endocrine: Negative for heat intolerance and polydipsia.   Genitourinary: Negative for difficulty urinating, dysuria, flank pain, frequency, hematuria and urgency.   Musculoskeletal: Negative for arthralgias, back pain and neck pain.   Skin: Negative for color change, pallor and rash.   Neurological: Negative for dizziness, light-headedness, numbness and headaches.   Hematological: Negative for adenopathy. Does not bruise/bleed easily.   Psychiatric/Behavioral: Negative for confusion and decreased concentration. The patient is not nervous/anxious.         Objective     Objective:     Vitals:    09/18/19 1305   BP: (!) 144/70   BP Location: Right arm   Patient Position: Sitting   BP Method: Large (Automatic)   Resp: 16   Temp: 98.1 °F (36.7 °C)   TempSrc: Oral   Weight: 62.4 kg (137 lb 9.1 oz)   Height: 5' 3" (1.6 m)     Body surface area is 1.67 meters squared.  Physical Exam   Constitutional: She is oriented to person, place, and time. She appears well-developed and well-nourished. No distress.   HENT:   Head: Normocephalic and atraumatic.   Mouth/Throat: Oropharynx is clear and moist and mucous membranes are normal. No oral lesions.   Eyes: Conjunctivae and EOM are normal. Right eye exhibits no discharge. Left " eye exhibits no discharge. No scleral icterus.   Neck: Normal range of motion. Neck supple. No thyroid mass and no thyromegaly present.   Cardiovascular: Normal rate, regular rhythm, S1 normal, S2 normal and normal heart sounds.   Pulmonary/Chest: Effort normal and breath sounds normal. No respiratory distress. She has no wheezes. She has no rhonchi. She has no rales. Chest wall is not dull to percussion.   Right breast without prior indention of skin at 4:00 and without mass  Mediport   Abdominal: Soft. Normal appearance and bowel sounds are normal. There is no hepatosplenomegaly. There is no tenderness.   Musculoskeletal: She exhibits no tenderness.   Lymphadenopathy: No inguinal adenopathy noted on the right or left side.   Neurological: She is alert and oriented to person, place, and time. She has normal strength.   Skin: Skin is warm, dry and intact. No pallor.   Psychiatric: Her behavior is normal. Thought content normal.     Labs and Imaging  Results for orders placed or performed in visit on 09/18/19   CBC Oncology   Result Value Ref Range    WBC 3.27 (L) 3.90 - 12.70 K/uL    RBC 2.67 (L) 4.00 - 5.40 M/uL    Hemoglobin 8.0 (L) 12.0 - 16.0 g/dL    Hematocrit 25.4 (L) 37.0 - 48.5 %    Mean Corpuscular Volume 95 82 - 98 fL    Mean Corpuscular Hemoglobin 30.0 27.0 - 31.0 pg    Mean Corpuscular Hemoglobin Conc 31.5 (L) 32.0 - 36.0 g/dL    RDW 18.6 (H) 11.5 - 14.5 %    Platelets 159 150 - 350 K/uL    MPV 10.1 9.2 - 12.9 fL    Gran # (ANC) 2.0 1.8 - 7.7 K/uL    Immature Grans (Abs) 0.02 0.00 - 0.04 K/uL       Assessment     Assessment:     1. Stage 1B ([id number]) invasive ductal carcinoma of right breast, lower outer quadrant, ER neg, CO neg, Her2 neg, Grade 3, Ki67 70%   * 7/3/19 started neoadjuvant chemotherapy with four cycles of dose-dense Adriamycin and cyclophosphamide with Neulasta support followed by twelve doses of weekly paclitaxel.   * Can consider adding Zometa every 6 months x 2 years in adjuvant  setting.    * Weekly Taxol, cycle 1.  Will start at 20% dose reduction given her recurrent significant cytopenias with AC and increase if able with future cycles.     2. Chronically elevated alk phos and bilirubin   * Chronic; follows with Hepatology; prior liver biopsy 11/2018 with mild portal inflammation    3. Chemo anemia   * Monitor; required transfusion after cycle 4 ddAC.   * Transfuse 1 unit prbcs.     4. Drug constipation   * Encouraged Senna S two nightly.     5. Neutropenia with neutropenic fever after cycle 3 and cycle 4 ddAC despite Neulasta.    * Worse again today. Monitor.     6. Chemo thrombocytopenia    * Improving. Monitor.     7. Hypokalemia   * KCl - follow up repeat today.     Plan:     1. Cycle 1 weekly Taxol.   2. Continue antiemetics.   3. Senna s  4. KCl  5. Transfuse 1 unit prbcs  6. Call if symptoms worsen or if changes her mind about blood transfusion.     Schedule weekly taxols through session 9 with MD visit every other week.     Future Appointments   Date Time Provider Department Center   9/18/2019  2:20 PM LAB, [facility] LAB HO Julian Martinez   9/19/2019  1:00 PM [facility], CHEMO [facility] CHEMO Jordan Canjaden   10/17/2019  7:30 AM Dominique Ayala MD [facility] HEM ONC Jordan Cance   10/31/2019  7:30 AM Dominique Ayala MD [facility] HEM ONC [facility] Cance   11/14/2019  7:30 AM Dominique Ayala MD [facility] HEM ONC Jordan Cance   1/17/2020  9:00 AM Michelle Nunez NP [facility] AGUILAR Traylor

## 2019-09-18 NOTE — TELEPHONE ENCOUNTER
Called patient to let her know that I printed all her appointments on a calendar and I will leave at the  on the 5th floor for her when she comes for her infusion.      ----- Message from Dominique Ayala MD sent at 9/18/2019  1:48 PM CDT -----  OK, how about we move her to Wednesday next week and then Tuesday with MD the next week and then keep her on Tuesdays. Basically, moving her back one day each week for two weeks and then staying on Tuesdays.   ----- Message -----  From: Shikha Topete  Sent: 9/18/2019   1:34 PM  To: Diana Ibarra, Dominique Ayala MD    She should be able to see PJ on 10/3 if she has availability    ----- Message -----  From: Diana Ibarra  Sent: 9/18/2019   1:31 PM  To: Dominique Ayala MD, Staff Jamie Fox     If we move patient to a Thursday ([facility]) the only available time is 7:30 am patient would have to get labs the day before.On 10/3 no time available can she see PJ?  Please advise  Thanks    ----- Message -----  From: Dominique Ayala MD  Sent: 9/18/2019   1:25 PM  To: Diana Ibarra    1. Labs today - repeat cbc, type and cross  2. Add 1 unit prbcs transfusion tomorrow to scheduled Taxol  3. Add weekly Taxol x 8 doses with MD visit in 2, 4, 6, 8 weeks - can do Thursdays - please schedule MD and chemo on same day.

## 2019-09-19 ENCOUNTER — INFUSION (OUTPATIENT)
Dept: INFUSION THERAPY | Facility: HOSPITAL | Age: 69
End: 2019-09-19
Attending: INTERNAL MEDICINE
Payer: MEDICARE

## 2019-09-19 VITALS
TEMPERATURE: 98 F | SYSTOLIC BLOOD PRESSURE: 128 MMHG | WEIGHT: 137.56 LBS | DIASTOLIC BLOOD PRESSURE: 73 MMHG | BODY MASS INDEX: 24.38 KG/M2 | RESPIRATION RATE: 18 BRPM | HEART RATE: 80 BPM | HEIGHT: 63 IN | OXYGEN SATURATION: 100 %

## 2019-09-19 DIAGNOSIS — E87.6 HYPOKALEMIA: ICD-10-CM

## 2019-09-19 DIAGNOSIS — C50.311 MALIGNANT NEOPLASM OF LOWER-INNER QUADRANT OF RIGHT BREAST OF FEMALE, ESTROGEN RECEPTOR NEGATIVE: ICD-10-CM

## 2019-09-19 DIAGNOSIS — Z17.1 MALIGNANT NEOPLASM OF LOWER-INNER QUADRANT OF RIGHT BREAST OF FEMALE, ESTROGEN RECEPTOR NEGATIVE: ICD-10-CM

## 2019-09-19 DIAGNOSIS — D64.81 ANTINEOPLASTIC CHEMOTHERAPY INDUCED ANEMIA: Primary | ICD-10-CM

## 2019-09-19 DIAGNOSIS — T45.1X5A ANTINEOPLASTIC CHEMOTHERAPY INDUCED ANEMIA: Primary | ICD-10-CM

## 2019-09-19 LAB
BLD PROD TYP BPU: NORMAL
BLOOD UNIT EXPIRATION DATE: NORMAL
BLOOD UNIT TYPE CODE: 6200
BLOOD UNIT TYPE: NORMAL
CODING SYSTEM: NORMAL
DISPENSE STATUS: NORMAL
NUM UNITS TRANS PACKED RBC: NORMAL

## 2019-09-19 PROCEDURE — 86920 COMPATIBILITY TEST SPIN: CPT

## 2019-09-19 PROCEDURE — P9016 RBC LEUKOCYTES REDUCED: HCPCS

## 2019-09-19 PROCEDURE — S0028 INJECTION, FAMOTIDINE, 20 MG: HCPCS | Performed by: INTERNAL MEDICINE

## 2019-09-19 PROCEDURE — 36430 TRANSFUSION BLD/BLD COMPNT: CPT

## 2019-09-19 PROCEDURE — 96375 TX/PRO/DX INJ NEW DRUG ADDON: CPT

## 2019-09-19 PROCEDURE — 63600175 PHARM REV CODE 636 W HCPCS: Performed by: INTERNAL MEDICINE

## 2019-09-19 PROCEDURE — 96367 TX/PROPH/DG ADDL SEQ IV INF: CPT

## 2019-09-19 PROCEDURE — 25000003 PHARM REV CODE 250: Performed by: INTERNAL MEDICINE

## 2019-09-19 PROCEDURE — 96413 CHEMO IV INFUSION 1 HR: CPT

## 2019-09-19 RX ORDER — SODIUM CHLORIDE 0.9 % (FLUSH) 0.9 %
10 SYRINGE (ML) INJECTION
Status: DISCONTINUED | OUTPATIENT
Start: 2019-09-19 | End: 2019-09-19 | Stop reason: HOSPADM

## 2019-09-19 RX ORDER — EPINEPHRINE 0.3 MG/.3ML
0.3 INJECTION SUBCUTANEOUS ONCE AS NEEDED
Status: DISCONTINUED | OUTPATIENT
Start: 2019-09-19 | End: 2019-09-19 | Stop reason: HOSPADM

## 2019-09-19 RX ORDER — ACETAMINOPHEN 325 MG/1
650 TABLET ORAL
Status: COMPLETED | OUTPATIENT
Start: 2019-09-19 | End: 2019-09-19

## 2019-09-19 RX ORDER — DIPHENHYDRAMINE HYDROCHLORIDE 50 MG/ML
50 INJECTION INTRAMUSCULAR; INTRAVENOUS ONCE AS NEEDED
Status: DISCONTINUED | OUTPATIENT
Start: 2019-09-19 | End: 2019-09-19 | Stop reason: HOSPADM

## 2019-09-19 RX ORDER — HYDROCODONE BITARTRATE AND ACETAMINOPHEN 500; 5 MG/1; MG/1
TABLET ORAL ONCE
Status: COMPLETED | OUTPATIENT
Start: 2019-09-19 | End: 2019-09-19

## 2019-09-19 RX ORDER — DIPHENHYDRAMINE HCL 25 MG
25 CAPSULE ORAL
Status: DISCONTINUED | OUTPATIENT
Start: 2019-09-19 | End: 2019-09-19 | Stop reason: HOSPADM

## 2019-09-19 RX ORDER — HEPARIN 100 UNIT/ML
500 SYRINGE INTRAVENOUS
Status: DISCONTINUED | OUTPATIENT
Start: 2019-09-19 | End: 2019-09-19 | Stop reason: HOSPADM

## 2019-09-19 RX ORDER — FAMOTIDINE 10 MG/ML
20 INJECTION INTRAVENOUS
Status: COMPLETED | OUTPATIENT
Start: 2019-09-19 | End: 2019-09-19

## 2019-09-19 RX ADMIN — SODIUM CHLORIDE: 9 INJECTION, SOLUTION INTRAVENOUS at 01:09

## 2019-09-19 RX ADMIN — FAMOTIDINE 20 MG: 10 INJECTION INTRAVENOUS at 01:09

## 2019-09-19 RX ADMIN — ACETAMINOPHEN 650 MG: 325 TABLET ORAL at 02:09

## 2019-09-19 RX ADMIN — HEPARIN SODIUM (PORCINE) LOCK FLUSH IV SOLN 100 UNIT/ML 500 UNITS: 100 SOLUTION at 05:09

## 2019-09-19 RX ADMIN — DIPHENHYDRAMINE HYDROCHLORIDE 25 MG: 50 INJECTION INTRAMUSCULAR; INTRAVENOUS at 01:09

## 2019-09-19 RX ADMIN — PACLITAXEL 114 MG: 300 INJECTION, SOLUTION INTRAVENOUS at 02:09

## 2019-09-19 RX ADMIN — DEXAMETHASONE SODIUM PHOSPHATE 10 MG: 4 INJECTION, SOLUTION INTRAMUSCULAR; INTRAVENOUS at 01:09

## 2019-09-19 NOTE — PLAN OF CARE
Problem: Adult Inpatient Plan of Care  Goal: Plan of Care Review  Outcome: Ongoing (interventions implemented as appropriate)  Pt tolerated Taxol infusion and 1 unit PRBCs well, with no complications or s/s of adverse reaction. VS stable throughout treatment. Left chest port positive for blood return, flushed with normal saline and heparin and de-accessed prior to discharge. Site dressed with band-aid. RTC 9/24/19, pt verbalized understanding. Pt instructed to call MD if concerns arise. Pt discharged with no distress noted, ambulating independently, accompanied by . AVS printed and given to pt.

## 2019-09-20 ENCOUNTER — PATIENT OUTREACH (OUTPATIENT)
Dept: ADMINISTRATIVE | Facility: OTHER | Age: 69
End: 2019-09-20

## 2019-09-20 NOTE — PROGRESS NOTES
Subjective:      Patient ID: Sharri Cline is a 69 y.o. female.    Chief Complaint:  Thyroid Nodule    History of Present Illness  Sharri Cline is here for follow up of thyroid nodule.  Previously seen by MISAEL Phelps NP.  Last seen 9/26/18.  This is their first visit with me.      She has chronically elevated alk phos dating back to 2008   Initial endocrine work up showed an elevated liver specific isoenzyme   Bone specific isoenzyme was negative   Evaluated by Hepatology in 2017   Had MRI of abdomen in July 2017 which noted:   Liver normal in size, contour, and parenchymal signal. Portal vein is patent     With regards to thyroid nodule:    Found: 2018  Thyroid US: 8/20/18  The thyroid is normal in size.  Right lobe of the thyroid measures 4.8 x 2.1 x 2 cm.  There is a 3.2 x 2 x 1.9 cm predominantly solid nodule with punctate calcifications and isoechoic echotexture.  This nodule meets TIRADS-4 criteria and FNA biopsy is recommended.  Left lobe of the thyroid measures 3.8 x 1.1 x 1 cm.  There is a 0.8 x 0.6 x 0.7 nodule.  It demonstrates mixed solid and cystic echotexture.  The solid component is isoechoic.  There are punctate calcifications.  It has smooth margins.  This nodule meets TIRADS-4 criteria yet no follow-up or FNA biopsy is recommended since it is smaller than 1 cm.  Cervical lymph nodes demonstrate normal morphology and size.  IMPRESSION:  There is a 3.2 cm right lobe nodule that meets TIRADS-4 criteria and FNA biopsy is recommended.  There is a subcentimeter left lobe nodule that does not meet criteria for FNA biopsy or follow-up as detailed above.    FNA:   10/19/18  THYROID, RIGHT LOBE NODULE, FINE-NEEDLE ASPIRATION (CYTOLOGY):  - Benign ([place] Classification System II).  - Benign follicular cells and colloid.     Ref. Range 8/30/2019 11:37   TSH Latest Ref Range: 0.400 - 4.000 uIU/mL 0.389 (L)   Free T4 Latest Ref Range: 0.71 - 1.51 ng/dL 0.68 (L)     Signs or Symptoms:  "  Difficulty breathing: -  Difficulty swallowing: -  Voice Changes: +  FH of thyroid cancer: -  Personal history of radiation treatment or exposure: -    Complains of signs or symptoms of hyper or hypothyroidism - coincided when she started chemotherapy.  Likely related related to start of chemotherapy.     BMD 4/26/19  Osteopenia: FRAX Score does not support osteoporosis medications  RECOMMENDATIONS of [facility]:  1) Adequate calcium and Vitamin D therapy  2) Appropriate exercise  3) Consider repeat BMD in 2- 4 years    Review of Systems   Constitutional: Negative for fatigue.   HENT: Positive for voice change.    Eyes: Negative for visual disturbance.   Respiratory: Negative for shortness of breath.    Cardiovascular: Negative for chest pain.   Gastrointestinal: Negative for abdominal pain.   Musculoskeletal: Negative for arthralgias.   Skin: Negative for wound.   Neurological: Negative for headaches.   Hematological: Does not bruise/bleed easily.   Psychiatric/Behavioral: Negative for sleep disturbance.     Objective:   Physical Exam   Neck: No thyromegaly present.   Cardiovascular: Normal rate.   No edema present   Pulmonary/Chest: Effort normal.   Abdominal: Soft.   Vitals reviewed.    Visit Vitals  /72 (BP Location: Right arm, Patient Position: Sitting, BP Method: Small (Manual))   Pulse 75   Resp 18   Ht 5' 3" (1.6 m)   Wt 61 kg (134 lb 9.5 oz)   BMI 23.84 kg/m²     Body mass index is 23.84 kg/m².    Lab Review:   Lab Results   Component Value Date    HGBA1C 5.1 08/30/2019     Lab Results   Component Value Date    CHOL 170 02/06/2017    HDL 53 02/06/2017    LDLCALC 108.6 02/06/2017    TRIG 42 02/06/2017    CHOLHDL 31.2 02/06/2017     Lab Results   Component Value Date     09/18/2019    K 3.8 09/18/2019     09/18/2019    CO2 24 09/18/2019    GLU 81 09/18/2019    BUN 6 (L) 09/18/2019    CREATININE 0.6 09/18/2019    CALCIUM 8.8 09/18/2019    PROT 5.9 (L) " 09/18/2019    ALBUMIN 3.2 (L) 09/18/2019    BILITOT 0.4 09/18/2019    ALKPHOS 84 09/18/2019    AST 17 09/18/2019    ALT 7 (L) 09/18/2019    ANIONGAP 7 (L) 09/18/2019    ESTGFRAFRICA >60.0 09/18/2019    EGFRNONAA >60.0 09/18/2019    TSH 0.389 (L) 08/30/2019     Vit D, 25-Hydroxy   Date Value Ref Range Status   09/26/2018 20 (L) 30 - 96 ng/mL Final     Comment:     Vitamin D deficiency.........<10 ng/mL                              Vitamin D insufficiency......10-29 ng/mL       Vitamin D sufficiency........> or equal to 30 ng/mL  Vitamin D toxicity............>100 ng/mL       Assessment and Plan     1. Thyroid nodule  US Soft Tissue Head Neck Thyroid    TSH    T4, free    TSH    T4, free   2. Abnormal TSH     3. Osteopenia, unspecified location     4. Malignant neoplasm of lower-inner quadrant of right breast of female, estrogen receptor negative       Thyroid nodule  -- I have reviewed management options including observation, FNA or surgery.  -- All of the patients questions were answered.  -- Will proceed with repeat thyroid US.   -- Discussed indications for repeat FNA as well as surgical indications (abnormal FNA, compressive symptoms or interval change).  -- Discussed possible results of FNA- Benign, Malignant, FLUS, or insufficient cells.     Abnormal TSH  -- Repeat TFTs    Osteopenia  -- Reassuring she is not fracturing.  -- BMD due 2021.    Malignant neoplasm of lower-inner quadrant of right breast of female, estrogen receptor negative  -- Continue following with hem/onc.    Follow up in about 1 year (around 9/24/2020).

## 2019-09-24 ENCOUNTER — OFFICE VISIT (OUTPATIENT)
Dept: ENDOCRINOLOGY | Facility: CLINIC | Age: 69
End: 2019-09-24
Payer: MEDICARE

## 2019-09-24 VITALS
DIASTOLIC BLOOD PRESSURE: 72 MMHG | SYSTOLIC BLOOD PRESSURE: 122 MMHG | HEIGHT: 63 IN | BODY MASS INDEX: 23.84 KG/M2 | HEART RATE: 75 BPM | RESPIRATION RATE: 18 BRPM | WEIGHT: 134.56 LBS

## 2019-09-24 DIAGNOSIS — M85.80 OSTEOPENIA, UNSPECIFIED LOCATION: ICD-10-CM

## 2019-09-24 DIAGNOSIS — E04.1 THYROID NODULE: Primary | ICD-10-CM

## 2019-09-24 DIAGNOSIS — Z17.1 MALIGNANT NEOPLASM OF LOWER-INNER QUADRANT OF RIGHT BREAST OF FEMALE, ESTROGEN RECEPTOR NEGATIVE: ICD-10-CM

## 2019-09-24 DIAGNOSIS — C50.311 MALIGNANT NEOPLASM OF LOWER-INNER QUADRANT OF RIGHT BREAST OF FEMALE, ESTROGEN RECEPTOR NEGATIVE: ICD-10-CM

## 2019-09-24 DIAGNOSIS — R79.89 ABNORMAL TSH: ICD-10-CM

## 2019-09-24 PROCEDURE — 1101F PT FALLS ASSESS-DOCD LE1/YR: CPT | Mod: CPTII,S$GLB,, | Performed by: NURSE PRACTITIONER

## 2019-09-24 PROCEDURE — 99999 PR PBB SHADOW E&M-EST. PATIENT-LVL IV: ICD-10-PCS | Mod: PBBFAC,,, | Performed by: NURSE PRACTITIONER

## 2019-09-24 PROCEDURE — 99999 PR PBB SHADOW E&M-EST. PATIENT-LVL IV: CPT | Mod: PBBFAC,,, | Performed by: NURSE PRACTITIONER

## 2019-09-24 PROCEDURE — 99214 OFFICE O/P EST MOD 30 MIN: CPT | Mod: S$GLB,,, | Performed by: NURSE PRACTITIONER

## 2019-09-24 PROCEDURE — 1101F PR PT FALLS ASSESS DOC 0-1 FALLS W/OUT INJ PAST YR: ICD-10-PCS | Mod: CPTII,S$GLB,, | Performed by: NURSE PRACTITIONER

## 2019-09-24 PROCEDURE — 99214 PR OFFICE/OUTPT VISIT, EST, LEVL IV, 30-39 MIN: ICD-10-PCS | Mod: S$GLB,,, | Performed by: NURSE PRACTITIONER

## 2019-09-24 NOTE — ASSESSMENT & PLAN NOTE
-- I have reviewed management options including observation, FNA or surgery.  -- All of the patients questions were answered.  -- Will proceed with repeat thyroid US.   -- Discussed indications for repeat FNA as well as surgical indications (abnormal FNA, compressive symptoms or interval change).  -- Discussed possible results of FNA- Benign, Malignant, FLUS, or insufficient cells.

## 2019-09-25 ENCOUNTER — INFUSION (OUTPATIENT)
Dept: INFUSION THERAPY | Facility: HOSPITAL | Age: 69
End: 2019-09-25
Attending: INTERNAL MEDICINE
Payer: MEDICARE

## 2019-09-25 ENCOUNTER — PATIENT MESSAGE (OUTPATIENT)
Dept: ENDOCRINOLOGY | Facility: CLINIC | Age: 69
End: 2019-09-25

## 2019-09-25 VITALS
SYSTOLIC BLOOD PRESSURE: 121 MMHG | DIASTOLIC BLOOD PRESSURE: 69 MMHG | HEART RATE: 75 BPM | RESPIRATION RATE: 18 BRPM | TEMPERATURE: 98 F

## 2019-09-25 VITALS — WEIGHT: 134 LBS | HEIGHT: 63 IN | BODY MASS INDEX: 23.74 KG/M2

## 2019-09-25 DIAGNOSIS — Z17.1 MALIGNANT NEOPLASM OF LOWER-INNER QUADRANT OF RIGHT BREAST OF FEMALE, ESTROGEN RECEPTOR NEGATIVE: ICD-10-CM

## 2019-09-25 DIAGNOSIS — Z51.11 ENCOUNTER FOR CHEMOTHERAPY MANAGEMENT: Primary | ICD-10-CM

## 2019-09-25 DIAGNOSIS — C50.311 MALIGNANT NEOPLASM OF LOWER-INNER QUADRANT OF RIGHT BREAST OF FEMALE, ESTROGEN RECEPTOR NEGATIVE: ICD-10-CM

## 2019-09-25 DIAGNOSIS — N95.8 OTHER SPECIFIED MENOPAUSAL AND PERIMENOPAUSAL DISORDERS: Primary | ICD-10-CM

## 2019-09-25 DIAGNOSIS — E87.6 HYPOKALEMIA: Primary | ICD-10-CM

## 2019-09-25 DIAGNOSIS — E04.1 THYROID NODULE: ICD-10-CM

## 2019-09-25 LAB
ALBUMIN SERPL BCP-MCNC: 3.5 G/DL (ref 3.5–5.2)
ALP SERPL-CCNC: 88 U/L (ref 55–135)
ALT SERPL W/O P-5'-P-CCNC: 9 U/L (ref 10–44)
ANION GAP SERPL CALC-SCNC: 7 MMOL/L (ref 8–16)
AST SERPL-CCNC: 20 U/L (ref 10–40)
BILIRUB SERPL-MCNC: 0.8 MG/DL (ref 0.1–1)
BUN SERPL-MCNC: 8 MG/DL (ref 8–23)
CALCIUM SERPL-MCNC: 9.4 MG/DL (ref 8.7–10.5)
CHLORIDE SERPL-SCNC: 110 MMOL/L (ref 95–110)
CO2 SERPL-SCNC: 26 MMOL/L (ref 23–29)
CREAT SERPL-MCNC: 0.7 MG/DL (ref 0.5–1.4)
ERYTHROCYTE [DISTWIDTH] IN BLOOD BY AUTOMATED COUNT: 17.3 % (ref 11.5–14.5)
EST. GFR  (AFRICAN AMERICAN): >60 ML/MIN/1.73 M^2
EST. GFR  (NON AFRICAN AMERICAN): >60 ML/MIN/1.73 M^2
GLUCOSE SERPL-MCNC: 105 MG/DL (ref 70–110)
HCT VFR BLD AUTO: 31.1 % (ref 37–48.5)
HGB BLD-MCNC: 9.8 G/DL (ref 12–16)
IMM GRANULOCYTES # BLD AUTO: 0.01 K/UL (ref 0–0.04)
MCH RBC QN AUTO: 30 PG (ref 27–31)
MCHC RBC AUTO-ENTMCNC: 31.5 G/DL (ref 32–36)
MCV RBC AUTO: 95 FL (ref 82–98)
NEUTROPHILS # BLD AUTO: 2.3 K/UL (ref 1.8–7.7)
PLATELET # BLD AUTO: 182 K/UL (ref 150–350)
PMV BLD AUTO: 10.6 FL (ref 9.2–12.9)
POTASSIUM SERPL-SCNC: 3.8 MMOL/L (ref 3.5–5.1)
PROT SERPL-MCNC: 6.5 G/DL (ref 6–8.4)
RBC # BLD AUTO: 3.27 M/UL (ref 4–5.4)
SODIUM SERPL-SCNC: 143 MMOL/L (ref 136–145)
T4 FREE SERPL-MCNC: 0.62 NG/DL (ref 0.71–1.51)
TSH SERPL DL<=0.005 MIU/L-ACNC: 1.35 UIU/ML (ref 0.4–4)
WBC # BLD AUTO: 3.33 K/UL (ref 3.9–12.7)

## 2019-09-25 PROCEDURE — S0028 INJECTION, FAMOTIDINE, 20 MG: HCPCS | Performed by: INTERNAL MEDICINE

## 2019-09-25 PROCEDURE — 96413 CHEMO IV INFUSION 1 HR: CPT

## 2019-09-25 PROCEDURE — 96367 TX/PROPH/DG ADDL SEQ IV INF: CPT

## 2019-09-25 PROCEDURE — 84439 ASSAY OF FREE THYROXINE: CPT

## 2019-09-25 PROCEDURE — 96375 TX/PRO/DX INJ NEW DRUG ADDON: CPT

## 2019-09-25 PROCEDURE — 63600175 PHARM REV CODE 636 W HCPCS: Performed by: INTERNAL MEDICINE

## 2019-09-25 PROCEDURE — 84443 ASSAY THYROID STIM HORMONE: CPT

## 2019-09-25 PROCEDURE — 85027 COMPLETE CBC AUTOMATED: CPT

## 2019-09-25 PROCEDURE — 80053 COMPREHEN METABOLIC PANEL: CPT

## 2019-09-25 PROCEDURE — 25000003 PHARM REV CODE 250: Performed by: INTERNAL MEDICINE

## 2019-09-25 PROCEDURE — A4216 STERILE WATER/SALINE, 10 ML: HCPCS | Performed by: INTERNAL MEDICINE

## 2019-09-25 RX ORDER — EPINEPHRINE 0.3 MG/.3ML
0.3 INJECTION SUBCUTANEOUS ONCE AS NEEDED
Status: DISCONTINUED | OUTPATIENT
Start: 2019-09-25 | End: 2019-09-25 | Stop reason: HOSPADM

## 2019-09-25 RX ORDER — SODIUM CHLORIDE 0.9 % (FLUSH) 0.9 %
10 SYRINGE (ML) INJECTION
Status: COMPLETED | OUTPATIENT
Start: 2019-09-25 | End: 2019-09-25

## 2019-09-25 RX ORDER — HEPARIN 100 UNIT/ML
500 SYRINGE INTRAVENOUS
Status: CANCELLED | OUTPATIENT
Start: 2019-09-25

## 2019-09-25 RX ORDER — FAMOTIDINE 10 MG/ML
20 INJECTION INTRAVENOUS
Status: COMPLETED | OUTPATIENT
Start: 2019-09-25 | End: 2019-09-25

## 2019-09-25 RX ORDER — SODIUM CHLORIDE 0.9 % (FLUSH) 0.9 %
10 SYRINGE (ML) INJECTION
Status: CANCELLED | OUTPATIENT
Start: 2019-09-25

## 2019-09-25 RX ORDER — HEPARIN 100 UNIT/ML
500 SYRINGE INTRAVENOUS
Status: COMPLETED | OUTPATIENT
Start: 2019-09-25 | End: 2019-09-25

## 2019-09-25 RX ORDER — DIPHENHYDRAMINE HYDROCHLORIDE 50 MG/ML
50 INJECTION INTRAMUSCULAR; INTRAVENOUS ONCE AS NEEDED
Status: DISCONTINUED | OUTPATIENT
Start: 2019-09-25 | End: 2019-09-25 | Stop reason: HOSPADM

## 2019-09-25 RX ORDER — HEPARIN 100 UNIT/ML
500 SYRINGE INTRAVENOUS
Status: DISCONTINUED | OUTPATIENT
Start: 2019-09-25 | End: 2019-09-25 | Stop reason: HOSPADM

## 2019-09-25 RX ORDER — SODIUM CHLORIDE 0.9 % (FLUSH) 0.9 %
10 SYRINGE (ML) INJECTION
Status: DISCONTINUED | OUTPATIENT
Start: 2019-09-25 | End: 2019-09-25 | Stop reason: HOSPADM

## 2019-09-25 RX ADMIN — FAMOTIDINE 20 MG: 10 INJECTION INTRAVENOUS at 02:09

## 2019-09-25 RX ADMIN — PACLITAXEL 114 MG: 300 INJECTION, SOLUTION INTRAVENOUS at 03:09

## 2019-09-25 RX ADMIN — Medication 10 ML: at 01:09

## 2019-09-25 RX ADMIN — DEXAMETHASONE SODIUM PHOSPHATE 10 MG: 4 INJECTION, SOLUTION INTRAMUSCULAR; INTRAVENOUS at 02:09

## 2019-09-25 RX ADMIN — HEPARIN SODIUM (PORCINE) LOCK FLUSH IV SOLN 100 UNIT/ML 500 UNITS: 100 SOLUTION at 01:09

## 2019-09-25 RX ADMIN — DIPHENHYDRAMINE HYDROCHLORIDE 25 MG: 50 INJECTION INTRAMUSCULAR; INTRAVENOUS at 02:09

## 2019-09-25 RX ADMIN — HEPARIN 500 UNITS: 100 SYRINGE at 04:09

## 2019-09-25 RX ADMIN — SODIUM CHLORIDE: 0.9 INJECTION, SOLUTION INTRAVENOUS at 03:09

## 2019-09-25 NOTE — PLAN OF CARE
Patient tolerated Taxol with no complications. VSS. NAD. Pt instructed to call MD with any problems. Pt discharged home independently.

## 2019-09-25 NOTE — TELEPHONE ENCOUNTER
Case discussed with Dr. Sinha.  Suspicious that her free T4 is low with a low TSH and with repeat labs a normal TSH and low free T4.  Assess pituitary function to assess FSH LH.

## 2019-09-25 NOTE — NURSING
1300 pt here for port access and blood draw, port cleaned per policy and accessed without issue, specimen drawn, port remains accessed for possible chemo today, no distress noted upon d/c to home

## 2019-10-01 ENCOUNTER — INFUSION (OUTPATIENT)
Dept: INFUSION THERAPY | Facility: HOSPITAL | Age: 69
End: 2019-10-01
Attending: INTERNAL MEDICINE
Payer: MEDICARE

## 2019-10-01 ENCOUNTER — OFFICE VISIT (OUTPATIENT)
Dept: HEMATOLOGY/ONCOLOGY | Facility: CLINIC | Age: 69
End: 2019-10-01
Payer: MEDICARE

## 2019-10-01 VITALS
OXYGEN SATURATION: 100 % | BODY MASS INDEX: 24.3 KG/M2 | DIASTOLIC BLOOD PRESSURE: 73 MMHG | HEIGHT: 63 IN | TEMPERATURE: 98 F | WEIGHT: 137.13 LBS | SYSTOLIC BLOOD PRESSURE: 133 MMHG | RESPIRATION RATE: 18 BRPM | HEART RATE: 78 BPM

## 2019-10-01 VITALS
WEIGHT: 137.13 LBS | DIASTOLIC BLOOD PRESSURE: 73 MMHG | HEART RATE: 72 BPM | TEMPERATURE: 98 F | OXYGEN SATURATION: 100 % | HEIGHT: 63 IN | BODY MASS INDEX: 24.3 KG/M2 | SYSTOLIC BLOOD PRESSURE: 128 MMHG | RESPIRATION RATE: 18 BRPM

## 2019-10-01 DIAGNOSIS — T45.1X5A ANTINEOPLASTIC CHEMOTHERAPY INDUCED ANEMIA: ICD-10-CM

## 2019-10-01 DIAGNOSIS — Z17.1 MALIGNANT NEOPLASM OF LOWER-INNER QUADRANT OF RIGHT BREAST OF FEMALE, ESTROGEN RECEPTOR NEGATIVE: Primary | ICD-10-CM

## 2019-10-01 DIAGNOSIS — Z51.11 ENCOUNTER FOR CHEMOTHERAPY MANAGEMENT: Primary | ICD-10-CM

## 2019-10-01 DIAGNOSIS — R74.8 ELEVATED ALKALINE PHOSPHATASE LEVEL: ICD-10-CM

## 2019-10-01 DIAGNOSIS — C50.311 MALIGNANT NEOPLASM OF LOWER-INNER QUADRANT OF RIGHT BREAST OF FEMALE, ESTROGEN RECEPTOR NEGATIVE: ICD-10-CM

## 2019-10-01 DIAGNOSIS — E87.6 HYPOKALEMIA: ICD-10-CM

## 2019-10-01 DIAGNOSIS — T50.905A DRUG-INDUCED THROMBOCYTOPENIA: ICD-10-CM

## 2019-10-01 DIAGNOSIS — C50.311 MALIGNANT NEOPLASM OF LOWER-INNER QUADRANT OF RIGHT BREAST OF FEMALE, ESTROGEN RECEPTOR NEGATIVE: Primary | ICD-10-CM

## 2019-10-01 DIAGNOSIS — D64.81 ANTINEOPLASTIC CHEMOTHERAPY INDUCED ANEMIA: ICD-10-CM

## 2019-10-01 DIAGNOSIS — Z17.1 MALIGNANT NEOPLASM OF LOWER-INNER QUADRANT OF RIGHT BREAST OF FEMALE, ESTROGEN RECEPTOR NEGATIVE: ICD-10-CM

## 2019-10-01 DIAGNOSIS — D69.59 DRUG-INDUCED THROMBOCYTOPENIA: ICD-10-CM

## 2019-10-01 LAB
ALBUMIN SERPL BCP-MCNC: 3.3 G/DL (ref 3.5–5.2)
ALP SERPL-CCNC: 80 U/L (ref 55–135)
ALT SERPL W/O P-5'-P-CCNC: 10 U/L (ref 10–44)
ANION GAP SERPL CALC-SCNC: 7 MMOL/L (ref 8–16)
AST SERPL-CCNC: 22 U/L (ref 10–40)
BILIRUB SERPL-MCNC: 0.6 MG/DL (ref 0.1–1)
BUN SERPL-MCNC: 9 MG/DL (ref 8–23)
CALCIUM SERPL-MCNC: 8.6 MG/DL (ref 8.7–10.5)
CHLORIDE SERPL-SCNC: 110 MMOL/L (ref 95–110)
CO2 SERPL-SCNC: 25 MMOL/L (ref 23–29)
CREAT SERPL-MCNC: 0.8 MG/DL (ref 0.5–1.4)
ERYTHROCYTE [DISTWIDTH] IN BLOOD BY AUTOMATED COUNT: 18 % (ref 11.5–14.5)
EST. GFR  (AFRICAN AMERICAN): >60 ML/MIN/1.73 M^2
EST. GFR  (NON AFRICAN AMERICAN): >60 ML/MIN/1.73 M^2
GLUCOSE SERPL-MCNC: 77 MG/DL (ref 70–110)
HCT VFR BLD AUTO: 26.3 % (ref 37–48.5)
HGB BLD-MCNC: 8.6 G/DL (ref 12–16)
IMM GRANULOCYTES # BLD AUTO: 0.02 K/UL (ref 0–0.04)
MCH RBC QN AUTO: 30.5 PG (ref 27–31)
MCHC RBC AUTO-ENTMCNC: 32.7 G/DL (ref 32–36)
MCV RBC AUTO: 93 FL (ref 82–98)
NEUTROPHILS # BLD AUTO: 1.6 K/UL (ref 1.8–7.7)
PLATELET # BLD AUTO: 157 K/UL (ref 150–350)
PMV BLD AUTO: 10.7 FL (ref 9.2–12.9)
POTASSIUM SERPL-SCNC: 4.2 MMOL/L (ref 3.5–5.1)
PROT SERPL-MCNC: 6 G/DL (ref 6–8.4)
RBC # BLD AUTO: 2.82 M/UL (ref 4–5.4)
SODIUM SERPL-SCNC: 142 MMOL/L (ref 136–145)
WBC # BLD AUTO: 2.51 K/UL (ref 3.9–12.7)

## 2019-10-01 PROCEDURE — 85027 COMPLETE CBC AUTOMATED: CPT

## 2019-10-01 PROCEDURE — 96413 CHEMO IV INFUSION 1 HR: CPT

## 2019-10-01 PROCEDURE — S0028 INJECTION, FAMOTIDINE, 20 MG: HCPCS | Performed by: INTERNAL MEDICINE

## 2019-10-01 PROCEDURE — 99215 PR OFFICE/OUTPT VISIT, EST, LEVL V, 40-54 MIN: ICD-10-PCS | Mod: S$GLB,,, | Performed by: INTERNAL MEDICINE

## 2019-10-01 PROCEDURE — 96367 TX/PROPH/DG ADDL SEQ IV INF: CPT

## 2019-10-01 PROCEDURE — 25000003 PHARM REV CODE 250: Performed by: INTERNAL MEDICINE

## 2019-10-01 PROCEDURE — 1101F PT FALLS ASSESS-DOCD LE1/YR: CPT | Mod: CPTII,S$GLB,, | Performed by: INTERNAL MEDICINE

## 2019-10-01 PROCEDURE — 99999 PR PBB SHADOW E&M-EST. PATIENT-LVL III: ICD-10-PCS | Mod: PBBFAC,,, | Performed by: INTERNAL MEDICINE

## 2019-10-01 PROCEDURE — 99999 PR PBB SHADOW E&M-EST. PATIENT-LVL III: CPT | Mod: PBBFAC,,, | Performed by: INTERNAL MEDICINE

## 2019-10-01 PROCEDURE — 99499 UNLISTED E&M SERVICE: CPT | Mod: S$GLB,,, | Performed by: INTERNAL MEDICINE

## 2019-10-01 PROCEDURE — A4216 STERILE WATER/SALINE, 10 ML: HCPCS | Performed by: INTERNAL MEDICINE

## 2019-10-01 PROCEDURE — 99499 RISK ADDL DX/OHS AUDIT: ICD-10-PCS | Mod: S$GLB,,, | Performed by: INTERNAL MEDICINE

## 2019-10-01 PROCEDURE — 63600175 PHARM REV CODE 636 W HCPCS: Performed by: INTERNAL MEDICINE

## 2019-10-01 PROCEDURE — 99215 OFFICE O/P EST HI 40 MIN: CPT | Mod: S$GLB,,, | Performed by: INTERNAL MEDICINE

## 2019-10-01 PROCEDURE — 96375 TX/PRO/DX INJ NEW DRUG ADDON: CPT

## 2019-10-01 PROCEDURE — 1101F PR PT FALLS ASSESS DOC 0-1 FALLS W/OUT INJ PAST YR: ICD-10-PCS | Mod: CPTII,S$GLB,, | Performed by: INTERNAL MEDICINE

## 2019-10-01 PROCEDURE — 80053 COMPREHEN METABOLIC PANEL: CPT

## 2019-10-01 RX ORDER — SODIUM CHLORIDE 0.9 % (FLUSH) 0.9 %
10 SYRINGE (ML) INJECTION
Status: CANCELLED | OUTPATIENT
Start: 2019-10-15

## 2019-10-01 RX ORDER — DIPHENHYDRAMINE HYDROCHLORIDE 50 MG/ML
50 INJECTION INTRAMUSCULAR; INTRAVENOUS ONCE AS NEEDED
Status: CANCELLED | OUTPATIENT
Start: 2019-10-02

## 2019-10-01 RX ORDER — FAMOTIDINE 10 MG/ML
20 INJECTION INTRAVENOUS
Status: CANCELLED | OUTPATIENT
Start: 2019-10-02

## 2019-10-01 RX ORDER — SODIUM CHLORIDE 0.9 % (FLUSH) 0.9 %
10 SYRINGE (ML) INJECTION
Status: DISCONTINUED | OUTPATIENT
Start: 2019-10-01 | End: 2019-10-01 | Stop reason: HOSPADM

## 2019-10-01 RX ORDER — EPINEPHRINE 0.3 MG/.3ML
0.3 INJECTION SUBCUTANEOUS ONCE AS NEEDED
Status: CANCELLED | OUTPATIENT
Start: 2019-10-02

## 2019-10-01 RX ORDER — EPINEPHRINE 0.3 MG/.3ML
0.3 INJECTION SUBCUTANEOUS ONCE AS NEEDED
Status: CANCELLED | OUTPATIENT
Start: 2019-10-15

## 2019-10-01 RX ORDER — SODIUM CHLORIDE 0.9 % (FLUSH) 0.9 %
10 SYRINGE (ML) INJECTION
Status: CANCELLED | OUTPATIENT
Start: 2019-10-02

## 2019-10-01 RX ORDER — FAMOTIDINE 10 MG/ML
20 INJECTION INTRAVENOUS
Status: CANCELLED | OUTPATIENT
Start: 2019-10-15

## 2019-10-01 RX ORDER — HEPARIN 100 UNIT/ML
500 SYRINGE INTRAVENOUS
Status: CANCELLED | OUTPATIENT
Start: 2019-10-15

## 2019-10-01 RX ORDER — HEPARIN 100 UNIT/ML
500 SYRINGE INTRAVENOUS
Status: COMPLETED | OUTPATIENT
Start: 2019-10-01 | End: 2019-10-01

## 2019-10-01 RX ORDER — DIPHENHYDRAMINE HYDROCHLORIDE 50 MG/ML
50 INJECTION INTRAMUSCULAR; INTRAVENOUS ONCE AS NEEDED
Status: CANCELLED | OUTPATIENT
Start: 2019-10-15

## 2019-10-01 RX ORDER — SODIUM CHLORIDE 0.9 % (FLUSH) 0.9 %
10 SYRINGE (ML) INJECTION
Status: COMPLETED | OUTPATIENT
Start: 2019-10-01 | End: 2019-10-01

## 2019-10-01 RX ORDER — HEPARIN 100 UNIT/ML
500 SYRINGE INTRAVENOUS
Status: CANCELLED | OUTPATIENT
Start: 2019-10-01

## 2019-10-01 RX ORDER — SODIUM CHLORIDE 0.9 % (FLUSH) 0.9 %
10 SYRINGE (ML) INJECTION
Status: CANCELLED | OUTPATIENT
Start: 2019-10-01

## 2019-10-01 RX ORDER — POTASSIUM CHLORIDE 20 MEQ/1
40 TABLET, EXTENDED RELEASE ORAL DAILY
Qty: 60 TABLET | Refills: 1 | Status: SHIPPED | OUTPATIENT
Start: 2019-10-01 | End: 2020-03-05

## 2019-10-01 RX ORDER — FAMOTIDINE 10 MG/ML
20 INJECTION INTRAVENOUS
Status: COMPLETED | OUTPATIENT
Start: 2019-10-01 | End: 2019-10-01

## 2019-10-01 RX ORDER — EPINEPHRINE 0.3 MG/.3ML
0.3 INJECTION SUBCUTANEOUS ONCE AS NEEDED
Status: DISCONTINUED | OUTPATIENT
Start: 2019-10-01 | End: 2019-10-01 | Stop reason: HOSPADM

## 2019-10-01 RX ORDER — DIPHENHYDRAMINE HYDROCHLORIDE 50 MG/ML
50 INJECTION INTRAMUSCULAR; INTRAVENOUS ONCE AS NEEDED
Status: DISCONTINUED | OUTPATIENT
Start: 2019-10-01 | End: 2019-10-01 | Stop reason: HOSPADM

## 2019-10-01 RX ORDER — HEPARIN 100 UNIT/ML
500 SYRINGE INTRAVENOUS
Status: CANCELLED | OUTPATIENT
Start: 2019-10-02

## 2019-10-01 RX ORDER — HEPARIN 100 UNIT/ML
500 SYRINGE INTRAVENOUS
Status: DISCONTINUED | OUTPATIENT
Start: 2019-10-01 | End: 2019-10-01 | Stop reason: HOSPADM

## 2019-10-01 RX ADMIN — Medication 10 ML: at 02:10

## 2019-10-01 RX ADMIN — SODIUM CHLORIDE: 0.9 INJECTION, SOLUTION INTRAVENOUS at 03:10

## 2019-10-01 RX ADMIN — Medication 10 ML: at 05:10

## 2019-10-01 RX ADMIN — DEXAMETHASONE SODIUM PHOSPHATE 10 MG: 4 INJECTION, SOLUTION INTRA-ARTICULAR; INTRALESIONAL; INTRAMUSCULAR; INTRAVENOUS; SOFT TISSUE at 03:10

## 2019-10-01 RX ADMIN — PACLITAXEL 114 MG: 6 INJECTION, SOLUTION INTRAVENOUS at 04:10

## 2019-10-01 RX ADMIN — HEPARIN SODIUM (PORCINE) LOCK FLUSH IV SOLN 100 UNIT/ML 500 UNITS: 100 SOLUTION at 02:10

## 2019-10-01 RX ADMIN — DIPHENHYDRAMINE HYDROCHLORIDE 25 MG: 50 INJECTION, SOLUTION INTRAMUSCULAR; INTRAVENOUS at 03:10

## 2019-10-01 RX ADMIN — HEPARIN SODIUM (PORCINE) LOCK FLUSH IV SOLN 100 UNIT/ML 500 UNITS: 100 SOLUTION at 05:10

## 2019-10-01 RX ADMIN — FAMOTIDINE 20 MG: 10 INJECTION INTRAVENOUS at 03:10

## 2019-10-01 NOTE — PROGRESS NOTES
History:     Reason For Follow Up/Onc History:   1. Stage 1B ([id number]) invasive ductal carcinoma of right breast, lower outer quadrant, ER neg, NH neg, Her2 neg, Grade 3, Ki67 70%      HPI:   Sharri Cline presents for follow up of breast cancer and continuation of neoadjuvant chemotherapy. She's due for cycle 3 Taxol. She remains anemic and neutropenic. She's feeling well - much better than she did on AC. Rare nausea in the morning. No significant shortness of air. Good appetite. No neuropathy.  She's exercising.     Onc history:   - She presented for screening mammo in 5/15/2019 which showed focal asymmetries in both left and right breast. Diagnostic mammogram and US showed no significant abn of left breast, and right breast 15 mm x 11 mm x 12 mm mass at 4:00, 5 CFN. Biopsy on 5/24/19 showed grade 3 IDC, triple negative, Ki67 70%.    - 7/3/19 started neoadjuvant chemotherapy with ddAC to be followed by Taxol.   - 8/8/19 admitted for neutropenic fever; admitted again with cycle 4.     History: I reviewed, confirmed and updated history (past medical, social, family) as necessary.    Medications    Current Outpatient Medications:     artificial tears (ISOPTO TEARS) 0.5 % ophthalmic solution, Place 1 drop into both eyes 4 (four) times daily., Disp: , Rfl:     carboxymethylcellulose sodium 1 % DpGe, Place 1 drop into both eyes every evening., Disp: , Rfl: 0    fish oil-omega-3 fatty acids 300-1,000 mg capsule, Take 2 g by mouth once daily., Disp: , Rfl:     magic mouthwash diphen/antac/lidoc/nysta, Take 10 mLs by mouth 4 (four) times daily., Disp: 120 mL, Rfl: 0    multivitamin with minerals tablet, Take 1 tablet by mouth once daily.  , Disp: , Rfl:     ondansetron (ZOFRAN-ODT) 8 MG TbDL, Take 1 tablet (8 mg total) by mouth every 8 (eight) hours as needed (nausea)., Disp: 45 tablet, Rfl: 1    potassium chloride SA (K-DUR,KLOR-CON) 20 MEQ tablet, Take 2 tablets (40 mEq total) by mouth once daily., Disp: 60  "tablet, Rfl: 1  No current facility-administered medications for this visit.   Allergies  Review of patient's allergies indicates:  No Known Allergies  Review of Systems  Review of Systems   Constitutional: Positive for fatigue. Negative for activity change, appetite change, chills, fever and unexpected weight change.   HENT: Negative for congestion, hearing loss, mouth sores, nosebleeds, sinus pressure and trouble swallowing.    Eyes: Negative for pain, discharge, itching and visual disturbance.   Respiratory: Negative for cough, chest tightness and shortness of breath.    Cardiovascular: Negative for chest pain, palpitations and leg swelling.   Gastrointestinal: Negative for abdominal distention, abdominal pain, blood in stool, diarrhea, nausea, rectal pain and vomiting.   Endocrine: Negative for heat intolerance and polydipsia.   Genitourinary: Negative for difficulty urinating, dysuria, flank pain, frequency, hematuria and urgency.   Musculoskeletal: Negative for arthralgias, back pain and neck pain.   Skin: Negative for color change, pallor and rash.   Neurological: Negative for dizziness, light-headedness, numbness and headaches.   Hematological: Negative for adenopathy. Does not bruise/bleed easily.   Psychiatric/Behavioral: Negative for confusion and decreased concentration. The patient is not nervous/anxious.         Objective     Objective:     Vitals:    10/01/19 1439   BP: 133/73   BP Location: Right arm   Patient Position: Sitting   BP Method: Large (Automatic)   Pulse: 78   Resp: 18   Temp: 98 °F (36.7 °C)   TempSrc: Oral   SpO2: 100%   Weight: 62.2 kg (137 lb 2 oz)   Height: 5' 3" (1.6 m)     Body surface area is 1.66 meters squared.  Physical Exam   Constitutional: She is oriented to person, place, and time. She appears well-developed and well-nourished. No distress.   HENT:   Head: Normocephalic and atraumatic.   Mouth/Throat: Oropharynx is clear and moist and mucous membranes are normal. No oral " lesions.   Eyes: Conjunctivae and EOM are normal. Right eye exhibits no discharge. Left eye exhibits no discharge. No scleral icterus.   Neck: Normal range of motion. Neck supple. No thyroid mass and no thyromegaly present.   Cardiovascular: Normal rate, regular rhythm, S1 normal, S2 normal and normal heart sounds.   Pulmonary/Chest: Effort normal and breath sounds normal. No respiratory distress. She has no wheezes. She has no rhonchi. She has no rales. Chest wall is not dull to percussion.   Right breast with indention of skin at 4:00 but only when she raises her right arm and significantly decreased  Mediport   Abdominal: Soft. Normal appearance and bowel sounds are normal. There is no hepatosplenomegaly. There is no tenderness.   Musculoskeletal: She exhibits no tenderness.   Lymphadenopathy: No inguinal adenopathy noted on the right or left side.   Neurological: She is alert and oriented to person, place, and time. She has normal strength.   Skin: Skin is warm, dry and intact. No pallor.   Psychiatric: Her behavior is normal. Thought content normal.     Labs and Imaging  Results for orders placed or performed in visit on 10/01/19   CBC Oncology   Result Value Ref Range    WBC 2.51 (L) 3.90 - 12.70 K/uL    RBC 2.82 (L) 4.00 - 5.40 M/uL    Hemoglobin 8.6 (L) 12.0 - 16.0 g/dL    Hematocrit 26.3 (L) 37.0 - 48.5 %    Mean Corpuscular Volume 93 82 - 98 fL    Mean Corpuscular Hemoglobin 30.5 27.0 - 31.0 pg    Mean Corpuscular Hemoglobin Conc 32.7 32.0 - 36.0 g/dL    RDW 18.0 (H) 11.5 - 14.5 %    Platelets 157 150 - 350 K/uL    MPV 10.7 9.2 - 12.9 fL    Gran # (ANC) 1.6 (L) 1.8 - 7.7 K/uL    Immature Grans (Abs) 0.02 0.00 - 0.04 K/uL       Assessment     Assessment:     1. Stage 1B ([id number]) invasive ductal carcinoma of right breast, lower outer quadrant, ER neg, RI neg, Her2 neg, Grade 3, Ki67 70%   * 7/3/19 started neoadjuvant chemotherapy with four cycles of dose-dense Adriamycin and cyclophosphamide with Neulasta  support followed by twelve doses of weekly paclitaxel.   * Can consider adding Zometa every 6 months x 2 years in adjuvant setting.    * Continue weekly Taxol. Will start at 20% dose reduction given her recurrent significant cytopenias with AC and increase if able with future cycles.     - Cycle 3. Tolerating Taxol well. I do not think that she can tolerate increased dose since she remains anemic and neutropenic, thus will continue at current dose.     2. Chronically elevated alk phos and bilirubin   * Chronic; follows with Hepatology; prior liver biopsy 11/2018 with mild portal inflammation    3. Chemo anemia   * Required transfusion after cycle 4 ddAC.   * continue to monitor; transfuse if less than 8.     4. Drug constipation   * Encouraged Senna S two nightly.     5. Neutropenia with neutropenic fever after cycle 3 and cycle 4 ddAC despite Neulasta.    * Worse again today. Monitor.     6. Chemo thrombocytopenia    * Improving. Monitor.     7. Hypokalemia   * KCl - follow up repeat today.     Plan:     1. Cycle 3 weekly Taxol.   2. Continue antiemetics.   3. Senna s  4. KCl - continue.   5. Monitor hemoglobin.   6. Call if symptoms worsen or if changes her mind about blood transfusion.     Already scheduled: weekly taxols through session 9 with MD visit every other week.     Future Appointments   Date Time Provider Department Center   10/1/2019  3:00 PM Dominique Ayala MD [facility] HEM ONC [facility]   10/1/2019  3:30 PM [facility]   10/8/2019  1:00 PM INJECTION, [facility]   10/8/2019  1:30 PM [facility]   10/15/2019  9:00 AM INJECTION, [facility]   10/15/2019 10:00 AM Dominique Ayala MD [facility] HEM ONC [facility]   10/15/2019 11:00 AM [facility]   10/22/2019  1:00 PM INJECTION, [facility]   10/22/2019  2:00 PM [facility]   10/29/2019  10:00 AM INJECTION, [facility]   10/29/2019 11:00 AM Dominique Ayala MD [facility] HEM ONC [facility]   10/29/2019  1:00 PM [facility]   10/31/2019  7:30 AM [facility] US ENDOCRINOLOGY [facility] ENDCNUS [address]   11/5/2019  1:00 PM INJECTION, [facility] INF[facility]ION [facility]   11/5/2019  2:00 PM [facility]   11/12/2019 10:30 AM INJECTION, [facility]   11/12/2019 11:30 AM Dominique Ayala MD [facility] HEM ONC [facility]   11/12/2019  1:00 PM [facility]

## 2019-10-01 NOTE — PLAN OF CARE
Pt tolerated C3 taxol without adverse effects. VSS. Verbalized understanding of RTC date. DC with family ambulating independently.

## 2019-10-02 ENCOUNTER — TELEPHONE (OUTPATIENT)
Dept: HEMATOLOGY/ONCOLOGY | Facility: CLINIC | Age: 69
End: 2019-10-02

## 2019-10-02 NOTE — TELEPHONE ENCOUNTER
Returned call to pt.  Pt unavailable.   Left message for pt to return call.   Callback number provided.         ----- Message from Doris Rajput sent at 10/2/2019  9:50 AM CDT -----  Contact: Pt   Type:  Patient Returning Call    Who Called:Pt Mrs Cline   Who Left Message for Patient Nurse Shikha   Does the patient know what this is regarding? About Going back to work   Would the patient rather a call back or a response via [facility]? Callback  Best Call Back Number:[phone]  Additional Information:   Thank You   SAM Rajput Patient Access Rep

## 2019-10-02 NOTE — TELEPHONE ENCOUNTER
Call to pt.  Pt unavailable.   Left message for pt informing of below.   Callback number provided if any questions.     MD Shikha Lester   Caller: Unspecified (Today,  9:44 AM)             Yes, she can go back to work if she feels good.          Returned call to pt.   Pt stated that she was informed could return to work after 9/30- pt wants to clarify if she can go back to work if feeling well.   Pt also asked if she could get her flu shot- informed pt can get her flu shot.   Informed pt would f/u if could return to work.   Pt verbalized understanding.     Message fwd.       ----- Message from Rajwinder Quiroga sent at 10/2/2019  9:36 AM CDT -----  Contact: Patient  Staff Message     Caller name: Pt    Reason for call: Pt has questions regarding her return to work, and also wants to know if she should get a flu shot this year.        Communication Preference: [phone]    Additional Information:

## 2019-10-03 ENCOUNTER — TELEPHONE (OUTPATIENT)
Dept: HEMATOLOGY/ONCOLOGY | Facility: CLINIC | Age: 69
End: 2019-10-03

## 2019-10-03 NOTE — TELEPHONE ENCOUNTER
Message fwd to MD.         ----- Message from Calista Fernández sent at 10/3/2019 12:44 PM CDT -----  Type:  Needs Medical Advice    Who Called: Pt    Would the patient rather a call back or a response via [facility]? Callback    Best Call Back Number: [phone]     Additional Information: Pt would like to know if she can get a flu shot the same day she do chemo?

## 2019-10-03 NOTE — TELEPHONE ENCOUNTER
Called pt and informed that okay to get flu shot same day as chemo.   Informed pt can get through PCP or through [facility] as our office does not have.   Pt verbalized understanding.       ----- Message from Dominique Ayala MD sent at 10/3/2019  1:20 PM CDT -----  Yes, its okay  ----- Message -----  From: Shikha Topete  Sent: 10/3/2019   1:13 PM CDT  To: Dominique Ayala MD    Are you okay with patient getting flu shot same day she gets her chemo?    ----- Message -----  From: Calista Fernández  Sent: 10/3/2019  12:44 PM CDT  To: Staff Jamie Fox    Type:  Needs Medical Advice    Who Called: Pt    Would the patient rather a call back or a response via [facility]? Callback    Best Call Back Number: [phone]     Additional Information: Pt would like to know if she can get a flu shot the same day she do chemo?

## 2019-10-07 ENCOUNTER — DOCUMENTATION ONLY (OUTPATIENT)
Dept: HEMATOLOGY/ONCOLOGY | Facility: CLINIC | Age: 69
End: 2019-10-07

## 2019-10-07 NOTE — PROGRESS NOTES
Spoke to the patient about the Meditation sessions offered at [facility] after receiving a message that she would be interested in participating in the next session. E-mailed her a flyer with information on the next session (starting on 10/23/19) per her request.

## 2019-10-08 ENCOUNTER — INFUSION (OUTPATIENT)
Dept: INFUSION THERAPY | Facility: HOSPITAL | Age: 69
End: 2019-10-08
Attending: INTERNAL MEDICINE
Payer: MEDICARE

## 2019-10-08 ENCOUNTER — DOCUMENTATION ONLY (OUTPATIENT)
Dept: HEMATOLOGY/ONCOLOGY | Facility: CLINIC | Age: 69
End: 2019-10-08

## 2019-10-08 ENCOUNTER — IMMUNIZATION (OUTPATIENT)
Dept: PHARMACY | Facility: CLINIC | Age: 69
End: 2019-10-08
Payer: MEDICARE

## 2019-10-08 VITALS
RESPIRATION RATE: 18 BRPM | TEMPERATURE: 98 F | DIASTOLIC BLOOD PRESSURE: 67 MMHG | SYSTOLIC BLOOD PRESSURE: 141 MMHG | HEART RATE: 75 BPM

## 2019-10-08 DIAGNOSIS — C50.311 MALIGNANT NEOPLASM OF LOWER-INNER QUADRANT OF RIGHT BREAST OF FEMALE, ESTROGEN RECEPTOR NEGATIVE: ICD-10-CM

## 2019-10-08 DIAGNOSIS — Z51.11 ENCOUNTER FOR CHEMOTHERAPY MANAGEMENT: Primary | ICD-10-CM

## 2019-10-08 DIAGNOSIS — Z17.1 MALIGNANT NEOPLASM OF LOWER-INNER QUADRANT OF RIGHT BREAST OF FEMALE, ESTROGEN RECEPTOR NEGATIVE: ICD-10-CM

## 2019-10-08 DIAGNOSIS — C50.311 MALIGNANT NEOPLASM OF LOWER-INNER QUADRANT OF RIGHT BREAST OF FEMALE, ESTROGEN RECEPTOR NEGATIVE: Primary | ICD-10-CM

## 2019-10-08 DIAGNOSIS — Z17.1 MALIGNANT NEOPLASM OF LOWER-INNER QUADRANT OF RIGHT BREAST OF FEMALE, ESTROGEN RECEPTOR NEGATIVE: Primary | ICD-10-CM

## 2019-10-08 LAB
ALBUMIN SERPL BCP-MCNC: 3.4 G/DL (ref 3.5–5.2)
ALP SERPL-CCNC: 88 U/L (ref 55–135)
ALT SERPL W/O P-5'-P-CCNC: 16 U/L (ref 10–44)
ANION GAP SERPL CALC-SCNC: 6 MMOL/L (ref 8–16)
AST SERPL-CCNC: 23 U/L (ref 10–40)
BILIRUB SERPL-MCNC: 0.7 MG/DL (ref 0.1–1)
BUN SERPL-MCNC: 7 MG/DL (ref 8–23)
CALCIUM SERPL-MCNC: 8.7 MG/DL (ref 8.7–10.5)
CHLORIDE SERPL-SCNC: 108 MMOL/L (ref 95–110)
CO2 SERPL-SCNC: 25 MMOL/L (ref 23–29)
CREAT SERPL-MCNC: 0.6 MG/DL (ref 0.5–1.4)
ERYTHROCYTE [DISTWIDTH] IN BLOOD BY AUTOMATED COUNT: 17.9 % (ref 11.5–14.5)
EST. GFR  (AFRICAN AMERICAN): >60 ML/MIN/1.73 M^2
EST. GFR  (NON AFRICAN AMERICAN): >60 ML/MIN/1.73 M^2
GLUCOSE SERPL-MCNC: 103 MG/DL (ref 70–110)
HCT VFR BLD AUTO: 27.3 % (ref 37–48.5)
HGB BLD-MCNC: 8.5 G/DL (ref 12–16)
IMM GRANULOCYTES # BLD AUTO: 0.01 K/UL (ref 0–0.04)
MCH RBC QN AUTO: 30.5 PG (ref 27–31)
MCHC RBC AUTO-ENTMCNC: 31.1 G/DL (ref 32–36)
MCV RBC AUTO: 98 FL (ref 82–98)
NEUTROPHILS # BLD AUTO: 1 K/UL (ref 1.8–7.7)
PLATELET # BLD AUTO: 170 K/UL (ref 150–350)
PMV BLD AUTO: 10 FL (ref 9.2–12.9)
POTASSIUM SERPL-SCNC: 3.7 MMOL/L (ref 3.5–5.1)
PROT SERPL-MCNC: 6.2 G/DL (ref 6–8.4)
RBC # BLD AUTO: 2.79 M/UL (ref 4–5.4)
SODIUM SERPL-SCNC: 139 MMOL/L (ref 136–145)
WBC # BLD AUTO: 1.9 K/UL (ref 3.9–12.7)

## 2019-10-08 PROCEDURE — 63600175 PHARM REV CODE 636 W HCPCS: Performed by: INTERNAL MEDICINE

## 2019-10-08 PROCEDURE — 25000003 PHARM REV CODE 250: Performed by: INTERNAL MEDICINE

## 2019-10-08 PROCEDURE — 96523 IRRIG DRUG DELIVERY DEVICE: CPT

## 2019-10-08 PROCEDURE — 80053 COMPREHEN METABOLIC PANEL: CPT

## 2019-10-08 PROCEDURE — 85027 COMPLETE CBC AUTOMATED: CPT

## 2019-10-08 PROCEDURE — A4216 STERILE WATER/SALINE, 10 ML: HCPCS | Performed by: INTERNAL MEDICINE

## 2019-10-08 RX ORDER — SODIUM CHLORIDE 0.9 % (FLUSH) 0.9 %
10 SYRINGE (ML) INJECTION
Status: COMPLETED | OUTPATIENT
Start: 2019-10-08 | End: 2019-10-08

## 2019-10-08 RX ORDER — SODIUM CHLORIDE 0.9 % (FLUSH) 0.9 %
10 SYRINGE (ML) INJECTION
Status: CANCELLED | OUTPATIENT
Start: 2019-10-08

## 2019-10-08 RX ORDER — HEPARIN 100 UNIT/ML
500 SYRINGE INTRAVENOUS
Status: COMPLETED | OUTPATIENT
Start: 2019-10-08 | End: 2019-10-08

## 2019-10-08 RX ORDER — HEPARIN 100 UNIT/ML
500 SYRINGE INTRAVENOUS
Status: CANCELLED | OUTPATIENT
Start: 2019-10-08

## 2019-10-08 RX ORDER — SODIUM CHLORIDE 0.9 % (FLUSH) 0.9 %
10 SYRINGE (ML) INJECTION
Status: DISCONTINUED | OUTPATIENT
Start: 2019-10-08 | End: 2023-02-14

## 2019-10-08 RX ADMIN — HEPARIN SODIUM (PORCINE) LOCK FLUSH IV SOLN 100 UNIT/ML 500 UNITS: 100 SOLUTION at 02:10

## 2019-10-08 RX ADMIN — Medication 10 ML: at 01:10

## 2019-10-08 RX ADMIN — HEPARIN SODIUM (PORCINE) LOCK FLUSH IV SOLN 100 UNIT/ML 500 UNITS: 100 SOLUTION at 01:10

## 2019-10-08 NOTE — PLAN OF CARE
Jake Wan notified by Dr. Ayala's nurse that today's Taxol infusion is cancelled related to patient's labs. Pt instructed to call MD with any problems. VSS. NAD. Pt discharged home independently.

## 2019-10-08 NOTE — NURSING
Patient here for blood draw from left chest port-accesses easily with good blood return-blood to lab-line flushed and left accessed for chemo today

## 2019-10-15 ENCOUNTER — INFUSION (OUTPATIENT)
Dept: INFUSION THERAPY | Facility: HOSPITAL | Age: 69
End: 2019-10-15
Attending: INTERNAL MEDICINE
Payer: MEDICARE

## 2019-10-15 ENCOUNTER — OFFICE VISIT (OUTPATIENT)
Dept: HEMATOLOGY/ONCOLOGY | Facility: CLINIC | Age: 69
End: 2019-10-15
Payer: MEDICARE

## 2019-10-15 VITALS
RESPIRATION RATE: 18 BRPM | OXYGEN SATURATION: 100 % | SYSTOLIC BLOOD PRESSURE: 120 MMHG | HEIGHT: 63 IN | WEIGHT: 137.81 LBS | HEART RATE: 72 BPM | DIASTOLIC BLOOD PRESSURE: 70 MMHG | BODY MASS INDEX: 24.42 KG/M2 | TEMPERATURE: 98 F

## 2019-10-15 VITALS
HEIGHT: 63 IN | SYSTOLIC BLOOD PRESSURE: 133 MMHG | TEMPERATURE: 98 F | WEIGHT: 137.81 LBS | DIASTOLIC BLOOD PRESSURE: 76 MMHG | HEART RATE: 67 BPM | BODY MASS INDEX: 24.42 KG/M2 | RESPIRATION RATE: 18 BRPM

## 2019-10-15 DIAGNOSIS — T45.1X5A CHEMOTHERAPY INDUCED NEUTROPENIA: ICD-10-CM

## 2019-10-15 DIAGNOSIS — Z51.11 ENCOUNTER FOR CHEMOTHERAPY MANAGEMENT: Primary | ICD-10-CM

## 2019-10-15 DIAGNOSIS — C50.311 MALIGNANT NEOPLASM OF LOWER-INNER QUADRANT OF RIGHT BREAST OF FEMALE, ESTROGEN RECEPTOR NEGATIVE: Primary | ICD-10-CM

## 2019-10-15 DIAGNOSIS — R74.8 ELEVATED ALKALINE PHOSPHATASE LEVEL: ICD-10-CM

## 2019-10-15 DIAGNOSIS — D69.59 DRUG-INDUCED THROMBOCYTOPENIA: ICD-10-CM

## 2019-10-15 DIAGNOSIS — D64.81 ANTINEOPLASTIC CHEMOTHERAPY INDUCED ANEMIA: ICD-10-CM

## 2019-10-15 DIAGNOSIS — Z17.1 MALIGNANT NEOPLASM OF LOWER-INNER QUADRANT OF RIGHT BREAST OF FEMALE, ESTROGEN RECEPTOR NEGATIVE: Primary | ICD-10-CM

## 2019-10-15 DIAGNOSIS — T50.905A DRUG-INDUCED THROMBOCYTOPENIA: ICD-10-CM

## 2019-10-15 DIAGNOSIS — Z17.1 MALIGNANT NEOPLASM OF LOWER-INNER QUADRANT OF RIGHT BREAST OF FEMALE, ESTROGEN RECEPTOR NEGATIVE: ICD-10-CM

## 2019-10-15 DIAGNOSIS — D70.1 CHEMOTHERAPY INDUCED NEUTROPENIA: ICD-10-CM

## 2019-10-15 DIAGNOSIS — E87.6 HYPOKALEMIA: ICD-10-CM

## 2019-10-15 DIAGNOSIS — C50.311 MALIGNANT NEOPLASM OF LOWER-INNER QUADRANT OF RIGHT BREAST OF FEMALE, ESTROGEN RECEPTOR NEGATIVE: ICD-10-CM

## 2019-10-15 DIAGNOSIS — T45.1X5A ANTINEOPLASTIC CHEMOTHERAPY INDUCED ANEMIA: ICD-10-CM

## 2019-10-15 PROBLEM — E43 SEVERE MALNUTRITION: Status: RESOLVED | Noted: 2019-09-02 | Resolved: 2019-10-15

## 2019-10-15 LAB
ALBUMIN SERPL BCP-MCNC: 3.3 G/DL (ref 3.5–5.2)
ALP SERPL-CCNC: 97 U/L (ref 55–135)
ALT SERPL W/O P-5'-P-CCNC: 10 U/L (ref 10–44)
ANION GAP SERPL CALC-SCNC: 7 MMOL/L (ref 8–16)
AST SERPL-CCNC: 18 U/L (ref 10–40)
BILIRUB SERPL-MCNC: 0.8 MG/DL (ref 0.1–1)
BUN SERPL-MCNC: 8 MG/DL (ref 8–23)
CALCIUM SERPL-MCNC: 8.8 MG/DL (ref 8.7–10.5)
CHLORIDE SERPL-SCNC: 110 MMOL/L (ref 95–110)
CO2 SERPL-SCNC: 25 MMOL/L (ref 23–29)
CREAT SERPL-MCNC: 0.6 MG/DL (ref 0.5–1.4)
ERYTHROCYTE [DISTWIDTH] IN BLOOD BY AUTOMATED COUNT: 17.6 % (ref 11.5–14.5)
EST. GFR  (AFRICAN AMERICAN): >60 ML/MIN/1.73 M^2
EST. GFR  (NON AFRICAN AMERICAN): >60 ML/MIN/1.73 M^2
GLUCOSE SERPL-MCNC: 101 MG/DL (ref 70–110)
HCT VFR BLD AUTO: 29.1 % (ref 37–48.5)
HGB BLD-MCNC: 9 G/DL (ref 12–16)
IMM GRANULOCYTES # BLD AUTO: 0.01 K/UL (ref 0–0.04)
MCH RBC QN AUTO: 30.1 PG (ref 27–31)
MCHC RBC AUTO-ENTMCNC: 30.9 G/DL (ref 32–36)
MCV RBC AUTO: 97 FL (ref 82–98)
NEUTROPHILS # BLD AUTO: 1.2 K/UL (ref 1.8–7.7)
PLATELET # BLD AUTO: 157 K/UL (ref 150–350)
PMV BLD AUTO: 10 FL (ref 9.2–12.9)
POTASSIUM SERPL-SCNC: 4.2 MMOL/L (ref 3.5–5.1)
PROT SERPL-MCNC: 6.1 G/DL (ref 6–8.4)
RBC # BLD AUTO: 2.99 M/UL (ref 4–5.4)
SODIUM SERPL-SCNC: 142 MMOL/L (ref 136–145)
WBC # BLD AUTO: 2.42 K/UL (ref 3.9–12.7)

## 2019-10-15 PROCEDURE — 63600175 PHARM REV CODE 636 W HCPCS: Performed by: INTERNAL MEDICINE

## 2019-10-15 PROCEDURE — 96375 TX/PRO/DX INJ NEW DRUG ADDON: CPT

## 2019-10-15 PROCEDURE — 99499 UNLISTED E&M SERVICE: CPT | Mod: S$GLB,,, | Performed by: INTERNAL MEDICINE

## 2019-10-15 PROCEDURE — 99499 RISK ADDL DX/OHS AUDIT: ICD-10-PCS | Mod: S$GLB,,, | Performed by: INTERNAL MEDICINE

## 2019-10-15 PROCEDURE — 99215 PR OFFICE/OUTPT VISIT, EST, LEVL V, 40-54 MIN: ICD-10-PCS | Mod: S$GLB,,, | Performed by: INTERNAL MEDICINE

## 2019-10-15 PROCEDURE — 99999 PR PBB SHADOW E&M-EST. PATIENT-LVL III: CPT | Mod: PBBFAC,,, | Performed by: INTERNAL MEDICINE

## 2019-10-15 PROCEDURE — S0028 INJECTION, FAMOTIDINE, 20 MG: HCPCS | Performed by: INTERNAL MEDICINE

## 2019-10-15 PROCEDURE — 99215 OFFICE O/P EST HI 40 MIN: CPT | Mod: S$GLB,,, | Performed by: INTERNAL MEDICINE

## 2019-10-15 PROCEDURE — A4216 STERILE WATER/SALINE, 10 ML: HCPCS | Performed by: INTERNAL MEDICINE

## 2019-10-15 PROCEDURE — 96367 TX/PROPH/DG ADDL SEQ IV INF: CPT

## 2019-10-15 PROCEDURE — 99999 PR PBB SHADOW E&M-EST. PATIENT-LVL III: ICD-10-PCS | Mod: PBBFAC,,, | Performed by: INTERNAL MEDICINE

## 2019-10-15 PROCEDURE — 85027 COMPLETE CBC AUTOMATED: CPT

## 2019-10-15 PROCEDURE — 1101F PT FALLS ASSESS-DOCD LE1/YR: CPT | Mod: CPTII,S$GLB,, | Performed by: INTERNAL MEDICINE

## 2019-10-15 PROCEDURE — 96413 CHEMO IV INFUSION 1 HR: CPT

## 2019-10-15 PROCEDURE — 80053 COMPREHEN METABOLIC PANEL: CPT

## 2019-10-15 PROCEDURE — 1101F PR PT FALLS ASSESS DOC 0-1 FALLS W/OUT INJ PAST YR: ICD-10-PCS | Mod: CPTII,S$GLB,, | Performed by: INTERNAL MEDICINE

## 2019-10-15 PROCEDURE — 25000003 PHARM REV CODE 250: Performed by: INTERNAL MEDICINE

## 2019-10-15 RX ORDER — SODIUM CHLORIDE 0.9 % (FLUSH) 0.9 %
10 SYRINGE (ML) INJECTION
Status: DISCONTINUED | OUTPATIENT
Start: 2019-10-15 | End: 2019-10-15 | Stop reason: HOSPADM

## 2019-10-15 RX ORDER — EPINEPHRINE 0.3 MG/.3ML
0.3 INJECTION SUBCUTANEOUS ONCE AS NEEDED
Status: DISCONTINUED | OUTPATIENT
Start: 2019-10-15 | End: 2019-10-15 | Stop reason: HOSPADM

## 2019-10-15 RX ORDER — HEPARIN 100 UNIT/ML
500 SYRINGE INTRAVENOUS
Status: CANCELLED | OUTPATIENT
Start: 2019-10-15

## 2019-10-15 RX ORDER — DIPHENHYDRAMINE HYDROCHLORIDE 50 MG/ML
50 INJECTION INTRAMUSCULAR; INTRAVENOUS ONCE AS NEEDED
Status: DISCONTINUED | OUTPATIENT
Start: 2019-10-15 | End: 2019-10-15 | Stop reason: HOSPADM

## 2019-10-15 RX ORDER — EPINEPHRINE 0.3 MG/.3ML
0.3 INJECTION SUBCUTANEOUS ONCE AS NEEDED
Status: CANCELLED | OUTPATIENT
Start: 2019-10-22

## 2019-10-15 RX ORDER — SODIUM CHLORIDE 0.9 % (FLUSH) 0.9 %
10 SYRINGE (ML) INJECTION
Status: COMPLETED | OUTPATIENT
Start: 2019-10-15 | End: 2019-10-15

## 2019-10-15 RX ORDER — FAMOTIDINE 10 MG/ML
20 INJECTION INTRAVENOUS
Status: COMPLETED | OUTPATIENT
Start: 2019-10-15 | End: 2019-10-15

## 2019-10-15 RX ORDER — HEPARIN 100 UNIT/ML
500 SYRINGE INTRAVENOUS
Status: CANCELLED | OUTPATIENT
Start: 2019-10-22

## 2019-10-15 RX ORDER — SODIUM CHLORIDE 0.9 % (FLUSH) 0.9 %
10 SYRINGE (ML) INJECTION
Status: CANCELLED | OUTPATIENT
Start: 2019-10-15

## 2019-10-15 RX ORDER — SODIUM CHLORIDE 0.9 % (FLUSH) 0.9 %
10 SYRINGE (ML) INJECTION
Status: CANCELLED | OUTPATIENT
Start: 2019-10-22

## 2019-10-15 RX ORDER — HEPARIN 100 UNIT/ML
500 SYRINGE INTRAVENOUS
Status: COMPLETED | OUTPATIENT
Start: 2019-10-15 | End: 2019-10-15

## 2019-10-15 RX ORDER — FAMOTIDINE 10 MG/ML
INJECTION INTRAVENOUS
Status: DISPENSED
Start: 2019-10-15 | End: 2019-10-15

## 2019-10-15 RX ORDER — FAMOTIDINE 10 MG/ML
20 INJECTION INTRAVENOUS
Status: CANCELLED | OUTPATIENT
Start: 2019-10-22

## 2019-10-15 RX ORDER — HEPARIN 100 UNIT/ML
500 SYRINGE INTRAVENOUS
Status: DISCONTINUED | OUTPATIENT
Start: 2019-10-15 | End: 2019-10-15 | Stop reason: HOSPADM

## 2019-10-15 RX ORDER — DIPHENHYDRAMINE HYDROCHLORIDE 50 MG/ML
50 INJECTION INTRAMUSCULAR; INTRAVENOUS ONCE AS NEEDED
Status: CANCELLED | OUTPATIENT
Start: 2019-10-22

## 2019-10-15 RX ADMIN — FAMOTIDINE 20 MG: 10 INJECTION INTRAVENOUS at 11:10

## 2019-10-15 RX ADMIN — DEXAMETHASONE SODIUM PHOSPHATE 10 MG: 4 INJECTION, SOLUTION INTRA-ARTICULAR; INTRALESIONAL; INTRAMUSCULAR; INTRAVENOUS; SOFT TISSUE at 11:10

## 2019-10-15 RX ADMIN — HEPARIN SODIUM (PORCINE) LOCK FLUSH IV SOLN 100 UNIT/ML 500 UNITS: 100 SOLUTION at 12:10

## 2019-10-15 RX ADMIN — Medication 10 ML: at 12:10

## 2019-10-15 RX ADMIN — HEPARIN 500 UNITS: 100 SYRINGE at 09:10

## 2019-10-15 RX ADMIN — DIPHENHYDRAMINE HYDROCHLORIDE 25 MG: 50 INJECTION INTRAMUSCULAR; INTRAVENOUS at 11:10

## 2019-10-15 RX ADMIN — Medication 10 ML: at 09:10

## 2019-10-15 RX ADMIN — PACLITAXEL 114 MG: 100 INJECTION INTRAVENOUS at 11:10

## 2019-10-15 NOTE — NURSING
Patient's PAC accessed.  Labs drawn from line.  Line heparinized and left in place for scheduled infusion.

## 2019-10-15 NOTE — PROGRESS NOTES
History:     Reason For Follow Up/Onc History:   1. Stage 1B ([id number]) invasive ductal carcinoma of right breast, lower outer quadrant, ER neg, NC neg, Her2 neg, Grade 3, Ki67 70%      HPI:   Sharri Cline presents for follow up of breast cancer and continuation of neoadjuvant chemotherapy. She's due for cycle 4 Taxol. We had to delay chemo last week due to her cytopenias. She's doing well without complaints. Fatigue improving.     Onc history:   - She presented for screening mammo in 5/15/2019 which showed focal asymmetries in both left and right breast. Diagnostic mammogram and US showed no significant abn of left breast, and right breast 15 mm x 11 mm x 12 mm mass at 4:00, 5 CFN. Biopsy on 5/24/19 showed grade 3 IDC, triple negative, Ki67 70%.    - 7/3/19 started neoadjuvant chemotherapy with ddAC to be followed by Taxol.   - 8/8/19 admitted for neutropenic fever; admitted again with cycle 4.     History: I reviewed, confirmed and updated history (past medical, social, family) as necessary.    Medications    Current Outpatient Medications:     artificial tears (ISOPTO TEARS) 0.5 % ophthalmic solution, Place 1 drop into both eyes 4 (four) times daily., Disp: , Rfl:     carboxymethylcellulose sodium 1 % DpGe, Place 1 drop into both eyes every evening., Disp: , Rfl: 0    fish oil-omega-3 fatty acids 300-1,000 mg capsule, Take 2 g by mouth once daily., Disp: , Rfl:     magic mouthwash diphen/antac/lidoc/nysta, Take 10 mLs by mouth 4 (four) times daily., Disp: 120 mL, Rfl: 0    multivitamin with minerals tablet, Take 1 tablet by mouth once daily.  , Disp: , Rfl:     ondansetron (ZOFRAN-ODT) 8 MG TbDL, Take 1 tablet (8 mg total) by mouth every 8 (eight) hours as needed (nausea)., Disp: 45 tablet, Rfl: 1    potassium chloride SA (K-DUR,KLOR-CON) 20 MEQ tablet, Take 2 tablets (40 mEq total) by mouth once daily., Disp: 60 tablet, Rfl: 1  No current facility-administered medications for this visit.  "    Facility-Administered Medications Ordered in Other Visits:     sodium chloride 0.9% flush 10 mL, 10 mL, Intravenous, 1 time in Clinic/HOD, Dominique Ayala MD  Allergies  Review of patient's allergies indicates:  No Known Allergies  Review of Systems  Review of Systems   Constitutional: Positive for fatigue. Negative for activity change, appetite change, chills, fever and unexpected weight change.   HENT: Negative for congestion, hearing loss, mouth sores, nosebleeds, sinus pressure and trouble swallowing.    Eyes: Negative for pain, discharge, itching and visual disturbance.   Respiratory: Negative for cough, chest tightness and shortness of breath.    Cardiovascular: Negative for chest pain, palpitations and leg swelling.   Gastrointestinal: Negative for abdominal distention, abdominal pain, blood in stool, diarrhea, nausea, rectal pain and vomiting.   Endocrine: Negative for heat intolerance and polydipsia.   Genitourinary: Negative for difficulty urinating, dysuria, flank pain, frequency, hematuria and urgency.   Musculoskeletal: Negative for arthralgias, back pain and neck pain.   Skin: Negative for color change, pallor and rash.   Neurological: Negative for dizziness, light-headedness, numbness and headaches.   Hematological: Negative for adenopathy. Does not bruise/bleed easily.   Psychiatric/Behavioral: Negative for confusion and decreased concentration. The patient is not nervous/anxious.         Objective     Objective:     Vitals:    10/15/19 1003   BP: 120/70   BP Location: Right arm   Patient Position: Sitting   BP Method: Large (Automatic)   Pulse: 72   Resp: 18   Temp: 98.4 °F (36.9 °C)   TempSrc: Oral   SpO2: 100%   Weight: 62.5 kg (137 lb 12.6 oz)   Height: 5' 3" (1.6 m)     Body surface area is 1.67 meters squared.  Physical Exam   Constitutional: She is oriented to person, place, and time. She appears well-developed and well-nourished. No distress.   HENT:   Head: Normocephalic and " atraumatic.   Mouth/Throat: Oropharynx is clear and moist and mucous membranes are normal. No oral lesions.   Eyes: Conjunctivae and EOM are normal. Right eye exhibits no discharge. Left eye exhibits no discharge. No scleral icterus.   Neck: Normal range of motion. Neck supple. No thyroid mass and no thyromegaly present.   Cardiovascular: Normal rate, regular rhythm, S1 normal, S2 normal and normal heart sounds.   Pulmonary/Chest: Effort normal and breath sounds normal. No respiratory distress. She has no wheezes. She has no rhonchi. She has no rales. Chest wall is not dull to percussion.   Right breast with indention of skin at 4:00 very, very minimal, no palpable mass  Mediport   Abdominal: Soft. Normal appearance and bowel sounds are normal. There is no hepatosplenomegaly. There is no tenderness.   Musculoskeletal: She exhibits no tenderness.   Lymphadenopathy: No inguinal adenopathy noted on the right or left side.   Neurological: She is alert and oriented to person, place, and time. She has normal strength.   Skin: Skin is warm, dry and intact. No pallor.   Psychiatric: Her behavior is normal. Thought content normal.     Labs and Imaging  Results for orders placed or performed in visit on 10/15/19   CBC Oncology   Result Value Ref Range    WBC 2.42 (L) 3.90 - 12.70 K/uL    RBC 2.99 (L) 4.00 - 5.40 M/uL    Hemoglobin 9.0 (L) 12.0 - 16.0 g/dL    Hematocrit 29.1 (L) 37.0 - 48.5 %    Mean Corpuscular Volume 97 82 - 98 fL    Mean Corpuscular Hemoglobin 30.1 27.0 - 31.0 pg    Mean Corpuscular Hemoglobin Conc 30.9 (L) 32.0 - 36.0 g/dL    RDW 17.6 (H) 11.5 - 14.5 %    Platelets 157 150 - 350 K/uL    MPV 10.0 9.2 - 12.9 fL    Gran # (ANC) 1.2 (L) 1.8 - 7.7 K/uL    Immature Grans (Abs) 0.01 0.00 - 0.04 K/uL       Assessment     Assessment:     1. Stage 1B ([id number]) invasive ductal carcinoma of right breast, lower outer quadrant, ER neg, HI neg, Her2 neg, Grade 3, Ki67 70%   * 7/3/19 started neoadjuvant chemotherapy with  four cycles of dose-dense Adriamycin and cyclophosphamide with Neulasta support followed by twelve doses of weekly paclitaxel.   * Can consider adding Zometa every 6 months x 2 years in adjuvant setting.    * Continue weekly Taxol, 20% dose reduction given her recurrent significant cytopenias   - Cycle 4. Tolerating Taxol well. Will not be able to tolerate increased dose due to cytopenias.     2. Chronically elevated alk phos and bilirubin   * Chronic; follows with Hepatology; prior liver biopsy 11/2018 with mild portal inflammation    3. Chemo anemia   * Required transfusion after cycle 4 ddAC.   * continue to monitor; transfuse if less than 8.    * Improving.     4. Drug constipation   * Encouraged Senna S two nightly.     5. Neutropenia with neutropenic fever after cycle 3 and cycle 4 ddAC despite Neulasta.    * Monitor. If low next week, will add neupogen on days 3 and 4.     6. Chemo thrombocytopenia    * Improving. Monitor.     7. Hypokalemia   * KCl - follow up repeat today.     Plan:     1. Cycle 4 weekly Taxol.   2. Add neupogen 300 mcg days 3 and 4 each cycle.   3. Continue antiemetics.   4. Senna s  5. KCl - continue.   6. Monitor hemoglobin.   7. When nearing end of chemo, will send back to Dr Hernandez. Patient did not have MRI breasts prior to treatment, but US right axilla unremarkable. Discussed with patient today.     Already scheduled: weekly taxols through session 8 with MD visit every other week. Add neupogen dates.     Future Appointments   Date Time Provider Department Center   10/15/2019 11:00 AM [facility]   10[facility]22/2019  [facility]:00 [facility]   10/22/2019  2:00 PM [facility]   10/29/2019 10:00 AM [facility]   10/29/2019 11:00 AM Dominique Ayala MD [facility] HEM ONC [facility]   10/29/2019  1:00 PM [facility]   10/31/2019  7:30 AM [facility] US ENDOCRINOLOGY  [facility] ENDCNUS [facility]lor   11/5/2019  1:00 PM INJECTION, [facility]   11/5/2019  2:00 PM [facility]   11/12/2019 10:30 AM [facility]JE[facility]   11/12/2019 11:30 AM Dominique Ayala MD [facility] HEM ONC [facility]   11/12/2019  1:00 PM [facility]   12/2/2019  2:00 PM Jhon Arndt MD [facility] Arnol elías [facility]

## 2019-10-16 ENCOUNTER — TELEPHONE (OUTPATIENT)
Dept: HEMATOLOGY/ONCOLOGY | Facility: CLINIC | Age: 69
End: 2019-10-16

## 2019-10-16 NOTE — TELEPHONE ENCOUNTER
----- Message from Dominique Ayala MD sent at 10/15/2019 10:22 AM CDT -----  Keep all appts - but add neupogen every Thursday and Friday.

## 2019-10-16 NOTE — TELEPHONE ENCOUNTER
Called and spoke with patient and assisted with scheduling her injections starting tomorrow and Friday for every Thursday and Friday

## 2019-10-17 ENCOUNTER — INFUSION (OUTPATIENT)
Dept: INFUSION THERAPY | Facility: HOSPITAL | Age: 69
End: 2019-10-17
Attending: INTERNAL MEDICINE
Payer: MEDICARE

## 2019-10-17 DIAGNOSIS — D70.1 CHEMOTHERAPY INDUCED NEUTROPENIA: Primary | ICD-10-CM

## 2019-10-17 DIAGNOSIS — Z17.1 MALIGNANT NEOPLASM OF LOWER-INNER QUADRANT OF RIGHT BREAST OF FEMALE, ESTROGEN RECEPTOR NEGATIVE: ICD-10-CM

## 2019-10-17 DIAGNOSIS — T45.1X5A CHEMOTHERAPY INDUCED NEUTROPENIA: Primary | ICD-10-CM

## 2019-10-17 DIAGNOSIS — C50.311 MALIGNANT NEOPLASM OF LOWER-INNER QUADRANT OF RIGHT BREAST OF FEMALE, ESTROGEN RECEPTOR NEGATIVE: ICD-10-CM

## 2019-10-17 PROCEDURE — 96372 THER/PROPH/DIAG INJ SC/IM: CPT

## 2019-10-17 PROCEDURE — 63600175 PHARM REV CODE 636 W HCPCS: Mod: JG | Performed by: INTERNAL MEDICINE

## 2019-10-17 RX ADMIN — FILGRASTIM 300 MCG: 300 INJECTION, SOLUTION INTRAVENOUS; SUBCUTANEOUS at 08:10

## 2019-10-18 ENCOUNTER — INFUSION (OUTPATIENT)
Dept: INFUSION THERAPY | Facility: HOSPITAL | Age: 69
End: 2019-10-18
Attending: INTERNAL MEDICINE
Payer: MEDICARE

## 2019-10-18 DIAGNOSIS — C50.311 MALIGNANT NEOPLASM OF LOWER-INNER QUADRANT OF RIGHT BREAST OF FEMALE, ESTROGEN RECEPTOR NEGATIVE: ICD-10-CM

## 2019-10-18 DIAGNOSIS — T45.1X5A CHEMOTHERAPY INDUCED NEUTROPENIA: Primary | ICD-10-CM

## 2019-10-18 DIAGNOSIS — D70.1 CHEMOTHERAPY INDUCED NEUTROPENIA: Primary | ICD-10-CM

## 2019-10-18 DIAGNOSIS — Z17.1 MALIGNANT NEOPLASM OF LOWER-INNER QUADRANT OF RIGHT BREAST OF FEMALE, ESTROGEN RECEPTOR NEGATIVE: ICD-10-CM

## 2019-10-18 PROCEDURE — 96372 THER/PROPH/DIAG INJ SC/IM: CPT

## 2019-10-18 PROCEDURE — 63600175 PHARM REV CODE 636 W HCPCS: Mod: JG | Performed by: INTERNAL MEDICINE

## 2019-10-18 RX ADMIN — FILGRASTIM 300 MCG: 300 INJECTION, SOLUTION INTRAVENOUS; SUBCUTANEOUS at 09:10

## 2019-10-18 NOTE — NURSING
Patient in clinic for Neupogen injection. Medication given SQ into abdomen with no complications. Patient has no c/o pain or discomfort. Reports no changes since last appointment. Patient educated on side effects of medication. Discharged home.

## 2019-10-22 ENCOUNTER — INFUSION (OUTPATIENT)
Dept: INFUSION THERAPY | Facility: HOSPITAL | Age: 69
End: 2019-10-22
Attending: INTERNAL MEDICINE
Payer: MEDICARE

## 2019-10-22 VITALS
TEMPERATURE: 98 F | DIASTOLIC BLOOD PRESSURE: 65 MMHG | HEART RATE: 77 BPM | SYSTOLIC BLOOD PRESSURE: 113 MMHG | RESPIRATION RATE: 18 BRPM

## 2019-10-22 DIAGNOSIS — Z17.1 MALIGNANT NEOPLASM OF LOWER-INNER QUADRANT OF RIGHT BREAST OF FEMALE, ESTROGEN RECEPTOR NEGATIVE: ICD-10-CM

## 2019-10-22 DIAGNOSIS — Z51.11 ENCOUNTER FOR CHEMOTHERAPY MANAGEMENT: Primary | ICD-10-CM

## 2019-10-22 DIAGNOSIS — C50.311 MALIGNANT NEOPLASM OF LOWER-INNER QUADRANT OF RIGHT BREAST OF FEMALE, ESTROGEN RECEPTOR NEGATIVE: ICD-10-CM

## 2019-10-22 DIAGNOSIS — T45.1X5A CHEMOTHERAPY INDUCED NEUTROPENIA: Primary | ICD-10-CM

## 2019-10-22 DIAGNOSIS — D70.1 CHEMOTHERAPY INDUCED NEUTROPENIA: Primary | ICD-10-CM

## 2019-10-22 LAB
ALBUMIN SERPL BCP-MCNC: 3.5 G/DL (ref 3.5–5.2)
ALP SERPL-CCNC: 127 U/L (ref 55–135)
ALT SERPL W/O P-5'-P-CCNC: 11 U/L (ref 10–44)
ANION GAP SERPL CALC-SCNC: 6 MMOL/L (ref 8–16)
AST SERPL-CCNC: 20 U/L (ref 10–40)
BILIRUB SERPL-MCNC: 0.5 MG/DL (ref 0.1–1)
BUN SERPL-MCNC: 8 MG/DL (ref 8–23)
CALCIUM SERPL-MCNC: 9.1 MG/DL (ref 8.7–10.5)
CHLORIDE SERPL-SCNC: 107 MMOL/L (ref 95–110)
CO2 SERPL-SCNC: 25 MMOL/L (ref 23–29)
CREAT SERPL-MCNC: 0.7 MG/DL (ref 0.5–1.4)
ERYTHROCYTE [DISTWIDTH] IN BLOOD BY AUTOMATED COUNT: 17.2 % (ref 11.5–14.5)
EST. GFR  (AFRICAN AMERICAN): >60 ML/MIN/1.73 M^2
EST. GFR  (NON AFRICAN AMERICAN): >60 ML/MIN/1.73 M^2
GLUCOSE SERPL-MCNC: 88 MG/DL (ref 70–110)
HCT VFR BLD AUTO: 28.5 % (ref 37–48.5)
HGB BLD-MCNC: 9.3 G/DL (ref 12–16)
IMM GRANULOCYTES # BLD AUTO: 0.17 K/UL (ref 0–0.04)
MCH RBC QN AUTO: 30 PG (ref 27–31)
MCHC RBC AUTO-ENTMCNC: 32.6 G/DL (ref 32–36)
MCV RBC AUTO: 92 FL (ref 82–98)
NEUTROPHILS # BLD AUTO: 1.3 K/UL (ref 1.8–7.7)
PLATELET # BLD AUTO: 144 K/UL (ref 150–350)
PMV BLD AUTO: 11.3 FL (ref 9.2–12.9)
POTASSIUM SERPL-SCNC: 3.9 MMOL/L (ref 3.5–5.1)
PROT SERPL-MCNC: 6.4 G/DL (ref 6–8.4)
RBC # BLD AUTO: 3.1 M/UL (ref 4–5.4)
SODIUM SERPL-SCNC: 138 MMOL/L (ref 136–145)
WBC # BLD AUTO: 3.41 K/UL (ref 3.9–12.7)

## 2019-10-22 PROCEDURE — 63600175 PHARM REV CODE 636 W HCPCS: Performed by: INTERNAL MEDICINE

## 2019-10-22 PROCEDURE — 85027 COMPLETE CBC AUTOMATED: CPT

## 2019-10-22 PROCEDURE — A4216 STERILE WATER/SALINE, 10 ML: HCPCS | Performed by: INTERNAL MEDICINE

## 2019-10-22 PROCEDURE — 25000003 PHARM REV CODE 250: Performed by: INTERNAL MEDICINE

## 2019-10-22 PROCEDURE — 80053 COMPREHEN METABOLIC PANEL: CPT

## 2019-10-22 PROCEDURE — 96376 TX/PRO/DX INJ SAME DRUG ADON: CPT

## 2019-10-22 PROCEDURE — 96367 TX/PROPH/DG ADDL SEQ IV INF: CPT

## 2019-10-22 PROCEDURE — S0028 INJECTION, FAMOTIDINE, 20 MG: HCPCS | Performed by: INTERNAL MEDICINE

## 2019-10-22 PROCEDURE — 96413 CHEMO IV INFUSION 1 HR: CPT

## 2019-10-22 RX ORDER — SODIUM CHLORIDE 0.9 % (FLUSH) 0.9 %
10 SYRINGE (ML) INJECTION
Status: CANCELLED | OUTPATIENT
Start: 2019-10-22

## 2019-10-22 RX ORDER — HEPARIN 100 UNIT/ML
500 SYRINGE INTRAVENOUS
Status: COMPLETED | OUTPATIENT
Start: 2019-10-22 | End: 2019-10-22

## 2019-10-22 RX ORDER — HEPARIN 100 UNIT/ML
500 SYRINGE INTRAVENOUS
Status: CANCELLED | OUTPATIENT
Start: 2019-10-22

## 2019-10-22 RX ORDER — EPINEPHRINE 0.3 MG/.3ML
0.3 INJECTION SUBCUTANEOUS ONCE AS NEEDED
Status: DISCONTINUED | OUTPATIENT
Start: 2019-10-22 | End: 2019-10-22 | Stop reason: HOSPADM

## 2019-10-22 RX ORDER — SODIUM CHLORIDE 0.9 % (FLUSH) 0.9 %
10 SYRINGE (ML) INJECTION
Status: COMPLETED | OUTPATIENT
Start: 2019-10-22 | End: 2019-10-22

## 2019-10-22 RX ORDER — HEPARIN 100 UNIT/ML
500 SYRINGE INTRAVENOUS
Status: DISCONTINUED | OUTPATIENT
Start: 2019-10-22 | End: 2019-10-22 | Stop reason: HOSPADM

## 2019-10-22 RX ORDER — FAMOTIDINE 10 MG/ML
20 INJECTION INTRAVENOUS
Status: COMPLETED | OUTPATIENT
Start: 2019-10-22 | End: 2019-10-22

## 2019-10-22 RX ORDER — DIPHENHYDRAMINE HYDROCHLORIDE 50 MG/ML
50 INJECTION INTRAMUSCULAR; INTRAVENOUS ONCE AS NEEDED
Status: DISCONTINUED | OUTPATIENT
Start: 2019-10-22 | End: 2019-10-22 | Stop reason: HOSPADM

## 2019-10-22 RX ORDER — SODIUM CHLORIDE 0.9 % (FLUSH) 0.9 %
10 SYRINGE (ML) INJECTION
Status: DISCONTINUED | OUTPATIENT
Start: 2019-10-22 | End: 2019-10-22 | Stop reason: HOSPADM

## 2019-10-22 RX ADMIN — Medication 10 ML: at 04:10

## 2019-10-22 RX ADMIN — Medication 10 ML: at 01:10

## 2019-10-22 RX ADMIN — SODIUM CHLORIDE: 0.9 INJECTION, SOLUTION INTRAVENOUS at 02:10

## 2019-10-22 RX ADMIN — HEPARIN 500 UNITS: 100 SYRINGE at 01:10

## 2019-10-22 RX ADMIN — DIPHENHYDRAMINE HYDROCHLORIDE 25 MG: 50 INJECTION INTRAMUSCULAR; INTRAVENOUS at 02:10

## 2019-10-22 RX ADMIN — HEPARIN SODIUM (PORCINE) LOCK FLUSH IV SOLN 100 UNIT/ML 500 UNITS: 100 SOLUTION at 04:10

## 2019-10-22 RX ADMIN — FAMOTIDINE 20 MG: 10 INJECTION INTRAVENOUS at 02:10

## 2019-10-22 RX ADMIN — DEXAMETHASONE SODIUM PHOSPHATE 10 MG: 4 INJECTION, SOLUTION INTRA-ARTICULAR; INTRALESIONAL; INTRAMUSCULAR; INTRAVENOUS; SOFT TISSUE at 02:10

## 2019-10-22 RX ADMIN — PACLITAXEL 114 MG: 6 INJECTION, SOLUTION INTRAVENOUS at 02:10

## 2019-10-24 ENCOUNTER — INFUSION (OUTPATIENT)
Dept: INFUSION THERAPY | Facility: HOSPITAL | Age: 69
End: 2019-10-24
Attending: INTERNAL MEDICINE
Payer: MEDICARE

## 2019-10-24 DIAGNOSIS — T45.1X5A CHEMOTHERAPY INDUCED NEUTROPENIA: Primary | ICD-10-CM

## 2019-10-24 DIAGNOSIS — C50.311 MALIGNANT NEOPLASM OF LOWER-INNER QUADRANT OF RIGHT BREAST OF FEMALE, ESTROGEN RECEPTOR NEGATIVE: ICD-10-CM

## 2019-10-24 DIAGNOSIS — D70.1 CHEMOTHERAPY INDUCED NEUTROPENIA: Primary | ICD-10-CM

## 2019-10-24 DIAGNOSIS — Z17.1 MALIGNANT NEOPLASM OF LOWER-INNER QUADRANT OF RIGHT BREAST OF FEMALE, ESTROGEN RECEPTOR NEGATIVE: ICD-10-CM

## 2019-10-24 PROCEDURE — 63600175 PHARM REV CODE 636 W HCPCS: Mod: JG | Performed by: INTERNAL MEDICINE

## 2019-10-24 PROCEDURE — 96372 THER/PROPH/DIAG INJ SC/IM: CPT

## 2019-10-24 RX ADMIN — FILGRASTIM 300 MCG: 300 INJECTION, SOLUTION INTRAVENOUS; SUBCUTANEOUS at 09:10

## 2019-10-25 ENCOUNTER — INFUSION (OUTPATIENT)
Dept: INFUSION THERAPY | Facility: HOSPITAL | Age: 69
End: 2019-10-25
Attending: INTERNAL MEDICINE
Payer: MEDICARE

## 2019-10-25 DIAGNOSIS — D70.1 CHEMOTHERAPY INDUCED NEUTROPENIA: Primary | ICD-10-CM

## 2019-10-25 DIAGNOSIS — Z17.1 MALIGNANT NEOPLASM OF LOWER-INNER QUADRANT OF RIGHT BREAST OF FEMALE, ESTROGEN RECEPTOR NEGATIVE: ICD-10-CM

## 2019-10-25 DIAGNOSIS — C50.311 MALIGNANT NEOPLASM OF LOWER-INNER QUADRANT OF RIGHT BREAST OF FEMALE, ESTROGEN RECEPTOR NEGATIVE: ICD-10-CM

## 2019-10-25 DIAGNOSIS — T45.1X5A CHEMOTHERAPY INDUCED NEUTROPENIA: Primary | ICD-10-CM

## 2019-10-25 PROCEDURE — 96372 THER/PROPH/DIAG INJ SC/IM: CPT

## 2019-10-25 PROCEDURE — 63600175 PHARM REV CODE 636 W HCPCS: Mod: JG | Performed by: INTERNAL MEDICINE

## 2019-10-25 RX ADMIN — FILGRASTIM 300 MCG: 300 INJECTION, SOLUTION INTRAVENOUS; SUBCUTANEOUS at 09:10

## 2019-10-28 ENCOUNTER — TELEPHONE (OUTPATIENT)
Dept: HEMATOLOGY/ONCOLOGY | Facility: CLINIC | Age: 69
End: 2019-10-28

## 2019-10-29 ENCOUNTER — INFUSION (OUTPATIENT)
Dept: INFUSION THERAPY | Facility: HOSPITAL | Age: 69
End: 2019-10-29
Attending: INTERNAL MEDICINE
Payer: MEDICARE

## 2019-10-29 ENCOUNTER — OFFICE VISIT (OUTPATIENT)
Dept: HEMATOLOGY/ONCOLOGY | Facility: CLINIC | Age: 69
End: 2019-10-29
Payer: MEDICARE

## 2019-10-29 VITALS
RESPIRATION RATE: 18 BRPM | BODY MASS INDEX: 24.06 KG/M2 | OXYGEN SATURATION: 100 % | DIASTOLIC BLOOD PRESSURE: 62 MMHG | HEIGHT: 63 IN | TEMPERATURE: 98 F | HEART RATE: 76 BPM | SYSTOLIC BLOOD PRESSURE: 113 MMHG | WEIGHT: 135.81 LBS

## 2019-10-29 VITALS
WEIGHT: 135.81 LBS | BODY MASS INDEX: 24.06 KG/M2 | HEART RATE: 77 BPM | RESPIRATION RATE: 18 BRPM | TEMPERATURE: 98 F | DIASTOLIC BLOOD PRESSURE: 58 MMHG | HEIGHT: 63 IN | SYSTOLIC BLOOD PRESSURE: 120 MMHG

## 2019-10-29 DIAGNOSIS — Z17.1 MALIGNANT NEOPLASM OF LOWER-INNER QUADRANT OF RIGHT BREAST OF FEMALE, ESTROGEN RECEPTOR NEGATIVE: Primary | ICD-10-CM

## 2019-10-29 DIAGNOSIS — C50.311 MALIGNANT NEOPLASM OF LOWER-INNER QUADRANT OF RIGHT BREAST OF FEMALE, ESTROGEN RECEPTOR NEGATIVE: ICD-10-CM

## 2019-10-29 DIAGNOSIS — T45.1X5A CHEMOTHERAPY-INDUCED NEUROPATHY: ICD-10-CM

## 2019-10-29 DIAGNOSIS — Z17.1 MALIGNANT NEOPLASM OF LOWER-INNER QUADRANT OF RIGHT BREAST OF FEMALE, ESTROGEN RECEPTOR NEGATIVE: ICD-10-CM

## 2019-10-29 DIAGNOSIS — C50.311 MALIGNANT NEOPLASM OF LOWER-INNER QUADRANT OF RIGHT BREAST OF FEMALE, ESTROGEN RECEPTOR NEGATIVE: Primary | ICD-10-CM

## 2019-10-29 DIAGNOSIS — E87.6 HYPOKALEMIA: ICD-10-CM

## 2019-10-29 DIAGNOSIS — Z51.11 ENCOUNTER FOR CHEMOTHERAPY MANAGEMENT: Primary | ICD-10-CM

## 2019-10-29 DIAGNOSIS — T45.1X5A CHEMOTHERAPY INDUCED NEUTROPENIA: Primary | ICD-10-CM

## 2019-10-29 DIAGNOSIS — G62.0 CHEMOTHERAPY-INDUCED NEUROPATHY: ICD-10-CM

## 2019-10-29 DIAGNOSIS — D70.1 CHEMOTHERAPY INDUCED NEUTROPENIA: Primary | ICD-10-CM

## 2019-10-29 LAB
ALBUMIN SERPL BCP-MCNC: 3.4 G/DL (ref 3.5–5.2)
ALP SERPL-CCNC: 130 U/L (ref 55–135)
ALT SERPL W/O P-5'-P-CCNC: 15 U/L (ref 10–44)
ANION GAP SERPL CALC-SCNC: 6 MMOL/L (ref 8–16)
AST SERPL-CCNC: 25 U/L (ref 10–40)
BILIRUB SERPL-MCNC: 0.9 MG/DL (ref 0.1–1)
BUN SERPL-MCNC: 7 MG/DL (ref 8–23)
CALCIUM SERPL-MCNC: 9.1 MG/DL (ref 8.7–10.5)
CHLORIDE SERPL-SCNC: 108 MMOL/L (ref 95–110)
CO2 SERPL-SCNC: 26 MMOL/L (ref 23–29)
CREAT SERPL-MCNC: 0.6 MG/DL (ref 0.5–1.4)
ERYTHROCYTE [DISTWIDTH] IN BLOOD BY AUTOMATED COUNT: 17.4 % (ref 11.5–14.5)
EST. GFR  (AFRICAN AMERICAN): >60 ML/MIN/1.73 M^2
EST. GFR  (NON AFRICAN AMERICAN): >60 ML/MIN/1.73 M^2
GLUCOSE SERPL-MCNC: 82 MG/DL (ref 70–110)
HCT VFR BLD AUTO: 28 % (ref 37–48.5)
HGB BLD-MCNC: 8.9 G/DL (ref 12–16)
IMM GRANULOCYTES # BLD AUTO: 0.11 K/UL (ref 0–0.04)
MCH RBC QN AUTO: 30.9 PG (ref 27–31)
MCHC RBC AUTO-ENTMCNC: 31.8 G/DL (ref 32–36)
MCV RBC AUTO: 97 FL (ref 82–98)
NEUTROPHILS # BLD AUTO: 1.7 K/UL (ref 1.8–7.7)
PLATELET # BLD AUTO: 151 K/UL (ref 150–350)
PMV BLD AUTO: 9.9 FL (ref 9.2–12.9)
POTASSIUM SERPL-SCNC: 3.9 MMOL/L (ref 3.5–5.1)
PROT SERPL-MCNC: 6.4 G/DL (ref 6–8.4)
RBC # BLD AUTO: 2.88 M/UL (ref 4–5.4)
SODIUM SERPL-SCNC: 140 MMOL/L (ref 136–145)
WBC # BLD AUTO: 3.22 K/UL (ref 3.9–12.7)

## 2019-10-29 PROCEDURE — 85027 COMPLETE CBC AUTOMATED: CPT

## 2019-10-29 PROCEDURE — 25000003 PHARM REV CODE 250: Performed by: INTERNAL MEDICINE

## 2019-10-29 PROCEDURE — 63600175 PHARM REV CODE 636 W HCPCS: Performed by: INTERNAL MEDICINE

## 2019-10-29 PROCEDURE — S0028 INJECTION, FAMOTIDINE, 20 MG: HCPCS | Performed by: INTERNAL MEDICINE

## 2019-10-29 PROCEDURE — 80053 COMPREHEN METABOLIC PANEL: CPT

## 2019-10-29 PROCEDURE — 96413 CHEMO IV INFUSION 1 HR: CPT

## 2019-10-29 PROCEDURE — 99999 PR PBB SHADOW E&M-EST. PATIENT-LVL III: ICD-10-PCS | Mod: PBBFAC,,, | Performed by: INTERNAL MEDICINE

## 2019-10-29 PROCEDURE — 96367 TX/PROPH/DG ADDL SEQ IV INF: CPT

## 2019-10-29 PROCEDURE — 99499 UNLISTED E&M SERVICE: CPT | Mod: S$GLB,,, | Performed by: INTERNAL MEDICINE

## 2019-10-29 PROCEDURE — 1101F PR PT FALLS ASSESS DOC 0-1 FALLS W/OUT INJ PAST YR: ICD-10-PCS | Mod: CPTII,S$GLB,, | Performed by: INTERNAL MEDICINE

## 2019-10-29 PROCEDURE — 99215 OFFICE O/P EST HI 40 MIN: CPT | Mod: S$GLB,,, | Performed by: INTERNAL MEDICINE

## 2019-10-29 PROCEDURE — A4216 STERILE WATER/SALINE, 10 ML: HCPCS | Performed by: INTERNAL MEDICINE

## 2019-10-29 PROCEDURE — 99499 RISK ADDL DX/OHS AUDIT: ICD-10-PCS | Mod: S$GLB,,, | Performed by: INTERNAL MEDICINE

## 2019-10-29 PROCEDURE — 1101F PT FALLS ASSESS-DOCD LE1/YR: CPT | Mod: CPTII,S$GLB,, | Performed by: INTERNAL MEDICINE

## 2019-10-29 PROCEDURE — 96375 TX/PRO/DX INJ NEW DRUG ADDON: CPT

## 2019-10-29 PROCEDURE — 99999 PR PBB SHADOW E&M-EST. PATIENT-LVL III: CPT | Mod: PBBFAC,,, | Performed by: INTERNAL MEDICINE

## 2019-10-29 PROCEDURE — 99215 PR OFFICE/OUTPT VISIT, EST, LEVL V, 40-54 MIN: ICD-10-PCS | Mod: S$GLB,,, | Performed by: INTERNAL MEDICINE

## 2019-10-29 RX ORDER — SODIUM CHLORIDE 0.9 % (FLUSH) 0.9 %
10 SYRINGE (ML) INJECTION
Status: DISCONTINUED | OUTPATIENT
Start: 2019-10-29 | End: 2019-10-29 | Stop reason: HOSPADM

## 2019-10-29 RX ORDER — SODIUM CHLORIDE 0.9 % (FLUSH) 0.9 %
10 SYRINGE (ML) INJECTION
Status: COMPLETED | OUTPATIENT
Start: 2019-10-29 | End: 2019-10-29

## 2019-10-29 RX ORDER — EPINEPHRINE 0.3 MG/.3ML
0.3 INJECTION SUBCUTANEOUS ONCE AS NEEDED
Status: CANCELLED | OUTPATIENT
Start: 2019-11-05

## 2019-10-29 RX ORDER — DIPHENHYDRAMINE HYDROCHLORIDE 50 MG/ML
50 INJECTION INTRAMUSCULAR; INTRAVENOUS ONCE AS NEEDED
Status: CANCELLED | OUTPATIENT
Start: 2019-11-05

## 2019-10-29 RX ORDER — FAMOTIDINE 10 MG/ML
20 INJECTION INTRAVENOUS
Status: CANCELLED | OUTPATIENT
Start: 2019-10-29

## 2019-10-29 RX ORDER — SODIUM CHLORIDE 0.9 % (FLUSH) 0.9 %
10 SYRINGE (ML) INJECTION
Status: CANCELLED | OUTPATIENT
Start: 2019-10-29

## 2019-10-29 RX ORDER — HEPARIN 100 UNIT/ML
500 SYRINGE INTRAVENOUS
Status: CANCELLED | OUTPATIENT
Start: 2019-11-05

## 2019-10-29 RX ORDER — DIPHENHYDRAMINE HYDROCHLORIDE 50 MG/ML
50 INJECTION INTRAMUSCULAR; INTRAVENOUS ONCE AS NEEDED
Status: CANCELLED | OUTPATIENT
Start: 2019-10-29

## 2019-10-29 RX ORDER — HEPARIN 100 UNIT/ML
500 SYRINGE INTRAVENOUS
Status: CANCELLED | OUTPATIENT
Start: 2019-10-29

## 2019-10-29 RX ORDER — FAMOTIDINE 10 MG/ML
20 INJECTION INTRAVENOUS
Status: CANCELLED | OUTPATIENT
Start: 2019-11-05

## 2019-10-29 RX ORDER — HEPARIN 100 UNIT/ML
500 SYRINGE INTRAVENOUS
Status: COMPLETED | OUTPATIENT
Start: 2019-10-29 | End: 2019-10-29

## 2019-10-29 RX ORDER — EPINEPHRINE 0.3 MG/.3ML
0.3 INJECTION SUBCUTANEOUS ONCE AS NEEDED
Status: DISCONTINUED | OUTPATIENT
Start: 2019-10-29 | End: 2019-10-29 | Stop reason: HOSPADM

## 2019-10-29 RX ORDER — HEPARIN 100 UNIT/ML
500 SYRINGE INTRAVENOUS
Status: DISCONTINUED | OUTPATIENT
Start: 2019-10-29 | End: 2019-10-29 | Stop reason: HOSPADM

## 2019-10-29 RX ORDER — FAMOTIDINE 10 MG/ML
20 INJECTION INTRAVENOUS
Status: COMPLETED | OUTPATIENT
Start: 2019-10-29 | End: 2019-10-29

## 2019-10-29 RX ORDER — DIPHENHYDRAMINE HYDROCHLORIDE 50 MG/ML
50 INJECTION INTRAMUSCULAR; INTRAVENOUS ONCE AS NEEDED
Status: DISCONTINUED | OUTPATIENT
Start: 2019-10-29 | End: 2019-10-29 | Stop reason: HOSPADM

## 2019-10-29 RX ORDER — EPINEPHRINE 0.3 MG/.3ML
0.3 INJECTION SUBCUTANEOUS ONCE AS NEEDED
Status: CANCELLED | OUTPATIENT
Start: 2019-10-29

## 2019-10-29 RX ORDER — SODIUM CHLORIDE 0.9 % (FLUSH) 0.9 %
10 SYRINGE (ML) INJECTION
Status: CANCELLED | OUTPATIENT
Start: 2019-11-05

## 2019-10-29 RX ADMIN — SODIUM CHLORIDE: 0.9 INJECTION, SOLUTION INTRAVENOUS at 11:10

## 2019-10-29 RX ADMIN — DIPHENHYDRAMINE HYDROCHLORIDE 25 MG: 50 INJECTION INTRAMUSCULAR; INTRAVENOUS at 11:10

## 2019-10-29 RX ADMIN — HEPARIN SODIUM (PORCINE) LOCK FLUSH IV SOLN 100 UNIT/ML 500 UNITS: 100 SOLUTION at 10:10

## 2019-10-29 RX ADMIN — Medication 10 ML: at 10:10

## 2019-10-29 RX ADMIN — PACLITAXEL 114 MG: 6 INJECTION, SOLUTION INTRAVENOUS at 12:10

## 2019-10-29 RX ADMIN — HEPARIN SODIUM (PORCINE) LOCK FLUSH IV SOLN 100 UNIT/ML 500 UNITS: 100 SOLUTION at 01:10

## 2019-10-29 RX ADMIN — Medication 10 ML: at 01:10

## 2019-10-29 RX ADMIN — DEXAMETHASONE SODIUM PHOSPHATE 10 MG: 4 INJECTION, SOLUTION INTRA-ARTICULAR; INTRALESIONAL; INTRAMUSCULAR; INTRAVENOUS; SOFT TISSUE at 11:10

## 2019-10-29 RX ADMIN — FAMOTIDINE 20 MG: 10 INJECTION, SOLUTION INTRAVENOUS at 11:10

## 2019-10-29 NOTE — Clinical Note
Add weekly taxol, cbc, cmp with day 3 and 4 neupogen (after each taxol) through 12/14 with MD on 11/26 and 12/10.

## 2019-10-31 ENCOUNTER — HOSPITAL ENCOUNTER (OUTPATIENT)
Dept: ENDOCRINOLOGY | Facility: CLINIC | Age: 69
Discharge: HOME | End: 2019-10-31
Attending: NURSE PRACTITIONER
Payer: MEDICARE

## 2019-10-31 ENCOUNTER — INFUSION (OUTPATIENT)
Dept: INFUSION THERAPY | Facility: HOSPITAL | Age: 69
End: 2019-10-31
Attending: INTERNAL MEDICINE
Payer: MEDICARE

## 2019-10-31 ENCOUNTER — PATIENT MESSAGE (OUTPATIENT)
Dept: ENDOCRINOLOGY | Facility: CLINIC | Age: 69
End: 2019-10-31

## 2019-10-31 DIAGNOSIS — D70.1 CHEMOTHERAPY INDUCED NEUTROPENIA: Primary | ICD-10-CM

## 2019-10-31 DIAGNOSIS — Z17.1 MALIGNANT NEOPLASM OF LOWER-INNER QUADRANT OF RIGHT BREAST OF FEMALE, ESTROGEN RECEPTOR NEGATIVE: ICD-10-CM

## 2019-10-31 DIAGNOSIS — C50.311 MALIGNANT NEOPLASM OF LOWER-INNER QUADRANT OF RIGHT BREAST OF FEMALE, ESTROGEN RECEPTOR NEGATIVE: ICD-10-CM

## 2019-10-31 DIAGNOSIS — E04.1 THYROID NODULE: ICD-10-CM

## 2019-10-31 DIAGNOSIS — T45.1X5A CHEMOTHERAPY INDUCED NEUTROPENIA: Primary | ICD-10-CM

## 2019-10-31 PROCEDURE — 76536 US EXAM OF HEAD AND NECK: CPT | Mod: S$GLB,,, | Performed by: INTERNAL MEDICINE

## 2019-10-31 PROCEDURE — 96372 THER/PROPH/DIAG INJ SC/IM: CPT

## 2019-10-31 PROCEDURE — 76536 US SOFT TISSUE HEAD NECK THYROID: ICD-10-PCS | Mod: S$GLB,,, | Performed by: INTERNAL MEDICINE

## 2019-10-31 PROCEDURE — 63600175 PHARM REV CODE 636 W HCPCS: Mod: JG | Performed by: INTERNAL MEDICINE

## 2019-10-31 RX ADMIN — FILGRASTIM 300 MCG: 300 INJECTION, SOLUTION INTRAVENOUS; SUBCUTANEOUS at 08:10

## 2019-11-01 ENCOUNTER — INFUSION (OUTPATIENT)
Dept: INFUSION THERAPY | Facility: HOSPITAL | Age: 69
End: 2019-11-01
Attending: INTERNAL MEDICINE
Payer: MEDICARE

## 2019-11-01 DIAGNOSIS — C50.311 MALIGNANT NEOPLASM OF LOWER-INNER QUADRANT OF RIGHT BREAST OF FEMALE, ESTROGEN RECEPTOR NEGATIVE: ICD-10-CM

## 2019-11-01 DIAGNOSIS — T45.1X5A CHEMOTHERAPY INDUCED NEUTROPENIA: Primary | ICD-10-CM

## 2019-11-01 DIAGNOSIS — D70.1 CHEMOTHERAPY INDUCED NEUTROPENIA: Primary | ICD-10-CM

## 2019-11-01 DIAGNOSIS — Z17.1 MALIGNANT NEOPLASM OF LOWER-INNER QUADRANT OF RIGHT BREAST OF FEMALE, ESTROGEN RECEPTOR NEGATIVE: ICD-10-CM

## 2019-11-01 PROCEDURE — 63600175 PHARM REV CODE 636 W HCPCS: Mod: JG | Performed by: INTERNAL MEDICINE

## 2019-11-01 PROCEDURE — 96372 THER/PROPH/DIAG INJ SC/IM: CPT

## 2019-11-01 RX ADMIN — FILGRASTIM 300 MCG: 300 INJECTION, SOLUTION INTRAVENOUS; SUBCUTANEOUS at 09:11

## 2019-11-05 ENCOUNTER — INFUSION (OUTPATIENT)
Dept: INFUSION THERAPY | Facility: HOSPITAL | Age: 69
End: 2019-11-05
Attending: INTERNAL MEDICINE
Payer: MEDICARE

## 2019-11-05 VITALS
DIASTOLIC BLOOD PRESSURE: 70 MMHG | HEIGHT: 63 IN | SYSTOLIC BLOOD PRESSURE: 125 MMHG | BODY MASS INDEX: 24.06 KG/M2 | WEIGHT: 135.81 LBS | TEMPERATURE: 98 F | HEART RATE: 75 BPM | RESPIRATION RATE: 18 BRPM

## 2019-11-05 DIAGNOSIS — D70.1 CHEMOTHERAPY INDUCED NEUTROPENIA: Primary | ICD-10-CM

## 2019-11-05 DIAGNOSIS — Z17.1 MALIGNANT NEOPLASM OF LOWER-INNER QUADRANT OF RIGHT BREAST OF FEMALE, ESTROGEN RECEPTOR NEGATIVE: ICD-10-CM

## 2019-11-05 DIAGNOSIS — C50.311 MALIGNANT NEOPLASM OF LOWER-INNER QUADRANT OF RIGHT BREAST OF FEMALE, ESTROGEN RECEPTOR NEGATIVE: ICD-10-CM

## 2019-11-05 DIAGNOSIS — T45.1X5A CHEMOTHERAPY INDUCED NEUTROPENIA: Primary | ICD-10-CM

## 2019-11-05 DIAGNOSIS — Z51.11 ENCOUNTER FOR CHEMOTHERAPY MANAGEMENT: Primary | ICD-10-CM

## 2019-11-05 LAB
ALBUMIN SERPL BCP-MCNC: 3.4 G/DL (ref 3.5–5.2)
ALP SERPL-CCNC: 123 U/L (ref 55–135)
ALT SERPL W/O P-5'-P-CCNC: 13 U/L (ref 10–44)
ANION GAP SERPL CALC-SCNC: 5 MMOL/L (ref 8–16)
AST SERPL-CCNC: 20 U/L (ref 10–40)
BILIRUB SERPL-MCNC: 0.5 MG/DL (ref 0.1–1)
BUN SERPL-MCNC: 7 MG/DL (ref 8–23)
CALCIUM SERPL-MCNC: 8.8 MG/DL (ref 8.7–10.5)
CHLORIDE SERPL-SCNC: 109 MMOL/L (ref 95–110)
CO2 SERPL-SCNC: 25 MMOL/L (ref 23–29)
CREAT SERPL-MCNC: 0.7 MG/DL (ref 0.5–1.4)
ERYTHROCYTE [DISTWIDTH] IN BLOOD BY AUTOMATED COUNT: 17.9 % (ref 11.5–14.5)
EST. GFR  (AFRICAN AMERICAN): >60 ML/MIN/1.73 M^2
EST. GFR  (NON AFRICAN AMERICAN): >60 ML/MIN/1.73 M^2
GLUCOSE SERPL-MCNC: 102 MG/DL (ref 70–110)
HCT VFR BLD AUTO: 27.9 % (ref 37–48.5)
HGB BLD-MCNC: 8.6 G/DL (ref 12–16)
IMM GRANULOCYTES # BLD AUTO: 0.33 K/UL (ref 0–0.04)
MCH RBC QN AUTO: 30.8 PG (ref 27–31)
MCHC RBC AUTO-ENTMCNC: 30.8 G/DL (ref 32–36)
MCV RBC AUTO: 100 FL (ref 82–98)
NEUTROPHILS # BLD AUTO: 1.5 K/UL (ref 1.8–7.7)
PLATELET # BLD AUTO: 162 K/UL (ref 150–350)
PMV BLD AUTO: 10.9 FL (ref 9.2–12.9)
POTASSIUM SERPL-SCNC: 3.8 MMOL/L (ref 3.5–5.1)
PROT SERPL-MCNC: 6.3 G/DL (ref 6–8.4)
RBC # BLD AUTO: 2.79 M/UL (ref 4–5.4)
SODIUM SERPL-SCNC: 139 MMOL/L (ref 136–145)
WBC # BLD AUTO: 3.53 K/UL (ref 3.9–12.7)

## 2019-11-05 PROCEDURE — 96413 CHEMO IV INFUSION 1 HR: CPT

## 2019-11-05 PROCEDURE — 63600175 PHARM REV CODE 636 W HCPCS: Performed by: INTERNAL MEDICINE

## 2019-11-05 PROCEDURE — 96367 TX/PROPH/DG ADDL SEQ IV INF: CPT

## 2019-11-05 PROCEDURE — 25000003 PHARM REV CODE 250: Performed by: INTERNAL MEDICINE

## 2019-11-05 PROCEDURE — A4216 STERILE WATER/SALINE, 10 ML: HCPCS | Performed by: INTERNAL MEDICINE

## 2019-11-05 PROCEDURE — S0028 INJECTION, FAMOTIDINE, 20 MG: HCPCS | Performed by: INTERNAL MEDICINE

## 2019-11-05 PROCEDURE — 85027 COMPLETE CBC AUTOMATED: CPT

## 2019-11-05 PROCEDURE — 96375 TX/PRO/DX INJ NEW DRUG ADDON: CPT

## 2019-11-05 PROCEDURE — 80053 COMPREHEN METABOLIC PANEL: CPT

## 2019-11-05 RX ORDER — FAMOTIDINE 10 MG/ML
20 INJECTION INTRAVENOUS
Status: COMPLETED | OUTPATIENT
Start: 2019-11-05 | End: 2019-11-05

## 2019-11-05 RX ORDER — HEPARIN 100 UNIT/ML
500 SYRINGE INTRAVENOUS
Status: DISCONTINUED | OUTPATIENT
Start: 2019-11-05 | End: 2019-11-05 | Stop reason: HOSPADM

## 2019-11-05 RX ORDER — DIPHENHYDRAMINE HYDROCHLORIDE 50 MG/ML
50 INJECTION INTRAMUSCULAR; INTRAVENOUS ONCE AS NEEDED
Status: DISCONTINUED | OUTPATIENT
Start: 2019-11-05 | End: 2019-11-05 | Stop reason: HOSPADM

## 2019-11-05 RX ORDER — SODIUM CHLORIDE 0.9 % (FLUSH) 0.9 %
10 SYRINGE (ML) INJECTION
Status: COMPLETED | OUTPATIENT
Start: 2019-11-05 | End: 2019-11-05

## 2019-11-05 RX ORDER — HEPARIN 100 UNIT/ML
500 SYRINGE INTRAVENOUS
Status: CANCELLED | OUTPATIENT
Start: 2019-11-05

## 2019-11-05 RX ORDER — HEPARIN 100 UNIT/ML
500 SYRINGE INTRAVENOUS
Status: COMPLETED | OUTPATIENT
Start: 2019-11-05 | End: 2019-11-05

## 2019-11-05 RX ORDER — EPINEPHRINE 0.3 MG/.3ML
0.3 INJECTION SUBCUTANEOUS ONCE AS NEEDED
Status: DISCONTINUED | OUTPATIENT
Start: 2019-11-05 | End: 2019-11-05 | Stop reason: HOSPADM

## 2019-11-05 RX ORDER — SODIUM CHLORIDE 0.9 % (FLUSH) 0.9 %
10 SYRINGE (ML) INJECTION
Status: DISCONTINUED | OUTPATIENT
Start: 2019-11-05 | End: 2019-11-05 | Stop reason: HOSPADM

## 2019-11-05 RX ORDER — SODIUM CHLORIDE 0.9 % (FLUSH) 0.9 %
10 SYRINGE (ML) INJECTION
Status: CANCELLED | OUTPATIENT
Start: 2019-11-05

## 2019-11-05 RX ADMIN — DEXAMETHASONE SODIUM PHOSPHATE 10 MG: 4 INJECTION, SOLUTION INTRA-ARTICULAR; INTRALESIONAL; INTRAMUSCULAR; INTRAVENOUS; SOFT TISSUE at 02:11

## 2019-11-05 RX ADMIN — SODIUM CHLORIDE: 0.9 INJECTION, SOLUTION INTRAVENOUS at 02:11

## 2019-11-05 RX ADMIN — PACLITAXEL 114 MG: 6 INJECTION, SOLUTION, CONCENTRATE INTRAVENOUS at 03:11

## 2019-11-05 RX ADMIN — DIPHENHYDRAMINE HYDROCHLORIDE 25 MG: 50 INJECTION INTRAMUSCULAR; INTRAVENOUS at 02:11

## 2019-11-05 RX ADMIN — Medication 10 ML: at 12:11

## 2019-11-05 RX ADMIN — FAMOTIDINE 20 MG: 10 INJECTION INTRAVENOUS at 02:11

## 2019-11-05 RX ADMIN — HEPARIN SODIUM (PORCINE) LOCK FLUSH IV SOLN 100 UNIT/ML 500 UNITS: 100 SOLUTION at 12:11

## 2019-11-05 RX ADMIN — HEPARIN 500 UNITS: 100 SYRINGE at 04:11

## 2019-11-05 RX ADMIN — Medication 10 ML: at 04:11

## 2019-11-05 NOTE — NURSING
Patient here for blood draw from left chest port-accesses easily with good blood return-blood to lab-line flushed and leftaccessed for chemo today.

## 2019-11-05 NOTE — PLAN OF CARE
Patient tolerated Taxol well today. Port + blood return present, flushed, hep locked and deaccessed. NAD noted upon discharge. Declined avs. Discharged home, ambulated independently with  by side.

## 2019-11-05 NOTE — PLAN OF CARE
Problem: Adult Inpatient Plan of Care  Goal: Optimal Comfort and Wellbeing  Intervention: Provide Person-Centered Care  Flowsheets (Taken 11/5/2019 1720)  Trust Relationship/Rapport: care explained; questions encouraged; choices provided; reassurance provided; emotional support provided; thoughts/feelings acknowledged; empathic listening provided; questions answered

## 2019-11-07 ENCOUNTER — INFUSION (OUTPATIENT)
Dept: INFUSION THERAPY | Facility: HOSPITAL | Age: 69
End: 2019-11-07
Attending: INTERNAL MEDICINE
Payer: MEDICARE

## 2019-11-07 DIAGNOSIS — C50.311 MALIGNANT NEOPLASM OF LOWER-INNER QUADRANT OF RIGHT BREAST OF FEMALE, ESTROGEN RECEPTOR NEGATIVE: ICD-10-CM

## 2019-11-07 DIAGNOSIS — Z17.1 MALIGNANT NEOPLASM OF LOWER-INNER QUADRANT OF RIGHT BREAST OF FEMALE, ESTROGEN RECEPTOR NEGATIVE: ICD-10-CM

## 2019-11-07 DIAGNOSIS — T45.1X5A CHEMOTHERAPY INDUCED NEUTROPENIA: Primary | ICD-10-CM

## 2019-11-07 DIAGNOSIS — D70.1 CHEMOTHERAPY INDUCED NEUTROPENIA: Primary | ICD-10-CM

## 2019-11-07 PROCEDURE — 63600175 PHARM REV CODE 636 W HCPCS: Mod: JG | Performed by: INTERNAL MEDICINE

## 2019-11-07 PROCEDURE — 96372 THER/PROPH/DIAG INJ SC/IM: CPT

## 2019-11-07 RX ADMIN — FILGRASTIM 300 MCG: 300 INJECTION, SOLUTION INTRAVENOUS; SUBCUTANEOUS at 09:11

## 2019-11-08 ENCOUNTER — INFUSION (OUTPATIENT)
Dept: INFUSION THERAPY | Facility: HOSPITAL | Age: 69
End: 2019-11-08
Attending: INTERNAL MEDICINE
Payer: MEDICARE

## 2019-11-08 DIAGNOSIS — Z17.1 MALIGNANT NEOPLASM OF LOWER-INNER QUADRANT OF RIGHT BREAST OF FEMALE, ESTROGEN RECEPTOR NEGATIVE: ICD-10-CM

## 2019-11-08 DIAGNOSIS — T45.1X5A CHEMOTHERAPY INDUCED NEUTROPENIA: Primary | ICD-10-CM

## 2019-11-08 DIAGNOSIS — C50.311 MALIGNANT NEOPLASM OF LOWER-INNER QUADRANT OF RIGHT BREAST OF FEMALE, ESTROGEN RECEPTOR NEGATIVE: ICD-10-CM

## 2019-11-08 DIAGNOSIS — D70.1 CHEMOTHERAPY INDUCED NEUTROPENIA: Primary | ICD-10-CM

## 2019-11-08 PROCEDURE — 63600175 PHARM REV CODE 636 W HCPCS: Mod: JG | Performed by: INTERNAL MEDICINE

## 2019-11-08 PROCEDURE — 96372 THER/PROPH/DIAG INJ SC/IM: CPT

## 2019-11-08 RX ADMIN — FILGRASTIM 300 MCG: 300 INJECTION, SOLUTION INTRAVENOUS; SUBCUTANEOUS at 09:11

## 2019-11-08 NOTE — NURSING
0925- Patient arrived ambulatory to the unit for 2nd day of neupogen.  No new issues today, no complaints, patient shows no signs of distress.  Patient tolerated injection without issue and was discharged to home setting.

## 2019-11-11 ENCOUNTER — TELEPHONE (OUTPATIENT)
Dept: HEMATOLOGY/ONCOLOGY | Facility: CLINIC | Age: 69
End: 2019-11-11

## 2019-11-12 ENCOUNTER — INFUSION (OUTPATIENT)
Dept: INFUSION THERAPY | Facility: HOSPITAL | Age: 69
End: 2019-11-12
Attending: INTERNAL MEDICINE
Payer: MEDICARE

## 2019-11-12 ENCOUNTER — OFFICE VISIT (OUTPATIENT)
Dept: HEMATOLOGY/ONCOLOGY | Facility: CLINIC | Age: 69
End: 2019-11-12
Payer: MEDICARE

## 2019-11-12 VITALS
RESPIRATION RATE: 18 BRPM | SYSTOLIC BLOOD PRESSURE: 145 MMHG | HEART RATE: 77 BPM | BODY MASS INDEX: 24.14 KG/M2 | HEIGHT: 63 IN | TEMPERATURE: 98 F | WEIGHT: 136.25 LBS | DIASTOLIC BLOOD PRESSURE: 66 MMHG

## 2019-11-12 VITALS
TEMPERATURE: 98 F | DIASTOLIC BLOOD PRESSURE: 86 MMHG | HEART RATE: 80 BPM | SYSTOLIC BLOOD PRESSURE: 131 MMHG | BODY MASS INDEX: 24.13 KG/M2 | RESPIRATION RATE: 20 BRPM | WEIGHT: 136.25 LBS

## 2019-11-12 DIAGNOSIS — G62.0 CHEMOTHERAPY-INDUCED NEUROPATHY: ICD-10-CM

## 2019-11-12 DIAGNOSIS — T45.1X5A CHEMOTHERAPY-INDUCED NEUROPATHY: ICD-10-CM

## 2019-11-12 DIAGNOSIS — Z17.1 MALIGNANT NEOPLASM OF LOWER-INNER QUADRANT OF RIGHT BREAST OF FEMALE, ESTROGEN RECEPTOR NEGATIVE: Primary | ICD-10-CM

## 2019-11-12 DIAGNOSIS — C50.311 MALIGNANT NEOPLASM OF LOWER-INNER QUADRANT OF RIGHT BREAST OF FEMALE, ESTROGEN RECEPTOR NEGATIVE: ICD-10-CM

## 2019-11-12 DIAGNOSIS — T45.1X5A CHEMOTHERAPY INDUCED NEUTROPENIA: ICD-10-CM

## 2019-11-12 DIAGNOSIS — T45.1X5A CHEMOTHERAPY INDUCED NEUTROPENIA: Primary | ICD-10-CM

## 2019-11-12 DIAGNOSIS — C50.311 MALIGNANT NEOPLASM OF LOWER-INNER QUADRANT OF RIGHT BREAST OF FEMALE, ESTROGEN RECEPTOR NEGATIVE: Primary | ICD-10-CM

## 2019-11-12 DIAGNOSIS — R74.8 ELEVATED ALKALINE PHOSPHATASE LEVEL: ICD-10-CM

## 2019-11-12 DIAGNOSIS — D70.1 CHEMOTHERAPY INDUCED NEUTROPENIA: ICD-10-CM

## 2019-11-12 DIAGNOSIS — D64.81 ANTINEOPLASTIC CHEMOTHERAPY INDUCED ANEMIA: ICD-10-CM

## 2019-11-12 DIAGNOSIS — Z17.1 MALIGNANT NEOPLASM OF LOWER-INNER QUADRANT OF RIGHT BREAST OF FEMALE, ESTROGEN RECEPTOR NEGATIVE: ICD-10-CM

## 2019-11-12 DIAGNOSIS — D70.1 CHEMOTHERAPY INDUCED NEUTROPENIA: Primary | ICD-10-CM

## 2019-11-12 DIAGNOSIS — T45.1X5A ANTINEOPLASTIC CHEMOTHERAPY INDUCED ANEMIA: ICD-10-CM

## 2019-11-12 DIAGNOSIS — Z51.11 ENCOUNTER FOR CHEMOTHERAPY MANAGEMENT: Primary | ICD-10-CM

## 2019-11-12 PROBLEM — D69.59 DRUG-INDUCED THROMBOCYTOPENIA: Status: RESOLVED | Noted: 2019-08-30 | Resolved: 2019-11-12

## 2019-11-12 PROBLEM — T50.905A DRUG-INDUCED THROMBOCYTOPENIA: Status: RESOLVED | Noted: 2019-08-30 | Resolved: 2019-11-12

## 2019-11-12 LAB
ALBUMIN SERPL BCP-MCNC: 3.4 G/DL (ref 3.5–5.2)
ALP SERPL-CCNC: 132 U/L (ref 55–135)
ALT SERPL W/O P-5'-P-CCNC: 11 U/L (ref 10–44)
ANION GAP SERPL CALC-SCNC: 5 MMOL/L (ref 8–16)
AST SERPL-CCNC: 18 U/L (ref 10–40)
BILIRUB SERPL-MCNC: 0.7 MG/DL (ref 0.1–1)
BUN SERPL-MCNC: 8 MG/DL (ref 8–23)
CALCIUM SERPL-MCNC: 9.1 MG/DL (ref 8.7–10.5)
CHLORIDE SERPL-SCNC: 109 MMOL/L (ref 95–110)
CO2 SERPL-SCNC: 25 MMOL/L (ref 23–29)
CREAT SERPL-MCNC: 0.6 MG/DL (ref 0.5–1.4)
ERYTHROCYTE [DISTWIDTH] IN BLOOD BY AUTOMATED COUNT: 17.9 % (ref 11.5–14.5)
EST. GFR  (AFRICAN AMERICAN): >60 ML/MIN/1.73 M^2
EST. GFR  (NON AFRICAN AMERICAN): >60 ML/MIN/1.73 M^2
GLUCOSE SERPL-MCNC: 108 MG/DL (ref 70–110)
HCT VFR BLD AUTO: 27.8 % (ref 37–48.5)
HGB BLD-MCNC: 8.6 G/DL (ref 12–16)
IMM GRANULOCYTES # BLD AUTO: 0.2 K/UL (ref 0–0.04)
MCH RBC QN AUTO: 30.7 PG (ref 27–31)
MCHC RBC AUTO-ENTMCNC: 30.9 G/DL (ref 32–36)
MCV RBC AUTO: 99 FL (ref 82–98)
NEUTROPHILS # BLD AUTO: 1.7 K/UL (ref 1.8–7.7)
PLATELET # BLD AUTO: 174 K/UL (ref 150–350)
PMV BLD AUTO: 10.8 FL (ref 9.2–12.9)
POTASSIUM SERPL-SCNC: 3.8 MMOL/L (ref 3.5–5.1)
PROT SERPL-MCNC: 6.4 G/DL (ref 6–8.4)
RBC # BLD AUTO: 2.8 M/UL (ref 4–5.4)
SODIUM SERPL-SCNC: 139 MMOL/L (ref 136–145)
WBC # BLD AUTO: 3.31 K/UL (ref 3.9–12.7)

## 2019-11-12 PROCEDURE — 25000003 PHARM REV CODE 250: Performed by: INTERNAL MEDICINE

## 2019-11-12 PROCEDURE — A4216 STERILE WATER/SALINE, 10 ML: HCPCS | Performed by: INTERNAL MEDICINE

## 2019-11-12 PROCEDURE — 85027 COMPLETE CBC AUTOMATED: CPT

## 2019-11-12 PROCEDURE — 99215 OFFICE O/P EST HI 40 MIN: CPT | Mod: S$GLB,,, | Performed by: INTERNAL MEDICINE

## 2019-11-12 PROCEDURE — 1101F PT FALLS ASSESS-DOCD LE1/YR: CPT | Mod: CPTII,S$GLB,, | Performed by: INTERNAL MEDICINE

## 2019-11-12 PROCEDURE — 99999 PR PBB SHADOW E&M-EST. PATIENT-LVL III: ICD-10-PCS | Mod: PBBFAC,,, | Performed by: INTERNAL MEDICINE

## 2019-11-12 PROCEDURE — 96367 TX/PROPH/DG ADDL SEQ IV INF: CPT

## 2019-11-12 PROCEDURE — 96375 TX/PRO/DX INJ NEW DRUG ADDON: CPT

## 2019-11-12 PROCEDURE — 80053 COMPREHEN METABOLIC PANEL: CPT

## 2019-11-12 PROCEDURE — 63600175 PHARM REV CODE 636 W HCPCS: Performed by: INTERNAL MEDICINE

## 2019-11-12 PROCEDURE — 1101F PR PT FALLS ASSESS DOC 0-1 FALLS W/OUT INJ PAST YR: ICD-10-PCS | Mod: CPTII,S$GLB,, | Performed by: INTERNAL MEDICINE

## 2019-11-12 PROCEDURE — 99215 PR OFFICE/OUTPT VISIT, EST, LEVL V, 40-54 MIN: ICD-10-PCS | Mod: S$GLB,,, | Performed by: INTERNAL MEDICINE

## 2019-11-12 PROCEDURE — 96413 CHEMO IV INFUSION 1 HR: CPT

## 2019-11-12 PROCEDURE — S0028 INJECTION, FAMOTIDINE, 20 MG: HCPCS | Performed by: INTERNAL MEDICINE

## 2019-11-12 PROCEDURE — 99999 PR PBB SHADOW E&M-EST. PATIENT-LVL III: CPT | Mod: PBBFAC,,, | Performed by: INTERNAL MEDICINE

## 2019-11-12 RX ORDER — SODIUM CHLORIDE 0.9 % (FLUSH) 0.9 %
10 SYRINGE (ML) INJECTION
Status: CANCELLED | OUTPATIENT
Start: 2019-11-12

## 2019-11-12 RX ORDER — DIPHENHYDRAMINE HYDROCHLORIDE 50 MG/ML
50 INJECTION INTRAMUSCULAR; INTRAVENOUS ONCE AS NEEDED
Status: CANCELLED | OUTPATIENT
Start: 2019-11-19

## 2019-11-12 RX ORDER — FAMOTIDINE 10 MG/ML
20 INJECTION INTRAVENOUS
Status: CANCELLED | OUTPATIENT
Start: 2019-11-19

## 2019-11-12 RX ORDER — EPINEPHRINE 0.3 MG/.3ML
0.3 INJECTION SUBCUTANEOUS ONCE AS NEEDED
Status: CANCELLED | OUTPATIENT
Start: 2019-11-12

## 2019-11-12 RX ORDER — SODIUM CHLORIDE 0.9 % (FLUSH) 0.9 %
10 SYRINGE (ML) INJECTION
Status: COMPLETED | OUTPATIENT
Start: 2019-11-12 | End: 2019-11-12

## 2019-11-12 RX ORDER — HEPARIN 100 UNIT/ML
500 SYRINGE INTRAVENOUS
Status: DISCONTINUED | OUTPATIENT
Start: 2019-11-12 | End: 2019-11-12 | Stop reason: HOSPADM

## 2019-11-12 RX ORDER — SODIUM CHLORIDE 0.9 % (FLUSH) 0.9 %
10 SYRINGE (ML) INJECTION
Status: CANCELLED | OUTPATIENT
Start: 2019-11-19

## 2019-11-12 RX ORDER — DIPHENHYDRAMINE HYDROCHLORIDE 50 MG/ML
50 INJECTION INTRAMUSCULAR; INTRAVENOUS ONCE AS NEEDED
Status: DISCONTINUED | OUTPATIENT
Start: 2019-11-12 | End: 2019-11-12 | Stop reason: HOSPADM

## 2019-11-12 RX ORDER — HEPARIN 100 UNIT/ML
500 SYRINGE INTRAVENOUS
Status: COMPLETED | OUTPATIENT
Start: 2019-11-12 | End: 2019-11-12

## 2019-11-12 RX ORDER — HEPARIN 100 UNIT/ML
500 SYRINGE INTRAVENOUS
Status: CANCELLED | OUTPATIENT
Start: 2019-11-12

## 2019-11-12 RX ORDER — EPINEPHRINE 0.3 MG/.3ML
0.3 INJECTION SUBCUTANEOUS ONCE AS NEEDED
Status: CANCELLED | OUTPATIENT
Start: 2019-11-19

## 2019-11-12 RX ORDER — FAMOTIDINE 10 MG/ML
20 INJECTION INTRAVENOUS
Status: COMPLETED | OUTPATIENT
Start: 2019-11-12 | End: 2019-11-12

## 2019-11-12 RX ORDER — SODIUM CHLORIDE 0.9 % (FLUSH) 0.9 %
10 SYRINGE (ML) INJECTION
Status: DISCONTINUED | OUTPATIENT
Start: 2019-11-12 | End: 2019-11-12 | Stop reason: HOSPADM

## 2019-11-12 RX ORDER — EPINEPHRINE 0.3 MG/.3ML
0.3 INJECTION SUBCUTANEOUS ONCE AS NEEDED
Status: DISCONTINUED | OUTPATIENT
Start: 2019-11-12 | End: 2019-11-12 | Stop reason: HOSPADM

## 2019-11-12 RX ORDER — DIPHENHYDRAMINE HYDROCHLORIDE 50 MG/ML
50 INJECTION INTRAMUSCULAR; INTRAVENOUS ONCE AS NEEDED
Status: CANCELLED | OUTPATIENT
Start: 2019-11-12

## 2019-11-12 RX ORDER — FAMOTIDINE 10 MG/ML
20 INJECTION INTRAVENOUS
Status: CANCELLED | OUTPATIENT
Start: 2019-11-12

## 2019-11-12 RX ORDER — HEPARIN 100 UNIT/ML
500 SYRINGE INTRAVENOUS
Status: CANCELLED | OUTPATIENT
Start: 2019-11-19

## 2019-11-12 RX ADMIN — SODIUM CHLORIDE: 0.9 INJECTION, SOLUTION INTRAVENOUS at 12:11

## 2019-11-12 RX ADMIN — HEPARIN 500 UNITS: 100 SYRINGE at 10:11

## 2019-11-12 RX ADMIN — DEXAMETHASONE SODIUM PHOSPHATE 10 MG: 4 INJECTION, SOLUTION INTRA-ARTICULAR; INTRALESIONAL; INTRAMUSCULAR; INTRAVENOUS; SOFT TISSUE at 12:11

## 2019-11-12 RX ADMIN — HEPARIN 500 UNITS: 100 SYRINGE at 02:11

## 2019-11-12 RX ADMIN — Medication 10 ML: at 10:11

## 2019-11-12 RX ADMIN — Medication 10 ML: at 02:11

## 2019-11-12 RX ADMIN — FAMOTIDINE 20 MG: 10 INJECTION, SOLUTION INTRAVENOUS at 12:11

## 2019-11-12 RX ADMIN — DIPHENHYDRAMINE HYDROCHLORIDE 25 MG: 50 INJECTION INTRAMUSCULAR; INTRAVENOUS at 01:11

## 2019-11-12 RX ADMIN — PACLITAXEL 108 MG: 6 INJECTION, SOLUTION, CONCENTRATE INTRAVENOUS at 01:11

## 2019-11-12 NOTE — NURSING
Patient's PAC accessed and lab specimens collected.  Patient tolerated well.  Line heparinized and left in place for scheduled treatment.  Site c/d/i.

## 2019-11-12 NOTE — PROGRESS NOTES
History:     Reason For Follow Up/Onc History:   1. Stage 1B ([id number]) invasive ductal carcinoma of right breast, lower outer quadrant, ER neg, NE neg, Her2 neg, Grade 3, Ki67 70%      HPI:   Sharri Cline presents for follow up of breast cancer and continuation of neoadjuvant chemotherapy. She's due for cycle 8 Taxol. Neupogen has been added. + neuropathy in hands and feet - having more trouble with buttoning buttons now - she can do it, but its difficult and takes a long time. Fatigue is progressive.     Onc history:   - She presented for screening mammo in 5/15/2019 which showed focal asymmetries in both left and right breast. Diagnostic mammogram and US showed no significant abn of left breast, and right breast 15 mm x 11 mm x 12 mm mass at 4:00, 5 CFN. Biopsy on 5/24/19 showed grade 3 IDC, triple negative, Ki67 70%.    - 7/3/19 started neoadjuvant chemotherapy with ddAC to be followed by Taxol.   - 8/8/19 admitted for neutropenic fever; admitted again with cycle 4.     History: I reviewed, confirmed and updated history (past medical, social, family) as necessary.    Medications    Current Outpatient Medications:     artificial tears (ISOPTO TEARS) 0.5 % ophthalmic solution, Place 1 drop into both eyes 4 (four) times daily., Disp: , Rfl:     carboxymethylcellulose sodium 1 % DpGe, Place 1 drop into both eyes every evening., Disp: , Rfl: 0    fish oil-omega-3 fatty acids 300-1,000 mg capsule, Take 2 g by mouth once daily., Disp: , Rfl:     magic mouthwash diphen/antac/lidoc/nysta, Take 10 mLs by mouth 4 (four) times daily., Disp: 120 mL, Rfl: 0    multivitamin with minerals tablet, Take 1 tablet by mouth once daily.  , Disp: , Rfl:     ondansetron (ZOFRAN-ODT) 8 MG TbDL, Take 1 tablet (8 mg total) by mouth every 8 (eight) hours as needed (nausea)., Disp: 45 tablet, Rfl: 1    potassium chloride SA (K-DUR,KLOR-CON) 20 MEQ tablet, Take 2 tablets (40 mEq total) by mouth once daily., Disp: 60 tablet,  Rfl: 1  No current facility-administered medications for this visit.     Facility-Administered Medications Ordered in Other Visits:     sodium chloride 0.9% flush 10 mL, 10 mL, Intravenous, 1 time in Clinic/HOD, Dominique Ayala MD  Allergies  Review of patient's allergies indicates:  No Known Allergies  Review of Systems  Review of Systems   Constitutional: Positive for fatigue. Negative for activity change, appetite change, chills, fever and unexpected weight change.   HENT: Negative for congestion, hearing loss, mouth sores, nosebleeds, sinus pressure and trouble swallowing.    Eyes: Negative for pain, discharge, itching and visual disturbance.   Respiratory: Negative for cough, chest tightness and shortness of breath.    Cardiovascular: Negative for chest pain, palpitations and leg swelling.   Gastrointestinal: Negative for abdominal distention, abdominal pain, blood in stool, diarrhea, nausea, rectal pain and vomiting.   Endocrine: Negative for heat intolerance and polydipsia.   Genitourinary: Negative for difficulty urinating, dysuria, flank pain, frequency, hematuria and urgency.   Musculoskeletal: Negative for arthralgias, back pain and neck pain.   Skin: Negative for color change, pallor and rash.   Neurological: Positive for numbness. Negative for dizziness, light-headedness and headaches.   Hematological: Negative for adenopathy. Does not bruise/bleed easily.   Psychiatric/Behavioral: Negative for confusion and decreased concentration. The patient is not nervous/anxious.         Objective     Objective:     Vitals:    11/12/19 1128   BP: 131/86   BP Location: Right arm   Patient Position: Sitting   Pulse: 80   Resp: 20   Temp: 97.9 °F (36.6 °C)   TempSrc: Oral   Weight: 61.8 kg (136 lb 3.9 oz)     Body surface area is 1.66 meters squared.  Physical Exam   Constitutional: She is oriented to person, place, and time. She appears well-developed and well-nourished. No distress.   HENT:   Head: Normocephalic  and atraumatic.   Mouth/Throat: Oropharynx is clear and moist and mucous membranes are normal. No oral lesions.   Eyes: Conjunctivae and EOM are normal. Right eye exhibits no discharge. Left eye exhibits no discharge. No scleral icterus.   Neck: Normal range of motion. Neck supple. No thyroid mass and no thyromegaly present.   Cardiovascular: Normal rate, regular rhythm, S1 normal, S2 normal and normal heart sounds.   Pulmonary/Chest: Effort normal and breath sounds normal. No respiratory distress. She has no wheezes. She has no rhonchi. She has no rales. Chest wall is not dull to percussion.   Right breast with indention of skin at 4:00 only when she raises her right arm, no palpable mass  Mediport   Abdominal: Soft. Normal appearance and bowel sounds are normal. There is no hepatosplenomegaly. There is no tenderness.   Musculoskeletal: She exhibits no tenderness.   Lymphadenopathy: No inguinal adenopathy noted on the right or left side.   Neurological: She is alert and oriented to person, place, and time. She has normal strength.   Skin: Skin is warm, dry and intact. No pallor.   Psychiatric: Her behavior is normal. Thought content normal.     Labs and Imaging  Results for orders placed or performed in visit on 11/12/19   CBC Oncology   Result Value Ref Range    WBC 3.31 (L) 3.90 - 12.70 K/uL    RBC 2.80 (L) 4.00 - 5.40 M/uL    Hemoglobin 8.6 (L) 12.0 - 16.0 g/dL    Hematocrit 27.8 (L) 37.0 - 48.5 %    Mean Corpuscular Volume 99 (H) 82 - 98 fL    Mean Corpuscular Hemoglobin 30.7 27.0 - 31.0 pg    Mean Corpuscular Hemoglobin Conc 30.9 (L) 32.0 - 36.0 g/dL    RDW 17.9 (H) 11.5 - 14.5 %    Platelets 174 150 - 350 K/uL    MPV 10.8 9.2 - 12.9 fL    Gran # (ANC) 1.7 (L) 1.8 - 7.7 K/uL    Immature Grans (Abs) 0.20 (H) 0.00 - 0.04 K/uL   Comprehensive metabolic panel   Result Value Ref Range    Sodium 139 136 - 145 mmol/L    Potassium 3.8 3.5 - 5.1 mmol/L    Chloride 109 95 - 110 mmol/L    CO2 25 23 - 29 mmol/L    Glucose  108 70 - 110 mg/dL    BUN, Bld 8 8 - 23 mg/dL    Creatinine 0.6 0.5 - 1.4 mg/dL    Calcium 9.1 8.7 - 10.5 mg/dL    Total Protein 6.4 6.0 - 8.4 g/dL    Albumin 3.4 (L) 3.5 - 5.2 g/dL    Total Bilirubin 0.7 0.1 - 1.0 mg/dL    Alkaline Phosphatase 132 55 - 135 U/L    AST 18 10 - 40 U/L    ALT 11 10 - 44 U/L    Anion Gap 5 (L) 8 - 16 mmol/L    eGFR if African American >60.0 >60 mL/min/1.73 m^2    eGFR if non African American >60.0 >60 mL/min/1.73 m^2       Assessment     Assessment:     1. Stage 1B ([id number]) invasive ductal carcinoma of right breast, lower outer quadrant, ER neg, SD neg, Her2 neg, Grade 3, Ki67 70%   * 7/3/19 started neoadjuvant chemotherapy with four cycles of dose-dense Adriamycin and cyclophosphamide with Neulasta support followed by twelve doses of weekly paclitaxel.   * Can consider adding Zometa every 6 months x 2 years in adjuvant setting.    * Continue weekly Taxol, dose reduced due to cytopenias and neuropathy.    - Cycle 8. Tolerating Taxol well; tumor responding.    2. Chronically elevated alk phos and bilirubin   * Chronic; follows with Hepatology; prior liver biopsy 11/2018 with mild portal inflammation    3. Chemo anemia   * Required transfusion after cycle 4 ddAC.   * continue to monitor; transfuse if less than 8.    * Following. Stable.     4. Drug constipation   * Encouraged Senna S two nightly.     5. Neutropenia with neutropenic fever after cycle 3 and cycle 4 ddAC despite Neulasta.    * Continue neupogen on days 3 and 4.     6. Hypokalemia   * KCl    7. Chemo neuropathy   * Progressive - will dose adjust just down to 60 mg/m2 from 64 mg/m2. Monitor. Start B6 complex.      Plan:     1. Cycle 8 weekly Taxol.   2. Continue neupogen 300 mcg days 3 and 4 each cycle.   3. Continue antiemetics.   4. Senna s  5. KCl - continue.   6. Monitor hemoglobin.   7. When nearing end of chemo, will send back to Dr Hernandez. Patient did not have MRI breasts prior to treatment, but US right axilla  unremarkable      Future Appointments   Date Time Provider Department Center   11/12/2019  1:00 PM [facility]   11/14/2019  9:00 AM INJECTION[facility]   11/15/2019  9:00 AM INJECTION[facility]   11/19/2019  1:00 PM INJECTION, [facility]   11/19/2019  2:00 PM [facility]   11/21/2019  9:15 AM INJECTION, [facility]   11/22/2019  9:15 AM INJECTION[facility]   11/26/2019  9:00 AM INJECTION, [facility]   11/26/2019 10:00 AM Dominique Ayala MD [facility] HEM ONC [facility]   11/26/2019 11:00 AM [facility] CHE[facility]   11/29/2019  9:45 AM INJECTION, [facility]   12/2/2019  2:00 PM Jhon Arndt MD [facility]   12/3/2019  1:00 PM INJECTION, [facility]   12/3/2019  2:00 PM [facility]MO [facility] CHE[facility]   12/5/2019  9:15 AM INJECTION, [facility] INF[facility]ION [facility]MO [facility]   12/6/2019  9:15 AM INJECTION, [facility]ION [facility] CHEMO [facility]   12/10/2019  9:15 AM INJECTION, [facility] IN[facility]ION [facility] CHE[facility]   12/10/2019 10:00 AM Dominique Ayala MD [facility] HEM ONC [facility]   12/10/2019 11:00 AM [facility] CHEMO [facility] CHEMO [facility]   12/12/2019  9:15 AM INJECTION, [facility]ION [facility]   12/13/2019  9:15 AM INJECTION, [facility] INF[facility]ION [facility] CHE[facility]

## 2019-11-14 ENCOUNTER — INFUSION (OUTPATIENT)
Dept: INFUSION THERAPY | Facility: HOSPITAL | Age: 69
End: 2019-11-14
Attending: INTERNAL MEDICINE
Payer: MEDICARE

## 2019-11-14 DIAGNOSIS — Z17.1 MALIGNANT NEOPLASM OF LOWER-INNER QUADRANT OF RIGHT BREAST OF FEMALE, ESTROGEN RECEPTOR NEGATIVE: ICD-10-CM

## 2019-11-14 DIAGNOSIS — C50.311 MALIGNANT NEOPLASM OF LOWER-INNER QUADRANT OF RIGHT BREAST OF FEMALE, ESTROGEN RECEPTOR NEGATIVE: ICD-10-CM

## 2019-11-14 DIAGNOSIS — D70.1 CHEMOTHERAPY INDUCED NEUTROPENIA: Primary | ICD-10-CM

## 2019-11-14 DIAGNOSIS — T45.1X5A CHEMOTHERAPY INDUCED NEUTROPENIA: Primary | ICD-10-CM

## 2019-11-14 PROCEDURE — 96372 THER/PROPH/DIAG INJ SC/IM: CPT

## 2019-11-14 PROCEDURE — 63600175 PHARM REV CODE 636 W HCPCS: Mod: JG | Performed by: INTERNAL MEDICINE

## 2019-11-14 RX ADMIN — FILGRASTIM 300 MCG: 300 INJECTION, SOLUTION INTRAVENOUS; SUBCUTANEOUS at 09:11

## 2019-11-15 ENCOUNTER — INFUSION (OUTPATIENT)
Dept: INFUSION THERAPY | Facility: HOSPITAL | Age: 69
End: 2019-11-15
Attending: INTERNAL MEDICINE
Payer: MEDICARE

## 2019-11-15 DIAGNOSIS — C50.311 MALIGNANT NEOPLASM OF LOWER-INNER QUADRANT OF RIGHT BREAST OF FEMALE, ESTROGEN RECEPTOR NEGATIVE: ICD-10-CM

## 2019-11-15 DIAGNOSIS — Z17.1 MALIGNANT NEOPLASM OF LOWER-INNER QUADRANT OF RIGHT BREAST OF FEMALE, ESTROGEN RECEPTOR NEGATIVE: ICD-10-CM

## 2019-11-15 DIAGNOSIS — D70.1 CHEMOTHERAPY INDUCED NEUTROPENIA: Primary | ICD-10-CM

## 2019-11-15 DIAGNOSIS — T45.1X5A CHEMOTHERAPY INDUCED NEUTROPENIA: Primary | ICD-10-CM

## 2019-11-15 PROCEDURE — 63600175 PHARM REV CODE 636 W HCPCS: Mod: JG | Performed by: INTERNAL MEDICINE

## 2019-11-15 PROCEDURE — 96372 THER/PROPH/DIAG INJ SC/IM: CPT

## 2019-11-15 RX ADMIN — FILGRASTIM 300 MCG: 300 INJECTION, SOLUTION INTRAVENOUS; SUBCUTANEOUS at 09:11

## 2019-11-18 ENCOUNTER — PATIENT OUTREACH (OUTPATIENT)
Dept: ADMINISTRATIVE | Facility: HOSPITAL | Age: 69
End: 2019-11-18

## 2019-11-19 ENCOUNTER — INFUSION (OUTPATIENT)
Dept: INFUSION THERAPY | Facility: HOSPITAL | Age: 69
End: 2019-11-19
Attending: INTERNAL MEDICINE
Payer: MEDICARE

## 2019-11-19 VITALS
SYSTOLIC BLOOD PRESSURE: 131 MMHG | RESPIRATION RATE: 18 BRPM | TEMPERATURE: 99 F | HEART RATE: 78 BPM | HEIGHT: 63 IN | BODY MASS INDEX: 24.25 KG/M2 | DIASTOLIC BLOOD PRESSURE: 77 MMHG | WEIGHT: 136.88 LBS

## 2019-11-19 DIAGNOSIS — D70.1 CHEMOTHERAPY INDUCED NEUTROPENIA: Primary | ICD-10-CM

## 2019-11-19 DIAGNOSIS — C50.311 MALIGNANT NEOPLASM OF LOWER-INNER QUADRANT OF RIGHT BREAST OF FEMALE, ESTROGEN RECEPTOR NEGATIVE: ICD-10-CM

## 2019-11-19 DIAGNOSIS — Z17.1 MALIGNANT NEOPLASM OF LOWER-INNER QUADRANT OF RIGHT BREAST OF FEMALE, ESTROGEN RECEPTOR NEGATIVE: ICD-10-CM

## 2019-11-19 DIAGNOSIS — Z51.11 ENCOUNTER FOR CHEMOTHERAPY MANAGEMENT: Primary | ICD-10-CM

## 2019-11-19 DIAGNOSIS — T45.1X5A CHEMOTHERAPY INDUCED NEUTROPENIA: Primary | ICD-10-CM

## 2019-11-19 LAB
ALBUMIN SERPL BCP-MCNC: 3.4 G/DL (ref 3.5–5.2)
ALP SERPL-CCNC: 128 U/L (ref 55–135)
ALT SERPL W/O P-5'-P-CCNC: 12 U/L (ref 10–44)
ANION GAP SERPL CALC-SCNC: 7 MMOL/L (ref 8–16)
AST SERPL-CCNC: 20 U/L (ref 10–40)
BILIRUB SERPL-MCNC: 0.7 MG/DL (ref 0.1–1)
BUN SERPL-MCNC: 7 MG/DL (ref 8–23)
CALCIUM SERPL-MCNC: 9 MG/DL (ref 8.7–10.5)
CHLORIDE SERPL-SCNC: 110 MMOL/L (ref 95–110)
CO2 SERPL-SCNC: 25 MMOL/L (ref 23–29)
CREAT SERPL-MCNC: 0.7 MG/DL (ref 0.5–1.4)
ERYTHROCYTE [DISTWIDTH] IN BLOOD BY AUTOMATED COUNT: 18.7 % (ref 11.5–14.5)
EST. GFR  (AFRICAN AMERICAN): >60 ML/MIN/1.73 M^2
EST. GFR  (NON AFRICAN AMERICAN): >60 ML/MIN/1.73 M^2
GLUCOSE SERPL-MCNC: 96 MG/DL (ref 70–110)
HCT VFR BLD AUTO: 26.4 % (ref 37–48.5)
HGB BLD-MCNC: 8.5 G/DL (ref 12–16)
IMM GRANULOCYTES # BLD AUTO: 0.07 K/UL (ref 0–0.04)
MCH RBC QN AUTO: 32.1 PG (ref 27–31)
MCHC RBC AUTO-ENTMCNC: 32.2 G/DL (ref 32–36)
MCV RBC AUTO: 100 FL (ref 82–98)
NEUTROPHILS # BLD AUTO: 1.4 K/UL (ref 1.8–7.7)
PLATELET # BLD AUTO: 184 K/UL (ref 150–350)
PMV BLD AUTO: 10.8 FL (ref 9.2–12.9)
POTASSIUM SERPL-SCNC: 4 MMOL/L (ref 3.5–5.1)
PROT SERPL-MCNC: 6.3 G/DL (ref 6–8.4)
RBC # BLD AUTO: 2.65 M/UL (ref 4–5.4)
SODIUM SERPL-SCNC: 142 MMOL/L (ref 136–145)
WBC # BLD AUTO: 3.3 K/UL (ref 3.9–12.7)

## 2019-11-19 PROCEDURE — A4216 STERILE WATER/SALINE, 10 ML: HCPCS | Performed by: INTERNAL MEDICINE

## 2019-11-19 PROCEDURE — 25000003 PHARM REV CODE 250: Performed by: INTERNAL MEDICINE

## 2019-11-19 PROCEDURE — 85027 COMPLETE CBC AUTOMATED: CPT

## 2019-11-19 PROCEDURE — S0028 INJECTION, FAMOTIDINE, 20 MG: HCPCS | Performed by: INTERNAL MEDICINE

## 2019-11-19 PROCEDURE — 96367 TX/PROPH/DG ADDL SEQ IV INF: CPT

## 2019-11-19 PROCEDURE — 63600175 PHARM REV CODE 636 W HCPCS: Performed by: INTERNAL MEDICINE

## 2019-11-19 PROCEDURE — 96413 CHEMO IV INFUSION 1 HR: CPT

## 2019-11-19 PROCEDURE — 96375 TX/PRO/DX INJ NEW DRUG ADDON: CPT

## 2019-11-19 PROCEDURE — 80053 COMPREHEN METABOLIC PANEL: CPT

## 2019-11-19 RX ORDER — DIPHENHYDRAMINE HYDROCHLORIDE 50 MG/ML
50 INJECTION INTRAMUSCULAR; INTRAVENOUS ONCE AS NEEDED
Status: DISCONTINUED | OUTPATIENT
Start: 2019-11-19 | End: 2019-11-19 | Stop reason: HOSPADM

## 2019-11-19 RX ORDER — SODIUM CHLORIDE 0.9 % (FLUSH) 0.9 %
10 SYRINGE (ML) INJECTION
Status: COMPLETED | OUTPATIENT
Start: 2019-11-19 | End: 2019-11-19

## 2019-11-19 RX ORDER — SODIUM CHLORIDE 0.9 % (FLUSH) 0.9 %
10 SYRINGE (ML) INJECTION
Status: DISCONTINUED | OUTPATIENT
Start: 2019-11-19 | End: 2019-11-19 | Stop reason: HOSPADM

## 2019-11-19 RX ORDER — HEPARIN 100 UNIT/ML
500 SYRINGE INTRAVENOUS
Status: DISCONTINUED | OUTPATIENT
Start: 2019-11-19 | End: 2019-11-19 | Stop reason: HOSPADM

## 2019-11-19 RX ORDER — HEPARIN 100 UNIT/ML
500 SYRINGE INTRAVENOUS
Status: COMPLETED | OUTPATIENT
Start: 2019-11-19 | End: 2019-11-19

## 2019-11-19 RX ORDER — HEPARIN 100 UNIT/ML
500 SYRINGE INTRAVENOUS
Status: CANCELLED | OUTPATIENT
Start: 2019-11-19

## 2019-11-19 RX ORDER — EPINEPHRINE 0.3 MG/.3ML
0.3 INJECTION SUBCUTANEOUS ONCE AS NEEDED
Status: DISCONTINUED | OUTPATIENT
Start: 2019-11-19 | End: 2019-11-19 | Stop reason: HOSPADM

## 2019-11-19 RX ORDER — SODIUM CHLORIDE 0.9 % (FLUSH) 0.9 %
10 SYRINGE (ML) INJECTION
Status: CANCELLED | OUTPATIENT
Start: 2019-11-19

## 2019-11-19 RX ORDER — FAMOTIDINE 10 MG/ML
20 INJECTION INTRAVENOUS
Status: COMPLETED | OUTPATIENT
Start: 2019-11-19 | End: 2019-11-19

## 2019-11-19 RX ADMIN — HEPARIN 500 UNITS: 100 SYRINGE at 04:11

## 2019-11-19 RX ADMIN — HEPARIN 500 UNITS: 100 SYRINGE at 12:11

## 2019-11-19 RX ADMIN — FAMOTIDINE 20 MG: 10 INJECTION, SOLUTION INTRAVENOUS at 02:11

## 2019-11-19 RX ADMIN — Medication 10 ML: at 12:11

## 2019-11-19 RX ADMIN — PACLITAXEL 108 MG: 6 INJECTION, SOLUTION INTRAVENOUS at 03:11

## 2019-11-19 RX ADMIN — Medication 10 ML: at 04:11

## 2019-11-19 RX ADMIN — SODIUM CHLORIDE: 9 INJECTION, SOLUTION INTRAVENOUS at 02:11

## 2019-11-19 RX ADMIN — DEXAMETHASONE SODIUM PHOSPHATE 10 MG: 4 INJECTION, SOLUTION INTRA-ARTICULAR; INTRALESIONAL; INTRAMUSCULAR; INTRAVENOUS; SOFT TISSUE at 02:11

## 2019-11-19 RX ADMIN — DIPHENHYDRAMINE HYDROCHLORIDE 25 MG: 50 INJECTION INTRAMUSCULAR; INTRAVENOUS at 02:11

## 2019-11-19 NOTE — NURSING
Patient's PAC accessed for lab draw.  Line heparinized and left in place for scheduled treatment.  Patient tolerated well.

## 2019-11-19 NOTE — PLAN OF CARE
Pt tolerated Taxol today. NAD. Port flushed + blood return present, flushed. Hep lock and deaccesed.AVS given pt. Discharged home. Ambulated independently.

## 2019-11-19 NOTE — PLAN OF CARE
Problem: Adult Inpatient Plan of Care  Goal: Optimal Comfort and Wellbeing  Intervention: Provide Person-Centered Care  Flowsheets (Taken 11/19/2019 1420)  Trust Relationship/Rapport: care explained; reassurance provided; choices provided; thoughts/feelings acknowledged; emotional support provided; empathic listening provided; questions answered; questions encouraged

## 2019-11-21 ENCOUNTER — INFUSION (OUTPATIENT)
Dept: INFUSION THERAPY | Facility: HOSPITAL | Age: 69
End: 2019-11-21
Attending: INTERNAL MEDICINE
Payer: MEDICARE

## 2019-11-21 DIAGNOSIS — Z17.1 MALIGNANT NEOPLASM OF LOWER-INNER QUADRANT OF RIGHT BREAST OF FEMALE, ESTROGEN RECEPTOR NEGATIVE: ICD-10-CM

## 2019-11-21 DIAGNOSIS — C50.311 MALIGNANT NEOPLASM OF LOWER-INNER QUADRANT OF RIGHT BREAST OF FEMALE, ESTROGEN RECEPTOR NEGATIVE: ICD-10-CM

## 2019-11-21 DIAGNOSIS — T45.1X5A CHEMOTHERAPY INDUCED NEUTROPENIA: Primary | ICD-10-CM

## 2019-11-21 DIAGNOSIS — D70.1 CHEMOTHERAPY INDUCED NEUTROPENIA: Primary | ICD-10-CM

## 2019-11-21 PROCEDURE — 96372 THER/PROPH/DIAG INJ SC/IM: CPT

## 2019-11-21 PROCEDURE — 63600175 PHARM REV CODE 636 W HCPCS: Mod: JG | Performed by: INTERNAL MEDICINE

## 2019-11-21 RX ADMIN — FILGRASTIM 300 MCG: 300 INJECTION, SOLUTION INTRAVENOUS; SUBCUTANEOUS at 09:11

## 2019-11-22 ENCOUNTER — INFUSION (OUTPATIENT)
Dept: INFUSION THERAPY | Facility: HOSPITAL | Age: 69
End: 2019-11-22
Attending: INTERNAL MEDICINE
Payer: MEDICARE

## 2019-11-22 DIAGNOSIS — C50.311 MALIGNANT NEOPLASM OF LOWER-INNER QUADRANT OF RIGHT BREAST OF FEMALE, ESTROGEN RECEPTOR NEGATIVE: ICD-10-CM

## 2019-11-22 DIAGNOSIS — Z17.1 MALIGNANT NEOPLASM OF LOWER-INNER QUADRANT OF RIGHT BREAST OF FEMALE, ESTROGEN RECEPTOR NEGATIVE: ICD-10-CM

## 2019-11-22 DIAGNOSIS — D70.1 CHEMOTHERAPY INDUCED NEUTROPENIA: Primary | ICD-10-CM

## 2019-11-22 DIAGNOSIS — T45.1X5A CHEMOTHERAPY INDUCED NEUTROPENIA: Primary | ICD-10-CM

## 2019-11-22 PROCEDURE — 63600175 PHARM REV CODE 636 W HCPCS: Mod: JG | Performed by: INTERNAL MEDICINE

## 2019-11-22 PROCEDURE — 96372 THER/PROPH/DIAG INJ SC/IM: CPT

## 2019-11-22 RX ADMIN — FILGRASTIM 300 MCG: 300 INJECTION, SOLUTION INTRAVENOUS; SUBCUTANEOUS at 09:11

## 2019-11-25 ENCOUNTER — TELEPHONE (OUTPATIENT)
Dept: HEMATOLOGY/ONCOLOGY | Facility: CLINIC | Age: 69
End: 2019-11-25

## 2019-11-26 ENCOUNTER — INFUSION (OUTPATIENT)
Dept: INFUSION THERAPY | Facility: HOSPITAL | Age: 69
End: 2019-11-26
Attending: INTERNAL MEDICINE
Payer: MEDICARE

## 2019-11-26 ENCOUNTER — OFFICE VISIT (OUTPATIENT)
Dept: HEMATOLOGY/ONCOLOGY | Facility: CLINIC | Age: 69
End: 2019-11-26
Payer: MEDICARE

## 2019-11-26 ENCOUNTER — TELEPHONE (OUTPATIENT)
Dept: HEMATOLOGY/ONCOLOGY | Facility: CLINIC | Age: 69
End: 2019-11-26

## 2019-11-26 VITALS
HEART RATE: 73 BPM | BODY MASS INDEX: 23.83 KG/M2 | RESPIRATION RATE: 18 BRPM | SYSTOLIC BLOOD PRESSURE: 145 MMHG | OXYGEN SATURATION: 96 % | WEIGHT: 134.5 LBS | HEIGHT: 63 IN | TEMPERATURE: 98 F | DIASTOLIC BLOOD PRESSURE: 80 MMHG

## 2019-11-26 VITALS
SYSTOLIC BLOOD PRESSURE: 136 MMHG | BODY MASS INDEX: 23.83 KG/M2 | WEIGHT: 134.5 LBS | OXYGEN SATURATION: 99 % | DIASTOLIC BLOOD PRESSURE: 83 MMHG | HEIGHT: 63 IN | HEART RATE: 83 BPM | TEMPERATURE: 98 F | RESPIRATION RATE: 18 BRPM

## 2019-11-26 DIAGNOSIS — T45.1X5A CHEMOTHERAPY INDUCED NEUTROPENIA: Primary | ICD-10-CM

## 2019-11-26 DIAGNOSIS — D64.81 ANTINEOPLASTIC CHEMOTHERAPY INDUCED ANEMIA: ICD-10-CM

## 2019-11-26 DIAGNOSIS — C50.311 MALIGNANT NEOPLASM OF LOWER-INNER QUADRANT OF RIGHT BREAST OF FEMALE, ESTROGEN RECEPTOR NEGATIVE: Primary | ICD-10-CM

## 2019-11-26 DIAGNOSIS — C50.311 MALIGNANT NEOPLASM OF LOWER-INNER QUADRANT OF RIGHT BREAST OF FEMALE, ESTROGEN RECEPTOR NEGATIVE: ICD-10-CM

## 2019-11-26 DIAGNOSIS — D70.1 CHEMOTHERAPY INDUCED NEUTROPENIA: ICD-10-CM

## 2019-11-26 DIAGNOSIS — T45.1X5A CHEMOTHERAPY-INDUCED NEUROPATHY: ICD-10-CM

## 2019-11-26 DIAGNOSIS — Z51.11 ENCOUNTER FOR CHEMOTHERAPY MANAGEMENT: Primary | ICD-10-CM

## 2019-11-26 DIAGNOSIS — T45.1X5A CHEMOTHERAPY INDUCED NEUTROPENIA: ICD-10-CM

## 2019-11-26 DIAGNOSIS — Z17.1 MALIGNANT NEOPLASM OF LOWER-INNER QUADRANT OF RIGHT BREAST OF FEMALE, ESTROGEN RECEPTOR NEGATIVE: ICD-10-CM

## 2019-11-26 DIAGNOSIS — D70.1 CHEMOTHERAPY INDUCED NEUTROPENIA: Primary | ICD-10-CM

## 2019-11-26 DIAGNOSIS — Z17.1 MALIGNANT NEOPLASM OF LOWER-INNER QUADRANT OF RIGHT BREAST OF FEMALE, ESTROGEN RECEPTOR NEGATIVE: Primary | ICD-10-CM

## 2019-11-26 DIAGNOSIS — T45.1X5A ANTINEOPLASTIC CHEMOTHERAPY INDUCED ANEMIA: ICD-10-CM

## 2019-11-26 DIAGNOSIS — G62.0 CHEMOTHERAPY-INDUCED NEUROPATHY: ICD-10-CM

## 2019-11-26 LAB
ALBUMIN SERPL BCP-MCNC: 3.4 G/DL (ref 3.5–5.2)
ALP SERPL-CCNC: 136 U/L (ref 55–135)
ALT SERPL W/O P-5'-P-CCNC: 18 U/L (ref 10–44)
ANION GAP SERPL CALC-SCNC: 7 MMOL/L (ref 8–16)
AST SERPL-CCNC: 27 U/L (ref 10–40)
BILIRUB SERPL-MCNC: 0.7 MG/DL (ref 0.1–1)
BUN SERPL-MCNC: 5 MG/DL (ref 8–23)
CALCIUM SERPL-MCNC: 9.2 MG/DL (ref 8.7–10.5)
CHLORIDE SERPL-SCNC: 109 MMOL/L (ref 95–110)
CO2 SERPL-SCNC: 24 MMOL/L (ref 23–29)
CREAT SERPL-MCNC: 0.7 MG/DL (ref 0.5–1.4)
ERYTHROCYTE [DISTWIDTH] IN BLOOD BY AUTOMATED COUNT: 18.6 % (ref 11.5–14.5)
EST. GFR  (AFRICAN AMERICAN): >60 ML/MIN/1.73 M^2
EST. GFR  (NON AFRICAN AMERICAN): >60 ML/MIN/1.73 M^2
GLUCOSE SERPL-MCNC: 130 MG/DL (ref 70–110)
HCT VFR BLD AUTO: 28.3 % (ref 37–48.5)
HGB BLD-MCNC: 9 G/DL (ref 12–16)
IMM GRANULOCYTES # BLD AUTO: 0.14 K/UL (ref 0–0.04)
MCH RBC QN AUTO: 32 PG (ref 27–31)
MCHC RBC AUTO-ENTMCNC: 31.8 G/DL (ref 32–36)
MCV RBC AUTO: 101 FL (ref 82–98)
NEUTROPHILS # BLD AUTO: 1.8 K/UL (ref 1.8–7.7)
PLATELET # BLD AUTO: 187 K/UL (ref 150–350)
PMV BLD AUTO: 10.3 FL (ref 9.2–12.9)
POTASSIUM SERPL-SCNC: 4.1 MMOL/L (ref 3.5–5.1)
PROT SERPL-MCNC: 6.4 G/DL (ref 6–8.4)
RBC # BLD AUTO: 2.81 M/UL (ref 4–5.4)
SODIUM SERPL-SCNC: 140 MMOL/L (ref 136–145)
WBC # BLD AUTO: 3.45 K/UL (ref 3.9–12.7)

## 2019-11-26 PROCEDURE — 96413 CHEMO IV INFUSION 1 HR: CPT

## 2019-11-26 PROCEDURE — 99999 PR PBB SHADOW E&M-EST. PATIENT-LVL III: ICD-10-PCS | Mod: PBBFAC,,, | Performed by: INTERNAL MEDICINE

## 2019-11-26 PROCEDURE — 99215 OFFICE O/P EST HI 40 MIN: CPT | Mod: S$GLB,,, | Performed by: INTERNAL MEDICINE

## 2019-11-26 PROCEDURE — 25000003 PHARM REV CODE 250: Performed by: INTERNAL MEDICINE

## 2019-11-26 PROCEDURE — 80053 COMPREHEN METABOLIC PANEL: CPT

## 2019-11-26 PROCEDURE — 63600175 PHARM REV CODE 636 W HCPCS: Performed by: INTERNAL MEDICINE

## 2019-11-26 PROCEDURE — S0028 INJECTION, FAMOTIDINE, 20 MG: HCPCS | Performed by: INTERNAL MEDICINE

## 2019-11-26 PROCEDURE — 96375 TX/PRO/DX INJ NEW DRUG ADDON: CPT

## 2019-11-26 PROCEDURE — 1159F MED LIST DOCD IN RCRD: CPT | Mod: S$GLB,,, | Performed by: INTERNAL MEDICINE

## 2019-11-26 PROCEDURE — 96367 TX/PROPH/DG ADDL SEQ IV INF: CPT

## 2019-11-26 PROCEDURE — 1159F PR MEDICATION LIST DOCUMENTED IN MEDICAL RECORD: ICD-10-PCS | Mod: S$GLB,,, | Performed by: INTERNAL MEDICINE

## 2019-11-26 PROCEDURE — 99999 PR PBB SHADOW E&M-EST. PATIENT-LVL III: CPT | Mod: PBBFAC,,, | Performed by: INTERNAL MEDICINE

## 2019-11-26 PROCEDURE — 85027 COMPLETE CBC AUTOMATED: CPT

## 2019-11-26 PROCEDURE — 1101F PT FALLS ASSESS-DOCD LE1/YR: CPT | Mod: CPTII,S$GLB,, | Performed by: INTERNAL MEDICINE

## 2019-11-26 PROCEDURE — 1101F PR PT FALLS ASSESS DOC 0-1 FALLS W/OUT INJ PAST YR: ICD-10-PCS | Mod: CPTII,S$GLB,, | Performed by: INTERNAL MEDICINE

## 2019-11-26 PROCEDURE — 1125F AMNT PAIN NOTED PAIN PRSNT: CPT | Mod: S$GLB,,, | Performed by: INTERNAL MEDICINE

## 2019-11-26 PROCEDURE — A4216 STERILE WATER/SALINE, 10 ML: HCPCS | Performed by: INTERNAL MEDICINE

## 2019-11-26 PROCEDURE — 99215 PR OFFICE/OUTPT VISIT, EST, LEVL V, 40-54 MIN: ICD-10-PCS | Mod: S$GLB,,, | Performed by: INTERNAL MEDICINE

## 2019-11-26 PROCEDURE — 1125F PR PAIN SEVERITY QUANTIFIED, PAIN PRESENT: ICD-10-PCS | Mod: S$GLB,,, | Performed by: INTERNAL MEDICINE

## 2019-11-26 RX ORDER — GABAPENTIN 300 MG/1
300 CAPSULE ORAL 2 TIMES DAILY
Qty: 60 CAPSULE | Refills: 2 | Status: SHIPPED | OUTPATIENT
Start: 2019-11-26 | End: 2020-04-27

## 2019-11-26 RX ORDER — DIPHENHYDRAMINE HYDROCHLORIDE 50 MG/ML
50 INJECTION INTRAMUSCULAR; INTRAVENOUS ONCE AS NEEDED
Status: CANCELLED | OUTPATIENT
Start: 2019-11-26

## 2019-11-26 RX ORDER — HEPARIN 100 UNIT/ML
500 SYRINGE INTRAVENOUS
Status: COMPLETED | OUTPATIENT
Start: 2019-11-26 | End: 2019-11-26

## 2019-11-26 RX ORDER — EPINEPHRINE 0.3 MG/.3ML
0.3 INJECTION SUBCUTANEOUS ONCE AS NEEDED
Status: CANCELLED | OUTPATIENT
Start: 2019-11-26

## 2019-11-26 RX ORDER — HEPARIN 100 UNIT/ML
500 SYRINGE INTRAVENOUS
Status: CANCELLED | OUTPATIENT
Start: 2019-11-26

## 2019-11-26 RX ORDER — FAMOTIDINE 10 MG/ML
20 INJECTION INTRAVENOUS
Status: COMPLETED | OUTPATIENT
Start: 2019-11-26 | End: 2019-11-26

## 2019-11-26 RX ORDER — SODIUM CHLORIDE 0.9 % (FLUSH) 0.9 %
10 SYRINGE (ML) INJECTION
Status: DISCONTINUED | OUTPATIENT
Start: 2019-11-26 | End: 2019-11-26 | Stop reason: HOSPADM

## 2019-11-26 RX ORDER — FAMOTIDINE 10 MG/ML
20 INJECTION INTRAVENOUS
Status: CANCELLED | OUTPATIENT
Start: 2019-11-26

## 2019-11-26 RX ORDER — SODIUM CHLORIDE 0.9 % (FLUSH) 0.9 %
10 SYRINGE (ML) INJECTION
Status: CANCELLED | OUTPATIENT
Start: 2019-11-26

## 2019-11-26 RX ORDER — DIPHENHYDRAMINE HYDROCHLORIDE 50 MG/ML
50 INJECTION INTRAMUSCULAR; INTRAVENOUS ONCE AS NEEDED
Status: DISCONTINUED | OUTPATIENT
Start: 2019-11-26 | End: 2019-11-26 | Stop reason: HOSPADM

## 2019-11-26 RX ORDER — EPINEPHRINE 0.3 MG/.3ML
0.3 INJECTION SUBCUTANEOUS ONCE AS NEEDED
Status: DISCONTINUED | OUTPATIENT
Start: 2019-11-26 | End: 2019-11-26 | Stop reason: HOSPADM

## 2019-11-26 RX ORDER — HEPARIN 100 UNIT/ML
500 SYRINGE INTRAVENOUS
Status: DISCONTINUED | OUTPATIENT
Start: 2019-11-26 | End: 2019-11-26 | Stop reason: HOSPADM

## 2019-11-26 RX ORDER — SODIUM CHLORIDE 0.9 % (FLUSH) 0.9 %
10 SYRINGE (ML) INJECTION
Status: COMPLETED | OUTPATIENT
Start: 2019-11-26 | End: 2019-11-26

## 2019-11-26 RX ADMIN — FAMOTIDINE 20 MG: 10 INJECTION, SOLUTION INTRAVENOUS at 10:11

## 2019-11-26 RX ADMIN — SODIUM CHLORIDE: 0.9 INJECTION, SOLUTION INTRAVENOUS at 10:11

## 2019-11-26 RX ADMIN — HEPARIN 500 UNITS: 100 SYRINGE at 12:11

## 2019-11-26 RX ADMIN — HEPARIN 500 UNITS: 100 SYRINGE at 09:11

## 2019-11-26 RX ADMIN — DEXAMETHASONE SODIUM PHOSPHATE 10 MG: 4 INJECTION, SOLUTION INTRA-ARTICULAR; INTRALESIONAL; INTRAMUSCULAR; INTRAVENOUS; SOFT TISSUE at 10:11

## 2019-11-26 RX ADMIN — Medication 10 ML: at 12:11

## 2019-11-26 RX ADMIN — Medication 10 ML: at 09:11

## 2019-11-26 RX ADMIN — DIPHENHYDRAMINE HYDROCHLORIDE 25 MG: 50 INJECTION, SOLUTION INTRAMUSCULAR; INTRAVENOUS at 10:11

## 2019-11-26 RX ADMIN — PACLITAXEL 72 MG: 6 INJECTION, SOLUTION INTRAVENOUS at 11:11

## 2019-11-26 NOTE — Clinical Note
Please move her Saturday injection to earlier - she has to be at work at 9:15. Please add appt with me next week before her chemo. OK to overbook if needed.

## 2019-11-26 NOTE — TELEPHONE ENCOUNTER
----- Message from Dominique Ayala MD sent at 11/26/2019  9:55 AM CST -----  Please move her Saturday injection to earlier - she has to be at work at 9:15.     Please add appt with me next week before her chemo. OK to overbook if needed.

## 2019-11-26 NOTE — NURSING
Pt tolerated Lab draw today. NAD. Port flushed + blood return present, flushed. Hep locked and capped. Left in place. pt has chemo today. declined AVS. Uses my [facility]. Discharged MD appt. Ambulated independently with .

## 2019-11-26 NOTE — PROGRESS NOTES
History:     Reason For Follow Up/Onc History:   1. Stage 1B ([id number]) invasive ductal carcinoma of right breast, lower outer quadrant, ER neg, [place] neg, Her2 neg, Grade 3, Ki67 70%      HPI:   Sharri Cline presents for follow up of breast cancer and continuation of neoadjuvant chemotherapy. She's due for cycle 10 Taxol. Neupogen has been added. Neuropathy progressive - can button buttons but having some trouble. Neuropathy in feet/hands is painful.     Onc history:   - She presented for screening mammo in 5/15/2019 which showed focal asymmetries in both left and right breast. Diagnostic mammogram and US showed no significant abn of left breast, and right breast 15 mm x 11 mm x 12 mm mass at 4:00, 5 CFN. Biopsy on 5/24/19 showed grade 3 IDC, triple negative, Ki67 70%.    - 7/3/19 started neoadjuvant chemotherapy with ddAC to be followed by Taxol.   - 8/8/19 admitted for neutropenic fever; admitted again with cycle 4.     History: I reviewed, confirmed and updated history (past medical, social, family) as necessary.    Medications    Current Outpatient Medications:     artificial tears (ISOPTO TEARS) 0.5 % ophthalmic solution, Place 1 drop into both eyes 4 (four) times daily., Disp: , Rfl:     carboxymethylcellulose sodium 1 % DpGe, Place 1 drop into both eyes every evening., Disp: , Rfl: 0    fish oil-omega-3 fatty acids 300-1,000 mg capsule, Take 2 g by mouth once daily., Disp: , Rfl:     magic mouthwash diphen/antac/lidoc/nysta, Take 10 mLs by mouth 4 (four) times daily., Disp: 120 mL, Rfl: 0    multivitamin with minerals tablet, Take 1 tablet by mouth once daily.  , Disp: , Rfl:     ondansetron (ZOFRAN-ODT) 8 MG TbDL, Take 1 tablet (8 mg total) by mouth every 8 (eight) hours as needed (nausea)., Disp: 45 tablet, Rfl: 1    potassium chloride SA (K-DUR,KLOR-CON) 20 MEQ tablet, Take 2 tablets (40 mEq total) by mouth once daily., Disp: 60 tablet, Rfl: 1  No current facility-administered medications for  "this visit.     Facility-Administered Medications Ordered in Other Visits:     sodium chloride 0.9% flush 10 mL, 10 mL, Intravenous, 1 time in Clinic/HOD, Dominique Ayala MD  Allergies  Review of patient's allergies indicates:  No Known Allergies  Review of Systems  Review of Systems   Constitutional: Positive for fatigue. Negative for activity change, appetite change, chills, fever and unexpected weight change.   HENT: Negative for congestion, hearing loss, mouth sores, nosebleeds, sinus pressure and trouble swallowing.    Eyes: Negative for pain, discharge, itching and visual disturbance.   Respiratory: Negative for cough, chest tightness and shortness of breath.    Cardiovascular: Negative for chest pain, palpitations and leg swelling.   Gastrointestinal: Negative for abdominal distention, abdominal pain, blood in stool, diarrhea, nausea, rectal pain and vomiting.   Endocrine: Negative for heat intolerance and polydipsia.   Genitourinary: Negative for difficulty urinating, dysuria, flank pain, frequency, hematuria and urgency.   Musculoskeletal: Negative for arthralgias, back pain and neck pain.   Skin: Negative for color change, pallor and rash.   Neurological: Positive for numbness. Negative for dizziness, light-headedness and headaches.   Hematological: Negative for adenopathy. Does not bruise/bleed easily.   Psychiatric/Behavioral: Negative for confusion and decreased concentration. The patient is not nervous/anxious.         Objective     Objective:     Vitals:    11/26/19 0936   BP: 136/83   BP Location: Right arm   Patient Position: Sitting   BP Method: Large (Automatic)   Pulse: 83   Resp: 18   Temp: 97.8 °F (36.6 °C)   TempSrc: Oral   SpO2: 99%   Weight: 61 kg (134 lb 7.7 oz)   Height: 5' 3" (1.6 m)     Body surface area is 1.65 meters squared.  Physical Exam   Constitutional: She is oriented to person, place, and time. She appears well-developed and well-nourished. No distress.   HENT:   Head: " Normocephalic and atraumatic.   Mouth/Throat: Oropharynx is clear and moist and mucous membranes are normal. No oral lesions.   Eyes: Conjunctivae and EOM are normal. Right eye exhibits no discharge. Left eye exhibits no discharge. No scleral icterus.   Neck: Normal range of motion. Neck supple. No thyroid mass and no thyromegaly present.   Cardiovascular: Normal rate, regular rhythm, S1 normal, S2 normal and normal heart sounds.   Pulmonary/Chest: Effort normal and breath sounds normal. No respiratory distress. She has no wheezes. She has no rhonchi. She has no rales. Chest wall is not dull to percussion.   Right breast with indention of skin at 4:00 only when she raises her right arm, no palpable mass - unchanged  Mediport   Abdominal: Soft. Normal appearance and bowel sounds are normal. There is no hepatosplenomegaly. There is no tenderness.   Musculoskeletal: She exhibits no tenderness.   Lymphadenopathy: No inguinal adenopathy noted on the right or left side.   Neurological: She is alert and oriented to person, place, and time. She has normal strength.   Skin: Skin is warm, dry and intact. No pallor.   Psychiatric: Her behavior is normal. Thought content normal.     Labs and Imaging  Results for orders placed or performed in visit on 11/26/19   CBC Oncology   Result Value Ref Range    WBC 3.45 (L) 3.90 - 12.70 K/uL    RBC 2.81 (L) 4.00 - 5.40 M/uL    Hemoglobin 9.0 (L) 12.0 - 16.0 g/dL    Hematocrit 28.3 (L) 37.0 - 48.5 %    Mean Corpuscular Volume 101 (H) 82 - 98 fL    Mean Corpuscular Hemoglobin 32.0 (H) 27.0 - 31.0 pg    Mean Corpuscular Hemoglobin Conc 31.8 (L) 32.0 - 36.0 g/dL    RDW 18.6 (H) 11.5 - 14.5 %    Platelets 187 150 - 350 K/uL    MPV 10.3 9.2 - 12.9 fL    Gran # (ANC) 1.8 1.8 - 7.7 K/uL    Immature Grans (Abs) 0.14 (H) 0.00 - 0.04 K/uL       Assessment     Assessment:     1. Stage 1B ([id number]) invasive ductal carcinoma of right breast, lower outer quadrant, ER neg, [place] neg, Her2 neg, Grade 3,  Ki67 70%   * 7/3/19 started neoadjuvant chemotherapy with four cycles of dose-dense Adriamycin and cyclophosphamide with Neulasta support followed by twelve doses of weekly paclitaxel.   * Can consider adding Zometa every 6 months x 2 years in adjuvant setting.    * Continue weekly Taxol, dose reduced due to cytopenias and neuropathy.    - Cycle 10 Taxol - dose reduce even further- may have to drop cycles 11 and 12 due to progressive neuropathy. Tolerating Taxol fairly well; tumor shrinking.    2. Chronically elevated alk phos and bilirubin   * Chronic; follows with Hepatology; prior liver biopsy 11/2018 with mild portal inflammation    3. Chemo anemia   * Required transfusion after cycle 4 ddAC.   * continue to monitor; transfuse if less than 8.    * Following. Monitoring.     4. Drug constipation   * Encouraged Senna S two nightly.     5. Neutropenia with neutropenic fever after cycle 3 and cycle 4 ddAC despite Neulasta.    * Continue neupogen on days 3 and 4.     6. Hypokalemia   * KCl    7. Chemo neuropathy   * Continues to progresse; reduce taxol even further. May have to drop cycles 11 and 12 - will re-eval next week.    * Start gabapentin 300 mg bid.       Plan:     1. Cycle 10 weekly Taxol - dose reduced.   2. Gabapentin 300 mg bid.   3. Continue neupogen 300 mcg days 3 and 4 each cycle.   4. Continue antiemetics.   5. Senna s  6. KCl - continue.   7. Monitor hemoglobin.   8. Next MD visit, will send back to Dr Hernandez. Patient did not have MRI breasts prior to treatment, but US right axilla unremarkable    RTC in 1 wk    Future Appointments   Date Time Provider Department Center   11/26/2019 10:00 AM Dominique Ayala MD [facility] HEM ONC [facility]   11/26/2019 11:00 AM [facility]   11/29/2019  9:45 AM INJECTION, [facility]   11/30/2019  9:45 AM INJE[facility], [facility]   12/2/2019  2:00 PM Jhon Arndt MD [facility]    12/3/2019  1:00 PM INJECTION, [facility]   12/3/2019  2:00 PM [facility]   12/5/2019  9:15 AM INJECTION, [facility]   12/6/2019  9:15 AM INJECTION, [facility]   12/10/2019  9:15 AM INJECTION, [facility]   12/10/2019 10:00 AM Dominique Ayala MD [facility] HEM [facility]C [facility]   12/10/2019 11:00 AM [facility]   12/12/2019  9:15 AM INJECTION, [facility]   12/13/2019  9:15 AM INJECTION, [facility]

## 2019-11-26 NOTE — TELEPHONE ENCOUNTER
Called pt. The phone rang many times then sounded like it had picked up but there was just the sound of air. Said hello several times and daniel told the pt of her appointment moving. Never head any kind of confirmation from the other end of the phone

## 2019-11-26 NOTE — PLAN OF CARE
Pt tolerated Taxol infusion well, with no complications or s/s of adverse reaction. VS stable throughout treatment. Chest port positive for blood return, flushed with normal saline and heparin and de-accessed prior to discharge. Site dressed with band-aid. RTC 11/29/19 for Neupogen injection, pt verbalized understanding. Pt discharged with no distress noted, ambulating independently, accompanied by . AVS printed and given to pt. Pt instructed to call MD if concerns arise.

## 2019-11-29 ENCOUNTER — INFUSION (OUTPATIENT)
Dept: INFUSION THERAPY | Facility: HOSPITAL | Age: 69
End: 2019-11-29
Attending: INTERNAL MEDICINE
Payer: MEDICARE

## 2019-11-29 DIAGNOSIS — Z17.1 MALIGNANT NEOPLASM OF LOWER-INNER QUADRANT OF RIGHT BREAST OF FEMALE, ESTROGEN RECEPTOR NEGATIVE: ICD-10-CM

## 2019-11-29 DIAGNOSIS — C50.311 MALIGNANT NEOPLASM OF LOWER-INNER QUADRANT OF RIGHT BREAST OF FEMALE, ESTROGEN RECEPTOR NEGATIVE: ICD-10-CM

## 2019-11-29 DIAGNOSIS — D70.1 CHEMOTHERAPY INDUCED NEUTROPENIA: Primary | ICD-10-CM

## 2019-11-29 DIAGNOSIS — T45.1X5A CHEMOTHERAPY INDUCED NEUTROPENIA: Primary | ICD-10-CM

## 2019-11-29 PROCEDURE — 63600175 PHARM REV CODE 636 W HCPCS: Mod: JG | Performed by: INTERNAL MEDICINE

## 2019-11-29 PROCEDURE — 96372 THER/PROPH/DIAG INJ SC/IM: CPT

## 2019-11-29 RX ADMIN — FILGRASTIM 300 MCG: 300 INJECTION, SOLUTION INTRAVENOUS; SUBCUTANEOUS at 10:11

## 2019-11-30 ENCOUNTER — INFUSION (OUTPATIENT)
Dept: INFUSION THERAPY | Facility: HOSPITAL | Age: 69
End: 2019-11-30
Attending: INTERNAL MEDICINE
Payer: MEDICARE

## 2019-11-30 VITALS — WEIGHT: 134.5 LBS | BODY MASS INDEX: 23.83 KG/M2 | HEIGHT: 63 IN

## 2019-11-30 DIAGNOSIS — T45.1X5A CHEMOTHERAPY INDUCED NEUTROPENIA: Primary | ICD-10-CM

## 2019-11-30 DIAGNOSIS — D70.1 CHEMOTHERAPY INDUCED NEUTROPENIA: Primary | ICD-10-CM

## 2019-11-30 DIAGNOSIS — C50.311 MALIGNANT NEOPLASM OF LOWER-INNER QUADRANT OF RIGHT BREAST OF FEMALE, ESTROGEN RECEPTOR NEGATIVE: ICD-10-CM

## 2019-11-30 DIAGNOSIS — Z17.1 MALIGNANT NEOPLASM OF LOWER-INNER QUADRANT OF RIGHT BREAST OF FEMALE, ESTROGEN RECEPTOR NEGATIVE: ICD-10-CM

## 2019-11-30 PROCEDURE — 96372 THER/PROPH/DIAG INJ SC/IM: CPT

## 2019-11-30 PROCEDURE — 63600175 PHARM REV CODE 636 W HCPCS: Mod: JG | Performed by: INTERNAL MEDICINE

## 2019-11-30 RX ADMIN — FILGRASTIM 300 MCG: 300 INJECTION, SOLUTION INTRAVENOUS; SUBCUTANEOUS at 08:11

## 2019-12-02 ENCOUNTER — OFFICE VISIT (OUTPATIENT)
Dept: INTERNAL MEDICINE | Facility: CLINIC | Age: 69
End: 2019-12-02
Payer: MEDICARE

## 2019-12-02 VITALS
SYSTOLIC BLOOD PRESSURE: 110 MMHG | HEIGHT: 63 IN | DIASTOLIC BLOOD PRESSURE: 62 MMHG | WEIGHT: 136 LBS | HEART RATE: 88 BPM | BODY MASS INDEX: 24.1 KG/M2

## 2019-12-02 DIAGNOSIS — Z00.00 ANNUAL PHYSICAL EXAM: Primary | ICD-10-CM

## 2019-12-02 DIAGNOSIS — Z23 NEED FOR SHINGLES VACCINE: ICD-10-CM

## 2019-12-02 DIAGNOSIS — T45.1X5A CHEMOTHERAPY-INDUCED NEUROPATHY: ICD-10-CM

## 2019-12-02 DIAGNOSIS — G62.0 CHEMOTHERAPY-INDUCED NEUROPATHY: ICD-10-CM

## 2019-12-02 PROCEDURE — 99999 PR PBB SHADOW E&M-EST. PATIENT-LVL III: CPT | Mod: PBBFAC,,, | Performed by: INTERNAL MEDICINE

## 2019-12-02 PROCEDURE — 99999 PR PBB SHADOW E&M-EST. PATIENT-LVL III: ICD-10-PCS | Mod: PBBFAC,,, | Performed by: INTERNAL MEDICINE

## 2019-12-02 PROCEDURE — 99213 OFFICE O/P EST LOW 20 MIN: CPT | Mod: S$GLB,,, | Performed by: INTERNAL MEDICINE

## 2019-12-02 PROCEDURE — 99213 PR OFFICE/OUTPT VISIT, EST, LEVL III, 20-29 MIN: ICD-10-PCS | Mod: S$GLB,,, | Performed by: INTERNAL MEDICINE

## 2019-12-02 NOTE — PROGRESS NOTES
Subjective:       Patient ID: Sharri Cline is a 69 y.o. female.    Chief Complaint: Annual Exam    HPI    Last visit with me May 2019.  In June the patient had a breast biopsy following an abnormal mammogram showing breast cancer.  She has been having regular followups with endocrinology, Oncology, and breast surgery.  Upcoming appointments with Oncology, chemotherapy, lab, hepatology.    Patient reports she is doing well.  Her neuropathy is being well controlled with gabapentin.  She has been responding well to the chemotherapy.  She is working to be better about resting her body to permit healing.    Reviewed PMH, PSH, SH, FH, allergies, and medications.     Review of Systems   All other systems reviewed and are negative.      Objective:      Physical Exam   Constitutional: She is oriented to person, place, and time. No distress.   HENT:   Head: Atraumatic.   Right Ear: Tympanic membrane normal. No tenderness.   Left Ear: Tympanic membrane normal. No tenderness.   Mouth/Throat: Oropharynx is clear and moist. No oropharyngeal exudate.   Eyes: Pupils are equal, round, and reactive to light. Right eye exhibits no discharge. Left eye exhibits no discharge.   Neck: Normal range of motion. No thyromegaly present.   Cardiovascular: Normal rate, regular rhythm and normal heart sounds.   Pulmonary/Chest: Effort normal and breath sounds normal. No stridor. She has no wheezes. She has no rales.   Abdominal: Soft. There is no tenderness. There is no guarding.   Musculoskeletal: She exhibits no edema or tenderness.   Lymphadenopathy:     She has no cervical adenopathy.   Neurological: She is alert and oriented to person, place, and time.   Skin: Skin is warm and dry. No rash noted.   Psychiatric: She has a normal mood and affect. Her behavior is normal.   Nursing note and vitals reviewed.      Vitals:    12/02/19 1426   BP: 110/62   BP Location: Right arm   Patient Position: Sitting   BP Method: Medium (Manual)   Pulse:  "88   Weight: 61.7 kg (136 lb)   Height: 5' 3" (1.6 m)     Body mass index is 24.09 kg/m².    RESULTS: Reviewed labs from last 6 months    Assessment:       1. Annual physical exam    2. Chemotherapy-induced neuropathy    3. Need for shingles vaccine        Plan:   Sharri was seen today for annual exam.    Diagnoses and all orders for this visit:    Annual physical exam:  Age-appropriate health screening reviewed, indicated tests ordered.     Chemotherapy-induced neuropathy:  Prior diagnosis, stable, well controlled on current management. No changes at this time, will continue to monitor.     Need for shingles vaccine:  I will discuss with Oncology to make sure ok to receive while getting chemo.    Follow up in about 1 year (around 12/2/2020) for EPP annual exam.  Jhon Arndt MD  Internal Medicine    Portions of this note were completed using medical dictation software. Please excuse typographical or syntax errors that were missed on review.    "

## 2019-12-02 NOTE — Clinical Note
Pt is receiving chemo; I don't see any contraindication to her getting the Shingrix vaccine to prevent shingles infection, but wanted to confirm it was okay on your end before telling her to go get it from the pharmacy. Please let me know; thanks! --Jhon

## 2019-12-03 ENCOUNTER — OFFICE VISIT (OUTPATIENT)
Dept: HEMATOLOGY/ONCOLOGY | Facility: CLINIC | Age: 69
End: 2019-12-03
Payer: MEDICARE

## 2019-12-03 ENCOUNTER — INFUSION (OUTPATIENT)
Dept: INFUSION THERAPY | Facility: HOSPITAL | Age: 69
End: 2019-12-03
Attending: INTERNAL MEDICINE
Payer: MEDICARE

## 2019-12-03 VITALS
HEIGHT: 63 IN | TEMPERATURE: 98 F | BODY MASS INDEX: 24.92 KG/M2 | RESPIRATION RATE: 18 BRPM | WEIGHT: 140.63 LBS | HEART RATE: 73 BPM | DIASTOLIC BLOOD PRESSURE: 85 MMHG | SYSTOLIC BLOOD PRESSURE: 138 MMHG

## 2019-12-03 VITALS
RESPIRATION RATE: 18 BRPM | HEART RATE: 75 BPM | TEMPERATURE: 98 F | SYSTOLIC BLOOD PRESSURE: 151 MMHG | WEIGHT: 140.63 LBS | DIASTOLIC BLOOD PRESSURE: 69 MMHG | OXYGEN SATURATION: 99 % | HEIGHT: 63 IN | BODY MASS INDEX: 24.92 KG/M2

## 2019-12-03 DIAGNOSIS — T45.1X5A CHEMOTHERAPY INDUCED NEUTROPENIA: Primary | ICD-10-CM

## 2019-12-03 DIAGNOSIS — Z17.1 MALIGNANT NEOPLASM OF LOWER-INNER QUADRANT OF RIGHT BREAST OF FEMALE, ESTROGEN RECEPTOR NEGATIVE: ICD-10-CM

## 2019-12-03 DIAGNOSIS — C50.311 MALIGNANT NEOPLASM OF LOWER-INNER QUADRANT OF RIGHT BREAST OF FEMALE, ESTROGEN RECEPTOR NEGATIVE: Primary | ICD-10-CM

## 2019-12-03 DIAGNOSIS — T45.1X5A CHEMOTHERAPY INDUCED NEUTROPENIA: ICD-10-CM

## 2019-12-03 DIAGNOSIS — D70.1 CHEMOTHERAPY INDUCED NEUTROPENIA: ICD-10-CM

## 2019-12-03 DIAGNOSIS — Z17.1 MALIGNANT NEOPLASM OF LOWER-INNER QUADRANT OF RIGHT BREAST OF FEMALE, ESTROGEN RECEPTOR NEGATIVE: Primary | ICD-10-CM

## 2019-12-03 DIAGNOSIS — E87.6 HYPOKALEMIA: ICD-10-CM

## 2019-12-03 DIAGNOSIS — T45.1X5A CHEMOTHERAPY-INDUCED NEUROPATHY: ICD-10-CM

## 2019-12-03 DIAGNOSIS — T45.1X5A ANTINEOPLASTIC CHEMOTHERAPY INDUCED ANEMIA: ICD-10-CM

## 2019-12-03 DIAGNOSIS — G62.0 CHEMOTHERAPY-INDUCED NEUROPATHY: ICD-10-CM

## 2019-12-03 DIAGNOSIS — D70.1 CHEMOTHERAPY INDUCED NEUTROPENIA: Primary | ICD-10-CM

## 2019-12-03 DIAGNOSIS — H10.9 BACTERIAL CONJUNCTIVITIS OF RIGHT EYE: ICD-10-CM

## 2019-12-03 DIAGNOSIS — D64.81 ANTINEOPLASTIC CHEMOTHERAPY INDUCED ANEMIA: ICD-10-CM

## 2019-12-03 DIAGNOSIS — C50.311 MALIGNANT NEOPLASM OF LOWER-INNER QUADRANT OF RIGHT BREAST OF FEMALE, ESTROGEN RECEPTOR NEGATIVE: ICD-10-CM

## 2019-12-03 DIAGNOSIS — Z51.11 ENCOUNTER FOR CHEMOTHERAPY MANAGEMENT: Primary | ICD-10-CM

## 2019-12-03 LAB
ALBUMIN SERPL BCP-MCNC: 3.4 G/DL (ref 3.5–5.2)
ALP SERPL-CCNC: 133 U/L (ref 55–135)
ALT SERPL W/O P-5'-P-CCNC: 11 U/L (ref 10–44)
ANION GAP SERPL CALC-SCNC: 6 MMOL/L (ref 8–16)
AST SERPL-CCNC: 19 U/L (ref 10–40)
BILIRUB SERPL-MCNC: 0.5 MG/DL (ref 0.1–1)
BUN SERPL-MCNC: 7 MG/DL (ref 8–23)
CALCIUM SERPL-MCNC: 9 MG/DL (ref 8.7–10.5)
CHLORIDE SERPL-SCNC: 109 MMOL/L (ref 95–110)
CO2 SERPL-SCNC: 25 MMOL/L (ref 23–29)
CREAT SERPL-MCNC: 0.7 MG/DL (ref 0.5–1.4)
ERYTHROCYTE [DISTWIDTH] IN BLOOD BY AUTOMATED COUNT: 19 % (ref 11.5–14.5)
EST. GFR  (AFRICAN AMERICAN): >60 ML/MIN/1.73 M^2
EST. GFR  (NON AFRICAN AMERICAN): >60 ML/MIN/1.73 M^2
GLUCOSE SERPL-MCNC: 91 MG/DL (ref 70–110)
HCT VFR BLD AUTO: 28.1 % (ref 37–48.5)
HGB BLD-MCNC: 8.6 G/DL (ref 12–16)
IMM GRANULOCYTES # BLD AUTO: 0.12 K/UL (ref 0–0.04)
MCH RBC QN AUTO: 31.5 PG (ref 27–31)
MCHC RBC AUTO-ENTMCNC: 30.6 G/DL (ref 32–36)
MCV RBC AUTO: 103 FL (ref 82–98)
NEUTROPHILS # BLD AUTO: 4.9 K/UL (ref 1.8–7.7)
PLATELET # BLD AUTO: 178 K/UL (ref 150–350)
PMV BLD AUTO: 10.3 FL (ref 9.2–12.9)
POTASSIUM SERPL-SCNC: 4.1 MMOL/L (ref 3.5–5.1)
PROT SERPL-MCNC: 6.3 G/DL (ref 6–8.4)
RBC # BLD AUTO: 2.73 M/UL (ref 4–5.4)
SODIUM SERPL-SCNC: 140 MMOL/L (ref 136–145)
WBC # BLD AUTO: 7.15 K/UL (ref 3.9–12.7)

## 2019-12-03 PROCEDURE — 96367 TX/PROPH/DG ADDL SEQ IV INF: CPT

## 2019-12-03 PROCEDURE — 25000003 PHARM REV CODE 250: Performed by: INTERNAL MEDICINE

## 2019-12-03 PROCEDURE — 96375 TX/PRO/DX INJ NEW DRUG ADDON: CPT

## 2019-12-03 PROCEDURE — 1159F PR MEDICATION LIST DOCUMENTED IN MEDICAL RECORD: ICD-10-PCS | Mod: S$GLB,,, | Performed by: INTERNAL MEDICINE

## 2019-12-03 PROCEDURE — S0028 INJECTION, FAMOTIDINE, 20 MG: HCPCS | Performed by: INTERNAL MEDICINE

## 2019-12-03 PROCEDURE — 1159F MED LIST DOCD IN RCRD: CPT | Mod: S$GLB,,, | Performed by: INTERNAL MEDICINE

## 2019-12-03 PROCEDURE — 99999 PR PBB SHADOW E&M-EST. PATIENT-LVL III: ICD-10-PCS | Mod: PBBFAC,,, | Performed by: INTERNAL MEDICINE

## 2019-12-03 PROCEDURE — 63600175 PHARM REV CODE 636 W HCPCS: Performed by: INTERNAL MEDICINE

## 2019-12-03 PROCEDURE — 99999 PR PBB SHADOW E&M-EST. PATIENT-LVL III: CPT | Mod: PBBFAC,,, | Performed by: INTERNAL MEDICINE

## 2019-12-03 PROCEDURE — A4216 STERILE WATER/SALINE, 10 ML: HCPCS | Performed by: INTERNAL MEDICINE

## 2019-12-03 PROCEDURE — 85027 COMPLETE CBC AUTOMATED: CPT

## 2019-12-03 PROCEDURE — 1101F PT FALLS ASSESS-DOCD LE1/YR: CPT | Mod: CPTII,S$GLB,, | Performed by: INTERNAL MEDICINE

## 2019-12-03 PROCEDURE — 1125F PR PAIN SEVERITY QUANTIFIED, PAIN PRESENT: ICD-10-PCS | Mod: S$GLB,,, | Performed by: INTERNAL MEDICINE

## 2019-12-03 PROCEDURE — 96413 CHEMO IV INFUSION 1 HR: CPT

## 2019-12-03 PROCEDURE — 99215 OFFICE O/P EST HI 40 MIN: CPT | Mod: S$GLB,,, | Performed by: INTERNAL MEDICINE

## 2019-12-03 PROCEDURE — 1125F AMNT PAIN NOTED PAIN PRSNT: CPT | Mod: S$GLB,,, | Performed by: INTERNAL MEDICINE

## 2019-12-03 PROCEDURE — 99215 PR OFFICE/OUTPT VISIT, EST, LEVL V, 40-54 MIN: ICD-10-PCS | Mod: S$GLB,,, | Performed by: INTERNAL MEDICINE

## 2019-12-03 PROCEDURE — 1101F PR PT FALLS ASSESS DOC 0-1 FALLS W/OUT INJ PAST YR: ICD-10-PCS | Mod: CPTII,S$GLB,, | Performed by: INTERNAL MEDICINE

## 2019-12-03 PROCEDURE — 80053 COMPREHEN METABOLIC PANEL: CPT

## 2019-12-03 RX ORDER — EPINEPHRINE 0.3 MG/.3ML
0.3 INJECTION SUBCUTANEOUS ONCE AS NEEDED
Status: DISCONTINUED | OUTPATIENT
Start: 2019-12-03 | End: 2019-12-03 | Stop reason: HOSPADM

## 2019-12-03 RX ORDER — HEPARIN 100 UNIT/ML
500 SYRINGE INTRAVENOUS
Status: CANCELLED | OUTPATIENT
Start: 2019-12-04

## 2019-12-03 RX ORDER — DIPHENHYDRAMINE HYDROCHLORIDE 50 MG/ML
50 INJECTION INTRAMUSCULAR; INTRAVENOUS ONCE AS NEEDED
Status: DISCONTINUED | OUTPATIENT
Start: 2019-12-03 | End: 2019-12-03 | Stop reason: HOSPADM

## 2019-12-03 RX ORDER — EPINEPHRINE 0.3 MG/.3ML
0.3 INJECTION SUBCUTANEOUS ONCE AS NEEDED
Status: CANCELLED | OUTPATIENT
Start: 2019-12-04

## 2019-12-03 RX ORDER — FAMOTIDINE 10 MG/ML
20 INJECTION INTRAVENOUS
Status: COMPLETED | OUTPATIENT
Start: 2019-12-03 | End: 2019-12-03

## 2019-12-03 RX ORDER — SODIUM CHLORIDE 0.9 % (FLUSH) 0.9 %
10 SYRINGE (ML) INJECTION
Status: CANCELLED | OUTPATIENT
Start: 2019-12-03

## 2019-12-03 RX ORDER — SODIUM CHLORIDE 0.9 % (FLUSH) 0.9 %
10 SYRINGE (ML) INJECTION
Status: COMPLETED | OUTPATIENT
Start: 2019-12-03 | End: 2019-12-03

## 2019-12-03 RX ORDER — HEPARIN 100 UNIT/ML
500 SYRINGE INTRAVENOUS
Status: CANCELLED | OUTPATIENT
Start: 2019-12-03

## 2019-12-03 RX ORDER — HEPARIN 100 UNIT/ML
500 SYRINGE INTRAVENOUS
Status: COMPLETED | OUTPATIENT
Start: 2019-12-03 | End: 2019-12-03

## 2019-12-03 RX ORDER — DIPHENHYDRAMINE HYDROCHLORIDE 50 MG/ML
50 INJECTION INTRAMUSCULAR; INTRAVENOUS ONCE AS NEEDED
Status: CANCELLED | OUTPATIENT
Start: 2019-12-04

## 2019-12-03 RX ORDER — SODIUM CHLORIDE 0.9 % (FLUSH) 0.9 %
10 SYRINGE (ML) INJECTION
Status: DISCONTINUED | OUTPATIENT
Start: 2019-12-03 | End: 2019-12-03 | Stop reason: HOSPADM

## 2019-12-03 RX ORDER — POLYMYXIN B SULFATE AND TRIMETHOPRIM 1; 10000 MG/ML; [USP'U]/ML
1 SOLUTION OPHTHALMIC EVERY 6 HOURS
Qty: 10 ML | Refills: 0 | Status: SHIPPED | OUTPATIENT
Start: 2019-12-03 | End: 2020-01-02

## 2019-12-03 RX ORDER — FAMOTIDINE 10 MG/ML
20 INJECTION INTRAVENOUS
Status: CANCELLED | OUTPATIENT
Start: 2019-12-04

## 2019-12-03 RX ORDER — HEPARIN 100 UNIT/ML
500 SYRINGE INTRAVENOUS
Status: DISCONTINUED | OUTPATIENT
Start: 2019-12-03 | End: 2019-12-03 | Stop reason: HOSPADM

## 2019-12-03 RX ORDER — SODIUM CHLORIDE 0.9 % (FLUSH) 0.9 %
10 SYRINGE (ML) INJECTION
Status: CANCELLED | OUTPATIENT
Start: 2019-12-04

## 2019-12-03 RX ADMIN — HEPARIN 500 UNITS: 100 SYRINGE at 04:12

## 2019-12-03 RX ADMIN — Medication 10 ML: at 12:12

## 2019-12-03 RX ADMIN — HEPARIN 500 UNITS: 100 SYRINGE at 12:12

## 2019-12-03 RX ADMIN — Medication 10 MG: at 02:12

## 2019-12-03 RX ADMIN — Medication 10 ML: at 04:12

## 2019-12-03 RX ADMIN — SODIUM CHLORIDE: 0.9 INJECTION, SOLUTION INTRAVENOUS at 02:12

## 2019-12-03 RX ADMIN — DIPHENHYDRAMINE HYDROCHLORIDE 25 MG: 50 INJECTION INTRAMUSCULAR; INTRAVENOUS at 02:12

## 2019-12-03 RX ADMIN — PACLITAXEL 72 MG: 6 INJECTION, SOLUTION INTRAVENOUS at 03:12

## 2019-12-03 RX ADMIN — FAMOTIDINE 20 MG: 10 INJECTION, SOLUTION INTRAVENOUS at 02:12

## 2019-12-03 NOTE — NURSING
1400  Pt here for Taxol infusion, no new complaints or concerns, reports tolerating treatment; discussed treatment plan for today, all questions answered and pt agrees to proceed

## 2019-12-03 NOTE — PROGRESS NOTES
History:     Reason For Follow Up/Onc History:   1. Stage 1B ([id number]) invasive ductal carcinoma of right breast, lower outer quadrant, ER neg, [place] neg, Her2 neg, Grade 3, Ki67 70%      HPI:   Sharri Cline presents for follow up of breast cancer and continuation of neoadjuvant chemotherapy. She's due for cycle 11 Taxol. Neupogen has been added. Neuropathy stable.   Right eye erythematous - woke with it that way this morning; describes whitish discharge on it this morning. Afebrile. Non pruritic.       Onc history:   - She presented for screening mammo in 5/15/2019 which showed focal asymmetries in both left and right breast. Diagnostic mammogram and US showed no significant abn of left breast, and right breast 15 mm x 11 mm x 12 mm mass at 4:00, 5 CFN. Biopsy on 5/24/19 showed grade 3 IDC, triple negative, Ki67 70%.    - 7/3/19 started neoadjuvant chemotherapy with ddAC to be followed by Taxol.   - 8/8/19 admitted for neutropenic fever; admitted again with cycle 4.     History: I reviewed, confirmed and updated history (past medical, social, family) as necessary.    Medications    Current Outpatient Medications:     artificial tears (ISOPTO TEARS) 0.5 % ophthalmic solution, Place 1 drop into both eyes 4 (four) times daily., Disp: , Rfl:     carboxymethylcellulose sodium 1 % DpGe, Place 1 drop into both eyes every evening., Disp: , Rfl: 0    fish oil-omega-3 fatty acids 300-1,000 mg capsule, Take 2 g by mouth once daily., Disp: , Rfl:     gabapentin (NEURONTIN) 300 MG capsule, Take 1 capsule (300 mg total) by mouth 2 (two) times daily., Disp: 60 capsule, Rfl: 2    magic mouthwash diphen/antac/lidoc/nysta, Take 10 mLs by mouth 4 (four) times daily., Disp: 120 mL, Rfl: 0    multivitamin with minerals tablet, Take 1 tablet by mouth once daily.  , Disp: , Rfl:     ondansetron (ZOFRAN-ODT) 8 MG TbDL, Take 1 tablet (8 mg total) by mouth every 8 (eight) hours as needed (nausea)., Disp: 45 tablet, Rfl: 1     potassium chloride SA (K-DUR,KLOR-CON) 20 MEQ tablet, Take 2 tablets (40 mEq total) by mouth once daily., Disp: 60 tablet, Rfl: 1  No current facility-administered medications for this visit.     Facility-Administered Medications Ordered in Other Visits:     sodium chloride 0.9% flush 10 mL, 10 mL, Intravenous, 1 time in Clinic/HOD, Dominique Ayala MD  Allergies  Review of patient's allergies indicates:  No Known Allergies  Review of Systems  Review of Systems   Constitutional: Positive for fatigue. Negative for activity change, appetite change, chills, fever and unexpected weight change.   HENT: Negative for congestion, ear discharge, hearing loss, mouth sores, nosebleeds, sinus pressure and trouble swallowing.    Eyes: Positive for discharge, redness and visual disturbance. Negative for pain and itching.   Respiratory: Negative for cough, chest tightness and shortness of breath.    Cardiovascular: Negative for chest pain, palpitations and leg swelling.   Gastrointestinal: Negative for abdominal distention, abdominal pain, blood in stool, diarrhea, nausea, rectal pain and vomiting.   Endocrine: Negative for heat intolerance and polydipsia.   Genitourinary: Negative for difficulty urinating, dysuria, flank pain, frequency, hematuria and urgency.   Musculoskeletal: Negative for arthralgias, back pain and neck pain.   Skin: Negative for color change, pallor and rash.   Neurological: Positive for numbness. Negative for dizziness, light-headedness and headaches.   Hematological: Negative for adenopathy. Does not bruise/bleed easily.   Psychiatric/Behavioral: Negative for confusion and decreased concentration. The patient is not nervous/anxious.         Objective     Objective:     Vitals:    12/03/19 1303   BP: (!) 151/69   BP Location: Right arm   Patient Position: Sitting   BP Method: Large (Automatic)   Pulse: 75   Resp: 18   Temp: 98.4 °F (36.9 °C)   TempSrc: Oral   SpO2: 99%   Weight: 63.8 kg (140 lb 10.5 oz)  "  Height: 5' 3" (1.6 m)     Body surface area is 1.68 meters squared.  Physical Exam   Constitutional: She is oriented to person, place, and time. She appears well-developed and well-nourished. No distress.   HENT:   Head: Normocephalic and atraumatic.   Mouth/Throat: Oropharynx is clear and moist and mucous membranes are normal. No oral lesions.   Eyes: EOM and lids are normal. Right eye exhibits discharge and exudate. Left eye exhibits no discharge. Right conjunctiva is injected. No scleral icterus.   Neck: Normal range of motion. Neck supple. No thyroid mass and no thyromegaly present.   Cardiovascular: Normal rate, regular rhythm, S1 normal, S2 normal and normal heart sounds.   Pulmonary/Chest: Effort normal and breath sounds normal. No respiratory distress. She has no wheezes. She has no rhonchi. She has no rales. Chest wall is not dull to percussion.   Right breast with indention of skin at 4:00 only when she raises her right arm, no palpable mass - unchanged  Mediport   Abdominal: Soft. Normal appearance and bowel sounds are normal. There is no hepatosplenomegaly. There is no tenderness.   Musculoskeletal: She exhibits no tenderness.   Lymphadenopathy: No inguinal adenopathy noted on the right or left side.   Neurological: She is alert and oriented to person, place, and time. She has normal strength.   Skin: Skin is warm, dry and intact. No pallor.   Psychiatric: Her behavior is normal. Thought content normal.     Labs and Imaging  Results for orders placed or performed in visit on 12/03/19   CBC Oncology   Result Value Ref Range    WBC 7.15 3.90 - 12.70 K/uL    RBC 2.73 (L) 4.00 - 5.40 M/uL    Hemoglobin 8.6 (L) 12.0 - 16.0 g/dL    Hematocrit 28.1 (L) 37.0 - 48.5 %    Mean Corpuscular Volume 103 (H) 82 - 98 fL    Mean Corpuscular Hemoglobin 31.5 (H) 27.0 - 31.0 pg    Mean Corpuscular Hemoglobin Conc 30.6 (L) 32.0 - 36.0 g/dL    RDW 19.0 (H) 11.5 - 14.5 %    Platelets 178 150 - 350 K/uL    MPV 10.3 9.2 - 12.9 " fL    Gran # (ANC) 4.9 1.8 - 7.7 K/uL    Immature Grans (Abs) 0.12 (H) 0.00 - 0.04 K/uL   Comprehensive metabolic panel   Result Value Ref Range    Sodium 140 136 - 145 mmol/L    Potassium 4.1 3.5 - 5.1 mmol/L    Chloride 109 95 - 110 mmol/L    CO2 25 23 - 29 mmol/L    Glucose 91 70 - 110 mg/dL    BUN, Bld 7 (L) 8 - 23 mg/dL    Creatinine 0.7 0.5 - 1.4 mg/dL    Calcium 9.0 8.7 - 10.5 mg/dL    Total Protein 6.3 6.0 - 8.4 g/dL    Albumin 3.4 (L) 3.5 - 5.2 g/dL    Total Bilirubin 0.5 0.1 - 1.0 mg/dL    Alkaline Phosphatase 133 55 - 135 U/L    AST 19 10 - 40 U/L    ALT 11 10 - 44 U/L    Anion Gap 6 (L) 8 - 16 mmol/L    eGFR if African American >60.0 >60 mL/min/1.73 m^2    eGFR if non African American >60.0 >60 mL/min/1.73 m^2       Assessment     Assessment:     1. Stage 1B ([id number]) invasive ductal carcinoma of right breast, lower outer quadrant, ER neg, [place] neg, Her2 neg, Grade 3, Ki67 70%   * 7/3/19 started neoadjuvant chemotherapy with four cycles of dose-dense Adriamycin and cyclophosphamide with Neulasta support followed by twelve doses of weekly paclitaxel.   * Can consider adding Zometa every 6 months x 2 years in adjuvant setting.    * Continue weekly Taxol, dose reduced due to cytopenias and neuropathy.    - Cycle 11 Taxol - dose reduced for cytopenias and neuropathy. Tolerating Taxol fairly well; tumor shrinking.    2. Chronically elevated alk phos and bilirubin   * Chronic; follows with Hepatology; prior liver biopsy 11/2018 with mild portal inflammation    3. Chemo anemia   * Required transfusion after cycle 4 ddAC.   * continue to monitor; transfuse if less than 8.    * Following. Monitoring.     4. Drug constipation   * Encouraged Senna S two nightly.     5. Neutropenia with neutropenic fever after cycle 3 and cycle 4 ddAC despite Neulasta.    * Continue neupogen on days 3 and 4.     6. Hypokalemia   * KCl    7. Chemo neuropathy   * Continues to progresse; reduce taxol even further. May have to drop cycles  11 and 12 - will re-eval next week.    * Continue gabapentin 300 mg bid.       8. Bacterial conjunctivitis right   * Polytrim drops    Plan:     1. Cycle 11 weekly Taxol - dose reduced.   2. Gabapentin 300 mg bid.   3. Continue neupogen 300 mcg days 3 and 4 each cycle.   4. Continue antiemetics.   5. Senna s  6. KCl - continue.   7. Monitor hemoglobin.   8. Polytrim for eye  9. Next MD visit, will send back to Dr Hernandez. Patient did not have MRI breasts prior to treatment, but US right axilla unremarkable    RTC in 1 wk    Future Appointments   Date Time Provider Department Center   12/3/2019  2:00 PM [facility]   12/5/2019  7:30 AM LAB, TRANS[facility]ANT [facility]   12/5/2019  8:00 AM Olivia Good NP [facility] HEPAT [facility]   12/5/2019  9:15 AM INJECTION, [facility] INFUSION [facility]   12/6/2019  9:15 AM INJECTION, [facility]   12/10/2019  9:15 AM INJECTION, [facility] INF[facility]   12/10/2019 10:00 AM Dominique Ayala MD [facility] HEM ONC [facility]   12/10/2019 11:00 AM [facility]MO [facility]   12/12/2019  9:15 AM INJECTION, [facility] INF[facility]ION [facility]   12/13/2019  9:15 AM INJECTION, [facility] INF[facility]ION [facility]

## 2019-12-03 NOTE — PLAN OF CARE
1630  Infusion completed, pt tolerated well; pt instructed to remain well hydrated; discussed when to contact MD, when to report to ER; pt declined AVS, verbalized understanding of all discussed and when to report next

## 2019-12-05 ENCOUNTER — OFFICE VISIT (OUTPATIENT)
Dept: HEPATOLOGY | Facility: CLINIC | Age: 69
End: 2019-12-05
Payer: MEDICARE

## 2019-12-05 ENCOUNTER — INFUSION (OUTPATIENT)
Dept: INFUSION THERAPY | Facility: HOSPITAL | Age: 69
End: 2019-12-05
Attending: INTERNAL MEDICINE
Payer: MEDICARE

## 2019-12-05 VITALS
SYSTOLIC BLOOD PRESSURE: 134 MMHG | RESPIRATION RATE: 18 BRPM | BODY MASS INDEX: 24.45 KG/M2 | HEIGHT: 63 IN | HEART RATE: 79 BPM | DIASTOLIC BLOOD PRESSURE: 69 MMHG | WEIGHT: 138 LBS | OXYGEN SATURATION: 100 %

## 2019-12-05 DIAGNOSIS — T45.1X5A CHEMOTHERAPY INDUCED NEUTROPENIA: Primary | ICD-10-CM

## 2019-12-05 DIAGNOSIS — R74.8 ELEVATED ALKALINE PHOSPHATASE LEVEL: Primary | ICD-10-CM

## 2019-12-05 DIAGNOSIS — Z17.1 MALIGNANT NEOPLASM OF LOWER-INNER QUADRANT OF RIGHT BREAST OF FEMALE, ESTROGEN RECEPTOR NEGATIVE: ICD-10-CM

## 2019-12-05 DIAGNOSIS — R79.89 ELEVATED LFTS: ICD-10-CM

## 2019-12-05 DIAGNOSIS — E87.6 HYPOKALEMIA: ICD-10-CM

## 2019-12-05 DIAGNOSIS — D70.1 CHEMOTHERAPY INDUCED NEUTROPENIA: Primary | ICD-10-CM

## 2019-12-05 DIAGNOSIS — C50.311 MALIGNANT NEOPLASM OF LOWER-INNER QUADRANT OF RIGHT BREAST OF FEMALE, ESTROGEN RECEPTOR NEGATIVE: ICD-10-CM

## 2019-12-05 PROBLEM — R17 ELEVATED BILIRUBIN: Chronic | Status: RESOLVED | Noted: 2019-06-05 | Resolved: 2019-12-05

## 2019-12-05 PROCEDURE — 99999 PR PBB SHADOW E&M-EST. PATIENT-LVL III: ICD-10-PCS | Mod: PBBFAC,,, | Performed by: NURSE PRACTITIONER

## 2019-12-05 PROCEDURE — 99999 PR PBB SHADOW E&M-EST. PATIENT-LVL III: CPT | Mod: PBBFAC,,, | Performed by: NURSE PRACTITIONER

## 2019-12-05 PROCEDURE — 99213 OFFICE O/P EST LOW 20 MIN: CPT | Mod: S$GLB,,, | Performed by: NURSE PRACTITIONER

## 2019-12-05 PROCEDURE — 1126F PR PAIN SEVERITY QUANTIFIED, NO PAIN PRESENT: ICD-10-PCS | Mod: S$GLB,,, | Performed by: NURSE PRACTITIONER

## 2019-12-05 PROCEDURE — 63600175 PHARM REV CODE 636 W HCPCS: Mod: JG | Performed by: INTERNAL MEDICINE

## 2019-12-05 PROCEDURE — 1159F PR MEDICATION LIST DOCUMENTED IN MEDICAL RECORD: ICD-10-PCS | Mod: S$GLB,,, | Performed by: NURSE PRACTITIONER

## 2019-12-05 PROCEDURE — 1126F AMNT PAIN NOTED NONE PRSNT: CPT | Mod: S$GLB,,, | Performed by: NURSE PRACTITIONER

## 2019-12-05 PROCEDURE — 99499 RISK ADDL DX/OHS AUDIT: ICD-10-PCS | Mod: S$GLB,,, | Performed by: NURSE PRACTITIONER

## 2019-12-05 PROCEDURE — 99213 PR OFFICE/OUTPT VISIT, EST, LEVL III, 20-29 MIN: ICD-10-PCS | Mod: S$GLB,,, | Performed by: NURSE PRACTITIONER

## 2019-12-05 PROCEDURE — 99499 UNLISTED E&M SERVICE: CPT | Mod: S$GLB,,, | Performed by: NURSE PRACTITIONER

## 2019-12-05 PROCEDURE — 1101F PR PT FALLS ASSESS DOC 0-1 FALLS W/OUT INJ PAST YR: ICD-10-PCS | Mod: CPTII,S$GLB,, | Performed by: NURSE PRACTITIONER

## 2019-12-05 PROCEDURE — 1101F PT FALLS ASSESS-DOCD LE1/YR: CPT | Mod: CPTII,S$GLB,, | Performed by: NURSE PRACTITIONER

## 2019-12-05 PROCEDURE — 96372 THER/PROPH/DIAG INJ SC/IM: CPT

## 2019-12-05 PROCEDURE — 1159F MED LIST DOCD IN RCRD: CPT | Mod: S$GLB,,, | Performed by: NURSE PRACTITIONER

## 2019-12-05 RX ADMIN — FILGRASTIM 300 MCG: 300 INJECTION, SOLUTION INTRAVENOUS; SUBCUTANEOUS at 09:12

## 2019-12-05 NOTE — PROGRESS NOTES
[facility] - Established Patient    Last Clinic Visit: 12/12/18 with Dr. Villanueva     CHIEF COMPLAINT: Follow-up for elevated liver enzymes    HPI: This is a 69 y.o. Black or  female here for follow-up for liver enzymes.     Previously followed by Dr. Villanueva and is a new patient to me.    She was initially referred to our clinic in 2018 for elevated alkaline phosphatase of unclear etiology.  ALP elevated from 2008 - 2019 (150-200). Transaminases normal. Intermittent elevations in TBili also noted.    ALP isoenzymes showed elevation for liver component.  AMA negative.   MRI MRCP unrevealing.     No concerning medications or herbal supplements to cause elevated liver tests.  No significant risk factors for BARRAZA. Minimal alcohol use.     Given negative workup with persistently abnormal ALP, liver biopsy recommended and completed 11/2018:  Minimal/mild portal inflammation, predominantly lymphocytes  No interface or lobular activity  Bile ducts present and unremarkable  No significant steatosis  No fibrosis, no hepatocyte iron  Iron and trichrome stains with appropriate controls    Without intervention, alk phos normalized as of 8/2019 and has remained normal.    She was recently diagnosed with breast CA this year, chemo started 7/3 and she is completing her last treatment soon. Feels well overall though does have some neuropathy.     Denies symptoms of hepatic decompensation including jaundice, ascites, cognitive problems to suggest hepatic encephalopathy, or GI bleeding.          Review of patient's allergies indicates:  No Known Allergies     Current Outpatient Medications on File Prior to Visit   Medication Sig Dispense Refill    artificial tears (ISOPTO TEARS) 0.5 % ophthalmic solution Place 1 drop into both eyes 4 (four) times daily.      carboxymethylcellulose sodium 1 % DpGe Place 1 drop into both eyes every evening.  0    gabapentin (NEURONTIN) 300 MG capsule Take 1 capsule (300 mg  total) by mouth 2 (two) times daily. 60 capsule 2    potassium chloride SA (K-DUR,KLOR-CON) 20 MEQ tablet Take 2 tablets (40 mEq total) by mouth once daily. 60 tablet 1    fish oil-omega-3 fatty acids 300-1,000 mg capsule Take 2 g by mouth once daily.      magic mouthwash diphen/antac/lidoc/nysta Take 10 mLs by mouth 4 (four) times daily. (Patient not taking: Reported on 12/5/2019) 120 mL 0    multivitamin with minerals tablet Take 1 tablet by mouth once daily.        ondansetron (ZOFRAN-ODT) 8 MG TbDL Take 1 tablet (8 mg total) by mouth every 8 (eight) hours as needed (nausea). (Patient not taking: Reported on 12/5/2019) 45 tablet 1    polymyxin B sulf-trimethoprim (POLYTRIM) 10,000 unit- 1 mg/mL Drop Place 1 drop into the right eye every 6 (six) hours. for 5 days (Patient not taking: Reported on 12/5/2019) 10 mL 0     Current Facility-Administered Medications on File Prior to Visit   Medication Dose Route Frequency Provider Last Rate Last Dose    sodium chloride 0.9% flush 10 mL  10 mL Intravenous 1 time in Clinic/HOD Dominique Ayala MD             PMHX:  has a past medical history of Helicobacter pylori antibody positive (5/27/13).    PSHX:  has a past surgical history that includes Tubal ligation; Biopsy of liver with ultrasound guidance (N/A, 11/19/2018); and Insertion of tunneled central venous catheter (CVC) with subcutaneous port (Left, 6/13/2019).    FAMILY HISTORY:   Family History   Problem Relation Age of Onset    Diabetes Father     Hypertension Mother     Miscarriages / Stillbirths Mother     ANGEL disease Mother     Eczema Mother     Parkinsonism Mother     Diabetes Brother     Diabetes Brother     Colon cancer Maternal Aunt     No Known Problems Sister     No Known Problems Maternal Uncle     No Known Problems Paternal Aunt     No Known Problems Paternal Uncle     No Known Problems Maternal Grandmother     No Known Problems Maternal Grandfather     No Known Problems Paternal  Grandmother     No Known Problems Paternal Grandfather     Diabetes Brother     No Known Problems Son     No Known Problems Son     Celiac disease Neg Hx     Cirrhosis Neg Hx     Colon polyps Neg Hx     Crohn's disease Neg Hx     Cystic fibrosis Neg Hx     Esophageal cancer Neg Hx     Hemochromatosis Neg Hx     Inflammatory bowel disease Neg Hx     Irritable bowel syndrome Neg Hx     Liver cancer Neg Hx     Liver disease Neg Hx     Rectal cancer Neg Hx     Stomach cancer Neg Hx     Ulcerative colitis Neg Hx     Anthony's disease Neg Hx     Psoriasis Neg Hx     Ovarian cancer Neg Hx     Breast cancer Neg Hx     Amblyopia Neg Hx     Blindness Neg Hx     Cancer Neg Hx     Cataracts Neg Hx     Glaucoma Neg Hx     Macular degeneration Neg Hx     Retinal detachment Neg Hx     Strabismus Neg Hx     Stroke Neg Hx     Thyroid disease Neg Hx        SOCIAL HISTORY:   Social History     Tobacco Use   Smoking Status Never Smoker   Smokeless Tobacco Never Used       Social History     Substance and Sexual Activity   Alcohol Use Yes    Comment: Social       Social History     Substance and Sexual Activity   Drug Use No         Review of Systems   Constitutional: Negative for appetite change, chills, fatigue, fever and unexpected weight change.   Eyes: Negative for visual disturbance.   Respiratory: Negative for cough and shortness of breath.    Cardiovascular: Negative for chest pain, palpitations and leg swelling.   Gastrointestinal: Negative for abdominal distention, abdominal pain, blood in stool, constipation, diarrhea, nausea and vomiting. No change in stool color.  Genitourinary: Negative for dysuria and hematuria. Denies dark urine.   Musculoskeletal: Negative for arthralgias, gait problem, joint swelling and myalgias.   Skin: Negative for color change, rash and wound. Denies itching.   Neurological: Negative for dizziness, tremors, speech difficulty, and headaches. (+)  "neuropathy  Hematological: Does not bruise/bleed easily.   Psychiatric/Behavioral: Negative for confusion, decreased concentration and sleep disturbance. Denies memory loss. Denies depression. The patient is not nervous/anxious.        Physical Exam   Constitutional: Well-nourished. No distress. Alert and oriented to person, place, and time.  Eyes: No scleral icterus.   Cardiovascular: Normal rate, regular rhythm and normal heart sounds. No murmur heard.  Pulmonary/Chest: Respiratory effort and breath sounds normal. No respiratory distress.   Abdominal: Soft, non-tender. No distension; no ascites appreciated. There is no palpable hepatosplenomegaly. No hernia or mass.   Musculoskeletal: No edema.   Neurological: No tremor. Coordination and gait normal. No asterixis.    Skin: Skin is warm and dry. No rash or erythema. No jaundice. No telangiectasias or palmar erythema noted.  Psychiatric: Normal mood and affect. Speech, behavior, and thought content normal. No depression or anxiety noted.       /69 (BP Location: Right arm, Patient Position: Sitting, BP Method: Medium (Automatic))   Pulse 79   Resp 18   Ht 5' 3" (1.6 m)   Wt 62.6 kg (138 lb 0.1 oz)   SpO2 100%   BMI 24.45 kg/m²         LABS:  Lab Results   Component Value Date    WBC 7.15 12/03/2019    HGB 8.6 (L) 12/03/2019    HCT 28.1 (L) 12/03/2019     12/03/2019     12/03/2019    K 4.1 12/03/2019    CREATININE 0.7 12/03/2019    ALT 11 12/03/2019    AST 19 12/03/2019    ALKPHOS 133 12/03/2019    BILITOT 0.5 12/03/2019    BILIDIR 0.7 (H) 08/08/2019    ALBUMIN 3.4 (L) 12/03/2019    INR 1.1 08/30/2019    AMAIFA Negative 1:40 07/13/2015    CHOL 170 02/06/2017    TRIG 42 02/06/2017    LDLCALC 108.6 02/06/2017    HGBA1C 5.1 08/30/2019       No results found for: HEPAIGG    No results found for: HEPBSAG  No results found for: HEPBCAB  No results found for: HEPBSAB  Hepatitis C Ab   Date Value Ref Range Status   07/22/2014 Negative  Final "     Immunization History   Administered Date(s) Administered    Influenza 10/21/2011, 11/27/2012    Influenza - High Dose - PF (65 years and older) 10/08/2019    Influenza - Quadrivalent 10/12/2016    Influenza - Quadrivalent - PF (6 months and older) 10/21/2011, 11/27/2012, 01/09/2016    Influenza Split 11/27/2012    PPD Test 09/04/2013, 08/25/2014, 08/15/2017    Pneumococcal Conjugate - 13 Valent 03/24/2015    Pneumococcal Polysaccharide - 23 Valent 07/26/2018    Tdap 05/17/2016    Zoster 03/24/2015          DIAGNOSTIC STUDIES:  MRI abd w/wo contrast + MRCP - 7/5/17  FINDINGS:     The visualized lungs, heart, and pericardium appear normal.      The liver is normal in size, contour, and parenchymal signal.  The portal vein is patent.      Gallbladder is unremarkable.  No intrahepatic or extra hepatic bile duct dilation. The intra pancreatic portion of the bile duct is within normal limits.  No evidence of filling defect to suggest retained stones within the bile ducts.  No strictures are evident.    The spleen, adrenal glands, stomach, and pancreas demonstrate no significant abnormalities.  The kidneys are unremarkable without evidence of hydronephrosis.      The osseous structures demonstrate no evidence of bone marrow replacement process.  The visualized aorta is normal in caliber.  Soft tissues of the lower thoracic and abdominal wall are unremarkable.    No pancreatic or hepatic masses are identified.      No fluid collections in the upper abdomen.      Impression          Unremarkable MRI of the abdomen without evidence of intra-or extrahepatic biliary ductal dilatation.           ASSESSMENT / PLAN:  69 y.o. Black or  female with:    1. Elevated alkaline phosphatase / TBili - Etiology unclear though this has now resolved  -- Negative serologic workup. Imaging unrevealing.   -- Liver biopsy with no significant abnormalities. Reassured that etiology is unclear but liver tests have  normalized and biopsy did not show any fibrosis or process requiring treatment   -- Encouraged to continue f/u with PCP with routine liver test monitoring 1-2 x year.    Can return to hepatology clinic PRN.        Thank you for allowing me to participate in the care of Sharri Good, LITAP-C  Hepatology and Liver Transplant

## 2019-12-06 ENCOUNTER — INFUSION (OUTPATIENT)
Dept: INFUSION THERAPY | Facility: HOSPITAL | Age: 69
End: 2019-12-06
Attending: INTERNAL MEDICINE
Payer: MEDICARE

## 2019-12-06 DIAGNOSIS — T45.1X5A CHEMOTHERAPY INDUCED NEUTROPENIA: Primary | ICD-10-CM

## 2019-12-06 DIAGNOSIS — C50.311 MALIGNANT NEOPLASM OF LOWER-INNER QUADRANT OF RIGHT BREAST OF FEMALE, ESTROGEN RECEPTOR NEGATIVE: ICD-10-CM

## 2019-12-06 DIAGNOSIS — D70.1 CHEMOTHERAPY INDUCED NEUTROPENIA: Primary | ICD-10-CM

## 2019-12-06 DIAGNOSIS — Z17.1 MALIGNANT NEOPLASM OF LOWER-INNER QUADRANT OF RIGHT BREAST OF FEMALE, ESTROGEN RECEPTOR NEGATIVE: ICD-10-CM

## 2019-12-06 DIAGNOSIS — E87.6 HYPOKALEMIA: ICD-10-CM

## 2019-12-06 PROCEDURE — 63600175 PHARM REV CODE 636 W HCPCS: Mod: JG | Performed by: INTERNAL MEDICINE

## 2019-12-06 PROCEDURE — 96372 THER/PROPH/DIAG INJ SC/IM: CPT

## 2019-12-06 RX ADMIN — FILGRASTIM 300 MCG: 300 INJECTION, SOLUTION INTRAVENOUS; SUBCUTANEOUS at 09:12

## 2019-12-10 ENCOUNTER — OFFICE VISIT (OUTPATIENT)
Dept: HEMATOLOGY/ONCOLOGY | Facility: CLINIC | Age: 69
End: 2019-12-10
Payer: MEDICARE

## 2019-12-10 ENCOUNTER — INFUSION (OUTPATIENT)
Dept: INFUSION THERAPY | Facility: HOSPITAL | Age: 69
End: 2019-12-10
Attending: INTERNAL MEDICINE
Payer: MEDICARE

## 2019-12-10 VITALS
HEIGHT: 63 IN | TEMPERATURE: 98 F | BODY MASS INDEX: 24.45 KG/M2 | WEIGHT: 138 LBS | HEART RATE: 80 BPM | DIASTOLIC BLOOD PRESSURE: 79 MMHG | RESPIRATION RATE: 18 BRPM | SYSTOLIC BLOOD PRESSURE: 159 MMHG

## 2019-12-10 VITALS
SYSTOLIC BLOOD PRESSURE: 144 MMHG | BODY MASS INDEX: 24.45 KG/M2 | OXYGEN SATURATION: 98 % | WEIGHT: 138 LBS | TEMPERATURE: 98 F | RESPIRATION RATE: 18 BRPM | HEART RATE: 90 BPM | HEIGHT: 63 IN | DIASTOLIC BLOOD PRESSURE: 68 MMHG

## 2019-12-10 DIAGNOSIS — T45.1X5A ANTINEOPLASTIC CHEMOTHERAPY INDUCED ANEMIA: ICD-10-CM

## 2019-12-10 DIAGNOSIS — J06.9 UPPER RESPIRATORY TRACT INFECTION, UNSPECIFIED TYPE: ICD-10-CM

## 2019-12-10 DIAGNOSIS — C50.311 MALIGNANT NEOPLASM OF LOWER-INNER QUADRANT OF RIGHT BREAST OF FEMALE, ESTROGEN RECEPTOR NEGATIVE: ICD-10-CM

## 2019-12-10 DIAGNOSIS — C50.311 MALIGNANT NEOPLASM OF LOWER-INNER QUADRANT OF RIGHT BREAST OF FEMALE, ESTROGEN RECEPTOR NEGATIVE: Primary | ICD-10-CM

## 2019-12-10 DIAGNOSIS — Z17.1 MALIGNANT NEOPLASM OF LOWER-INNER QUADRANT OF RIGHT BREAST OF FEMALE, ESTROGEN RECEPTOR NEGATIVE: ICD-10-CM

## 2019-12-10 DIAGNOSIS — T45.1X5A CHEMOTHERAPY INDUCED NEUTROPENIA: Primary | ICD-10-CM

## 2019-12-10 DIAGNOSIS — T45.1X5A CHEMOTHERAPY-INDUCED NEUROPATHY: ICD-10-CM

## 2019-12-10 DIAGNOSIS — D64.81 ANTINEOPLASTIC CHEMOTHERAPY INDUCED ANEMIA: ICD-10-CM

## 2019-12-10 DIAGNOSIS — Z51.11 ENCOUNTER FOR CHEMOTHERAPY MANAGEMENT: Primary | ICD-10-CM

## 2019-12-10 DIAGNOSIS — D70.1 CHEMOTHERAPY INDUCED NEUTROPENIA: ICD-10-CM

## 2019-12-10 DIAGNOSIS — D70.1 CHEMOTHERAPY INDUCED NEUTROPENIA: Primary | ICD-10-CM

## 2019-12-10 DIAGNOSIS — Z17.1 MALIGNANT NEOPLASM OF LOWER-INNER QUADRANT OF RIGHT BREAST OF FEMALE, ESTROGEN RECEPTOR NEGATIVE: Primary | ICD-10-CM

## 2019-12-10 DIAGNOSIS — G62.0 CHEMOTHERAPY-INDUCED NEUROPATHY: ICD-10-CM

## 2019-12-10 DIAGNOSIS — T45.1X5A CHEMOTHERAPY INDUCED NEUTROPENIA: ICD-10-CM

## 2019-12-10 LAB
ALBUMIN SERPL BCP-MCNC: 3.2 G/DL (ref 3.5–5.2)
ALP SERPL-CCNC: 137 U/L (ref 55–135)
ALT SERPL W/O P-5'-P-CCNC: 10 U/L (ref 10–44)
ANION GAP SERPL CALC-SCNC: 6 MMOL/L (ref 8–16)
AST SERPL-CCNC: 18 U/L (ref 10–40)
BILIRUB SERPL-MCNC: 0.8 MG/DL (ref 0.1–1)
BUN SERPL-MCNC: 7 MG/DL (ref 8–23)
CALCIUM SERPL-MCNC: 9.1 MG/DL (ref 8.7–10.5)
CHLORIDE SERPL-SCNC: 112 MMOL/L (ref 95–110)
CO2 SERPL-SCNC: 24 MMOL/L (ref 23–29)
CREAT SERPL-MCNC: 0.6 MG/DL (ref 0.5–1.4)
ERYTHROCYTE [DISTWIDTH] IN BLOOD BY AUTOMATED COUNT: 17.9 % (ref 11.5–14.5)
EST. GFR  (AFRICAN AMERICAN): >60 ML/MIN/1.73 M^2
EST. GFR  (NON AFRICAN AMERICAN): >60 ML/MIN/1.73 M^2
GLUCOSE SERPL-MCNC: 117 MG/DL (ref 70–110)
HCT VFR BLD AUTO: 30.5 % (ref 37–48.5)
HGB BLD-MCNC: 9.5 G/DL (ref 12–16)
IMM GRANULOCYTES # BLD AUTO: 0.07 K/UL (ref 0–0.04)
MCH RBC QN AUTO: 31.8 PG (ref 27–31)
MCHC RBC AUTO-ENTMCNC: 31.1 G/DL (ref 32–36)
MCV RBC AUTO: 102 FL (ref 82–98)
NEUTROPHILS # BLD AUTO: 3.9 K/UL (ref 1.8–7.7)
PLATELET # BLD AUTO: 184 K/UL (ref 150–350)
PMV BLD AUTO: 10.8 FL (ref 9.2–12.9)
POTASSIUM SERPL-SCNC: 3.6 MMOL/L (ref 3.5–5.1)
PROT SERPL-MCNC: 6.1 G/DL (ref 6–8.4)
RBC # BLD AUTO: 2.99 M/UL (ref 4–5.4)
SODIUM SERPL-SCNC: 142 MMOL/L (ref 136–145)
WBC # BLD AUTO: 5.85 K/UL (ref 3.9–12.7)

## 2019-12-10 PROCEDURE — 99215 PR OFFICE/OUTPT VISIT, EST, LEVL V, 40-54 MIN: ICD-10-PCS | Mod: S$GLB,,, | Performed by: INTERNAL MEDICINE

## 2019-12-10 PROCEDURE — 1101F PT FALLS ASSESS-DOCD LE1/YR: CPT | Mod: CPTII,S$GLB,, | Performed by: INTERNAL MEDICINE

## 2019-12-10 PROCEDURE — 1101F PR PT FALLS ASSESS DOC 0-1 FALLS W/OUT INJ PAST YR: ICD-10-PCS | Mod: CPTII,S$GLB,, | Performed by: INTERNAL MEDICINE

## 2019-12-10 PROCEDURE — 99999 PR PBB SHADOW E&M-EST. PATIENT-LVL III: ICD-10-PCS | Mod: PBBFAC,,, | Performed by: INTERNAL MEDICINE

## 2019-12-10 PROCEDURE — 63600175 PHARM REV CODE 636 W HCPCS: Performed by: INTERNAL MEDICINE

## 2019-12-10 PROCEDURE — 96413 CHEMO IV INFUSION 1 HR: CPT

## 2019-12-10 PROCEDURE — A4216 STERILE WATER/SALINE, 10 ML: HCPCS | Performed by: INTERNAL MEDICINE

## 2019-12-10 PROCEDURE — 25000003 PHARM REV CODE 250: Performed by: INTERNAL MEDICINE

## 2019-12-10 PROCEDURE — 1159F MED LIST DOCD IN RCRD: CPT | Mod: S$GLB,,, | Performed by: INTERNAL MEDICINE

## 2019-12-10 PROCEDURE — 1159F PR MEDICATION LIST DOCUMENTED IN MEDICAL RECORD: ICD-10-PCS | Mod: S$GLB,,, | Performed by: INTERNAL MEDICINE

## 2019-12-10 PROCEDURE — 99215 OFFICE O/P EST HI 40 MIN: CPT | Mod: S$GLB,,, | Performed by: INTERNAL MEDICINE

## 2019-12-10 PROCEDURE — 80053 COMPREHEN METABOLIC PANEL: CPT

## 2019-12-10 PROCEDURE — 1125F PR PAIN SEVERITY QUANTIFIED, PAIN PRESENT: ICD-10-PCS | Mod: S$GLB,,, | Performed by: INTERNAL MEDICINE

## 2019-12-10 PROCEDURE — 96367 TX/PROPH/DG ADDL SEQ IV INF: CPT

## 2019-12-10 PROCEDURE — 99999 PR PBB SHADOW E&M-EST. PATIENT-LVL III: CPT | Mod: PBBFAC,,, | Performed by: INTERNAL MEDICINE

## 2019-12-10 PROCEDURE — S0028 INJECTION, FAMOTIDINE, 20 MG: HCPCS | Performed by: INTERNAL MEDICINE

## 2019-12-10 PROCEDURE — 85027 COMPLETE CBC AUTOMATED: CPT

## 2019-12-10 PROCEDURE — 1125F AMNT PAIN NOTED PAIN PRSNT: CPT | Mod: S$GLB,,, | Performed by: INTERNAL MEDICINE

## 2019-12-10 PROCEDURE — 96375 TX/PRO/DX INJ NEW DRUG ADDON: CPT

## 2019-12-10 RX ORDER — EPINEPHRINE 0.3 MG/.3ML
0.3 INJECTION SUBCUTANEOUS ONCE AS NEEDED
Status: DISCONTINUED | OUTPATIENT
Start: 2019-12-10 | End: 2019-12-10 | Stop reason: HOSPADM

## 2019-12-10 RX ORDER — FAMOTIDINE 10 MG/ML
20 INJECTION INTRAVENOUS
Status: COMPLETED | OUTPATIENT
Start: 2019-12-10 | End: 2019-12-10

## 2019-12-10 RX ORDER — FAMOTIDINE 10 MG/ML
20 INJECTION INTRAVENOUS
Status: CANCELLED | OUTPATIENT
Start: 2019-12-11

## 2019-12-10 RX ORDER — DIPHENHYDRAMINE HYDROCHLORIDE 50 MG/ML
50 INJECTION INTRAMUSCULAR; INTRAVENOUS ONCE AS NEEDED
Status: CANCELLED | OUTPATIENT
Start: 2019-12-11

## 2019-12-10 RX ORDER — SODIUM CHLORIDE 0.9 % (FLUSH) 0.9 %
10 SYRINGE (ML) INJECTION
Status: COMPLETED | OUTPATIENT
Start: 2019-12-10 | End: 2019-12-10

## 2019-12-10 RX ORDER — SODIUM CHLORIDE 0.9 % (FLUSH) 0.9 %
10 SYRINGE (ML) INJECTION
Status: CANCELLED | OUTPATIENT
Start: 2019-12-11

## 2019-12-10 RX ORDER — AZITHROMYCIN 250 MG/1
TABLET, FILM COATED ORAL
Qty: 6 TABLET | Refills: 0 | Status: SHIPPED | OUTPATIENT
Start: 2019-12-10 | End: 2019-12-12

## 2019-12-10 RX ORDER — HEPARIN 100 UNIT/ML
500 SYRINGE INTRAVENOUS
Status: COMPLETED | OUTPATIENT
Start: 2019-12-10 | End: 2019-12-10

## 2019-12-10 RX ORDER — SODIUM CHLORIDE 0.9 % (FLUSH) 0.9 %
10 SYRINGE (ML) INJECTION
Status: CANCELLED | OUTPATIENT
Start: 2019-12-10

## 2019-12-10 RX ORDER — SODIUM CHLORIDE 0.9 % (FLUSH) 0.9 %
10 SYRINGE (ML) INJECTION
Status: DISCONTINUED | OUTPATIENT
Start: 2019-12-10 | End: 2019-12-10 | Stop reason: HOSPADM

## 2019-12-10 RX ORDER — HEPARIN 100 UNIT/ML
500 SYRINGE INTRAVENOUS
Status: DISCONTINUED | OUTPATIENT
Start: 2019-12-10 | End: 2019-12-10 | Stop reason: HOSPADM

## 2019-12-10 RX ORDER — DIPHENHYDRAMINE HYDROCHLORIDE 50 MG/ML
50 INJECTION INTRAMUSCULAR; INTRAVENOUS ONCE AS NEEDED
Status: DISCONTINUED | OUTPATIENT
Start: 2019-12-10 | End: 2019-12-10 | Stop reason: HOSPADM

## 2019-12-10 RX ORDER — EPINEPHRINE 0.3 MG/.3ML
0.3 INJECTION SUBCUTANEOUS ONCE AS NEEDED
Status: CANCELLED | OUTPATIENT
Start: 2019-12-11

## 2019-12-10 RX ORDER — HEPARIN 100 UNIT/ML
500 SYRINGE INTRAVENOUS
Status: CANCELLED | OUTPATIENT
Start: 2019-12-10

## 2019-12-10 RX ORDER — HEPARIN 100 UNIT/ML
500 SYRINGE INTRAVENOUS
Status: CANCELLED | OUTPATIENT
Start: 2019-12-11

## 2019-12-10 RX ADMIN — Medication 10 ML: at 01:12

## 2019-12-10 RX ADMIN — FAMOTIDINE 20 MG: 10 INJECTION, SOLUTION INTRAVENOUS at 11:12

## 2019-12-10 RX ADMIN — HEPARIN 500 UNITS: 100 SYRINGE at 10:12

## 2019-12-10 RX ADMIN — DIPHENHYDRAMINE HYDROCHLORIDE 25 MG: 50 INJECTION INTRAMUSCULAR; INTRAVENOUS at 11:12

## 2019-12-10 RX ADMIN — Medication 10 ML: at 10:12

## 2019-12-10 RX ADMIN — HEPARIN 500 UNITS: 100 SYRINGE at 01:12

## 2019-12-10 RX ADMIN — SODIUM CHLORIDE: 0.9 INJECTION, SOLUTION INTRAVENOUS at 11:12

## 2019-12-10 RX ADMIN — DEXAMETHASONE SODIUM PHOSPHATE 10 MG: 4 INJECTION, SOLUTION INTRA-ARTICULAR; INTRALESIONAL; INTRAMUSCULAR; INTRAVENOUS; SOFT TISSUE at 11:12

## 2019-12-10 RX ADMIN — PACLITAXEL 72 MG: 6 INJECTION, SOLUTION INTRAVENOUS at 11:12

## 2019-12-10 NOTE — Clinical Note
Cancel Friday's injectionRTC in about 1 mo with me with port labs - cbc, cmpReferral back to Dr Hernandez for surgery

## 2019-12-10 NOTE — PROGRESS NOTES
History:     Reason For Follow Up/Onc History:   1. Stage 1B ([id number]) invasive ductal carcinoma of right breast, lower outer quadrant, ER neg, NH neg, Her2 neg, Grade 3, Ki67 70%      HPI:   Sharri Cline presents for follow up of breast cancer and continuation of neoadjuvant chemotherapy. She's due for cycle 12 Taxol. Neupogen has been added.   Neuropathy present and stable.   Right eye improved.   + fatigue.   + cough that's new; afebrile.       Onc history:   - She presented for screening mammo in 5/15/2019 which showed focal asymmetries in both left and right breast. Diagnostic mammogram and US showed no significant abn of left breast, and right breast 15 mm x 11 mm x 12 mm mass at 4:00, 5 CFN. Biopsy on 5/24/19 showed grade 3 IDC, triple negative, Ki67 70%.    - 7/3/19 started neoadjuvant chemotherapy with ddAC to be followed by Taxol.   - 8/8/19 admitted for neutropenic fever; admitted again with cycle 4.     History: I reviewed, confirmed and updated history (past medical, social, family) as necessary.    Medications    Current Outpatient Medications:     artificial tears (ISOPTO TEARS) 0.5 % ophthalmic solution, Place 1 drop into both eyes 4 (four) times daily., Disp: , Rfl:     azithromycin (ZITHROMAX Z-BARNEY) 250 MG tablet, Take 2 tablets (500 mg) on  Day 1,  followed by 1 tablet (250 mg) once daily on Days 2 through 5., Disp: 6 tablet, Rfl: 0    carboxymethylcellulose sodium 1 % DpGe, Place 1 drop into both eyes every evening., Disp: , Rfl: 0    fish oil-omega-3 fatty acids 300-1,000 mg capsule, Take 2 g by mouth once daily., Disp: , Rfl:     gabapentin (NEURONTIN) 300 MG capsule, Take 1 capsule (300 mg total) by mouth 2 (two) times daily., Disp: 60 capsule, Rfl: 2    magic mouthwash diphen/antac/lidoc/nysta, Take 10 mLs by mouth 4 (four) times daily. (Patient not taking: Reported on 12/5/2019), Disp: 120 mL, Rfl: 0    multivitamin with minerals tablet, Take 1 tablet by mouth once daily.  ,  Disp: , Rfl:     ondansetron (ZOFRAN-ODT) 8 MG TbDL, Take 1 tablet (8 mg total) by mouth every 8 (eight) hours as needed (nausea). (Patient not taking: Reported on 12/5/2019), Disp: 45 tablet, Rfl: 1    polymyxin B sulf-trimethoprim (POLYTRIM) 10,000 unit- 1 mg/mL Drop, Place 1 drop into the right eye every 6 (six) hours. for 5 days (Patient not taking: Reported on 12/5/2019), Disp: 10 mL, Rfl: 0    potassium chloride SA (K-DUR,KLOR-CON) 20 MEQ tablet, Take 2 tablets (40 mEq total) by mouth once daily., Disp: 60 tablet, Rfl: 1  No current facility-administered medications for this visit.     Facility-Administered Medications Ordered in Other Visits:     sodium chloride 0.9% flush 10 mL, 10 mL, Intravenous, 1 time in Clinic/HOD, Dominique Ayala MD  Allergies  Review of patient's allergies indicates:  No Known Allergies  Review of Systems  Review of Systems   Constitutional: Positive for fatigue. Negative for activity change, appetite change, chills, fever and unexpected weight change.   HENT: Positive for sinus pressure. Negative for congestion, ear discharge, hearing loss, mouth sores, nosebleeds and trouble swallowing.    Eyes: Negative for pain, discharge, redness, itching and visual disturbance.   Respiratory: Positive for cough. Negative for chest tightness and shortness of breath.    Cardiovascular: Negative for chest pain, palpitations and leg swelling.   Gastrointestinal: Negative for abdominal distention, abdominal pain, blood in stool, diarrhea, nausea, rectal pain and vomiting.   Endocrine: Negative for heat intolerance and polydipsia.   Genitourinary: Negative for difficulty urinating, dysuria, flank pain, frequency, hematuria and urgency.   Musculoskeletal: Negative for arthralgias, back pain and neck pain.   Skin: Negative for color change, pallor and rash.   Neurological: Positive for numbness. Negative for dizziness, light-headedness and headaches.   Hematological: Negative for adenopathy. Does  "not bruise/bleed easily.   Psychiatric/Behavioral: Negative for confusion and decreased concentration. The patient is not nervous/anxious.         Objective     Objective:     Vitals:    12/10/19 1026   BP: (!) 144/68   BP Location: Right arm   Patient Position: Sitting   BP Method: Large (Automatic)   Pulse: 90   Resp: 18   Temp: 98 °F (36.7 °C)   TempSrc: Oral   SpO2: 98%   Weight: 62.6 kg (138 lb 0.1 oz)   Height: 5' 3" (1.6 m)     Body surface area is 1.67 meters squared.  Physical Exam   Constitutional: She is oriented to person, place, and time. She appears well-developed and well-nourished. No distress.   HENT:   Head: Normocephalic and atraumatic.   Mouth/Throat: Oropharynx is clear and moist and mucous membranes are normal. No oral lesions.   Eyes: EOM and lids are normal. Right eye exhibits no discharge and no exudate. Left eye exhibits no discharge. Right conjunctiva is not injected. No scleral icterus.   Neck: Normal range of motion. Neck supple. No thyroid mass and no thyromegaly present.   Cardiovascular: Normal rate, regular rhythm, S1 normal, S2 normal and normal heart sounds.   Pulmonary/Chest: Effort normal and breath sounds normal. No respiratory distress. She has no wheezes. She has no rhonchi. She has no rales. Chest wall is not dull to percussion.   Right breast with indention of skin at 4:00 only when she raises her right arm, no palpable mass - unchanged  Mediport accessed   Abdominal: Soft. Normal appearance and bowel sounds are normal. There is no hepatosplenomegaly. There is no tenderness.   Musculoskeletal: She exhibits no tenderness.   Lymphadenopathy: No inguinal adenopathy noted on the right or left side.   Neurological: She is alert and oriented to person, place, and time. She has normal strength.   Skin: Skin is warm, dry and intact. No pallor.   Psychiatric: Her behavior is normal. Thought content normal.     Labs and Imaging  Results for orders placed or performed in visit on " 12/10/19   CBC Oncology   Result Value Ref Range    WBC 5.85 3.90 - 12.70 K/uL    RBC 2.99 (L) 4.00 - 5.40 M/uL    Hemoglobin 9.5 (L) 12.0 - 16.0 g/dL    Hematocrit 30.5 (L) 37.0 - 48.5 %    Mean Corpuscular Volume 102 (H) 82 - 98 fL    Mean Corpuscular Hemoglobin 31.8 (H) 27.0 - 31.0 pg    Mean Corpuscular Hemoglobin Conc 31.1 (L) 32.0 - 36.0 g/dL    RDW 17.9 (H) 11.5 - 14.5 %    Platelets 184 150 - 350 K/uL    MPV 10.8 9.2 - 12.9 fL    Gran # (ANC) 3.9 1.8 - 7.7 K/uL    Immature Grans (Abs) 0.07 (H) 0.00 - 0.04 K/uL   Comprehensive metabolic panel   Result Value Ref Range    Sodium 142 136 - 145 mmol/L    Potassium 3.6 3.5 - 5.1 mmol/L    Chloride 112 (H) 95 - 110 mmol/L    CO2 24 23 - 29 mmol/L    Glucose 117 (H) 70 - 110 mg/dL    BUN, Bld 7 (L) 8 - 23 mg/dL    Creatinine 0.6 0.5 - 1.4 mg/dL    Calcium 9.1 8.7 - 10.5 mg/dL    Total Protein 6.1 6.0 - 8.4 g/dL    Albumin 3.2 (L) 3.5 - 5.2 g/dL    Total Bilirubin 0.8 0.1 - 1.0 mg/dL    Alkaline Phosphatase 137 (H) 55 - 135 U/L    AST 18 10 - 40 U/L    ALT 10 10 - 44 U/L    Anion Gap 6 (L) 8 - 16 mmol/L    eGFR if African American >60.0 >60 mL/min/1.73 m^2    eGFR if non African American >60.0 >60 mL/min/1.73 m^2       Assessment     Assessment:     1. Stage 1B ([id number]) invasive ductal carcinoma of right breast, lower outer quadrant, ER neg, CA neg, Her2 neg, Grade 3, Ki67 70%   * 7/3/19 - 12/10/19 completed neoadjuvant chemotherapy with four cycles of dose-dense Adriamycin and cyclophosphamide with Neulasta support followed by twelve doses of weekly paclitaxel. Dose reduced for cytopenias and neuropathy.   * Can consider adding Zometa every 6 months x 2 years in adjuvant setting.    - Cycle 12 Taxol - dose reduced for cytopenias and neuropathy. Tolerating Taxol fairly well; tumor has responded nicely.     2. Chronically elevated alk phos and bilirubin   * Chronic; follows with Hepatology; prior liver biopsy 11/2018 with mild portal inflammation    3. Chemo  anemia   * Required transfusion after cycle 4 ddAC.   * continue to monitor; transfuse if less than 8.    * Following. Improved.     4. Drug constipation   * Encouraged Senna S two nightly.     5. Neutropenia with neutropenic fever after cycle 3 and cycle 4 ddAC despite Neulasta.    * Continue neupogen    6. Hypokalemia   * KCl - continue    7. Chemo neuropathy   * Continues to progresse; reduce taxol even further. May have to drop cycles 11 and 12 - will re-eval next week.    * Continue gabapentin 300 mg bid.       8. Bacterial conjunctivitis right   * Polytrim drops; Completed; improved.     9. Upper respiratory infection   * Azithromycin    Plan:     1. Cycle 12 weekly Taxol - dose reduced.   2. Gabapentin 300 mg bid.   3. Continue neupogen 300 mcg day 3 only on this cycle  4. Continue antiemetics.   5. Senna s  6. KCl - continue until I see her back in 1 mo  7. azithromycin  8. Back to Dr Hernandez. Patient did not have MRI breasts prior to treatment, but US right axilla unremarkable    RTC in 1 mo with me with port labs - cbc, cmp  Referral back to Dr Hernandez    Future Appointments   Date Time Provider Department Center   12/12/2019  9:15 AM IN[facility] Jordan Canjaden   1/10/2020 10:30 AM [facility] Jordan Cance   1/10/2020 11:30 AM Dominique Ayala MD [facility] HEM ONC Julian Martinez

## 2019-12-10 NOTE — NURSING
Patient here for blood draw from left chest port-accesses easily with good blood return-blood to lab-line flushed and left accessed for chemo.

## 2019-12-11 ENCOUNTER — TELEPHONE (OUTPATIENT)
Dept: SURGERY | Facility: CLINIC | Age: 69
End: 2019-12-11

## 2019-12-11 DIAGNOSIS — C50.311 MALIGNANT NEOPLASM OF LOWER-INNER QUADRANT OF RIGHT BREAST OF FEMALE, ESTROGEN RECEPTOR NEGATIVE: Primary | ICD-10-CM

## 2019-12-11 DIAGNOSIS — Z17.1 MALIGNANT NEOPLASM OF LOWER-INNER QUADRANT OF RIGHT BREAST OF FEMALE, ESTROGEN RECEPTOR NEGATIVE: Primary | ICD-10-CM

## 2019-12-12 ENCOUNTER — INFUSION (OUTPATIENT)
Dept: INFUSION THERAPY | Facility: HOSPITAL | Age: 69
End: 2019-12-12
Attending: INTERNAL MEDICINE
Payer: MEDICARE

## 2019-12-12 DIAGNOSIS — L60.9 FINGERNAIL ABNORMALITIES: Primary | ICD-10-CM

## 2019-12-12 DIAGNOSIS — D70.1 CHEMOTHERAPY INDUCED NEUTROPENIA: Primary | ICD-10-CM

## 2019-12-12 DIAGNOSIS — Z17.1 MALIGNANT NEOPLASM OF LOWER-INNER QUADRANT OF RIGHT BREAST OF FEMALE, ESTROGEN RECEPTOR NEGATIVE: ICD-10-CM

## 2019-12-12 DIAGNOSIS — L03.90 CELLULITIS, UNSPECIFIED CELLULITIS SITE: Primary | ICD-10-CM

## 2019-12-12 DIAGNOSIS — C50.311 MALIGNANT NEOPLASM OF LOWER-INNER QUADRANT OF RIGHT BREAST OF FEMALE, ESTROGEN RECEPTOR NEGATIVE: ICD-10-CM

## 2019-12-12 DIAGNOSIS — T45.1X5A CHEMOTHERAPY INDUCED NEUTROPENIA: Primary | ICD-10-CM

## 2019-12-12 PROCEDURE — 63600175 PHARM REV CODE 636 W HCPCS: Mod: JG | Performed by: INTERNAL MEDICINE

## 2019-12-12 PROCEDURE — 96372 THER/PROPH/DIAG INJ SC/IM: CPT

## 2019-12-12 RX ORDER — SULFAMETHOXAZOLE AND TRIMETHOPRIM 800; 160 MG/1; MG/1
1 TABLET ORAL 2 TIMES DAILY
Qty: 14 TABLET | Refills: 0 | Status: SHIPPED | OUTPATIENT
Start: 2019-12-12 | End: 2020-04-27

## 2019-12-12 RX ORDER — SULFAMETHOXAZOLE AND TRIMETHOPRIM 400; 80 MG/1; MG/1
1 TABLET ORAL 2 TIMES DAILY
Qty: 14 TABLET | Refills: 0 | OUTPATIENT
Start: 2019-12-12 | End: 2019-12-19

## 2019-12-12 RX ORDER — SULFAMETHOXAZOLE AND TRIMETHOPRIM 400; 80 MG/1; MG/1
1 TABLET ORAL 2 TIMES DAILY
Qty: 14 TABLET | Refills: 0 | Status: CANCELLED | OUTPATIENT
Start: 2019-12-12 | End: 2019-12-19

## 2019-12-12 RX ORDER — SULFAMETHOXAZOLE AND TRIMETHOPRIM 800; 160 MG/1; MG/1
1 TABLET ORAL 2 TIMES DAILY
Qty: 14 TABLET | Refills: 0 | Status: SHIPPED | OUTPATIENT
Start: 2019-12-12 | End: 2019-12-19

## 2019-12-12 RX ADMIN — FILGRASTIM 300 MCG: 300 INJECTION, SOLUTION INTRAVENOUS; SUBCUTANEOUS at 09:12

## 2019-12-18 ENCOUNTER — HOSPITAL ENCOUNTER (OUTPATIENT)
Dept: RADIOLOGY | Facility: HOSPITAL | Age: 69
Discharge: HOME | End: 2019-12-18
Attending: SURGERY
Payer: MEDICARE

## 2019-12-18 ENCOUNTER — OFFICE VISIT (OUTPATIENT)
Dept: SURGERY | Facility: CLINIC | Age: 69
End: 2019-12-18
Payer: MEDICARE

## 2019-12-18 VITALS
HEART RATE: 81 BPM | HEIGHT: 63 IN | TEMPERATURE: 98 F | BODY MASS INDEX: 24.45 KG/M2 | DIASTOLIC BLOOD PRESSURE: 89 MMHG | SYSTOLIC BLOOD PRESSURE: 131 MMHG

## 2019-12-18 DIAGNOSIS — Z01.818 PREOP TESTING: ICD-10-CM

## 2019-12-18 DIAGNOSIS — Z17.1 MALIGNANT NEOPLASM OF LOWER-INNER QUADRANT OF RIGHT BREAST OF FEMALE, ESTROGEN RECEPTOR NEGATIVE: ICD-10-CM

## 2019-12-18 DIAGNOSIS — C50.311 MALIGNANT NEOPLASM OF LOWER-INNER QUADRANT OF RIGHT BREAST OF FEMALE, ESTROGEN RECEPTOR NEGATIVE: ICD-10-CM

## 2019-12-18 DIAGNOSIS — C50.311 MALIGNANT NEOPLASM OF LOWER-INNER QUADRANT OF RIGHT BREAST OF FEMALE, ESTROGEN RECEPTOR NEGATIVE: Primary | ICD-10-CM

## 2019-12-18 DIAGNOSIS — Z17.1 MALIGNANT NEOPLASM OF LOWER-INNER QUADRANT OF RIGHT BREAST OF FEMALE, ESTROGEN RECEPTOR NEGATIVE: Primary | ICD-10-CM

## 2019-12-18 PROCEDURE — 77061 BREAST TOMOSYNTHESIS UNI: CPT | Mod: TC,PO

## 2019-12-18 PROCEDURE — 76642 US BREAST RIGHT LIMITED: ICD-10-PCS | Mod: 26,RT,, | Performed by: RADIOLOGY

## 2019-12-18 PROCEDURE — 77065 DX MAMMO INCL CAD UNI: CPT | Mod: 26,,, | Performed by: RADIOLOGY

## 2019-12-18 PROCEDURE — 99213 OFFICE O/P EST LOW 20 MIN: CPT | Mod: S$GLB,,, | Performed by: SURGERY

## 2019-12-18 PROCEDURE — 99213 PR OFFICE/OUTPT VISIT, EST, LEVL III, 20-29 MIN: ICD-10-PCS | Mod: S$GLB,,, | Performed by: SURGERY

## 2019-12-18 PROCEDURE — 77065 DX MAMMO INCL CAD UNI: CPT | Mod: TC,PO

## 2019-12-18 PROCEDURE — 77061 MAMMO DIGITAL DIAGNOSTIC RIGHT WITH TOMOSYNTHESIS_CAD: ICD-10-PCS | Mod: 26,,, | Performed by: RADIOLOGY

## 2019-12-18 PROCEDURE — 1101F PR PT FALLS ASSESS DOC 0-1 FALLS W/OUT INJ PAST YR: ICD-10-PCS | Mod: CPTII,S$GLB,, | Performed by: SURGERY

## 2019-12-18 PROCEDURE — 1126F AMNT PAIN NOTED NONE PRSNT: CPT | Mod: S$GLB,,, | Performed by: SURGERY

## 2019-12-18 PROCEDURE — 76642 ULTRASOUND BREAST LIMITED: CPT | Mod: 26,RT,, | Performed by: RADIOLOGY

## 2019-12-18 PROCEDURE — 76642 ULTRASOUND BREAST LIMITED: CPT | Mod: TC,PO,RT

## 2019-12-18 PROCEDURE — 99999 PR PBB SHADOW E&M-EST. PATIENT-LVL III: CPT | Mod: PBBFAC,,, | Performed by: SURGERY

## 2019-12-18 PROCEDURE — 99999 PR PBB SHADOW E&M-EST. PATIENT-LVL III: ICD-10-PCS | Mod: PBBFAC,,, | Performed by: SURGERY

## 2019-12-18 PROCEDURE — 77065 MAMMO DIGITAL DIAGNOSTIC RIGHT WITH TOMOSYNTHESIS_CAD: ICD-10-PCS | Mod: 26,,, | Performed by: RADIOLOGY

## 2019-12-18 PROCEDURE — 1101F PT FALLS ASSESS-DOCD LE1/YR: CPT | Mod: CPTII,S$GLB,, | Performed by: SURGERY

## 2019-12-18 PROCEDURE — 1126F PR PAIN SEVERITY QUANTIFIED, NO PAIN PRESENT: ICD-10-PCS | Mod: S$GLB,,, | Performed by: SURGERY

## 2019-12-18 PROCEDURE — 77061 BREAST TOMOSYNTHESIS UNI: CPT | Mod: 26,,, | Performed by: RADIOLOGY

## 2019-12-18 PROCEDURE — 1159F PR MEDICATION LIST DOCUMENTED IN MEDICAL RECORD: ICD-10-PCS | Mod: S$GLB,,, | Performed by: SURGERY

## 2019-12-18 PROCEDURE — 1159F MED LIST DOCD IN RCRD: CPT | Mod: S$GLB,,, | Performed by: SURGERY

## 2019-12-18 NOTE — PROGRESS NOTES
Breast Surgery  [facility]  Department of Surgery      REFERRING PROVIDER: No referring provider defined for this encounter.    Chief Complaint: Right breast cancer    Subjective:      Patient ID: Sharri Cline is a 69 y.o. 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     Follow-up mammogram (05/24/19) and ultrasound (05/16/19) showed [id number] irregularly shaped hypoechoic mass with spiculated margins seen in right breast at 4 oclock 5cm from nipple . A ultrasound guided biopsy was performed on 05/24/19 with pathology revealing infiltrating ductal carcinoma of the breast. Has lost 40 pounds over last year with diet and exercise.     Patient does routinely do self breast exams.  Patient has not noted a change on breast exam.  Patient denies nipple discharge. Patient denies to previous breast biopsy. Patient denies a personal history of breast cancer.    Findings at that time were the following:   Tumor size: 1.5cm x 1.1cm x 1.2cm  Tumor ndgndrndanddndend:nd nd2nd Estrogen Receptor: negative   Progesterone Receptor: negative   Her-2 cash: negative   Lymph node status: negative   Lymphatic invasion: negative     Interval History: Patient presents for follow up s/p completion of neoadjuvant chemotherapy (last dose on 12/10/19); wishes to proceed with further surgical planning. She tolerated chemotherapy okay, continues to have peripheral neuropathy and fatigue but feels good. She had further imaging today to assess treatment response and per discussion with radiology her tumor had great response. She feels that her tumor is much smaller on self exam. She would prefer to have a lumpectomy if possible. She denies any changes since previous visit, no nipple or skin changes.      Past Medical History:   Diagnosis Date    Helicobacter pylori antibody positive 5/27/13    treated with clarithromycin, metronidazole, and  omeprazole x 14 days  (5/27-6/3); EGD normal in July 2013     Past Surgical History:   Procedure Laterality Date    BIOPSY OF LIVER WITH ULTRASOUND GUIDANCE N/A 11/19/2018    Procedure: BIOPSY, LIVER, WITH US GUIDANCE;  Surgeon: Gordon Diagnostic Provider;  Location: [facility] OR 2ND FLR;  Service: Radiology;  Laterality: N/A;  U/S GUIDED LIVER ([id number]).  11/19/2018  DOSC 7 AM  PROCEDURE 8 AM.  DR CARITO LAGOS.  DB 11/12/18 3:55P    INSERTION OF TUNNELED CENTRAL VENOUS CATHETER (CVC) WITH SUBCUTANEOUS PORT Left 6/13/2019    Procedure: [id number] - CHEST;  Surgeon: Libertad Hernandez MD;  Location: [facility] OR [address] [place] [address];  Service: General;  Laterality: Left;    TUBAL LIGATION       Current Outpatient Medications on File Prior to Visit   Medication Sig Dispense Refill    artificial tears (ISOPTO TEARS) 0.5 % ophthalmic solution Place 1 drop into both eyes 4 (four) times daily.      carboxymethylcellulose sodium 1 % DpGe Place 1 drop into both eyes every evening.  0    fish oil-omega-3 fatty acids 300-1,000 mg capsule Take 2 g by mouth once daily.      gabapentin (NEURONTIN) 300 MG capsule Take 1 capsule (300 mg total) by mouth 2 (two) times daily. 60 capsule 2    magic mouthwash diphen/antac/lidoc/nysta Take 10 mLs by mouth 4 (four) times daily. 120 mL 0    multivitamin with minerals tablet Take 1 tablet by mouth once daily.        ondansetron (ZOFRAN-ODT) 8 MG TbDL Take 1 tablet (8 mg total) by mouth every 8 (eight) hours as needed (nausea). 45 tablet 1    polymyxin B sulf-trimethoprim (POLYTRIM) 10,000 unit- 1 mg/mL Drop Place 1 drop into the right eye every 6 (six) hours. for 5 days 10 mL 0    potassium chloride SA (K-DUR,KLOR-CON) 20 MEQ tablet Take 2 tablets (40 mEq total) by mouth once daily. 60 tablet 1    sulfamethoxazole-trimethoprim 800-160mg (BACTRIM DS) 800-160 mg Tab Take 1 tablet by mouth 2 (two) times daily. for 7 days 14 tablet 0    sulfamethoxazole-trimethoprim 800-160mg (BACTRIM DS) 800-160  mg Tab Take 1 tablet by mouth 2 (two) times daily. 14 tablet 0     Current Facility-Administered Medications on File Prior to Visit   Medication Dose Route Frequency Provider Last Rate Last Dose    sodium chloride 0.9% flush 10 mL  10 mL Intravenous 1 time in Clinic/HOD Dominique Ayala MD         Social History     Socioeconomic History    Marital status:      Spouse name: Not on file    Number of children: Not on file    Years of education: Not on file    Highest education level: Not on file   Occupational History    Occupation:  worker     Employer: [employer]   Social Needs    Financial resource strain: Not on file    Food insecurity:     Worry: Not on file     Inability: Not on file    Transportation needs:     Medical: Not on file     Non-medical: Not on file   Tobacco Use    Smoking status: Never Smoker    Smokeless tobacco: Never Used   Substance and Sexual Activity    Alcohol use: Yes     Comment: Social    Drug use: No    Sexual activity: Yes     Partners: Male     Birth control/protection: Post-menopausal   Lifestyle    Physical activity:     Days per week: Not on file     Minutes per session: Not on file    Stress: Not on file   Relationships    Social connections:     Talks on phone: Not on file     Gets together: Not on file     Attends Protestant service: Not on file     Active member of club or organization: Not on file     Attends meetings of clubs or organizations: Not on file     Relationship status: Not on file   Other Topics Concern    Are you pregnant or think you may be? No    Breast-feeding No   Social History Narrative    Not on file     Family History   Problem Relation Age of Onset    Diabetes Father     Hypertension Mother     Miscarriages / Stillbirths Mother     ANGEL disease Mother     Eczema Mother     Parkinsonism Mother     Diabetes Brother     Diabetes Brother     Colon cancer Maternal Aunt     No Known Problems Sister     No  "Known Problems Maternal Uncle     No Known Problems Paternal Aunt     No Known Problems Paternal Uncle     No Known Problems Maternal Grandmother     No Known Problems Maternal Grandfather     No Known Problems Paternal Grandmother     No Known Problems Paternal Grandfather     Diabetes Brother     No Known Problems Son     No Known Problems Son     Celiac disease Neg Hx     Cirrhosis Neg Hx     Colon polyps Neg Hx     Crohn's disease Neg Hx     Cystic fibrosis Neg Hx     Esophageal cancer Neg Hx     Hemochromatosis Neg Hx     Inflammatory bowel disease Neg Hx     Irritable bowel syndrome Neg Hx     Liver cancer Neg Hx     Liver disease Neg Hx     Rectal cancer Neg Hx     Stomach cancer Neg Hx     Ulcerative colitis Neg Hx     Anthony's disease Neg Hx     Psoriasis Neg Hx     Ovarian cancer Neg Hx     Breast cancer Neg Hx     Amblyopia Neg Hx     Blindness Neg Hx     Cancer Neg Hx     Cataracts Neg Hx     Glaucoma Neg Hx     Macular degeneration Neg Hx     Retinal detachment Neg Hx     Strabismus Neg Hx     Stroke Neg Hx     Thyroid disease Neg Hx       Review of Systems   Constitutional: Negative for chills and fever.   HENT: Negative for sore throat.    Eyes: Negative for redness.   Respiratory: Negative for shortness of breath.    Cardiovascular: Negative for chest pain.   Gastrointestinal: Negative for abdominal pain.   Endocrine: Negative for polyuria.   Genitourinary: Negative for dysuria.   Musculoskeletal: Negative for back pain.   Skin: Negative for wound.   Neurological: Negative for headaches.   Psychiatric/Behavioral: Negative for agitation.     Objective:   /89 (BP Location: Right arm, Patient Position: Sitting, BP Method: Medium (Automatic))   Pulse 81   Temp 98.1 °F (36.7 °C) (Oral)   Ht 5' 3" (1.6 m)   BMI 24.45 kg/m²     Physical Exam   Nursing note and vitals reviewed.  Constitutional: She appears well-developed and well-nourished. No distress.   HENT: "   Head: Normocephalic and atraumatic.   Eyes: Conjunctivae are normal. No scleral icterus.   Neck: Normal range of motion. Neck supple. No tracheal deviation present.   Cardiovascular: Normal rate and regular rhythm.    Pulmonary/Chest: Effort normal and breath sounds normal. No respiratory distress. Right breast exhibits mass. Right breast exhibits no inverted nipple, no nipple discharge and no skin change. Left breast exhibits no inverted nipple, no mass, no nipple discharge and no skin change.       Abdominal: Soft. She exhibits no mass. There is no tenderness.   Musculoskeletal: She exhibits no edema.   Lymphadenopathy:     She has no cervical adenopathy.   Neurological: She is alert.   Skin: Skin is warm and dry. No rash noted. No erythema.     Psychiatric: She has a normal mood and affect. Her behavior is normal.     Radiology review: Images personally reviewed by me in the clinic.   Mammogram and US  12/18/19: Final read pending but reviewed in person with radiologist and showed significant decrease in size of mass, great treatment response.     Assessment:       69-year-old female with triple negative invasive ductal carcinoma of the right breast.       Plan:     Options for management were discussed with the patient and her family. We reviewed the existing data noting the equivalency of breast conserving surgery with radiation therapy and mastectomy. We also reviewed the guidelines of the National Comprehensive Cancer Network for Stage 1B breast carcinoma. We discussed the need for lumpectomy margins to be negative for carcinoma, the necessity for postoperative radiation therapy after breast conservation in most cases, the possibility of a failed or false negative sentinel lymph node biopsy and the potential need for complete lymphadenectomy for a failed or positive sentinel lymph node biopsy were fully discussed. In the setting of mastectomy, delayed or immediate reconstruction options are available and  were discussed.     In the setting of lumpectomy, radiation therapy would be recommended majority of the time.  The duration and treatment side effects were discussed with the patient.  This will coordinated with the radiation oncologist pending final pathology.    We also discussed the role of systemic therapy in the treatment of early stage breast cancer.  We discussed that this is based on tumor biology and jl status and will be determined based on final pathology.  We discussed that if the cancer is hormone positive, endocrine therapy would be recommended in most cases and its use can reduce the risk of recurrence as well as improve survival. Side effects of treatment were briefly discussed. We also discussed the potential role for chemotherapy based on a number of factors such as tumor phenotype (ER+ vs. triple negative vs. Qfe8mse+) and this would be determined in coordination with the medical oncologist.    Patient was educated on breast cancer, receptors, wire localization lumpectomy, mastectomy, sentinel lymph node mapping and biopsy, axillary lymph node dissection, reconstruction, breast prosthesis with post-mastectomy bra and radiation therapy. Patient was given patient information binder including [facility] breast cancer treatment brochure.  All her questions were answered.    After discussion with patient and family she has elected to proceed with right partial mastectomy with SLNB, seed localized. Consent for surgery obtained and scheduled for surgery.

## 2019-12-26 DIAGNOSIS — C50.311 MALIGNANT NEOPLASM OF LOWER-INNER QUADRANT OF RIGHT BREAST OF FEMALE, ESTROGEN RECEPTOR NEGATIVE: Primary | ICD-10-CM

## 2019-12-26 DIAGNOSIS — Z17.1 MALIGNANT NEOPLASM OF LOWER-INNER QUADRANT OF RIGHT BREAST OF FEMALE, ESTROGEN RECEPTOR NEGATIVE: Primary | ICD-10-CM

## 2020-01-03 ENCOUNTER — TELEPHONE (OUTPATIENT)
Dept: HEMATOLOGY/ONCOLOGY | Facility: CLINIC | Age: 70
End: 2020-01-03

## 2020-01-03 NOTE — TELEPHONE ENCOUNTER
"Returned call to pt.   Pt stated she received appt reminders in the mail and was confused as was scheduled for an infusion appt on 1/10.   Apologized to pt for confusion and informed that labs from port are scheduled as an injection in the infusion suite and only scheduled for labs and to see Dr. Ayala on 1/10.   Pt asked if needs labs on 1/15- informed pt would check with Dr. Hernandez's nurse as same labs getting done on 1/10 from her port (to try to avoid pt getting stuck).   Informed pt would f/u.   Pt verbalized understanding and thanked nurse.      Message fwd.       ----- Message from Rosy Roque sent at 1/3/2020 12:33 PM CST -----  Contact: EOS  Consult/Advisory:    Name Of Caller: Sharri   Provider Name: Jamie Fox MD  Does patient feel the need to be seen today? No  Relationship to the Pt?: self  Contact Preference?: [phone]  What is the nature of the call?:    - pt notified of appts added to her schedule but she  thought that she was done with treatment. Please   call and advise if appts are correct or will be modified.     Additional Notes:  "Thank you for all that you do for our patients'"        "

## 2020-01-10 ENCOUNTER — INFUSION (OUTPATIENT)
Dept: INFUSION THERAPY | Facility: HOSPITAL | Age: 70
End: 2020-01-10
Attending: INTERNAL MEDICINE
Payer: MEDICARE

## 2020-01-10 ENCOUNTER — OFFICE VISIT (OUTPATIENT)
Dept: HEMATOLOGY/ONCOLOGY | Facility: CLINIC | Age: 70
End: 2020-01-10
Payer: MEDICARE

## 2020-01-10 VITALS
BODY MASS INDEX: 24.18 KG/M2 | DIASTOLIC BLOOD PRESSURE: 81 MMHG | WEIGHT: 136.44 LBS | RESPIRATION RATE: 18 BRPM | SYSTOLIC BLOOD PRESSURE: 168 MMHG | HEART RATE: 68 BPM | HEIGHT: 63 IN | OXYGEN SATURATION: 99 % | TEMPERATURE: 98 F

## 2020-01-10 DIAGNOSIS — C50.311 MALIGNANT NEOPLASM OF LOWER-INNER QUADRANT OF RIGHT BREAST OF FEMALE, ESTROGEN RECEPTOR NEGATIVE: Primary | ICD-10-CM

## 2020-01-10 DIAGNOSIS — Z17.1 MALIGNANT NEOPLASM OF LOWER-INNER QUADRANT OF RIGHT BREAST OF FEMALE, ESTROGEN RECEPTOR NEGATIVE: Primary | ICD-10-CM

## 2020-01-10 DIAGNOSIS — D70.1 CHEMOTHERAPY INDUCED NEUTROPENIA: ICD-10-CM

## 2020-01-10 DIAGNOSIS — T45.1X5A CHEMOTHERAPY INDUCED NEUTROPENIA: ICD-10-CM

## 2020-01-10 DIAGNOSIS — T45.1X5A CHEMOTHERAPY-INDUCED NEUROPATHY: ICD-10-CM

## 2020-01-10 DIAGNOSIS — D64.81 ANTINEOPLASTIC CHEMOTHERAPY INDUCED ANEMIA: ICD-10-CM

## 2020-01-10 DIAGNOSIS — G62.0 CHEMOTHERAPY-INDUCED NEUROPATHY: ICD-10-CM

## 2020-01-10 DIAGNOSIS — T45.1X5A ANTINEOPLASTIC CHEMOTHERAPY INDUCED ANEMIA: ICD-10-CM

## 2020-01-10 LAB
ALBUMIN SERPL BCP-MCNC: 3.6 G/DL (ref 3.5–5.2)
ALP SERPL-CCNC: 143 U/L (ref 55–135)
ALT SERPL W/O P-5'-P-CCNC: 13 U/L (ref 10–44)
ANION GAP SERPL CALC-SCNC: 6 MMOL/L (ref 8–16)
AST SERPL-CCNC: 22 U/L (ref 10–40)
BILIRUB SERPL-MCNC: 1.1 MG/DL (ref 0.1–1)
BUN SERPL-MCNC: 7 MG/DL (ref 8–23)
CALCIUM SERPL-MCNC: 9.1 MG/DL (ref 8.7–10.5)
CHLORIDE SERPL-SCNC: 109 MMOL/L (ref 95–110)
CO2 SERPL-SCNC: 26 MMOL/L (ref 23–29)
CREAT SERPL-MCNC: 0.7 MG/DL (ref 0.5–1.4)
ERYTHROCYTE [DISTWIDTH] IN BLOOD BY AUTOMATED COUNT: 14.7 % (ref 11.5–14.5)
EST. GFR  (AFRICAN AMERICAN): >60 ML/MIN/1.73 M^2
EST. GFR  (NON AFRICAN AMERICAN): >60 ML/MIN/1.73 M^2
GLUCOSE SERPL-MCNC: 95 MG/DL (ref 70–110)
HCT VFR BLD AUTO: 36.5 % (ref 37–48.5)
HGB BLD-MCNC: 11.1 G/DL (ref 12–16)
IMM GRANULOCYTES # BLD AUTO: 0.01 K/UL (ref 0–0.04)
MCH RBC QN AUTO: 30.1 PG (ref 27–31)
MCHC RBC AUTO-ENTMCNC: 30.4 G/DL (ref 32–36)
MCV RBC AUTO: 99 FL (ref 82–98)
NEUTROPHILS # BLD AUTO: 1.2 K/UL (ref 1.8–7.7)
PLATELET # BLD AUTO: 127 K/UL (ref 150–350)
PMV BLD AUTO: 10.1 FL (ref 9.2–12.9)
POTASSIUM SERPL-SCNC: 4.1 MMOL/L (ref 3.5–5.1)
PROT SERPL-MCNC: 6.6 G/DL (ref 6–8.4)
RBC # BLD AUTO: 3.69 M/UL (ref 4–5.4)
SODIUM SERPL-SCNC: 141 MMOL/L (ref 136–145)
WBC # BLD AUTO: 3.22 K/UL (ref 3.9–12.7)

## 2020-01-10 PROCEDURE — 36591 DRAW BLOOD OFF VENOUS DEVICE: CPT

## 2020-01-10 PROCEDURE — A4216 STERILE WATER/SALINE, 10 ML: HCPCS | Performed by: INTERNAL MEDICINE

## 2020-01-10 PROCEDURE — 1159F PR MEDICATION LIST DOCUMENTED IN MEDICAL RECORD: ICD-10-PCS | Mod: S$GLB,,, | Performed by: INTERNAL MEDICINE

## 2020-01-10 PROCEDURE — 99999 PR PBB SHADOW E&M-EST. PATIENT-LVL III: ICD-10-PCS | Mod: PBBFAC,,, | Performed by: INTERNAL MEDICINE

## 2020-01-10 PROCEDURE — 99215 OFFICE O/P EST HI 40 MIN: CPT | Mod: S$GLB,,, | Performed by: INTERNAL MEDICINE

## 2020-01-10 PROCEDURE — 25000003 PHARM REV CODE 250: Performed by: INTERNAL MEDICINE

## 2020-01-10 PROCEDURE — 1126F PR PAIN SEVERITY QUANTIFIED, NO PAIN PRESENT: ICD-10-PCS | Mod: S$GLB,,, | Performed by: INTERNAL MEDICINE

## 2020-01-10 PROCEDURE — 80053 COMPREHEN METABOLIC PANEL: CPT

## 2020-01-10 PROCEDURE — 99215 PR OFFICE/OUTPT VISIT, EST, LEVL V, 40-54 MIN: ICD-10-PCS | Mod: S$GLB,,, | Performed by: INTERNAL MEDICINE

## 2020-01-10 PROCEDURE — 63600175 PHARM REV CODE 636 W HCPCS: Mod: JG | Performed by: INTERNAL MEDICINE

## 2020-01-10 PROCEDURE — 99999 PR PBB SHADOW E&M-EST. PATIENT-LVL III: CPT | Mod: PBBFAC,,, | Performed by: INTERNAL MEDICINE

## 2020-01-10 PROCEDURE — 99499 RISK ADDL DX/OHS AUDIT: ICD-10-PCS | Mod: S$GLB,,, | Performed by: INTERNAL MEDICINE

## 2020-01-10 PROCEDURE — 99499 UNLISTED E&M SERVICE: CPT | Mod: S$GLB,,, | Performed by: INTERNAL MEDICINE

## 2020-01-10 PROCEDURE — 96372 THER/PROPH/DIAG INJ SC/IM: CPT

## 2020-01-10 PROCEDURE — 1159F MED LIST DOCD IN RCRD: CPT | Mod: S$GLB,,, | Performed by: INTERNAL MEDICINE

## 2020-01-10 PROCEDURE — 63600175 PHARM REV CODE 636 W HCPCS: Performed by: INTERNAL MEDICINE

## 2020-01-10 PROCEDURE — 1101F PR PT FALLS ASSESS DOC 0-1 FALLS W/OUT INJ PAST YR: ICD-10-PCS | Mod: CPTII,S$GLB,, | Performed by: INTERNAL MEDICINE

## 2020-01-10 PROCEDURE — 85027 COMPLETE CBC AUTOMATED: CPT

## 2020-01-10 PROCEDURE — 1101F PT FALLS ASSESS-DOCD LE1/YR: CPT | Mod: CPTII,S$GLB,, | Performed by: INTERNAL MEDICINE

## 2020-01-10 PROCEDURE — 1126F AMNT PAIN NOTED NONE PRSNT: CPT | Mod: S$GLB,,, | Performed by: INTERNAL MEDICINE

## 2020-01-10 RX ORDER — SODIUM CHLORIDE 0.9 % (FLUSH) 0.9 %
10 SYRINGE (ML) INJECTION
Status: CANCELLED | OUTPATIENT
Start: 2020-01-10

## 2020-01-10 RX ORDER — HEPARIN 100 UNIT/ML
500 SYRINGE INTRAVENOUS
Status: CANCELLED | OUTPATIENT
Start: 2020-01-10

## 2020-01-10 RX ORDER — SODIUM CHLORIDE 0.9 % (FLUSH) 0.9 %
10 SYRINGE (ML) INJECTION
Status: COMPLETED | OUTPATIENT
Start: 2020-01-10 | End: 2020-01-10

## 2020-01-10 RX ORDER — HEPARIN 100 UNIT/ML
500 SYRINGE INTRAVENOUS
Status: COMPLETED | OUTPATIENT
Start: 2020-01-10 | End: 2020-01-10

## 2020-01-10 RX ADMIN — HEPARIN 500 UNITS: 100 SYRINGE at 10:01

## 2020-01-10 RX ADMIN — Medication 10 ML: at 10:01

## 2020-01-10 RX ADMIN — FILGRASTIM 300 MCG: 300 INJECTION, SOLUTION INTRAVENOUS; SUBCUTANEOUS at 12:01

## 2020-01-10 NOTE — NURSING
Patient received Neulasta injection SQ to ABD.  Labs drawn prior to injection via port.  Patient tolerated well.  Line heparinized and de-accessed.  Site c/d/i.

## 2020-01-10 NOTE — PROGRESS NOTES
History:     Reason For Follow Up/Onc History:   1. Stage 1B ([id number]) invasive ductal carcinoma of right breast, lower outer quadrant, ER neg, OH neg, Her2 neg, Grade 3, Ki67 70%      HPI:   Sharri Cline presents for follow up of breast cancer. She completed neoadjuvant chemotherapy and has surgery scheduled later this month.   Posterior mammogram and ultrasound on December 18, 2019 showed near resolution of the right breast 4:00 a.m. Mass.  The residual focal asymmetry remains similar in size the overall appearance has shown significant improvement. She is feeling well but continues to struggle with peripheral neuropathy.  She has been out of the gabapentin noticed that the pain in her lower extremities has actually improved but it unclear if this is secondary to improving neuropathy or a side effect of the gabapentin.  Otherwise her upper respiratory symptoms has improved.      Onc history:   - She presented for screening mammo in 5/15/2019 which showed focal asymmetries in both left and right breast. Diagnostic mammogram and US showed no significant abn of left breast, and right breast 15 mm x 11 mm x 12 mm mass at 4:00, 5 CFN. Biopsy on 5/24/19 showed grade 3 IDC, triple negative, Ki67 70%.    - 7/3/19 - 12/10/19 completed neoadjuvant chemotherapy with four cycles of dose-dense Adriamycin and cyclophosphamide with Neulasta support followed by twelve doses of weekly paclitaxel. Dose reduced for cytopenias and neuropathy.    History: I reviewed, confirmed and updated history (past medical, social, family) as necessary.       Medications    Current Outpatient Medications:     artificial tears (ISOPTO TEARS) 0.5 % ophthalmic solution, Place 1 drop into both eyes 4 (four) times daily., Disp: , Rfl:     carboxymethylcellulose sodium 1 % DpGe, Place 1 drop into both eyes every evening., Disp: , Rfl: 0    fish oil-omega-3 fatty acids 300-1,000 mg capsule, Take 2 g by mouth once daily., Disp: , Rfl:      gabapentin (NEURONTIN) 300 MG capsule, Take 1 capsule (300 mg total) by mouth 2 (two) times daily., Disp: 60 capsule, Rfl: 2    magic mouthwash diphen/antac/lidoc/nysta, Take 10 mLs by mouth 4 (four) times daily., Disp: 120 mL, Rfl: 0    multivitamin with minerals tablet, Take 1 tablet by mouth once daily.  , Disp: , Rfl:     ondansetron (ZOFRAN-ODT) 8 MG TbDL, Take 1 tablet (8 mg total) by mouth every 8 (eight) hours as needed (nausea)., Disp: 45 tablet, Rfl: 1    potassium chloride SA (K-DUR,KLOR-CON) 20 MEQ tablet, Take 2 tablets (40 mEq total) by mouth once daily., Disp: 60 tablet, Rfl: 1    sulfamethoxazole-trimethoprim 800-160mg (BACTRIM DS) 800-160 mg Tab, Take 1 tablet by mouth 2 (two) times daily., Disp: 14 tablet, Rfl: 0  No current facility-administered medications for this visit.     Facility-Administered Medications Ordered in Other Visits:     sodium chloride 0.9% flush 10 mL, 10 mL, Intravenous, 1 time in Clinic/HOD, Dominique Ayala MD  Allergies  Review of patient's allergies indicates:  No Known Allergies  Review of Systems  Review of Systems   Constitutional: Positive for fatigue. Negative for activity change, appetite change, chills, fever and unexpected weight change.   HENT: Negative for congestion, ear discharge, hearing loss, mouth sores, nosebleeds, sinus pressure, sinus pain and trouble swallowing.    Eyes: Negative for pain, discharge, redness, itching and visual disturbance.   Respiratory: Negative for cough, chest tightness and shortness of breath.    Cardiovascular: Negative for chest pain, palpitations and leg swelling.   Gastrointestinal: Negative for abdominal distention, abdominal pain, blood in stool, diarrhea, nausea, rectal pain and vomiting.   Endocrine: Negative for heat intolerance and polydipsia.   Genitourinary: Negative for difficulty urinating, dysuria, flank pain, frequency, hematuria and urgency.   Musculoskeletal: Negative for arthralgias, back pain and neck pain.  "  Skin: Negative for color change, pallor and rash.   Neurological: Positive for numbness. Negative for dizziness, light-headedness and headaches.   Hematological: Negative for adenopathy. Does not bruise/bleed easily.   Psychiatric/Behavioral: Negative for confusion and decreased concentration. The patient is not nervous/anxious.         Objective     Objective:     Vitals:    01/10/20 1123   BP: (!) 168/81   BP Location: Right arm   Patient Position: Sitting   BP Method: Large (Automatic)   Pulse: 68   Resp: 18   Temp: 98.3 °F (36.8 °C)   TempSrc: Oral   SpO2: 99%   Weight: 61.9 kg (136 lb 7.4 oz)   Height: 5' 3" (1.6 m)     Body surface area is 1.66 meters squared.  Physical Exam   Constitutional: She is oriented to person, place, and time. She appears well-developed and well-nourished. No distress.   HENT:   Head: Normocephalic and atraumatic.   Mouth/Throat: Oropharynx is clear and moist and mucous membranes are normal. No oral lesions.   Eyes: EOM and lids are normal. Right eye exhibits no discharge and no exudate. Left eye exhibits no discharge. Right conjunctiva is not injected. No scleral icterus.   Neck: Normal range of motion. Neck supple. No thyroid mass and no thyromegaly present.   Cardiovascular: Normal rate, regular rhythm, S1 normal, S2 normal and normal heart sounds.   Pulmonary/Chest: Effort normal and breath sounds normal. No respiratory distress. She has no wheezes. She has no rhonchi. She has no rales. Chest wall is not dull to percussion.   Abdominal: Soft. Normal appearance and bowel sounds are normal. There is no hepatosplenomegaly. There is no tenderness.   Musculoskeletal: She exhibits no tenderness.   Lymphadenopathy: No inguinal adenopathy noted on the right or left side.   Neurological: She is alert and oriented to person, place, and time. She has normal strength.   Skin: Skin is warm, dry and intact. No pallor.   Psychiatric: Her behavior is normal. Thought content normal.     Labs and " Imaging  Results for orders placed or performed in visit on 01/10/20   CBC Oncology   Result Value Ref Range    WBC 3.22 (L) 3.90 - 12.70 K/uL    RBC 3.69 (L) 4.00 - 5.40 M/uL    Hemoglobin 11.1 (L) 12.0 - 16.0 g/dL    Hematocrit 36.5 (L) 37.0 - 48.5 %    Mean Corpuscular Volume 99 (H) 82 - 98 fL    Mean Corpuscular Hemoglobin 30.1 27.0 - 31.0 pg    Mean Corpuscular Hemoglobin Conc 30.4 (L) 32.0 - 36.0 g/dL    RDW 14.7 (H) 11.5 - 14.5 %    Platelets 127 (L) 150 - 350 K/uL    MPV 10.1 9.2 - 12.9 fL    Gran # (ANC) 1.2 (L) 1.8 - 7.7 K/uL    Immature Grans (Abs) 0.01 0.00 - 0.04 K/uL     I have personally reviewed imaging results and agree with assessment as detailed below in dictation.     Mammo Digital Diagnostic Right w/ Elias, US Breast Right Limited  Narrative: Result:   Mammo Digital Diagnostic Right w/ Elias  US Breast Right Limited     History:  Known right breast 04:00 o'clock axis malignancy. Follow-up, post   neoadjuvant chemotherapy.     Films Compared:  Prior images (if available) were compared.     Findings:  This procedure was performed using tomosynthesis.  Computer-aided   detection was utilized in the interpretation of this examination.  Mammo Digital Diagnostic Right w/ Elias  The right breast has scattered areas of fibroglandular density.     There has been near resolution of the right breast lower inner quadrant   04:00 o'clock axis middle depth spiculated mass representing known   malignancy.  Residual focal asymmetry at the site of the mass measures 1.6   cm, but the volume, spiculated margins, and density of the mass have all   nearly resolved.  Associated internal ring biopsy marker remains in   expected position.     Limited right breast ultrasound:   There has been near resolution of the right breast 04:00 o'clock axis 5 cm   from the nipple mass representing known malignancy.  Residual focal change   of echogenicity measures 1.5 cm, but the volume and shadowing related to   the abnormality have  nearly resolved.  Associated internal ring marker   remains in expected position.     No new abnormality.  No right axillary adenopathy.   Impression: Since May 16, 2019, there has been near resolution of the right breast   04:00 o'clock axis mass representing known malignancy. Residual focal   asymmetry/change of echogenicity at the site of the previous mass measures   similar in size, but overall the volume and appearance of the abnormality   have shown substantial improvement. Associated internal ring biopsy marker   remains in expected position. BI-RADS 6: Known malignancy. The patient is   already under surgical and oncologic care.     BI-RADS Category:   Right: 6 - Known Biopsy-Proven Malignancy  Overall: 6 - Known Biopsy-Proven Malignancy     Recommendation:  The patient is already under surgical and oncologic care.    The findings and recommendations were discussed with the patient by Dr. GILBERT Mae in person at the time of interpretation.      Assessment     Assessment:     1. Stage 1B ([id number]) invasive ductal carcinoma of right breast, lower outer quadrant, ER neg, [place] neg, Her2 neg, Grade 3, Ki67 70%   * 7/3/19 - 12/10/19 completed neoadjuvant chemotherapy with four cycles of dose-dense Adriamycin and cyclophosphamide with Neulasta support followed by twelve doses of weekly paclitaxel. Dose reduced for cytopenias and neuropathy.   * Planning lumpectomy later in January.     2. Chronically elevated alk phos and bilirubin   * Chronic; follows with Hepatology; prior liver biopsy 11/2018 with mild portal inflammation    3. Chemo anemia   * Improving.     4.. Neutropenia    * Mild, but will give one dose of neupogen since upcoming surgery.     5. Chemo neuropathy   * Present - slow improvement.    * Continue gabapentin 300 mg bid.         Plan:     1. Lumpectomy later in January. Imaging looks good. Will follow up post-op to re-eval path  2. Gabapentin 300 mg bid.   3. Neupogen today.     RTC in 5 weeks to  review path    Future Appointments   Date Time Provider Department Center   1/10/2020  1:30 PM INJECTION, NOM INFUSION [facility] CHEMO Jordan Cance   1/15/2020  8:00 AM [facility] [id number] STEREO [facility] MAMMO [facility]   2020  8:15 AM [facility] MAMMO6 SCREEN [facility] MAMMO [facility]   2020 12:00 PM [facility] NM5 PREP ROOM [facility] NUCLEAR [facility]   2020  3:45 PM Libertad Hernandez MD [facility] BRSTSUR [facility]     Medical decision making this visit:  Established problem, stable or improvin point each, 3 total points this category  Lab test(s) reviewed/ordered: 1 point  Radiographic tests reviewed/ordered 1 point  Independently viewed breast imaging

## 2020-01-10 NOTE — PROGRESS NOTES
Patient, Sharri Cline (MRN #[id number]), presented with a recent Platelet count less than 150 K/uL consistent with the definition of thrombocytopenia (ICD10 - D69.6).    Platelets   Date Value Ref Range Status   01/10/2020 127 (L) 150 - 350 K/uL Final     The patient's thrombocytopenia was monitored, evaluated, addressed and/or treated. This addendum to the medical record is made on 01/10/2020.

## 2020-01-15 ENCOUNTER — HOSPITAL ENCOUNTER (OUTPATIENT)
Dept: RADIOLOGY | Facility: HOSPITAL | Age: 70
Discharge: HOME | End: 2020-01-15
Attending: SURGERY
Payer: MEDICARE

## 2020-01-15 DIAGNOSIS — R92.8 ABNORMAL MAMMOGRAM OF RIGHT BREAST: ICD-10-CM

## 2020-01-15 PROCEDURE — A4648 IMPLANTABLE TISSUE MARKER: HCPCS | Mod: PO

## 2020-01-15 PROCEDURE — 77065 MAMMO DIGITAL DIAGNOSTIC RIGHT WITH CAD: ICD-10-PCS | Mod: 26,RT,, | Performed by: RADIOLOGY

## 2020-01-15 PROCEDURE — 19285 PERQ DEV BREAST 1ST US IMAG: CPT | Mod: RT,,, | Performed by: RADIOLOGY

## 2020-01-15 PROCEDURE — 19285 US RIGHT MAGSEED LOCALIZATION: ICD-10-PCS | Mod: RT,,, | Performed by: RADIOLOGY

## 2020-01-15 PROCEDURE — 77065 DX MAMMO INCL CAD UNI: CPT | Mod: 26,RT,, | Performed by: RADIOLOGY

## 2020-01-15 PROCEDURE — 77065 DX MAMMO INCL CAD UNI: CPT | Mod: TC,PO,RT

## 2020-01-15 PROCEDURE — 25000003 PHARM REV CODE 250: Mod: PO | Performed by: SURGERY

## 2020-01-15 RX ORDER — LIDOCAINE HYDROCHLORIDE 20 MG/ML
2 INJECTION, SOLUTION INFILTRATION; PERINEURAL ONCE
Status: COMPLETED | OUTPATIENT
Start: 2020-01-15 | End: 2020-01-15

## 2020-01-15 RX ADMIN — LIDOCAINE HYDROCHLORIDE 20 ML: 20 INJECTION, SOLUTION INFILTRATION; PERINEURAL at 08:01

## 2020-01-22 ENCOUNTER — ANESTHESIA EVENT (OUTPATIENT)
Dept: SURGERY | Facility: HOSPITAL | Age: 70
End: 2020-01-22
Payer: MEDICARE

## 2020-01-22 ENCOUNTER — TELEPHONE (OUTPATIENT)
Dept: SURGERY | Facility: CLINIC | Age: 70
End: 2020-01-22

## 2020-01-22 NOTE — PRE-PROCEDURE INSTRUCTIONS
PreOp Instructions given:   - Verbal medication information (what to hold and what to take)   - NPO guidelines   - Arrival place directions given;  - Bathing with antibacterial soap   - Don't wear any jewelry or bring any valuables AM of surgery   - No makeup or moisturizer to face   - No perfume/cologne, powder, lotions or aftershave   Pt. verbalized understanding.   Pt denies any h/o Anesthesia/Sedation complications or side effects.    ARRIVAL TO [facility] - 0730  PT'S  - ANA SMITH WILL BE PROVIDING TRANSPORTATION HOME UPON DISCHARGE.

## 2020-01-22 NOTE — TELEPHONE ENCOUNTER
----- Message from Ld Hill sent at 1/22/2020  1:49 PM CST -----  Contact: Patient @ [phone]  Patient returning a missed call, pls return call

## 2020-01-22 NOTE — TELEPHONE ENCOUNTER
Return call to pt. Arrival time for surgery given. Pt to report at 7:30 am, [facility], 2nd floor.

## 2020-01-23 ENCOUNTER — ANESTHESIA (OUTPATIENT)
Dept: SURGERY | Facility: HOSPITAL | Age: 70
End: 2020-01-23
Payer: MEDICARE

## 2020-01-23 ENCOUNTER — HOSPITAL ENCOUNTER (OUTPATIENT)
Facility: HOSPITAL | Age: 70
Discharge: HOME | End: 2020-01-23
Attending: SURGERY | Admitting: SURGERY
Payer: MEDICARE

## 2020-01-23 ENCOUNTER — HOSPITAL ENCOUNTER (OUTPATIENT)
Dept: RADIOLOGY | Facility: HOSPITAL | Age: 70
Discharge: HOME | End: 2020-01-23
Attending: SURGERY | Admitting: SURGERY
Payer: MEDICARE

## 2020-01-23 VITALS
SYSTOLIC BLOOD PRESSURE: 133 MMHG | HEART RATE: 80 BPM | DIASTOLIC BLOOD PRESSURE: 70 MMHG | RESPIRATION RATE: 16 BRPM | BODY MASS INDEX: 23.39 KG/M2 | TEMPERATURE: 97 F | HEIGHT: 63 IN | WEIGHT: 132 LBS | OXYGEN SATURATION: 100 %

## 2020-01-23 DIAGNOSIS — Z17.1 MALIGNANT NEOPLASM OF LOWER-INNER QUADRANT OF RIGHT BREAST OF FEMALE, ESTROGEN RECEPTOR NEGATIVE: ICD-10-CM

## 2020-01-23 DIAGNOSIS — C50.311 MALIGNANT NEOPLASM OF LOWER-INNER QUADRANT OF RIGHT BREAST OF FEMALE, ESTROGEN RECEPTOR NEGATIVE: ICD-10-CM

## 2020-01-23 DIAGNOSIS — C50.919 BREAST CANCER: Primary | ICD-10-CM

## 2020-01-23 DIAGNOSIS — R92.8 ABNORMAL MAMMOGRAM OF RIGHT BREAST: ICD-10-CM

## 2020-01-23 PROCEDURE — 88307 TISSUE EXAM BY PATHOLOGIST: CPT | Performed by: PATHOLOGY

## 2020-01-23 PROCEDURE — D9220A PRA ANESTHESIA: Mod: ANES,,, | Performed by: ANESTHESIOLOGY

## 2020-01-23 PROCEDURE — 64462 RIGHT PARAVERTEBRAL: ICD-10-PCS | Mod: 59,RT,, | Performed by: ANESTHESIOLOGY

## 2020-01-23 PROCEDURE — 64461 RIGHT PARAVERTEBRAL: ICD-10-PCS | Mod: 59,RT,, | Performed by: ANESTHESIOLOGY

## 2020-01-23 PROCEDURE — 38792 PR IDENTIFY SENTINEL 2DE: ICD-10-PCS | Mod: 59,RT,, | Performed by: SURGERY

## 2020-01-23 PROCEDURE — 76098 MAMMO BREAST SPECIMEN: ICD-10-PCS | Mod: 26,,, | Performed by: RADIOLOGY

## 2020-01-23 PROCEDURE — 88305 TISSUE EXAM BY PATHOLOGIST: ICD-10-PCS | Mod: 26,,, | Performed by: PATHOLOGY

## 2020-01-23 PROCEDURE — 88305 TISSUE EXAM BY PATHOLOGIST: CPT | Performed by: PATHOLOGY

## 2020-01-23 PROCEDURE — 19301 PR MASTECTOMY, PARTIAL: ICD-10-PCS | Mod: RT,,, | Performed by: SURGERY

## 2020-01-23 PROCEDURE — 76098 X-RAY EXAM SURGICAL SPECIMEN: CPT | Mod: TC,PO

## 2020-01-23 PROCEDURE — D9220A PRA ANESTHESIA: ICD-10-PCS | Mod: CRNA,,, | Performed by: NURSE ANESTHETIST, CERTIFIED REGISTERED

## 2020-01-23 PROCEDURE — 88342 IMHCHEM/IMCYTCHM 1ST ANTB: CPT | Mod: 59 | Performed by: PATHOLOGY

## 2020-01-23 PROCEDURE — 88305 TISSUE EXAM BY PATHOLOGIST: CPT | Mod: 26,,, | Performed by: PATHOLOGY

## 2020-01-23 PROCEDURE — 88307 PR  SURG PATH,LEVEL V: ICD-10-PCS | Mod: 26,,, | Performed by: PATHOLOGY

## 2020-01-23 PROCEDURE — 88341 PR IHC OR ICC EACH ADD'L SINGLE ANTIBODY  STAINPR: ICD-10-PCS | Mod: 26,,, | Performed by: PATHOLOGY

## 2020-01-23 PROCEDURE — D9220A PRA ANESTHESIA: ICD-10-PCS | Mod: ANES,,, | Performed by: ANESTHESIOLOGY

## 2020-01-23 PROCEDURE — 38525 PR BIOPSY/REM LYMPH NODES, AXILLARY: ICD-10-PCS | Mod: 51,RT,, | Performed by: SURGERY

## 2020-01-23 PROCEDURE — 71000015 HC POSTOP RECOV 1ST HR: Performed by: SURGERY

## 2020-01-23 PROCEDURE — 88341 IMHCHEM/IMCYTCHM EA ADD ANTB: CPT | Mod: 26,,, | Performed by: PATHOLOGY

## 2020-01-23 PROCEDURE — 19301 PARTIAL MASTECTOMY: CPT | Mod: RT,,, | Performed by: SURGERY

## 2020-01-23 PROCEDURE — 76098 X-RAY EXAM SURGICAL SPECIMEN: CPT | Mod: 26,,, | Performed by: RADIOLOGY

## 2020-01-23 PROCEDURE — 37000009 HC ANESTHESIA EA ADD 15 MINS: Performed by: SURGERY

## 2020-01-23 PROCEDURE — 88342 IMHCHEM/IMCYTCHM 1ST ANTB: CPT | Mod: 26,,, | Performed by: PATHOLOGY

## 2020-01-23 PROCEDURE — 36000706: Performed by: SURGERY

## 2020-01-23 PROCEDURE — 71000044 HC DOSC ROUTINE RECOVERY FIRST HOUR: Performed by: SURGERY

## 2020-01-23 PROCEDURE — 36000707: Performed by: SURGERY

## 2020-01-23 PROCEDURE — 25000003 PHARM REV CODE 250: Performed by: NURSE ANESTHETIST, CERTIFIED REGISTERED

## 2020-01-23 PROCEDURE — 88331 PR  PATH CONSULT IN SURG,W FRZ SEC: ICD-10-PCS | Mod: 26,,, | Performed by: PATHOLOGY

## 2020-01-23 PROCEDURE — D9220A PRA ANESTHESIA: Mod: CRNA,,, | Performed by: NURSE ANESTHETIST, CERTIFIED REGISTERED

## 2020-01-23 PROCEDURE — 63600175 PHARM REV CODE 636 W HCPCS: Performed by: NURSE ANESTHETIST, CERTIFIED REGISTERED

## 2020-01-23 PROCEDURE — 63600175 PHARM REV CODE 636 W HCPCS: Performed by: ANESTHESIOLOGY

## 2020-01-23 PROCEDURE — 88331 PATH CONSLTJ SURG 1 BLK 1SPC: CPT | Mod: 26,,, | Performed by: PATHOLOGY

## 2020-01-23 PROCEDURE — 63600175 PHARM REV CODE 636 W HCPCS: Performed by: STUDENT IN AN ORGANIZED HEALTH CARE EDUCATION/TRAINING PROGRAM

## 2020-01-23 PROCEDURE — 71000016 HC POSTOP RECOV ADDL HR: Performed by: SURGERY

## 2020-01-23 PROCEDURE — A9520 TC99 TILMANOCEPT DIAG 0.5MCI: HCPCS

## 2020-01-23 PROCEDURE — 37000008 HC ANESTHESIA 1ST 15 MINUTES: Performed by: SURGERY

## 2020-01-23 PROCEDURE — 38900 IO MAP OF SENT LYMPH NODE: CPT | Mod: RT,,, | Performed by: SURGERY

## 2020-01-23 PROCEDURE — 88342 CHG IMMUNOCYTOCHEMISTRY: ICD-10-PCS | Mod: 26,,, | Performed by: PATHOLOGY

## 2020-01-23 PROCEDURE — 64461 PVB THORACIC SINGLE INJ SITE: CPT | Mod: 59,RT,, | Performed by: ANESTHESIOLOGY

## 2020-01-23 PROCEDURE — 88331 PATH CONSLTJ SURG 1 BLK 1SPC: CPT | Performed by: PATHOLOGY

## 2020-01-23 PROCEDURE — 27200651 HC AIRWAY, LMA: Performed by: ANESTHESIOLOGY

## 2020-01-23 PROCEDURE — 88307 TISSUE EXAM BY PATHOLOGIST: CPT | Mod: 26,,, | Performed by: PATHOLOGY

## 2020-01-23 PROCEDURE — 63600175 PHARM REV CODE 636 W HCPCS: Mod: JW,JG | Performed by: SURGERY

## 2020-01-23 PROCEDURE — 38525 BIOPSY/REMOVAL LYMPH NODES: CPT | Mod: 51,RT,, | Performed by: SURGERY

## 2020-01-23 PROCEDURE — 64462 PVB THORACIC 2ND+ INJ SITE: CPT | Mod: 59,RT,, | Performed by: ANESTHESIOLOGY

## 2020-01-23 PROCEDURE — C1729 CATH, DRAINAGE: HCPCS | Performed by: SURGERY

## 2020-01-23 PROCEDURE — 38792 RA TRACER ID OF SENTINL NODE: CPT | Mod: 59,RT,, | Performed by: SURGERY

## 2020-01-23 PROCEDURE — 38900 PR INTRAOPERATIVE SENTINEL LYMPH NODE ID W DYE INJECTION: ICD-10-PCS | Mod: RT,,, | Performed by: SURGERY

## 2020-01-23 PROCEDURE — 88341 IMHCHEM/IMCYTCHM EA ADD ANTB: CPT | Performed by: PATHOLOGY

## 2020-01-23 PROCEDURE — 64461 PVB THORACIC SINGLE INJ SITE: CPT | Performed by: STUDENT IN AN ORGANIZED HEALTH CARE EDUCATION/TRAINING PROGRAM

## 2020-01-23 RX ORDER — SODIUM CHLORIDE 9 MG/ML
INJECTION, SOLUTION INTRAVENOUS CONTINUOUS PRN
Status: DISCONTINUED | OUTPATIENT
Start: 2020-01-23 | End: 2020-01-23

## 2020-01-23 RX ORDER — PROPOFOL 10 MG/ML
INJECTION, EMULSION INTRAVENOUS
Status: DISCONTINUED | OUTPATIENT
Start: 2020-01-23 | End: 2020-01-23

## 2020-01-23 RX ORDER — LIDOCAINE HCL/PF 100 MG/5ML
SYRINGE (ML) INTRAVENOUS
Status: DISCONTINUED | OUTPATIENT
Start: 2020-01-23 | End: 2020-01-23

## 2020-01-23 RX ORDER — LIDOCAINE HYDROCHLORIDE 10 MG/ML
INJECTION, SOLUTION EPIDURAL; INFILTRATION; INTRACAUDAL; PERINEURAL
Status: DISCONTINUED
Start: 2020-01-23 | End: 2020-01-23 | Stop reason: HOSPADM

## 2020-01-23 RX ORDER — FENTANYL CITRATE 50 UG/ML
25 INJECTION, SOLUTION INTRAMUSCULAR; INTRAVENOUS EVERY 5 MIN PRN
Status: DISCONTINUED | OUTPATIENT
Start: 2020-01-23 | End: 2020-01-23 | Stop reason: HOSPADM

## 2020-01-23 RX ORDER — CEFAZOLIN SODIUM 1 G/3ML
2 INJECTION, POWDER, FOR SOLUTION INTRAMUSCULAR; INTRAVENOUS
Status: COMPLETED | OUTPATIENT
Start: 2020-01-23 | End: 2020-01-23

## 2020-01-23 RX ORDER — EPHEDRINE SULFATE 50 MG/ML
INJECTION, SOLUTION INTRAVENOUS
Status: DISCONTINUED | OUTPATIENT
Start: 2020-01-23 | End: 2020-01-23

## 2020-01-23 RX ORDER — PHENYLEPHRINE HYDROCHLORIDE 10 MG/ML
INJECTION INTRAVENOUS
Status: DISCONTINUED | OUTPATIENT
Start: 2020-01-23 | End: 2020-01-23

## 2020-01-23 RX ORDER — FENTANYL CITRATE 50 UG/ML
INJECTION, SOLUTION INTRAMUSCULAR; INTRAVENOUS
Status: DISCONTINUED | OUTPATIENT
Start: 2020-01-23 | End: 2020-01-23

## 2020-01-23 RX ORDER — HYDROCODONE BITARTRATE AND ACETAMINOPHEN 5; 325 MG/1; MG/1
1 TABLET ORAL EVERY 6 HOURS PRN
Qty: 10 TABLET | Refills: 0 | Status: SHIPPED | OUTPATIENT
Start: 2020-01-23 | End: 2020-01-23 | Stop reason: SDUPTHER

## 2020-01-23 RX ORDER — SODIUM CHLORIDE 9 MG/ML
INJECTION, SOLUTION INTRAVENOUS CONTINUOUS
Status: ACTIVE | OUTPATIENT
Start: 2020-01-23

## 2020-01-23 RX ORDER — HYDROCODONE BITARTRATE AND ACETAMINOPHEN 5; 325 MG/1; MG/1
1 TABLET ORAL EVERY 6 HOURS PRN
Qty: 10 TABLET | Refills: 0 | Status: SHIPPED | OUTPATIENT
Start: 2020-01-23 | End: 2020-04-27

## 2020-01-23 RX ORDER — LIDOCAINE HYDROCHLORIDE 10 MG/ML
1 INJECTION, SOLUTION EPIDURAL; INFILTRATION; INTRACAUDAL; PERINEURAL ONCE
Status: DISCONTINUED | OUTPATIENT
Start: 2020-01-23 | End: 2023-02-14

## 2020-01-23 RX ORDER — DEXAMETHASONE SODIUM PHOSPHATE 4 MG/ML
INJECTION, SOLUTION INTRA-ARTICULAR; INTRALESIONAL; INTRAMUSCULAR; INTRAVENOUS; SOFT TISSUE
Status: DISCONTINUED | OUTPATIENT
Start: 2020-01-23 | End: 2020-01-23

## 2020-01-23 RX ORDER — ONDANSETRON 2 MG/ML
4 INJECTION INTRAMUSCULAR; INTRAVENOUS ONCE AS NEEDED
Status: DISCONTINUED | OUTPATIENT
Start: 2020-01-23 | End: 2020-01-23 | Stop reason: HOSPADM

## 2020-01-23 RX ORDER — ROPIVACAINE HYDROCHLORIDE 5 MG/ML
INJECTION, SOLUTION EPIDURAL; INFILTRATION; PERINEURAL
Status: COMPLETED | OUTPATIENT
Start: 2020-01-23 | End: 2020-01-23

## 2020-01-23 RX ORDER — ONDANSETRON 2 MG/ML
INJECTION INTRAMUSCULAR; INTRAVENOUS
Status: DISCONTINUED | OUTPATIENT
Start: 2020-01-23 | End: 2020-01-23

## 2020-01-23 RX ORDER — MIDAZOLAM HYDROCHLORIDE 1 MG/ML
0.5 INJECTION INTRAMUSCULAR; INTRAVENOUS
Status: DISCONTINUED | OUTPATIENT
Start: 2020-01-23 | End: 2020-01-23 | Stop reason: HOSPADM

## 2020-01-23 RX ORDER — ISOSULFAN BLUE 50 MG/5ML
INJECTION, SOLUTION SUBCUTANEOUS
Status: DISCONTINUED | OUTPATIENT
Start: 2020-01-23 | End: 2020-01-23 | Stop reason: HOSPADM

## 2020-01-23 RX ADMIN — FENTANYL CITRATE 25 MCG: 50 INJECTION, SOLUTION INTRAMUSCULAR; INTRAVENOUS at 11:01

## 2020-01-23 RX ADMIN — LIDOCAINE HYDROCHLORIDE 80 MG: 20 INJECTION, SOLUTION INTRAVENOUS at 09:01

## 2020-01-23 RX ADMIN — SODIUM CHLORIDE, SODIUM GLUCONATE, SODIUM ACETATE, POTASSIUM CHLORIDE, MAGNESIUM CHLORIDE, SODIUM PHOSPHATE, DIBASIC, AND POTASSIUM PHOSPHATE: .53; .5; .37; .037; .03; .012; .00082 INJECTION, SOLUTION INTRAVENOUS at 10:01

## 2020-01-23 RX ADMIN — EPHEDRINE SULFATE 5 MG: 50 INJECTION, SOLUTION INTRAMUSCULAR; INTRAVENOUS; SUBCUTANEOUS at 11:01

## 2020-01-23 RX ADMIN — FENTANYL CITRATE 25 MCG: 50 INJECTION, SOLUTION INTRAMUSCULAR; INTRAVENOUS at 09:01

## 2020-01-23 RX ADMIN — PHENYLEPHRINE HYDROCHLORIDE 200 MCG: 10 INJECTION INTRAVENOUS at 09:01

## 2020-01-23 RX ADMIN — PHENYLEPHRINE HYDROCHLORIDE 200 MCG: 10 INJECTION INTRAVENOUS at 10:01

## 2020-01-23 RX ADMIN — ONDANSETRON 4 MG: 2 INJECTION INTRAMUSCULAR; INTRAVENOUS at 09:01

## 2020-01-23 RX ADMIN — MIDAZOLAM HYDROCHLORIDE 1 MG: 1 INJECTION, SOLUTION INTRAMUSCULAR; INTRAVENOUS at 08:01

## 2020-01-23 RX ADMIN — MIDAZOLAM HYDROCHLORIDE 2 MG: 1 INJECTION, SOLUTION INTRAMUSCULAR; INTRAVENOUS at 09:01

## 2020-01-23 RX ADMIN — EPHEDRINE SULFATE 5 MG: 50 INJECTION, SOLUTION INTRAMUSCULAR; INTRAVENOUS; SUBCUTANEOUS at 10:01

## 2020-01-23 RX ADMIN — DEXAMETHASONE SODIUM PHOSPHATE 4 MG: 4 INJECTION, SOLUTION INTRAMUSCULAR; INTRAVENOUS at 09:01

## 2020-01-23 RX ADMIN — SODIUM CHLORIDE: 0.9 INJECTION, SOLUTION INTRAVENOUS at 08:01

## 2020-01-23 RX ADMIN — SODIUM CHLORIDE: 0.9 INJECTION, SOLUTION INTRAVENOUS at 09:01

## 2020-01-23 RX ADMIN — ROPIVACAINE HYDROCHLORIDE 15 ML: 5 INJECTION, SOLUTION EPIDURAL; INFILTRATION; PERINEURAL at 09:01

## 2020-01-23 RX ADMIN — PROPOFOL 130 MG: 10 INJECTION, EMULSION INTRAVENOUS at 09:01

## 2020-01-23 RX ADMIN — CEFAZOLIN 2 G: 330 INJECTION, POWDER, FOR SOLUTION INTRAMUSCULAR; INTRAVENOUS at 09:01

## 2020-01-23 NOTE — ANESTHESIA PROCEDURE NOTES
Right paravertebral    Patient location during procedure: pre-op   Block not for primary anesthetic.  Reason for block: at surgeon's request and post-op pain management   Post-op Pain Location: right breat pain  Start time: 1/23/2020 8:41 AM  Timeout: 1/23/2020 8:40 AM   End time: 1/23/2020 9:25 AM    Staffing  Authorizing Provider: Lorene Cherry MD  Performing Provider: Shikha Montes MD    Preanesthetic Checklist  Completed: patient identified, site marked, surgical consent, pre-op evaluation, timeout performed, IV checked, risks and benefits discussed and monitors and equipment checked  Peripheral Block  Patient position: sitting  Prep: ChloraPrep  Patient monitoring: heart rate, cardiac monitor, continuous pulse ox, continuous capnometry and frequent blood pressure checks  Block type: paravertebral - thoracic  Laterality: right  Injection technique: single shot  Location: T1-2 and T3-4  Needle  Needle type: Tuohy   Needle gauge: 17 G  Needle length: 3.5 in  Needle localization: anatomical landmarks     Assessment  Injection assessment: negative aspiration and negative parasthesia  Paresthesia pain: none  Heart rate change: no  Slow fractionated injection: yes  Additional Notes  T2 os at 3 cm 15 ml 0.5% ropivacaine with epi  T4 os at 2.5 cm 16ml 0.5% ropivacaine with epi  VSS.  [facility] RN monitoring vitals throughout procedure.  Patient tolerated procedure well.

## 2020-01-23 NOTE — DISCHARGE INSTRUCTIONS
POSTOPERATIVE INSTRUCTIONS FOLLOWING BREAST BIOPSY OR LUMPECTOMY    The following are post-operative instructions that will help you to recover from your surgery.  Please read over these instructions carefully and contact us if we can answer any of your questions or concerns.    Dressing/breast binder (surgi-bra)  A surgical bra may be placed around your chest after your surgery.  If you are given the bra, please wear it for the first 48 hours after surgery. After 48 hours you can remove your surgical bra and dressing to shower/cleanse the breast with antibacterial soap and warm water. Do not take a tub bath and do not soak the surgical site for at least 2 weeks. Please do not remove the white strips of tape (steri-strips) that cover your incision- they will be removed at your clinic visit.    The final pathology report will be available approximately 7-10 days after your surgery.  Our office will call you with your pathology report when it becomes available.    Activity   You should be able to return to your regular activities 2 days after your surgery.  However, do not engage in strenuous activities in which you use your upper body such as:  golf, tennis, aerobics, washing windows, raking the yard, mopping, vacuuming, heavy lifting (e.g children) until you are seen for your follow-up appointment in clinic. Do not lift anything heavier than a gallon of milk.    Medication for pain  You may find that over the counter pain medications may be sufficient for your pain.  You will be given a prescription for pain medication for more severe pain.  You should not drive or operate machinery while taking these.  Please take prescription pain medicine (narcotics) with food.  Narcotics can cause, or worsen, constipation.  You will need to increase your fluid intake, eat high fiber foods (such as fruits and bran) and make sure that you are up and walking. You may need to take an over the counter stool softener for  constipation.    Please report the following:   Temperature greater than 101 degrees   Discharge or bad odor from the wound   Excessive bleeding, such as saturated bloody dressing or extreme bruising   Redness at incision and/or drain sites   Swelling or buildup of fluid around incision   Persistent fevers, chills, nausea, vomiting, or diarrhea    Additional information  Your surgeon will see you approximately 2 weeks following your surgery.  If this follow-up appointment has not been made, please call the office.    If you have any questions or problems, please call my office or my nurse.    Dr. Rupal Cardenas, PAYTON Garcia, PAYTON Garcia, PAYTON Howard RN  [phone]    Magi Caballero PA-C  [phone]  Robina Maher, PAYTON  [phone]    After hours and on weekends, you may call the main [facility] line at [phone] and ask to have the general surgery resident paged or have me paged.

## 2020-01-23 NOTE — H&P
Breast Surgery  [facility]  Department of Surgery        REFERRING PROVIDER: No referring provider defined for this encounter.     Chief Complaint: Right breast cancer     Subjective:      Patient ID: Sharri Cline is a 69 y.o. 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      Follow-up mammogram (05/24/19) and ultrasound (05/16/19) showed [id number] irregularly shaped hypoechoic mass with spiculated margins seen in right breast at 4 oclock 5cm from nipple . A ultrasound guided biopsy was performed on 05/24/19 with pathology revealing infiltrating ductal carcinoma of the breast. Has lost 40 pounds over last year with diet and exercise.      Patient does routinely do self breast exams.  Patient has not noted a change on breast exam.  Patient denies nipple discharge. Patient denies to previous breast biopsy. Patient denies a personal history of breast cancer.     Findings at that time were the following:   Tumor size: 1.5cm x 1.1cm x 1.2cm  Tumor thgthrthathdtheth:th th4th Estrogen Receptor: negative   Progesterone Receptor: negative   Her-2 cash: negative   Lymph node status: negative   Lymphatic invasion: negative      Interval History: Patient presents for follow up s/p completion of neoadjuvant chemotherapy (last dose on 12/10/19); wishes to proceed with further surgical planning. She tolerated chemotherapy okay, continues to have peripheral neuropathy and fatigue but feels good. She had further imaging today to assess treatment response and per discussion with radiology her tumor had great response. She feels that her tumor is much smaller on self exam. She would prefer to have a lumpectomy if possible. She denies any changes since previous visit, no nipple or skin changes.            Past Medical History:   Diagnosis Date    Helicobacter pylori antibody positive 5/27/13     treated with clarithromycin,  metronidazole, and omeprazole x 14 days  (5/27-6/3); EGD normal in July 2013            Past Surgical History:   Procedure Laterality Date    BIOPSY OF LIVER WITH ULTRASOUND GUIDANCE N/A 11/19/2018     Procedure: BIOPSY, LIVER, WITH US GUIDANCE;  Surgeon: Gordon Diagnostic Provider;  Location: [facility] OR [address] [place] [address];  Service: Radiology;  Laterality: N/A;  U/S GUIDED LIVER ([id number]).  11/19/2018  [facility] 7 AM  PROCEDURE 8 AM.  DR CARITO LAGOS.  DB 11/12/18 3:55P    INSERTION OF TUNNELED CENTRAL VENOUS CATHETER (CVC) WITH SUBCUTANEOUS PORT Left 6/13/2019     Procedure: [id number] - CHEST;  Surgeon: Libertad Hernandez MD;  Location: [facility] OR [address] [place] [address];  Service: General;  Laterality: Left;    TUBAL LIGATION          Current Outpatient Medications on File Prior to Visit   Medication Sig Dispense Refill    artificial tears (ISOPTO TEARS) 0.5 % ophthalmic solution Place 1 drop into both eyes 4 (four) times daily.        carboxymethylcellulose sodium 1 % DpGe Place 1 drop into both eyes every evening.   0    fish oil-omega-3 fatty acids 300-1,000 mg capsule Take 2 g by mouth once daily.        gabapentin (NEURONTIN) 300 MG capsule Take 1 capsule (300 mg total) by mouth 2 (two) times daily. 60 capsule 2    magic mouthwash diphen/antac/lidoc/nysta Take 10 mLs by mouth 4 (four) times daily. 120 mL 0    multivitamin with minerals tablet Take 1 tablet by mouth once daily.          ondansetron (ZOFRAN-ODT) 8 MG TbDL Take 1 tablet (8 mg total) by mouth every 8 (eight) hours as needed (nausea). 45 tablet 1    polymyxin B sulf-trimethoprim (POLYTRIM) 10,000 unit- 1 mg/mL Drop Place 1 drop into the right eye every 6 (six) hours. for 5 days 10 mL 0    potassium chloride SA (K-DUR,KLOR-CON) 20 MEQ tablet Take 2 tablets (40 mEq total) by mouth once daily. 60 tablet 1    sulfamethoxazole-trimethoprim 800-160mg (BACTRIM DS) 800-160 mg Tab Take 1 tablet by mouth 2 (two) times daily. for 7 days 14 tablet 0     sulfamethoxazole-trimethoprim 800-160mg (BACTRIM DS) 800-160 mg Tab Take 1 tablet by mouth 2 (two) times daily. 14 tablet 0                Current Facility-Administered Medications on File Prior to Visit   Medication Dose Route Frequency Provider Last Rate Last Dose    sodium chloride 0.9% flush 10 mL  10 mL Intravenous 1 time in Clinic/HOD Dominique Ayala MD          Social History               Socioeconomic History    Marital status:        Spouse name: Not on file    Number of children: Not on file    Years of education: Not on file    Highest education level: Not on file   Occupational History    Occupation:  worker       Employer: [employer]   Social Needs    Financial resource strain: Not on file    Food insecurity:       Worry: Not on file       Inability: Not on file    Transportation needs:       Medical: Not on file       Non-medical: Not on file   Tobacco Use    Smoking status: Never Smoker    Smokeless tobacco: Never Used   Substance and Sexual Activity    Alcohol use: Yes       Comment: Social    Drug use: No    Sexual activity: Yes       Partners: Male       Birth control/protection: Post-menopausal   Lifestyle    Physical activity:       Days per week: Not on file       Minutes per session: Not on file    Stress: Not on file   Relationships    Social connections:       Talks on phone: Not on file       Gets together: Not on file       Attends Voodoo service: Not on file       Active member of club or organization: Not on file       Attends meetings of clubs or organizations: Not on file       Relationship status: Not on file   Other Topics Concern    Are you pregnant or think you may be? No    Breast-feeding No   Social History Narrative    Not on file               Family History   Problem Relation Age of Onset    Diabetes Father      Hypertension Mother      Miscarriages / Stillbirths Mother      ANGEL disease Mother      Eczema Mother       Parkinsonism Mother      Diabetes Brother      Diabetes Brother      Colon cancer Maternal Aunt      No Known Problems Sister      No Known Problems Maternal Uncle      No Known Problems Paternal Aunt      No Known Problems Paternal Uncle      No Known Problems Maternal Grandmother      No Known Problems Maternal Grandfather      No Known Problems Paternal Grandmother      No Known Problems Paternal Grandfather      Diabetes Brother      No Known Problems Son      No Known Problems Son      Celiac disease Neg Hx      Cirrhosis Neg Hx      Colon polyps Neg Hx      Crohn's disease Neg Hx      Cystic fibrosis Neg Hx      Esophageal cancer Neg Hx      Hemochromatosis Neg Hx      Inflammatory bowel disease Neg Hx      Irritable bowel syndrome Neg Hx      Liver cancer Neg Hx      Liver disease Neg Hx      Rectal cancer Neg Hx      Stomach cancer Neg Hx      Ulcerative colitis Neg Hx      Anthony's disease Neg Hx      Psoriasis Neg Hx      Ovarian cancer Neg Hx      Breast cancer Neg Hx      Amblyopia Neg Hx      Blindness Neg Hx      Cancer Neg Hx      Cataracts Neg Hx      Glaucoma Neg Hx      Macular degeneration Neg Hx      Retinal detachment Neg Hx      Strabismus Neg Hx      Stroke Neg Hx      Thyroid disease Neg Hx        Review of Systems   Constitutional: Negative for chills and fever.   HENT: Negative for sore throat.    Eyes: Negative for redness.   Respiratory: Negative for shortness of breath.    Cardiovascular: Negative for chest pain.   Gastrointestinal: Negative for abdominal pain.   Endocrine: Negative for polyuria.   Genitourinary: Negative for dysuria.   Musculoskeletal: Negative for back pain.   Skin: Negative for wound.   Neurological: Negative for headaches.   Psychiatric/Behavioral: Negative for agitation.      Objective:   /89 (BP Location: Right arm, Patient Position: Sitting, BP Method: Medium (Automatic))   Pulse 81   Temp 98.1 °F (36.7 °C) (Oral)   " Ht 5' 3" (1.6 m)   BMI 24.45 kg/m²      Physical Exam   Nursing note and vitals reviewed.  Constitutional: She appears well-developed and well-nourished. No distress.   HENT:   Head: Normocephalic and atraumatic.   Eyes: Conjunctivae are normal. No scleral icterus.   Neck: Normal range of motion. Neck supple. No tracheal deviation present.   Cardiovascular: Normal rate and regular rhythm.    Pulmonary/Chest: Effort normal and breath sounds normal. No respiratory distress. Right breast exhibits mass. Right breast exhibits no inverted nipple, no nipple discharge and no skin change. Left breast exhibits no inverted nipple, no mass, no nipple discharge and no skin change.       Abdominal: Soft. She exhibits no mass. There is no tenderness.   Musculoskeletal: She exhibits no edema.   Lymphadenopathy:     She has no cervical adenopathy.   Neurological: She is alert.   Skin: Skin is warm and dry. No rash noted. No erythema.     Psychiatric: She has a normal mood and affect. Her behavior is normal.      Radiology review: Images personally reviewed by me in the clinic.   Mammogram and US  12/18/19: Final read pending but reviewed in person with radiologist and showed significant decrease in size of mass, great treatment response.      Assessment:       69-year-old female with triple negative invasive ductal carcinoma of the right breast.        Plan:      To OR for Right lumpectomy + SLNB   "

## 2020-01-23 NOTE — DISCHARGE SUMMARY
[facility]  Brief Operative Note    Surgery Date: 1/23/2020     Surgeon(s) and Role:     * Libertad Hernandez MD - Primary     * Chung Olson MD - Resident - Assisting    Pre-op Diagnosis:  Malignant neoplasm of lower-inner quadrant of right breast of female, estrogen receptor negative [C50.311, Z17.1]    Post-op Diagnosis:  Post-Op Diagnosis Codes:     * Malignant neoplasm of lower-inner quadrant of right breast of female, estrogen receptor negative [C50.311, Z17.1]    Procedure(s) (LRB):  MASTECTOMY, PARTIAL- w/Seed Localization (Right)  INJECTION, FOR SENTINEL NODE IDENTIFICATION (Right)  BIOPSY, LYMPH NODE, SENTINEL (Right)    Anesthesia: General    Description of the findings of the procedure(s):   1. Right breast ultrasound and magseed localization partial mastectomy (lumpectomy) with excision for clear margins   2. Right axillary deep sentinel lymph node biopsy (3 nodes sent for frozen, 2 hot 1 palpable, negative for malignancy)  3. injection of left breast with technetium-labeled radiocolloid for sentinel lymph node identification + blue dye   4. Re-excision of inferior and anterior margin      Estimated Blood Loss: * No values recorded between 1/23/2020 10:05 AM and 1/23/2020 11:53 AM *         Specimens:   Specimen (12h ago, onward)    None            Discharge Note    OUTCOME: Patient tolerated treatment/procedure well without complication and is now ready for discharge.    DISPOSITION: Home or Self Care    FINAL DIAGNOSIS:  Breast Cancer     FOLLOWUP: In clinic

## 2020-01-23 NOTE — TRANSFER OF CARE
"Anesthesia Transfer of Care Note    Patient: Sharri Cline    Procedure(s) Performed: Procedure(s) (LRB):  MASTECTOMY, PARTIAL- w/Seed Localization (Right)  INJECTION, FOR SENTINEL NODE IDENTIFICATION (Right)  BIOPSY, LYMPH NODE, SENTINEL (Right)    Patient location: PACU    Anesthesia Type: general    Transport from OR: Transported from OR on 6-10 L/min O2 by face mask with adequate spontaneous ventilation    Post pain: adequate analgesia    Post assessment: no apparent anesthetic complications and tolerated procedure well    Post vital signs: stable    Level of consciousness: responds to stimulation (voice)    Nausea/Vomiting: no nausea/vomiting    Complications: none    Transfer of care protocol was followed      Last vitals:   Visit Vitals  /68 (BP Location: Left arm, Patient Position: Lying)   Pulse 77   Temp 36.6 °C (97.9 °F) (Oral)   Resp 18   Ht 5' 3" (1.6 m)   Wt 59.9 kg (132 lb)   SpO2 100%   Breastfeeding? No   BMI 23.38 kg/m²     "

## 2020-01-23 NOTE — OP NOTE
DATE OF PROCEDURE: 1/23/2020    SURGEON: Surgeon(s) and Role:     * Libertad Hernandez MD - Primary     * Chung Olson MD - Resident - Assisting    PREOPERATIVE DIAGNOSIS: Invasive breast carcinoma of the right breast lower inner quadrant    POSTOPERATIVE DIAGNOSIS: same    ANESTHESIA: regional and general    PROCEDURES PERFORMED:   1. right breast ultrasound localization partial mastectomy (lumpectomy) with excision for clear margins   2. right axillary deep sentinel lymph node biopsy   3. injection of right breast with technetium-labeled radiocolloid for sentinel lymph node identification  4. Identification of sentinel lymph node   5. Ultrasound localization of right breast mass  6. Surgeon interpretation of specimen radiograph  7. breast injection with isosulfan Lymphazurin blue dye for dual mapping to sentinel node      PROCEDURE IN DETAIL:   The patient underwent informed consent.  The films were reviewed.    She was then brought to the Operating Room and placed in the supine position. regional and general anesthesia was administered.    The right breast was injected in the subareolar region with the technetium-labeled radiocolloid. The right breast was further injected with the isosulfan Lymphazurin blue dye in the central subareolar region.  The right breast, anterior chest, arm and axilla were then prepped and draped in a sterile fashion.     Using the gamma probe, activity was noted and localized in the right axilla. Local anesthetic with 1% lidocaine was injected into the skin where the incision will be placed. We made a small transverse inferior axillary incision over the area of activity. We then dissected down through the clavipectoral fascia using electrocautery, identifying a hot afferent lymphatic channel coming from the upper outer quadrant axillary tail of Ramires breast tissue to a level 1 sentinel node, which was excised. It was dissected circumferentially with blunt dissection and the  afferent and efferent lymphatics were tied together. The lymph node was labeled as specimen #1 for permanent sectioning, hot and not blue with an ex vivo count of 3576. A total of 2 axillary sentinel lymph nodes were removed.  The probe was placed in the cavity and there was no significant residual background radioactivity or blue dye noted in the axilla indicating adequate and appropriate sentinel lymph node biopsy. The cavity was then palpated and 1 further palpable nodes were noted. These were sent for frozen section and all were negative for malignancy so no further nodes were taken.  We then achieved hemostasis and irrigation was performed.  The incision was then closed with an interuppted 3-0 vicryl deep dermal followed by a running 4-0 vicryl subcuticular.    Next, we turned our attention to the right breast itself. Ultrasound was used to identify the breast mass at 4 o'clock.  The area if interest was identified with a clip within and then was marked for localization using ultrasound guidance with magseed localization  An incision was made in the lower inner quadrant of the right breast over the anticipated tract of the lesion in a rita-tumoral location with a small ellipse of skin taken over the tumor.  The specimen was dissected circumferentially around the cancer.  We did dissect all the way down to, but not including the underlying pectoralis fascia.  The ultrasound localization lumpectomy specimen was inked on the back table using green ink inferiorly, blue ink superiorly, orange ink laterally, yellow ink anteriorly, black ink posteriorly, and the red ink medially.  It was then fixed with acetic acid and submitted for specimen radiograph with the [facility], which confirmed the clip and area of interest within the specimen, and was interpreted in the operating room. Given the appearance and location of the lesion, additional margins were taken including full posterior margin to muscle including fascia, new  medial and inferior margins.       Within the lumpectomy cavity, hemostasis was achieved with cautery. The wound was irrigated until clear. There was no evidence of bleeding. It was closed in multiple layers with deep dermal and subcutaneous interrupted Vicryl sutures and a running 4-0 vicryl subcuticular skin closure.    Dermabond was applied. Sterile fluff gauze was placed and a post-procedure bra was placed. She tolerated the procedure well without complication and was turned over to Anesthesia for transport to the recovery area in a satisfactory condition. All specimens were sent to Pathology for permanent sectioning.    ESTIMATED BLOOD LOSS: 10ml    COMPLICATIONS: none    DISPOSITION:  PACU--hemodynamically stable    ATTESTATION:  I was present and scrubbed for the entire procedure.

## 2020-01-23 NOTE — ANESTHESIA POSTPROCEDURE EVALUATION
Anesthesia Post Evaluation    Patient: Sharri Cline    Procedure(s) Performed: Procedure(s) (LRB):  MASTECTOMY, PARTIAL- w/Seed Localization (Right)  INJECTION, FOR SENTINEL NODE IDENTIFICATION (Right)  BIOPSY, LYMPH NODE, SENTINEL (Right)    Final Anesthesia Type: general    Patient location during evaluation: [facility]  Patient participation: Yes- Able to Participate  Level of consciousness: awake and alert and oriented  Post-procedure vital signs: reviewed and stable  Pain management: adequate  Airway patency: patent    PONV status at discharge: No PONV  Anesthetic complications: no      Cardiovascular status: blood pressure returned to baseline and hemodynamically stable  Respiratory status: unassisted, spontaneous ventilation and room air  Hydration status: euvolemic  Follow-up not needed.          Vitals Value Taken Time   /65 1/23/2020 12:46 PM   Temp 36.3 °C (97.3 °F) 1/23/2020 11:50 AM   Pulse 80 1/23/2020 12:49 PM   Resp 24 1/23/2020 12:49 PM   SpO2 99 % 1/23/2020 12:49 PM   Vitals shown include unvalidated device data.      No case tracking events are documented in the log.      Pain/Debby Score: Debby Score: 9 (1/23/2020 11:50 AM)

## 2020-01-23 NOTE — ANESTHESIA PREPROCEDURE EVALUATION
01/23/2020  Sharri Cline is a 69 y.o., female.    Anesthesia Evaluation    I have reviewed the Patient Summary Reports.     I have reviewed the Medications.     Review of Systems  Anesthesia Hx:  No problems with previous Anesthesia Denies Hx of Anesthetic complications  History of prior surgery of interest to airway management or planning:  Denies Personal Hx of Anesthesia complications.   Social:  Non-Smoker    Hematology/Oncology:         -- Anemia: Current/Recent Cancer. Breast   EENT/Dental:EENT/Dental Normal   Cardiovascular:    Denies Angina.        Pulmonary:   Denies Shortness of breath.    Renal/:  Renal/ Normal     Hepatic/GI:   GERD, well controlled    Musculoskeletal:  Musculoskeletal Normal    Neurological:   Peripheral Neuropathy    Endocrine:  Endocrine Normal    Dermatological:  Skin Normal    Psych:  Psychiatric Normal           Physical Exam  General:  Well nourished    Airway/Jaw/Neck:  Airway Findings: Mouth Opening: Normal Tongue: Normal  General Airway Assessment: Adult  Mallampati: II  TM Distance: Normal, at least 6 cm  Jaw/Neck Findings:  Neck ROM: Normal ROM      Dental:  Dental Findings: Edentulous, Upper Dentures, Lower Dentures   Chest/Lungs:  Chest/Lungs Findings: Clear to auscultation, Normal Respiratory Rate     Heart/Vascular:  Heart Findings: Rate: Normal  Rhythm: Regular Rhythm  Sounds: Normal        Mental Status:  Mental Status Findings:  Cooperative, Alert and Oriented         Anesthesia Plan  Type of Anesthesia, risks & benefits discussed:  Anesthesia Type:  general, regional  Patient's Preference:   Intra-op Monitoring Plan: standard ASA monitors  Intra-op Monitoring Plan Comments:   Post Op Pain Control Plan: multimodal analgesia, peripheral nerve block, IV/PO Opioids PRN and per primary service following discharge from PACU  Post Op Pain Control Plan  Comments:   Induction:   IV  Beta Blocker:  Patient is not currently on a Beta-Blocker (No further documentation required).       Informed Consent: Patient understands risks and agrees with Anesthesia plan.  Questions answered. Anesthesia consent signed with patient.  ASA Score: 2     Day of Surgery Review of History & Physical:    H&P update referred to the surgeon.         Ready For Surgery From Anesthesia Perspective.

## 2020-01-23 NOTE — ADDENDUM NOTE
Addendum  created 01/23/20 1437 by Lorene Cherry MD    Attestation recorded in Intraprocedure, Intraprocedure Attestations filed

## 2020-01-24 ENCOUNTER — TELEPHONE (OUTPATIENT)
Dept: SURGERY | Facility: CLINIC | Age: 70
End: 2020-01-24

## 2020-01-24 NOTE — TELEPHONE ENCOUNTER
Return call to pt. Was confused as to when she could shower as she has 2 different documents, on says 24 hrs, there other 38 hrs. Informed pt that she may shower today. Pt to remove the guauze and surgical bra for shower and then put back on after shower. Pt asked if she needed to replace the guaze, informed pt that she would only need to repalce the guaze should it become soiled.Pt voiced understanding.

## 2020-01-24 NOTE — TELEPHONE ENCOUNTER
----- Message from Van Shipman sent at 1/24/2020 10:16 AM CST -----  Contact: Pt  Patient called inquiring about her post-op showering restrictions in relation to getting her incision area wet requesting callback regarding this matter       Callback:[phone] (home)

## 2020-01-29 ENCOUNTER — TELEPHONE (OUTPATIENT)
Dept: HEMATOLOGY/ONCOLOGY | Facility: CLINIC | Age: 70
End: 2020-01-29

## 2020-01-29 ENCOUNTER — TELEPHONE (OUTPATIENT)
Dept: SURGERY | Facility: CLINIC | Age: 70
End: 2020-01-29

## 2020-01-29 DIAGNOSIS — T45.1X5A CHEMOTHERAPY-INDUCED NEUROPATHY: Primary | ICD-10-CM

## 2020-01-29 DIAGNOSIS — G62.0 CHEMOTHERAPY-INDUCED NEUROPATHY: Primary | ICD-10-CM

## 2020-01-29 NOTE — TELEPHONE ENCOUNTER
Call to pt.  Pt unavailable.   Left message for pt informing OT referral placed and LCSW to contact to set up.  Callback number provided if any questions/concerns.     MD Shikha Lester   Caller: Unspecified (Today,  9:37 AM)             Not sure it'll help, but I think okay to do occupational therapy  referral             Returned call to pt.   Pt stated that she does not want to take the Gabapentin for neuropathy (would rather a natural approach).   Pt stated not having pain, rather numbness and tingling.   Pt asked if PT/OT could help her with exercises to manage.   Informed pt would check with Dr. Ayala and f/u.   Pt verbalized understanding.       Message fwd.        ----- Message from Rajwinder Quiroga sent at 1/29/2020  9:32 AM CST -----  Contact: Patient  Who is calling:: Pt    Provider treating:: Dr Ayala    Current symptom:: neuropathy in hands and feet    Are you currently receiving treatment for your diagnosis::--    When was your last treatment::     How long have you had the symptom:: Nov '19    Communication preference:: [phone]    Additional Info::

## 2020-01-29 NOTE — TELEPHONE ENCOUNTER
Return call to pt. States she would like to return to work if Dr Hernandez thinks it's ok for her to do so. Pt s/p right lumpectomy, SLNB 1/23/2020. 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 Instructed pt not to lift > 10 pounds at a time, and not to do too much over the head lifting. Pt to contact her manager to see if she does return, if there is a work release letter that may be needed. Pt to call back if work release note needed.

## 2020-01-29 NOTE — TELEPHONE ENCOUNTER
----- Message from Apryl Jimenez sent at 1/29/2020  9:29 AM CST -----  Contact: pt called   Would like to know if she can return back to work on this upcoming weekend.  2/1/20.  Pt contact is [phone]

## 2020-01-30 ENCOUNTER — DOCUMENTATION ONLY (OUTPATIENT)
Dept: HEMATOLOGY/ONCOLOGY | Facility: CLINIC | Age: 70
End: 2020-01-30

## 2020-01-30 NOTE — PROGRESS NOTES
Received a message from Dr Ayala's office requesting to inquire about an order that was placed for Outpatient OT and PT yesterday. The patient was scheduled for PT on 2/7/20 - spoke to Samra at [facility] and she agreed to make arrangements for the patient's OT appointment. Notified Dr Ayala's office.

## 2020-01-31 LAB
COMMENT: NORMAL
FINAL PATHOLOGIC DIAGNOSIS: NORMAL
FROZEN SECTION DIAGNOSIS: NORMAL
FROZEN SECTION FOOTNOTE: NORMAL
GROSS: NORMAL

## 2020-02-05 ENCOUNTER — OFFICE VISIT (OUTPATIENT)
Dept: SURGERY | Facility: CLINIC | Age: 70
End: 2020-02-05
Payer: MEDICARE

## 2020-02-05 VITALS
SYSTOLIC BLOOD PRESSURE: 145 MMHG | BODY MASS INDEX: 24.84 KG/M2 | HEIGHT: 63 IN | DIASTOLIC BLOOD PRESSURE: 70 MMHG | WEIGHT: 140.19 LBS | HEART RATE: 79 BPM

## 2020-02-05 DIAGNOSIS — C50.311 MALIGNANT NEOPLASM OF LOWER-INNER QUADRANT OF RIGHT BREAST OF FEMALE, ESTROGEN RECEPTOR NEGATIVE: Primary | ICD-10-CM

## 2020-02-05 DIAGNOSIS — Z17.1 MALIGNANT NEOPLASM OF LOWER-INNER QUADRANT OF RIGHT BREAST OF FEMALE, ESTROGEN RECEPTOR NEGATIVE: Primary | ICD-10-CM

## 2020-02-05 PROCEDURE — 99499 RISK ADDL DX/OHS AUDIT: ICD-10-PCS | Mod: S$GLB,,, | Performed by: SURGERY

## 2020-02-05 PROCEDURE — 99999 PR PBB SHADOW E&M-EST. PATIENT-LVL III: ICD-10-PCS | Mod: PBBFAC,,, | Performed by: SURGERY

## 2020-02-05 PROCEDURE — 99024 POSTOP FOLLOW-UP VISIT: CPT | Mod: S$GLB,,, | Performed by: SURGERY

## 2020-02-05 PROCEDURE — 99024 PR POST-OP FOLLOW-UP VISIT: ICD-10-PCS | Mod: S$GLB,,, | Performed by: SURGERY

## 2020-02-05 PROCEDURE — 99499 UNLISTED E&M SERVICE: CPT | Mod: S$GLB,,, | Performed by: SURGERY

## 2020-02-05 PROCEDURE — 99999 PR PBB SHADOW E&M-EST. PATIENT-LVL III: CPT | Mod: PBBFAC,,, | Performed by: SURGERY

## 2020-02-05 NOTE — PROGRESS NOTES
REFERRING PHYSICIAN:  No referring provider defined for this encounter.       Jhon Arndt MD    MEDICAL ONCOLOGIST:    Dr. Ayala  RADIATION ONCOLOGIST:       DIAGNOSIS:    This is a 69 y.o. 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    TREATMENT SUMMARY:  The patient is status post right partial mastectomy and sentinel node biopsy on 1/23/20.  Final pathology showed:    1. BREAST, RIGHT, LOWER INNER QUADRANT, PARTIAL MASTECTOMY:  - No residual carcinoma present, compatible with complete response to chemotherapy, see Tumor Synoptic.  - Benign skin, negative for carcinoma.  - Biopsy clip and fibrous changes consistent with tumor resolution are present in the sample.  - Pathologic staging: ypT0 (sn)N0  2. AXILLARY SENTINEL LYMPH NODE, RIGHT, # 1, HOT BLUE 3576, EXCISION:  - One benign sentinel lymph node, negative for metastatic carcinoma (0/1).  - Immunohistochemical stains for CK WSK, Cam 5.2, and CK AE1/AE3 are negative, supporting the  diagnosis.  - No fibrosis or chemotherapeutic effect identified.  3. AXILLARY SENTINEL LYMPH NODE, RIGHT, # 2, HOT 1290, EXCISION:  - One benign sentinel lymph node, negative for metastatic carcinoma (0/1).  - Immunohistochemical stains for CK WSK, Cam 5.2, and CK AE1/AE3 are negative, supporting the  diagnosis.  - No fibrosis or chemotherapeutic effect identified.  4. AXILLARY LYMPH NODE, RIGHT, PALPABLE, EXCISION:  - One benign lymph node, negative for metastatic carcinoma (0/1).  - Immunohistochemical stains for CK WSK, Cam 5.2, and CK AE1/AE3 are negative, supporting the  diagnosis.  - No fibrosis or chemotherapeutic effect identified.  Procedure  Excision (less than total mastectomy)  Specimen Laterality  Right  Tumor Site  Lower inner quadrant  Histologic Type  No residual invasive carcinoma  Histologic Grade (Garcia Histologic Score)  No residual invasive carcinoma  Tumor Size  No residual invasive carcinoma  Ductal Carcinoma In Situ (DCIS)  Not  identified  Lobular Carcinoma In Situ (LCIS)  No LCIS in specimen  Lymphovascular Invasion  Not identified  Dermal Lymphovascular Invasion  Not identified  Microcalcifications  Not identified  Treatment Effect  Treatment Effect in the Breast  No residual invasive carcinoma is present in the breast after presurgical therapy  Treatment Effect in the Lymph Nodes  No lymph node metastases and no prominent fibrous scarring in the nodes  LYMPH NODES  Regional Lymph Nodes  Uninvolved by tumor cells  Number of Lymph Nodes Examined  3  Number of [place] Nodes Examined  2  TNM Descriptors  y (post-treatment)  Primary Tumor (pT)  pT0  Regional Lymph Nodes (pN)  Modifier  (sn): Only sentinel node(s) evaluated.  Category (pN)  pN0  Breast Biomarker Testing Performed on Previous Biopsy  Estrogen Receptor (ER)  Negative  Progesterone Receptor (PgR)  Negative  HER2 (by immunohistochemistry)  Negative (Score 1+)    INTERVAL HISTORY:   Sharri Cline comes in for a post-op check.  She denies fever, chills, chest pain or shortness of breath.  Her pain is well controlled.      MEDICATIONS:  Current Outpatient Medications   Medication Sig Dispense Refill    artificial tears (ISOPTO TEARS) 0.5 % ophthalmic solution Place 1 drop into both eyes 4 (four) times daily.      carboxymethylcellulose sodium 1 % DpGe Place 1 drop into both eyes every evening.  0    fish oil-omega-3 fatty acids 300-1,000 mg capsule Take 2 g by mouth once daily.      gabapentin (NEURONTIN) 300 MG capsule Take 1 capsule (300 mg total) by mouth 2 (two) times daily. 60 capsule 2    HYDROcodone-acetaminophen (NORCO) 5-325 mg per tablet Take 1 tablet by mouth every 6 (six) hours as needed for Pain. 10 tablet 0    magic mouthwash diphen/antac/lidoc/nysta Take 10 mLs by mouth 4 (four) times daily. 120 mL 0    multivitamin with minerals tablet Take 1 tablet by mouth once daily.        ondansetron (ZOFRAN-ODT) 8 MG TbDL Take 1 tablet (8 mg total) by mouth every  8 (eight) hours as needed (nausea). 45 tablet 1    potassium chloride SA (K-DUR,KLOR-CON) 20 MEQ tablet Take 2 tablets (40 mEq total) by mouth once daily. 60 tablet 1    sulfamethoxazole-trimethoprim 800-160mg (BACTRIM DS) 800-160 mg Tab Take 1 tablet by mouth 2 (two) times daily. 14 tablet 0     No current facility-administered medications for this visit.      Facility-Administered Medications Ordered in Other Visits   Medication Dose Route Frequency Provider Last Rate Last Dose    0.9%  NaCl infusion   Intravenous Continuous Chung Olson MD 70 mL/hr at 01/23/20 0817      lidocaine (PF) 10 mg/ml (1%) injection 10 mg  1 mL Intradermal Once Chung Olson MD        sodium chloride 0.9% flush 10 mL  10 mL Intravenous 1 time in Clinic/HOD Dominique Ayala MD           ALLERGIES:   Review of patient's allergies indicates:  No Known Allergies    PHYSICAL EXAMINATION:   General:  This is a well appearing female with appropriate speech, affect and gait.     Breast:  Incision clean, dry, and intact    IMPRESSION:   The patient has had an uneventful postoperative course.    PLAN:   1. return in 6 months for a follow up office visit and breast exam  2. bilateral mammogram in May, 2020  3. The patient is advised in continued exam of the breast chest wall and to report to this office sooner should she note any areas of abnormality or concern.   4.  She has been instructed to meet with med onc and rad onc for discussion of adjuvant treatment recommendations

## 2020-02-07 ENCOUNTER — CLINICAL SUPPORT (OUTPATIENT)
Dept: REHABILITATION | Facility: HOSPITAL | Age: 70
End: 2020-02-07
Attending: INTERNAL MEDICINE
Payer: MEDICARE

## 2020-02-07 DIAGNOSIS — R20.1 IMPAIRED SENSATION: ICD-10-CM

## 2020-02-07 DIAGNOSIS — Z74.09 IMPAIRED FUNCTIONAL MOBILITY, BALANCE, GAIT, AND ENDURANCE: ICD-10-CM

## 2020-02-07 PROCEDURE — 97161 PT EVAL LOW COMPLEX 20 MIN: CPT | Mod: PO

## 2020-02-07 NOTE — PLAN OF CARE
[facility]  Physical Therapy Neurological Rehabilitation Initial Evaluation    Name: Sharri Cline  Clinic Number: [id number]    Therapy Diagnosis:   Encounter Diagnoses   Name Primary?   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 As a side effect of her chemotherapy she has constant numbness and tingling in B hands and feet. She feels a tingling from the knee down and her B feet are constantly numb and tingling. B hands are numb but the numbness does not radiate past her wrist. In the evening she feels a shooting pain in her feet. Pt was originally diagnosed with breast cancer in May 2019 then head breast lumpectomy in Jan 2020. Her chemotherapy began in Nov-Dec of 2019 and this is when she started having neuropathy. Pt was active prior to cancer diagnosis walking 3-4 miles 5 days a week. She reports when walking she would aim for 15 minute mile but usually have about a 20 minute mile     Medical History:   Past Medical History:   Diagnosis Date    Helicobacter pylori antibody positive 5/27/13    treated with clarithromycin, metronidazole, and omeprazole x 14 days  (5/27-6/3); EGD normal in July 2013       Surgical History:   Sharri Cline  has a past surgical history that includes Tubal ligation; Biopsy of liver with ultrasound guidance (N/A, 11/19/2018); Insertion of tunneled  "central venous catheter (CVC) with subcutaneous port (Left, 6/13/2019); Mastectomy, partial (Right, 1/23/2020); Injection for sentinel node identification (Right, 1/23/2020); and [place] lymph node biopsy (Right, 1/23/2020).    Medications:   Sharri has a current medication list which includes the following prescription(s): artificial tears, carboxymethylcellulose sodium, fish oil-omega-3 fatty acids, gabapentin, hydrocodone-acetaminophen, magic mouthwash diphen/antac/lidoc/nysta, multivitamin with minerals, ondansetron, potassium chloride sa, and sulfamethoxazole-trimethoprim 800-160mg, and the following Facility-Administered Medications: sodium chloride 0.9%, lidocaine (pf) 10 mg/ml (1%), and sodium chloride 0.9%.    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 To be able to walk and have my balance. As normal as I can be."    Objective     Observation: pleasant and cooperative   Speech: fluent    Mental status: alert, oriented to person, place, and time, normal mood, behavior, speech, dress, motor activity, and thought processes  Appearance: Casually dressed and Well groomed  Behavior:  calm, cooperative and adequate rapport can be " established  Attention Span and Concentration:  Normal    Posture Alignment :slouched posture    Dominant hand:  right     Skin integrity:  Intact and Impaired: pt reports the incision is still healing but her MD cleared her for all activity    Visual/Auditory: denies changes     ROM:   GROSS AROM/PROM  UPPER EXTREMITY  (R) UE: WFLs  (L) UE: WFLs  LOWER EXTREMITY  (R) LE: WFLs  (L) LE: WFLs       Lower Extremity Strength (tested in seated)  Right LE  Left LE    Hip flexion:  4/5 Hip flexion: 4/5   Knee extension: 5/5 Knee extension: 5/5   Knee flexion: 4+/5 Knee flexion: 4+/5   Ankle dorsiflexion:  4/5 Ankle dorsiflexion: 4/5   Ankle plantarflexion:  4/5 Ankle plantarflexion: 4/5   Hip abduction: 5/5 Hip abduction: 5/5   Hip adduction: 5/5 Hip adduction 5/5   Hip extension: 4-/5 Hip extension: 4/5     Upper extremity strength:     Right Left   Shoulder Flexion: 5/5 5/5   Shoulder Abduction: 5/5 5/5   Shoulder Extension: 5/5 5/5   Elbow Flexion: 5/5 5/5   Elbow Extension: 5/5 5/5   Wrist Extension: 5/5 5/5   Wrist Flexion: 5/5 5/5    4/5 4/5     Coordination:   Rapid Alternating Movements: intact  Point to Point:    -Finger to Nose: NT   -Heel to Shin: NT    Sensation: intact with tingling feeling in B feet  Proprioception: NT    Tone/Spasticity: NT    Functional Mobility (Bed mobility, transfers)  Bed mobility: I  Supine to sit: I  Sit to supine: I  Rolling: I  Transfers to bed: I  Transfers to toilet: I  Sit to stand:  I  Stand pivot:  I  Car transfers: I  Floor transfers: Mod I        Evaluation   Single Limb Stance R LE 11 sec  (<10 sec = HIGH FALL RISK)   Single Limb Stance L LE 12 sec  (<10 sec = HIGH FALL RISK)   30 second Chair Rise 13 completed with no arms   TUG 8.8 + 9 = 8.9 seconds   Self selected walking speed 1.05 m/sec (6m/5.7s)   Fast walking speed 1.54 m/sec (6m/3.9s)     30 second chair rise below average score:   Age  Men  Women  65-69  <12  <11  A below average score indicates a risk for  "fall.    Postural control:  [place] SENSORY TESTING:  (P= Pass, F= Fail; note any sway; hold each position for 30")  Condition 1: (firm surface/feet together/eyes open) P  Condition 2: (firm surface/feet together/eyes closed) P  Condition 3: (firm surface/feet in tandem/eyes open) P  Condition 4: (firm surface/feet in tandem/eyes closed) F; 5 sec  Condition 5: (soft surface/feet together/eyes open) P  Condition 6: (soft surface/feet together/eyes closed) P, mod sway  Condition 7: (Fakuda step test), measure distance varied from center starting position NT    Gait Assessment:   - AD used: none  - Assistance: ind  - Stairs: I    · GAIT DEVIATIONS:   Sharri displays the following deviations with ambulation: decreased B heel strike, decreased cristi, decreased arm swing   Impairments contributing to deviations: impaired motor control, impaired strength, impaired sensation      Functional Gait Assessment:   1. Gait on level surface =  2   (3) Normal: less than 5.5 sec, no A.D., no imbalance, normal gait pattern, deviates< 6in   (2) Mild impairment: 7-5.6 sec, uses A.D., mild gait deviations, or deviates 6-10 in   (1) Moderate impairment: > 7 sec, slow speed, imbalance, deviates 10-15 in.   (0) Severe impairment: needs assist, deviates >15 in, reach/touch wall  2. Change in Gait Speed = 3   (3) Normal: smooth change w/o loss of balance or gait deviation, deviates < 6 in, significant difference between speeds   (2) Mild impairment: changes speed, but demonstrates mild gait deviations, deviates 6-10 in, OR no deviations but unable to significantly speed, OR uses A.D.   (1) Moderate impairment: minor changes to speed, OR changes speed w/ significant deviations, deviates 10-15 in, OR  Changes speed , but loses balance & recovers   (0) Severe impairment: cannot change speed, deviates >15 in, or loses balance & needs assist  3. Gait with horizontal head turns  = 2   (3) Normal: no change in gait, deviates <6 in   (2) Mild " impairment: slight change in speed, deviates 6-10 in, OR uses A.D.   (1) Moderate impairment: moderate change in speed, deviates 10-15 in   (0) Severe impairment: severe disruption of gait, deviates >15in  4. Gait with vertical head turns = 2   (3) Normal: no change in gait, deviates <6 in   (2) Mild impairment: slight change in speed, deviates 6-10 in OR uses A.D.   (1) Moderate impairment: moderate change in speed, deviates 10-15 in   (0) Severe impairment: severe disruption of gait, deviates >15 in  5. Gait with pivot turns = 3   (3) Normal: performs safely in 3 sec, no LOB   (2) Mild impairment: performs in >3 sec & no LOB, OR turns safely & requires several steps to regain LOB   (1) Moderate impairment: turns slow, OR requires several small steps for balance following turn & stop   (0) Severe impairment: cannot turn safely, needs assist  6. Step over obstacle = 2   (3) Normal: steps over 2 stacked boxes w/o change in speed or LOB   (2) Mild impairment: able to step over 1 box w/o change in speed or LOB   (1) Moderate impairment: steps over 1 box but must slow down, may require VC   (0) Severe impairment: cannot perform w/o assist  7. Gait with Narrow PIAT = 2   (3) Normal: 10 steps no staggering   (2) Mild impairment: 7-9 steps   (1) Moderate impairment: 4-7 steps   (0) Severe impairment: < 4 steps or cannot perform w/o assist  8. Gait with eyes closed = 1   (3) Normal: < 7 sec, no A.D., no LOB, normal gait pattern, deviates <6 in   (2) Mild impairment: 7.1-9 sec, mild gait deviations, deviates 6-10 in   (1) Moderate impairment: > 9 sec, abnormal pattern, LOB, deviates 10-15 in   (0) Severe impairment: cannot perform w/o assist, LOB, deviates >15in  9. Ambulating Backwards = 1   (3) Normal: no A.D., no LOB, normal gait pattern, deviates <6in   (2) Mild impairment: uses A.D., slower speed, mild gait deviations, deviates 6-10 in   (1) Moderate impairment: slow speed, abnormal gait pattern, LOB, deviates 10-15  in   (0) Severe impairment: severe gait deviations or LOB, deviates >15in  10. Steps = 3   (3) Normal: alternating feet, no rail   (2) Mild Impairment: alternating feet, uses rail   (1) Moderate impairment: step-to, uses rail   (0) Severe impairment: cannot perform safely    Score 21/30     Score:   <22/30 fall risk   <20/30 fall risk in older adults   <18/30 fall risk in Parkinsons       Endurance Deficit: yes; pt reports inabiltiy to ambulate as far as she used to and gets SOB easily     PT Evaluation Completed? Yes        CMS Impairment/Limitation/Restriction for FOTO Intake Survey    Therapist reviewed FOTO scores for Sharri Cline on 2/7/2020.   FOTO documents entered into [employer] - see Media section.    Limitation Score: 30%  Category: Mobility    Current : CJ = at least 20% but < 40% impaired, limited or restricted  Goal: CJ = at least 20% but < 40% impaired, limited or restricted         TREATMENT   Treatment not provided due to time restrictions.     Home Exercises and Patient Education Provided    Education provided:   - POC, role of PT, neuropathy diagnosis      Assessment   Sharri is a 69 y.o. female referred to outpatient Physical Therapy with a medical diagnosis of chemotherapy induced neuropathy. Pt presents to PT with the following impairments leading to his/her functional decline: decrease balance, impaired sensation in B feet and hands, impaired endurance, and decreased overall mobility. Pt is highly motivated to participate in skilled PT services and reports that she would like to feel more confident in her balance and mobility so that she can return to walking several miles a day. Pt demonstrates most difficulty with vision eliminated balance, SLS, endurance, and strength in B LE. She will benefit from skilled PT services 2x/week for the next 8 weeks in order to improve her mobility and independence.    Pt prognosis is Good.   Pt will benefit from skilled outpatient Physical Therapy to address  the deficits stated above and in the chart below, provide pt/family education, and to maximize pt's level of independence.     Plan of care discussed with patient: Yes  Pt's spiritual, cultural and educational needs considered and patient is agreeable to the plan of care and goals as stated below:     Anticipated Barriers for therapy: none at this time    Medical Necessity is demonstrated by the following  History  Co-morbidities and personal factors that may impact the plan of care Co-morbidities:   history of cancer    Personal Factors:   lifestyle     low   Examination  Body Structures and Functions, activity limitations and participation restrictions that may impact the plan of care Body Regions:   lower extremities  upper extremities    Body Systems:    balance  gait  transfers  transitions  motor control    Participation Restrictions:   Pt unable to ambulate without feeling off balance    Activity limitations:   Learning and applying knowledge  no deficits    General Tasks and Commands  no deficits    Communication  no deficits    Mobility  lifting and carrying objects  walking    Self care  no deficits    Domestic Life  doing house work (cleaning house, washing dishes, laundry)    Interactions/Relationships  no deficits    Life Areas  no deficits    Community and Social Life  recreation and leisure         low   Clinical Presentation evolving clinical presentation with changing clinical characteristics moderate   Decision Making/ Complexity Score: low     Goals:  Short Term Goals: 4 weeks   1. Pt will improve score on FGA to at least 25/30 in order to decrease risk for fall.   2. Pt will be independent with established HEP for strengthening and balance.  3. Pt will increase 30 sec chair rise score to at least 15 in order to demonstrate improved endruance and functional strength.  4. Pt will increase SLS time to at least 15 sec B in order to decrease risk for fall.    Long Term Goals: 8 weeks   1. Pt will  improve score on FGA to at least 27/30 in order to demonstrate improved functional balance.   2. Pt will improve score on [place] sensory testing condition 4 to at least 20 seconds in order to demonstrate improved balance in dim lit conditions.   3. Pt will increase SLS time to at least 20 seconds on B LE in order to decrease fall risk.  4. Pt will increase SSWS to at least 1.2 m/s in order to return to ambulation 5x/wk as she was prior to cancer.     Plan   Plan of care Certification: 2/7/2020 to 4/7/2020.    Outpatient Physical Therapy 2 times weekly for 8 weeks to include the following interventions: Gait Training, Manual Therapy, Moist Heat/ Ice, Neuromuscular Re-ed, Orthotic Management and Training, Patient Education, Therapeutic Activites and Therapeutic Exercise.     Patrick Pineda, PT

## 2020-02-10 NOTE — PROGRESS NOTES
Physical Therapy Daily Treatment Note     Name: Sharri Dennis Milan  Clinic Number: [id number]    Therapy Diagnosis:   Encounter Diagnoses   Name Primary?    Impaired functional mobility, balance, gait, and endurance Yes    Impaired sensation      Physician: Dominique Ayala MD    Visit Date: 2/11/2020    Therapy Diagnosis:        Encounter Diagnoses   Name Primary?   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 She is happy her numbness isn't too bad today because she was on her feet all day yesterday.    She was provided with home exercise program today.  Response to previous treatment: first session since evaluation  Functional change: ongoing    Pain: 0/10  Location: n/a     Objective     Sharri received therapeutic exercises to develop strength, endurance, ROM, flexibility, posture and core stabilization for 31 minutes including:  2 x 30 sec piriformis stretch  2 x 30 sec DKTC  2 x 30 sec hamstring stretch  2 x 10 ankle pumps of sciatica nerve glide   2 x 10 wrist pumps of median nerve glide  2 x 30 sec of calf stretch on wedge  3 x 15 reps of bridging with green theraband for hip abduction  3 x 10 reps of SLR  5 minutes recumbent bike, lv 5    Sharri participated in neuromuscular re-education activities to improve: Balance, Coordination, Kinesthetic, Sense, Proprioception and Posture for 0 minutes. The following activities were  included:      Sharri participated in dynamic functional therapeutic activities to improve functional performance for 0 minutes, including:      Home Exercises Provided and Patient Education Provided     Education provided:   - HEP provided    Written Home Exercises Provided: yes.  Exercises were reviewed and Sharri was able to demonstrate them prior to the end of the session.  Sharri demonstrated good  understanding of the education provided.     See EMR under Patient Instructions for exercises provided 2/11/2020.    Assessment     Ms. Harrell tolerated first session since evaluation well. Despite pt arriving late for apt, she was able to tolerate a good amount of therex for strengthening and stretching. These exercises were assigned for HEP and pt demonstrates good understanding of these exercises stating she has previously performed some of them. She remains appropriate for skilled PT services.     Sharri is progressing well towards her goals.   Pt prognosis is Good.     Pt will continue to benefit from skilled outpatient physical therapy to address the deficits listed in the problem list box on initial evaluation, provide pt/family education and to maximize pt's level of independence in the home and community environment.     Pt's spiritual, cultural and educational needs considered and pt agreeable to plan of care and goals.     Anticipated barriers to physical therapy: none    Goals:  Short Term Goals: 4 weeks   1. Pt will improve score on FGA to at least 25/30 in order to decrease risk for fall. ongoing  2. Pt will be independent with established HEP for strengthening and balance. ongoing  3. Pt will increase 30 sec chair rise score to at least 15 in order to demonstrate improved endruance and functional strength. ongoing  4. Pt will increase SLS time to at least 15 sec B in order to decrease risk for fall. ongoing     Long Term Goals: 8 weeks   1. Pt will improve score on FGA to at least 27/30 in order to  demonstrate improved functional balance. ongoing  2. Pt will improve score on [place] sensory testing condition 4 to at least 20 seconds in order to demonstrate improved balance in dim lit conditions. ongoing  3. Pt will increase SLS time to at least 20 seconds on B LE in order to decrease fall risk. ongoing  4. Pt will increase SSWS to at least 1.2 m/s in order to return to ambulation 5x/wk as she was prior to cancer. ongoing     Plan   Plan of care Certification: 2/7/2020 to 4/7/2020.    Continue with strengthening and stretching program, incorporate more high level balance and endurance as appropriate.    Patrick Pineda, PT

## 2020-02-11 ENCOUNTER — TUMOR BOARD CONFERENCE (OUTPATIENT)
Dept: SURGERY | Facility: CLINIC | Age: 70
End: 2020-02-11

## 2020-02-11 ENCOUNTER — CLINICAL SUPPORT (OUTPATIENT)
Dept: REHABILITATION | Facility: HOSPITAL | Age: 70
End: 2020-02-11
Attending: INTERNAL MEDICINE
Payer: MEDICARE

## 2020-02-11 DIAGNOSIS — Z74.09 IMPAIRED FUNCTIONAL MOBILITY, BALANCE, GAIT, AND ENDURANCE: Primary | ICD-10-CM

## 2020-02-11 DIAGNOSIS — R20.1 IMPAIRED SENSATION: ICD-10-CM

## 2020-02-11 PROCEDURE — 97110 THERAPEUTIC EXERCISES: CPT | Mod: PO

## 2020-02-11 NOTE — PROGRESS NOTES
Malignant neoplasm of lower-inner quadrant of right breast of female, estrogen receptor negative    5/16/2019 Imaging Significant Findings     Mammogram and US  Impression:  Right  Mass: Right breast mass at the lower inner position. Assessment: 4 - Suspicious finding. Biopsy is recommended.      Left  There is no mammographic or sonographic evidence of malignancy.          Recommendation:  Biopsy is recommended      5/24/2019 Biopsy     BREAST, RIGHT, 04:00 MASS, ULTRASOUND-GUIDED BIOPSY:  Invasive ductal carcinoma of breast.  Size of invasive carcinoma: 7 MM in greatest linear dimension within a single core biopsy fragment.  Garcia Histologic Score: Grade 3 of 3.      5/24/2019 Initial Diagnosis     Malignant neoplasm of lower-inner quadrant of right breast of female, estrogen receptor negative      5/24/2019 Breast Tumor Markers     Estrogen: Negative  Progesterone: Negative  HER2: Negative   Ki67:70%      6/5/2019 Cancer Staged     Staging form: Breast, AJCC 8th Edition  - Clinical stage from 6/5/2019: Stage IB (cT1c, cN0, cM0, G3, ER-, MS-, HER2-) - Signed by Dominique Ayala MD on 6/5/2019 6/13/2019 - 8/26/2019 Chemotherapy     Treatment Summary   Plan Name: OP BREAST DOSE-DENSE AC - DOXORUBICIN CYCLOPHOSPHAMIDE Q2W  Treatment Goal: Curative  Status: Inactive  Start Date: 7/3/2019  End Date: 8/21/2019  Provider: Dominique Ayala MD  Chemotherapy: DOXOrubicin chemo injection 106 mg, 60 mg/m2 = 106 mg, Intravenous, Clinic/HOD 1 time, 4 of 4 cycles  Administration: 106 mg (7/3/2019), 106 mg (7/17/2019), 106 mg (7/31/2019), 106 mg (8/21/2019)  cyclophosphamide (CYTOXAN) 600 mg/m2 = 1,060 mg in sodium chloride 0.9% 250 mL chemo infusion, 600 mg/m2 = 1,060 mg, Intravenous, Clinic/HOD 1 time, 4 of 4 cycles  Administration: 1,060 mg (7/3/2019), 1,060 mg (7/17/2019), 1,060 mg (7/31/2019), 1,060 mg (8/21/2019)      9/18/2019 - 12/10/2019 Chemotherapy     Treatment Summary   Plan Name: OP PACLITAXEL  QW  Treatment Goal: Curative  Status: Active  Start Date: 9/19/2019  End Date: 1/10/2020  Provider: Dominique Ayala MD  Chemotherapy: PACLitaxel (TAXOL) 64 mg/m2 = 114 mg in sodium chloride 0.9% 250 mL chemo infusion, 64 mg/m2 = 114 mg (100 % of original dose 64 mg/m2), Intravenous, Clinic/HOD 1 time, 12 of 12 cycles  Dose modification: 64 mg/m2 (original dose 64 mg/m2, Cycle 1), 64 mg/m2 (original dose 64 mg/m2, Cycle 3), 60 mg/m2 (original dose 64 mg/m2, Cycle 8), 40 mg/m2 (original dose 64 mg/m2, Cycle 10)  Administration: 114 mg (9/19/2019), 114 mg (9/25/2019), 114 mg (10/1/2019), 114 mg (10/15/2019), 114 mg (10/22/2019), 114 mg (10/29/2019), 114 mg (11/5/2019), 108 mg (11/12/2019), 108 mg (11/19/2019), 72 mg (11/26/2019), 72 mg (12/3/2019), 72 mg (12/10/2019)        1/23/2020 Breast Surgery     Surgery: Dr Libertad Hernandez, [phone] performed right lumpectomy with sentinel lymph node biopsy with pathology showing no residual tumor        1/23/2020 Cancer Staged     yp T0 N0      2/5/2020 Genetic Testing     MyRisk: pending      2/11/2020 Tumor Conference        Radiation therapy. No adjuvant medical therapy indicated.

## 2020-02-12 PROBLEM — D64.81 ANTINEOPLASTIC CHEMOTHERAPY INDUCED ANEMIA: Status: RESOLVED | Noted: 2019-07-17 | Resolved: 2020-02-12

## 2020-02-12 PROBLEM — T45.1X5A ANTINEOPLASTIC CHEMOTHERAPY INDUCED ANEMIA: Status: RESOLVED | Noted: 2019-07-17 | Resolved: 2020-02-12

## 2020-02-12 NOTE — PROGRESS NOTES
History:     Reason For Follow Up/Onc History:   1. Stage 1B ([id number]) invasive ductal carcinoma of right breast, lower outer quadrant, ER neg, ME neg, Her2 neg, Grade 3, Ki67 70%      HPI:   Sharri Cline presents for follow up of breast cancer. She had lumpectomy with complete response. Discussed case with Dr Hernandez. Working with therapy - neuropathy improving.     Onc history:   - She presented for screening mammo in 5/15/2019 which showed focal asymmetries in both left and right breast. Diagnostic mammogram and US showed no significant abn of left breast, and right breast 15 mm x 11 mm x 12 mm mass at 4:00, 5 CFN. Biopsy on 5/24/19 showed grade 3 IDC, triple negative, Ki67 70%.    - 7/3/19 - 12/10/19 completed neoadjuvant chemotherapy with four cycles of dose-dense Adriamycin and cyclophosphamide with Neulasta support followed by twelve doses of weekly paclitaxel. Dose reduced for cytopenias and neuropathy.   - 1/23/2020 - Dr Hernandez performed right breast lumpectomy with SLN Biopsy with pathology showing no residual carcinoma with 3 neg SLN.    History: I reviewed, confirmed and updated history (past medical, social, family) as necessary.       Medications    Current Outpatient Medications:     artificial tears (ISOPTO TEARS) 0.5 % ophthalmic solution, Place 1 drop into both eyes 4 (four) times daily., Disp: , Rfl:     carboxymethylcellulose sodium 1 % DpGe, Place 1 drop into both eyes every evening., Disp: , Rfl: 0    fish oil-omega-3 fatty acids 300-1,000 mg capsule, Take 2 g by mouth once daily., Disp: , Rfl:     gabapentin (NEURONTIN) 300 MG capsule, Take 1 capsule (300 mg total) by mouth 2 (two) times daily., Disp: 60 capsule, Rfl: 2    HYDROcodone-acetaminophen (NORCO) 5-325 mg per tablet, Take 1 tablet by mouth every 6 (six) hours as needed for Pain., Disp: 10 tablet, Rfl: 0    magic mouthwash diphen/antac/lidoc/nysta, Take 10 mLs by mouth 4 (four) times daily., Disp: 120 mL, Rfl: 0     multivitamin with minerals tablet, Take 1 tablet by mouth once daily.  , Disp: , Rfl:     ondansetron (ZOFRAN-ODT) 8 MG TbDL, Take 1 tablet (8 mg total) by mouth every 8 (eight) hours as needed (nausea)., Disp: 45 tablet, Rfl: 1    potassium chloride SA (K-DUR,KLOR-CON) 20 MEQ tablet, Take 2 tablets (40 mEq total) by mouth once daily., Disp: 60 tablet, Rfl: 1    sulfamethoxazole-trimethoprim 800-160mg (BACTRIM DS) 800-160 mg Tab, Take 1 tablet by mouth 2 (two) times daily., Disp: 14 tablet, Rfl: 0  No current facility-administered medications for this visit.     Facility-Administered Medications Ordered in Other Visits:     0.9%  NaCl infusion, , Intravenous, Continuous, Chung Olson MD, Last Rate: 70 mL/hr at 01/23/20 0817    lidocaine (PF) 10 mg/ml (1%) injection 10 mg, 1 mL, Intradermal, Once, Chung Olson MD    sodium chloride 0.9% flush 10 mL, 10 mL, Intravenous, 1 time in Clinic/HOD, Dominique Ayala MD  Allergies  Review of patient's allergies indicates:  No Known Allergies  Review of Systems  Review of Systems   Constitutional: Positive for fatigue. Negative for activity change, appetite change, chills, fever and unexpected weight change.   HENT: Negative for congestion, ear discharge, hearing loss, mouth sores, nosebleeds, sinus pressure, sinus pain and trouble swallowing.    Eyes: Negative for pain, discharge, redness, itching and visual disturbance.   Respiratory: Negative for cough, chest tightness and shortness of breath.    Cardiovascular: Negative for chest pain, palpitations and leg swelling.   Gastrointestinal: Negative for abdominal distention, abdominal pain, blood in stool, diarrhea, nausea, rectal pain and vomiting.   Endocrine: Negative for heat intolerance and polydipsia.   Genitourinary: Negative for difficulty urinating, dysuria, flank pain, frequency, hematuria and urgency.   Musculoskeletal: Negative for arthralgias, back pain and neck pain.   Skin: Negative for  "color change, pallor and rash.   Neurological: Positive for numbness. Negative for dizziness, light-headedness and headaches.   Hematological: Negative for adenopathy. Does not bruise/bleed easily.   Psychiatric/Behavioral: Negative for confusion and decreased concentration. The patient is not nervous/anxious.         Objective     Objective:     Vitals:    02/14/20 0738   BP: (!) 146/69   BP Location: Right arm   Patient Position: Sitting   BP Method: Large (Automatic)   Pulse: 64   Resp: 18   Temp: 98 °F (36.7 °C)   TempSrc: Oral   SpO2: 100%   Weight: 62.6 kg (138 lb 0.1 oz)   Height: 5' 3" (1.6 m)     Body surface area is 1.67 meters squared.  Physical Exam   Constitutional: She is oriented to person, place, and time. She appears well-developed and well-nourished. No distress.   HENT:   Head: Normocephalic and atraumatic.   Mouth/Throat: Oropharynx is clear and moist and mucous membranes are normal. No oral lesions.   Eyes: EOM and lids are normal. Right eye exhibits no discharge and no exudate. Left eye exhibits no discharge. Right conjunctiva is not injected. No scleral icterus.   Neck: Normal range of motion. Neck supple. No thyroid mass and no thyromegaly present.   Cardiovascular: Normal rate, regular rhythm, S1 normal, S2 normal and normal heart sounds.   Pulmonary/Chest: Effort normal and breath sounds normal. No respiratory distress. She has no wheezes. She has no rhonchi. She has no rales. Chest wall is not dull to percussion.   Well-healing right breast incision without palpable masses.  Axillary incision healing well.   Abdominal: Soft. Normal appearance and bowel sounds are normal. There is no hepatosplenomegaly. There is no tenderness.   Musculoskeletal: She exhibits no tenderness.   Lymphadenopathy: No inguinal adenopathy noted on the right or left side.   Neurological: She is alert and oriented to person, place, and time. She has normal strength.   Skin: Skin is warm, dry and intact. No pallor. "   Psychiatric: Her behavior is normal. Thought content normal.     Labs and Imaging  Results for orders placed or performed during the hospital encounter of 01/23/20   Specimen to Pathology, Surgery Breast   Result Value Ref Range    Final Pathologic Diagnosis       1. BREAST, RIGHT, LOWER INNER QUADRANT, PARTIAL MASTECTOMY:  - No residual carcinoma present, compatible with complete response to  chemotherapy, see Tumor Synoptic.  - Benign skin, negative for carcinoma.  - Biopsy clip and fibrous changes consistent with tumor resolution are  present in the sample.  - Pathologic staging: ypT0 (sn)N0    2.  AXILLARY SENTINEL LYMPH NODE, RIGHT, # 1, HOT BLUE 3576, EXCISION:  - One benign sentinel lymph node, negative for metastatic carcinoma (0/1).  - Immunohistochemical stains for CK WSK, Cam 5.2, and CK AE1/AE3 are  negative, supporting the diagnosis.  - No fibrosis or chemotherapeutic effect identified.    3.  AXILLARY SENTINEL LYMPH NODE, RIGHT, # 2, HOT 1290, EXCISION:  - One benign sentinel lymph node, negative for metastatic carcinoma (0/1).  - Immunohistochemical stains for CK WSK, Cam 5.2, and CK AE1/AE3 are  negative, supporting the diagnosis.  - No fibrosis or chemotherapeutic effect identified.    4.  AXILLARY LYMPH NODE, RIGHT, PALPABLE, E XCISION:  - One benign lymph node, negative for metastatic carcinoma (0/1).  - Immunohistochemical stains for CK WSK, Cam 5.2, and CK AE1/AE3 are  negative, supporting the diagnosis.  - No fibrosis or chemotherapeutic effect identified.    5.  BREAST, RIGHT, NEW POSTERIOR MARGIN, EXCISION:  - Negative for atypia or carcinoma.  - Benign breast tissue with predominantly fatty stroma.    6.  BREAST, RIGHT, NEW MEDIAL MARGIN, EXCISION:  - Negative for atypia or carcinoma.  - Benign breast tissue with predominantly fatty stroma.  - Benign skeletal muscle with no significant histopathologic alterations.    7.  BREAST, RIGHT, NEW INFERIOR MARGIN, EXCISION:  - Negative for atypia  "or carcinoma.  - Benign breast tissue with predominantly fatty stroma.    INVASIVE CARCINOMA OF THE BREAST: Resection    SPECIMEN      Procedure        Excision (less than total mastectomy)      Specimen Laterality        Right      Tumor Site        Lower inner quadrant      Histologic Type        No residual invasiv e carcinoma      Histologic Grade (Hadley Histologic Score)        No residual invasive carcinoma      Tumor Size        No residual invasive carcinoma      Ductal Carcinoma In Situ (DCIS)        Not identified      Lobular Carcinoma In Situ (LCIS)        No LCIS in specimen      Lymphovascular Invasion        Not identified      Dermal Lymphovascular Invasion        Not identified      Microcalcifications        Not identified      Treatment Effect          Treatment Effect in the Breast            No residual invasive carcinoma is present in the breast after  presurgical therapy          Treatment Effect in the Lymph Nodes            No lymph node metastases and no prominent fibrous scarring in the  nodes    LYMPH NODES      Regional Lymph Nodes        Uninvolved by tumor cells          Number of Lymph Nodes Examined  3          Number of [place] Nodes Examined  2      TNM Descriptors        y (post-treatment)      Primary Tumor (pT)        pT0      Regional Lymph Nodes (pN)         Modifier          (sn): Only sentinel node(s) evaluated.        Category (pN)          pN0      Breast Biomarker Testing Performed on Previous Biopsy          Estrogen Receptor (ER)            Negative          Progesterone Receptor (PgR)            Negative          HER2 (by immunohistochemistry)            Negative (Score 1+)      Gross       Received are 7 containers:    Container Label and Clinic/AP Number:  [id number]    Part 1  Received in formalin labeled "1.  Right lumpectomy" is a 20 g fibrofatty  lumpectomy specimen that has been previously inked prior to receipt in the  lab breast margin ink, and measures 4.4 " "cm from superior to inferior, 4.3 cm  from medial to lateral, and 2.8 cm from anterior to posterior.  On the  anterior surface of the specimen is a 2.7 x 1.0 cm yellow-inked skin  excision.  No lesions are grossly identified on the surface of the skin.  The  specimen is serially sectioned from superior to inferior into 7 slices to  find a silver metallic marry-shaped biopsy clip and a blue metallic round  biopsy clip within slice 5.  Surrounding the biopsy clips is a 1.1 x 0.9 x  0.8 cm firm, tan-white, ill-defined lesion within slices 5 and 6, involving  approximately 20% of the overall cut surface.  Grossly, the mass is 0.7 cm  from the medial margin, 1.3 cm from the posterior margin, 1.3 cm from the  lateral jacob in, 1.2 cm from the anterior margin, 1.7 cm from the skin  surface, 3.4 cm from the superior margin, and 1.2 cm from the inferior  margin.  The remaining cut surface is soft, tan-yellow, lobulated breast  parenchyma with white wispy fibers.  Sections of 2842 are submitted entirely  and sequentially as follows:    Cassette key:  1 A-B:  Slice 1 superior perpendicular margin  1C-D:  Slice 2, bisected  1E-F:  Slice 3, bisected  1G:  Slice 4  1H-I:  Slice 5, bisected (biopsy site)  1J-K:  Slice 6, bisected  1 L-M:  Slice 7, inferior perpendicular margin.    Part 2  Received fresh for frozen tissue diagnosis labeled "2.  Right axillary  sentinel lymph node #1, hot/blue 1836" is a 1.2 x 0.8 x 0.5 cm lymph node  surrounded by soft, tan-yellow, lobulated fat.  The lymph node is entirely  frozen for diagnosis.  Entirely submitted in 2842-2A FS.  The remaining fat  is submitted entirely in 2842-2B.    Part 3  Received fresh for frozen tissue diagnosis labeled "3.  Right axillary  sentinel lymph  node #2, hot 1290" is a 0.5 x 0.4 x 0.4 cm firm, pink-tan  lymph node surrounded by soft, tan-yellow, lobulated fat.  The lymph node is  entirely frozen for diagnosis.  Entirely submitted in 2842-3A FS.  The  remaining fat " "is submitted entirely in 2842-3B.    Part 4  Received fresh for frozen tissue diagnosis labeled "4.  Right axillary  palpable node" is a 0.4 x 0.4 x 0.3 cm firm, pink-tan lymph node.  The lymph  node is entirely frozen for diagnosis.  Entirely submitted in 2842-4A FS.  The remaining fat is submitted entirely in 2842-4B.    Part 5  Received in formalin labeled "5.  New posterior margin with ink" is a 2.5 x  1.8 x 1.1 cm tan-yellow, lobulated fibrofatty tissue segment partially inked  black on 1 side, designating "posterior" according to the requisition form.  The specimen is serially sectioned to reveal soft, smooth, tan-yellow,  lobulated tissue.  Entirely submitted in 2842-5A-B.    Part 6  Received in formalin labeled "6.  New medial margin ink marks new margin" is   a 1.7 x 1.2 x 0.8 cm soft, tan-yellow, lobulated fibrofatty tissue segment  partially inked red on 1 side, designating "medial" according to the  requisition form.  The specimen is serially sectioned to reveal soft,  tan-yellow, lobulated tissue.  Entirely submitted in 2842-6.    Part 7  Received in formalin labeled "7.  New inferior margin, ink marks new margin"  is a 1.8 x 1.4 x 0.6 cm soft, granular, tan-yellow, lobulated fibrofatty  tissue segment partially inked green on 1 side, designating "inferior"  according to the requisition form.  The specimen is serially sectioned to  reveal soft, tan-yellow, lobulated tissue overlying dense, tan-pink fibrous  tissue.  Entirely submitted in 2842-7.    Gross by: Alisson Doty      Comment       Breast biomarkers were performed on the original biopsy specimen.    Immunohistochemical staining is interpreted in the setting of appropriate  positive and negative controls.      Frozen Section Diagnosis       2.  Lymph node with no metastatic carcinoma seen on frozen section.  TS  3.  Lymph node with no metastatic carcinoma seen on frozen section.  TS  4.  Lymph node with no evidence of metastatic carcinoma seen on " frozen  section.  TS  Verbally reported to Dr. Hernandez      Frozen Section Footnote       Frozen section performed at [facility], [address], [place], [address]         Assessment     Assessment:     1. Stage 1B ([id number]) invasive ductal carcinoma of right breast, lower outer quadrant, ER neg, CT neg, Her2 neg, Grade 3, Ki67 70%   * 7/3/19 - 12/10/19 completed neoadjuvant chemotherapy with four cycles of dose-dense Adriamycin and cyclophosphamide with Neulasta support followed by twelve doses of weekly paclitaxel. Dose reduced for cytopenias and neuropathy.   * 1/23/2020 - Dr Hernandez performed right breast lumpectomy with SLN Biopsy with pathology showing no residual carcinoma with 3 neg SLN.   - With complete response, no further systemic therapy needed. Seeing rad onc.     2. Chronically elevated alk phos and bilirubin   * Chronic; follows with Hepatology; prior liver biopsy 11/2018 with mild portal inflammation    3. Chemo thrombocytopenia    * Monitoring.    4.. Neutropenia    * Mild,     5. Chemo neuropathy   * Present - slow improvement.    * Continue gabapentin 300 mg bid.     6. Hypokalemia   * Repeat cmp - currently taking KCL daily.     Plan:     1. Rad onc - on 2/27  2. Gabapentin 300 mg bid.  3. Possible vaccine trial  4. Genetics, labs today - call if she can stop potassium  5. Continue PT.    6. Referral to survivorship at next visit.     RTC in 2 mo with port flush. Can get port out after RT.     Future Appointments   Date Time Provider Department Center   2/14/2020  8:10 AM LAB, HEMONC CANCER [facility] Julian Martinez   2/18/2020  8:45 AM Patrick Pineda, PT [facility]   2/21/2020  8:00 AM Patrick Pineda, PT [facility]   2/27/2020  9:15 AM Skye Núñez MD [facility]   2/28/2020  8:45 AM Patrick Pineda, PT [facility]   3/3/2020  8:45 AM Patrick Pineda, PT [facility]   3/6/2020  8:45 AM Patrick HOLLIDAY  Miriam, PT [facility]   3/11/2020  9:30 AM Patrick Pineda, PT [facility]   3/13/2020  8:45 AM Patrick Pineda, PT [facility]     Medical decision making this visit:  Established problem, stable or improvin point each, 4 total points this category  Lab test(s) reviewed/ordered: 1 point  Other diagnostic test reviewed in medicine section: 1 point  Discussed case with another health care provider ro reviewed and summarized old records (non-[facility] records): 2 points

## 2020-02-13 ENCOUNTER — CLINICAL SUPPORT (OUTPATIENT)
Dept: REHABILITATION | Facility: HOSPITAL | Age: 70
End: 2020-02-13
Attending: INTERNAL MEDICINE
Payer: MEDICARE

## 2020-02-13 DIAGNOSIS — R20.1 IMPAIRED SENSATION: ICD-10-CM

## 2020-02-13 DIAGNOSIS — Z74.09 IMPAIRED FUNCTIONAL MOBILITY, BALANCE, GAIT, AND ENDURANCE: Primary | ICD-10-CM

## 2020-02-13 PROCEDURE — 97110 THERAPEUTIC EXERCISES: CPT | Mod: PO

## 2020-02-13 PROCEDURE — 97112 NEUROMUSCULAR REEDUCATION: CPT | Mod: PO

## 2020-02-13 NOTE — PROGRESS NOTES
Physical Therapy Daily Treatment Note     Name: Sharri Dennis Artesian  Clinic Number: [id number]    Therapy Diagnosis:   Encounter Diagnoses   Name Primary?    Impaired functional mobility, balance, gait, and endurance Yes    Impaired sensation      Physician: Dominique Ayala MD    Visit Date: 2/13/2020    Therapy Diagnosis:        Encounter Diagnoses   Name Primary?   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 She is happy her numbness isn't too bad today because she was on her feet all day yesterday.    She was provided with home exercise program today.  Response to previous treatment: first session since evaluation  Functional change: ongoing    Pain: 0/10  Location: n/a     Objective     Sharri received therapeutic exercises to develop strength, endurance, ROM, flexibility, posture and core stabilization for 35 minutes including:  2 x 30 sec piriformis stretch  2 x 30 sec DKTC  2 x 30 sec hamstring stretch  2 x 10 ankle pumps of sciatica nerve glide   2 x 10 wrist pumps of median nerve glide  2 x 30 sec of calf stretch on wedge  3 x 15 reps of bridging with green theraband for hip abduction  3 x 10 reps of SLR  10 minutes recumbent bike, lv 7, rated at 5 on Modified Suhail Scale    Sharri participated in neuromuscular re-education activities to improve: Balance, Coordination, Kinesthetic, Sense, Proprioception and Posture for 13 minutes. The following activities were included:  2 x20 reps of  alternating lg cone taps  2 x 30 sec ea modified SLS with opposing foot on lg cone, CGA, occ UE support     Sharri participated in dynamic functional therapeutic activities to improve functional performance for 0 minutes, including:      Home Exercises Provided and Patient Education Provided     Education provided:   - HEP provided    Written Home Exercises Provided: yes.  Exercises were reviewed and Sharri was able to demonstrate them prior to the end of the session.  Sharri demonstrated good  understanding of the education provided.     See EMR under Patient Instructions for exercises provided 2/11/2020.    Assessment     Ms. Harrell tolerated session well. She does require some cueing when reviewing HEP exercises, but reports when she is able to follow along on her sheet at home she does well. She continues to demonstrate difficulty with SLS and may benefit from addition of pilates box for strengthening and balance challenge in coming sessions.  She remains appropriate for skilled PT services.     Sharri is progressing well towards her goals.   Pt prognosis is Good.     Pt will continue to benefit from skilled outpatient physical therapy to address the deficits listed in the problem list box on initial evaluation, provide pt/family education and to maximize pt's level of independence in the home and community environment.     Pt's spiritual, cultural and educational needs considered and pt agreeable to plan of care and goals.     Anticipated barriers to physical therapy: none    Goals:  Short Term Goals: 4 weeks   1. Pt will improve score on FGA to at least 25/30 in order to decrease risk for fall. ongoing  2. Pt will be independent with established HEP for strengthening and balance. ongoing  3. Pt will increase 30 sec chair rise score to at least 15 in order to demonstrate improved endruance and functional strength. ongoing  4. Pt will increase SLS time to at least 15 sec B in order to decrease risk for fall.  ongoing     Long Term Goals: 8 weeks   1. Pt will improve score on FGA to at least 27/30 in order to demonstrate improved functional balance. ongoing  2. Pt will improve score on Girard sensory testing condition 4 to at least 20 seconds in order to demonstrate improved balance in dim lit conditions. ongoing  3. Pt will increase SLS time to at least 20 seconds on B LE in order to decrease fall risk. ongoing  4. Pt will increase SSWS to at least 1.2 m/s in order to return to ambulation 5x/wk as she was prior to cancer. ongoing     Plan   Plan of care Certification: 2/7/2020 to 4/7/2020.    Continue with strengthening and stretching program, incorporate more high level balance and endurance as appropriate.    Patrick Pineda, PT

## 2020-02-14 ENCOUNTER — RESEARCH ENCOUNTER (OUTPATIENT)
Dept: RESEARCH | Facility: HOSPITAL | Age: 70
End: 2020-02-14

## 2020-02-14 ENCOUNTER — LAB VISIT (OUTPATIENT)
Dept: LAB | Facility: HOSPITAL | Age: 70
End: 2020-02-14
Attending: SURGERY
Payer: MEDICARE

## 2020-02-14 ENCOUNTER — PATIENT MESSAGE (OUTPATIENT)
Dept: HEMATOLOGY/ONCOLOGY | Facility: CLINIC | Age: 70
End: 2020-02-14

## 2020-02-14 ENCOUNTER — OFFICE VISIT (OUTPATIENT)
Dept: HEMATOLOGY/ONCOLOGY | Facility: CLINIC | Age: 70
End: 2020-02-14
Payer: MEDICARE

## 2020-02-14 VITALS
HEART RATE: 64 BPM | WEIGHT: 138 LBS | RESPIRATION RATE: 18 BRPM | HEIGHT: 63 IN | OXYGEN SATURATION: 100 % | SYSTOLIC BLOOD PRESSURE: 146 MMHG | DIASTOLIC BLOOD PRESSURE: 69 MMHG | BODY MASS INDEX: 24.45 KG/M2 | TEMPERATURE: 98 F

## 2020-02-14 DIAGNOSIS — T45.1X5A CHEMOTHERAPY-INDUCED NEUROPATHY: ICD-10-CM

## 2020-02-14 DIAGNOSIS — C50.311 MALIGNANT NEOPLASM OF LOWER-INNER QUADRANT OF RIGHT BREAST OF FEMALE, ESTROGEN RECEPTOR NEGATIVE: ICD-10-CM

## 2020-02-14 DIAGNOSIS — T45.1X5A CHEMOTHERAPY-INDUCED NEUTROPENIA: ICD-10-CM

## 2020-02-14 DIAGNOSIS — Z17.1 MALIGNANT NEOPLASM OF LOWER-INNER QUADRANT OF RIGHT BREAST OF FEMALE, ESTROGEN RECEPTOR NEGATIVE: Primary | ICD-10-CM

## 2020-02-14 DIAGNOSIS — G62.0 CHEMOTHERAPY-INDUCED NEUROPATHY: ICD-10-CM

## 2020-02-14 DIAGNOSIS — E87.6 HYPOKALEMIA: ICD-10-CM

## 2020-02-14 DIAGNOSIS — D69.6 THROMBOCYTOPENIA: ICD-10-CM

## 2020-02-14 DIAGNOSIS — D70.1 CHEMOTHERAPY-INDUCED NEUTROPENIA: ICD-10-CM

## 2020-02-14 DIAGNOSIS — C50.311 MALIGNANT NEOPLASM OF LOWER-INNER QUADRANT OF RIGHT BREAST OF FEMALE, ESTROGEN RECEPTOR NEGATIVE: Primary | ICD-10-CM

## 2020-02-14 DIAGNOSIS — Z17.1 MALIGNANT NEOPLASM OF LOWER-INNER QUADRANT OF RIGHT BREAST OF FEMALE, ESTROGEN RECEPTOR NEGATIVE: ICD-10-CM

## 2020-02-14 LAB
ALBUMIN SERPL BCP-MCNC: 4 G/DL (ref 3.5–5.2)
ALP SERPL-CCNC: 158 U/L (ref 55–135)
ALT SERPL W/O P-5'-P-CCNC: 14 U/L (ref 10–44)
ANION GAP SERPL CALC-SCNC: 7 MMOL/L (ref 8–16)
AST SERPL-CCNC: 26 U/L (ref 10–40)
BILIRUB SERPL-MCNC: 1 MG/DL (ref 0.1–1)
BUN SERPL-MCNC: 8 MG/DL (ref 8–23)
CALCIUM SERPL-MCNC: 9.6 MG/DL (ref 8.7–10.5)
CHLORIDE SERPL-SCNC: 106 MMOL/L (ref 95–110)
CO2 SERPL-SCNC: 26 MMOL/L (ref 23–29)
CREAT SERPL-MCNC: 0.7 MG/DL (ref 0.5–1.4)
ERYTHROCYTE [DISTWIDTH] IN BLOOD BY AUTOMATED COUNT: 13.8 % (ref 11.5–14.5)
EST. GFR  (AFRICAN AMERICAN): >60 ML/MIN/1.73 M^2
EST. GFR  (NON AFRICAN AMERICAN): >60 ML/MIN/1.73 M^2
GLUCOSE SERPL-MCNC: 81 MG/DL (ref 70–110)
HCT VFR BLD AUTO: 40.8 % (ref 37–48.5)
HGB BLD-MCNC: 12.2 G/DL (ref 12–16)
IMM GRANULOCYTES # BLD AUTO: 0.01 K/UL (ref 0–0.04)
MCH RBC QN AUTO: 29.3 PG (ref 27–31)
MCHC RBC AUTO-ENTMCNC: 29.9 G/DL (ref 32–36)
MCV RBC AUTO: 98 FL (ref 82–98)
NEUTROPHILS # BLD AUTO: 1.5 K/UL (ref 1.8–7.7)
PLATELET # BLD AUTO: 141 K/UL (ref 150–350)
PMV BLD AUTO: 10.2 FL (ref 9.2–12.9)
POTASSIUM SERPL-SCNC: 3.8 MMOL/L (ref 3.5–5.1)
PROT SERPL-MCNC: 7.3 G/DL (ref 6–8.4)
RBC # BLD AUTO: 4.16 M/UL (ref 4–5.4)
SODIUM SERPL-SCNC: 139 MMOL/L (ref 136–145)
WBC # BLD AUTO: 3.97 K/UL (ref 3.9–12.7)

## 2020-02-14 PROCEDURE — 1101F PR PT FALLS ASSESS DOC 0-1 FALLS W/OUT INJ PAST YR: ICD-10-PCS | Mod: CPTII,S$GLB,, | Performed by: INTERNAL MEDICINE

## 2020-02-14 PROCEDURE — 99215 PR OFFICE/OUTPT VISIT, EST, LEVL V, 40-54 MIN: ICD-10-PCS | Mod: S$GLB,,, | Performed by: INTERNAL MEDICINE

## 2020-02-14 PROCEDURE — 36415 COLL VENOUS BLD VENIPUNCTURE: CPT

## 2020-02-14 PROCEDURE — 1159F PR MEDICATION LIST DOCUMENTED IN MEDICAL RECORD: ICD-10-PCS | Mod: S$GLB,,, | Performed by: INTERNAL MEDICINE

## 2020-02-14 PROCEDURE — 99999 PR PBB SHADOW E&M-EST. PATIENT-LVL III: ICD-10-PCS | Mod: PBBFAC,,, | Performed by: INTERNAL MEDICINE

## 2020-02-14 PROCEDURE — 85027 COMPLETE CBC AUTOMATED: CPT

## 2020-02-14 PROCEDURE — 99215 OFFICE O/P EST HI 40 MIN: CPT | Mod: S$GLB,,, | Performed by: INTERNAL MEDICINE

## 2020-02-14 PROCEDURE — 1126F AMNT PAIN NOTED NONE PRSNT: CPT | Mod: S$GLB,,, | Performed by: INTERNAL MEDICINE

## 2020-02-14 PROCEDURE — 1101F PT FALLS ASSESS-DOCD LE1/YR: CPT | Mod: CPTII,S$GLB,, | Performed by: INTERNAL MEDICINE

## 2020-02-14 PROCEDURE — 1126F PR PAIN SEVERITY QUANTIFIED, NO PAIN PRESENT: ICD-10-PCS | Mod: S$GLB,,, | Performed by: INTERNAL MEDICINE

## 2020-02-14 PROCEDURE — 99999 PR PBB SHADOW E&M-EST. PATIENT-LVL III: CPT | Mod: PBBFAC,,, | Performed by: INTERNAL MEDICINE

## 2020-02-14 PROCEDURE — 80053 COMPREHEN METABOLIC PANEL: CPT

## 2020-02-14 PROCEDURE — 1159F MED LIST DOCD IN RCRD: CPT | Mod: S$GLB,,, | Performed by: INTERNAL MEDICINE

## 2020-02-14 NOTE — PROGRESS NOTES
Friday, February 14, 2020    At Dr. Ayala's request, called patient to discuss possible participation in the TNBC vaccine trial, [id number]. Message left for patient to please return my call.

## 2020-02-18 ENCOUNTER — CLINICAL SUPPORT (OUTPATIENT)
Dept: REHABILITATION | Facility: HOSPITAL | Age: 70
End: 2020-02-18
Attending: INTERNAL MEDICINE
Payer: MEDICARE

## 2020-02-18 DIAGNOSIS — Z74.09 IMPAIRED FUNCTIONAL MOBILITY, BALANCE, GAIT, AND ENDURANCE: Primary | ICD-10-CM

## 2020-02-18 DIAGNOSIS — R20.1 IMPAIRED SENSATION: ICD-10-CM

## 2020-02-18 PROCEDURE — 97110 THERAPEUTIC EXERCISES: CPT | Mod: PO

## 2020-02-18 PROCEDURE — 97112 NEUROMUSCULAR REEDUCATION: CPT | Mod: PO

## 2020-02-21 NOTE — PROGRESS NOTES
REFERRING PHYSICIAN: Libertad Hernandez M.D.    DIAGNOSIS: c T1c N0 M0 (pT0N0) invasive ductal carcinoma of the right breast    HISTORY OF PRESENT ILLNESS:  Ms. Cline is a 69 y.o. female who was recently diagnosed with right breast cancer after an abnormal mammogram in May 2019.  This revealed an irregularly-shaped mass in the lower inner quadrant of the right breast.  After evaluation by Dr. Hernandez, she was noted to have approximately 2 cm palpable mass with skin indentation.  No right axillary lymphadenopathy was palpated.  A core needle biopsy of this lesion on May 24, 2019 revealed grade 3, invasive ductal carcinoma, which was ER / CT negative, and HER2 negative, with Ki-67 index of 70%.     She received neoadjuvant chemotherapy with dose dense Adriamycin and cyclophosphamide followed by 12 cycles of weekly paclitaxel which ended on December 10, 2019.  On 2020, she underwent lumpectomy and sentinel node biopsy.  Pathology revealed no residual carcinoma in the lumpectomy specimen.  2/2 sentinel lymph nodes and 1 additional lymph node from the right side were negative for involvement.  She is here today for recommendations regarding postoperative radiation.    At present, she is healing from the surgery without any unexpected side effects.  She denies right breast pain, edema, erythema, or nipple discharge. She reports approximately 25 lbs during chemotherapy as well has neuropathy. She also denies fever or night sweats.    REVIEW OF SYSTEMS:  As above. In addition, patient denies headaches, visual problems, dizziness, chest pain, shortness of breath, cough, nausea, vomiting, diarrhea, of any new bony pains. Patient also denies easily bruising, skin rashes, or numbness or tingling.     GYN HISTORY:   Menarche age 13.  .  Menopause in her late 50s.  She denies hormone replacement therapy.    ECO    PAST MEDICAL HISTORY:  Past Medical History:   Diagnosis Date    Helicobacter pylori antibody positive  5/27/13    treated with clarithromycin, metronidazole, and omeprazole x 14 days  (5/27-6/3); EGD normal in July 2013       PAST SURGICAL HISTORY:  Past Surgical History:   Procedure Laterality Date    BIOPSY OF LIVER WITH ULTRASOUND GUIDANCE N/A 11/19/2018    Procedure: BIOPSY, LIVER, WITH US GUIDANCE;  Surgeon: Gordon Diagnostic Provider;  Location: [facility] OR [facility];  Service: Radiology;  Laterality: N/A;  U/S GUIDED LIVER ([id number]).  11/19/2018  DOSC 7 AM  PROCEDURE 8 AM.  DR CARITO LAGOS.  DB 11/12/18 3:55P    INJECTION FOR SENTINEL NODE IDENTIFICATION Right 1/23/2020    Procedure: INJECTION, FOR SENTINEL NODE IDENTIFICATION;  Surgeon: Libertad Hernandez MD;  [facility];  Service: General;  Laterality: Right;    INSERTION OF TUNNELED CENTRAL VENOUS CATHETER (CVC) WITH SUBCUTANEOUS PORT Left 6/13/2019    Procedure: [id number] - CHEST;  Surgeon: Libertad Hernandez MD;  [facility];  Service: General;  Laterality: Left;    MASTECTOMY, PARTIAL Right 1/23/2020    Procedure: MASTECTOMY, PARTIAL- w/Seed Localization;  Surgeon: Libertad Hernandez MD;  Location: [facility] OR [facility];  Service: General;  Laterality: Right;    SENTINEL LYMPH NODE BIOPSY Right 1/23/2020    Procedure: BIOPSY, LYMPH NODE, SENTINEL;  Surgeon: Libertad Hernandez MD;  [facility];  Service: General;  Laterality: Right;    TUBAL LIGATION         ALLERGIES:   Review of patient's allergies indicates:  No Known Allergies    MEDICATIONS:  Current Outpatient Medications   Medication Sig    artificial tears (ISOPTO TEARS) 0.5 % ophthalmic solution Place 1 drop into both eyes 4 (four) times daily.    carboxymethylcellulose sodium 1 % DpGe Place 1 drop into both eyes every evening.    fish oil-omega-3 fatty acids 300-1,000 mg capsule Take 2 g by mouth once daily.    gabapentin (NEURONTIN) 300 MG capsule Take 1 capsule (300 mg total) by mouth 2 (two) times daily.    HYDROcodone-acetaminophen (NORCO) 5-325 mg per  tablet Take 1 tablet by mouth every 6 (six) hours as needed for Pain.    magic mouthwash diphen/antac/lidoc/nysta Take 10 mLs by mouth 4 (four) times daily.    multivitamin with minerals tablet Take 1 tablet by mouth once daily.      ondansetron (ZOFRAN-ODT) 8 MG TbDL Take 1 tablet (8 mg total) by mouth every 8 (eight) hours as needed (nausea).    potassium chloride SA (K-DUR,KLOR-CON) 20 MEQ tablet Take 2 tablets (40 mEq total) by mouth once daily.    sulfamethoxazole-trimethoprim 800-160mg (BACTRIM DS) 800-160 mg Tab Take 1 tablet by mouth 2 (two) times daily.     No current facility-administered medications for this visit.      Facility-Administered Medications Ordered in Other Visits   Medication    0.9%  NaCl infusion    lidocaine (PF) 10 mg/ml (1%) injection 10 mg    sodium chloride 0.9% flush 10 mL       SOCIAL HISTORY:  Social History     Socioeconomic History    Marital status:      Spouse name: Not on file    Number of children: Not on file    Years of education: Not on file    Highest education level: Not on file   Occupational History    Occupation:  worker     Employer: [employer]   Social Needs    Financial resource strain: Not on file    Food insecurity:     Worry: Not on file     Inability: Not on file    Transportation needs:     Medical: Not on file     Non-medical: Not on file   Tobacco Use    Smoking status: Never Smoker    Smokeless tobacco: Never Used   Substance and Sexual Activity    Alcohol use: Yes     Comment: Social    Drug use: No    Sexual activity: Yes     Partners: Male     Birth control/protection: Post-menopausal   Lifestyle    Physical activity:     Days per week: Not on file     Minutes per session: Not on file    Stress: Not on file   Relationships    Social connections:     Talks on phone: Not on file     Gets together: Not on file     Attends [employer] service: Not on file     Active member of club or organization: Not on file      Attends meetings of clubs or organizations: Not on file     Relationship status: Not on file   Other Topics Concern    Are you pregnant or think you may be? No    Breast-feeding No   Social History Narrative    Not on file       FAMILY HISTORY:  Family History   Problem Relation Age of Onset    Diabetes Father     Hypertension Mother     Miscarriages / Stillbirths Mother     ANGEL disease Mother     Eczema Mother     Parkinsonism Mother     Diabetes Brother     Diabetes Brother     Colon cancer Maternal Aunt     No Known Problems Sister     No Known Problems Maternal Uncle     No Known Problems Paternal Aunt     No Known Problems Paternal Uncle     No Known Problems Maternal Grandmother     No Known Problems Maternal Grandfather     No Known Problems Paternal Grandmother     No Known Problems Paternal Grandfather     Diabetes Brother     No Known Problems Son     No Known Problems Son     Celiac disease Neg Hx     Cirrhosis Neg Hx     Colon polyps Neg Hx     Crohn's disease Neg Hx     Cystic fibrosis Neg Hx     Esophageal cancer Neg Hx     Hemochromatosis Neg Hx     Inflammatory bowel disease Neg Hx     Irritable bowel syndrome Neg Hx     Liver cancer Neg Hx     Liver disease Neg Hx     Rectal cancer Neg Hx     Stomach cancer Neg Hx     Ulcerative colitis Neg Hx     Anthony's disease Neg Hx     Psoriasis Neg Hx     Ovarian cancer Neg Hx     Breast cancer Neg Hx     Amblyopia Neg Hx     Blindness Neg Hx     Cancer Neg Hx     Cataracts Neg Hx     Glaucoma Neg Hx     Macular degeneration Neg Hx     Retinal detachment Neg Hx     Strabismus Neg Hx     Stroke Neg Hx     Thyroid disease Neg Hx          PHYSICAL EXAMINATION:  Vitals:    02/27/20 0910   BP: (!) 150/68   Pulse: 70   Resp: 16   Temp: 97.7 °F (36.5 °C)     GENERAL: Patient is alert and oriented, in no acute distress  HEENT: Extraocular muscles are intact. Oropharynx is clear without lesions. There is no cervical  or supraclavicular lymphadenopathy palpated. No thyromegaly noted.   HEART: Regular rate and rhythm.   LUNGS: Clear to auscultation bilaterally.   BREAST EXAM:  She has pendulous breasts bilaterally. The right breast is smaller than the left. The scar secondary to lumpectomy is noted in the lower inner quadrant of the right breast.  There is also a scar in the right axilla secondary to sentinel node biopsy.  No abnormal masses palpated in the right breast or right axilla.  The left breast and left axilla are also without palpable masses.  ABDOMEN: Soft, nontender, nondistended, without hepatosplenomegaly. Normoactive bowel sounds.   EXTREMITIES: No clubbing, cyanosis, or edema  NEUROLOGICAL: Cranial nerve II through XII grossly intact. Sensation is intact. Strength is 5 out of 5 in the upper and lower extremities bilaterally.     ASSESSMENT:  This is a 69 y.o. female with clinical T1c N0 M0 (ypT0N0) grade 3, invasive ductal carcinoma of the right breast who underwent neoadjuvant chemotherapy followed by lumpectomy and sentinel node biopsy on January 23, 2020, with pathology revealing no residual carcinoma in the right breast or right axilla, ER/NY negative, her 2 negative, Ki-67 index of 70%.    PLAN:  After discussion the multidisciplinary breast Conference and review of the available pathology and radiological images, Ms. Cline is noted to have complete response to chemotherapy with no residual disease upon lumpectomy.  Her right axillary nodes are clinically negative originally and pathologically negative. To reduce her chance of local recurrence, she is recommended to undergo postoperative radiation to the right breast.  I plan to deliver approximately 4200 cGy.    The risks, benefits, and side effects of radiation were explained in detail to the patient. All questions were answered and informed consent was signed. I plan to see the patient back for radiation planning CT in approximately 2 weeks.      Psychosocial Distress screening score of Distress Score: 2 noted and reviewed. No intervention indicated.    I spent approximately 60 minutes reviewing the available records and evaluating the patient, out of which over 50% of the time was spent face to face with the patient in counseling and coordinating this patient's care.

## 2020-02-27 ENCOUNTER — OFFICE VISIT (OUTPATIENT)
Dept: SURGERY | Facility: CLINIC | Age: 70
End: 2020-02-27
Payer: MEDICARE

## 2020-02-27 VITALS
SYSTOLIC BLOOD PRESSURE: 150 MMHG | HEART RATE: 70 BPM | DIASTOLIC BLOOD PRESSURE: 68 MMHG | RESPIRATION RATE: 16 BRPM | TEMPERATURE: 98 F | BODY MASS INDEX: 24.1 KG/M2 | HEIGHT: 63 IN | WEIGHT: 136 LBS

## 2020-02-27 DIAGNOSIS — C50.311 MALIGNANT NEOPLASM OF LOWER-INNER QUADRANT OF RIGHT BREAST OF FEMALE, ESTROGEN RECEPTOR NEGATIVE: Primary | ICD-10-CM

## 2020-02-27 DIAGNOSIS — Z17.1 MALIGNANT NEOPLASM OF LOWER-INNER QUADRANT OF RIGHT BREAST OF FEMALE, ESTROGEN RECEPTOR NEGATIVE: Primary | ICD-10-CM

## 2020-02-27 PROCEDURE — 1101F PT FALLS ASSESS-DOCD LE1/YR: CPT | Mod: CPTII,S$GLB,, | Performed by: RADIOLOGY

## 2020-02-27 PROCEDURE — 99204 PR OFFICE/OUTPT VISIT, NEW, LEVL IV, 45-59 MIN: ICD-10-PCS | Mod: S$GLB,,, | Performed by: RADIOLOGY

## 2020-02-27 PROCEDURE — 1159F MED LIST DOCD IN RCRD: CPT | Mod: S$GLB,,, | Performed by: RADIOLOGY

## 2020-02-27 PROCEDURE — 99204 OFFICE O/P NEW MOD 45 MIN: CPT | Mod: S$GLB,,, | Performed by: RADIOLOGY

## 2020-02-27 PROCEDURE — 1159F PR MEDICATION LIST DOCUMENTED IN MEDICAL RECORD: ICD-10-PCS | Mod: S$GLB,,, | Performed by: RADIOLOGY

## 2020-02-27 PROCEDURE — 1126F AMNT PAIN NOTED NONE PRSNT: CPT | Mod: S$GLB,,, | Performed by: RADIOLOGY

## 2020-02-27 PROCEDURE — 1101F PR PT FALLS ASSESS DOC 0-1 FALLS W/OUT INJ PAST YR: ICD-10-PCS | Mod: CPTII,S$GLB,, | Performed by: RADIOLOGY

## 2020-02-27 PROCEDURE — 1126F PR PAIN SEVERITY QUANTIFIED, NO PAIN PRESENT: ICD-10-PCS | Mod: S$GLB,,, | Performed by: RADIOLOGY

## 2020-02-27 PROCEDURE — 99999 PR PBB SHADOW E&M-EST. PATIENT-LVL IV: ICD-10-PCS | Mod: PBBFAC,,, | Performed by: RADIOLOGY

## 2020-02-27 PROCEDURE — 99999 PR PBB SHADOW E&M-EST. PATIENT-LVL IV: CPT | Mod: PBBFAC,,, | Performed by: RADIOLOGY

## 2020-02-27 NOTE — PROGRESS NOTES
Physical Therapy Daily Treatment Note     Name: Sharri Dennis [place]  Clinic Number: [id number]    Therapy Diagnosis:   Encounter Diagnoses   Name Primary?   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 She has noted too that her balance is improving as she was able to walk down the stairs with no handrails this morning.     She was provided with home exercise program today.  Response to previous treatment: first session since evaluation  Functional change: ongoing    Pain: 0/10  Location: n/a     Objective     Sharri received therapeutic exercises to develop strength, endurance, ROM, flexibility, posture and core stabilization for 20 minutes including:  2 x 30 sec hamstring stretch on step   2 x 30 sec of calf stretch on wedge  10 minutes recumbent stepper, SciFit, lv 3  x15 reps of hip and knee extension on pilates box with all springs on level 1, cues to eccentrically control movement after press down    Sharri participated in neuromuscular re-education activities to improve: Balance, Coordination, Kinesthetic, Sense, Proprioception and Posture for 25 minutes. The following activities were included:  2 x20 reps of alternating lg cone taps, no UE support  2 x 30 sec ea modified SLS with  opposing foot on lg cone, CGA, occ UE support   2 x 30 sec tandem on foam, ea LE lead, occ UE support, CGA  2 x 30 sec on foam head turns, L/R, CGA  x30 sec on foam head turns L/R vision eliminated, CGA, pt require WBOS for this activity    Sharri participated in dynamic functional therapeutic activities to improve functional performance for 0 minutes, including:      Home Exercises Provided and Patient Education Provided     Education provided:   - HEP provided    Written Home Exercises Provided: yes.  Exercises were reviewed and Sharri was able to demonstrate them prior to the end of the session.  Sharri demonstrated good  understanding of the education provided.     See EMR under Patient Instructions for exercises provided 2/11/2020.    Assessment     Ms. Harrell tolerated session well. She noting decreased numbness and tingling in B legs and arms. Pt does report she continues to have balance deficits, but improving. Pt continues to have most difficulty with vision eliminated balance and SLS. She remains appropriate for skilled PT services.     Sharri is progressing well towards her goals.   Pt prognosis is Good.     Pt will continue to benefit from skilled outpatient physical therapy to address the deficits listed in the problem list box on initial evaluation, provide pt/family education and to maximize pt's level of independence in the home and community environment.     Pt's spiritual, cultural and educational needs considered and pt agreeable to plan of care and goals.     Anticipated barriers to physical therapy: none    Goals:  Short Term Goals: 4 weeks   1. Pt will improve score on FGA to at least 25/30 in order to decrease risk for fall. ongoing  2. Pt will be independent with established HEP for strengthening and balance. ongoing  3. Pt will increase 30 sec chair rise score to at least 15 in order to demonstrate improved endruance and functional strength. ongoing  4. Pt will increase SLS time to at least 15  sec B in order to decrease risk for fall. ongoing     Long Term Goals: 8 weeks   1. Pt will improve score on FGA to at least 27/30 in order to demonstrate improved functional balance. ongoing  2. Pt will improve score on [employer] sensory testing condition 4 to at least 20 seconds in order to demonstrate improved balance in dim lit conditions. ongoing  3. Pt will increase SLS time to at least 20 seconds on B LE in order to decrease fall risk. ongoing  4. Pt will increase SSWS to at least 1.2 m/s in order to return to ambulation 5x/wk as she was prior to cancer. ongoing     Plan   Plan of care Certification: 2/7/2020 to 4/7/2020.    Continue with strengthening and stretching program, incorporate more high level balance and endurance as appropriate.    Patrick Pineda, PT

## 2020-02-27 NOTE — PATIENT INSTRUCTIONS
Return for ct/sim in about 2 weeks  Radiation Therapy Treatment  Radiation therapy can help you in your fight against cancer. It begins with a session to discuss treatment with your doctor. If you and your doctor decide on radiation, you will return for a simulation. The simulation is a planning session that helps the doctor target your cancer. He or she will design a radiation plan to protect normal tissues. When the simulation and plan are completed, you will begin your daily treatments. Treatment is usually once daily Monday to Friday. It takes less than a half an hour. Sometimes you may need radiation twice a day, with usually 6 hours between treatments. After the course of radiation is complete, you will be scheduled for follow-up appointments. This is to make sure the cancer is under control. The follow-ups will also make sure that any side effects from the treatment are taken care of.  Radiation therapy uses high-energy X-rays to kill cancer cells.   Your treatment planning visit: The simulation  Your radiation therapy team uses a special machine called a simulator to map out your treatment. The simulator is usually an X-ray machine (fluoroscopy), CT scanner, MRI scanner, or PET-CT scanner machine. Laser lights act as guides to help position your body accurately. During this visit:  · The team figures out the best position for your body. They make notes in your chart so youll be placed the same way each time.  · You may use special devices to keep your body correctly positioned and still during treatment. These may include molds, masks, rests, and blocks.  · The team makes ink marks on your skin. These will help you get in the same position for each treatment. Tiny permanent tattoos may also be used.   · Markers such as metal balls or wires may be put on or in your body. Sometime these are taped to the skin to help with the imaging process. These work with the X-rays to position your body. The markers are  removed when the visit is over.  After the team has the imaging and data, the information is sent into the computer planning system. Your doctor and the team of physicists and dosimetrists design a treatment field. The field will best target your cancer and how it might spread. It will also help limit radiation to nearby normal tissues.  Your treatments  Each treatment usually takes 10 to 30 minutes. You may need to change into a hospital gown. The radiation therapist puts you in the correct position on the treatment table, then leaves the room. Sometimes you may need more imaging before each treatment. The machine may take digital X-rays or a CT scan to help make sure you are lined up correctly. During treatment, lie as still as you can and breathe normally. You will hear noises coming from the machine. You can talk to the radiation therapist, who watches you from the control room on a TV monitor. After treatment, the therapist will help you off the table. You can then get dressed and go back to your normal activities.  Date Last Reviewed: 1/14/2016 © 2000-2017 [employer]. [address] [place] [address]. All rights reserved. This information is not intended as a substitute for professional medical care. Always follow your healthcare professional's instructions.        Discharge Instructions for Radiation Therapy  Radiation therapy uses high-energy X-rays to kill cancer cells and help you in your fight against cancer. Radiation destroys cancer cells gradually, over time. The goal of therapy is to focus on and kill as many cancer cells as possible. Radiation can also damage or kill some of the normal cells that are closest to the tumor. Damaged normal cells can repair themselves, often within a few days.  Caring for your skin  You should ask your healthcare provider for specific products that he or she recommends for washing and bathing. In general, use a mild nondetergent soap and warm  "(not hot) water to clean the area receiving radiation. Pat the region dry rather than rubbing.  Your healthcare provider may give you products to moisturize the skin and prevent infection. The goal is to prevent cracks or breaks in the skin that may be sensitive from treatment:   · Dont be surprised if your treatment causes skin redness, and a type of "sunburn" over time. Some radiation treatments can cause this.   · Ask your therapy team what lotion to use. Also ask for directions about when and how to apply it.  · Avoid prolonged or direct sunlight on the treated area. Ask your therapy team about using a sunscreen. You do not have to avoid going outside altogether, but must take appropriate precautions.  · Dont remove ink marks unless your radiation therapist says its OK. Dont scrub or use soap on the marks when you wash. Let water run over them and pat them dry.  · Protect your skin from heat or cold. Avoid hot tubs, saunas, heating pads, or ice packs.  · Avoid clothing that causes friction or rubbing on the skin.  Fighting fatigue  Radiation therapy may cause you to feel tired. Your body is working hard to heal and repair itself. To feel better, try these things:  · Do light exercise each day. Take short walks.  · Plan tasks for the times when you tend to have the most energy. Ask for help when you need it.  · Relax before you go to bed. This will help you sleep better. Try reading or listening to soothing music.  Coping with appetite changes  Here are ways to cope:  · Tell your therapy team if you find it hard to eat or you have no appetite. You may be referred to a nutritionist to help you with meal planning.  · Radiation to certain internal sites can cause nausea, depending on the location of treatment. This can affect your appetite. Think of healthy eating as part of your treatment. Try these tips:  ¨ Eat slowly.  ¨ Eat small meals several times a day.  ¨ Eat more food when youre feeling better.  ¨ Ask " others to keep you company when you eat.  ¨ Stock up on easy-to-prepare foods.  ¨ Eat foods high in protein and calories. Your healthcare provider may recommend liquid meal supplements.  ¨ Drink plenty of water and other fluids.  ¨ Ask your healthcare provider before taking any vitamins or over-the-counter supplements. Such products are not regulated by the FDA and can sometimes interfere with your treatments.   Dealing with other side effects  Here are suggestions to deal with other side effects:   · Be prepared for hair loss in the area being treated. The hair loss may be permanent. Be sure to discuss this with your healthcare provider.  · Sip cool water if your mouth or throat becomes dry or sore. Ice chips may also help.  · Tell your healthcare provider if you have diarrhea or constipation. You may be given a special diet.  · If you have trouble swallowing liquids, tell your healthcare provider.  Follow-up  Make a follow-up appointment as directed by your healthcare provider.     When to call your healthcare provider  Call your healthcare provider right away if you have any of the following:  · Unexpected headaches  · Trouble concentrating  · Ongoing fatigue  · Wheezing, shortness of breath, or trouble breathing  · Pain that doesnt go away, especially if its always in the same place  · New or unusual lumps, bumps, or swelling  · Dizziness or lightheadedness  · Unusual rashes, bruises, or bleeding  · Fever of 100.4°F (38°C) or higher, or chills  · Nausea and vomiting  · Diarrhea that doesnt improve with time  · Skin breakdown; significant pain due to skin irritation   Date Last Reviewed: 1/13/2016  © 6569-8316 [employer]. [address] [place] [address], [place] [address]. All rights reserved. This information is not intended as a substitute for professional medical care. Always follow your healthcare professional's instructions.        Radiation Therapy: Managing Short-Term Side Effects     Take short  walks daily.   Radiation therapy uses high-energy X-rays or particles to kill cancer cells. Some normal cells can also be affected. This causes side effects such as dry skin, tiredness (fatigue), or changes in your appetite. Most side effects go away when your radiation therapy is over.  Having side effects of radiation therapy does not mean that your cancer is getting worse or that therapy isnt working.   Caring for your skin  Skin problems may happen where your body gets radiation. Your skin may become dry, itchy, red, and peeling. It may darken in that spot, like a tan. To care for your skin:  · Dont scrub on the treatment area. Clean that area of the skin every day. Use warm water and mild soap, or as your healthcare provider advises. Pat the skin afterward or let it air dry.  · Ask your therapy team what lotion to use and when to use it.  · Keep the treated area out of the sun. Ask your team about using a sunscreen.  · Don't remove ink marks unless your radiation therapist says you can. Dont scrub the marks when you wash. Let water run over them and pat them dry.  · Protect your skin from heat or cold. Avoid hot tubs, saunas, hot pads, and ice packs.  · Wear soft, loose clothing to avoid rubbing skin.  Fighting tiredness  The cancer itself or the radiation therapy may cause you to feel tired. Your body is working hard to heal and repair itself. To feel better:  · Try light exercise each day. Take short walks.  · Plan tasks for the times when you tend to have the most energy. Ask for help when you need it.  · Relax before you go to bed to sleep better. Try reading or listening to soothing music.  · Be sure to let your cancer care team know if you continue to have fatigue that is not getting better. They may be able to offer ways to help.   Coping with appetite changes  Tell your therapy team if you find it hard to eat or have no appetite. You may need to see a nutritionist. This is a healthcare provider with  special training in meal planning. To keep your strength up, you need to eat well and maintain your weight. Think of healthy eating as part of your treatment. Try these tips:  · Eat slowly.  · Eat small meals several times a day.  · Eat more food when youre feeling better, even if it is not mealtime.  · Ask others to keep you company when you eat.  · Stock up on easy-to-prepare foods.  · Eat foods high in protein and calories.  · Drink plenty of water and other fluids.  · Ask your healthcare provider before taking any vitamins.  Site-specific side effects  These side effects include the following:   · You may lose hair in the area being treated. The hair often grows back after treatment.  · Your mouth or throat can become dry or sore if your head or neck is being treated. Sip cool water to help ease discomfort.  · Nausea and bowel changes can happen with radiation to the pelvic region. Tell your healthcare provider if you have nausea, diarrhea, or constipation. You may need to take medicine or follow a special diet.  Talk with your healthcare team  Radiation therapy can also have other side effects, including some that might not show up until years later. Be sure to talk with your healthcare team about what to expect with the type of radiation therapy you are getting, including when you should call them with concerns.   Date Last Reviewed: 5/1/2016  © 2862-5701 [employer]. [address] [place] [address], [place] [address]. All rights reserved. This information is not intended as a substitute for professional medical care. Always follow your healthcare professional's instructions.

## 2020-02-28 ENCOUNTER — CLINICAL SUPPORT (OUTPATIENT)
Dept: REHABILITATION | Facility: HOSPITAL | Age: 70
End: 2020-02-28
Attending: INTERNAL MEDICINE
Payer: MEDICARE

## 2020-02-28 DIAGNOSIS — R20.1 IMPAIRED SENSATION: ICD-10-CM

## 2020-02-28 DIAGNOSIS — Z74.09 IMPAIRED FUNCTIONAL MOBILITY, BALANCE, GAIT, AND ENDURANCE: Primary | ICD-10-CM

## 2020-02-28 PROCEDURE — 97112 NEUROMUSCULAR REEDUCATION: CPT | Mod: PO

## 2020-02-28 PROCEDURE — 97110 THERAPEUTIC EXERCISES: CPT | Mod: PO

## 2020-03-02 NOTE — PROGRESS NOTES
Physical Therapy Daily Treatment Note     Name: Sharri Dennis [place]  Clinic Number: [id number]    Therapy Diagnosis:   Encounter Diagnoses   Name Primary?   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 She does her stretches every night before bed and noted this helps her numbness to not increase over night. Numbness has reduced to half of her foot only now, she can feel her heels now. B hands are still numb but they don't feel as swollen as they did when she started.     She was provided with home exercise program today.  Response to previous treatment: first session since evaluation  Functional change: ongoing    Pain: 0/10  Location: n/a     Objective     Sharri received therapeutic exercises to develop strength, endurance, ROM, flexibility, posture and core stabilization for 20 minutes including:  2 x 30 sec hamstring stretch on step   2 x 30 sec of calf stretch on wedge  10 minutes recumbent bike, lv 7, RPE 4  x15 reps of hip and knee extension on pilates box with all springs on level 1, much better control than last session    Sharri participated in neuromuscular re-education activities to improve: Balance, Coordination, Kinesthetic, Sense, Proprioception and Posture for 30 minutes. The following activities were included:  2 x 20 reps of alternating lg cone taps, no UE support  2 x 30 sec ea modified SLS with opposing foot on lg cone, CGA, occ UE support   2  x 30 sec tandem on foam, ea LE lead, occ UE support, CGA  2 x 30 sec on foam head turns, L/R, CGA  x30 sec on foam head turns L/R vision eliminated, CGA, pt require WBOS for this activity    2 x 10 reps of median nerve glide in seated, cues for technique  2 x 10 reps of ulnar nerve glide in seated, cues for technique    Sharri participated in dynamic functional therapeutic activities to improve functional performance for 0 minutes, including:    Home Exercises Provided and Patient Education Provided     Education provided:   - HEP provided    Written Home Exercises Provided: yes.  Exercises were reviewed and Sharri was able to demonstrate them prior to the end of the session.  Sharri demonstrated good  understanding of the education provided.     See EMR under Patient Instructions for exercises provided 2/11/2020.    Assessment     Ms. Harrell tolerated session well. She is continually noting decreased numbness in her feet though has not noted much reduction in her hands. Therefore, PT added both median and ulnar glides to pt's HEP in hope of decreasing this numbness feeling in her hands. Pt continues to do well with balance activities though struggles more with vision eliminate tasks. She would benefit from addition of Bosu soon to improve balance. Pt continually reports good adherence to HEP and even practices balance with her . She remains appropriate for skilled PT services and is set for a reassessment next session..     Sharri is progressing well towards her goals.   Pt prognosis is Good.     Pt will continue to benefit from skilled outpatient physical therapy to address the deficits listed in the problem list box on initial evaluation, provide pt/family education and to maximize pt's level of independence in the home and community environment.     Pt's spiritual, cultural and educational needs considered and pt agreeable to plan of care and goals.     Anticipated barriers to physical therapy:  none    Goals:  Short Term Goals: 4 weeks   1. Pt will improve score on FGA to at least 25/30 in order to decrease risk for fall. ongoing  2. Pt will be independent with established HEP for strengthening and balance. ongoing  3. Pt will increase 30 sec chair rise score to at least 15 in order to demonstrate improved endruance and functional strength. ongoing  4. Pt will increase SLS time to at least 15 sec B in order to decrease risk for fall. ongoing     Long Term Goals: 8 weeks   1. Pt will improve score on FGA to at least 27/30 in order to demonstrate improved functional balance. ongoing  2. Pt will improve score on [place] sensory testing condition 4 to at least 20 seconds in order to demonstrate improved balance in dim lit conditions. ongoing  3. Pt will increase SLS time to at least 20 seconds on B LE in order to decrease fall risk. ongoing  4. Pt will increase SSWS to at least 1.2 m/s in order to return to ambulation 5x/wk as she was prior to cancer. ongoing     Plan   Plan of care Certification: 2/7/2020 to 4/7/2020.    Continue with strengthening and stretching program, incorporate more high level balance and endurance as appropriate.    Patrick Pineda, PT

## 2020-03-03 ENCOUNTER — CLINICAL SUPPORT (OUTPATIENT)
Dept: REHABILITATION | Facility: HOSPITAL | Age: 70
End: 2020-03-03
Attending: INTERNAL MEDICINE
Payer: MEDICARE

## 2020-03-03 DIAGNOSIS — R20.1 IMPAIRED SENSATION: ICD-10-CM

## 2020-03-03 DIAGNOSIS — Z74.09 IMPAIRED FUNCTIONAL MOBILITY, BALANCE, GAIT, AND ENDURANCE: Primary | ICD-10-CM

## 2020-03-03 PROCEDURE — 97112 NEUROMUSCULAR REEDUCATION: CPT | Mod: PO

## 2020-03-03 PROCEDURE — 97110 THERAPEUTIC EXERCISES: CPT | Mod: PO

## 2020-03-04 ENCOUNTER — TELEPHONE (OUTPATIENT)
Dept: SURGERY | Facility: CLINIC | Age: 70
End: 2020-03-04

## 2020-03-04 NOTE — TELEPHONE ENCOUNTER
Call to inform pt that genetic test results are back and are NEGATIVE. Copy of the results mailed to pt.

## 2020-03-05 DIAGNOSIS — E87.6 HYPOKALEMIA: ICD-10-CM

## 2020-03-05 RX ORDER — POTASSIUM CHLORIDE 20 MEQ/1
TABLET, EXTENDED RELEASE ORAL
Qty: 60 TABLET | Refills: 1 | Status: SHIPPED | OUTPATIENT
Start: 2020-03-05 | End: 2020-04-27

## 2020-03-06 ENCOUNTER — CLINICAL SUPPORT (OUTPATIENT)
Dept: REHABILITATION | Facility: HOSPITAL | Age: 70
End: 2020-03-06
Attending: INTERNAL MEDICINE
Payer: MEDICARE

## 2020-03-06 ENCOUNTER — DOCUMENTATION ONLY (OUTPATIENT)
Dept: REHABILITATION | Facility: HOSPITAL | Age: 70
End: 2020-03-06

## 2020-03-06 DIAGNOSIS — R20.1 IMPAIRED SENSATION: ICD-10-CM

## 2020-03-06 DIAGNOSIS — Z74.09 IMPAIRED FUNCTIONAL MOBILITY, BALANCE, GAIT, AND ENDURANCE: Primary | ICD-10-CM

## 2020-03-06 PROCEDURE — 97112 NEUROMUSCULAR REEDUCATION: CPT | Mod: PO

## 2020-03-06 PROCEDURE — 97110 THERAPEUTIC EXERCISES: CPT | Mod: PO

## 2020-03-06 NOTE — PROGRESS NOTES
PT/PTA met face to face to discuss pt's treatment plan and progress towards established goals.  Continue with current PT POC with focus on balance and endurance.  Patient will be seen by physical therapist at least every sixth treatment or 30 days, whichever occurs first.    Michelle Brambila, PTA  03/06/2020

## 2020-03-06 NOTE — PROGRESS NOTES
Physical Therapy Daily Treatment Note     Name: Sharri Dennis [place]  Clinic Number: [id number]    Therapy Diagnosis:   Encounter Diagnoses   Name Primary?   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 She went to the gym yesterday for the first time and it was nice to get to use their equipment.     She was provided with home exercise program today.  Response to previous treatment: first session since evaluation  Functional change: ongoing    Pain: 0/10  Location: n/a     Objective     Sharri received therapeutic exercises to develop strength, endurance, ROM, flexibility, posture and core stabilization for 15 minutes including:  3 x 30 sec of calf stretch on wedge  10 minutes recumbent bike, lv 7, RPE 3    Sharri participated in neuromuscular re-education activities to improve: Balance, Coordination, Kinesthetic, Sense, Proprioception and Posture for 28 minutes. The following activities were included:       Evaluation 3/6/20   Single Limb Stance R LE 11 sec  (<10 sec = HIGH FALL RISK) 16 sec   Single Limb Stance L LE 12 sec  (<10 sec = HIGH FALL RISK) 30 sec   30 second Chair Rise 13 completed with no arms 14 completed with no arms   TUG 8.8 + 9 = 8.9 seconds 7.1 + 6.8 + 7.3 = 7.1 sec   Self selected walking speed 1.05 m/sec (6m/5.7s) 1.3 m/s (6m/4.6s)   Fast walking speed 1.54 m/sec (6m/3.9s) 1.8 m/s (6m/3.4s)   FGA 21/30 25/30      EFE SENSORY TESTING:  (P= Pass,  "F= Fail; note any sway; hold each position for 30")  Condition 1: (firm surface/feet together/eyes open) P  Condition 2: (firm surface/feet together/eyes closed) P  Condition 3: (firm surface/feet in tandem/eyes open) P  Condition 4: (firm surface/feet in tandem/eyes closed) F; 13 sec  Condition 5: (soft surface/feet together/eyes open) P  Condition 6: (soft surface/feet together/eyes closed) P; min sway  Condition 7: (Fakuda step test), measure distance varied from center starting position NT     Functional Gait Assessment:   1. Gait on level surface =  3   (3) Normal: less than 5.5 sec, no A.D., no imbalance, normal gait pattern, deviates< 6in   (2) Mild impairment: 7-5.6 sec, uses A.D., mild gait deviations, or deviates 6-10 in   (1) Moderate impairment: > 7 sec, slow speed, imbalance, deviates 10-15 in.   (0) Severe impairment: needs assist, deviates >15 in, reach/touch wall  2. Change in Gait Speed = 3   (3) Normal: smooth change w/o loss of balance or gait deviation, deviates < 6 in, significant difference between speeds   (2) Mild impairment: changes speed, but demonstrates mild gait deviations, deviates 6-10 in, OR no deviations but unable to significantly speed, OR uses A.D.   (1) Moderate impairment: minor changes to speed, OR changes speed w/ significant deviations, deviates 10-15 in, OR  Changes speed , but loses balance & recovers   (0) Severe impairment: cannot change speed, deviates >15 in, or loses balance & needs assist  3. Gait with horizontal head turns  = 3   (3) Normal: no change in gait, deviates <6 in   (2) Mild impairment: slight change in speed, deviates 6-10 in, OR uses A.D.   (1) Moderate impairment: moderate change in speed, deviates 10-15 in   (0) Severe impairment: severe disruption of gait, deviates >15in  4. Gait with vertical head turns = 3   (3) Normal: no change in gait, deviates <6 in   (2) Mild impairment: slight change in speed, deviates 6-10 in OR uses A.D.   (1) Moderate " impairment: moderate change in speed, deviates 10-15 in   (0) Severe impairment: severe disruption of gait, deviates >15 in  5. Gait with pivot turns = 2   (3) Normal: performs safely in 3 sec, no LOB   (2) Mild impairment: performs in >3 sec & no LOB, OR turns safely & requires several steps to regain LOB   (1) Moderate impairment: turns slow, OR requires several small steps for balance following turn & stop   (0) Severe impairment: cannot turn safely, needs assist  6. Step over obstacle = 2   (3) Normal: steps over 2 stacked boxes w/o change in speed or LOB   (2) Mild impairment: able to step over 1 box w/o change in speed or LOB   (1) Moderate impairment: steps over 1 box but must slow down, may require VC   (0) Severe impairment: cannot perform w/o assist  7. Gait with Narrow PITA = 2   (3) Normal: 10 steps no staggering   (2) Mild impairment: 7-9 steps   (1) Moderate impairment: 4-7 steps   (0) Severe impairment: < 4 steps or cannot perform w/o assist  8. Gait with eyes closed = 2   (3) Normal: < 7 sec, no A.D., no LOB, normal gait pattern, deviates <6 in   (2) Mild impairment: 7.1-9 sec, mild gait deviations, deviates 6-10 in   (1) Moderate impairment: > 9 sec, abnormal pattern, LOB, deviates 10-15 in   (0) Severe impairment: cannot perform w/o assist, LOB, deviates >15in  9. Ambulating Backwards = 2   (3) Normal: no A.D., no LOB, normal gait pattern, deviates <6in   (2) Mild impairment: uses A.D., slower speed, mild gait deviations, deviates 6-10 in   (1) Moderate impairment: slow speed, abnormal gait pattern, LOB, deviates 10-15 in   (0) Severe impairment: severe gait deviations or LOB, deviates >15in  10. Steps = 3   (3) Normal: alternating feet, no rail   (2) Mild Impairment: alternating feet, uses rail   (1) Moderate impairment: step-to, uses rail   (0) Severe impairment: cannot perform safely    Score 25/30     Score:   <22/30 fall risk   <20/30 fall risk in older adults   <18/30 fall risk in Parkinsons        CMS Impairment/Limitation/Restriction for FOTO Status Survey    Therapist reviewed FOTO scores for Sharri Cline on 3/6/2020.   FOTO documents entered into [employer] - see Media section.    Limitation Score: 27%  Category: Mobility    Current : CJ = at least 20% but < 40% impaired, limited or restricted  Goal: CJ = at least 20% but < 40% impaired, limited or restricted         Sharri participated in dynamic functional therapeutic activities to improve functional performance for 0 minutes, including:    Home Exercises Provided and Patient Education Provided     Education provided:   - HEP provided    Written Home Exercises Provided: yes.  Exercises were reviewed and Sharri was able to demonstrate them prior to the end of the session.  Sharri demonstrated good  understanding of the education provided.     See EMR under Patient Instructions for exercises provided 2/11/2020.    Assessment     Ms. Harrell has made steady progress within her first month of skilled PT services. She improved on her scores in all categories and met 3/4 STGs as well as 1 LTG during this month. She continues to demonstrate most difficulty with SLS especially on the R LE, vision eliminated tasks, and stepping over objects. Pt would benefit from use of Bosu and 2pt fitter in the coming sessions to continue to strengthen her ankles as well as improve her proprioceptive feedback at the ankles. Pt continues to be motivated and regularly performs her HEP. PT to continue with services 2x/week as per current POC. Her goals have been updated and remain appropriate for the next month of care.    Sharri is progressing well towards her goals.   Pt prognosis is Good.     Pt will continue to benefit from skilled outpatient physical therapy to address the deficits listed in the problem list box on initial evaluation, provide pt/family education and to maximize pt's level of independence in the home and community environment.     Pt's spiritual, cultural and  educational needs considered and pt agreeable to plan of care and goals.     Anticipated barriers to physical therapy: none    Goals:  Short Term Goals: 4 weeks   1. Pt will improve score on FGA to at least 25/30 in order to decrease risk for fall. MET 3/6/2020  2. Pt will be independent with established HEP for strengthening and balance. MET 3/6/2020  3. Pt will increase 30 sec chair rise score to at least 15 in order to demonstrate improved endruance and functional strength. Ongoing, improving  4. Pt will increase SLS time to at least 15 sec B in order to decrease risk for fall. MET 3/6/2020     Long Term Goals: 8 weeks   1. Pt will improve score on FGA to at least 27/30 in order to demonstrate improved functional balance. ongoing  2. Pt will improve score on [place] sensory testing condition 4 to at least 20 seconds in order to demonstrate improved balance in dim lit conditions. ongoing  3. Pt will increase SLS time to at least 20 seconds on B LE in order to decrease fall risk. ongoing  4. Pt will increase SSWS to at least 1.2 m/s in order to return to ambulation 5x/wk as she was prior to cancer. Met 3/6/2020     Plan   Plan of care Certification: 2/7/2020 to 4/7/2020.    Continue with strengthening and stretching program, incorporate more high level balance and endurance as appropriate.    Patrick Pineda, PT

## 2020-03-06 NOTE — PLAN OF CARE
Physical Therapy Daily Treatment Note     Name: Sharri Dennis [place]  Clinic Number: [id number]    Therapy Diagnosis:   Encounter Diagnoses   Name Primary?   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 She went to the gym yesterday for the first time and it was nice to get to use their equipment.     She was provided with home exercise program today.  Response to previous treatment: first session since evaluation  Functional change: ongoing    Pain: 0/10  Location: n/a     Objective     Sharri received therapeutic exercises to develop strength, endurance, ROM, flexibility, posture and core stabilization for 15 minutes including:  3 x 30 sec of calf stretch on wedge  10 minutes recumbent bike, lv 7, RPE 3    Sharri participated in neuromuscular re-education activities to improve: Balance, Coordination, Kinesthetic, Sense, Proprioception and Posture for 28 minutes. The following activities were included:       Evaluation 3/6/20   Single Limb Stance R LE 11 sec  (<10 sec = HIGH FALL RISK) 16 sec   Single Limb Stance L LE 12 sec  (<10 sec = HIGH FALL RISK) 30 sec   30 second Chair Rise 13 completed with no arms 14 completed with no arms   TUG 8.8 + 9 = 8.9 seconds 7.1 + 6.8 + 7.3 = 7.1 sec   Self selected walking speed 1.05 m/sec (6m/5.7s) 1.3 m/s (6m/4.6s)   Fast walking speed 1.54 m/sec (6m/3.9s) 1.8 m/s (6m/3.4s)   FGA 21/30 25/30      EFE SENSORY TESTING:  (P= Pass,  "F= Fail; note any sway; hold each position for 30")  Condition 1: (firm surface/feet together/eyes open) P  Condition 2: (firm surface/feet together/eyes closed) P  Condition 3: (firm surface/feet in tandem/eyes open) P  Condition 4: (firm surface/feet in tandem/eyes closed) F; 13 sec  Condition 5: (soft surface/feet together/eyes open) P  Condition 6: (soft surface/feet together/eyes closed) P; min sway  Condition 7: (Fakuda step test), measure distance varied from center starting position NT     Functional Gait Assessment:   1. Gait on level surface =  3   (3) Normal: less than 5.5 sec, no A.D., no imbalance, normal gait pattern, deviates< 6in   (2) Mild impairment: 7-5.6 sec, uses A.D., mild gait deviations, or deviates 6-10 in   (1) Moderate impairment: > 7 sec, slow speed, imbalance, deviates 10-15 in.   (0) Severe impairment: needs assist, deviates >15 in, reach/touch wall  2. Change in Gait Speed = 3   (3) Normal: smooth change w/o loss of balance or gait deviation, deviates < 6 in, significant difference between speeds   (2) Mild impairment: changes speed, but demonstrates mild gait deviations, deviates 6-10 in, OR no deviations but unable to significantly speed, OR uses A.D.   (1) Moderate impairment: minor changes to speed, OR changes speed w/ significant deviations, deviates 10-15 in, OR  Changes speed , but loses balance & recovers   (0) Severe impairment: cannot change speed, deviates >15 in, or loses balance & needs assist  3. Gait with horizontal head turns  = 3   (3) Normal: no change in gait, deviates <6 in   (2) Mild impairment: slight change in speed, deviates 6-10 in, OR uses A.D.   (1) Moderate impairment: moderate change in speed, deviates 10-15 in   (0) Severe impairment: severe disruption of gait, deviates >15in  4. Gait with vertical head turns = 3   (3) Normal: no change in gait, deviates <6 in   (2) Mild impairment: slight change in speed, deviates 6-10 in OR uses A.D.   (1) Moderate " impairment: moderate change in speed, deviates 10-15 in   (0) Severe impairment: severe disruption of gait, deviates >15 in  5. Gait with pivot turns = 2   (3) Normal: performs safely in 3 sec, no LOB   (2) Mild impairment: performs in >3 sec & no LOB, OR turns safely & requires several steps to regain LOB   (1) Moderate impairment: turns slow, OR requires several small steps for balance following turn & stop   (0) Severe impairment: cannot turn safely, needs assist  6. Step over obstacle = 2   (3) Normal: steps over 2 stacked boxes w/o change in speed or LOB   (2) Mild impairment: able to step over 1 box w/o change in speed or LOB   (1) Moderate impairment: steps over 1 box but must slow down, may require VC   (0) Severe impairment: cannot perform w/o assist  7. Gait with Narrow PITA = 2   (3) Normal: 10 steps no staggering   (2) Mild impairment: 7-9 steps   (1) Moderate impairment: 4-7 steps   (0) Severe impairment: < 4 steps or cannot perform w/o assist  8. Gait with eyes closed = 2   (3) Normal: < 7 sec, no A.D., no LOB, normal gait pattern, deviates <6 in   (2) Mild impairment: 7.1-9 sec, mild gait deviations, deviates 6-10 in   (1) Moderate impairment: > 9 sec, abnormal pattern, LOB, deviates 10-15 in   (0) Severe impairment: cannot perform w/o assist, LOB, deviates >15in  9. Ambulating Backwards = 2   (3) Normal: no A.D., no LOB, normal gait pattern, deviates <6in   (2) Mild impairment: uses A.D., slower speed, mild gait deviations, deviates 6-10 in   (1) Moderate impairment: slow speed, abnormal gait pattern, LOB, deviates 10-15 in   (0) Severe impairment: severe gait deviations or LOB, deviates >15in  10. Steps = 3   (3) Normal: alternating feet, no rail   (2) Mild Impairment: alternating feet, uses rail   (1) Moderate impairment: step-to, uses rail   (0) Severe impairment: cannot perform safely    Score 25/30     Score:   <22/30 fall risk   <20/30 fall risk in older adults   <18/30 fall risk in Parkinsons        CMS Impairment/Limitation/Restriction for FOTO Status Survey    Therapist reviewed FOTO scores for Sharri Cline on 3/6/2020.   FOTO documents entered into [employer] - see Media section.    Limitation Score: 27%  Category: Mobility    Current : CJ = at least 20% but < 40% impaired, limited or restricted  Goal: CJ = at least 20% but < 40% impaired, limited or restricted         Sharri participated in dynamic functional therapeutic activities to improve functional performance for 0 minutes, including:    Home Exercises Provided and Patient Education Provided     Education provided:   - HEP provided    Written Home Exercises Provided: yes.  Exercises were reviewed and Sharri was able to demonstrate them prior to the end of the session.  Sharri demonstrated good  understanding of the education provided.     See EMR under Patient Instructions for exercises provided 2/11/2020.    Assessment     Ms. Harrell has made steady progress within her first month of skilled PT services. She improved on her scores in all categories and met 3/4 STGs as well as 1 LTG during this month. She continues to demonstrate most difficulty with SLS especially on the R LE, vision eliminated tasks, and stepping over objects. Pt would benefit from use of Bosu and 2pt fitter in the coming sessions to continue to strengthen her ankles as well as improve her proprioceptive feedback at the ankles. Pt continues to be motivated and regularly performs her HEP. PT to continue with services 2x/week as per current POC. Her goals have been updated and remain appropriate for the next month of care.    Sharri is progressing well towards her goals.   Pt prognosis is Good.     Pt will continue to benefit from skilled outpatient physical therapy to address the deficits listed in the problem list box on initial evaluation, provide pt/family education and to maximize pt's level of independence in the home and community environment.     Pt's spiritual, cultural and  educational needs considered and pt agreeable to plan of care and goals.     Anticipated barriers to physical therapy: none    Goals:  Short Term Goals: 4 weeks   1. Pt will improve score on FGA to at least 25/30 in order to decrease risk for fall. MET 3/6/2020  2. Pt will be independent with established HEP for strengthening and balance. MET 3/6/2020  3. Pt will increase 30 sec chair rise score to at least 15 in order to demonstrate improved endruance and functional strength. Ongoing, improving  4. Pt will increase SLS time to at least 15 sec B in order to decrease risk for fall. MET 3/6/2020     Long Term Goals: 8 weeks   1. Pt will improve score on FGA to at least 27/30 in order to demonstrate improved functional balance. ongoing  2. Pt will improve score on [place] sensory testing condition 4 to at least 20 seconds in order to demonstrate improved balance in dim lit conditions. ongoing  3. Pt will increase SLS time to at least 20 seconds on B LE in order to decrease fall risk. ongoing  4. Pt will increase SSWS to at least 1.2 m/s in order to return to ambulation 5x/wk as she was prior to cancer. Met 3/6/2020     Plan   Plan of care Certification: 2/7/2020 to 4/7/2020.    Continue with strengthening and stretching program, incorporate more high level balance and endurance as appropriate.    Patrick Pineda, PT

## 2020-03-09 LAB — INTEGRATED BRAC ANALYSIS: NORMAL

## 2020-03-10 NOTE — PROGRESS NOTES
Physical Therapy Daily Treatment Note     Name: Sharri Dennis [place]  Clinic Number: [id number]    Therapy Diagnosis:   Encounter Diagnoses   Name Primary?   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 She also has to start radiation at the end of this month to ensure her cancer stays in remission.     She was provided with home exercise program today.  Response to previous treatment: first session since evaluation  Functional change: ongoing    Pain: 0/10  Location: n/a     Objective     Sharri received therapeutic exercises to develop strength, endurance, ROM, flexibility, posture and core stabilization for 15 minutes including:  3 x 30 sec of calf stretch on wedge  10 minutes recumbent stepper, lv 3, RPE 3  2 x 10 reps of sit<>stand from EOM (low mat) with no UE support, first set with foam under glutes second set no foam  2 x 30 sec ea LE HS stretch on step    Sharri participated in neuromuscular re-education activities to improve: Balance, Coordination, Kinesthetic, Sense, Proprioception and Posture for 30 minutes. The following activities were included:  2 x 30 sec tandem, vision eliminated, occ UE support, [place]  2 x 30 sec tandem on foam, occ UE support, Tristian    Long airex:   3 laps tandem ambulation, occ UE support, Tristian    Bosu, small, flat side up:  2 x 30 sec static standing, no UE support, CGA  2 x 30 sec static standing with vision  eliminated, [place], occ UE support  x30 sec static standing with L/R head turns, no UE support, CGA  x30 sec static standing with up/down head turns, no UE support, CGA  2 x 30 sec L/R weight shifting, no UE support, slow movement but CGA only  2 x 30 sec up/down head turns, no UE support, cues to shift weight fully posterior    Bosu, small, flat side down:   x10 ea LE step up with opposing side hip and knee flexion, no UE support  x30 sec static stance with no UE support, CGA  2 x 30 sec static stance with vision eliminated and no UE support, [place]    Sharri participated in dynamic functional therapeutic activities to improve functional performance for 0 minutes, including:    Home Exercises Provided and Patient Education Provided     Education provided:   - HEP provided    Written Home Exercises Provided: yes.  Exercises were reviewed and Sharri was able to demonstrate them prior to the end of the session.  Sharri demonstrated good  understanding of the education provided.     See EMR under Patient Instructions for exercises provided 2/11/2020.    Assessment     Ms. Harrell did well in session this morning. She continues to be most limited by vision eliminated balance in regards to balance and struggles with some endurance. Pt does report she will be starting radiation soon which may have effects on her progress. Pt remains appropriate for skilled PT services.     Sharri is progressing well towards her goals.   Pt prognosis is Good.     Pt will continue to benefit from skilled outpatient physical therapy to address the deficits listed in the problem list box on initial evaluation, provide pt/family education and to maximize pt's level of independence in the home and community environment.     Pt's spiritual, cultural and educational needs considered and pt agreeable to plan of care and goals.     Anticipated barriers to physical therapy: none    Goals:  Short Term Goals: 4 weeks   1. Pt will improve score on FGA to at  least 25/30 in order to decrease risk for fall. MET 3/6/2020  2. Pt will be independent with established HEP for strengthening and balance. MET 3/6/2020  3. Pt will increase 30 sec chair rise score to at least 15 in order to demonstrate improved endruance and functional strength. Ongoing, improving  4. Pt will increase SLS time to at least 15 sec B in order to decrease risk for fall. MET 3/6/2020     Long Term Goals: 8 weeks   1. Pt will improve score on FGA to at least 27/30 in order to demonstrate improved functional balance. ongoing  2. Pt will improve score on [place] sensory testing condition 4 to at least 20 seconds in order to demonstrate improved balance in dim lit conditions. ongoing  3. Pt will increase SLS time to at least 20 seconds on B LE in order to decrease fall risk. ongoing  4. Pt will increase SSWS to at least 1.2 m/s in order to return to ambulation 5x/wk as she was prior to cancer. Met 3/6/2020     Plan   Plan of care Certification: 2/7/2020 to 4/7/2020.    Continue with strengthening and stretching program, incorporate more high level balance and endurance as appropriate.    Patrick Pineda, PT

## 2020-03-11 ENCOUNTER — DOCUMENTATION ONLY (OUTPATIENT)
Dept: REHABILITATION | Facility: HOSPITAL | Age: 70
End: 2020-03-11

## 2020-03-11 ENCOUNTER — CLINICAL SUPPORT (OUTPATIENT)
Dept: REHABILITATION | Facility: HOSPITAL | Age: 70
End: 2020-03-11
Attending: INTERNAL MEDICINE
Payer: MEDICARE

## 2020-03-11 ENCOUNTER — HOSPITAL ENCOUNTER (OUTPATIENT)
Dept: RADIATION THERAPY | Facility: HOSPITAL | Age: 70
Discharge: HOME | End: 2020-03-11
Attending: RADIOLOGY
Payer: MEDICARE

## 2020-03-11 DIAGNOSIS — R20.1 IMPAIRED SENSATION: ICD-10-CM

## 2020-03-11 DIAGNOSIS — Z74.09 IMPAIRED FUNCTIONAL MOBILITY, BALANCE, GAIT, AND ENDURANCE: Primary | ICD-10-CM

## 2020-03-11 PROCEDURE — 77014 HC CT GUIDANCE RADIATION THERAPY FLDS PLACEMENT: CPT | Mod: TC | Performed by: RADIOLOGY

## 2020-03-11 PROCEDURE — 77263 PR  RADIATION THERAPY PLAN COMPLEX: ICD-10-PCS | Mod: ,,, | Performed by: RADIOLOGY

## 2020-03-11 PROCEDURE — 77290 THER RAD SIMULAJ FIELD CPLX: CPT | Mod: 26,,, | Performed by: RADIOLOGY

## 2020-03-11 PROCEDURE — 77334 PR  RADN TREATMENT AID(S) COMPLX: ICD-10-PCS | Mod: 26,,, | Performed by: RADIOLOGY

## 2020-03-11 PROCEDURE — 77334 RADIATION TREATMENT AID(S): CPT | Mod: TC | Performed by: RADIOLOGY

## 2020-03-11 PROCEDURE — 77263 THER RADIOLOGY TX PLNG CPLX: CPT | Mod: ,,, | Performed by: RADIOLOGY

## 2020-03-11 PROCEDURE — 77334 RADIATION TREATMENT AID(S): CPT | Mod: 26,,, | Performed by: RADIOLOGY

## 2020-03-11 PROCEDURE — 97110 THERAPEUTIC EXERCISES: CPT | Mod: PO

## 2020-03-11 PROCEDURE — 77290 THER RAD SIMULAJ FIELD CPLX: CPT | Mod: TC | Performed by: RADIOLOGY

## 2020-03-11 PROCEDURE — 97112 NEUROMUSCULAR REEDUCATION: CPT | Mod: PO

## 2020-03-11 PROCEDURE — 77290 PR  SET RADN THERAPY FIELD COMPLEX: ICD-10-PCS | Mod: 26,,, | Performed by: RADIOLOGY

## 2020-03-11 NOTE — PROGRESS NOTES
Face to face meeting completed with Patrick Pineda  PT regarding current status and progress of   Sharri Cline .  Josh Escamilla PTA      Face to face conference held on 3/9/2020.  Patrick Pineda, PT

## 2020-03-12 ENCOUNTER — TELEPHONE (OUTPATIENT)
Dept: SURGERY | Facility: CLINIC | Age: 70
End: 2020-03-12

## 2020-03-12 NOTE — PROGRESS NOTES
PT/PTA met face to face to discuss pt's treatment plan and progress towards established goals. Continue with current PT POC, including: endurance and balance. Pt will be seen by physical therapist at least every 6th treatment day or every 30 days, whichever occurs first.      Eduardo Parker, PTA  3/09/20

## 2020-03-12 NOTE — TELEPHONE ENCOUNTER
Call to pt to see if she received the genetic test results repot in the mail. Per pt she did receive the results.

## 2020-03-12 NOTE — PROGRESS NOTES
Physical Therapy Daily Treatment Note     Name: Sharri Dennis Cantil  Clinic Number: [id number]    Therapy Diagnosis:   Encounter Diagnoses   Name Primary?   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     She was compliant with home exercise program.  Response to previous treatment: first session since evaluation  Functional change: ongoing    Pain: 0/10  Location: n/a     Objective     Sharri received therapeutic exercises to develop strength, endurance, ROM, flexibility, posture and core stabilization for 15 minutes including:  3 x 30 sec of calf stretch on wedge  7 minutes elliptical, lv 3, RPE 5  2 x 10 reps of sit<>stand from EOM (low mat) with no UE support, with foam under feet  2 x 30 sec ea LE HS stretch on step    Sharri participated in neuromuscular re-education activities to improve: Balance, Coordination, Kinesthetic, Sense, Proprioception and Posture for 30 minutes. The following activities were included:  2 x 30 sec tandem, vision eliminated, no UE support, CGA  2 x 30 sec tandem on foam, occ UE support, CGA  x20 alternating tapping lg cone, on foam, CGA    Bosu, small, flat side up:  2 x 30 sec static standing, no UE support, CGA  2 x 30 sec static standing with vision eliminated, Tristian, occ UE  support  x30 sec static standing with L/R head turns, no UE support, CGA  x30 sec static standing with up/down head turns, no UE support, CGA  2 x 30 sec L/R weight shifting, no UE support, slow movement but CGA only  2 x 30 sec up/down head turns, no UE support, cues to shift weight fully posterior    Bosu, small, flat side down:   x10 ea LE step up with opposing side hip and knee flexion, no UE support, CGA  x30 sec static stance with no UE support, CGA  2 x 30 sec static stance with vision eliminated and no UE support, Tristian Harrell participated in dynamic functional therapeutic activities to improve functional performance for 0 minutes, including:    Home Exercises Provided and Patient Education Provided     Education provided:   - HEP provided    Written Home Exercises Provided: yes.  Exercises were reviewed and Sharri was able to demonstrate them prior to the end of the session.  Sharri demonstrated good  understanding of the education provided.     See EMR under Patient Instructions for exercises provided 2/11/2020.    Assessment     Ms. Harrell continues to do well in skilled PT sessions. She demonstrates improving endurance as she was able to tolerate elliptical today for the first time and for 7 minutes. Pt continues to report good compliance with HEP. She is progressing with balance as well, requiring CGA only for majority of tasks today. Pt remains appropriate for skilled PT services.     Sharri is progressing well towards her goals.   Pt prognosis is Good.     Pt will continue to benefit from skilled outpatient physical therapy to address the deficits listed in the problem list box on initial evaluation, provide pt/family education and to maximize pt's level of independence in the home and community environment.     Pt's spiritual, cultural and educational needs considered and pt agreeable to plan of care and goals.     Anticipated barriers to physical therapy: none    Goals:  Short Term Goals: 4 weeks   1.  Pt will improve score on FGA to at least 25/30 in order to decrease risk for fall. MET 3/6/2020  2. Pt will be independent with established HEP for strengthening and balance. MET 3/6/2020  3. Pt will increase 30 sec chair rise score to at least 15 in order to demonstrate improved endruance and functional strength. Ongoing, improving  4. Pt will increase SLS time to at least 15 sec B in order to decrease risk for fall. MET 3/6/2020     Long Term Goals: 8 weeks   1. Pt will improve score on FGA to at least 27/30 in order to demonstrate improved functional balance. ongoing  2. Pt will improve score on Vu sensory testing condition 4 to at least 20 seconds in order to demonstrate improved balance in dim lit conditions. ongoing  3. Pt will increase SLS time to at least 20 seconds on B LE in order to decrease fall risk. ongoing  4. Pt will increase SSWS to at least 1.2 m/s in order to return to ambulation 5x/wk as she was prior to cancer. Met 3/6/2020     Plan   Plan of care Certification: 2/7/2020 to 4/7/2020.    Continue with strengthening and stretching program, incorporate more high level balance and endurance as appropriate.    Patrick Pineda, PT

## 2020-03-13 ENCOUNTER — CLINICAL SUPPORT (OUTPATIENT)
Dept: REHABILITATION | Facility: HOSPITAL | Age: 70
End: 2020-03-13
Attending: INTERNAL MEDICINE
Payer: MEDICARE

## 2020-03-13 DIAGNOSIS — R20.1 IMPAIRED SENSATION: ICD-10-CM

## 2020-03-13 DIAGNOSIS — Z74.09 IMPAIRED FUNCTIONAL MOBILITY, BALANCE, GAIT, AND ENDURANCE: Primary | ICD-10-CM

## 2020-03-13 PROCEDURE — 97112 NEUROMUSCULAR REEDUCATION: CPT | Mod: PO

## 2020-03-13 PROCEDURE — 97110 THERAPEUTIC EXERCISES: CPT | Mod: PO

## 2020-03-17 ENCOUNTER — PATIENT MESSAGE (OUTPATIENT)
Dept: HEMATOLOGY/ONCOLOGY | Facility: CLINIC | Age: 70
End: 2020-03-17

## 2020-03-17 ENCOUNTER — CLINICAL SUPPORT (OUTPATIENT)
Dept: REHABILITATION | Facility: HOSPITAL | Age: 70
End: 2020-03-17
Attending: INTERNAL MEDICINE
Payer: MEDICARE

## 2020-03-17 DIAGNOSIS — Z74.09 IMPAIRED FUNCTIONAL MOBILITY, BALANCE, GAIT, AND ENDURANCE: ICD-10-CM

## 2020-03-17 DIAGNOSIS — R20.1 IMPAIRED SENSATION: ICD-10-CM

## 2020-03-17 PROCEDURE — 97112 NEUROMUSCULAR REEDUCATION: CPT | Mod: PO

## 2020-03-17 PROCEDURE — 97110 THERAPEUTIC EXERCISES: CPT | Mod: PO

## 2020-03-17 NOTE — PROGRESS NOTES
Physical Therapy Daily Treatment Note     Name: Sharri Dennis Como  Clinic Number: [id number]    Therapy Diagnosis:   Encounter Diagnoses   Name Primary?   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 She came today mainly to make sure she has her HEP down and is performing it correctly.     She was compliant with home exercise program.  Response to previous treatment:no adverse response  Functional change: ongoing    Pain: 0/10  Location: n/a     Objective     Sharri received therapeutic exercises to develop strength, endurance, ROM, flexibility, posture and core stabilization for 15 minutes including:    HEP education performed to ensure pt is completing exercises correctly; specifically went over ulnar and median nerve mobilizations.    Supine  2 x 30 B LE Manual Gastroc stretch; pt also informed of how she can complete this exercise on her own    4 minutes elliptical, level 5, RPE 5  2 x 10 reps of sit<>stand from EOM (low mat) with no UE support    Sharri participated in neuromuscular re-education activities to improve: Balance, Coordination, Kinesthetic, Sense, Proprioception and Posture for 28 minutes. The following activities were included:    2 x 30 sec tandem, vision eliminated, no UE support, CGA  2 x 30 sec tandem on foam, occ UE support, CGA    Bosu, small, flat side up:  1 x 60 sec static standing, no UE support, CGA  2 x 30 sec static  standing with vision eliminated, min A, occ UE support  X 30 sec static standing with L/R head turns, no UE support, CGA  X 30 sec static standing with up/down head turns, no UE support, CGA  2 x 30 sec L/R weight shifting, no UE support, slow movement but CGA only  2 x 30 sec up/down head turns, no UE support, cues to shift weight fully posterior    Home Exercises Provided and Patient Education Provided     Education provided:   - HEP provided    Written Home Exercises Provided: yes.  Exercises were reviewed and Sharri was able to demonstrate them prior to the end of the session.  Sharri demonstrated good  understanding of the education provided.     See EMR under Patient Instructions for exercises provided 2/11/2020.    Assessment     Ms. Harrell continues to exhibit and endorse improvement through skilled PT. Pt wanted to come in today for treatment even though she was informed that she is classified as a higher risk patient in relation to Covid-19 pandemic. SPT went over HEP with patient and pt exhibited good understanding. Pt exhibited good balance today only requiring CGA for most balance tasks, with the exception being min A required for eyes eliminated balance activities. Pt continues to report good compliance with HEP and staying as active as possible. Pt remains appropriate for skilled PT services.     Sharri is progressing well towards her goals.   Pt prognosis is Good.     Pt will continue to benefit from skilled outpatient physical therapy to address the deficits listed in the problem list box on initial evaluation, provide pt/family education and to maximize pt's level of independence in the home and community environment.     Pt's spiritual, cultural and educational needs considered and pt agreeable to plan of care and goals.     Anticipated barriers to physical therapy: none    Goals:  Short Term Goals: 4 weeks   1. Pt will improve score on FGA to at least 25/30 in order to decrease risk for fall. MET  3/6/2020  2. Pt will be independent with established HEP for strengthening and balance. MET 3/6/2020  3. Pt will increase 30 sec chair rise score to at least 15 in order to demonstrate improved endruance and functional strength. Ongoing, improving  4. Pt will increase SLS time to at least 15 sec B in order to decrease risk for fall. MET 3/6/2020     Long Term Goals: 8 weeks   1. Pt will improve score on FGA to at least 27/30 in order to demonstrate improved functional balance. ongoing  2. Pt will improve score on Vu sensory testing condition 4 to at least 20 seconds in order to demonstrate improved balance in dim lit conditions. ongoing  3. Pt will increase SLS time to at least 20 seconds on B LE in order to decrease fall risk. ongoing  4. Pt will increase SSWS to at least 1.2 m/s in order to return to ambulation 5x/wk as she was prior to cancer. Met 3/6/2020     Plan   Plan of care Certification: 2/7/2020 to 4/7/2020.    Continue with strengthening and stretching program, incorporate more high level balance and endurance as appropriate.    Germán Pryor, SPT     I certify that I was present in the room directing the student in service delivery and guiding them using my skilled judgment. As the co-signing therapist, I have reviewed the student's documentation and am responsible for the treatment, assessment and plan.    Damien Alba, PT  3/17/2020

## 2020-03-18 PROCEDURE — 77334 RADIATION TREATMENT AID(S): CPT | Mod: TC | Performed by: RADIOLOGY

## 2020-03-18 PROCEDURE — 77295 3-D RADIOTHERAPY PLAN: CPT | Mod: 26,,, | Performed by: RADIOLOGY

## 2020-03-18 PROCEDURE — 77334 PR  RADN TREATMENT AID(S) COMPLX: ICD-10-PCS | Mod: 26,,, | Performed by: RADIOLOGY

## 2020-03-18 PROCEDURE — 77334 RADIATION TREATMENT AID(S): CPT | Mod: 26,,, | Performed by: RADIOLOGY

## 2020-03-18 PROCEDURE — 77300 PR RADIATION THERAPY,DOSIMETRY PLAN: ICD-10-PCS | Mod: 26,,, | Performed by: RADIOLOGY

## 2020-03-18 PROCEDURE — 77300 RADIATION THERAPY DOSE PLAN: CPT | Mod: 26,,, | Performed by: RADIOLOGY

## 2020-03-18 PROCEDURE — 77300 RADIATION THERAPY DOSE PLAN: CPT | Mod: TC | Performed by: RADIOLOGY

## 2020-03-18 PROCEDURE — 77295 3-D RADIOTHERAPY PLAN: CPT | Mod: TC | Performed by: RADIOLOGY

## 2020-03-18 PROCEDURE — 77295 PR 3D RADIOTHERAPY PLAN: ICD-10-PCS | Mod: 26,,, | Performed by: RADIOLOGY

## 2020-03-19 ENCOUNTER — TELEPHONE (OUTPATIENT)
Dept: REHABILITATION | Facility: HOSPITAL | Age: 70
End: 2020-03-19

## 2020-03-19 NOTE — TELEPHONE ENCOUNTER
Postponed Appointments    Patient: Sharri Cline  Date: 3/19/2020  Diagnosis: No diagnosis found.  MRN: [id number]    Spoke with patient concerning [facility] following updates regarding COVID-19 closely and taking every precaution to ensure the safety of our patients, staff and community.  In an abundance of caution and in an effort to help reduce risk and limit community spread, we have decided to temporarily postpone appointments for patients who may be at increased risk to attend in-person therapy or where therapy is not critically needed at this time. Plan of care and home exercise program were reviewed and patient has what they need to continue therapy at home. All patient questions were answered. Also stated to patient that we are exploring virtual methods of providing care and will be in touch over the next few weeks. Patient verbalized understanding to all.    3/19/2020

## 2020-03-24 ENCOUNTER — TELEPHONE (OUTPATIENT)
Dept: REHABILITATION | Facility: HOSPITAL | Age: 70
End: 2020-03-24

## 2020-03-24 NOTE — TELEPHONE ENCOUNTER
Postponed Appointments    Patient: Sharri Cline  Date: 3/24/2020  MRN: [id number]    Spoke with patient concerning [facility] following updates regarding COVID-19 closely and taking every precaution to ensure the safety of our patients, staff and community.  In an abundance of caution and in an effort to help reduce risk and limit community spread, we have decided to temporarily postpone all appointments for patients who may be at increased risk to attend in-person therapy or where therapy is not critically needed at this time. Plan of care and home exercise program were reviewed and patient has what they need to continue therapy at home. All patient questions were answered. PT also made patient aware that we are exploring virtual methods of providing care, and she is interested in this method of therapy services. Patient verbalized understanding to all.    3/24/2020

## 2020-03-27 ENCOUNTER — PATIENT MESSAGE (OUTPATIENT)
Dept: HEMATOLOGY/ONCOLOGY | Facility: CLINIC | Age: 70
End: 2020-03-27

## 2020-03-31 ENCOUNTER — TELEPHONE (OUTPATIENT)
Dept: REHABILITATION | Facility: HOSPITAL | Age: 70
End: 2020-03-31

## 2020-03-31 NOTE — TELEPHONE ENCOUNTER
Therapy Postponed / COVID-19 pandemic   Patient Check-In Courtesy Call    Courtesy call to check in with patient today to ensure they are doing their home exercise program and to answer any questions. Sharri Cline did not answer and a voicemail was left reminding patient to be doing their HEP throughout the weeks as long as therapy is postponed and to call if any questions.      Patrick Pineda, PT  03/31/2020

## 2020-04-01 ENCOUNTER — HOSPITAL ENCOUNTER (OUTPATIENT)
Dept: RADIATION THERAPY | Facility: HOSPITAL | Age: 70
Discharge: HOME | End: 2020-04-01
Attending: RADIOLOGY
Payer: MEDICARE

## 2020-04-02 PROCEDURE — 77412 RADIATION TX DELIVERY LVL 3: CPT | Performed by: RADIOLOGY

## 2020-04-02 PROCEDURE — 77417 THER RADIOLOGY PORT IMAGE(S): CPT | Performed by: RADIOLOGY

## 2020-04-03 PROCEDURE — 77412 RADIATION TX DELIVERY LVL 3: CPT | Performed by: RADIOLOGY

## 2020-04-06 PROCEDURE — 77412 RADIATION TX DELIVERY LVL 3: CPT | Performed by: RADIOLOGY

## 2020-04-07 ENCOUNTER — DOCUMENTATION ONLY (OUTPATIENT)
Dept: RADIATION ONCOLOGY | Facility: CLINIC | Age: 70
End: 2020-04-07

## 2020-04-07 PROCEDURE — 77412 RADIATION TX DELIVERY LVL 3: CPT | Performed by: RADIOLOGY

## 2020-04-08 ENCOUNTER — TELEPHONE (OUTPATIENT)
Dept: REHABILITATION | Facility: HOSPITAL | Age: 70
End: 2020-04-08

## 2020-04-08 PROCEDURE — 77412 RADIATION TX DELIVERY LVL 3: CPT | Performed by: RADIOLOGY

## 2020-04-08 NOTE — TELEPHONE ENCOUNTER
Therapy Postponed / COVID-19 pandemic   Patient Check-In Courtesy Call    Courtesy call to check in with patient today to ensure they are doing their home exercise program and to answer any questions. Sharri Cline stated she is doing well. Stopped doing her HEP for a little while and noted the difference in her balance so has since resumed her HEP. Not in need of new exercises at this time.   No new exercise recommendations given today.   Patient educated further on options of telemedicine upcoming and stated no interest and desire to get scheduled.     Patrick Pineda, PT  04/08/2020

## 2020-04-09 PROCEDURE — 77412 RADIATION TX DELIVERY LVL 3: CPT | Performed by: RADIOLOGY

## 2020-04-09 PROCEDURE — 77417 THER RADIOLOGY PORT IMAGE(S): CPT | Performed by: RADIOLOGY

## 2020-04-09 PROCEDURE — 77336 RADIATION PHYSICS CONSULT: CPT | Performed by: RADIOLOGY

## 2020-04-13 DIAGNOSIS — Z13.9 SCREENING FOR CONDITION: Primary | ICD-10-CM

## 2020-04-13 PROCEDURE — 77412 RADIATION TX DELIVERY LVL 3: CPT | Performed by: RADIOLOGY

## 2020-04-13 NOTE — PROGRESS NOTES
Cancer Services COVID-19 Testing  [facility] [address], [place]  [address]    04/13/2020      In an effort to protect our immunocompromised patients from potential exposure to COVID-19, [facility] will now require all patients receiving an infusion, an injection, and/or radiation therapy to be tested for COVID-19 prior to their appointment.  All patients currently under treatment will be tested immediately, and patients initiating new treatment cycles or with one-time appointments (injections, transfusions, etc.) must be tested within 72 hours of their appointment.     Placed COVID-19 test order for patient.  A member of our team is to contact the patient in the near future to explain this process and the rationale behind it and to get the patient scheduled for their COVID-19 test.     The above was completed in accordance with instructions and guidelines set forth by [facility].     Charlie De Jeuss, DA, APRN, FNP-BC, AOCNP  Department of Hematology and Oncology  [facility], 3rd Floor  [facility] [address], [place]  [address]  Office phone   [phone]    Date:  04/13/2020

## 2020-04-14 ENCOUNTER — TELEPHONE (OUTPATIENT)
Dept: REHABILITATION | Facility: HOSPITAL | Age: 70
End: 2020-04-14

## 2020-04-14 ENCOUNTER — DOCUMENTATION ONLY (OUTPATIENT)
Dept: RADIATION ONCOLOGY | Facility: CLINIC | Age: 70
End: 2020-04-14

## 2020-04-14 PROCEDURE — 77412 RADIATION TX DELIVERY LVL 3: CPT | Performed by: RADIOLOGY

## 2020-04-14 NOTE — TELEPHONE ENCOUNTER
"Therapy Postponed / COVID-19 pandemic   Patient Check-In Courtesy Call    Courtesy call to check in with patient today to ensure they are doing their home exercise program and to answer any questions. Sharri Cline stated she is attempting to increase her distance with daily walks, continues to struggle with balance. She inquires about her legs feeling "tight" due to increasing her walks. PT explains this is likely normal but to note that it should improve in the coming days.  No new exercise recommendations given today.     Patrick Pineda, PT  04/14/2020    "

## 2020-04-15 ENCOUNTER — DOCUMENTATION ONLY (OUTPATIENT)
Dept: RADIATION ONCOLOGY | Facility: CLINIC | Age: 70
End: 2020-04-15

## 2020-04-15 ENCOUNTER — LAB VISIT (OUTPATIENT)
Dept: INTERNAL MEDICINE | Facility: CLINIC | Age: 70
End: 2020-04-15
Payer: MEDICARE

## 2020-04-15 DIAGNOSIS — Z13.9 SCREENING FOR CONDITION: ICD-10-CM

## 2020-04-15 LAB — SARS-COV-2 RNA RESP QL NAA+PROBE: NOT DETECTED

## 2020-04-15 PROCEDURE — 77412 RADIATION TX DELIVERY LVL 3: CPT | Performed by: RADIOLOGY

## 2020-04-15 PROCEDURE — U0002 COVID-19 LAB TEST NON-CDC: HCPCS

## 2020-04-15 NOTE — PROGRESS NOTES
04/15/2020           Discussed the following with the patient:  In an effort to protect our immunocompromised patients from potential exposure to COVID-19, [facility] will now require all patients receiving an infusion, an injection, and/or radiation therapy to be tested for COVID-19 prior to their appointment.  All patients currently under treatment will be tested immediately, and patients initiating new treatment cycles or with one-time appointments (injections, transfusions, etc.) must be tested within 72 hours of their appointment.      Symptom Patient's Response   Fever YES/NO: no   Cough YES/NO: no   Shortness of breath  YES/NO: no   Difficulty breathing YES/NO: no   GI symptoms such as diarrhea or nausea YES/NO: no   Loss of taste YES/NO: no   Loss of smell YES/NO: no     Other Screening Patient's Response   Has the patient previously undergone COVID-19 testing? YES/NO: no     If yes to the question directly above, what was the result? not applicable   Has the patient been in close contact with someone who has undergone COVID-19 testing? YES/NO: no     If yes to the question directly above, what was the result? not applicable      The patient's 24-hour COVID-19 test was ordered and scheduled.  Reviewed the appointment date, time, and location with the patient.      Advised the patient that she can expect the results to take approximately 24 hours and that someone will reach out to her regarding her results.  Advised the patient that her treatment appointment will be rescheduled if she tests positive for COVID-19.      Questions were answered to the patient's satisfaction, and the patient verbalized understanding of information and agreement with the plan.       The above was completed in accordance with instructions and guidelines set forth by [facility].     Signed,        Megan Marquez MA     Date:  04/15/2020

## 2020-04-16 ENCOUNTER — TELEPHONE (OUTPATIENT)
Dept: ADMINISTRATIVE | Facility: CLINIC | Age: 70
End: 2020-04-16

## 2020-04-16 PROCEDURE — 77412 RADIATION TX DELIVERY LVL 3: CPT | Performed by: RADIOLOGY

## 2020-04-16 PROCEDURE — 77336 RADIATION PHYSICS CONSULT: CPT | Performed by: RADIOLOGY

## 2020-04-16 NOTE — PROGRESS NOTES
To:  Bibiana Crouch, RN    [facility]-  Please phone this patient with her negative COVID-19 test results following the scripting and protocol we have reviewed.  If my assistance is needed, don't hesitate to reach out.  Thanks!  -Charlie Kincaid, DNP, APRN, FNP-BC, AOCNP  [facility], 3rd Floor  [facility] [address], [place]  [address]  [phone]

## 2020-04-16 NOTE — TELEPHONE ENCOUNTER
Symptom Patient's Response   Fever YES/NO: no   Cough YES/NO: no   Shortness of breath  YES/NO: no   Difficulty breathing YES/NO: no   GI symptoms such as diarrhea or nausea YES/NO: no   Loss of taste YES/NO: no   Loss of smell YES/NO: no     Other Screening Patient's Response   Has the patient previously undergone COVID-19 testing? YES/NO: no     If yes to the question directly above, what was the result? not applicable   Has the patient been in close contact with someone who has undergone COVID-19 testing? YES/NO: no     If yes to the question directly above, what was the result? not applicable     04/16/2020    - COVID-19 testing Collection Date: 4/15/2020 Collection Time:  10:00 AM  - This result was communicated to the patient by Bibiana Crouch on 04/16/2020 as below.     The following was discussed with the patient via telephone call:  The test was NEGATIVE for COVID-19 (coronavirus), and based on those negative results, treatment at the cancer center can proceed as planned.  Questions were encouraged and answered to patient's satisfaction, and patient verbalized understanding of information and agreement with the plan.     Signed,    Bibiana Crouch

## 2020-04-17 PROCEDURE — 77412 RADIATION TX DELIVERY LVL 3: CPT | Performed by: RADIOLOGY

## 2020-04-17 PROCEDURE — 77417 THER RADIOLOGY PORT IMAGE(S): CPT | Performed by: RADIOLOGY

## 2020-04-20 PROCEDURE — 77412 RADIATION TX DELIVERY LVL 3: CPT | Performed by: RADIOLOGY

## 2020-04-21 ENCOUNTER — PATIENT MESSAGE (OUTPATIENT)
Dept: HEMATOLOGY/ONCOLOGY | Facility: CLINIC | Age: 70
End: 2020-04-21

## 2020-04-21 ENCOUNTER — DOCUMENTATION ONLY (OUTPATIENT)
Dept: RADIATION ONCOLOGY | Facility: CLINIC | Age: 70
End: 2020-04-21

## 2020-04-21 PROCEDURE — 77412 RADIATION TX DELIVERY LVL 3: CPT | Performed by: RADIOLOGY

## 2020-04-22 ENCOUNTER — TELEPHONE (OUTPATIENT)
Dept: REHABILITATION | Facility: HOSPITAL | Age: 70
End: 2020-04-22

## 2020-04-22 PROCEDURE — 77412 RADIATION TX DELIVERY LVL 3: CPT | Performed by: RADIOLOGY

## 2020-04-22 NOTE — TELEPHONE ENCOUNTER
Therapy Postponed / COVID-19 pandemic   Patient Check-In Courtesy Call    Courtesy call to check in with patient today to ensure they are doing their home exercise program and to answer any questions. Sharri Jeannie Cline stated her stiffness is better since doing her stretches. She is doing good with the addition of these stretches daily.     Patrick Pineda, PT  04/22/2020

## 2020-04-23 PROCEDURE — 77336 RADIATION PHYSICS CONSULT: CPT | Performed by: RADIOLOGY

## 2020-04-23 PROCEDURE — 77412 RADIATION TX DELIVERY LVL 3: CPT | Performed by: RADIOLOGY

## 2020-04-24 ENCOUNTER — TELEPHONE (OUTPATIENT)
Dept: HEMATOLOGY/ONCOLOGY | Facility: CLINIC | Age: 70
End: 2020-04-24

## 2020-04-24 PROCEDURE — 77412 RADIATION TX DELIVERY LVL 3: CPT | Performed by: RADIOLOGY

## 2020-04-27 ENCOUNTER — INFUSION (OUTPATIENT)
Dept: INFUSION THERAPY | Facility: HOSPITAL | Age: 70
End: 2020-04-27
Attending: INTERNAL MEDICINE
Payer: MEDICARE

## 2020-04-27 ENCOUNTER — OFFICE VISIT (OUTPATIENT)
Dept: HEMATOLOGY/ONCOLOGY | Facility: CLINIC | Age: 70
End: 2020-04-27
Payer: MEDICARE

## 2020-04-27 DIAGNOSIS — C50.311 MALIGNANT NEOPLASM OF LOWER-INNER QUADRANT OF RIGHT BREAST OF FEMALE, ESTROGEN RECEPTOR NEGATIVE: Primary | ICD-10-CM

## 2020-04-27 DIAGNOSIS — T45.1X5A CHEMOTHERAPY-INDUCED NEUROPATHY: ICD-10-CM

## 2020-04-27 DIAGNOSIS — G62.0 CHEMOTHERAPY-INDUCED NEUROPATHY: ICD-10-CM

## 2020-04-27 DIAGNOSIS — E87.6 HYPOKALEMIA: ICD-10-CM

## 2020-04-27 DIAGNOSIS — Z17.1 MALIGNANT NEOPLASM OF LOWER-INNER QUADRANT OF RIGHT BREAST OF FEMALE, ESTROGEN RECEPTOR NEGATIVE: Primary | ICD-10-CM

## 2020-04-27 DIAGNOSIS — Z12.31 ENCOUNTER FOR SCREENING MAMMOGRAM FOR MALIGNANT NEOPLASM OF BREAST: ICD-10-CM

## 2020-04-27 PROCEDURE — 99499 RISK ADDL DX/OHS AUDIT: ICD-10-PCS | Mod: 95,,, | Performed by: INTERNAL MEDICINE

## 2020-04-27 PROCEDURE — A4216 STERILE WATER/SALINE, 10 ML: HCPCS | Performed by: INTERNAL MEDICINE

## 2020-04-27 PROCEDURE — 1101F PR PT FALLS ASSESS DOC 0-1 FALLS W/OUT INJ PAST YR: ICD-10-PCS | Mod: CPTII,95,, | Performed by: INTERNAL MEDICINE

## 2020-04-27 PROCEDURE — 1101F PT FALLS ASSESS-DOCD LE1/YR: CPT | Mod: CPTII,95,, | Performed by: INTERNAL MEDICINE

## 2020-04-27 PROCEDURE — 99499 UNLISTED E&M SERVICE: CPT | Mod: 95,,, | Performed by: INTERNAL MEDICINE

## 2020-04-27 PROCEDURE — 1159F PR MEDICATION LIST DOCUMENTED IN MEDICAL RECORD: ICD-10-PCS | Mod: 95,,, | Performed by: INTERNAL MEDICINE

## 2020-04-27 PROCEDURE — 63600175 PHARM REV CODE 636 W HCPCS: Performed by: INTERNAL MEDICINE

## 2020-04-27 PROCEDURE — 99213 PR OFFICE/OUTPT VISIT, EST, LEVL III, 20-29 MIN: ICD-10-PCS | Mod: 95,,, | Performed by: INTERNAL MEDICINE

## 2020-04-27 PROCEDURE — 99213 OFFICE O/P EST LOW 20 MIN: CPT | Mod: 95,,, | Performed by: INTERNAL MEDICINE

## 2020-04-27 PROCEDURE — 1159F MED LIST DOCD IN RCRD: CPT | Mod: 95,,, | Performed by: INTERNAL MEDICINE

## 2020-04-27 PROCEDURE — 96523 IRRIG DRUG DELIVERY DEVICE: CPT

## 2020-04-27 PROCEDURE — 25000003 PHARM REV CODE 250: Performed by: INTERNAL MEDICINE

## 2020-04-27 RX ORDER — HEPARIN 100 UNIT/ML
500 SYRINGE INTRAVENOUS
Status: CANCELLED | OUTPATIENT
Start: 2020-04-27

## 2020-04-27 RX ORDER — SODIUM CHLORIDE 0.9 % (FLUSH) 0.9 %
10 SYRINGE (ML) INJECTION
Status: COMPLETED | OUTPATIENT
Start: 2020-04-27 | End: 2020-04-27

## 2020-04-27 RX ORDER — SODIUM CHLORIDE 0.9 % (FLUSH) 0.9 %
10 SYRINGE (ML) INJECTION
Status: CANCELLED | OUTPATIENT
Start: 2020-04-27

## 2020-04-27 RX ORDER — HEPARIN 100 UNIT/ML
500 SYRINGE INTRAVENOUS
Status: COMPLETED | OUTPATIENT
Start: 2020-04-27 | End: 2020-04-27

## 2020-04-27 RX ORDER — POTASSIUM CHLORIDE 20 MEQ/1
TABLET, EXTENDED RELEASE ORAL
Qty: 60 TABLET | Refills: 1 | Status: SHIPPED | OUTPATIENT
Start: 2020-04-27 | End: 2020-06-29

## 2020-04-27 RX ADMIN — HEPARIN 500 UNITS: 100 SYRINGE at 09:04

## 2020-04-27 RX ADMIN — SODIUM CHLORIDE, PRESERVATIVE FREE 10 ML: 5 INJECTION INTRAVENOUS at 09:04

## 2020-04-27 NOTE — PROGRESS NOTES
History:     Reason For Follow Up/Onc History:   1. Stage 1B ([id number]) invasive ductal carcinoma of right breast, lower outer quadrant, ER neg, MI neg, Her2 neg, Grade 3, Ki67 70%      HPI:   Sharri Cline presents for follow up of breast cancer. Completed radiation last week. Had mild skin changes. Neuropathy still present - not painful. Stopped gabapentin. Exercising regularly.     Onc history:   - She presented for screening mammo in 5/15/2019 which showed focal asymmetries in both left and right breast. Diagnostic mammogram and US showed no significant abn of left breast, and right breast 15 mm x 11 mm x 12 mm mass at 4:00, 5 CFN. Biopsy on 5/24/19 showed grade 3 IDC, triple negative, Ki67 70%.    - 7/3/19 - 12/10/19 completed neoadjuvant chemotherapy with four cycles of dose-dense Adriamycin and cyclophosphamide with Neulasta support followed by twelve doses of weekly paclitaxel. Dose reduced for cytopenias and neuropathy.   - 1/23/2020 - Dr Hernandez performed right breast lumpectomy with SLN Biopsy with pathology showing no residual carcinoma with 3 neg SLN.   - 4/2/2020 - 4/24/2020 - completed RT    History: I reviewed, confirmed and updated history (past medical, social, family) as necessary.    Medications    Current Outpatient Medications:     artificial tears (ISOPTO TEARS) 0.5 % ophthalmic solution, Place 1 drop into both eyes 4 (four) times daily., Disp: , Rfl:     carboxymethylcellulose sodium 1 % DpGe, Place 1 drop into both eyes every evening. (Patient not taking: Reported on 2/27/2020), Disp: , Rfl: 0    fish oil-omega-3 fatty acids 300-1,000 mg capsule, Take 2 g by mouth once daily., Disp: , Rfl:     gabapentin (NEURONTIN) 300 MG capsule, Take 1 capsule (300 mg total) by mouth 2 (two) times daily. (Patient not taking: Reported on 2/27/2020), Disp: 60 capsule, Rfl: 2    HYDROcodone-acetaminophen (NORCO) 5-325 mg per tablet, Take 1 tablet by mouth every 6 (six) hours as needed for Pain.  (Patient not taking: Reported on 2/27/2020), Disp: 10 tablet, Rfl: 0    magic mouthwash diphen/antac/lidoc/nysta, Take 10 mLs by mouth 4 (four) times daily. (Patient not taking: Reported on 2/27/2020), Disp: 120 mL, Rfl: 0    multivitamin with minerals tablet, Take 1 tablet by mouth once daily.  , Disp: , Rfl:     ondansetron (ZOFRAN-ODT) 8 MG TbDL, Take 1 tablet (8 mg total) by mouth every 8 (eight) hours as needed (nausea). (Patient not taking: Reported on 2/27/2020), Disp: 45 tablet, Rfl: 1    potassium chloride SA (K-DUR,KLOR-CON) 20 MEQ tablet, TAKE 2 TABLETS(40 MEQ) BY MOUTH EVERY DAY, Disp: 60 tablet, Rfl: 1    sulfamethoxazole-trimethoprim 800-160mg (BACTRIM DS) 800-160 mg Tab, Take 1 tablet by mouth 2 (two) times daily. (Patient not taking: Reported on 2/27/2020), Disp: 14 tablet, Rfl: 0  No current facility-administered medications for this visit.     Facility-Administered Medications Ordered in Other Visits:     0.9%  NaCl infusion, , Intravenous, Continuous, Chung Olson MD, Last Rate: 70 mL/hr at 01/23/20 0817    lidocaine (PF) 10 mg/ml (1%) injection 10 mg, 1 mL, Intradermal, Once, Chung Olson MD    sodium chloride 0.9% flush 10 mL, 10 mL, Intravenous, 1 time in Clinic/HOD, Dominique Ayala MD  Allergies  Review of patient's allergies indicates:  No Known Allergies  Review of Systems  Review of Systems   Constitutional: Negative for activity change, appetite change, chills, fatigue, fever and unexpected weight change.   HENT: Negative for congestion, ear discharge, hearing loss, mouth sores, nosebleeds, sinus pressure, sinus pain and trouble swallowing.    Eyes: Negative for pain, discharge, redness, itching and visual disturbance.   Respiratory: Negative for cough, chest tightness and shortness of breath.    Cardiovascular: Negative for chest pain, palpitations and leg swelling.   Gastrointestinal: Negative for abdominal distention, abdominal pain, blood in stool,  diarrhea, nausea, rectal pain and vomiting.   Endocrine: Negative for heat intolerance and polydipsia.   Genitourinary: Negative for difficulty urinating, dysuria, flank pain, frequency, hematuria and urgency.   Musculoskeletal: Negative for arthralgias, back pain and neck pain.   Skin: Negative for color change, pallor and rash.   Neurological: Negative for dizziness, light-headedness, numbness and headaches.   Hematological: Negative for adenopathy. Does not bruise/bleed easily.   Psychiatric/Behavioral: Negative for confusion and decreased concentration. The patient is not nervous/anxious.         Objective     Objective:   Physical Exam   Constitutional: She is oriented to person, place, and time. She appears well-developed and well-nourished.   Neurological: She is alert and oriented to person, place, and time.   Psychiatric: She has a normal mood and affect. Her behavior is normal. Judgment and thought content normal.         Labs and Imaging  Results for orders placed or performed in visit on 04/15/20   COVID-19 Routine Screening   Result Value Ref Range    SARS-CoV2 (COVID-19) Qualitative PCR Not Detected Not Detected       Assessment     Assessment:     1. Stage 1B ([id number]) invasive ductal carcinoma of right breast, lower outer quadrant, ER neg, [place] neg, Her2 neg, Grade 3, Ki67 70%   * 7/3/19 - 12/10/19 completed neoadjuvant chemotherapy with four cycles of dose-dense Adriamycin and cyclophosphamide with Neulasta support followed by twelve doses of weekly paclitaxel. Dose reduced for cytopenias and neuropathy.   * 1/23/2020 - Dr Hernandez performed right breast lumpectomy with SLN Biopsy with pathology showing no residual carcinoma with 3 neg SLN.   * 4/2/2020 - 4/24/2020 - completed RT   - With complete response, no further systemic therapy needed.     2.  Chemo neuropathy   * Present - slow improvement.    * Not taking gabapentin     3. Hypokalemia   * Repeat cmp - currently taking KCL daily.     Plan:      1. Patient was instructed on the importance of physical activity, healthy diet, and self breast exams.  Patient will continue calcium and vitamin D supplementation  2. Mammogram in 3 mo (left side is due)  3. Referral to survivorship at next visit - will refer .     RTC in 3 mo with port flush. Can get port out when operating rooms more available/free from covid..     The patient location is: home  The chief complaint leading to consultation is: breast ca  Visit type: audiovisual + audio  Total time spent with patient: 15 min  Each patient to whom he or she provides medical services by telemedicine is:  (1) informed of the relationship between the physician and patient and the respective role of any other health care provider with respect to management of the patient; and (2) notified that he or she may decline to receive medical services by telemedicine and may withdraw from such care at any time.

## 2020-05-01 ENCOUNTER — TELEPHONE (OUTPATIENT)
Dept: RADIATION ONCOLOGY | Facility: CLINIC | Age: 70
End: 2020-05-01

## 2020-05-01 NOTE — TELEPHONE ENCOUNTER
"Pt. Doing well.  Skin still "dark" with some tightness.  Using tylenol and lotion still.  Pt. Encouraged to call back with any questions or concerns.  "

## 2020-05-12 DIAGNOSIS — G62.0 CHEMOTHERAPY-INDUCED NEUROPATHY: Primary | ICD-10-CM

## 2020-05-12 DIAGNOSIS — T45.1X5A CHEMOTHERAPY-INDUCED NEUROPATHY: Primary | ICD-10-CM

## 2020-05-12 DIAGNOSIS — C50.311 MALIGNANT NEOPLASM OF LOWER-INNER QUADRANT OF RIGHT BREAST OF FEMALE, ESTROGEN RECEPTOR NEGATIVE: ICD-10-CM

## 2020-05-12 DIAGNOSIS — Z17.1 MALIGNANT NEOPLASM OF LOWER-INNER QUADRANT OF RIGHT BREAST OF FEMALE, ESTROGEN RECEPTOR NEGATIVE: ICD-10-CM

## 2020-05-19 ENCOUNTER — CLINICAL SUPPORT (OUTPATIENT)
Dept: REHABILITATION | Facility: HOSPITAL | Age: 70
End: 2020-05-19
Attending: INTERNAL MEDICINE
Payer: MEDICARE

## 2020-05-19 DIAGNOSIS — Z17.1 MALIGNANT NEOPLASM OF LOWER-INNER QUADRANT OF RIGHT BREAST OF FEMALE, ESTROGEN RECEPTOR NEGATIVE: ICD-10-CM

## 2020-05-19 DIAGNOSIS — G62.0 CHEMOTHERAPY-INDUCED NEUROPATHY: ICD-10-CM

## 2020-05-19 DIAGNOSIS — T45.1X5A CHEMOTHERAPY-INDUCED NEUROPATHY: ICD-10-CM

## 2020-05-19 DIAGNOSIS — Z74.09 IMPAIRED FUNCTIONAL MOBILITY, BALANCE, GAIT, AND ENDURANCE: Primary | ICD-10-CM

## 2020-05-19 DIAGNOSIS — C50.311 MALIGNANT NEOPLASM OF LOWER-INNER QUADRANT OF RIGHT BREAST OF FEMALE, ESTROGEN RECEPTOR NEGATIVE: ICD-10-CM

## 2020-05-19 DIAGNOSIS — R20.1 IMPAIRED SENSATION: ICD-10-CM

## 2020-05-19 PROCEDURE — 97112 NEUROMUSCULAR REEDUCATION: CPT | Mod: PO

## 2020-05-19 PROCEDURE — 97110 THERAPEUTIC EXERCISES: CPT | Mod: PO

## 2020-05-19 NOTE — PLAN OF CARE
Physical Therapy Daily Treatment Note     Name: Sharri [place]  Clinic Number: [id number]    Therapy Diagnosis:   Encounter Diagnoses   Name Primary?    Chemotherapy-induced neuropathy     Malignant neoplasm of lower-inner quadrant of right breast of female, estrogen receptor negative     Impaired functional mobility, balance, gait, and endurance Yes    Impaired sensation      Physician: Dominique Ayala MD    Visit Date: 5/19/2020     Physician Orders: PT Eval and Treat for neuropathy related to chemotherapy  Medical Diagnosis from Referral: chemotherapy induced neuropathy  Evaluation Date: 6/11//2020  Authorization Period Expiration:1/29/20 to 1/28/21  New Plan of Care Expiration: 6/19//2020   Visit # / Visits authorized: 1/ 3; (referral pending) 11 total visits in 2020     Time In: 930  Time Out: 1015  Total Billable Time: 45 minutes    Precautions: Standard and Fall    Subjective     Pt reports: Her neuropathy is varied, some days are bad and others arent. She has noted that it is dependent on how active she is, on active days she does not notice the tingling in hands and feet. Pt reports that she would like an update to her HEP to ensure she is doing ALL of the things she should be.     She was compliant with home exercise program.  Response to previous treatment:no adverse response  Functional change: ongoing    Pain: 0/10  Location: n/a     Objective     Sharri received therapeutic exercises to develop strength, endurance, ROM, flexibility, posture and core stabilization for 15 minutes including:    3 x 30 B LE gastroc stretch on wedge  2 x 10 reps of pilates box, all springs on level 2, no UE support  5 minutes elliptical, level 5    Sharri participated in neuromuscular re-education activities to improve: Balance, Coordination, Kinesthetic, Sense, Proprioception and Posture for 30 minutes. The following activities were included:       Evaluation 3/6/20 5/19/20   Single Limb Stance R LE 11 sec  (<10 sec  "= HIGH FALL RISK) 16 sec 30 s   Single Limb Stance L LE 12 sec  (<10 sec = HIGH FALL RISK) 30 sec 30 s   30 second Chair Rise 13 completed with no arms 14 completed with no arms 14 completed with no arms   TUG 8.8 + 9 = 8.9 seconds 7.1 + 6.8 + 7.3 = 7.1 sec NT   Self selected walking speed 1.05 m/sec (6m/5.7s) 1.3 m/s (6m/4.6s) .82 m/s (6m/7.3s)   Fast walking speed 1.54 m/sec (6m/3.9s) 1.8 m/s (6m/3.4s) 1.9 m/s (6m/3.2s)   FGA 21/30 25/30 24/30      EFE SENSORY TESTING:  (P= Pass, F= Fail; note any sway; hold each position for 30")  Condition 1: (firm surface/feet together/eyes open) P  Condition 2: (firm surface/feet together/eyes closed) P  Condition 3: (firm surface/feet in tandem/eyes open) P  Condition 4: (firm surface/feet in tandem/eyes closed) P  Condition 5: (soft surface/feet together/eyes open) P  Condition 6: (soft surface/feet together/eyes closed) P  Condition 7: (Fakuda step test), measure distance varied from center starting position NT    Functional Gait Assessment:   1. Gait on level surface =  2   (3) Normal: less than 5.5 sec, no A.D., no imbalance, normal gait pattern, deviates< 6in   (2) Mild impairment: 7-5.6 sec, uses A.D., mild gait deviations, or deviates 6-10 in   (1) Moderate impairment: > 7 sec, slow speed, imbalance, deviates 10-15 in.   (0) Severe impairment: needs assist, deviates >15 in, reach/touch wall  2. Change in Gait Speed = 3   (3) Normal: smooth change w/o loss of balance or gait deviation, deviates < 6 in, significant difference between speeds   (2) Mild impairment: changes speed, but demonstrates mild gait deviations, deviates 6-10 in, OR no deviations but unable to significantly speed, OR uses A.D.   (1) Moderate impairment: minor changes to speed, OR changes speed w/ significant deviations, deviates 10-15 in, OR  Changes speed , but loses balance & recovers   (0) Severe impairment: cannot change speed, deviates >15 in, or loses balance & needs assist  3. Gait with " horizontal head turns  = 2   (3) Normal: no change in gait, deviates <6 in   (2) Mild impairment: slight change in speed, deviates 6-10 in, OR uses A.D.   (1) Moderate impairment: moderate change in speed, deviates 10-15 in   (0) Severe impairment: severe disruption of gait, deviates >15in  4. Gait with vertical head turns = 3   (3) Normal: no change in gait, deviates <6 in   (2) Mild impairment: slight change in speed, deviates 6-10 in OR uses A.D.   (1) Moderate impairment: moderate change in speed, deviates 10-15 in   (0) Severe impairment: severe disruption of gait, deviates >15 in  5. Gait with pivot turns = 2   (3) Normal: performs safely in 3 sec, no LOB   (2) Mild impairment: performs in >3 sec & no LOB, OR turns safely & requires several steps to regain LOB   (1) Moderate impairment: turns slow, OR requires several small steps for balance following turn & stop   (0) Severe impairment: cannot turn safely, needs assist  6. Step over obstacle = 3   (3) Normal: steps over 2 stacked boxes w/o change in speed or LOB   (2) Mild impairment: able to step over 1 box w/o change in speed or LOB   (1) Moderate impairment: steps over 1 box but must slow down, may require VC   (0) Severe impairment: cannot perform w/o assist  7. Gait with Narrow PITA = 3   (3) Normal: 10 steps no staggering   (2) Mild impairment: 7-9 steps   (1) Moderate impairment: 4-7 steps   (0) Severe impairment: < 4 steps or cannot perform w/o assist  8. Gait with eyes closed = 2   (3) Normal: < 7 sec, no A.D., no LOB, normal gait pattern, deviates <6 in   (2) Mild impairment: 7.1-9 sec, mild gait deviations, deviates 6-10 in   (1) Moderate impairment: > 9 sec, abnormal pattern, LOB, deviates 10-15 in   (0) Severe impairment: cannot perform w/o assist, LOB, deviates >15in  9. Ambulating Backwards = 1   (3) Normal: no A.D., no LOB, normal gait pattern, deviates <6in   (2) Mild impairment: uses A.D., slower speed, mild gait deviations, deviates 6-10  in   (1) Moderate impairment: slow speed, abnormal gait pattern, LOB, deviates 10-15 in   (0) Severe impairment: severe gait deviations or LOB, deviates >15in  10. Steps = 3   (3) Normal: alternating feet, no rail   (2) Mild Impairment: alternating feet, uses rail   (1) Moderate impairment: step-to, uses rail   (0) Severe impairment: cannot perform safely    Score 24/30     Score:   <22/30 fall risk   <20/30 fall risk in older adults   <18/30 fall risk in Parkinsons     2 x ~75 feet backwards ambulation, cues for larger step length  ~75 feet tandem ambulation, CGA      CMS Impairment/Limitation/Restriction for FOTO Interim Survey    Therapist reviewed FOTO scores for Sharri Cline on 5/19/2020.   FOTO documents entered into [employer] - see Media section.    Limitation Score: 23%  Category: Mobility         Home Exercises Provided and Patient Education Provided     Education provided:   - HEP provided    Written Home Exercises Provided: yes.  Exercises were reviewed and Sharri was able to demonstrate them prior to the end of the session.  Sharri demonstrated good  understanding of the education provided.     See EMR under Patient Instructions for exercises provided 2/11/2020.    Assessment     Ms. Harrell tolerates reassessment well today and demonstrates stable or improved function since last session in March. PT sessions have been interrupted by COVID-19 pandemic, but patient has been keeping up with HEP and exercise at home on her own. Pt does demonstrate slight decrease in self selected walking speed today, though she does report that she walks at a very leisurely pace normally. Pt continues to demonstrate most difficulty with tandem and backwards ambulation as well as some decreased endurance. PT and pt discuss final extension of HEP for 2x/week for 4 weeks in order to improve her balance and ensure pt is set with HEP and empowered to continue with her exercises at home.      Sharri is progressing well towards her  goals.   Pt prognosis is Good.     Pt will continue to benefit from skilled outpatient physical therapy to address the deficits listed in the problem list box on initial evaluation, provide pt/family education and to maximize pt's level of independence in the home and community environment.     Pt's spiritual, cultural and educational needs considered and pt agreeable to plan of care and goals.     Anticipated barriers to physical therapy: none    Goals:  Short Term Goals: 4 weeks   1. Pt will improve score on FGA to at least 25/30 in order to decrease risk for fall. MET 3/6/2020  2. Pt will be independent with established HEP for strengthening and balance. MET 3/6/2020  3. Pt will increase 30 sec chair rise score to at least 15 in order to demonstrate improved endruance and functional strength. Ongoing, improving  4. Pt will increase SLS time to at least 15 sec B in order to decrease risk for fall. MET 3/6/2020     Long Term Goals: 8 weeks   1. Pt will improve score on FGA to at least 27/30 in order to demonstrate improved functional balance. Ongoing, improving  2. Pt will improve score on Vu sensory testing condition 4 to at least 20 seconds in order to demonstrate improved balance in dim lit conditions. MET 5/19/20  3. Pt will increase SLS time to at least 20 seconds on B LE in order to decrease fall risk. MET 5/19/20  4. Pt will increase SSWS to at least 1.2 m/s in order to return to ambulation 5x/wk as she was prior to cancer. Met 3/6/2020, inconsistent today, pt walks at a leisurely pace on this date     Plan     Plan of care Certification extension through: 6/19/20    Ensure pt's HEP is established and she is empowered to complete on her own. Continue to use elliptical for endurance training. Work on ambulatory and high level balance tasks.    Patrick Pineda, PT  5/19/2020

## 2020-05-21 ENCOUNTER — CLINICAL SUPPORT (OUTPATIENT)
Dept: REHABILITATION | Facility: HOSPITAL | Age: 70
End: 2020-05-21
Attending: INTERNAL MEDICINE
Payer: MEDICARE

## 2020-05-21 DIAGNOSIS — R20.1 IMPAIRED SENSATION: ICD-10-CM

## 2020-05-21 DIAGNOSIS — Z74.09 IMPAIRED FUNCTIONAL MOBILITY, BALANCE, GAIT, AND ENDURANCE: Primary | ICD-10-CM

## 2020-05-21 PROCEDURE — 97110 THERAPEUTIC EXERCISES: CPT | Mod: PO

## 2020-05-21 PROCEDURE — 97112 NEUROMUSCULAR REEDUCATION: CPT | Mod: PO

## 2020-05-21 NOTE — PROGRESS NOTES
"  Physical Therapy Daily Treatment Note     Name: Sharri Dennis [place]  Clinic Number: [id number]    Therapy Diagnosis:   Encounter Diagnoses   Name Primary?    Impaired functional mobility, balance, gait, and endurance Yes    Impaired sensation      Physician: Dominique Ayala MD    Visit Date: 5/21/2020     Physician Orders: PT Eval and Treat for neuropathy related to chemotherapy  Medical Diagnosis from Referral: chemotherapy induced neuropathy  Evaluation Date: 2/7/2020  Authorization Period Expiration:6/11/20  Plan of Care Expiration: 4/7/2020  Visit # / Visits authorized: 2/ 3; 12 total visits in 2020     Time In: 747  Time Out: 830  Total Billable Time: 43 minutes    Precautions: Standard and Fall    Subjective     Pt reports: Her neuropathy is in feet only this morning. Not too bad today.      She was compliant with home exercise program.  Response to previous treatment: no adverse response, "felt good"  Functional change: ongoing    Pain: 0/10  Location: n/a     Objective     Sharri received therapeutic exercises to develop strength, endurance, ROM, flexibility, posture and core stabilization for 20 minutes including:  3 laps at ballet bar, side stepping with purple sports band at knees  2 laps at [employer] bar, monster walk fwd with purple sports band at knees    2 x 10 reps ea LE at Clerk box of hip and knee extension with all 4 springs on lv 2    5 minutes elliptical, level 5, RPE 5    Sharri participated in neuromuscular re-education activities to improve: Balance, Coordination, Kinesthetic, Sense, Proprioception and Posture for 23 minutes. The following activities were included:  2 laps, ~50' ea direction, retro ambulation, CGA only with cues to increase speed slightly     Long airex:  3 laps tandem ambulation, CGA, occ UE support  2 x 30 sec ea LE lead, tandem, CGA only    Bosu, small, flat side up:   2 x 30 sec static standing, no UE support, CGA  2 x 30 sec static standing with vision eliminated, min A, " occ UE support  2 X 30 sec static standing with L/R head turns, no UE support, CGA  2 X 30 sec static standing with up/down head turns, no UE support, CGA  x 30 sec L/R weight shifting, no UE support, slow movement but CGA only  x 30 sec A/P weight shifting, no UE support, CGA    Home Exercises Provided and Patient Education Provided     Education provided:   - HEP provided    Written Home Exercises Provided: yes.  Exercises were reviewed and Sharri was able to demonstrate them prior to the end of the session.  Sharri demonstrated good  understanding of the education provided.     See EMR under Patient Instructions for exercises provided 2/11/2020.    Assessment     Sharri did well with session this morning and thoroughly enjoyed addition of banded ambulatory exercises. She continues to struggle with vision eliminated balance though this continues to improve. Pt better able to execute retro ambulation today. She remains highly motivated and appropriate for skilled PT services.     Sharri is progressing well towards her goals.   Pt prognosis is Good.     Pt will continue to benefit from skilled outpatient physical therapy to address the deficits listed in the problem list box on initial evaluation, provide pt/family education and to maximize pt's level of independence in the home and community environment.     Pt's spiritual, cultural and educational needs considered and pt agreeable to plan of care and goals.     Anticipated barriers to physical therapy: none    Goals:  Short Term Goals: 4 weeks   1. Pt will improve score on FGA to at least 25/30 in order to decrease risk for fall. MET 3/6/2020  2. Pt will be independent with established HEP for strengthening and balance. MET 3/6/2020  3. Pt will increase 30 sec chair rise score to at least 15 in order to demonstrate improved endruance and functional strength. Ongoing, improving  4. Pt will increase SLS time to at least 15 sec B in order to decrease risk for fall. MET  3/6/2020     Long Term Goals: 8 weeks   1. Pt will improve score on FGA to at least 27/30 in order to demonstrate improved functional balance. Ongoing, improving  2. Pt will improve score on Vu sensory testing condition 4 to at least 20 seconds in order to demonstrate improved balance in dim lit conditions. MET 5/19/20  3. Pt will increase SLS time to at least 20 seconds on B LE in order to decrease fall risk. MET 5/19/20  4. Pt will increase SSWS to at least 1.2 m/s in order to return to ambulation 5x/wk as she was prior to cancer. Met 3/6/2020, inconsistent today, pt walks at a leisurely pace on this date     Plan      Plan of care Certification extension through: 6/19/20     Ensure pt's HEP is established and she is empowered to complete on her own. Continue to use elliptical for endurance training. Work on ambulatory and high level balance tasks.    Patrick Pineda, PT  5/21/2020

## 2020-05-26 ENCOUNTER — CLINICAL SUPPORT (OUTPATIENT)
Dept: REHABILITATION | Facility: HOSPITAL | Age: 70
End: 2020-05-26
Attending: INTERNAL MEDICINE
Payer: MEDICARE

## 2020-05-26 DIAGNOSIS — R20.1 IMPAIRED SENSATION: ICD-10-CM

## 2020-05-26 DIAGNOSIS — Z74.09 IMPAIRED FUNCTIONAL MOBILITY, BALANCE, GAIT, AND ENDURANCE: Primary | ICD-10-CM

## 2020-05-26 PROCEDURE — 97110 THERAPEUTIC EXERCISES: CPT | Mod: PO

## 2020-05-26 PROCEDURE — 97112 NEUROMUSCULAR REEDUCATION: CPT | Mod: PO

## 2020-05-26 NOTE — PROGRESS NOTES
"  Physical Therapy Daily Treatment Note     Name: Sharri Dennis [place]  Clinic Number: [id number]    Therapy Diagnosis:   Encounter Diagnoses   Name Primary?   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       She was compliant with home exercise program.  Response to previous treatment: no adverse response, "felt good"  Functional change: ongoing    Pain: 0/10  Location: n/a     Objective     Sharri received therapeutic exercises to develop strength, endurance, ROM, flexibility, posture and core stabilization for 10 minutes including:  3 laps at [email or web address] bar, side stepping with purple sports band at knees  3 laps at [email or web address] bar, monster walk fwd with purple sports band at knees    2 x 10 reps ea LE at pilates box of hip and knee extension with all 4 springs on lv 2    2 x 30 sec standing gastroc stretch on wedge    7 minutes elliptical, level 5, ramp 1, RPE 4    Sharri participated in neuromuscular re-education activities to improve: Balance, Coordination, Kinesthetic, Sense, Proprioception and Posture for 30 minutes. The following activities were included:  2 laps, ~50' ea direction, retro ambulation, CGA only with cues to increase speed slightly     Bosu, large, flat side up:   2 x 30 sec static standing, no UE support, CGA  2 x 30 sec static standing with vision eliminated, min A, occ UE support  2 X 30 sec static standing with L/R head turns, no " UE support, CGA  2 X 30 sec static standing with up/down head turns, no UE support, CGA  x 30 sec L/R weight shifting, no UE support, slow movement but CGA only  x 30 sec A/P weight shifting, no UE support, CGA    Bosu, large, flat side down:   2 x 30 sec static standing, NBOS, CGA  X 10 ea LE step onto Bosu with opposing LE hip and knee flexion, more difficulty with L LE SLS but CGA only and occ UE support    Home Exercises Provided and Patient Education Provided     Education provided:   - HEP provided    Written Home Exercises Provided: yes.  Exercises were reviewed and Sharri was able to demonstrate them prior to the end of the session.  Sharri demonstrated good  understanding of the education provided.     See EMR under Patient Instructions for exercises provided 2/11/2020.    Assessment     Sharri did well with session this morning and continues to show steady improvement in balance. She tolerated addition of round side bosu tasks well and requires only minimal UE support for this task. Pt reports that she has been walking daily and neuropathy pain has improved greatly.  She remains highly motivated and appropriate for skilled PT services.     Sharri is progressing well towards her goals.   Pt prognosis is Good.     Pt will continue to benefit from skilled outpatient physical therapy to address the deficits listed in the problem list box on initial evaluation, provide pt/family education and to maximize pt's level of independence in the home and community environment.     Pt's spiritual, cultural and educational needs considered and pt agreeable to plan of care and goals.     Anticipated barriers to physical therapy: none    Goals:  Short Term Goals: 4 weeks   1. Pt will improve score on FGA to at least 25/30 in order to decrease risk for fall. MET 3/6/2020  2. Pt will be independent with established HEP for strengthening and balance. MET 3/6/2020  3. Pt will increase 30 sec chair rise score to at least 15 in  order to demonstrate improved endruance and functional strength. Ongoing, improving  4. Pt will increase SLS time to at least 15 sec B in order to decrease risk for fall. MET 3/6/2020     Long Term Goals: 8 weeks   1. Pt will improve score on FGA to at least 27/30 in order to demonstrate improved functional balance. Ongoing, improving  2. Pt will improve score on Vu sensory testing condition 4 to at least 20 seconds in order to demonstrate improved balance in dim lit conditions. MET 5/19/20  3. Pt will increase SLS time to at least 20 seconds on B LE in order to decrease fall risk. MET 5/19/20  4. Pt will increase SSWS to at least 1.2 m/s in order to return to ambulation 5x/wk as she was prior to cancer. Met 3/6/2020, inconsistent today, pt walks at a leisurely pace on this date     Plan      Plan of care Certification extension through: 6/19/20     Ensure pt's HEP is established and she is empowered to complete on her own. Continue to use elliptical for endurance training. Work on ambulatory and high level balance tasks.    Patrick Pineda, PT  5/26/2020

## 2020-05-27 NOTE — PROGRESS NOTES
"  Physical Therapy Daily Treatment Note     Name: Sharri Dennis [facility]  Clinic Number: [id number]    Therapy Diagnosis:   Encounter Diagnoses   Name Primary?   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       She was compliant with home exercise program.  Response to previous treatment: no adverse response, "felt good"  Functional change: ongoing    Pain: 0/10  Location: n/a     Objective     Sharri received therapeutic exercises to develop strength, endurance, ROM, flexibility, posture and core stabilization for 10 minutes including:  3 laps at [employer] bar, side stepping with purple sports band at knees  3 laps at [employer] bar, monster walk fwd with purple sports band at knees    2 x 10 reps ea LE at [employer] box of hip and knee extension with all 4 springs on lv 2    2 x 30 sec standing gastroc stretch on wedge    7 minutes elliptical, level 5, ramp 1, RPE 4    Sharri participated in neuromuscular re-education activities to improve: Balance, Coordination, Kinesthetic, Sense, Proprioception and Posture for 29 minutes. The following activities were included:  2 laps, ~50' ea direction, retro ambulation, CGA only with cues to increase speed slightly     Bosu, large, flat side up:   2 x 30 sec static standing, no UE support, CGA  2 x 30 sec static standing with vision eliminated, min A, occ UE " support  2 X 30 sec static standing with L/R head turns, no UE support, CGA  2 X 30 sec static standing with up/down head turns, no UE support, CGA    Bosu, large, flat side down:   2 x 30 sec static standing, NBOS, CGA  2 x 30 sec static standing with vision eliminated, [place]  X 10 ea LE step onto Bosu with opposing LE hip and knee flexion, more difficulty with L LE SLS but CGA only and occ UE support    4 pt fitter:   x10 ea LE tapping two lg cones before returning to standing, CGA  x20 alternating tapping to one lg cone placed anteriorly, CGA    Home Exercises Provided and Patient Education Provided     Education provided:   - HEP provided    Written Home Exercises Provided: yes.  Exercises were reviewed and Sharri was able to demonstrate them prior to the end of the session.  Sharri demonstrated good  understanding of the education provided.     See EMR under Patient Instructions for exercises provided 2/11/2020.    Assessment     Sharri did well with session this morning and continues to show steady improvement in balance. She was able to complete Bosu with vision eliminated today without assist and continues to report good adherence to HEP. She remains highly motivated and appropriate for skilled PT services.     Sharri is progressing well towards her goals.   Pt prognosis is Good.     Pt will continue to benefit from skilled outpatient physical therapy to address the deficits listed in the problem list box on initial evaluation, provide pt/family education and to maximize pt's level of independence in the home and community environment.     Pt's spiritual, cultural and educational needs considered and pt agreeable to plan of care and goals.     Anticipated barriers to physical therapy: none    Goals:  Short Term Goals: 4 weeks   1. Pt will improve score on FGA to at least 25/30 in order to decrease risk for fall. MET 3/6/2020  2. Pt will be independent with established HEP for strengthening and balance. MET  3/6/2020  3. Pt will increase 30 sec chair rise score to at least 15 in order to demonstrate improved endruance and functional strength. Ongoing, improving  4. Pt will increase SLS time to at least 15 sec B in order to decrease risk for fall. MET 3/6/2020     Long Term Goals: 8 weeks   1. Pt will improve score on FGA to at least 27/30 in order to demonstrate improved functional balance. Ongoing, improving  2. Pt will improve score on [place] sensory testing condition 4 to at least 20 seconds in order to demonstrate improved balance in dim lit conditions. MET 5/19/20  3. Pt will increase SLS time to at least 20 seconds on B LE in order to decrease fall risk. MET 5/19/20  4. Pt will increase SSWS to at least 1.2 m/s in order to return to ambulation 5x/wk as she was prior to cancer. Met 3/6/2020, inconsistent today, pt walks at a leisurely pace on this date     Plan      Plan of care Certification extension through: 6/19/20     Ensure pt's HEP is established and she is empowered to complete on her own. Continue to use elliptical for endurance training. Work on ambulatory and high level balance tasks.    Patrick Pineda, PT  5/28/2020

## 2020-05-28 ENCOUNTER — CLINICAL SUPPORT (OUTPATIENT)
Dept: REHABILITATION | Facility: HOSPITAL | Age: 70
End: 2020-05-28
Attending: INTERNAL MEDICINE
Payer: MEDICARE

## 2020-05-28 DIAGNOSIS — Z74.09 IMPAIRED FUNCTIONAL MOBILITY, BALANCE, GAIT, AND ENDURANCE: Primary | ICD-10-CM

## 2020-05-28 DIAGNOSIS — R20.1 IMPAIRED SENSATION: ICD-10-CM

## 2020-05-28 PROCEDURE — 97112 NEUROMUSCULAR REEDUCATION: CPT | Mod: PO

## 2020-05-28 PROCEDURE — 97110 THERAPEUTIC EXERCISES: CPT | Mod: PO

## 2020-06-02 ENCOUNTER — CLINICAL SUPPORT (OUTPATIENT)
Dept: REHABILITATION | Facility: HOSPITAL | Age: 70
End: 2020-06-02
Attending: INTERNAL MEDICINE
Payer: MEDICARE

## 2020-06-02 DIAGNOSIS — Z74.09 IMPAIRED FUNCTIONAL MOBILITY, BALANCE, GAIT, AND ENDURANCE: Primary | ICD-10-CM

## 2020-06-02 DIAGNOSIS — R20.1 IMPAIRED SENSATION: ICD-10-CM

## 2020-06-02 PROCEDURE — 97110 THERAPEUTIC EXERCISES: CPT | Mod: PO

## 2020-06-02 PROCEDURE — 97112 NEUROMUSCULAR REEDUCATION: CPT | Mod: PO

## 2020-06-02 NOTE — PROGRESS NOTES
Physical Therapy Daily Treatment Note     Name: Sharri Dennis [place]  Clinic Number: [id number]    Therapy Diagnosis:   Encounter Diagnoses   Name Primary?   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       She was compliant with home exercise program.  Response to previous treatment: no adverse response, slight soreness.  Functional change: ongoing    Pain: 0/10  Location: n/a     Objective     Sharri received therapeutic exercises to develop strength, endurance, ROM, flexibility, posture and core stabilization for 10 minutes including:  3 laps at ballet bar, side stepping with purple sports band at knees  3 laps at ballet bar, monster walk fwd with purple sports band at knees    x20 reps alternating side, bird dog on mat for core strengthening and balance    3 x 30 sec standing gastroc stretch on wedge    8 minutes elliptical, level 5, ramp 1, RPE 4    Sharri participated in neuromuscular re-education activities to improve: Balance, Coordination, Kinesthetic, Sense, Proprioception and Posture for 29 minutes. The following activities were included:    Bosu, large, flat side up:   2 x 30 sec static standing, no UE support, CGA  2 x 30 sec static standing with vision eliminated, min A, occ UE support  2 X 30 sec static standing with L/R head turns, no UE support,  CGA  2 X 30 sec static standing with up/down head turns, no UE support, CGA    Bosu, large, flat side down:    2 x 30 sec static standing, NBOS, CGA  2 x 30 sec static standing with vision eliminated, CGA  X 10 ea LE step onto Bosu with opposing LE hip and knee flexion, more difficulty with L LE SLS but CGA only and occ UE support    4 pt fitter:   x10 ea LE tapping two lg cones before returning to standing, CGA    Home Exercises Provided and Patient Education Provided     Education provided:   - HEP provided    Written Home Exercises Provided: yes.  Exercises were reviewed and Sharri was able to demonstrate them prior to the end of the session.  Sharri demonstrated good  understanding of the education provided.     See EMR under Patient Instructions for exercises provided 2/11/2020.    Assessment     Sharri did well with session this morning and continues to show steady improvement in balance and endurance. She was able to increase time on the elliptical this morning and completed all balance activities with CGA only from therapist. PT educates pt on righting reactions and that some movement in LEs with vision eliminated balance tasks is normal. She remains highly motivated and appropriate for skilled PT services.     Sharri is progressing well towards her goals.   Pt prognosis is Good.     Pt will continue to benefit from skilled outpatient physical therapy to address the deficits listed in the problem list box on initial evaluation, provide pt/family education and to maximize pt's level of independence in the home and community environment.     Pt's spiritual, cultural and educational needs considered and pt agreeable to plan of care and goals.     Anticipated barriers to physical therapy: none    Goals:  Short Term Goals: 4 weeks   1. Pt will improve score on FGA to at least 25/30 in order to decrease risk for fall. MET 3/6/2020  2. Pt will be independent with established HEP for strengthening and balance. MET  3/6/2020  3. Pt will increase 30 sec chair rise score to at least 15 in order to demonstrate improved endruance and functional strength. Ongoing, improving  4. Pt will increase SLS time to at least 15 sec B in order to decrease risk for fall. MET 3/6/2020     Long Term Goals: 8 weeks   1. Pt will improve score on FGA to at least 27/30 in order to demonstrate improved functional balance. Ongoing, improving  2. Pt will improve score on [place] sensory testing condition 4 to at least 20 seconds in order to demonstrate improved balance in dim lit conditions. MET 5/19/20  3. Pt will increase SLS time to at least 20 seconds on B LE in order to decrease fall risk. MET 5/19/20  4. Pt will increase SSWS to at least 1.2 m/s in order to return to ambulation 5x/wk as she was prior to cancer. Met 3/6/2020, inconsistent today, pt walks at a leisurely pace on this date     Plan      Plan of care Certification extension through: 6/19/20     Ensure pt's HEP is established and she is empowered to complete on her own. Continue to use elliptical for endurance training. Work on ambulatory and high level balance tasks.    Patrick Pineda, PT  6/2/2020

## 2020-06-04 ENCOUNTER — CLINICAL SUPPORT (OUTPATIENT)
Dept: REHABILITATION | Facility: HOSPITAL | Age: 70
End: 2020-06-04
Attending: INTERNAL MEDICINE
Payer: MEDICARE

## 2020-06-04 DIAGNOSIS — R20.1 IMPAIRED SENSATION: ICD-10-CM

## 2020-06-04 DIAGNOSIS — Z74.09 IMPAIRED FUNCTIONAL MOBILITY, BALANCE, GAIT, AND ENDURANCE: Primary | ICD-10-CM

## 2020-06-04 PROCEDURE — 97112 NEUROMUSCULAR REEDUCATION: CPT | Mod: PO

## 2020-06-04 PROCEDURE — 97110 THERAPEUTIC EXERCISES: CPT | Mod: PO

## 2020-06-04 NOTE — PROGRESS NOTES
Physical Therapy Daily Treatment Note     Name: Sharri Dennis [place]  Clinic Number: [id number]    Therapy Diagnosis:   Encounter Diagnoses   Name Primary?   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       She was compliant with home exercise program.  Response to previous treatment: no adverse response, slight soreness.  Functional change: ongoing    Pain: 0/10  Location: n/a     Objective     Sharri received therapeutic exercises to develop strength, endurance, ROM, flexibility, posture and core stabilization for 15 minutes including:  3 laps at ballet bar, side stepping with purple sports band at knees  3 laps at ballet bar, monster walk fwd with purple sports band at knees    x20 reps alternating side, bird dog on mat for core strengthening and balance    3 x 30 sec standing gastroc stretch on wedge  2 x 30 sec standing hamstring stretch on step    8 minutes elliptical, level 5, ramp 1, RPE 4    Sharri participated in neuromuscular re-education activities to improve: Balance, Coordination, Kinesthetic, Sense, Proprioception and Posture for 30 minutes. The following activities were included:    Bosu, large, flat side up:   x 30 sec static standing, no UE support, CGA  2 x 30 sec static standing with vision eliminated, min A, occ UE support  2 X 30 sec static standing with L/R head turns,  no UE support, CGA  2 X 30 sec static standing with up/down head turns, no UE support, CGA  1 minute standing on Bosu with ball tosses at various angles    Bosu, large, flat side down:    2 x 30 sec static standing, NBOS, CGA  2 x 30 sec static standing with vision eliminated, CGA  X 10 ea LE step onto Bosu with opposing LE hip and knee flexion, more difficulty with L LE SLS but CGA only and no UE support    Home Exercises Provided and Patient Education Provided     Education provided:   - HEP provided    Written Home Exercises Provided: yes.  Exercises were reviewed and Sharri was able to demonstrate them prior to the end of the session.  Sharri demonstrated good  understanding of the education provided.     See EMR under Patient Instructions for exercises provided 2/11/2020.    Assessment     Sharri did well with session this morning and continues to show steady improvement in balance and endurance. She did well with addition of bird dog and ball toss on Bosu today. Pt continues to demonstrate most difficulty with vision eliminated balance. She remains highly motivated and appropriate for skilled PT services.     Sharri is progressing well towards her goals.   Pt prognosis is Good.     Pt will continue to benefit from skilled outpatient physical therapy to address the deficits listed in the problem list box on initial evaluation, provide pt/family education and to maximize pt's level of independence in the home and community environment.     Pt's spiritual, cultural and educational needs considered and pt agreeable to plan of care and goals.     Anticipated barriers to physical therapy: none    Goals:  Short Term Goals: 4 weeks   1. Pt will improve score on FGA to at least 25/30 in order to decrease risk for fall. MET 3/6/2020  2. Pt will be independent with established HEP for strengthening and balance. MET 3/6/2020  3. Pt will increase 30 sec chair rise score to at least 15 in order to demonstrate improved endruance  and functional strength. Ongoing, improving  4. Pt will increase SLS time to at least 15 sec B in order to decrease risk for fall. MET 3/6/2020     Long Term Goals: 8 weeks   1. Pt will improve score on FGA to at least 27/30 in order to demonstrate improved functional balance. Ongoing, improving  2. Pt will improve score on Vu sensory testing condition 4 to at least 20 seconds in order to demonstrate improved balance in dim lit conditions. MET 5/19/20  3. Pt will increase SLS time to at least 20 seconds on B LE in order to decrease fall risk. MET 5/19/20  4. Pt will increase SSWS to at least 1.2 m/s in order to return to ambulation 5x/wk as she was prior to cancer. Met 3/6/2020, inconsistent today, pt walks at a leisurely pace on this date     Plan      Plan of care Certification extension through: 6/19/20     Ensure pt's HEP is established and she is empowered to complete on her own. Continue to use elliptical for endurance training. Work on ambulatory and high level balance tasks.    Patrick Pineda, PT  6/4/2020

## 2020-06-09 ENCOUNTER — CLINICAL SUPPORT (OUTPATIENT)
Dept: REHABILITATION | Facility: HOSPITAL | Age: 70
End: 2020-06-09
Attending: INTERNAL MEDICINE
Payer: MEDICARE

## 2020-06-09 DIAGNOSIS — R20.1 IMPAIRED SENSATION: ICD-10-CM

## 2020-06-09 DIAGNOSIS — Z74.09 IMPAIRED FUNCTIONAL MOBILITY, BALANCE, GAIT, AND ENDURANCE: Primary | ICD-10-CM

## 2020-06-09 PROCEDURE — 97110 THERAPEUTIC EXERCISES: CPT | Mod: PO

## 2020-06-09 NOTE — PROGRESS NOTES
Physical Therapy Daily Treatment Note     Name: Sharri Dennis [place]  Clinic Number: [id number]    Therapy Diagnosis:   Encounter Diagnoses   Name Primary?   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       She was compliant with home exercise program.  Response to previous treatment: no adverse response, slight soreness.  Functional change: ongoing    Pain: 0/10  Location: n/a     Objective     Sharri received therapeutic exercises to develop strength, endurance, ROM, flexibility, posture and core stabilization for 41 minutes including:  3 laps at ballet bar, side stepping with purple sports band at knees  3 laps at ballet bar, monster walk fwd with purple sports band at knees    2x20 reps alternating side, bird dog on mat for core strengthening and balance  2x20 reps of seated russian twists with 4.4 lb ball and no LE support    3 x 30 sec standing gastroc stretch on wedge  2 x 30 sec standing hamstring stretch on step    8 minutes elliptical, level 5, ramp 1, RPE 3    Sharri participated in neuromuscular re-education activities to improve: Balance, Coordination, Kinesthetic, Sense, Proprioception and Posture for 0 minutes. The following activities were included:      Home Exercises Provided and Patient Education Provided     Education provided:   - HEP provided    Written Home Exercises  Provided: yes.  Exercises were reviewed and Sharri was able to demonstrate them prior to the end of the session.  Sharri demonstrated good  understanding of the education provided.     See EMR under Patient Instructions for exercises provided 2/11/2020.    Assessment     Sharri did well with session this afternoon. She did take signifiantly more time to do monster walking and side stepping with band moved to ankles today. Pt does continue to demonstrate improving balance and did well with addition of core activities today. She remains highly motivated and appropriate for skilled PT services.     Sharri is progressing well towards her goals.   Pt prognosis is Good.     Pt will continue to benefit from skilled outpatient physical therapy to address the deficits listed in the problem list box on initial evaluation, provide pt/family education and to maximize pt's level of independence in the home and community environment.     Pt's spiritual, cultural and educational needs considered and pt agreeable to plan of care and goals.     Anticipated barriers to physical therapy: none    Goals:  Short Term Goals: 4 weeks   1. Pt will improve score on FGA to at least 25/30 in order to decrease risk for fall. MET 3/6/2020  2. Pt will be independent with established HEP for strengthening and balance. MET 3/6/2020  3. Pt will increase 30 sec chair rise score to at least 15 in order to demonstrate improved endruance and functional strength. Ongoing, improving  4. Pt will increase SLS time to at least 15 sec B in order to decrease risk for fall. MET 3/6/2020     Long Term Goals: 8 weeks   1. Pt will improve score on FGA to at least 27/30 in order to demonstrate improved functional balance. Ongoing, improving  2. Pt will improve score on [place] sensory testing condition 4 to at least 20 seconds in order to demonstrate improved balance in dim lit conditions. MET 5/19/20  3. Pt will increase SLS time to at least 20 seconds on B LE in order  to decrease fall risk. MET 5/19/20  4. Pt will increase SSWS to at least 1.2 m/s in order to return to ambulation 5x/wk as she was prior to cancer. Met 3/6/2020, inconsistent today, pt walks at a leisurely pace on this date     Plan      Plan of care Certification extension through: 6/19/20     Ensure pt's HEP is established and she is empowered to complete on her own. Continue to use elliptical for endurance training. Work on ambulatory and high level balance tasks.    Patrick Pineda, PT  6/9/2020

## 2020-06-12 ENCOUNTER — CLINICAL SUPPORT (OUTPATIENT)
Dept: REHABILITATION | Facility: HOSPITAL | Age: 70
End: 2020-06-12
Attending: INTERNAL MEDICINE
Payer: MEDICARE

## 2020-06-12 DIAGNOSIS — R20.1 IMPAIRED SENSATION: ICD-10-CM

## 2020-06-12 DIAGNOSIS — Z74.09 IMPAIRED FUNCTIONAL MOBILITY, BALANCE, GAIT, AND ENDURANCE: Primary | ICD-10-CM

## 2020-06-12 PROCEDURE — 97110 THERAPEUTIC EXERCISES: CPT | Mod: PO

## 2020-06-12 NOTE — PROGRESS NOTES
Physical Therapy Daily Treatment Note     Name: Sharri Dennis [place]  Clinic Number: [id number]    Therapy Diagnosis:   Encounter Diagnoses   Name Primary?   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       She was compliant with home exercise program.  Response to previous treatment: no adverse response, slight soreness.  Functional change: ongoing    Pain: 0/10  Location: n/a     Objective     Sharri received therapeutic exercises to develop strength, endurance, ROM, flexibility, posture and core stabilization for 41 minutes including:  3 laps at ballet bar, side stepping with purple sports band at knees  3 laps at ballet bar, monster walk fwd with purple sports band at knees    2x20 reps alternating side, bird dog on mat for core strengthening and balance  2x20 reps of seated russian twists with 4.4 lb ball and no LE support    3 x 30 sec standing gastroc stretch on wedge  2 x 30 sec standing hamstring stretch on step    10 minutes elliptical, level 5, ramp 1, RPE 3    Ladder drills:   1 lap side stepping 2 feet inside then two feet outside, CGA  1 lap fwd stepping 2 feet in ea rung, CGA  1 lap side stepping 2 feet inside then 1 foot outside rungs, CGA    Sharri participated in neuromuscular re-education activities to improve: Balance, Coordination, Kinesthetic, Sense, Proprioception and  Posture for 0 minutes. The following activities were included:      Home Exercises Provided and Patient Education Provided     Education provided:   - HEP provided    Written Home Exercises Provided: yes.  Exercises were reviewed and Sharri was able to demonstrate them prior to the end of the session.  Sharri demonstrated good  understanding of the education provided.     See EMR under Patient Instructions for exercises provided 2/11/2020.    Assessment     Sharri did very well with session this afternoon. She tolerated addition of ladder activity well today and was able to get into a good rhythm with this activity. Pt reports good compliance with HEP and is working toward discharge next week for maintenance of conditions on her own. She remains highly motivated and appropriate for skilled PT services.     Sharri is progressing well towards her goals.   Pt prognosis is Good.     Pt will continue to benefit from skilled outpatient physical therapy to address the deficits listed in the problem list box on initial evaluation, provide pt/family education and to maximize pt's level of independence in the home and community environment.     Pt's spiritual, cultural and educational needs considered and pt agreeable to plan of care and goals.     Anticipated barriers to physical therapy: none    Goals:  Short Term Goals: 4 weeks   1. Pt will improve score on FGA to at least 25/30 in order to decrease risk for fall. MET 3/6/2020  2. Pt will be independent with established HEP for strengthening and balance. MET 3/6/2020  3. Pt will increase 30 sec chair rise score to at least 15 in order to demonstrate improved endruance and functional strength. Ongoing, improving  4. Pt will increase SLS time to at least 15 sec B in order to decrease risk for fall. MET 3/6/2020     Long Term Goals: 8 weeks   1. Pt will improve score on FGA to at least 27/30 in order to demonstrate improved functional balance. Ongoing, improving  2. Pt will  improve score on [place] sensory testing condition 4 to at least 20 seconds in order to demonstrate improved balance in dim lit conditions. MET 5/19/20  3. Pt will increase SLS time to at least 20 seconds on B LE in order to decrease fall risk. MET 5/19/20  4. Pt will increase SSWS to at least 1.2 m/s in order to return to ambulation 5x/wk as she was prior to cancer. Met 3/6/2020, inconsistent today, pt walks at a leisurely pace on this date     Plan      Plan of care Certification extension through: 6/19/20     Ensure pt's HEP is established and she is empowered to complete on her own. Continue to use elliptical for endurance training. Work on ambulatory and high level balance tasks.    Patrick Pineda, PT  6/12/2020

## 2020-06-16 ENCOUNTER — CLINICAL SUPPORT (OUTPATIENT)
Dept: REHABILITATION | Facility: HOSPITAL | Age: 70
End: 2020-06-16
Attending: INTERNAL MEDICINE
Payer: MEDICARE

## 2020-06-16 DIAGNOSIS — Z74.09 IMPAIRED FUNCTIONAL MOBILITY, BALANCE, GAIT, AND ENDURANCE: Primary | ICD-10-CM

## 2020-06-16 DIAGNOSIS — R20.1 IMPAIRED SENSATION: ICD-10-CM

## 2020-06-16 PROCEDURE — 97110 THERAPEUTIC EXERCISES: CPT | Mod: PO

## 2020-06-16 NOTE — PROGRESS NOTES
Physical Therapy Daily Treatment Note     Name: Sharri Dennis [place]  Clinic Number: [id number]    Therapy Diagnosis:   Encounter Diagnoses   Name Primary?   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 She had a nice weekend with her son.    She was compliant with home exercise program.  Response to previous treatment: no adverse response, slight soreness.  Functional change: ongoing    Pain: 0/10  Location: n/a     Objective     Sharri received therapeutic exercises to develop strength, endurance, ROM, flexibility, posture and core stabilization for 44 minutes including:  3 laps at ballet bar, side stepping with purple sports band at knees  3 laps at ballet bar, monster walk fwd with purple sports band at knees    2x20 reps alternating side, bird dog on mat for core strengthening and balance  2x20 reps of seated russian twists with 4.4 lb ball and no LE support    3 x 30 sec standing gastroc stretch on wedge    2 x 10 reps ea LE standing piliates box hip and knee extension, no UE support,     10 minutes elliptical, level 5, ramp 1    Ladder drills:   2 lap side stepping 2 feet inside then two feet outside, CGA  2 lap fwd stepping 2 feet in ea rung, CGA  2 lap side stepping 2 feet inside then 1 foot outside rungs, CGA  2 laps side stepping straight through ladder, SBA    Bosu, lg, flat  side up:  x30 sec vision eliminated, CGA  x30 sec NBOS, CGA    Bosu, lg, flat side down:   x30 sec vision eliminated, Trist[place]  x30 sec NBOS, CGA    Sharri participated in neuromuscular re-education activities to improve: Balance, Coordination, Kinesthetic, Sense, Proprioception and Posture for 0 minutes. The following activities were included:      Home Exercises Provided and Patient Education Provided     Education provided:   - HEP provided    Written Home Exercises Provided: yes.  Exercises were reviewed and Sharri was able to demonstrate them prior to the end of the session.  Sharri demonstrated good  understanding of the education provided.     See EMR under Patient Instructions for exercises provided 2/11/2020.    Assessment     Sharri did very well with session this afternoon. She tolerated addition of ladder activity well today and was able to get into a good rhythm with this activity. Pt reports good compliance with HEP and is working toward discharge next week for maintenance of conditions on her own. She remains highly motivated and appropriate for skilled PT services.     Sharri is progressing well towards her goals.   Pt prognosis is Good.     Pt will continue to benefit from skilled outpatient physical therapy to address the deficits listed in the problem list box on initial evaluation, provide pt/family education and to maximize pt's level of independence in the home and community environment.     Pt's spiritual, cultural and educational needs considered and pt agreeable to plan of care and goals.     Anticipated barriers to physical therapy: none    Goals:  Short Term Goals: 4 weeks   1. Pt will improve score on FGA to at least 25/30 in order to decrease risk for fall. MET 3/6/2020  2. Pt will be independent with established HEP for strengthening and balance. MET 3/6/2020  3. Pt will increase 30 sec chair rise score to at least 15 in order to demonstrate improved endruance and functional strength. Ongoing,  improving  4. Pt will increase SLS time to at least 15 sec B in order to decrease risk for fall. MET 3/6/2020     Long Term Goals: 8 weeks   1. Pt will improve score on FGA to at least 27/30 in order to demonstrate improved functional balance. Ongoing, improving  2. Pt will improve score on [place] sensory testing condition 4 to at least 20 seconds in order to demonstrate improved balance in dim lit conditions. MET 5/19/20  3. Pt will increase SLS time to at least 20 seconds on B LE in order to decrease fall risk. MET 5/19/20  4. Pt will increase SSWS to at least 1.2 m/s in order to return to ambulation 5x/wk as she was prior to cancer. Met 3/6/2020, inconsistent today, pt walks at a leisurely pace on this date     Plan      Plan of care Certification extension through: 6/19/20     Ensure pt's HEP is established and she is empowered to complete on her own. Continue to use elliptical for endurance training. Work on ambulatory and high level balance tasks.    Patrick Pineda, PT  6/16/2020

## 2020-06-18 ENCOUNTER — CLINICAL SUPPORT (OUTPATIENT)
Dept: REHABILITATION | Facility: HOSPITAL | Age: 70
End: 2020-06-18
Attending: INTERNAL MEDICINE
Payer: MEDICARE

## 2020-06-18 DIAGNOSIS — Z74.09 IMPAIRED FUNCTIONAL MOBILITY, BALANCE, GAIT, AND ENDURANCE: Primary | ICD-10-CM

## 2020-06-18 DIAGNOSIS — R20.1 IMPAIRED SENSATION: ICD-10-CM

## 2020-06-18 PROCEDURE — 97110 THERAPEUTIC EXERCISES: CPT | Mod: PO

## 2020-06-18 PROCEDURE — 97112 NEUROMUSCULAR REEDUCATION: CPT | Mod: PO

## 2020-06-18 NOTE — PLAN OF CARE
Physical Therapy Daily Treatment Note/ Discharge Summary     Name: Sharri Dennis [place]  Clinic Number: [id number]    Therapy Diagnosis:   Encounter Diagnoses   Name Primary?   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    She was compliant with home exercise program.  Response to previous treatment: no adverse response, slight soreness.  Functional change: ongoing    Pain: 0/10  Location: n/a     Objective     Sharri received therapeutic exercises to develop strength, endurance, ROM, flexibility, posture and core stabilization for 15 minutes including:    Lower Extremity Strength (tested in seated)  Right LE  eval discharge Left LE eval  discharge   Hip flexion:  4/5 5/5 Hip flexion: 4/5 5/5   Knee extension: 5/5 5/5 Knee extension: 5/5 5/5   Knee flexion: 4+/5 5/5 Knee flexion: 4+/5 5/5   Ankle dorsiflexion:  4/5 5/5 Ankle dorsiflexion: 4/5 5/5   Ankle plantarflexion:  4/5 5/5 Ankle plantarflexion: 4/5 5/5   Hip abduction: 5/5 5/5 Hip abduction: 5/5 5/5   Hip adduction: 5/5 5/5 Hip adduction 5/5 5/5   Hip extension: 4-/5 4+/5 Hip extension: 4/5 4+/5         11 minutes elliptical, level 5, ramp 1      Sharri participated in neuromuscular re-education activities to improve: Balance, Coordination, Kinesthetic, Sense, Proprioception and Posture for 23  "minutes. The following activities were included:         Evaluation 3/6/20 5/19/20 6/18/2020   Single Limb Stance R LE 11 sec  (<10 sec = HIGH FALL RISK) 16 sec 30 s 30 s   Single Limb Stance L LE 12 sec  (<10 sec = HIGH FALL RISK) 30 sec 30 s 30 s   30 second Chair Rise 13 completed with no arms 14 completed with no arms 14 completed with no arms 18 completed with no UE support   TUG 8.8 + 9 = 8.9 seconds 7.1 + 6.8 + 7.3 = 7.1 sec NT NT   Self selected walking speed 1.05 m/sec (6m/5.7s) 1.3 m/s (6m/4.6s) .82 m/s (6m/7.3s) 1.4 m/s (6m/4.4s)   Fast walking speed 1.54 m/sec (6m/3.9s) 1.8 m/s (6m/3.4s) 1.9 m/s (6m/3.2s) 1.9 m/s (6m/3.2s)   FGA 21/30 25/30 24/30 28/30       EFE SENSORY TESTING:  (P= Pass, F= Fail; note any sway; hold each position for 30")  Condition 1: (firm surface/feet together/eyes open) P  Condition 2: (firm surface/feet together/eyes closed) P  Condition 3: (firm surface/feet in tandem/eyes open) P  Condition 4: (firm surface/feet in tandem/eyes closed) P  Condition 5: (soft surface/feet together/eyes open) P  Condition 6: (soft surface/feet together/eyes closed) P  Condition 7: (Fakuda step test), measure distance varied from center starting position NT    Functional Gait Assessment:   1. Gait on level surface =  3   (3) Normal: less than 5.5 sec, no A.D., no imbalance, normal gait pattern, deviates< 6in   (2) Mild impairment: 7-5.6 sec, uses A.D., mild gait deviations, or deviates 6-10 in   (1) Moderate impairment: > 7 sec, slow speed, imbalance, deviates 10-15 in.   (0) Severe impairment: needs assist, deviates >15 in, reach/touch wall  2. Change in Gait Speed = 3   (3) Normal: smooth change w/o loss of balance or gait deviation, deviates < 6 in, significant difference between speeds   (2) Mild impairment: changes speed, but demonstrates mild gait deviations, deviates 6-10 in, OR no deviations but unable to significantly speed, OR uses A.D.   (1) Moderate impairment: minor changes to speed, OR " changes speed w/ significant deviations, deviates 10-15 in, OR  Changes speed , but loses balance & recovers   (0) Severe impairment: cannot change speed, deviates >15 in, or loses balance & needs assist  3. Gait with horizontal head turns  = 3   (3) Normal: no change in gait, deviates <6 in   (2) Mild impairment: slight change in speed, deviates 6-10 in, OR uses A.D.   (1) Moderate impairment: moderate change in speed, deviates 10-15 in   (0) Severe impairment: severe disruption of gait, deviates >15in  4. Gait with vertical head turns = 3   (3) Normal: no change in gait, deviates <6 in   (2) Mild impairment: slight change in speed, deviates 6-10 in OR uses A.D.   (1) Moderate impairment: moderate change in speed, deviates 10-15 in   (0) Severe impairment: severe disruption of gait, deviates >15 in  5. Gait with pivot turns = 3   (3) Normal: performs safely in 3 sec, no LOB   (2) Mild impairment: performs in >3 sec & no LOB, OR turns safely & requires several steps to regain LOB   (1) Moderate impairment: turns slow, OR requires several small steps for balance following turn & stop   (0) Severe impairment: cannot turn safely, needs assist  6. Step over obstacle = 3   (3) Normal: steps over 2 stacked boxes w/o change in speed or LOB   (2) Mild impairment: able to step over 1 box w/o change in speed or LOB   (1) Moderate impairment: steps over 1 box but must slow down, may require VC   (0) Severe impairment: cannot perform w/o assist  7. Gait with Narrow PITA = 3   (3) Normal: 10 steps no staggering   (2) Mild impairment: 7-9 steps   (1) Moderate impairment: 4-7 steps   (0) Severe impairment: < 4 steps or cannot perform w/o assist  8. Gait with eyes closed = 2   (3) Normal: < 7 sec, no A.D., no LOB, normal gait pattern, deviates <6 in   (2) Mild impairment: 7.1-9 sec, mild gait deviations, deviates 6-10 in   (1) Moderate impairment: > 9 sec, abnormal pattern, LOB, deviates 10-15 in   (0) Severe impairment: cannot  perform w/o assist, LOB, deviates >15in  9. Ambulating Backwards = 2   (3) Normal: no A.D., no LOB, normal gait pattern, deviates <6in   (2) Mild impairment: uses A.D., slower speed, mild gait deviations, deviates 6-10 in   (1) Moderate impairment: slow speed, abnormal gait pattern, LOB, deviates 10-15 in   (0) Severe impairment: severe gait deviations or LOB, deviates >15in  10. Steps = 3   (3) Normal: alternating feet, no rail   (2) Mild Impairment: alternating feet, uses rail   (1) Moderate impairment: step-to, uses rail   (0) Severe impairment: cannot perform safely    Score 28/30     Score:   <22/30 fall risk   <20/30 fall risk in older adults   <18/30 fall risk in Parkinsons       CMS Impairment/Limitation/Restriction for FOTO Discharge Survey    Therapist reviewed FOTO scores for Sharri Cline on 6/18/2020.   FOTO documents entered into [employer] - see Media section.    Limitation Score: 25%  Category: Mobility           Home Exercises Provided and Patient Education Provided     Education provided:   - HEP provided    Written Home Exercises Provided: yes.  Exercises were reviewed and Sharri was able to demonstrate them prior to the end of the session.  Sharri demonstrated good  understanding of the education provided.     See EMR under Patient Instructions for exercises provided 2/11/2020.    Assessment     Sharri has made great progress with skilled PT services. She no longer has neuropathy pains daily and is empowered with a new home exercise program for strengthening, balance, and overall endurance. She is knowledgeable of her HEP and ready for discharge. All goals met, pt is discharged from skilled PT services.    PHYSICAL THERAPY DISCHARGE SUMMARY   Missed Visits, including no show and cancels: 0  Status Towards Goals Met:   Goals:  Short Term Goals: 4 weeks   1. Pt will improve score on FGA to at least 25/30 in order to decrease risk for fall. MET 3/6/2020  2. Pt will be independent with established HEP  for strengthening and balance. MET 3/6/2020  3. Pt will increase 30 sec chair rise score to at least 15 in order to demonstrate improved endruance and functional strength. Met 6/18/2020  4. Pt will increase SLS time to at least 15 sec B in order to decrease risk for fall. MET 3/6/2020     Long Term Goals: 8 weeks   1. Pt will improve score on FGA to at least 27/30 in order to demonstrate improved functional balance. Met 6/18/2020  2. Pt will improve score on [place] sensory testing condition 4 to at least 20 seconds in order to demonstrate improved balance in dim lit conditions. MET 5/19/20  3. Pt will increase SLS time to at least 20 seconds on B LE in order to decrease fall risk. MET 5/19/20  4. Pt will increase SSWS to at least 1.2 m/s in order to return to ambulation 5x/wk as she was prior to cancer. Met 3/6/2020, Met 6/18/2020    Goals Not achieved and why:   All goals met        Discharge reason : Met goals and Patient has maximized benefit from PT at this time    PLAN   This patient is discharged from Outpatient Physical Therapy Services.     Patrick Pineda, PT  06/18/2020

## 2020-07-24 ENCOUNTER — TELEPHONE (OUTPATIENT)
Dept: HEMATOLOGY/ONCOLOGY | Facility: CLINIC | Age: 70
End: 2020-07-24

## 2020-07-24 NOTE — TELEPHONE ENCOUNTER
Call patient to confirm appointment with Dr. Ayala on 7/27 at 11 am. Pt did not answer, left a detailed message.

## 2020-07-27 ENCOUNTER — HOSPITAL ENCOUNTER (OUTPATIENT)
Dept: RADIOLOGY | Facility: HOSPITAL | Age: 70
Discharge: HOME | End: 2020-07-27
Attending: INTERNAL MEDICINE
Payer: MEDICARE

## 2020-07-27 ENCOUNTER — OFFICE VISIT (OUTPATIENT)
Dept: HEMATOLOGY/ONCOLOGY | Facility: CLINIC | Age: 70
End: 2020-07-27
Payer: MEDICARE

## 2020-07-27 ENCOUNTER — INFUSION (OUTPATIENT)
Dept: INFUSION THERAPY | Facility: HOSPITAL | Age: 70
End: 2020-07-27
Payer: MEDICARE

## 2020-07-27 VITALS
HEART RATE: 65 BPM | BODY MASS INDEX: 25.7 KG/M2 | WEIGHT: 145.06 LBS | TEMPERATURE: 98 F | RESPIRATION RATE: 20 BRPM | SYSTOLIC BLOOD PRESSURE: 145 MMHG | DIASTOLIC BLOOD PRESSURE: 81 MMHG

## 2020-07-27 DIAGNOSIS — G62.0 CHEMOTHERAPY-INDUCED NEUROPATHY: ICD-10-CM

## 2020-07-27 DIAGNOSIS — Z12.31 ENCOUNTER FOR SCREENING MAMMOGRAM FOR MALIGNANT NEOPLASM OF BREAST: ICD-10-CM

## 2020-07-27 DIAGNOSIS — C50.311 MALIGNANT NEOPLASM OF LOWER-INNER QUADRANT OF RIGHT BREAST OF FEMALE, ESTROGEN RECEPTOR NEGATIVE: ICD-10-CM

## 2020-07-27 DIAGNOSIS — C50.311 MALIGNANT NEOPLASM OF LOWER-INNER QUADRANT OF RIGHT BREAST OF FEMALE, ESTROGEN RECEPTOR NEGATIVE: Primary | ICD-10-CM

## 2020-07-27 DIAGNOSIS — Z17.1 MALIGNANT NEOPLASM OF LOWER-INNER QUADRANT OF RIGHT BREAST OF FEMALE, ESTROGEN RECEPTOR NEGATIVE: Primary | ICD-10-CM

## 2020-07-27 DIAGNOSIS — Z17.1 MALIGNANT NEOPLASM OF LOWER-INNER QUADRANT OF RIGHT BREAST OF FEMALE, ESTROGEN RECEPTOR NEGATIVE: ICD-10-CM

## 2020-07-27 DIAGNOSIS — T45.1X5A CHEMOTHERAPY-INDUCED NEUROPATHY: ICD-10-CM

## 2020-07-27 PROCEDURE — 77063 BREAST TOMOSYNTHESIS BI: CPT | Mod: 26,,, | Performed by: RADIOLOGY

## 2020-07-27 PROCEDURE — 3008F BODY MASS INDEX DOCD: CPT | Mod: CPTII,S$GLB,, | Performed by: INTERNAL MEDICINE

## 2020-07-27 PROCEDURE — 1101F PT FALLS ASSESS-DOCD LE1/YR: CPT | Mod: CPTII,S$GLB,, | Performed by: INTERNAL MEDICINE

## 2020-07-27 PROCEDURE — 3008F PR BODY MASS INDEX (BMI) DOCUMENTED: ICD-10-PCS | Mod: CPTII,S$GLB,, | Performed by: INTERNAL MEDICINE

## 2020-07-27 PROCEDURE — 1101F PR PT FALLS ASSESS DOC 0-1 FALLS W/OUT INJ PAST YR: ICD-10-PCS | Mod: CPTII,S$GLB,, | Performed by: INTERNAL MEDICINE

## 2020-07-27 PROCEDURE — 63600175 PHARM REV CODE 636 W HCPCS: Performed by: INTERNAL MEDICINE

## 2020-07-27 PROCEDURE — 77063 MAMMO DIGITAL SCREENING BILAT WITH TOMOSYNTHESIS_CAD: ICD-10-PCS | Mod: 26,,, | Performed by: RADIOLOGY

## 2020-07-27 PROCEDURE — 96523 IRRIG DRUG DELIVERY DEVICE: CPT

## 2020-07-27 PROCEDURE — 1126F PR PAIN SEVERITY QUANTIFIED, NO PAIN PRESENT: ICD-10-PCS | Mod: S$GLB,,, | Performed by: INTERNAL MEDICINE

## 2020-07-27 PROCEDURE — 25000003 PHARM REV CODE 250: Performed by: INTERNAL MEDICINE

## 2020-07-27 PROCEDURE — 1159F MED LIST DOCD IN RCRD: CPT | Mod: S$GLB,,, | Performed by: INTERNAL MEDICINE

## 2020-07-27 PROCEDURE — 77067 MAMMO DIGITAL SCREENING BILAT WITH TOMOSYNTHESIS_CAD: ICD-10-PCS | Mod: 26,,, | Performed by: RADIOLOGY

## 2020-07-27 PROCEDURE — 99213 PR OFFICE/OUTPT VISIT, EST, LEVL III, 20-29 MIN: ICD-10-PCS | Mod: S$GLB,,, | Performed by: INTERNAL MEDICINE

## 2020-07-27 PROCEDURE — 77067 SCR MAMMO BI INCL CAD: CPT | Mod: TC

## 2020-07-27 PROCEDURE — 1159F PR MEDICATION LIST DOCUMENTED IN MEDICAL RECORD: ICD-10-PCS | Mod: S$GLB,,, | Performed by: INTERNAL MEDICINE

## 2020-07-27 PROCEDURE — 77067 SCR MAMMO BI INCL CAD: CPT | Mod: 26,,, | Performed by: RADIOLOGY

## 2020-07-27 PROCEDURE — A4216 STERILE WATER/SALINE, 10 ML: HCPCS | Performed by: INTERNAL MEDICINE

## 2020-07-27 PROCEDURE — 99999 PR PBB SHADOW E&M-EST. PATIENT-LVL III: CPT | Mod: PBBFAC,,, | Performed by: INTERNAL MEDICINE

## 2020-07-27 PROCEDURE — 99999 PR PBB SHADOW E&M-EST. PATIENT-LVL III: ICD-10-PCS | Mod: PBBFAC,,, | Performed by: INTERNAL MEDICINE

## 2020-07-27 PROCEDURE — 1126F AMNT PAIN NOTED NONE PRSNT: CPT | Mod: S$GLB,,, | Performed by: INTERNAL MEDICINE

## 2020-07-27 PROCEDURE — 99213 OFFICE O/P EST LOW 20 MIN: CPT | Mod: S$GLB,,, | Performed by: INTERNAL MEDICINE

## 2020-07-27 RX ORDER — HEPARIN 100 UNIT/ML
500 SYRINGE INTRAVENOUS
Status: COMPLETED | OUTPATIENT
Start: 2020-07-27 | End: 2020-07-27

## 2020-07-27 RX ORDER — SODIUM CHLORIDE 0.9 % (FLUSH) 0.9 %
10 SYRINGE (ML) INJECTION
Status: COMPLETED | OUTPATIENT
Start: 2020-07-27 | End: 2020-07-27

## 2020-07-27 RX ADMIN — HEPARIN 500 UNITS: 100 SYRINGE at 11:07

## 2020-07-27 RX ADMIN — Medication 10 ML: at 11:07

## 2020-07-27 NOTE — PROGRESS NOTES
History:     Reason For Follow Up/Onc History:   1. Stage 1B ([id number]) invasive ductal carcinoma of right breast, lower outer quadrant, ER neg, [place] neg, Her2 neg, Grade 3, Ki67 70%      HPI:   Sharri Cline presents for follow up of breast cancer. She had her mammogram this morning which is benign. Neuropathy is still present in hands. Back to work full time. Started back walking. Exercising helps neuropathy.     Onc history:   - She presented for screening mammo in 5/15/2019 which showed focal asymmetries in both left and right breast. Diagnostic mammogram and US showed no significant abn of left breast, and right breast 15 mm x 11 mm x 12 mm mass at 4:00, 5 CFN. Biopsy on 5/24/19 showed grade 3 IDC, triple negative, Ki67 70%.    - 7/3/19 - 12/10/19 completed neoadjuvant chemotherapy with four cycles of dose-dense Adriamycin and cyclophosphamide with Neulasta support followed by twelve doses of weekly paclitaxel. Dose reduced for cytopenias and neuropathy.   - 1/23/2020 - Dr Hernandez performed right breast lumpectomy with SLN Biopsy with pathology showing no residual carcinoma with 3 neg SLN.   - 4/2/2020 - 4/24/2020 - completed RT    History: I reviewed, confirmed and updated history (past medical, social, family) as necessary.    Medications    Current Outpatient Medications:     artificial tears (ISOPTO TEARS) 0.5 % ophthalmic solution, Place 1 drop into both eyes 4 (four) times daily., Disp: , Rfl:     fish oil-omega-3 fatty acids 300-1,000 mg capsule, Take 2 g by mouth once daily., Disp: , Rfl:     multivitamin with minerals tablet, Take 1 tablet by mouth once daily.  , Disp: , Rfl:   No current facility-administered medications for this visit.     Facility-Administered Medications Ordered in Other Visits:     0.9%  NaCl infusion, , Intravenous, Continuous, Chung Olson MD, Last Rate: 70 mL/hr at 01/23/20 0817    lidocaine (PF) 10 mg/ml (1%) injection 10 mg, 1 mL, Intradermal, Once, Chung  Abhijit Olson MD    sodium chloride 0.9% flush 10 mL, 10 mL, Intravenous, 1 time in Clinic/HOD, Dominique Ayala MD  Allergies  Review of patient's allergies indicates:  No Known Allergies  Review of Systems  Review of Systems   Constitutional: Negative for activity change, appetite change, chills, fatigue, fever and unexpected weight change.   HENT: Negative for congestion, ear discharge, hearing loss, mouth sores, nosebleeds, sinus pressure, sinus pain and trouble swallowing.    Eyes: Negative for pain, discharge, redness, itching and visual disturbance.   Respiratory: Negative for cough, chest tightness and shortness of breath.    Cardiovascular: Negative for chest pain, palpitations and leg swelling.   Gastrointestinal: Negative for abdominal distention, abdominal pain, blood in stool, diarrhea, nausea, rectal pain and vomiting.   Endocrine: Negative for heat intolerance and polydipsia.   Genitourinary: Negative for difficulty urinating, dysuria, flank pain, frequency, hematuria and urgency.   Musculoskeletal: Negative for arthralgias, back pain and neck pain.   Skin: Negative for color change, pallor and rash.   Neurological: Positive for numbness. Negative for dizziness, light-headedness and headaches.   Hematological: Negative for adenopathy. Does not bruise/bleed easily.   Psychiatric/Behavioral: Negative for confusion and decreased concentration. The patient is not nervous/anxious.         Objective     Objective:     Vitals:    07/27/20 1105   BP: (!) 145/81   Pulse: 65   Resp: 20   Temp: 97.8 °F (36.6 °C)     Physical Exam  Vitals signs and nursing note reviewed.   Constitutional:       General: She is not in acute distress.     Appearance: Normal appearance. She is well-developed.   HENT:      Head: Normocephalic and atraumatic.      Mouth/Throat:      Mouth: No oral lesions.   Eyes:      General: No scleral icterus.        Right eye: No discharge.         Left eye: No discharge.       Conjunctiva/sclera: Conjunctivae normal.   Neck:      Musculoskeletal: Neck supple.   Cardiovascular:      Rate and Rhythm: Normal rate and regular rhythm.      Heart sounds: Normal heart sounds. No murmur.   Pulmonary:      Effort: Pulmonary effort is normal. No respiratory distress.      Breath sounds: Normal breath sounds. No wheezing, rhonchi or rales.      Comments: Right beast without palpable masses or skin changes  Chest:      Chest wall: There is no dullness to percussion.   Abdominal:      General: Bowel sounds are normal.      Palpations: Abdomen is soft.      Tenderness: There is no abdominal tenderness.   Skin:     General: Skin is warm and dry.      Coloration: Skin is not pale.   Neurological:      Mental Status: She is alert and oriented to person, place, and time.   Psychiatric:         Behavior: Behavior normal.         Thought Content: Thought content normal.           Labs and Imaging  Results for orders placed or performed in visit on 04/15/20   COVID-19 Routine Screening   Result Value Ref Range    SARS-CoV2 (COVID-19) Qualitative PCR Not Detected Not Detected     Bilateral mammogram reviewed.     Assessment     Assessment:     1. Stage 1B ([id number]) invasive ductal carcinoma of right breast, lower outer quadrant, ER neg, [place] neg, Her2 neg, Grade 3, Ki67 70%   * 7/3/19 - 12/10/19 completed neoadjuvant chemotherapy with four cycles of dose-dense Adriamycin and cyclophosphamide with Neulasta support followed by twelve doses of weekly paclitaxel. Dose reduced for cytopenias and neuropathy.   * 1/23/2020 - Dr Hernandez performed right breast lumpectomy with SLN Biopsy with pathology showing no residual carcinoma with 3 neg SLN.   * 4/2/2020 - 4/24/2020 - completed RT   - With complete response, no further systemic therapy needed. Socea to discuss vaccine trial.    - Remains no evidence of disease. Ecog 0    2.  Chemo neuropathy   * Present - slow improvement.    * Not taking gabapentin         Plan:      1. Patient was instructed on the importance of physical activity, healthy diet, and self breast exams.  Patient will continue calcium and vitamin D supplementation  2. Mammogram due 7/2021  3. Referral to survivorship with our [facility]    RTC in 3 mo with port flush - will discuss port flush more at next visit. Can get port out when operating rooms more available/free from covid..     Future Appointments   Date Time Provider Department Center   7/27/2020 11:30 AM INJECTION, [facility]   8/3/2020  1:00 PM Karie Amaral NP [facility] HEMONC3 [facility]   10/29/2020 11:00 AM Dominique Ayala MD [facility] HEMONC3 [facility]   10/29/2020 11:45 AM INJECTION, [facility]

## 2020-07-27 NOTE — PROGRESS NOTES
History:     Reason For Follow Up/Onc History:   1. Stage 1B ([id number]) invasive ductal carcinoma of right breast, lower outer quadrant, ER neg, [place] neg, Her2 neg, Grade 3, Ki67 70%      HPI:   Sharri Cline presents for follow up of breast cancer. She is here for her survivorship visit.   Eating and drinking well. Back to work full time. Continuing to walk. Exercising helps neuropathy. Neuropathy still present in hands and feet.  Notes some fatigue after having a very long day with a lot of activity, she is required to rest, but no ongoing fatigue.    She does still have some peripheral neuroapthy hands and feet - she is doing physical therapy exercises and walking and these seem to help. Does not wish to pursue oral medication at this time.    Onc history:   - She presented for screening mammo in 5/15/2019 which showed focal asymmetries in both left and right breast. Diagnostic mammogram and US showed no significant abn of left breast, and right breast 15 mm x 11 mm x 12 mm mass at 4:00, 5 CFN. Biopsy on 5/24/19 showed grade 3 IDC, triple negative, Ki67 70%.    - 7/3/19 - 12/10/19 completed neoadjuvant chemotherapy with four cycles of dose-dense Adriamycin and cyclophosphamide with Neulasta support followed by twelve doses of weekly paclitaxel. Dose reduced for cytopenias and neuropathy.   - 1/23/2020 - Dr Hernandez performed right breast lumpectomy with SLN Biopsy with pathology showing no residual carcinoma with 3 neg SLN.   - 4/2/2020 - 4/24/2020 - completed RT    Review of Systems  Review of Systems   Constitutional: Negative for activity change, appetite change, chills, fatigue, fever and unexpected weight change.   HENT: Negative for congestion, ear discharge, hearing loss, mouth sores, nosebleeds, sinus pressure, sinus pain and trouble swallowing.    Eyes: Negative for pain, discharge, redness, itching and visual disturbance.   Respiratory: Negative for cough, chest tightness and shortness of breath.     Cardiovascular: Negative for chest pain, palpitations and leg swelling.   Gastrointestinal: Negative for abdominal distention, abdominal pain, blood in stool, diarrhea, nausea, rectal pain and vomiting.   Endocrine: Negative for heat intolerance and polydipsia.   Genitourinary: Negative for difficulty urinating, dysuria, flank pain, frequency, hematuria and urgency.   Musculoskeletal: Negative for arthralgias, back pain and neck pain.   Skin: Negative for color change, pallor and rash.   Neurological: Positive for numbness. Negative for dizziness, light-headedness and headaches.   Hematological: Negative for adenopathy. Does not bruise/bleed easily.   Psychiatric/Behavioral: Negative for confusion and decreased concentration. The patient is not nervous/anxious.         Objective     Objective:     Vitals:    08/03/20 1302   BP: (!) 152/71   Pulse: 64   Resp: 18   Temp: 98.2 °F (36.8 °C)     Physical Exam  Vitals signs and nursing note reviewed.   Constitutional:       General: She is not in acute distress.     Appearance: Normal appearance. She is well-developed.   HENT:      Head: Normocephalic and atraumatic.      Mouth/Throat:      Mouth: No oral lesions.      Pharynx: No oropharyngeal exudate.   Eyes:      General: No scleral icterus.        Right eye: No discharge.         Left eye: No discharge.      Conjunctiva/sclera: Conjunctivae normal.      Pupils: Pupils are equal, round, and reactive to light.   Neck:      Musculoskeletal: Normal range of motion and neck supple.   Cardiovascular:      Rate and Rhythm: Normal rate and regular rhythm.      Heart sounds: Normal heart sounds. No murmur.   Pulmonary:      Effort: Pulmonary effort is normal. No respiratory distress.      Breath sounds: Normal breath sounds. No wheezing, rhonchi or rales.      Comments: No masses, no tenderness. Skin and nipple are normal  No axillary lymph nodes palpated.    Chest:      Chest wall: There is no dullness to percussion.    Abdominal:      General: Bowel sounds are normal. There is no distension.      Palpations: Abdomen is soft.      Tenderness: There is no abdominal tenderness.   Skin:     General: Skin is warm and dry.      Coloration: Skin is not pale.      Findings: No rash.   Neurological:      Mental Status: She is alert and oriented to person, place, and time.   Psychiatric:         Behavior: Behavior normal.         Thought Content: Thought content normal.         Assessment     Assessment:     1. Stage 1B ([id number]) invasive ductal carcinoma of right breast, lower outer quadrant, ER neg, [place] neg, Her2 neg, Grade 3, Ki67 70%   * 7/3/19 - 12/10/19 completed neoadjuvant chemotherapy with four cycles of dose-dense Adriamycin and cyclophosphamide with Neulasta support followed by twelve doses of weekly paclitaxel. Dose reduced for cytopenias and neuropathy.   * 1/23/2020 - Dr Hernandez performed right breast lumpectomy with SLN Biopsy with pathology showing no residual carcinoma with 3 neg SLN.   * 4/2/2020 - 4/24/2020 - completed RT   - With complete response, no further systemic therapy needed. Socea to discuss vaccine trial.    - Remains no evidence of disease. Ecog 0    2.  Chemo neuropathy   * Present - slow improvement.    * Not taking gabapentin         Plan:     1. Patient was instructed on the importance of physical activity, healthy diet, and self breast exams.  Patient will continue calcium and vitamin D supplementation  2. Mammogram due 7/2021 - most recent mammogram negative  3. Will continue to exercise as this has shown to help the neuropathy     RTC in 3 mo with port flush - will discuss port flush more at next visit. Can get port out when operating rooms more available/free from covid.    We discussed the role of the survivorship clinic in her care.  Today we reviewed all aspects of treatment and the potential long term side effects of treatment, namely cardiotoxicity and secondary malignancy.  We also discussed the  emotional/psychological impact of diagnosis of treatment and I let her know out our psychosocial services (dedicated  and Psychologist).  She declines referral to these services at this time.         Please see Summary Treatment and Care plan filed under same date.    60 minutes total spent preparing for visit, with patient and post visit documentation.    Return to clinic in 3 months with RAJEEV appointment.     Patient is in agreement with the proposed treatment plan. All questions were answered to the patient's satisfaction. Patient knows to call clinic for any new or worsening symptoms and if anything is needed before the next clinic visit.          Karie Amaral, FNP-C  Hematology & Medical Oncology   [address] [place] [address]  ph. [phone]  Fax. [phone]    Patient dicussed with collaborating physician, Dr. Ayala.

## 2020-07-30 ENCOUNTER — RESEARCH ENCOUNTER (OUTPATIENT)
Dept: RESEARCH | Facility: HOSPITAL | Age: 70
End: 2020-07-30

## 2020-07-30 NOTE — PROGRESS NOTES
Thursday, July 30, 2020    Again patient told Dr. Ayala that she wanted more information regarding [id number] ([facility] vaccine trial) and ask that I give her a call. Called patient on 7/28/20 and patient stated she was at work and could not talk. At the patient's request we set up a time to speak on 7/30/20 at 9:00 AM. Patient was called twice this morning, once a message was left and once the phone was picked up and then hung up immediately. Dr. Ayala notified.

## 2020-08-03 ENCOUNTER — OFFICE VISIT (OUTPATIENT)
Dept: HEMATOLOGY/ONCOLOGY | Facility: CLINIC | Age: 70
End: 2020-08-03
Payer: MEDICARE

## 2020-08-03 VITALS
HEART RATE: 64 BPM | HEIGHT: 63 IN | TEMPERATURE: 98 F | DIASTOLIC BLOOD PRESSURE: 71 MMHG | OXYGEN SATURATION: 98 % | WEIGHT: 148.81 LBS | BODY MASS INDEX: 26.37 KG/M2 | SYSTOLIC BLOOD PRESSURE: 152 MMHG | RESPIRATION RATE: 18 BRPM

## 2020-08-03 DIAGNOSIS — C50.311 MALIGNANT NEOPLASM OF LOWER-INNER QUADRANT OF RIGHT BREAST OF FEMALE, ESTROGEN RECEPTOR NEGATIVE: Primary | ICD-10-CM

## 2020-08-03 DIAGNOSIS — Z17.1 MALIGNANT NEOPLASM OF LOWER-INNER QUADRANT OF RIGHT BREAST OF FEMALE, ESTROGEN RECEPTOR NEGATIVE: Primary | ICD-10-CM

## 2020-08-03 PROCEDURE — 99215 PR OFFICE/OUTPT VISIT, EST, LEVL V, 40-54 MIN: ICD-10-PCS | Mod: S$GLB,,, | Performed by: NURSE PRACTITIONER

## 2020-08-03 PROCEDURE — 99999 PR PBB SHADOW E&M-EST. PATIENT-LVL IV: CPT | Mod: PBBFAC,,, | Performed by: NURSE PRACTITIONER

## 2020-08-03 PROCEDURE — 99999 PR PBB SHADOW E&M-EST. PATIENT-LVL IV: ICD-10-PCS | Mod: PBBFAC,,, | Performed by: NURSE PRACTITIONER

## 2020-08-03 PROCEDURE — 99215 OFFICE O/P EST HI 40 MIN: CPT | Mod: S$GLB,,, | Performed by: NURSE PRACTITIONER

## 2020-09-28 ENCOUNTER — OFFICE VISIT (OUTPATIENT)
Dept: HEMATOLOGY/ONCOLOGY | Facility: CLINIC | Age: 70
End: 2020-09-28
Payer: MEDICARE

## 2020-09-28 ENCOUNTER — TELEPHONE (OUTPATIENT)
Dept: HEMATOLOGY/ONCOLOGY | Facility: CLINIC | Age: 70
End: 2020-09-28

## 2020-09-28 VITALS
HEIGHT: 63 IN | OXYGEN SATURATION: 97 % | SYSTOLIC BLOOD PRESSURE: 134 MMHG | RESPIRATION RATE: 18 BRPM | DIASTOLIC BLOOD PRESSURE: 81 MMHG | TEMPERATURE: 98 F | HEART RATE: 84 BPM | BODY MASS INDEX: 26.95 KG/M2 | WEIGHT: 152.13 LBS

## 2020-09-28 DIAGNOSIS — G62.0 CHEMOTHERAPY-INDUCED NEUROPATHY: ICD-10-CM

## 2020-09-28 DIAGNOSIS — Z17.1 MALIGNANT NEOPLASM OF LOWER-INNER QUADRANT OF RIGHT BREAST OF FEMALE, ESTROGEN RECEPTOR NEGATIVE: Primary | ICD-10-CM

## 2020-09-28 DIAGNOSIS — T45.1X5A CHEMOTHERAPY-INDUCED NEUROPATHY: ICD-10-CM

## 2020-09-28 DIAGNOSIS — C50.311 MALIGNANT NEOPLASM OF LOWER-INNER QUADRANT OF RIGHT BREAST OF FEMALE, ESTROGEN RECEPTOR NEGATIVE: Primary | ICD-10-CM

## 2020-09-28 PROCEDURE — 1101F PT FALLS ASSESS-DOCD LE1/YR: CPT | Mod: CPTII,S$GLB,, | Performed by: INTERNAL MEDICINE

## 2020-09-28 PROCEDURE — 1126F PR PAIN SEVERITY QUANTIFIED, NO PAIN PRESENT: ICD-10-PCS | Mod: S$GLB,,, | Performed by: INTERNAL MEDICINE

## 2020-09-28 PROCEDURE — 99999 PR PBB SHADOW E&M-EST. PATIENT-LVL III: CPT | Mod: PBBFAC,,, | Performed by: INTERNAL MEDICINE

## 2020-09-28 PROCEDURE — 1159F MED LIST DOCD IN RCRD: CPT | Mod: S$GLB,,, | Performed by: INTERNAL MEDICINE

## 2020-09-28 PROCEDURE — 1101F PR PT FALLS ASSESS DOC 0-1 FALLS W/OUT INJ PAST YR: ICD-10-PCS | Mod: CPTII,S$GLB,, | Performed by: INTERNAL MEDICINE

## 2020-09-28 PROCEDURE — 1159F PR MEDICATION LIST DOCUMENTED IN MEDICAL RECORD: ICD-10-PCS | Mod: S$GLB,,, | Performed by: INTERNAL MEDICINE

## 2020-09-28 PROCEDURE — 1126F AMNT PAIN NOTED NONE PRSNT: CPT | Mod: S$GLB,,, | Performed by: INTERNAL MEDICINE

## 2020-09-28 PROCEDURE — 99999 PR PBB SHADOW E&M-EST. PATIENT-LVL III: ICD-10-PCS | Mod: PBBFAC,,, | Performed by: INTERNAL MEDICINE

## 2020-09-28 PROCEDURE — 99213 PR OFFICE/OUTPT VISIT, EST, LEVL III, 20-29 MIN: ICD-10-PCS | Mod: S$GLB,,, | Performed by: INTERNAL MEDICINE

## 2020-09-28 PROCEDURE — 3008F BODY MASS INDEX DOCD: CPT | Mod: CPTII,S$GLB,, | Performed by: INTERNAL MEDICINE

## 2020-09-28 PROCEDURE — 99213 OFFICE O/P EST LOW 20 MIN: CPT | Mod: S$GLB,,, | Performed by: INTERNAL MEDICINE

## 2020-09-28 PROCEDURE — 3008F PR BODY MASS INDEX (BMI) DOCUMENTED: ICD-10-PCS | Mod: CPTII,S$GLB,, | Performed by: INTERNAL MEDICINE

## 2020-09-28 NOTE — TELEPHONE ENCOUNTER
----- Message from Dominique Ayala MD sent at 9/28/2020  4:28 PM CDT -----  See Dr Alexandra or Dr Canas in 4 moPlease send back to Dr Hernandez for port removal Please see if they can flush port today rather than later this week

## 2020-09-28 NOTE — Clinical Note
See Dr Alexandra or Dr Canas in 4 mo  Please send back to Dr Hernandez for port removal   Please see if they can flush port today rather than later this week

## 2020-09-28 NOTE — PROGRESS NOTES
History:     Reason For Follow Up/Onc History:   1. Stage 1B ([id number]) invasive ductal carcinoma of right breast, lower outer quadrant, ER neg, [place] neg, Her2 neg, Grade 3, Ki67 70%      HPI:   Sharri Cline presents for follow up of breast cancer. Neuropathy is still present but improving.  Fatigue when she does a lot of activities.   Restarted working out/walking again.     Onc history:   - She presented for screening mammo in 5/15/2019 which showed focal asymmetries in both left and right breast. Diagnostic mammogram and US showed no significant abn of left breast, and right breast 15 mm x 11 mm x 12 mm mass at 4:00, 5 CFN. Biopsy on 5/24/19 showed grade 3 IDC, triple negative, Ki67 70%.    - 7/3/19 - 12/10/19 completed neoadjuvant chemotherapy with four cycles of dose-dense Adriamycin and cyclophosphamide with Neulasta support followed by twelve doses of weekly paclitaxel. Dose reduced for cytopenias and neuropathy.   - 1/23/2020 - Dr Hernandez performed right breast lumpectomy with SLN Biopsy with pathology showing no residual carcinoma with 3 neg SLN.   - 4/2/2020 - 4/24/2020 - completed RT    History: I reviewed, confirmed and updated history (past medical, social, family) as necessary.    Medications    Current Outpatient Medications:     artificial tears (ISOPTO TEARS) 0.5 % ophthalmic solution, Place 1 drop into both eyes 4 (four) times daily., Disp: , Rfl:     fish oil-omega-3 fatty acids 300-1,000 mg capsule, Take 2 g by mouth once daily., Disp: , Rfl:     multivitamin with minerals tablet, Take 1 tablet by mouth once daily.  , Disp: , Rfl:   No current facility-administered medications for this visit.     Facility-Administered Medications Ordered in Other Visits:     0.9%  NaCl infusion, , Intravenous, Continuous, Chung Olson MD, Last Rate: 70 mL/hr at 01/23/20 0817    lidocaine (PF) 10 mg/ml (1%) injection 10 mg, 1 mL, Intradermal, Once, Chung Olson MD    sodium chloride  0.9% flush 10 mL, 10 mL, Intravenous, 1 time in Clinic/HOD, Dominique Ayala MD  Allergies  Review of patient's allergies indicates:  No Known Allergies  Review of Systems  Review of Systems   Constitutional: Positive for fatigue. Negative for activity change, appetite change, chills, fever and unexpected weight change.   HENT: Negative for congestion, ear discharge, hearing loss, mouth sores, nosebleeds, sinus pressure, sinus pain and trouble swallowing.    Eyes: Negative for pain, discharge, redness, itching and visual disturbance.   Respiratory: Negative for cough, chest tightness and shortness of breath.    Cardiovascular: Negative for chest pain, palpitations and leg swelling.   Gastrointestinal: Negative for abdominal distention, abdominal pain, blood in stool, diarrhea, nausea, rectal pain and vomiting.   Endocrine: Negative for heat intolerance and polydipsia.   Genitourinary: Negative for difficulty urinating, dysuria, flank pain, frequency, hematuria and urgency.   Musculoskeletal: Negative for arthralgias, back pain and neck pain.   Skin: Negative for color change, pallor and rash.   Neurological: Positive for numbness. Negative for dizziness, light-headedness and headaches.   Hematological: Negative for adenopathy. Does not bruise/bleed easily.   Psychiatric/Behavioral: Negative for confusion and decreased concentration. The patient is not nervous/anxious.         Objective     Objective:     Vitals:    09/28/20 1615   BP: 134/81   Pulse: 84   Resp: 18   Temp: 98 °F (36.7 °C)     Physical Exam  Vitals signs and nursing note reviewed.   Constitutional:       General: She is not in acute distress.     Appearance: Normal appearance. She is well-developed.   HENT:      Head: Normocephalic and atraumatic.      Mouth/Throat:      Mouth: No oral lesions.   Eyes:      General: No scleral icterus.        Right eye: No discharge.         Left eye: No discharge.      Conjunctiva/sclera: Conjunctivae normal.   Neck:       Musculoskeletal: Neck supple.   Cardiovascular:      Rate and Rhythm: Normal rate and regular rhythm.      Heart sounds: Normal heart sounds. No murmur.   Pulmonary:      Effort: Pulmonary effort is normal. No respiratory distress.      Breath sounds: Normal breath sounds. No wheezing, rhonchi or rales.      Comments: Right beast without palpable masses or skin changes; left chest wall port  Chest:      Chest wall: There is no dullness to percussion.   Abdominal:      General: Bowel sounds are normal.      Palpations: Abdomen is soft.      Tenderness: There is no abdominal tenderness.   Skin:     General: Skin is warm and dry.      Coloration: Skin is not pale.   Neurological:      Mental Status: She is alert and oriented to person, place, and time.   Psychiatric:         Behavior: Behavior normal.         Thought Content: Thought content normal.           Labs and Imaging  Results for orders placed or performed in visit on 07/27/20   BMP   Result Value Ref Range    Sodium 140 136 - 145 mmol/L    Potassium 4.5 3.5 - 5.1 mmol/L    Chloride 107 95 - 110 mmol/L    CO2 26 23 - 29 mmol/L    Glucose 81 70 - 110 mg/dL    BUN, Bld 11 8 - 23 mg/dL    Creatinine 0.8 0.5 - 1.4 mg/dL    Calcium 9.5 8.7 - 10.5 mg/dL    Anion Gap 7 (L) 8 - 16 mmol/L    eGFR if African American >60.0 >60 mL/min/1.73 m^2    eGFR if non African American >60.0 >60 mL/min/1.73 m^2       Assessment     Assessment:     1. Stage 1B ([id number]) invasive ductal carcinoma of right breast, lower outer quadrant, ER neg, [place] neg, Her2 neg, Grade 3, Ki67 70%   * 7/3/19 - 12/10/19 completed neoadjuvant chemotherapy with four cycles of dose-dense Adriamycin and cyclophosphamide with Neulasta support followed by twelve doses of weekly paclitaxel. Dose reduced for cytopenias and neuropathy.   * 1/23/2020 - Dr Hernandez performed right breast lumpectomy with SLN Biopsy with pathology showing no residual carcinoma with 3 neg SLN.   * 4/2/2020 - 4/24/2020 - completed  RT   - With complete response, no further systemic therapy needed. Declined vaccine trial.   - Remains no evidence of disease. Doing well.     2.  Chemo neuropathy   * Present - slow improvement.       Plan:     1. Patient was instructed on the importance of physical activity, healthy diet, and self breast exams.  Patient will continue calcium and vitamin D supplementation  2. Mammogram due 7/2021  3. Back to Dr Hernandez for port removal   4. Port flush today.     RTC in 4 mo with MD    Future Appointments   Date Time Provider Department Center   9/28/2020  4:30 PM Dominique Ayala MD [facility] HEMONC3 [facility]   10/1/2020  3:30 [facility] Julian Martinez   10/10/2020  2:00 PM Jhon Arndt MD [facility] [place] PANCHAL

## 2020-10-01 ENCOUNTER — INFUSION (OUTPATIENT)
Dept: INFUSION THERAPY | Facility: HOSPITAL | Age: 70
End: 2020-10-01
Payer: MEDICARE

## 2020-10-01 DIAGNOSIS — Z17.1 MALIGNANT NEOPLASM OF LOWER-INNER QUADRANT OF RIGHT BREAST OF FEMALE, ESTROGEN RECEPTOR NEGATIVE: Primary | ICD-10-CM

## 2020-10-01 DIAGNOSIS — C50.311 MALIGNANT NEOPLASM OF LOWER-INNER QUADRANT OF RIGHT BREAST OF FEMALE, ESTROGEN RECEPTOR NEGATIVE: Primary | ICD-10-CM

## 2020-10-01 PROCEDURE — 96523 IRRIG DRUG DELIVERY DEVICE: CPT

## 2020-10-01 PROCEDURE — A4216 STERILE WATER/SALINE, 10 ML: HCPCS | Performed by: INTERNAL MEDICINE

## 2020-10-01 PROCEDURE — 63600175 PHARM REV CODE 636 W HCPCS: Performed by: INTERNAL MEDICINE

## 2020-10-01 PROCEDURE — 25000003 PHARM REV CODE 250: Performed by: INTERNAL MEDICINE

## 2020-10-01 RX ORDER — SODIUM CHLORIDE 0.9 % (FLUSH) 0.9 %
10 SYRINGE (ML) INJECTION
Status: COMPLETED | OUTPATIENT
Start: 2020-10-01 | End: 2020-10-01

## 2020-10-01 RX ORDER — HEPARIN 100 UNIT/ML
500 SYRINGE INTRAVENOUS
Status: COMPLETED | OUTPATIENT
Start: 2020-10-01 | End: 2020-10-01

## 2020-10-01 RX ADMIN — Medication 10 ML: at 03:10

## 2020-10-01 RX ADMIN — HEPARIN 500 UNITS: 100 SYRINGE at 03:10

## 2020-10-10 ENCOUNTER — OFFICE VISIT (OUTPATIENT)
Dept: INTERNAL MEDICINE | Facility: CLINIC | Age: 70
End: 2020-10-10
Payer: MEDICARE

## 2020-10-10 ENCOUNTER — IMMUNIZATION (OUTPATIENT)
Dept: INTERNAL MEDICINE | Facility: CLINIC | Age: 70
End: 2020-10-10
Payer: MEDICARE

## 2020-10-10 VITALS
HEART RATE: 68 BPM | OXYGEN SATURATION: 99 % | HEIGHT: 63 IN | DIASTOLIC BLOOD PRESSURE: 80 MMHG | WEIGHT: 151 LBS | BODY MASS INDEX: 26.75 KG/M2 | SYSTOLIC BLOOD PRESSURE: 134 MMHG

## 2020-10-10 DIAGNOSIS — Z13.6 SCREENING FOR CARDIOVASCULAR CONDITION: ICD-10-CM

## 2020-10-10 DIAGNOSIS — Z23 NEED FOR INFLUENZA VACCINATION: ICD-10-CM

## 2020-10-10 DIAGNOSIS — R79.89 ABNORMAL TSH: ICD-10-CM

## 2020-10-10 DIAGNOSIS — E04.1 THYROID NODULE: ICD-10-CM

## 2020-10-10 DIAGNOSIS — Z00.00 ANNUAL PHYSICAL EXAM: Primary | ICD-10-CM

## 2020-10-10 DIAGNOSIS — R20.2 PARESTHESIA OF SKIN: ICD-10-CM

## 2020-10-10 PROCEDURE — 99999 PR PBB SHADOW E&M-EST. PATIENT-LVL III: ICD-10-PCS | Mod: PBBFAC,,, | Performed by: INTERNAL MEDICINE

## 2020-10-10 PROCEDURE — 99499 RISK ADDL DX/OHS AUDIT: ICD-10-PCS | Mod: S$GLB,,, | Performed by: INTERNAL MEDICINE

## 2020-10-10 PROCEDURE — G0008 ADMIN INFLUENZA VIRUS VAC: HCPCS | Mod: S$GLB,,, | Performed by: INTERNAL MEDICINE

## 2020-10-10 PROCEDURE — 90694 FLU VACCINE - QUADRIVALENT - ADJUVANTED: ICD-10-PCS | Mod: S$GLB,,, | Performed by: INTERNAL MEDICINE

## 2020-10-10 PROCEDURE — 99214 PR OFFICE/OUTPT VISIT, EST, LEVL IV, 30-39 MIN: ICD-10-PCS | Mod: 25,S$GLB,, | Performed by: INTERNAL MEDICINE

## 2020-10-10 PROCEDURE — 90694 VACC AIIV4 NO PRSRV 0.5ML IM: CPT | Mod: S$GLB,,, | Performed by: INTERNAL MEDICINE

## 2020-10-10 PROCEDURE — G0008 FLU VACCINE - QUADRIVALENT - ADJUVANTED: ICD-10-PCS | Mod: S$GLB,,, | Performed by: INTERNAL MEDICINE

## 2020-10-10 PROCEDURE — 99999 PR PBB SHADOW E&M-EST. PATIENT-LVL III: CPT | Mod: PBBFAC,,, | Performed by: INTERNAL MEDICINE

## 2020-10-10 PROCEDURE — 99214 OFFICE O/P EST MOD 30 MIN: CPT | Mod: 25,S$GLB,, | Performed by: INTERNAL MEDICINE

## 2020-10-10 PROCEDURE — 99499 UNLISTED E&M SERVICE: CPT | Mod: S$GLB,,, | Performed by: INTERNAL MEDICINE

## 2020-10-10 NOTE — PROGRESS NOTES
Chief Complaint  No chief complaint on file.      RIKA Cline is a 70 y.o. female with multiple medical diagnoses as listed in the medical history and problem list that presents for flu-shot.  Their last appointment with primary care was 12/02/2019.        Patient states she is doing well. She is trying to regain her strength after chemotherapy for breast cancer. She has been walking 30 minutes each day and trying to eat healthy and take multi-vitamins. She says that this helps with her neuropathy that she got from the chemotherapy.      PAST MEDICAL HISTORY:  Past Medical History:   Diagnosis Date    Breast cancer 01/23/2020    RIGHT    Helicobacter pylori antibody positive 5/27/13    treated with clarithromycin, metronidazole, and omeprazole x 14 days  (5/27-6/3); EGD normal in July 2013       PAST SURGICAL HISTORY:  Past Surgical History:   Procedure Laterality Date    BIOPSY OF LIVER WITH ULTRASOUND GUIDANCE N/A 11/19/2018    Procedure: BIOPSY, LIVER, WITH US GUIDANCE;  Surgeon: Gordon Diagnostic Provider;  Location: [facility] OR [address] [place] [address];  Service: Radiology;  Laterality: N/A;  U/S GUIDED LIVER ([id number]).  11/19/2018  DOSC 7 AM  PROCEDURE 8 AM.  DR CARITO LAGOS.  DB 11/12/18 3:55P    BREAST LUMPECTOMY Right     01/23/20    INJECTION FOR SENTINEL NODE IDENTIFICATION Right 1/23/2020    Procedure: INJECTION, FOR SENTINEL NODE IDENTIFICATION;  Surgeon: Libertad Hernandez MD;  Location: [facility] OR [address] [place] [address];  Service: General;  Laterality: Right;    INSERTION OF TUNNELED CENTRAL VENOUS CATHETER (CVC) WITH SUBCUTANEOUS PORT Left 6/13/2019    Procedure: [id number] - CHEST;  Surgeon: Libertad Hernandez MD;  Location: [facility] OR [address] [place] [address];  Service: General;  Laterality: Left;    MASTECTOMY, PARTIAL Right 1/23/2020    Procedure: MASTECTOMY, PARTIAL- w/Seed Localization;  Surgeon: Libertad Hernandez MD;  Location: [facility] OR [address] [place] [address];  Service: General;  Laterality: Right;    SENTINEL LYMPH NODE BIOPSY Right 1/23/2020     Procedure: BIOPSY, LYMPH NODE, SENTINEL;  Surgeon: Libertad Hernandez MD;  Location: [facility] OR [address] [place] [address];  Service: General;  Laterality: Right;    TUBAL LIGATION         SOCIAL HISTORY:  Social History     Socioeconomic History    Marital status:      Spouse name: Not on file    Number of children: Not on file    Years of education: Not on file    Highest education level: Not on file   Occupational History    Occupation:  worker     Employer: [employer]   Social Needs    Financial resource strain: Not on file    Food insecurity     Worry: Not on file     Inability: Not on file    Transportation needs     Medical: Not on file     Non-medical: Not on file   Tobacco Use    Smoking status: Never Smoker    Smokeless tobacco: Never Used   Substance and Sexual Activity    Alcohol use: Yes     Comment: Social    Drug use: No    Sexual activity: Yes     Partners: Male     Birth control/protection: Post-menopausal   Lifestyle    Physical activity     Days per week: Not on file     Minutes per session: Not on file    Stress: Not on file   Relationships    Social connections     Talks on phone: Not on file     Gets together: Not on file     Attends Hoahaoism service: Not on file     Active member of club or organization: Not on file     Attends meetings of clubs or organizations: Not on file     Relationship status: Not on file   Other Topics Concern    Are you pregnant or think you may be? No    Breast-feeding No   Social History Narrative    Not on file       FAMILY HISTORY:  Family History   Problem Relation Age of Onset    Diabetes Father     Hypertension Mother     Miscarriages / Stillbirths Mother     ANGEL disease Mother     Eczema Mother     Parkinsonism Mother     Diabetes Brother     Diabetes Brother     Colon cancer Maternal Aunt     No Known Problems Sister     No Known Problems Maternal Uncle     No Known Problems Paternal Aunt     No Known Problems Paternal Uncle      No Known Problems Maternal Grandmother     No Known Problems Maternal Grandfather     No Known Problems Paternal Grandmother     No Known Problems Paternal Grandfather     Diabetes Brother     No Known Problems Son     No Known Problems Son     Celiac disease Neg Hx     Cirrhosis Neg Hx     Colon polyps Neg Hx     Crohn's disease Neg Hx     Cystic fibrosis Neg Hx     Esophageal cancer Neg Hx     Hemochromatosis Neg Hx     Inflammatory bowel disease Neg Hx     Irritable bowel syndrome Neg Hx     Liver cancer Neg Hx     Liver disease Neg Hx     Rectal cancer Neg Hx     Stomach cancer Neg Hx     Ulcerative colitis Neg Hx     Anthony's disease Neg Hx     Psoriasis Neg Hx     Ovarian cancer Neg Hx     Breast cancer Neg Hx     Amblyopia Neg Hx     Blindness Neg Hx     Cancer Neg Hx     Cataracts Neg Hx     Glaucoma Neg Hx     Macular degeneration Neg Hx     Retinal detachment Neg Hx     Strabismus Neg Hx     Stroke Neg Hx     Thyroid disease Neg Hx        ALLERGIES AND MEDICATIONS: updated and reviewed.  Review of patient's allergies indicates:  No Known Allergies  Current Outpatient Medications   Medication Sig Dispense Refill    fish oil-omega-3 fatty acids 300-1,000 mg capsule Take 2 g by mouth once daily.      multivitamin with minerals tablet Take 1 tablet by mouth once daily.        artificial tears (ISOPTO TEARS) 0.5 % ophthalmic solution Place 1 drop into both eyes 4 (four) times daily. (Patient not taking: Reported on 10/10/2020)       No current facility-administered medications for this visit.      Facility-Administered Medications Ordered in Other Visits   Medication Dose Route Frequency Provider Last Rate Last Dose    0.9%  NaCl infusion   Intravenous Continuous Chung Olson MD 70 mL/hr at 01/23/20 0817      lidocaine (PF) 10 mg/ml (1%) injection 10 mg  1 mL Intradermal Once Chung Olson MD        sodium chloride 0.9% flush 10 mL  10 mL  "Intravenous 1 time in Clinic/HOD MD SAVANNAH Lester  Review of Systems   Constitutional: Negative.    HENT: Negative.    Eyes: Negative.    Respiratory: Negative.    Cardiovascular: Negative.    Gastrointestinal: Negative.    Endocrine: Negative.    Genitourinary: Negative.    Musculoskeletal: Negative.    Skin: Negative.    Allergic/Immunologic: Negative.    Neurological: Positive for light-headedness (when tired (attributes this to chemo in the past year)).        Tingling in fingers and lower limbs up to her calves   Hematological: Negative.    Psychiatric/Behavioral: Negative.            Physical Exam  Vitals:    10/10/20 1412   BP: 134/80   BP Location: Left arm   Patient Position: Sitting   BP Method: Medium (Manual)   Pulse: 68   SpO2: 99%   Weight: 68.5 kg (151 lb 0.2 oz)   Height: 5' 3" (1.6 m)    Body mass index is 26.75 kg/m².  Weight: 68.5 kg (151 lb 0.2 oz)   Height: 5' 3" (160 cm)   Physical Exam  Constitutional:       Appearance: Normal appearance.   HENT:      Head: Normocephalic and atraumatic.   Neck:      Musculoskeletal: Normal range of motion.   Cardiovascular:      Rate and Rhythm: Normal rate and regular rhythm.      Heart sounds: Normal heart sounds.   Pulmonary:      Effort: Pulmonary effort is normal.      Breath sounds: Normal breath sounds.   Abdominal:      General: Bowel sounds are normal.      Palpations: Abdomen is soft.   Skin:     General: Skin is warm and dry.   Neurological:      Mental Status: She is alert and oriented to person, place, and time.   Psychiatric:         Mood and Affect: Mood normal.           Health Maintenance       Date Due Completion Date    Influenza Vaccine (1) 08/01/2020 10/8/2019    Shingles Vaccine (2 of 3) 12/02/2020 (Originally 5/19/2015) 3/24/2015    DEXA SCAN 04/26/2021 4/26/2019    Mammogram 07/27/2021 7/27/2020    Lipid Panel 02/06/2022 2/6/2017    Override on 11/22/2003: Done (Repeat recommended in seven years)    Colorectal Cancer " Screening 07/09/2023 7/9/2013    TETANUS VACCINE 05/17/2026 5/17/2016            Assessment and Plan:  Annual physical exam  -     Comprehensive Metabolic Panel; Future; Expected date: 10/10/2020  -     Lipid Panel; Future; Expected date: 10/10/2020  -     TSH; Future; Expected date: 10/10/2020  -     Vitamin B12; Future; Expected date: 10/10/2020    Abnormal TSH  -     TSH; Future; Expected date: 10/10/2020    Paresthesia of skin  -     Comprehensive Metabolic Panel; Future; Expected date: 10/10/2020  -     Vitamin B12; Future; Expected date: 10/10/2020    Screening for cardiovascular condition  -     Lipid Panel; Future; Expected date: 10/10/2020    Thyroid nodule  -     TSH; Future; Expected date: 10/10/2020    Need for influenza vaccination         Annual influenza vaccine  - administer in clinic today    Shingrix vaccine  - present to pharmacy for vaccination    Peripheral neuropathy  - Glove and stocking neuropathy  - post- chemotherapy  - check vitamin B-12 levels    Thyroid nodule  - Check TSH    Annual labs  - CMP  - CBC  - Fasting lipids      Attestation 10/31/2020 4:07 PM    I attest that I have reviewed the student note and that the components of the history of the present illness, the physical exam, and the assessment and plan documented were performed by me or were performed in my presence by the student where I verified the documentation and performed (or re-performed) the exam and medical decision making.    Jhon Arndt MD, [facility]  [facility]

## 2020-10-10 NOTE — PATIENT INSTRUCTIONS
For fasting labs, please avoid any food or drinks besides water for 8 hours prior to the labs, but make sure to drink at least 1 glass of water beforehand so that you are not dehydrated.    I recommend getting the shingles vaccine, Shingrix.  This is given through the pharmacy.  This is a 2-dose vaccine that is recommended even for patients who have had the older Zostavax shingles vaccine in the past.  You can get this at the [facility], or at your local pharmacy.

## 2020-11-24 NOTE — PROGRESS NOTES
Subjective:      Patient ID: Sharri Cline is a 70 y.o. female.    Chief Complaint:  Follow-up    History of Present Illness  Sharri Cline is here for follow up of thyroid nodule.  Previously seen by me on 9/24/2019.    She has chronically elevated alk phos dating back to 2008   Initial endocrine work up showed an elevated liver specific isoenzyme   Bone specific isoenzyme was negative   Evaluated by Hepatology in 2017   Had MRI of abdomen in July 2017 which noted:   Liver normal in size, contour, and parenchymal signal. Portal vein is patent     With regards to thyroid nodule:    Found: 2018    Thyroid US 10/31/2019  THYROID GLAND has a homogeneous echotexture.  Size is asymmetric with right greater than left; the majority of the right lobes occupied by a large dominant nodule.  Vascularity is increased on the right and normal on the left.  RIGHT LOBE of the thyroid measures: 5.38 x 1.42 x 2.0 cm.  1.  Right inferior lobe nodule measures 3.04 x 1.23 x 1.99 cm.  The nodule is hypoechoic, with a diffuse pattern of microcystic changes.  Numerous echogenic foci are noted throughout the nodule with >90% of these displaying microcystic changes directly anterior.  Borders are regular and well defined.  Vascularity is grade 4 (chaotic).  Microcalcifications are not present.  This nodule measured 3.3 x 1.90 x 1.97 cm on the previous ultrasound in 2018, is unchanged in appearance.  ISTHMUS measures 0.141 cm in AP diameter.  LEFT LOBE measures: 3.52 x 0.792 x 1.24 cm.  1.  Left inferior lobe nodule measures 0.78 x 0.50 x 0.67 cm.  The nodule is complex with scattered microcystic changes, and grade 2 (peripheral) vascularity.  Microcalcifications are not present.  The nodule measured 0.83 x 0.72 x 0.56 cm in 2018.  The nodule appears more solid on the current ultrasound compared to 2018.  LYMPH NODES: Real-time survey of the bilateral central and lateral neck did not reveal any abnormal appearing lymph  nodes.  COMPARISON: neck ultrasound dated 08/20/2018.  When compared to the prior study the right thyroid nodule has decreased in size, and is unchanged in appearance.  Impression:  1.  Asymmetric thyroid gland right > left, with homogeneous echotexture.  2.  Single nodule in the right thyroid described above.  Since the previous ultrasound the size is decreased.  This nodule had benign cytology on FNA in 2018.  3.  Single complex nodule in the left thyroid, not meeting criteria for fine-needle aspiration.  RECOMMENDATIONS:  Follow-up ultrasound in 1 year is recommended.    FNA:   10/19/18  THYROID, RIGHT LOBE NODULE, FINE-NEEDLE ASPIRATION (CYTOLOGY):  - Benign ([place] Classification System II).  - Benign follicular cells and colloid.    Lab Results   Component Value Date    TSH 1.348 09/25/2019    FREET4 0.62 (L) 09/25/2019     Signs or Symptoms:   Difficulty breathing: Denies  Difficulty swallowing: Denies  Voice Changes: + raspy  FH of thyroid cancer: Denies  Personal history of radiation treatment or exposure: + radiation for breast cancer    Denies signs or symptoms of hyper or hypothyroidism.    BMD 4/26/19  Osteopenia: FRAX Score does not support osteoporosis medications  RECOMMENDATIONS of [facility]:  1) Adequate calcium and Vitamin D therapy  2) Appropriate exercise  3) Consider repeat BMD in 2- 4 years    With regards to Vitamin D Deficiency:    Vit D, 25-Hydroxy   Date Value Ref Range Status   09/26/2018 20 (L) 30 - 96 ng/mL Final     Comment:     Vitamin D deficiency.........<10 ng/mL                              Vitamin D insufficiency......10-29 ng/mL       Vitamin D sufficiency........> or equal to 30 ng/mL  Vitamin D toxicity............>100 ng/mL       Current Meds: OTC Vit D3 1000iu daily.     Review of Systems   Constitutional: Negative for fatigue.   HENT: Positive for voice change.    Eyes: Negative for visual disturbance.   Respiratory: Negative for  "shortness of breath.    Cardiovascular: Negative for chest pain.   Gastrointestinal: Negative for abdominal pain.   Musculoskeletal: Negative for arthralgias.   Skin: Negative for wound.   Neurological: Negative for headaches.   Hematological: Does not bruise/bleed easily.   Psychiatric/Behavioral: Negative for sleep disturbance.     Objective:   Physical Exam  Vitals signs reviewed.   Neck:      Thyroid: Thyromegaly (R nodule palpable ) present.   Cardiovascular:      Rate and Rhythm: Normal rate.      Comments: No edema present  Pulmonary:      Effort: Pulmonary effort is normal.   Abdominal:      Palpations: Abdomen is soft.       Visit Vitals  /62   Ht 5' 3" (1.6 m)   Wt 69.7 kg (153 lb 8.8 oz)   BMI 27.20 kg/m²     Body mass index is 27.2 kg/m².    Lab Review:   Lab Results   Component Value Date    HGBA1C 5.1 08/30/2019     Lab Results   Component Value Date    CHOL 170 02/06/2017    HDL 53 02/06/2017    LDLCALC 108.6 02/06/2017    TRIG 42 02/06/2017    CHOLHDL 31.2 02/06/2017     Lab Results   Component Value Date     07/27/2020    K 4.5 07/27/2020     07/27/2020    CO2 26 07/27/2020    GLU 81 07/27/2020    BUN 11 07/27/2020    CREATININE 0.8 07/27/2020    CALCIUM 9.5 07/27/2020    PROT 7.3 02/14/2020    ALBUMIN 4.0 02/14/2020    BILITOT 1.0 02/14/2020    ALKPHOS 158 (H) 02/14/2020    AST 26 02/14/2020    ALT 14 02/14/2020    ANIONGAP 7 (L) 07/27/2020    ESTGFRAFRICA >60.0 07/27/2020    EGFRNONAA >60.0 07/27/2020    TSH 1.348 09/25/2019     Vit D, 25-Hydroxy   Date Value Ref Range Status   09/26/2018 20 (L) 30 - 96 ng/mL Final     Comment:     Vitamin D deficiency.........<10 ng/mL                              Vitamin D insufficiency......10-29 ng/mL       Vitamin D sufficiency........> or equal to 30 ng/mL  Vitamin D toxicity............>100 ng/mL       Assessment and Plan     1. Thyroid nodule  TSH    US Soft Tissue Head Neck Thyroid   2. Abnormal TSH     3. Vitamin D deficiency  Vitamin D "   4. Osteopenia, unspecified location     5. Malignant neoplasm of lower-inner quadrant of right breast of female, estrogen receptor negative       Thyroid nodule  -- I have reviewed management options including observation, FNA or surgery.  -- FNA procedure explained in depth.  -- Discussed indications for repeat FNA as well as surgical indications (abnormal FNA, compressive symptoms or interval change).  -- Discussed possible results of FNA- Benign, Malignant, FLUS, or insufficient cells.  Discussed results auto released to patients, but advised the ordering provider will also contact to discuss.   -- Patient is not on any blood thinners.  -- All of the patients questions were answered.  -- FNA:   10/19/18  THYROID, RIGHT LOBE NODULE, FINE-NEEDLE ASPIRATION (CYTOLOGY):  - Benign ([place] Classification System II).  - Benign follicular cells and colloid.  -- Schedule thyroid US.  -- Check TFTs.    Abnormal TSH  -- Lost to follow up.  -- Check TFTs today.    Vitamin D deficiency  -- Check vitamin D.  -- CONTINUE: OTC Vit D3 1000iu daily.    Osteopenia  -- Reassuring she is not fracturing.  -- BMD due 2021.    Malignant neoplasm of lower-inner quadrant of right breast of female, estrogen receptor negative  -- Continue following with hem/onc.    Follow up in about 1 year (around 11/27/2021).

## 2020-11-27 ENCOUNTER — PATIENT MESSAGE (OUTPATIENT)
Dept: ENDOCRINOLOGY | Facility: CLINIC | Age: 70
End: 2020-11-27

## 2020-11-27 ENCOUNTER — OFFICE VISIT (OUTPATIENT)
Dept: ENDOCRINOLOGY | Facility: CLINIC | Age: 70
End: 2020-11-27
Payer: MEDICARE

## 2020-11-27 ENCOUNTER — LAB VISIT (OUTPATIENT)
Dept: LAB | Facility: HOSPITAL | Age: 70
End: 2020-11-27
Payer: MEDICARE

## 2020-11-27 VITALS
WEIGHT: 153.56 LBS | BODY MASS INDEX: 27.21 KG/M2 | SYSTOLIC BLOOD PRESSURE: 128 MMHG | HEIGHT: 63 IN | DIASTOLIC BLOOD PRESSURE: 62 MMHG

## 2020-11-27 DIAGNOSIS — R79.89 ABNORMAL TSH: ICD-10-CM

## 2020-11-27 DIAGNOSIS — E04.1 THYROID NODULE: Primary | ICD-10-CM

## 2020-11-27 DIAGNOSIS — R94.6 ABNORMAL RESULTS OF THYROID FUNCTION STUDIES: ICD-10-CM

## 2020-11-27 DIAGNOSIS — R79.89 ABNORMAL TSH: Primary | ICD-10-CM

## 2020-11-27 DIAGNOSIS — E55.9 VITAMIN D DEFICIENCY: ICD-10-CM

## 2020-11-27 DIAGNOSIS — M85.80 OSTEOPENIA, UNSPECIFIED LOCATION: ICD-10-CM

## 2020-11-27 DIAGNOSIS — C50.311 MALIGNANT NEOPLASM OF LOWER-INNER QUADRANT OF RIGHT BREAST OF FEMALE, ESTROGEN RECEPTOR NEGATIVE: ICD-10-CM

## 2020-11-27 DIAGNOSIS — E04.1 THYROID NODULE: ICD-10-CM

## 2020-11-27 DIAGNOSIS — Z17.1 MALIGNANT NEOPLASM OF LOWER-INNER QUADRANT OF RIGHT BREAST OF FEMALE, ESTROGEN RECEPTOR NEGATIVE: ICD-10-CM

## 2020-11-27 LAB
25(OH)D3+25(OH)D2 SERPL-MCNC: 27 NG/ML (ref 30–96)
T4 FREE SERPL-MCNC: 0.79 NG/DL (ref 0.71–1.51)
TSH SERPL DL<=0.005 MIU/L-ACNC: 0.3 UIU/ML (ref 0.4–4)

## 2020-11-27 PROCEDURE — 1159F MED LIST DOCD IN RCRD: CPT | Mod: S$GLB,,, | Performed by: NURSE PRACTITIONER

## 2020-11-27 PROCEDURE — 1126F PR PAIN SEVERITY QUANTIFIED, NO PAIN PRESENT: ICD-10-PCS | Mod: S$GLB,,, | Performed by: NURSE PRACTITIONER

## 2020-11-27 PROCEDURE — 3008F BODY MASS INDEX DOCD: CPT | Mod: CPTII,S$GLB,, | Performed by: NURSE PRACTITIONER

## 2020-11-27 PROCEDURE — 3008F PR BODY MASS INDEX (BMI) DOCUMENTED: ICD-10-PCS | Mod: CPTII,S$GLB,, | Performed by: NURSE PRACTITIONER

## 2020-11-27 PROCEDURE — 99214 OFFICE O/P EST MOD 30 MIN: CPT | Mod: S$GLB,,, | Performed by: NURSE PRACTITIONER

## 2020-11-27 PROCEDURE — 84443 ASSAY THYROID STIM HORMONE: CPT

## 2020-11-27 PROCEDURE — 36415 COLL VENOUS BLD VENIPUNCTURE: CPT

## 2020-11-27 PROCEDURE — 82306 VITAMIN D 25 HYDROXY: CPT

## 2020-11-27 PROCEDURE — 99999 PR PBB SHADOW E&M-EST. PATIENT-LVL III: CPT | Mod: PBBFAC,,, | Performed by: NURSE PRACTITIONER

## 2020-11-27 PROCEDURE — 84439 ASSAY OF FREE THYROXINE: CPT

## 2020-11-27 PROCEDURE — 1159F PR MEDICATION LIST DOCUMENTED IN MEDICAL RECORD: ICD-10-PCS | Mod: S$GLB,,, | Performed by: NURSE PRACTITIONER

## 2020-11-27 PROCEDURE — 1126F AMNT PAIN NOTED NONE PRSNT: CPT | Mod: S$GLB,,, | Performed by: NURSE PRACTITIONER

## 2020-11-27 PROCEDURE — 99214 PR OFFICE/OUTPT VISIT, EST, LEVL IV, 30-39 MIN: ICD-10-PCS | Mod: S$GLB,,, | Performed by: NURSE PRACTITIONER

## 2020-11-27 PROCEDURE — 99999 PR PBB SHADOW E&M-EST. PATIENT-LVL III: ICD-10-PCS | Mod: PBBFAC,,, | Performed by: NURSE PRACTITIONER

## 2020-11-27 NOTE — ASSESSMENT & PLAN NOTE
-- I have reviewed management options including observation, FNA or surgery.  -- FNA procedure explained in depth.  -- Discussed indications for repeat FNA as well as surgical indications (abnormal FNA, compressive symptoms or interval change).  -- Discussed possible results of FNA- Benign, Malignant, FLUS, or insufficient cells.  Discussed results auto released to patients, but advised the ordering provider will also contact to discuss.   -- Patient is not on any blood thinners.  -- All of the patients questions were answered.  -- FNA:   10/19/18  THYROID, RIGHT LOBE NODULE, FINE-NEEDLE ASPIRATION (CYTOLOGY):  - Benign ([place] Classification System II).  - Benign follicular cells and colloid.  -- Schedule thyroid US.  -- Check TFTs.

## 2020-12-02 ENCOUNTER — HOSPITAL ENCOUNTER (OUTPATIENT)
Dept: ENDOCRINOLOGY | Facility: CLINIC | Age: 70
Discharge: HOME | End: 2020-12-02
Attending: NURSE PRACTITIONER
Payer: MEDICARE

## 2020-12-02 DIAGNOSIS — E04.1 THYROID NODULE: ICD-10-CM

## 2020-12-02 PROCEDURE — 76536 US SOFT TISSUE HEAD NECK THYROID: ICD-10-PCS | Mod: S$GLB,,, | Performed by: INTERNAL MEDICINE

## 2020-12-02 PROCEDURE — 76536 US EXAM OF HEAD AND NECK: CPT | Mod: S$GLB,,, | Performed by: INTERNAL MEDICINE

## 2020-12-03 ENCOUNTER — PATIENT MESSAGE (OUTPATIENT)
Dept: ENDOCRINOLOGY | Facility: CLINIC | Age: 70
End: 2020-12-03

## 2020-12-11 ENCOUNTER — LAB VISIT (OUTPATIENT)
Dept: LAB | Facility: HOSPITAL | Age: 70
End: 2020-12-11
Payer: MEDICARE

## 2020-12-11 DIAGNOSIS — R94.6 ABNORMAL RESULTS OF THYROID FUNCTION STUDIES: ICD-10-CM

## 2020-12-11 DIAGNOSIS — R79.89 ABNORMAL TSH: ICD-10-CM

## 2020-12-11 LAB
FSH SERPL-ACNC: 79.1 MIU/ML
LH SERPL-ACNC: 32.9 MIU/ML
T3 SERPL-MCNC: 83 NG/DL (ref 60–180)
T4 FREE SERPL-MCNC: 0.74 NG/DL (ref 0.71–1.51)
THYROPEROXIDASE IGG SERPL-ACNC: <6 IU/ML
TSH SERPL DL<=0.005 MIU/L-ACNC: 0.91 UIU/ML (ref 0.4–4)

## 2020-12-11 PROCEDURE — 83002 ASSAY OF GONADOTROPIN (LH): CPT

## 2020-12-11 PROCEDURE — 84439 ASSAY OF FREE THYROXINE: CPT

## 2020-12-11 PROCEDURE — 36415 COLL VENOUS BLD VENIPUNCTURE: CPT

## 2020-12-11 PROCEDURE — 84443 ASSAY THYROID STIM HORMONE: CPT

## 2020-12-11 PROCEDURE — 84445 ASSAY OF TSI GLOBULIN: CPT

## 2020-12-11 PROCEDURE — 83520 IMMUNOASSAY QUANT NOS NONAB: CPT

## 2020-12-11 PROCEDURE — 83001 ASSAY OF GONADOTROPIN (FSH): CPT

## 2020-12-11 PROCEDURE — 84480 ASSAY TRIIODOTHYRONINE (T3): CPT

## 2020-12-11 PROCEDURE — 86376 MICROSOMAL ANTIBODY EACH: CPT

## 2020-12-14 LAB — TSH RECEP AB SER-ACNC: <1.1 IU/L (ref 0–1.75)

## 2020-12-16 ENCOUNTER — PATIENT MESSAGE (OUTPATIENT)
Dept: ENDOCRINOLOGY | Facility: CLINIC | Age: 70
End: 2020-12-16

## 2020-12-16 LAB — TSI SER-ACNC: <0.1 IU/L

## 2020-12-31 ENCOUNTER — TELEPHONE (OUTPATIENT)
Dept: VASCULAR SURGERY | Facility: CLINIC | Age: 70
End: 2020-12-31

## 2020-12-31 NOTE — TELEPHONE ENCOUNTER
Contacted patient to schedule portacath removal with Dr. Acevedo. Pt states Thursdays and Fridays are usually best for her. Will discuss with Dr. Acevedo and will confirm available times with minor room tech.----- Message from Shikha Topete sent at 12/10/2020  9:55 AM CST -----  Can we get pt scheduled for a port removal please?Thanks  ----- Message -----  From: Shikha Topete  Sent: 9/29/2020   8:57 AM CST  To: Shikha Topete    Please send back to Dr Hernandez for port removal

## 2021-01-14 ENCOUNTER — TELEPHONE (OUTPATIENT)
Dept: HEMATOLOGY/ONCOLOGY | Facility: CLINIC | Age: 71
End: 2021-01-14

## 2021-01-15 ENCOUNTER — PROCEDURE VISIT (OUTPATIENT)
Dept: VASCULAR SURGERY | Facility: CLINIC | Age: 71
End: 2021-01-15
Payer: MEDICARE

## 2021-01-15 VITALS
HEART RATE: 65 BPM | BODY MASS INDEX: 27.77 KG/M2 | WEIGHT: 156.75 LBS | HEIGHT: 63 IN | TEMPERATURE: 98 F | DIASTOLIC BLOOD PRESSURE: 70 MMHG | SYSTOLIC BLOOD PRESSURE: 150 MMHG

## 2021-01-15 DIAGNOSIS — C50.311 MALIGNANT NEOPLASM OF LOWER-INNER QUADRANT OF RIGHT BREAST OF FEMALE, ESTROGEN RECEPTOR NEGATIVE: Primary | ICD-10-CM

## 2021-01-15 DIAGNOSIS — Z17.1 MALIGNANT NEOPLASM OF LOWER-INNER QUADRANT OF RIGHT BREAST OF FEMALE, ESTROGEN RECEPTOR NEGATIVE: Primary | ICD-10-CM

## 2021-02-03 ENCOUNTER — TELEPHONE (OUTPATIENT)
Dept: HEMATOLOGY/ONCOLOGY | Facility: CLINIC | Age: 71
End: 2021-02-03

## 2021-03-08 ENCOUNTER — OFFICE VISIT (OUTPATIENT)
Dept: HEMATOLOGY/ONCOLOGY | Facility: CLINIC | Age: 71
End: 2021-03-08
Payer: MEDICARE

## 2021-03-08 VITALS
SYSTOLIC BLOOD PRESSURE: 136 MMHG | OXYGEN SATURATION: 100 % | RESPIRATION RATE: 18 BRPM | WEIGHT: 158.06 LBS | DIASTOLIC BLOOD PRESSURE: 72 MMHG | TEMPERATURE: 98 F | HEIGHT: 62 IN | BODY MASS INDEX: 29.08 KG/M2 | HEART RATE: 85 BPM

## 2021-03-08 DIAGNOSIS — C50.311 MALIGNANT NEOPLASM OF LOWER-INNER QUADRANT OF RIGHT BREAST OF FEMALE, ESTROGEN RECEPTOR NEGATIVE: Primary | ICD-10-CM

## 2021-03-08 DIAGNOSIS — Z12.31 ENCOUNTER FOR SCREENING MAMMOGRAM FOR HIGH-RISK PATIENT: ICD-10-CM

## 2021-03-08 DIAGNOSIS — Z17.1 MALIGNANT NEOPLASM OF LOWER-INNER QUADRANT OF RIGHT BREAST OF FEMALE, ESTROGEN RECEPTOR NEGATIVE: Primary | ICD-10-CM

## 2021-03-08 PROCEDURE — 99999 PR PBB SHADOW E&M-EST. PATIENT-LVL III: ICD-10-PCS | Mod: PBBFAC,,, | Performed by: INTERNAL MEDICINE

## 2021-03-08 PROCEDURE — 99213 OFFICE O/P EST LOW 20 MIN: CPT | Mod: S$GLB,,, | Performed by: INTERNAL MEDICINE

## 2021-03-08 PROCEDURE — 99213 PR OFFICE/OUTPT VISIT, EST, LEVL III, 20-29 MIN: ICD-10-PCS | Mod: S$GLB,,, | Performed by: INTERNAL MEDICINE

## 2021-03-08 PROCEDURE — 99499 UNLISTED E&M SERVICE: CPT | Mod: S$GLB,,, | Performed by: INTERNAL MEDICINE

## 2021-03-08 PROCEDURE — 1159F PR MEDICATION LIST DOCUMENTED IN MEDICAL RECORD: ICD-10-PCS | Mod: S$GLB,,, | Performed by: INTERNAL MEDICINE

## 2021-03-08 PROCEDURE — 1126F PR PAIN SEVERITY QUANTIFIED, NO PAIN PRESENT: ICD-10-PCS | Mod: S$GLB,,, | Performed by: INTERNAL MEDICINE

## 2021-03-08 PROCEDURE — 1126F AMNT PAIN NOTED NONE PRSNT: CPT | Mod: S$GLB,,, | Performed by: INTERNAL MEDICINE

## 2021-03-08 PROCEDURE — 99499 RISK ADDL DX/OHS AUDIT: ICD-10-PCS | Mod: S$GLB,,, | Performed by: INTERNAL MEDICINE

## 2021-03-08 PROCEDURE — 1101F PR PT FALLS ASSESS DOC 0-1 FALLS W/OUT INJ PAST YR: ICD-10-PCS | Mod: CPTII,S$GLB,, | Performed by: INTERNAL MEDICINE

## 2021-03-08 PROCEDURE — 1101F PT FALLS ASSESS-DOCD LE1/YR: CPT | Mod: CPTII,S$GLB,, | Performed by: INTERNAL MEDICINE

## 2021-03-08 PROCEDURE — 3288F FALL RISK ASSESSMENT DOCD: CPT | Mod: CPTII,S$GLB,, | Performed by: INTERNAL MEDICINE

## 2021-03-08 PROCEDURE — 99999 PR PBB SHADOW E&M-EST. PATIENT-LVL III: CPT | Mod: PBBFAC,,, | Performed by: INTERNAL MEDICINE

## 2021-03-08 PROCEDURE — 1159F MED LIST DOCD IN RCRD: CPT | Mod: S$GLB,,, | Performed by: INTERNAL MEDICINE

## 2021-03-08 PROCEDURE — 3008F PR BODY MASS INDEX (BMI) DOCUMENTED: ICD-10-PCS | Mod: CPTII,S$GLB,, | Performed by: INTERNAL MEDICINE

## 2021-03-08 PROCEDURE — 3008F BODY MASS INDEX DOCD: CPT | Mod: CPTII,S$GLB,, | Performed by: INTERNAL MEDICINE

## 2021-03-08 PROCEDURE — 3288F PR FALLS RISK ASSESSMENT DOCUMENTED: ICD-10-PCS | Mod: CPTII,S$GLB,, | Performed by: INTERNAL MEDICINE

## 2021-04-28 ENCOUNTER — PATIENT MESSAGE (OUTPATIENT)
Dept: RESEARCH | Facility: HOSPITAL | Age: 71
End: 2021-04-28

## 2021-05-26 ENCOUNTER — OFFICE VISIT (OUTPATIENT)
Dept: ENDOCRINOLOGY | Facility: CLINIC | Age: 71
End: 2021-05-26
Payer: MEDICARE

## 2021-05-26 VITALS
DIASTOLIC BLOOD PRESSURE: 75 MMHG | HEART RATE: 75 BPM | WEIGHT: 164.56 LBS | SYSTOLIC BLOOD PRESSURE: 112 MMHG | OXYGEN SATURATION: 99 % | BODY MASS INDEX: 30.28 KG/M2 | HEIGHT: 62 IN

## 2021-05-26 DIAGNOSIS — C50.311 MALIGNANT NEOPLASM OF LOWER-INNER QUADRANT OF RIGHT BREAST OF FEMALE, ESTROGEN RECEPTOR NEGATIVE: ICD-10-CM

## 2021-05-26 DIAGNOSIS — Z17.1 MALIGNANT NEOPLASM OF LOWER-INNER QUADRANT OF RIGHT BREAST OF FEMALE, ESTROGEN RECEPTOR NEGATIVE: ICD-10-CM

## 2021-05-26 DIAGNOSIS — R79.89 ABNORMAL TSH: ICD-10-CM

## 2021-05-26 DIAGNOSIS — E55.9 VITAMIN D DEFICIENCY: ICD-10-CM

## 2021-05-26 DIAGNOSIS — R13.10 DYSPHAGIA, UNSPECIFIED TYPE: ICD-10-CM

## 2021-05-26 DIAGNOSIS — E04.1 THYROID NODULE: Primary | ICD-10-CM

## 2021-05-26 DIAGNOSIS — M85.80 OSTEOPENIA, UNSPECIFIED LOCATION: ICD-10-CM

## 2021-05-26 PROCEDURE — 3288F PR FALLS RISK ASSESSMENT DOCUMENTED: ICD-10-PCS | Mod: CPTII,S$GLB,, | Performed by: NURSE PRACTITIONER

## 2021-05-26 PROCEDURE — 99499 RISK ADDL DX/OHS AUDIT: ICD-10-PCS | Mod: S$GLB,,, | Performed by: NURSE PRACTITIONER

## 2021-05-26 PROCEDURE — 99214 PR OFFICE/OUTPT VISIT, EST, LEVL IV, 30-39 MIN: ICD-10-PCS | Mod: S$GLB,,, | Performed by: NURSE PRACTITIONER

## 2021-05-26 PROCEDURE — 99499 UNLISTED E&M SERVICE: CPT | Mod: S$GLB,,, | Performed by: NURSE PRACTITIONER

## 2021-05-26 PROCEDURE — 1101F PT FALLS ASSESS-DOCD LE1/YR: CPT | Mod: CPTII,S$GLB,, | Performed by: NURSE PRACTITIONER

## 2021-05-26 PROCEDURE — 1126F PR PAIN SEVERITY QUANTIFIED, NO PAIN PRESENT: ICD-10-PCS | Mod: S$GLB,,, | Performed by: NURSE PRACTITIONER

## 2021-05-26 PROCEDURE — 99999 PR PBB SHADOW E&M-EST. PATIENT-LVL III: ICD-10-PCS | Mod: PBBFAC,,, | Performed by: NURSE PRACTITIONER

## 2021-05-26 PROCEDURE — 99999 PR PBB SHADOW E&M-EST. PATIENT-LVL III: CPT | Mod: PBBFAC,,, | Performed by: NURSE PRACTITIONER

## 2021-05-26 PROCEDURE — 1101F PR PT FALLS ASSESS DOC 0-1 FALLS W/OUT INJ PAST YR: ICD-10-PCS | Mod: CPTII,S$GLB,, | Performed by: NURSE PRACTITIONER

## 2021-05-26 PROCEDURE — 3008F BODY MASS INDEX DOCD: CPT | Mod: CPTII,S$GLB,, | Performed by: NURSE PRACTITIONER

## 2021-05-26 PROCEDURE — 1126F AMNT PAIN NOTED NONE PRSNT: CPT | Mod: S$GLB,,, | Performed by: NURSE PRACTITIONER

## 2021-05-26 PROCEDURE — 3008F PR BODY MASS INDEX (BMI) DOCUMENTED: ICD-10-PCS | Mod: CPTII,S$GLB,, | Performed by: NURSE PRACTITIONER

## 2021-05-26 PROCEDURE — 1159F PR MEDICATION LIST DOCUMENTED IN MEDICAL RECORD: ICD-10-PCS | Mod: S$GLB,,, | Performed by: NURSE PRACTITIONER

## 2021-05-26 PROCEDURE — 1159F MED LIST DOCD IN RCRD: CPT | Mod: S$GLB,,, | Performed by: NURSE PRACTITIONER

## 2021-05-26 PROCEDURE — 99214 OFFICE O/P EST MOD 30 MIN: CPT | Mod: S$GLB,,, | Performed by: NURSE PRACTITIONER

## 2021-05-26 PROCEDURE — 3288F FALL RISK ASSESSMENT DOCD: CPT | Mod: CPTII,S$GLB,, | Performed by: NURSE PRACTITIONER

## 2021-06-24 ENCOUNTER — PATIENT MESSAGE (OUTPATIENT)
Dept: ENDOCRINOLOGY | Facility: CLINIC | Age: 71
End: 2021-06-24

## 2021-06-24 ENCOUNTER — HOSPITAL ENCOUNTER (OUTPATIENT)
Dept: ENDOCRINOLOGY | Facility: CLINIC | Age: 71
Discharge: HOME | End: 2021-06-24
Attending: NURSE PRACTITIONER
Payer: MEDICARE

## 2021-06-24 DIAGNOSIS — E04.1 THYROID NODULE: ICD-10-CM

## 2021-06-24 DIAGNOSIS — R79.89 ABNORMAL TSH: ICD-10-CM

## 2021-06-24 PROCEDURE — 76536 US SOFT TISSUE HEAD NECK THYROID: ICD-10-PCS | Mod: S$GLB,,, | Performed by: INTERNAL MEDICINE

## 2021-06-24 PROCEDURE — 76536 US EXAM OF HEAD AND NECK: CPT | Mod: S$GLB,,, | Performed by: INTERNAL MEDICINE

## 2021-08-02 ENCOUNTER — OFFICE VISIT (OUTPATIENT)
Dept: HEMATOLOGY/ONCOLOGY | Facility: CLINIC | Age: 71
End: 2021-08-02
Payer: MEDICARE

## 2021-08-02 ENCOUNTER — HOSPITAL ENCOUNTER (OUTPATIENT)
Dept: RADIOLOGY | Facility: HOSPITAL | Age: 71
Discharge: HOME | End: 2021-08-02
Attending: INTERNAL MEDICINE
Payer: MEDICARE

## 2021-08-02 VITALS — WEIGHT: 156 LBS | HEIGHT: 63 IN | BODY MASS INDEX: 27.64 KG/M2

## 2021-08-02 VITALS
RESPIRATION RATE: 18 BRPM | HEART RATE: 65 BPM | HEIGHT: 63 IN | OXYGEN SATURATION: 98 % | WEIGHT: 159.19 LBS | TEMPERATURE: 98 F | BODY MASS INDEX: 28.21 KG/M2 | SYSTOLIC BLOOD PRESSURE: 126 MMHG | DIASTOLIC BLOOD PRESSURE: 72 MMHG

## 2021-08-02 DIAGNOSIS — Z17.1 MALIGNANT NEOPLASM OF LOWER-INNER QUADRANT OF RIGHT BREAST OF FEMALE, ESTROGEN RECEPTOR NEGATIVE: Primary | ICD-10-CM

## 2021-08-02 DIAGNOSIS — Z12.31 ENCOUNTER FOR SCREENING MAMMOGRAM FOR HIGH-RISK PATIENT: ICD-10-CM

## 2021-08-02 DIAGNOSIS — T45.1X5A CHEMOTHERAPY-INDUCED PERIPHERAL NEUROPATHY: ICD-10-CM

## 2021-08-02 DIAGNOSIS — C50.311 MALIGNANT NEOPLASM OF LOWER-INNER QUADRANT OF RIGHT BREAST OF FEMALE, ESTROGEN RECEPTOR NEGATIVE: Primary | ICD-10-CM

## 2021-08-02 DIAGNOSIS — G62.0 CHEMOTHERAPY-INDUCED PERIPHERAL NEUROPATHY: ICD-10-CM

## 2021-08-02 PROCEDURE — 99499 RISK ADDL DX/OHS AUDIT: ICD-10-PCS | Mod: S$GLB,,, | Performed by: INTERNAL MEDICINE

## 2021-08-02 PROCEDURE — 3288F FALL RISK ASSESSMENT DOCD: CPT | Mod: CPTII,S$GLB,, | Performed by: INTERNAL MEDICINE

## 2021-08-02 PROCEDURE — 77067 SCR MAMMO BI INCL CAD: CPT | Mod: TC

## 2021-08-02 PROCEDURE — 1159F MED LIST DOCD IN RCRD: CPT | Mod: CPTII,S$GLB,, | Performed by: INTERNAL MEDICINE

## 2021-08-02 PROCEDURE — 3078F DIAST BP <80 MM HG: CPT | Mod: CPTII,S$GLB,, | Performed by: INTERNAL MEDICINE

## 2021-08-02 PROCEDURE — 1126F AMNT PAIN NOTED NONE PRSNT: CPT | Mod: CPTII,S$GLB,, | Performed by: INTERNAL MEDICINE

## 2021-08-02 PROCEDURE — 1126F PR PAIN SEVERITY QUANTIFIED, NO PAIN PRESENT: ICD-10-PCS | Mod: CPTII,S$GLB,, | Performed by: INTERNAL MEDICINE

## 2021-08-02 PROCEDURE — 99999 PR PBB SHADOW E&M-EST. PATIENT-LVL III: CPT | Mod: PBBFAC,,, | Performed by: INTERNAL MEDICINE

## 2021-08-02 PROCEDURE — 99213 PR OFFICE/OUTPT VISIT, EST, LEVL III, 20-29 MIN: ICD-10-PCS | Mod: S$GLB,,, | Performed by: INTERNAL MEDICINE

## 2021-08-02 PROCEDURE — 77063 BREAST TOMOSYNTHESIS BI: CPT | Mod: 26,,, | Performed by: RADIOLOGY

## 2021-08-02 PROCEDURE — 77063 MAMMO DIGITAL SCREENING BILAT WITH TOMO: ICD-10-PCS | Mod: 26,,, | Performed by: RADIOLOGY

## 2021-08-02 PROCEDURE — 3008F BODY MASS INDEX DOCD: CPT | Mod: CPTII,S$GLB,, | Performed by: INTERNAL MEDICINE

## 2021-08-02 PROCEDURE — 1101F PR PT FALLS ASSESS DOC 0-1 FALLS W/OUT INJ PAST YR: ICD-10-PCS | Mod: CPTII,S$GLB,, | Performed by: INTERNAL MEDICINE

## 2021-08-02 PROCEDURE — 1101F PT FALLS ASSESS-DOCD LE1/YR: CPT | Mod: CPTII,S$GLB,, | Performed by: INTERNAL MEDICINE

## 2021-08-02 PROCEDURE — 99213 OFFICE O/P EST LOW 20 MIN: CPT | Mod: S$GLB,,, | Performed by: INTERNAL MEDICINE

## 2021-08-02 PROCEDURE — 3288F PR FALLS RISK ASSESSMENT DOCUMENTED: ICD-10-PCS | Mod: CPTII,S$GLB,, | Performed by: INTERNAL MEDICINE

## 2021-08-02 PROCEDURE — 99999 PR PBB SHADOW E&M-EST. PATIENT-LVL III: ICD-10-PCS | Mod: PBBFAC,,, | Performed by: INTERNAL MEDICINE

## 2021-08-02 PROCEDURE — 1159F PR MEDICATION LIST DOCUMENTED IN MEDICAL RECORD: ICD-10-PCS | Mod: CPTII,S$GLB,, | Performed by: INTERNAL MEDICINE

## 2021-08-02 PROCEDURE — 3074F SYST BP LT 130 MM HG: CPT | Mod: CPTII,S$GLB,, | Performed by: INTERNAL MEDICINE

## 2021-08-02 PROCEDURE — 99499 UNLISTED E&M SERVICE: CPT | Mod: S$GLB,,, | Performed by: INTERNAL MEDICINE

## 2021-08-02 PROCEDURE — 77067 MAMMO DIGITAL SCREENING BILAT WITH TOMO: ICD-10-PCS | Mod: 26,,, | Performed by: RADIOLOGY

## 2021-08-02 PROCEDURE — 1160F PR REVIEW ALL MEDS BY PRESCRIBER/CLIN PHARMACIST DOCUMENTED: ICD-10-PCS | Mod: CPTII,S$GLB,, | Performed by: INTERNAL MEDICINE

## 2021-08-02 PROCEDURE — 3074F PR MOST RECENT SYSTOLIC BLOOD PRESSURE < 130 MM HG: ICD-10-PCS | Mod: CPTII,S$GLB,, | Performed by: INTERNAL MEDICINE

## 2021-08-02 PROCEDURE — 3078F PR MOST RECENT DIASTOLIC BLOOD PRESSURE < 80 MM HG: ICD-10-PCS | Mod: CPTII,S$GLB,, | Performed by: INTERNAL MEDICINE

## 2021-08-02 PROCEDURE — 77067 SCR MAMMO BI INCL CAD: CPT | Mod: 26,,, | Performed by: RADIOLOGY

## 2021-08-02 PROCEDURE — 3008F PR BODY MASS INDEX (BMI) DOCUMENTED: ICD-10-PCS | Mod: CPTII,S$GLB,, | Performed by: INTERNAL MEDICINE

## 2021-08-02 PROCEDURE — 1160F RVW MEDS BY RX/DR IN RCRD: CPT | Mod: CPTII,S$GLB,, | Performed by: INTERNAL MEDICINE

## 2021-08-03 ENCOUNTER — TELEPHONE (OUTPATIENT)
Dept: HEMATOLOGY/ONCOLOGY | Facility: CLINIC | Age: 71
End: 2021-08-03

## 2021-08-19 ENCOUNTER — LAB VISIT (OUTPATIENT)
Dept: PRIMARY CARE CLINIC | Facility: OTHER | Age: 71
End: 2021-08-19
Payer: MEDICARE

## 2021-08-19 DIAGNOSIS — Z20.822 ENCOUNTER FOR LABORATORY TESTING FOR COVID-19 VIRUS: ICD-10-CM

## 2021-08-19 PROCEDURE — U0003 INFECTIOUS AGENT DETECTION BY NUCLEIC ACID (DNA OR RNA); SEVERE ACUTE RESPIRATORY SYNDROME CORONAVIRUS 2 (SARS-COV-2) (CORONAVIRUS DISEASE [COVID-19]), AMPLIFIED PROBE TECHNIQUE, MAKING USE OF HIGH THROUGHPUT TECHNOLOGIES AS DESCRIBED BY CMS-2020-01-R: HCPCS | Performed by: INTERNAL MEDICINE

## 2021-08-21 LAB
SARS-COV-2 RNA RESP QL NAA+PROBE: NOT DETECTED
SARS-COV-2- CYCLE NUMBER: -1

## 2021-09-08 ENCOUNTER — TELEPHONE (OUTPATIENT)
Dept: HEMATOLOGY/ONCOLOGY | Facility: CLINIC | Age: 71
End: 2021-09-08

## 2021-10-07 ENCOUNTER — TELEPHONE (OUTPATIENT)
Dept: HEMATOLOGY/ONCOLOGY | Facility: CLINIC | Age: 71
End: 2021-10-07

## 2021-10-08 ENCOUNTER — TELEPHONE (OUTPATIENT)
Dept: INFUSION THERAPY | Facility: HOSPITAL | Age: 71
End: 2021-10-08

## 2021-10-08 ENCOUNTER — TELEPHONE (OUTPATIENT)
Dept: HEMATOLOGY/ONCOLOGY | Facility: CLINIC | Age: 71
End: 2021-10-08

## 2021-10-08 ENCOUNTER — OFFICE VISIT (OUTPATIENT)
Dept: HEMATOLOGY/ONCOLOGY | Facility: CLINIC | Age: 71
End: 2021-10-08
Payer: MEDICARE

## 2021-10-08 VITALS
HEIGHT: 63 IN | WEIGHT: 157.19 LBS | HEART RATE: 74 BPM | SYSTOLIC BLOOD PRESSURE: 139 MMHG | BODY MASS INDEX: 27.85 KG/M2 | DIASTOLIC BLOOD PRESSURE: 67 MMHG

## 2021-10-08 DIAGNOSIS — R06.02 SOBOE (SHORTNESS OF BREATH ON EXERTION): ICD-10-CM

## 2021-10-08 DIAGNOSIS — M85.89 OTHER SPECIFIED DISORDERS OF BONE DENSITY AND STRUCTURE, MULTIPLE SITES: ICD-10-CM

## 2021-10-08 DIAGNOSIS — C50.919 MALIGNANT NEOPLASM OF BREAST IN FEMALE, ESTROGEN RECEPTOR NEGATIVE, UNSPECIFIED LATERALITY, UNSPECIFIED SITE OF BREAST: ICD-10-CM

## 2021-10-08 DIAGNOSIS — Z17.1 MALIGNANT NEOPLASM OF LOWER-INNER QUADRANT OF RIGHT BREAST OF FEMALE, ESTROGEN RECEPTOR NEGATIVE: ICD-10-CM

## 2021-10-08 DIAGNOSIS — C50.311 MALIGNANT NEOPLASM OF LOWER-INNER QUADRANT OF RIGHT BREAST OF FEMALE, ESTROGEN RECEPTOR NEGATIVE: ICD-10-CM

## 2021-10-08 DIAGNOSIS — M85.80 OSTEOPENIA: Primary | ICD-10-CM

## 2021-10-08 DIAGNOSIS — M54.50 CHRONIC LOW BACK PAIN: ICD-10-CM

## 2021-10-08 DIAGNOSIS — Z17.1 MALIGNANT NEOPLASM OF BREAST IN FEMALE, ESTROGEN RECEPTOR NEGATIVE, UNSPECIFIED LATERALITY, UNSPECIFIED SITE OF BREAST: ICD-10-CM

## 2021-10-08 DIAGNOSIS — T45.1X5A CHEMOTHERAPY-INDUCED PERIPHERAL NEUROPATHY: ICD-10-CM

## 2021-10-08 DIAGNOSIS — Z92.21 STATUS POST ADMINISTRATION OF CARDIOTOXIC CHEMOTHERAPY: ICD-10-CM

## 2021-10-08 DIAGNOSIS — R63.0 LOSS OF APPETITE: ICD-10-CM

## 2021-10-08 DIAGNOSIS — F43.21 GRIEF: ICD-10-CM

## 2021-10-08 DIAGNOSIS — G62.0 CHEMOTHERAPY-INDUCED PERIPHERAL NEUROPATHY: ICD-10-CM

## 2021-10-08 DIAGNOSIS — R13.10 DYSPHAGIA: ICD-10-CM

## 2021-10-08 DIAGNOSIS — G89.29 CHRONIC LOW BACK PAIN: ICD-10-CM

## 2021-10-08 PROCEDURE — 99999 PR PBB SHADOW E&M-EST. PATIENT-LVL III: CPT | Mod: PBBFAC,,, | Performed by: OBSTETRICS & GYNECOLOGY

## 2021-10-08 PROCEDURE — 99499 UNLISTED E&M SERVICE: CPT | Mod: S$GLB,,, | Performed by: OBSTETRICS & GYNECOLOGY

## 2021-10-08 PROCEDURE — 1101F PR PT FALLS ASSESS DOC 0-1 FALLS W/OUT INJ PAST YR: ICD-10-PCS | Mod: CPTII,S$GLB,, | Performed by: OBSTETRICS & GYNECOLOGY

## 2021-10-08 PROCEDURE — 3075F PR MOST RECENT SYSTOLIC BLOOD PRESS GE 130-139MM HG: ICD-10-PCS | Mod: CPTII,S$GLB,, | Performed by: OBSTETRICS & GYNECOLOGY

## 2021-10-08 PROCEDURE — 1126F PR PAIN SEVERITY QUANTIFIED, NO PAIN PRESENT: ICD-10-PCS | Mod: CPTII,S$GLB,, | Performed by: OBSTETRICS & GYNECOLOGY

## 2021-10-08 PROCEDURE — 3078F DIAST BP <80 MM HG: CPT | Mod: CPTII,S$GLB,, | Performed by: OBSTETRICS & GYNECOLOGY

## 2021-10-08 PROCEDURE — 3078F PR MOST RECENT DIASTOLIC BLOOD PRESSURE < 80 MM HG: ICD-10-PCS | Mod: CPTII,S$GLB,, | Performed by: OBSTETRICS & GYNECOLOGY

## 2021-10-08 PROCEDURE — 3075F SYST BP GE 130 - 139MM HG: CPT | Mod: CPTII,S$GLB,, | Performed by: OBSTETRICS & GYNECOLOGY

## 2021-10-08 PROCEDURE — 3008F BODY MASS INDEX DOCD: CPT | Mod: CPTII,S$GLB,, | Performed by: OBSTETRICS & GYNECOLOGY

## 2021-10-08 PROCEDURE — 1101F PT FALLS ASSESS-DOCD LE1/YR: CPT | Mod: CPTII,S$GLB,, | Performed by: OBSTETRICS & GYNECOLOGY

## 2021-10-08 PROCEDURE — 99999 PR PBB SHADOW E&M-EST. PATIENT-LVL III: ICD-10-PCS | Mod: PBBFAC,,, | Performed by: OBSTETRICS & GYNECOLOGY

## 2021-10-08 PROCEDURE — 1126F AMNT PAIN NOTED NONE PRSNT: CPT | Mod: CPTII,S$GLB,, | Performed by: OBSTETRICS & GYNECOLOGY

## 2021-10-08 PROCEDURE — 99203 PR OFFICE/OUTPT VISIT, NEW, LEVL III, 30-44 MIN: ICD-10-PCS | Mod: S$GLB,,, | Performed by: OBSTETRICS & GYNECOLOGY

## 2021-10-08 PROCEDURE — 1159F PR MEDICATION LIST DOCUMENTED IN MEDICAL RECORD: ICD-10-PCS | Mod: CPTII,S$GLB,, | Performed by: OBSTETRICS & GYNECOLOGY

## 2021-10-08 PROCEDURE — 99203 OFFICE O/P NEW LOW 30 MIN: CPT | Mod: S$GLB,,, | Performed by: OBSTETRICS & GYNECOLOGY

## 2021-10-08 PROCEDURE — 1159F MED LIST DOCD IN RCRD: CPT | Mod: CPTII,S$GLB,, | Performed by: OBSTETRICS & GYNECOLOGY

## 2021-10-08 PROCEDURE — 99499 RISK ADDL DX/OHS AUDIT: ICD-10-PCS | Mod: S$GLB,,, | Performed by: OBSTETRICS & GYNECOLOGY

## 2021-10-08 PROCEDURE — 3288F FALL RISK ASSESSMENT DOCD: CPT | Mod: CPTII,S$GLB,, | Performed by: OBSTETRICS & GYNECOLOGY

## 2021-10-08 PROCEDURE — 3288F PR FALLS RISK ASSESSMENT DOCUMENTED: ICD-10-PCS | Mod: CPTII,S$GLB,, | Performed by: OBSTETRICS & GYNECOLOGY

## 2021-10-08 PROCEDURE — 3008F PR BODY MASS INDEX (BMI) DOCUMENTED: ICD-10-PCS | Mod: CPTII,S$GLB,, | Performed by: OBSTETRICS & GYNECOLOGY

## 2021-10-11 ENCOUNTER — TELEPHONE (OUTPATIENT)
Dept: OBSTETRICS AND GYNECOLOGY | Facility: CLINIC | Age: 71
End: 2021-10-11

## 2021-10-16 ENCOUNTER — IMMUNIZATION (OUTPATIENT)
Dept: INTERNAL MEDICINE | Facility: CLINIC | Age: 71
End: 2021-10-16
Payer: MEDICARE

## 2021-10-16 DIAGNOSIS — Z23 NEED FOR VACCINATION: Primary | ICD-10-CM

## 2021-10-16 PROCEDURE — 91300 COVID-19, MRNA, LNP-S, PF, 30 MCG/0.3 ML DOSE VACCINE: CPT | Mod: PBBFAC

## 2021-10-16 PROCEDURE — 0003A COVID-19, MRNA, LNP-S, PF, 30 MCG/0.3 ML DOSE VACCINE: CPT | Mod: CV19,PBBFAC

## 2021-10-22 ENCOUNTER — CLINICAL SUPPORT (OUTPATIENT)
Dept: HEMATOLOGY/ONCOLOGY | Facility: CLINIC | Age: 71
End: 2021-10-22
Payer: MEDICARE

## 2021-10-22 ENCOUNTER — IMMUNIZATION (OUTPATIENT)
Dept: INTERNAL MEDICINE | Facility: CLINIC | Age: 71
End: 2021-10-22
Payer: MEDICARE

## 2021-10-22 VITALS — HEIGHT: 63 IN | BODY MASS INDEX: 27.85 KG/M2 | WEIGHT: 157.19 LBS

## 2021-10-22 DIAGNOSIS — T45.1X5A CHEMOTHERAPY-INDUCED PERIPHERAL NEUROPATHY: ICD-10-CM

## 2021-10-22 DIAGNOSIS — C50.919 MALIGNANT NEOPLASM OF BREAST IN FEMALE, ESTROGEN RECEPTOR NEGATIVE, UNSPECIFIED LATERALITY, UNSPECIFIED SITE OF BREAST: ICD-10-CM

## 2021-10-22 DIAGNOSIS — G62.0 CHEMOTHERAPY-INDUCED PERIPHERAL NEUROPATHY: ICD-10-CM

## 2021-10-22 DIAGNOSIS — G89.29 CHRONIC BILATERAL LOW BACK PAIN WITHOUT SCIATICA: ICD-10-CM

## 2021-10-22 DIAGNOSIS — Z17.1 MALIGNANT NEOPLASM OF LOWER-INNER QUADRANT OF RIGHT BREAST OF FEMALE, ESTROGEN RECEPTOR NEGATIVE: ICD-10-CM

## 2021-10-22 DIAGNOSIS — R63.0 LOSS OF APPETITE: ICD-10-CM

## 2021-10-22 DIAGNOSIS — R13.10 DYSPHAGIA: ICD-10-CM

## 2021-10-22 DIAGNOSIS — M54.50 CHRONIC BILATERAL LOW BACK PAIN WITHOUT SCIATICA: ICD-10-CM

## 2021-10-22 DIAGNOSIS — C50.919 MALIGNANT NEOPLASM OF BREAST IN FEMALE, ESTROGEN RECEPTOR NEGATIVE, UNSPECIFIED LATERALITY, UNSPECIFIED SITE OF BREAST: Primary | ICD-10-CM

## 2021-10-22 DIAGNOSIS — Z17.1 MALIGNANT NEOPLASM OF BREAST IN FEMALE, ESTROGEN RECEPTOR NEGATIVE, UNSPECIFIED LATERALITY, UNSPECIFIED SITE OF BREAST: ICD-10-CM

## 2021-10-22 DIAGNOSIS — C50.311 MALIGNANT NEOPLASM OF LOWER-INNER QUADRANT OF RIGHT BREAST OF FEMALE, ESTROGEN RECEPTOR NEGATIVE: ICD-10-CM

## 2021-10-22 DIAGNOSIS — Z17.1 MALIGNANT NEOPLASM OF BREAST IN FEMALE, ESTROGEN RECEPTOR NEGATIVE, UNSPECIFIED LATERALITY, UNSPECIFIED SITE OF BREAST: Primary | ICD-10-CM

## 2021-10-22 DIAGNOSIS — Z71.3 NUTRITIONAL COUNSELING: Primary | ICD-10-CM

## 2021-10-22 PROCEDURE — 99999 PR PBB SHADOW E&M-EST. PATIENT-LVL II: CPT | Mod: PBBFAC,,, | Performed by: DIETITIAN, REGISTERED

## 2021-10-22 PROCEDURE — G0008 FLU VACCINE - QUADRIVALENT - ADJUVANTED: ICD-10-PCS | Mod: S$GLB,,, | Performed by: INTERNAL MEDICINE

## 2021-10-22 PROCEDURE — 97814 ACUP 1/> W/ESTIM EA ADDL 15: CPT | Mod: S$GLB,,, | Performed by: ACUPUNCTURIST

## 2021-10-22 PROCEDURE — 97813 PR ACUPUNCT W/ ELEC STIMUL 15 MIN: ICD-10-PCS | Mod: S$GLB,,, | Performed by: ACUPUNCTURIST

## 2021-10-22 PROCEDURE — G0008 ADMIN INFLUENZA VIRUS VAC: HCPCS | Mod: S$GLB,,, | Performed by: INTERNAL MEDICINE

## 2021-10-22 PROCEDURE — 99999 PR PBB SHADOW E&M-EST. PATIENT-LVL II: ICD-10-PCS | Mod: PBBFAC,,, | Performed by: DIETITIAN, REGISTERED

## 2021-10-22 PROCEDURE — 97802 MEDICAL NUTRITION INDIV IN: CPT | Mod: S$GLB,,, | Performed by: DIETITIAN, REGISTERED

## 2021-10-22 PROCEDURE — 97813 ACUP 1/> W/ESTIM 1ST 15 MIN: CPT | Mod: S$GLB,,, | Performed by: ACUPUNCTURIST

## 2021-10-22 PROCEDURE — 97802 PR MED NUTR THER, 1ST, INDIV, EA 15 MIN: ICD-10-PCS | Mod: S$GLB,,, | Performed by: DIETITIAN, REGISTERED

## 2021-10-22 PROCEDURE — 90694 VACC AIIV4 NO PRSRV 0.5ML IM: CPT | Mod: S$GLB,,, | Performed by: INTERNAL MEDICINE

## 2021-10-22 PROCEDURE — 97814 PR ACUPUNCT W/ ELEC STIMUL ADDL 15M: ICD-10-PCS | Mod: S$GLB,,, | Performed by: ACUPUNCTURIST

## 2021-10-22 PROCEDURE — 90694 FLU VACCINE - QUADRIVALENT - ADJUVANTED: ICD-10-PCS | Mod: S$GLB,,, | Performed by: INTERNAL MEDICINE

## 2021-10-27 ENCOUNTER — HOSPITAL ENCOUNTER (OUTPATIENT)
Dept: RADIOLOGY | Facility: CLINIC | Age: 71
Discharge: HOME | End: 2021-10-27
Attending: OBSTETRICS & GYNECOLOGY
Payer: MEDICARE

## 2021-10-27 DIAGNOSIS — M85.89 OTHER SPECIFIED DISORDERS OF BONE DENSITY AND STRUCTURE, MULTIPLE SITES: ICD-10-CM

## 2021-10-27 DIAGNOSIS — M85.80 OSTEOPENIA: ICD-10-CM

## 2021-10-27 PROCEDURE — 77080 DEXA BONE DENSITY SPINE HIP: ICD-10-PCS | Mod: 26,,, | Performed by: INTERNAL MEDICINE

## 2021-10-27 PROCEDURE — 77080 DXA BONE DENSITY AXIAL: CPT | Mod: TC

## 2021-10-27 PROCEDURE — 77080 DXA BONE DENSITY AXIAL: CPT | Mod: 26,,, | Performed by: INTERNAL MEDICINE

## 2021-11-01 ENCOUNTER — PATIENT OUTREACH (OUTPATIENT)
Dept: ADMINISTRATIVE | Facility: OTHER | Age: 71
End: 2021-11-01
Payer: MEDICARE

## 2021-11-01 ENCOUNTER — OFFICE VISIT (OUTPATIENT)
Dept: INTERNAL MEDICINE | Facility: CLINIC | Age: 71
End: 2021-11-01
Payer: MEDICARE

## 2021-11-01 ENCOUNTER — HOSPITAL ENCOUNTER (OUTPATIENT)
Dept: CARDIOLOGY | Facility: HOSPITAL | Age: 71
Discharge: HOME | End: 2021-11-01
Attending: STUDENT IN AN ORGANIZED HEALTH CARE EDUCATION/TRAINING PROGRAM
Payer: MEDICARE

## 2021-11-01 ENCOUNTER — CLINICAL SUPPORT (OUTPATIENT)
Dept: HEMATOLOGY/ONCOLOGY | Facility: CLINIC | Age: 71
End: 2021-11-01
Payer: MEDICARE

## 2021-11-01 ENCOUNTER — OFFICE VISIT (OUTPATIENT)
Dept: CARDIOLOGY | Facility: CLINIC | Age: 71
End: 2021-11-01
Payer: MEDICARE

## 2021-11-01 VITALS
HEART RATE: 68 BPM | HEIGHT: 63 IN | DIASTOLIC BLOOD PRESSURE: 84 MMHG | SYSTOLIC BLOOD PRESSURE: 132 MMHG | WEIGHT: 158 LBS | BODY MASS INDEX: 28 KG/M2

## 2021-11-01 VITALS
WEIGHT: 158.5 LBS | DIASTOLIC BLOOD PRESSURE: 72 MMHG | HEIGHT: 63 IN | SYSTOLIC BLOOD PRESSURE: 139 MMHG | BODY MASS INDEX: 28.08 KG/M2 | HEART RATE: 72 BPM

## 2021-11-01 VITALS — SYSTOLIC BLOOD PRESSURE: 132 MMHG | DIASTOLIC BLOOD PRESSURE: 84 MMHG | HEART RATE: 70 BPM

## 2021-11-01 DIAGNOSIS — Z17.1 MALIGNANT NEOPLASM OF RIGHT BREAST IN FEMALE, ESTROGEN RECEPTOR NEGATIVE, UNSPECIFIED SITE OF BREAST: ICD-10-CM

## 2021-11-01 DIAGNOSIS — G89.29 CHRONIC LOW BACK PAIN: ICD-10-CM

## 2021-11-01 DIAGNOSIS — G62.0 CHEMOTHERAPY-INDUCED PERIPHERAL NEUROPATHY: ICD-10-CM

## 2021-11-01 DIAGNOSIS — T45.1X5A CHEMOTHERAPY-INDUCED PERIPHERAL NEUROPATHY: ICD-10-CM

## 2021-11-01 DIAGNOSIS — C50.911 MALIGNANT NEOPLASM OF RIGHT BREAST IN FEMALE, ESTROGEN RECEPTOR NEGATIVE, UNSPECIFIED SITE OF BREAST: Primary | ICD-10-CM

## 2021-11-01 DIAGNOSIS — M54.50 CHRONIC LOW BACK PAIN: ICD-10-CM

## 2021-11-01 DIAGNOSIS — C50.911 MALIGNANT NEOPLASM OF RIGHT BREAST IN FEMALE, ESTROGEN RECEPTOR NEGATIVE, UNSPECIFIED SITE OF BREAST: ICD-10-CM

## 2021-11-01 DIAGNOSIS — E78.5 HYPERLIPIDEMIA, UNSPECIFIED HYPERLIPIDEMIA TYPE: ICD-10-CM

## 2021-11-01 DIAGNOSIS — Z00.00 HEALTHCARE MAINTENANCE: Primary | ICD-10-CM

## 2021-11-01 DIAGNOSIS — Z17.1 MALIGNANT NEOPLASM OF LOWER-INNER QUADRANT OF RIGHT BREAST OF FEMALE, ESTROGEN RECEPTOR NEGATIVE: ICD-10-CM

## 2021-11-01 DIAGNOSIS — R53.83 FATIGUE, UNSPECIFIED TYPE: ICD-10-CM

## 2021-11-01 DIAGNOSIS — G62.9 PERIPHERAL POLYNEUROPATHY: ICD-10-CM

## 2021-11-01 DIAGNOSIS — Z17.1 MALIGNANT NEOPLASM OF RIGHT BREAST IN FEMALE, ESTROGEN RECEPTOR NEGATIVE, UNSPECIFIED SITE OF BREAST: Primary | ICD-10-CM

## 2021-11-01 DIAGNOSIS — C50.311 MALIGNANT NEOPLASM OF LOWER-INNER QUADRANT OF RIGHT BREAST OF FEMALE, ESTROGEN RECEPTOR NEGATIVE: ICD-10-CM

## 2021-11-01 LAB
ASCENDING AORTA: 3.15 CM
AV INDEX (PROSTH): 0.7
AV MEAN GRADIENT: 3 MMHG
AV PEAK GRADIENT: 5 MMHG
AV VALVE AREA: 2.1 CM2
AV VELOCITY RATIO: 0.74
BSA FOR ECHO PROCEDURE: 1.78 M2
CV ECHO LV RWT: 0.34 CM
DOP CALC AO PEAK VEL: 1.17 M/S
DOP CALC AO VTI: 25.46 CM
DOP CALC LVOT AREA: 3 CM2
DOP CALC LVOT DIAMETER: 1.95 CM
DOP CALC LVOT PEAK VEL: 0.86 M/S
DOP CALC LVOT STROKE VOLUME: 53.52 CM3
DOP CALCLVOT PEAK VEL VTI: 17.93 CM
E WAVE DECELERATION TIME: 213.25 MSEC
E/A RATIO: 1.21
E/E' RATIO: 7.71 M/S
ECHO LV POSTERIOR WALL: 0.73 CM (ref 0.6–1.1)
EJECTION FRACTION: 60 %
FRACTIONAL SHORTENING: 38 % (ref 28–44)
INTERVENTRICULAR SEPTUM: 0.82 CM (ref 0.6–1.1)
IVRT: 97.05 MSEC
LA MAJOR: 4.11 CM
LA MINOR: 4.54 CM
LA WIDTH: 3.27 CM
LEFT ATRIUM SIZE: 3.34 CM
LEFT ATRIUM VOLUME INDEX MOD: 19.7 ML/M2
LEFT ATRIUM VOLUME INDEX: 22.9 ML/M2
LEFT ATRIUM VOLUME MOD: 34.42 CM3
LEFT ATRIUM VOLUME: 40.05 CM3
LEFT INTERNAL DIMENSION IN SYSTOLE: 2.64 CM (ref 2.1–4)
LEFT VENTRICLE DIASTOLIC VOLUME INDEX: 46.05 ML/M2
LEFT VENTRICLE DIASTOLIC VOLUME: 80.58 ML
LEFT VENTRICLE MASS INDEX: 56 G/M2
LEFT VENTRICLE SYSTOLIC VOLUME INDEX: 14.6 ML/M2
LEFT VENTRICLE SYSTOLIC VOLUME: 25.53 ML
LEFT VENTRICULAR INTERNAL DIMENSION IN DIASTOLE: 4.24 CM (ref 3.5–6)
LEFT VENTRICULAR MASS: 98.68 G
LV LATERAL E/E' RATIO: 8.1 M/S
LV SEPTAL E/E' RATIO: 7.36 M/S
MV A" WAVE DURATION": 14.56 MSEC
MV PEAK A VEL: 0.67 M/S
MV PEAK E VEL: 0.81 M/S
MV STENOSIS PRESSURE HALF TIME: 61.84 MS
MV VALVE AREA P 1/2 METHOD: 3.56 CM2
PISA MRMAX VEL: 0.06 M/S
PISA RADIUS: 0.39 CM
PISA TR MAX VEL: 2.41 M/S
PISA TR VN NYQUIST: 0.01 M/S
PULM VEIN S/D RATIO: 1.78
PV PEAK D VEL: 0.32 M/S
PV PEAK S VEL: 0.57 M/S
RA MAJOR: 3.57 CM
RA PRESSURE: 3 MMHG
RA WIDTH: 2.9 CM
RIGHT VENTRICULAR END-DIASTOLIC DIMENSION: 3.09 CM
RV TISSUE DOPPLER FREE WALL SYSTOLIC VELOCITY 1 (APICAL 4 CHAMBER VIEW): 11.67 CM/S
SINUS: 2.71 CM
STJ: 2.53 CM
TDI LATERAL: 0.1 M/S
TDI SEPTAL: 0.11 M/S
TDI: 0.11 M/S
TR MAX PG: 23 MMHG
TRICUSPID ANNULAR PLANE SYSTOLIC EXCURSION: 2.22 CM
TV REST PULMONARY ARTERY PRESSURE: 26 MMHG

## 2021-11-01 PROCEDURE — 99204 PR OFFICE/OUTPT VISIT, NEW, LEVL IV, 45-59 MIN: ICD-10-PCS | Mod: GC,S$GLB,, | Performed by: STUDENT IN AN ORGANIZED HEALTH CARE EDUCATION/TRAINING PROGRAM

## 2021-11-01 PROCEDURE — 97814 ACUP 1/> W/ESTIM EA ADDL 15: CPT | Mod: S$GLB,,, | Performed by: ACUPUNCTURIST

## 2021-11-01 PROCEDURE — 99999 PR PBB SHADOW E&M-EST. PATIENT-LVL III: CPT | Mod: PBBFAC,GC,, | Performed by: STUDENT IN AN ORGANIZED HEALTH CARE EDUCATION/TRAINING PROGRAM

## 2021-11-01 PROCEDURE — 99999 PR PBB SHADOW E&M-EST. PATIENT-LVL II: CPT | Mod: PBBFAC,GC,,

## 2021-11-01 PROCEDURE — 99999 PR PBB SHADOW E&M-EST. PATIENT-LVL I: ICD-10-PCS | Mod: PBBFAC,,, | Performed by: ACUPUNCTURIST

## 2021-11-01 PROCEDURE — 93306 TTE W/DOPPLER COMPLETE: CPT

## 2021-11-01 PROCEDURE — 93356 MYOCRD STRAIN IMG SPCKL TRCK: CPT | Mod: ,,, | Performed by: INTERNAL MEDICINE

## 2021-11-01 PROCEDURE — 93306 TTE W/DOPPLER COMPLETE: CPT | Mod: 26,,, | Performed by: INTERNAL MEDICINE

## 2021-11-01 PROCEDURE — 97813 PR ACUPUNCT W/ ELEC STIMUL 15 MIN: ICD-10-PCS | Mod: S$GLB,,, | Performed by: ACUPUNCTURIST

## 2021-11-01 PROCEDURE — 99214 OFFICE O/P EST MOD 30 MIN: CPT | Mod: GC,S$GLB,,

## 2021-11-01 PROCEDURE — 97814 PR ACUPUNCT W/ ELEC STIMUL ADDL 15M: ICD-10-PCS | Mod: S$GLB,,, | Performed by: ACUPUNCTURIST

## 2021-11-01 PROCEDURE — 97813 ACUP 1/> W/ESTIM 1ST 15 MIN: CPT | Mod: S$GLB,,, | Performed by: ACUPUNCTURIST

## 2021-11-01 PROCEDURE — 99204 OFFICE O/P NEW MOD 45 MIN: CPT | Mod: GC,S$GLB,, | Performed by: STUDENT IN AN ORGANIZED HEALTH CARE EDUCATION/TRAINING PROGRAM

## 2021-11-01 PROCEDURE — 99214 PR OFFICE/OUTPT VISIT, EST, LEVL IV, 30-39 MIN: ICD-10-PCS | Mod: GC,S$GLB,,

## 2021-11-01 PROCEDURE — 99999 PR PBB SHADOW E&M-EST. PATIENT-LVL III: ICD-10-PCS | Mod: PBBFAC,GC,, | Performed by: STUDENT IN AN ORGANIZED HEALTH CARE EDUCATION/TRAINING PROGRAM

## 2021-11-01 PROCEDURE — 99999 PR PBB SHADOW E&M-EST. PATIENT-LVL I: CPT | Mod: PBBFAC,,, | Performed by: ACUPUNCTURIST

## 2021-11-01 PROCEDURE — 93306 ECHO (CUPID ONLY): ICD-10-PCS | Mod: 26,,, | Performed by: INTERNAL MEDICINE

## 2021-11-01 PROCEDURE — 93356 ECHO (CUPID ONLY): ICD-10-PCS | Mod: ,,, | Performed by: INTERNAL MEDICINE

## 2021-11-01 PROCEDURE — 99999 PR PBB SHADOW E&M-EST. PATIENT-LVL II: ICD-10-PCS | Mod: PBBFAC,GC,,

## 2021-11-01 RX ORDER — VIT C/E/ZN/COPPR/LUTEIN/ZEAXAN 250MG-90MG
1000 CAPSULE ORAL DAILY
COMMUNITY

## 2021-11-01 RX ORDER — CYANOCOBALAMIN (VITAMIN B-12) 1000MCG/ML
DROPS ORAL DAILY
COMMUNITY

## 2021-11-02 ENCOUNTER — PES CALL (OUTPATIENT)
Dept: ADMINISTRATIVE | Facility: CLINIC | Age: 71
End: 2021-11-02
Payer: MEDICARE

## 2021-11-02 ENCOUNTER — LAB VISIT (OUTPATIENT)
Dept: LAB | Facility: HOSPITAL | Age: 71
End: 2021-11-02
Attending: INTERNAL MEDICINE
Payer: MEDICARE

## 2021-11-02 DIAGNOSIS — G62.9 PERIPHERAL POLYNEUROPATHY: ICD-10-CM

## 2021-11-02 DIAGNOSIS — E78.5 HYPERLIPIDEMIA, UNSPECIFIED HYPERLIPIDEMIA TYPE: ICD-10-CM

## 2021-11-02 LAB
ALBUMIN SERPL BCP-MCNC: 3.9 G/DL (ref 3.5–5.2)
ALP SERPL-CCNC: 156 U/L (ref 55–135)
ALT SERPL W/O P-5'-P-CCNC: 14 U/L (ref 10–44)
ANION GAP SERPL CALC-SCNC: 9 MMOL/L (ref 8–16)
AST SERPL-CCNC: 26 U/L (ref 10–40)
BILIRUB SERPL-MCNC: 1.1 MG/DL (ref 0.1–1)
BUN SERPL-MCNC: 10 MG/DL (ref 8–23)
CALCIUM SERPL-MCNC: 10.1 MG/DL (ref 8.7–10.5)
CHLORIDE SERPL-SCNC: 107 MMOL/L (ref 95–110)
CHOLEST SERPL-MCNC: 190 MG/DL (ref 120–199)
CHOLEST/HDLC SERPL: 3 {RATIO} (ref 2–5)
CO2 SERPL-SCNC: 27 MMOL/L (ref 23–29)
CREAT SERPL-MCNC: 0.9 MG/DL (ref 0.5–1.4)
EST. GFR  (AFRICAN AMERICAN): >60 ML/MIN/1.73 M^2
EST. GFR  (NON AFRICAN AMERICAN): >60 ML/MIN/1.73 M^2
GLUCOSE SERPL-MCNC: 93 MG/DL (ref 70–110)
HDLC SERPL-MCNC: 64 MG/DL (ref 40–75)
HDLC SERPL: 33.7 % (ref 20–50)
LDLC SERPL CALC-MCNC: 112.6 MG/DL (ref 63–159)
NONHDLC SERPL-MCNC: 126 MG/DL
POTASSIUM SERPL-SCNC: 4.1 MMOL/L (ref 3.5–5.1)
PROT SERPL-MCNC: 7.3 G/DL (ref 6–8.4)
SODIUM SERPL-SCNC: 143 MMOL/L (ref 136–145)
TRIGL SERPL-MCNC: 67 MG/DL (ref 30–150)

## 2021-11-02 PROCEDURE — 80061 LIPID PANEL: CPT

## 2021-11-02 PROCEDURE — 80053 COMPREHEN METABOLIC PANEL: CPT

## 2021-11-02 PROCEDURE — 36415 COLL VENOUS BLD VENIPUNCTURE: CPT

## 2021-11-04 ENCOUNTER — OFFICE VISIT (OUTPATIENT)
Dept: PSYCHIATRY | Facility: CLINIC | Age: 71
End: 2021-11-04
Payer: MEDICARE

## 2021-11-04 ENCOUNTER — PATIENT MESSAGE (OUTPATIENT)
Dept: INTERNAL MEDICINE | Facility: CLINIC | Age: 71
End: 2021-11-04
Payer: MEDICARE

## 2021-11-04 ENCOUNTER — TELEPHONE (OUTPATIENT)
Dept: INTERNAL MEDICINE | Facility: CLINIC | Age: 71
End: 2021-11-04
Payer: MEDICARE

## 2021-11-04 DIAGNOSIS — C50.311 MALIGNANT NEOPLASM OF LOWER-INNER QUADRANT OF RIGHT BREAST OF FEMALE, ESTROGEN RECEPTOR NEGATIVE: Primary | ICD-10-CM

## 2021-11-04 DIAGNOSIS — Z17.1 MALIGNANT NEOPLASM OF LOWER-INNER QUADRANT OF RIGHT BREAST OF FEMALE, ESTROGEN RECEPTOR NEGATIVE: Primary | ICD-10-CM

## 2021-11-04 DIAGNOSIS — F43.21 GRIEF: ICD-10-CM

## 2021-11-04 PROCEDURE — 99999 PR PBB SHADOW E&M-EST. PATIENT-LVL II: ICD-10-PCS | Mod: PBBFAC,,, | Performed by: PSYCHOLOGIST

## 2021-11-04 PROCEDURE — 90791 PR PSYCHIATRIC DIAGNOSTIC EVALUATION: ICD-10-PCS | Mod: S$GLB,,, | Performed by: PSYCHOLOGIST

## 2021-11-04 PROCEDURE — 1159F MED LIST DOCD IN RCRD: CPT | Mod: CPTII,S$GLB,, | Performed by: PSYCHOLOGIST

## 2021-11-04 PROCEDURE — 90791 PSYCH DIAGNOSTIC EVALUATION: CPT | Mod: S$GLB,,, | Performed by: PSYCHOLOGIST

## 2021-11-04 PROCEDURE — 1159F PR MEDICATION LIST DOCUMENTED IN MEDICAL RECORD: ICD-10-PCS | Mod: CPTII,S$GLB,, | Performed by: PSYCHOLOGIST

## 2021-11-04 PROCEDURE — 99999 PR PBB SHADOW E&M-EST. PATIENT-LVL II: CPT | Mod: PBBFAC,,, | Performed by: PSYCHOLOGIST

## 2021-11-08 ENCOUNTER — CLINICAL SUPPORT (OUTPATIENT)
Dept: HEMATOLOGY/ONCOLOGY | Facility: CLINIC | Age: 71
End: 2021-11-08
Payer: MEDICARE

## 2021-11-08 DIAGNOSIS — T45.1X5A CHEMOTHERAPY-INDUCED PERIPHERAL NEUROPATHY: ICD-10-CM

## 2021-11-08 DIAGNOSIS — M54.50 CHRONIC BILATERAL LOW BACK PAIN WITHOUT SCIATICA: Primary | ICD-10-CM

## 2021-11-08 DIAGNOSIS — G62.0 CHEMOTHERAPY-INDUCED PERIPHERAL NEUROPATHY: ICD-10-CM

## 2021-11-08 DIAGNOSIS — Z17.1 MALIGNANT NEOPLASM OF LOWER-INNER QUADRANT OF RIGHT BREAST OF FEMALE, ESTROGEN RECEPTOR NEGATIVE: ICD-10-CM

## 2021-11-08 DIAGNOSIS — C50.311 MALIGNANT NEOPLASM OF LOWER-INNER QUADRANT OF RIGHT BREAST OF FEMALE, ESTROGEN RECEPTOR NEGATIVE: ICD-10-CM

## 2021-11-08 DIAGNOSIS — G89.29 CHRONIC BILATERAL LOW BACK PAIN WITHOUT SCIATICA: Primary | ICD-10-CM

## 2021-11-08 PROCEDURE — 97814 PR ACUPUNCT W/ ELEC STIMUL ADDL 15M: ICD-10-PCS | Mod: S$GLB,,, | Performed by: ACUPUNCTURIST

## 2021-11-08 PROCEDURE — 97814 ACUP 1/> W/ESTIM EA ADDL 15: CPT | Mod: S$GLB,,, | Performed by: ACUPUNCTURIST

## 2021-11-08 PROCEDURE — 97813 PR ACUPUNCT W/ ELEC STIMUL 15 MIN: ICD-10-PCS | Mod: S$GLB,,, | Performed by: ACUPUNCTURIST

## 2021-11-08 PROCEDURE — 97813 ACUP 1/> W/ESTIM 1ST 15 MIN: CPT | Mod: S$GLB,,, | Performed by: ACUPUNCTURIST

## 2021-11-15 ENCOUNTER — CLINICAL SUPPORT (OUTPATIENT)
Dept: HEMATOLOGY/ONCOLOGY | Facility: CLINIC | Age: 71
End: 2021-11-15
Payer: MEDICARE

## 2021-11-15 DIAGNOSIS — G62.0 CHEMOTHERAPY-INDUCED PERIPHERAL NEUROPATHY: ICD-10-CM

## 2021-11-15 DIAGNOSIS — T45.1X5A CHEMOTHERAPY-INDUCED PERIPHERAL NEUROPATHY: ICD-10-CM

## 2021-11-15 DIAGNOSIS — M54.50 CHRONIC BILATERAL LOW BACK PAIN WITHOUT SCIATICA: Primary | ICD-10-CM

## 2021-11-15 DIAGNOSIS — G89.29 CHRONIC BILATERAL LOW BACK PAIN WITHOUT SCIATICA: Primary | ICD-10-CM

## 2021-11-15 PROCEDURE — 97814 ACUP 1/> W/ESTIM EA ADDL 15: CPT | Mod: S$GLB,,, | Performed by: ACUPUNCTURIST

## 2021-11-15 PROCEDURE — 97814 PR ACUPUNCT W/ ELEC STIMUL ADDL 15M: ICD-10-PCS | Mod: S$GLB,,, | Performed by: ACUPUNCTURIST

## 2021-11-15 PROCEDURE — 97813 PR ACUPUNCT W/ ELEC STIMUL 15 MIN: ICD-10-PCS | Mod: S$GLB,,, | Performed by: ACUPUNCTURIST

## 2021-11-15 PROCEDURE — 97813 ACUP 1/> W/ESTIM 1ST 15 MIN: CPT | Mod: S$GLB,,, | Performed by: ACUPUNCTURIST

## 2021-11-29 ENCOUNTER — TELEPHONE (OUTPATIENT)
Dept: HEMATOLOGY/ONCOLOGY | Facility: CLINIC | Age: 71
End: 2021-11-29
Payer: MEDICARE

## 2021-11-29 ENCOUNTER — PATIENT MESSAGE (OUTPATIENT)
Dept: HEMATOLOGY/ONCOLOGY | Facility: CLINIC | Age: 71
End: 2021-11-29
Payer: MEDICARE

## 2021-12-01 ENCOUNTER — PES CALL (OUTPATIENT)
Dept: ADMINISTRATIVE | Facility: CLINIC | Age: 71
End: 2021-12-01
Payer: MEDICARE

## 2021-12-07 ENCOUNTER — CLINICAL SUPPORT (OUTPATIENT)
Dept: HEMATOLOGY/ONCOLOGY | Facility: CLINIC | Age: 71
End: 2021-12-07
Payer: MEDICARE

## 2021-12-07 DIAGNOSIS — G89.29 CHRONIC BILATERAL LOW BACK PAIN WITHOUT SCIATICA: Primary | ICD-10-CM

## 2021-12-07 DIAGNOSIS — T45.1X5A CHEMOTHERAPY-INDUCED PERIPHERAL NEUROPATHY: ICD-10-CM

## 2021-12-07 DIAGNOSIS — G62.0 CHEMOTHERAPY-INDUCED PERIPHERAL NEUROPATHY: ICD-10-CM

## 2021-12-07 DIAGNOSIS — M54.50 CHRONIC BILATERAL LOW BACK PAIN WITHOUT SCIATICA: Primary | ICD-10-CM

## 2021-12-07 PROCEDURE — 97814 ACUP 1/> W/ESTIM EA ADDL 15: CPT | Mod: S$GLB,,, | Performed by: ACUPUNCTURIST

## 2021-12-07 PROCEDURE — 97813 ACUP 1/> W/ESTIM 1ST 15 MIN: CPT | Mod: S$GLB,,, | Performed by: ACUPUNCTURIST

## 2021-12-07 PROCEDURE — 97814 PR ACUPUNCT W/ ELEC STIMUL ADDL 15M: ICD-10-PCS | Mod: S$GLB,,, | Performed by: ACUPUNCTURIST

## 2021-12-07 PROCEDURE — 97813 PR ACUPUNCT W/ ELEC STIMUL 15 MIN: ICD-10-PCS | Mod: S$GLB,,, | Performed by: ACUPUNCTURIST

## 2021-12-13 ENCOUNTER — CLINICAL SUPPORT (OUTPATIENT)
Dept: HEMATOLOGY/ONCOLOGY | Facility: CLINIC | Age: 71
End: 2021-12-13
Payer: MEDICARE

## 2021-12-13 DIAGNOSIS — G62.0 CHEMOTHERAPY-INDUCED PERIPHERAL NEUROPATHY: ICD-10-CM

## 2021-12-13 DIAGNOSIS — G89.29 CHRONIC BILATERAL LOW BACK PAIN WITHOUT SCIATICA: Primary | ICD-10-CM

## 2021-12-13 DIAGNOSIS — T45.1X5A CHEMOTHERAPY-INDUCED PERIPHERAL NEUROPATHY: ICD-10-CM

## 2021-12-13 DIAGNOSIS — M54.50 CHRONIC BILATERAL LOW BACK PAIN WITHOUT SCIATICA: Primary | ICD-10-CM

## 2021-12-13 PROCEDURE — 97813 PR ACUPUNCT W/ ELEC STIMUL 15 MIN: ICD-10-PCS | Mod: S$GLB,,, | Performed by: ACUPUNCTURIST

## 2021-12-13 PROCEDURE — 97813 ACUP 1/> W/ESTIM 1ST 15 MIN: CPT | Mod: S$GLB,,, | Performed by: ACUPUNCTURIST

## 2021-12-13 PROCEDURE — 97814 PR ACUPUNCT W/ ELEC STIMUL ADDL 15M: ICD-10-PCS | Mod: S$GLB,,, | Performed by: ACUPUNCTURIST

## 2021-12-13 PROCEDURE — 97814 ACUP 1/> W/ESTIM EA ADDL 15: CPT | Mod: S$GLB,,, | Performed by: ACUPUNCTURIST

## 2021-12-20 ENCOUNTER — CLINICAL SUPPORT (OUTPATIENT)
Dept: HEMATOLOGY/ONCOLOGY | Facility: CLINIC | Age: 71
End: 2021-12-20
Payer: MEDICARE

## 2021-12-20 DIAGNOSIS — M54.50 CHRONIC BILATERAL LOW BACK PAIN WITHOUT SCIATICA: Primary | ICD-10-CM

## 2021-12-20 DIAGNOSIS — G89.29 CHRONIC BILATERAL LOW BACK PAIN WITHOUT SCIATICA: Primary | ICD-10-CM

## 2021-12-20 DIAGNOSIS — G62.0 CHEMOTHERAPY-INDUCED PERIPHERAL NEUROPATHY: ICD-10-CM

## 2021-12-20 DIAGNOSIS — T45.1X5A CHEMOTHERAPY-INDUCED PERIPHERAL NEUROPATHY: ICD-10-CM

## 2021-12-20 PROCEDURE — 97813 ACUP 1/> W/ESTIM 1ST 15 MIN: CPT | Mod: S$GLB,,, | Performed by: ACUPUNCTURIST

## 2021-12-20 PROCEDURE — 97813 PR ACUPUNCT W/ ELEC STIMUL 15 MIN: ICD-10-PCS | Mod: S$GLB,,, | Performed by: ACUPUNCTURIST

## 2021-12-20 PROCEDURE — 97814 PR ACUPUNCT W/ ELEC STIMUL ADDL 15M: ICD-10-PCS | Mod: S$GLB,,, | Performed by: ACUPUNCTURIST

## 2021-12-20 PROCEDURE — 97814 ACUP 1/> W/ESTIM EA ADDL 15: CPT | Mod: S$GLB,,, | Performed by: ACUPUNCTURIST

## 2021-12-27 ENCOUNTER — CLINICAL SUPPORT (OUTPATIENT)
Dept: HEMATOLOGY/ONCOLOGY | Facility: CLINIC | Age: 71
End: 2021-12-27
Payer: MEDICARE

## 2021-12-27 DIAGNOSIS — M54.50 CHRONIC BILATERAL LOW BACK PAIN WITHOUT SCIATICA: Primary | ICD-10-CM

## 2021-12-27 DIAGNOSIS — G89.29 CHRONIC BILATERAL LOW BACK PAIN WITHOUT SCIATICA: Primary | ICD-10-CM

## 2021-12-27 DIAGNOSIS — T45.1X5A CHEMOTHERAPY-INDUCED PERIPHERAL NEUROPATHY: ICD-10-CM

## 2021-12-27 DIAGNOSIS — G62.0 CHEMOTHERAPY-INDUCED PERIPHERAL NEUROPATHY: ICD-10-CM

## 2021-12-27 PROCEDURE — 97814 ACUP 1/> W/ESTIM EA ADDL 15: CPT | Mod: S$GLB,,, | Performed by: ACUPUNCTURIST

## 2021-12-27 PROCEDURE — 97814 PR ACUPUNCT W/ ELEC STIMUL ADDL 15M: ICD-10-PCS | Mod: S$GLB,,, | Performed by: ACUPUNCTURIST

## 2021-12-27 PROCEDURE — 97813 PR ACUPUNCT W/ ELEC STIMUL 15 MIN: ICD-10-PCS | Mod: S$GLB,,, | Performed by: ACUPUNCTURIST

## 2021-12-27 PROCEDURE — 97813 ACUP 1/> W/ESTIM 1ST 15 MIN: CPT | Mod: S$GLB,,, | Performed by: ACUPUNCTURIST

## 2022-01-03 ENCOUNTER — CLINICAL SUPPORT (OUTPATIENT)
Dept: HEMATOLOGY/ONCOLOGY | Facility: CLINIC | Age: 72
End: 2022-01-03
Payer: MEDICARE

## 2022-01-03 DIAGNOSIS — C50.311 MALIGNANT NEOPLASM OF LOWER-INNER QUADRANT OF RIGHT BREAST OF FEMALE, ESTROGEN RECEPTOR NEGATIVE: ICD-10-CM

## 2022-01-03 DIAGNOSIS — T45.1X5A CHEMOTHERAPY-INDUCED PERIPHERAL NEUROPATHY: ICD-10-CM

## 2022-01-03 DIAGNOSIS — M54.50 CHRONIC BILATERAL LOW BACK PAIN WITHOUT SCIATICA: Primary | ICD-10-CM

## 2022-01-03 DIAGNOSIS — G62.0 CHEMOTHERAPY-INDUCED PERIPHERAL NEUROPATHY: ICD-10-CM

## 2022-01-03 DIAGNOSIS — G89.29 CHRONIC BILATERAL LOW BACK PAIN WITHOUT SCIATICA: Primary | ICD-10-CM

## 2022-01-03 DIAGNOSIS — Z17.1 MALIGNANT NEOPLASM OF LOWER-INNER QUADRANT OF RIGHT BREAST OF FEMALE, ESTROGEN RECEPTOR NEGATIVE: ICD-10-CM

## 2022-01-03 PROCEDURE — 97814 ACUP 1/> W/ESTIM EA ADDL 15: CPT | Mod: S$GLB,,, | Performed by: ACUPUNCTURIST

## 2022-01-03 PROCEDURE — 97813 ACUP 1/> W/ESTIM 1ST 15 MIN: CPT | Mod: S$GLB,,, | Performed by: ACUPUNCTURIST

## 2022-01-03 PROCEDURE — 97813 PR ACUPUNCT W/ ELEC STIMUL 15 MIN: ICD-10-PCS | Mod: S$GLB,,, | Performed by: ACUPUNCTURIST

## 2022-01-03 PROCEDURE — 97814 PR ACUPUNCT W/ ELEC STIMUL ADDL 15M: ICD-10-PCS | Mod: S$GLB,,, | Performed by: ACUPUNCTURIST

## 2022-01-03 NOTE — PROGRESS NOTES
Subjective:       Patient ID: Sharri Cline is a 71 y.o. female.    Chief Complaint: Pain (cLBP, CIPN)    Patient responding well to treatments and is happy with progress. Continue with care.     Review of Systems   Musculoskeletal: Positive for arthralgias and back pain.   Neurological: Positive for numbness.         Objective:      Physical Exam    Assessment:       Problem List Items Addressed This Visit        Oncology    Malignant neoplasm of lower-inner quadrant of right breast of female, estrogen receptor negative      Other Visit Diagnoses     Chronic bilateral low back pain without sciatica    -  Primary    Chemotherapy-induced peripheral neuropathy              Plan:       1x a week*         Pre-Symptom Score: NA  Post-Symptom Score: NA     Pain (cLBP, CIPN)       Encounter Diagnoses   Name Primary?    Chronic bilateral low back pain without sciatica Yes    Malignant neoplasm of lower-inner quadrant of right breast of female, estrogen receptor negative     Chemotherapy-induced peripheral neuropathy        Acupuncture points used:  4 ALSTON, BA VICENTE, Gb34, Li11, Sp10, Sp6, Sp9, St36 and YIN SULLIVAN    STIM USED @ BA VICENTE      NEEDLES IN: 20  NEEDLES OUT: 20    NEEDLES W/ STIM  AT: 9:30 AM  NEEDLES W/ STIM REMOVED AT: 10:00 AM

## 2022-01-20 ENCOUNTER — PES CALL (OUTPATIENT)
Dept: ADMINISTRATIVE | Facility: CLINIC | Age: 72
End: 2022-01-20
Payer: MEDICARE

## 2022-01-21 ENCOUNTER — PES CALL (OUTPATIENT)
Dept: ADMINISTRATIVE | Facility: CLINIC | Age: 72
End: 2022-01-21
Payer: MEDICARE

## 2022-01-21 ENCOUNTER — CLINICAL SUPPORT (OUTPATIENT)
Dept: HEMATOLOGY/ONCOLOGY | Facility: CLINIC | Age: 72
End: 2022-01-21
Payer: MEDICARE

## 2022-01-21 DIAGNOSIS — G62.0 CHEMOTHERAPY-INDUCED PERIPHERAL NEUROPATHY: ICD-10-CM

## 2022-01-21 DIAGNOSIS — G89.29 CHRONIC BILATERAL LOW BACK PAIN WITHOUT SCIATICA: Primary | ICD-10-CM

## 2022-01-21 DIAGNOSIS — T45.1X5A CHEMOTHERAPY-INDUCED PERIPHERAL NEUROPATHY: ICD-10-CM

## 2022-01-21 DIAGNOSIS — M54.50 CHRONIC BILATERAL LOW BACK PAIN WITHOUT SCIATICA: Primary | ICD-10-CM

## 2022-01-21 PROCEDURE — 97814 PR ACUPUNCT W/ ELEC STIMUL ADDL 15M: ICD-10-PCS | Mod: S$GLB,,, | Performed by: ACUPUNCTURIST

## 2022-01-21 PROCEDURE — 97813 ACUP 1/> W/ESTIM 1ST 15 MIN: CPT | Mod: S$GLB,,, | Performed by: ACUPUNCTURIST

## 2022-01-21 PROCEDURE — 97814 ACUP 1/> W/ESTIM EA ADDL 15: CPT | Mod: S$GLB,,, | Performed by: ACUPUNCTURIST

## 2022-01-21 PROCEDURE — 97813 PR ACUPUNCT W/ ELEC STIMUL 15 MIN: ICD-10-PCS | Mod: S$GLB,,, | Performed by: ACUPUNCTURIST

## 2022-01-24 ENCOUNTER — OFFICE VISIT (OUTPATIENT)
Dept: INTERNAL MEDICINE | Facility: CLINIC | Age: 72
End: 2022-01-24
Payer: MEDICARE

## 2022-01-24 ENCOUNTER — OFFICE VISIT (OUTPATIENT)
Dept: HEMATOLOGY/ONCOLOGY | Facility: CLINIC | Age: 72
End: 2022-01-24
Payer: MEDICARE

## 2022-01-24 VITALS
OXYGEN SATURATION: 99 % | WEIGHT: 155.44 LBS | BODY MASS INDEX: 27.54 KG/M2 | SYSTOLIC BLOOD PRESSURE: 132 MMHG | HEIGHT: 63 IN | HEART RATE: 68 BPM | DIASTOLIC BLOOD PRESSURE: 74 MMHG

## 2022-01-24 VITALS
HEIGHT: 63 IN | OXYGEN SATURATION: 100 % | RESPIRATION RATE: 16 BRPM | TEMPERATURE: 98 F | WEIGHT: 155.63 LBS | HEART RATE: 64 BPM | BODY MASS INDEX: 27.57 KG/M2 | SYSTOLIC BLOOD PRESSURE: 143 MMHG | DIASTOLIC BLOOD PRESSURE: 62 MMHG

## 2022-01-24 DIAGNOSIS — Z17.1 MALIGNANT NEOPLASM OF LOWER-INNER QUADRANT OF RIGHT BREAST OF FEMALE, ESTROGEN RECEPTOR NEGATIVE: ICD-10-CM

## 2022-01-24 DIAGNOSIS — C50.311 MALIGNANT NEOPLASM OF LOWER-INNER QUADRANT OF RIGHT BREAST OF FEMALE, ESTROGEN RECEPTOR NEGATIVE: ICD-10-CM

## 2022-01-24 DIAGNOSIS — K21.9 GASTROESOPHAGEAL REFLUX DISEASE, UNSPECIFIED WHETHER ESOPHAGITIS PRESENT: ICD-10-CM

## 2022-01-24 DIAGNOSIS — Z00.00 ENCOUNTER FOR PREVENTIVE HEALTH EXAMINATION: Primary | ICD-10-CM

## 2022-01-24 DIAGNOSIS — I70.0 AORTIC ATHEROSCLEROSIS: ICD-10-CM

## 2022-01-24 DIAGNOSIS — Z17.1 MALIGNANT NEOPLASM OF LOWER-INNER QUADRANT OF RIGHT BREAST OF FEMALE, ESTROGEN RECEPTOR NEGATIVE: Primary | ICD-10-CM

## 2022-01-24 DIAGNOSIS — T45.1X5A CHEMOTHERAPY-INDUCED NEUROPATHY: ICD-10-CM

## 2022-01-24 DIAGNOSIS — G62.0 CHEMOTHERAPY-INDUCED NEUROPATHY: ICD-10-CM

## 2022-01-24 DIAGNOSIS — E55.9 VITAMIN D DEFICIENCY: ICD-10-CM

## 2022-01-24 DIAGNOSIS — M81.0 OSTEOPOROSIS WITHOUT CURRENT PATHOLOGICAL FRACTURE, UNSPECIFIED OSTEOPOROSIS TYPE: ICD-10-CM

## 2022-01-24 DIAGNOSIS — E04.1 THYROID NODULE: ICD-10-CM

## 2022-01-24 DIAGNOSIS — C50.311 MALIGNANT NEOPLASM OF LOWER-INNER QUADRANT OF RIGHT BREAST OF FEMALE, ESTROGEN RECEPTOR NEGATIVE: Primary | ICD-10-CM

## 2022-01-24 DIAGNOSIS — L81.8 IDIOPATHIC GUTTATE HYPOMELANOSIS: ICD-10-CM

## 2022-01-24 PROBLEM — B37.0 ORAL CANDIDIASIS: Status: RESOLVED | Noted: 2019-08-10 | Resolved: 2022-01-24

## 2022-01-24 PROBLEM — E87.6 HYPOKALEMIA: Status: RESOLVED | Noted: 2019-07-31 | Resolved: 2022-01-24

## 2022-01-24 PROBLEM — H10.9 CONJUNCTIVITIS: Status: RESOLVED | Noted: 2019-08-31 | Resolved: 2022-01-24

## 2022-01-24 PROCEDURE — 99499 RISK ADDL DX/OHS AUDIT: ICD-10-PCS | Mod: S$GLB,,, | Performed by: PHYSICIAN ASSISTANT

## 2022-01-24 PROCEDURE — 99499 UNLISTED E&M SERVICE: CPT | Mod: S$GLB,,, | Performed by: INTERNAL MEDICINE

## 2022-01-24 PROCEDURE — 3008F BODY MASS INDEX DOCD: CPT | Mod: CPTII,S$GLB,, | Performed by: INTERNAL MEDICINE

## 2022-01-24 PROCEDURE — 1160F RVW MEDS BY RX/DR IN RCRD: CPT | Mod: CPTII,S$GLB,, | Performed by: PHYSICIAN ASSISTANT

## 2022-01-24 PROCEDURE — 1159F PR MEDICATION LIST DOCUMENTED IN MEDICAL RECORD: ICD-10-PCS | Mod: CPTII,S$GLB,, | Performed by: PHYSICIAN ASSISTANT

## 2022-01-24 PROCEDURE — 1160F PR REVIEW ALL MEDS BY PRESCRIBER/CLIN PHARMACIST DOCUMENTED: ICD-10-PCS | Mod: CPTII,S$GLB,, | Performed by: INTERNAL MEDICINE

## 2022-01-24 PROCEDURE — 1170F PR FUNCTIONAL STATUS ASSESSED: ICD-10-PCS | Mod: CPTII,S$GLB,, | Performed by: PHYSICIAN ASSISTANT

## 2022-01-24 PROCEDURE — 3008F PR BODY MASS INDEX (BMI) DOCUMENTED: ICD-10-PCS | Mod: CPTII,S$GLB,, | Performed by: PHYSICIAN ASSISTANT

## 2022-01-24 PROCEDURE — 3008F BODY MASS INDEX DOCD: CPT | Mod: CPTII,S$GLB,, | Performed by: PHYSICIAN ASSISTANT

## 2022-01-24 PROCEDURE — 1126F PR PAIN SEVERITY QUANTIFIED, NO PAIN PRESENT: ICD-10-PCS | Mod: CPTII,S$GLB,, | Performed by: INTERNAL MEDICINE

## 2022-01-24 PROCEDURE — 3077F PR MOST RECENT SYSTOLIC BLOOD PRESSURE >= 140 MM HG: ICD-10-PCS | Mod: CPTII,S$GLB,, | Performed by: INTERNAL MEDICINE

## 2022-01-24 PROCEDURE — 99499 RISK ADDL DX/OHS AUDIT: ICD-10-PCS | Mod: S$GLB,,, | Performed by: INTERNAL MEDICINE

## 2022-01-24 PROCEDURE — 99213 PR OFFICE/OUTPT VISIT, EST, LEVL III, 20-29 MIN: ICD-10-PCS | Mod: S$GLB,,, | Performed by: INTERNAL MEDICINE

## 2022-01-24 PROCEDURE — 99999 PR PBB SHADOW E&M-EST. PATIENT-LVL IV: CPT | Mod: PBBFAC,,, | Performed by: PHYSICIAN ASSISTANT

## 2022-01-24 PROCEDURE — 1160F PR REVIEW ALL MEDS BY PRESCRIBER/CLIN PHARMACIST DOCUMENTED: ICD-10-PCS | Mod: CPTII,S$GLB,, | Performed by: PHYSICIAN ASSISTANT

## 2022-01-24 PROCEDURE — 3075F SYST BP GE 130 - 139MM HG: CPT | Mod: CPTII,S$GLB,, | Performed by: PHYSICIAN ASSISTANT

## 2022-01-24 PROCEDURE — 3078F PR MOST RECENT DIASTOLIC BLOOD PRESSURE < 80 MM HG: ICD-10-PCS | Mod: CPTII,S$GLB,, | Performed by: INTERNAL MEDICINE

## 2022-01-24 PROCEDURE — 3078F DIAST BP <80 MM HG: CPT | Mod: CPTII,S$GLB,, | Performed by: INTERNAL MEDICINE

## 2022-01-24 PROCEDURE — 1101F PR PT FALLS ASSESS DOC 0-1 FALLS W/OUT INJ PAST YR: ICD-10-PCS | Mod: CPTII,S$GLB,, | Performed by: INTERNAL MEDICINE

## 2022-01-24 PROCEDURE — 1160F RVW MEDS BY RX/DR IN RCRD: CPT | Mod: CPTII,S$GLB,, | Performed by: INTERNAL MEDICINE

## 2022-01-24 PROCEDURE — 1159F MED LIST DOCD IN RCRD: CPT | Mod: CPTII,S$GLB,, | Performed by: INTERNAL MEDICINE

## 2022-01-24 PROCEDURE — 99999 PR PBB SHADOW E&M-EST. PATIENT-LVL III: ICD-10-PCS | Mod: PBBFAC,,, | Performed by: INTERNAL MEDICINE

## 2022-01-24 PROCEDURE — 1170F FXNL STATUS ASSESSED: CPT | Mod: CPTII,S$GLB,, | Performed by: PHYSICIAN ASSISTANT

## 2022-01-24 PROCEDURE — 99999 PR PBB SHADOW E&M-EST. PATIENT-LVL IV: ICD-10-PCS | Mod: PBBFAC,,, | Performed by: PHYSICIAN ASSISTANT

## 2022-01-24 PROCEDURE — G0439 PR MEDICARE ANNUAL WELLNESS SUBSEQUENT VISIT: ICD-10-PCS | Mod: S$GLB,,, | Performed by: PHYSICIAN ASSISTANT

## 2022-01-24 PROCEDURE — 99213 OFFICE O/P EST LOW 20 MIN: CPT | Mod: S$GLB,,, | Performed by: INTERNAL MEDICINE

## 2022-01-24 PROCEDURE — 1126F PR PAIN SEVERITY QUANTIFIED, NO PAIN PRESENT: ICD-10-PCS | Mod: CPTII,S$GLB,, | Performed by: PHYSICIAN ASSISTANT

## 2022-01-24 PROCEDURE — 3078F PR MOST RECENT DIASTOLIC BLOOD PRESSURE < 80 MM HG: ICD-10-PCS | Mod: CPTII,S$GLB,, | Performed by: PHYSICIAN ASSISTANT

## 2022-01-24 PROCEDURE — 99999 PR PBB SHADOW E&M-EST. PATIENT-LVL III: CPT | Mod: PBBFAC,,, | Performed by: INTERNAL MEDICINE

## 2022-01-24 PROCEDURE — 1126F AMNT PAIN NOTED NONE PRSNT: CPT | Mod: CPTII,S$GLB,, | Performed by: PHYSICIAN ASSISTANT

## 2022-01-24 PROCEDURE — 3075F PR MOST RECENT SYSTOLIC BLOOD PRESS GE 130-139MM HG: ICD-10-PCS | Mod: CPTII,S$GLB,, | Performed by: PHYSICIAN ASSISTANT

## 2022-01-24 PROCEDURE — 3008F PR BODY MASS INDEX (BMI) DOCUMENTED: ICD-10-PCS | Mod: CPTII,S$GLB,, | Performed by: INTERNAL MEDICINE

## 2022-01-24 PROCEDURE — 3288F FALL RISK ASSESSMENT DOCD: CPT | Mod: CPTII,S$GLB,, | Performed by: INTERNAL MEDICINE

## 2022-01-24 PROCEDURE — 3288F PR FALLS RISK ASSESSMENT DOCUMENTED: ICD-10-PCS | Mod: CPTII,S$GLB,, | Performed by: INTERNAL MEDICINE

## 2022-01-24 PROCEDURE — 3078F DIAST BP <80 MM HG: CPT | Mod: CPTII,S$GLB,, | Performed by: PHYSICIAN ASSISTANT

## 2022-01-24 PROCEDURE — 1159F MED LIST DOCD IN RCRD: CPT | Mod: CPTII,S$GLB,, | Performed by: PHYSICIAN ASSISTANT

## 2022-01-24 PROCEDURE — G0439 PPPS, SUBSEQ VISIT: HCPCS | Mod: S$GLB,,, | Performed by: PHYSICIAN ASSISTANT

## 2022-01-24 PROCEDURE — 99499 UNLISTED E&M SERVICE: CPT | Mod: S$GLB,,, | Performed by: PHYSICIAN ASSISTANT

## 2022-01-24 PROCEDURE — 1101F PT FALLS ASSESS-DOCD LE1/YR: CPT | Mod: CPTII,S$GLB,, | Performed by: INTERNAL MEDICINE

## 2022-01-24 PROCEDURE — 1159F PR MEDICATION LIST DOCUMENTED IN MEDICAL RECORD: ICD-10-PCS | Mod: CPTII,S$GLB,, | Performed by: INTERNAL MEDICINE

## 2022-01-24 PROCEDURE — 3077F SYST BP >= 140 MM HG: CPT | Mod: CPTII,S$GLB,, | Performed by: INTERNAL MEDICINE

## 2022-01-24 PROCEDURE — 1126F AMNT PAIN NOTED NONE PRSNT: CPT | Mod: CPTII,S$GLB,, | Performed by: INTERNAL MEDICINE

## 2022-01-24 NOTE — PROGRESS NOTES
Subjective:       Patient ID: Sharri Cline is a 71 y.o. female.    Chief Complaint: No chief complaint on file.    HPI 71-year-old female who returns to clinic for follow-up of right breast cancer-ER negative.  She had a pathological complete response to neoadjuvant chemotherapy  She is a former patient of .    Her major issue is peripheral neuropathy after chemotherapy. She has been been receiving accupuncture with some improvement in her stiffness.  She has no shortness of breath. Appetite and bowel function have been good.        Onc history:   - She presented for screening mammo in 5/15/2019 which showed focal asymmetries in both left and right breast. Diagnostic mammogram and US showed no significant abn of left breast, and right breast 15 mm x 11 mm x 12 mm mass at 4:00, 5 CFN. Biopsy on 5/24/19 showed grade 3 IDC, triple negative, Ki67 70%.    - 7/3/19 - 12/10/19 completed neoadjuvant chemotherapy with four cycles of dose-dense Adriamycin and cyclophosphamide with Neulasta support followed by twelve doses of weekly paclitaxel. Dose reduced for cytopenias and neuropathy.   - 1/23/2020 - Dr Hernandez performed right breast lumpectomy with SLN Biopsy with pathology showing no residual carcinoma with 3 neg SLN.   - 4/2/2020 - 4/24/2020 - completed RT    Review of Systems   Constitutional: Negative.    Cardiovascular: Negative.    Gastrointestinal: Negative.    Musculoskeletal: Negative.    Neurological: Positive for numbness. Negative for headaches.   Psychiatric/Behavioral: Negative.          Objective:      Physical Exam  Vitals reviewed.   Constitutional:       General: She is not in acute distress.     Appearance: Normal appearance.   Cardiovascular:      Rate and Rhythm: Normal rate and regular rhythm.   Pulmonary:      Effort: Pulmonary effort is normal. No respiratory distress.      Breath sounds: Normal breath sounds. No wheezing or rales.   Chest:   Breasts:      Right: No mass, nipple  discharge or skin change.      Left: Normal.         Abdominal:      Palpations: Abdomen is soft. There is no mass.      Tenderness: There is no abdominal tenderness.   Lymphadenopathy:      Cervical: No cervical adenopathy.   Neurological:      Mental Status: She is alert and oriented to person, place, and time.   Psychiatric:         Mood and Affect: Mood normal.         Behavior: Behavior normal.         Thought Content: Thought content normal.         Judgment: Judgment normal.         Assessment:     Mammogram -   1. Malignant neoplasm of lower-inner quadrant of right breast of female, estrogen receptor negative        Plan:       RTC  4 months .

## 2022-01-24 NOTE — PROGRESS NOTES
I offered to discuss advanced care planning, including how to pick a person who would make decisions for you if you were unable to make them for yourself, called a health care power of , and what kind of decisions you might make such as use of life sustaining treatments such as ventilators and tube feeding when faced with a life limiting illness recorded on a living will that they will need to know. (How you want to be cared for as you near the end of your natural life)     X Patient is interested in learning more about how to make advanced directives.  I provided them paperwork and offered to discuss this with them.

## 2022-01-24 NOTE — PROGRESS NOTES
"  Sharri Cline presented for a  Medicare AWV and comprehensive Health Risk Assessment today. The following components were reviewed and updated:    · Medical history  · Family History  · Social history  · Allergies and Current Medications  · Health Risk Assessment  · Health Maintenance  · Care Team         ** See Completed Assessments for Annual Wellness Visit within the encounter summary.**         The following assessments were completed:  · Living Situation  · CAGE  · Depression Screening  · Timed Get Up and Go  · Whisper Test  · Cognitive Function Screening    · Nutrition Screening  · ADL Screening  · PAQ Screening        Vitals:    01/24/22 0908   BP: 132/74   Pulse: 68   SpO2: 99%   Weight: 70.5 kg (155 lb 6.8 oz)   Height: 5' 3" (1.6 m)     Body mass index is 27.53 kg/m².  Physical Exam  Vitals reviewed.   Constitutional:       General: She is not in acute distress.     Appearance: She is well-developed.   HENT:      Head: Normocephalic and atraumatic.      Right Ear: Tympanic membrane, ear canal and external ear normal.      Left Ear: Tympanic membrane, ear canal and external ear normal.   Cardiovascular:      Rate and Rhythm: Normal rate and regular rhythm.      Heart sounds: No murmur heard.      Pulmonary:      Effort: Pulmonary effort is normal.      Breath sounds: Normal breath sounds. No wheezing or rales.   Abdominal:      General: Bowel sounds are normal.      Palpations: Abdomen is soft.      Tenderness: There is no abdominal tenderness.   Musculoskeletal:      Right lower leg: No edema.      Left lower leg: No edema.   Skin:     General: Skin is warm and dry.      Findings: No rash.   Neurological:      Mental Status: She is alert.   Psychiatric:         Mood and Affect: Mood normal.               Diagnoses and health risks identified today and associated recommendations/orders:    1. Encounter for preventive health examination  Exam and Assessments performed  Chart Review Complete  Health " Maintenance Reviewed and updated      2. Chemotherapy-induced neuropathy  Stable with acupuncture  Followed by Hem/Onc    3. Aortic atherosclerosis  Noted on prior imaging (CXR 2019)  Stable  Followed by PCp    4. Malignant neoplasm of lower-inner quadrant of right breast of female, estrogen receptor negative  Dx in 2019  S/p chemotherapy  Follows closely with Hem/Onc    5. Osteoporosis without current pathological fracture, unspecified osteoporosis type  Last Dexa 10/2021  On Ca and Vit D  Due for follow up with Endo about pharmacotherapy      6. Gastroesophageal reflux disease, unspecified whether esophagitis present  Stable with lifestyle/diet mods  Followed by PCP    7. Idiopathic guttate hypomelanosis  Stable  Followed by PCP    8. Vitamin D deficiency  Stable on current supplements  Followed by PCP and Endo    9. Thyroid nodule  Last US 6/2021  Followed by Endo      Provided Sharri with a 5-10 year written screening schedule and personal prevention plan. Recommendations were developed using the USPSTF age appropriate recommendations. Education, counseling, and referrals were provided as needed. After Visit Summary printed and given to patient which includes a list of additional screenings\tests needed.    Follow up in about 10 months (around 11/24/2022) for PCP follow up and 1 year for next Medicare AWV.    Destiny Sow PA-C

## 2022-01-24 NOTE — PATIENT INSTRUCTIONS
Daily calcium intake 4237-7496 mg, dietary sources preferred; Vitamin D 7360-4815 IU daily.    Michelle Nunez, Endocrinology to follow up with [facility].       Counseling and Referral of Other Preventative  (Italic type indicates deductible and co-insurance are waived)    Patient Name: Sharri Cline  Today's Date: 1/24/2022    Health Maintenance       Date Due Completion Date    Shingles Vaccine (2 of 3) 11/01/2022 (Originally 5/19/2015) 3/24/2015    Mammogram 08/02/2022 8/2/2021    Colorectal Cancer Screening 07/09/2023 7/9/2013    DEXA SCAN 10/27/2023 10/27/2021    Override on 10/27/2021: Done    TETANUS VACCINE 05/17/2026 5/17/2016    Lipid Panel 11/02/2026 11/2/2021    Override on 11/22/2003: Done (Repeat recommended in seven years)        No orders of the defined types were placed in this encounter.    The following information is provided to all patients.  This information is to help you find resources for any of the problems found today that may be affecting your health:                Living healthy guide: www.[facility].[place].gov      Understanding Diabetes: www.diabetes.org      Eating healthy: www.cdc.gov/healthyweight      CDC home safety checklist: www.cdc.gov/steadi/patient.html      Agency on Aging: www.goea.[place].[facility]      Alcoholics anonymous (AA): www.aa.org      Physical Activity: www.marichuy.nih.gov/jq2wxwj      Tobacco use: www.quitwithusla.org

## 2022-01-27 NOTE — PROGRESS NOTES
Subjective:       Patient ID: Sharri Cline is a 71 y.o. female.    Chief Complaint: Pain (cLBP, CIPN)    Patient doing well. States less stiffness in legs and feet. Continue with care.    Review of Systems   Musculoskeletal: Positive for arthralgias and back pain.   Neurological: Positive for numbness.         Objective:      Physical Exam    Assessment:       Problem List Items Addressed This Visit    None     Visit Diagnoses     Chronic bilateral low back pain without sciatica    -  Primary    Chemotherapy-induced peripheral neuropathy              Plan:       1x a week*         Pre-Symptom Score: NA  Post-Symptom Score: NA     Pain (cLBP, CIPN)       Encounter Diagnoses   Name Primary?    Chronic bilateral low back pain without sciatica Yes    Chemotherapy-induced peripheral neuropathy        Acupuncture points used:  4 ALSTON, BA VICENTE, BA NAT , Li11, Sp10, Sp6, Sp9, St36 and YIN SULLIVAN    STIM USED @ [facility]      NEEDLES IN: 28  NEEDLES OUT: 28    NEEDLES W/ STIM  AT: 9:45 AM  NEEDLES W/ STIM REMOVED AT: 10:15 AM

## 2022-02-08 NOTE — PROGRESS NOTES
Patient is ready for discharge.  PIV removed, catheter intact, no redness or swelling noted at site.  Patient and patient's  verbalized understanding of discharge instructions.  Patient getting dressed and will wait on patient transport.    Please call patient and let him know that I have reviewed his lab results, overall labs were normal with the exception of his hgb A1c of 5.77. This is pre-diabetic range. I recommend regular exercise/weight loss and incorporating healthy diet as a first intervention to get this to normal range. We need to recheck this in 3 months and see if it has normalized with those modifications. Another alternative option is to go ahead and start metformin medication.

## 2022-02-15 ENCOUNTER — TELEPHONE (OUTPATIENT)
Dept: HEMATOLOGY/ONCOLOGY | Facility: CLINIC | Age: 72
End: 2022-02-15
Payer: MEDICARE

## 2022-02-15 NOTE — TELEPHONE ENCOUNTER
----- Message from Laura Salazar sent at 2/15/2022  1:49 PM CST -----  Type:  Patient Returning Call    Who Called: Pt  Who Left Message for Patient: NA  Does the patient know what this is regarding?: Patient missed acupuncture appointment on 02/03, would like to reschedule if possible!  Would the patient rather a call back or a response via [facility]? Call  Best Call Back Number: [phone]  Additional Information: Please assist, thank you!

## 2022-02-16 ENCOUNTER — TELEPHONE (OUTPATIENT)
Dept: HEMATOLOGY/ONCOLOGY | Facility: CLINIC | Age: 72
End: 2022-02-16
Payer: MEDICARE

## 2022-02-16 NOTE — TELEPHONE ENCOUNTER
----- Message from Ellie Casarez sent at 2/15/2022  4:15 PM CST -----  Pt#[phone]    Returning call regarding an appt

## 2022-02-17 ENCOUNTER — CLINICAL SUPPORT (OUTPATIENT)
Dept: HEMATOLOGY/ONCOLOGY | Facility: CLINIC | Age: 72
End: 2022-02-17
Payer: MEDICARE

## 2022-02-17 DIAGNOSIS — T45.1X5A CHEMOTHERAPY-INDUCED PERIPHERAL NEUROPATHY: ICD-10-CM

## 2022-02-17 DIAGNOSIS — M54.50 CHRONIC BILATERAL LOW BACK PAIN WITHOUT SCIATICA: Primary | ICD-10-CM

## 2022-02-17 DIAGNOSIS — G89.29 CHRONIC BILATERAL LOW BACK PAIN WITHOUT SCIATICA: Primary | ICD-10-CM

## 2022-02-17 DIAGNOSIS — G62.0 CHEMOTHERAPY-INDUCED PERIPHERAL NEUROPATHY: ICD-10-CM

## 2022-02-17 PROCEDURE — 97814 PR ACUPUNCT W/ ELEC STIMUL ADDL 15M: ICD-10-PCS | Mod: S$GLB,,, | Performed by: ACUPUNCTURIST

## 2022-02-17 PROCEDURE — 97813 PR ACUPUNCT W/ ELEC STIMUL 15 MIN: ICD-10-PCS | Mod: S$GLB,,, | Performed by: ACUPUNCTURIST

## 2022-02-17 PROCEDURE — 97813 ACUP 1/> W/ESTIM 1ST 15 MIN: CPT | Mod: S$GLB,,, | Performed by: ACUPUNCTURIST

## 2022-02-17 PROCEDURE — 97814 ACUP 1/> W/ESTIM EA ADDL 15: CPT | Mod: S$GLB,,, | Performed by: ACUPUNCTURIST

## 2022-02-18 NOTE — PROGRESS NOTES
Subjective:       Patient ID: Sharri Cline is a 71 y.o. female.    Chief Complaint: Pain (cLBP, CIPN)    Patient states that with gap between treatments that she felt like the pain was returning and that she felt more issues in her feet as well. Pain did not return to original level. Continue weekly for 3 weeks and eval.     Review of Systems   Musculoskeletal: Positive for arthralgias and back pain.   Neurological: Positive for numbness.         Objective:      Physical Exam    Assessment:       Problem List Items Addressed This Visit    None     Visit Diagnoses     Chronic bilateral low back pain without sciatica    -  Primary    Chemotherapy-induced peripheral neuropathy              Plan:       1x a week*         Pre-Symptom Score: NA  Post-Symptom Score: NA     Pain (cLBP, CIPN)       Encounter Diagnoses   Name Primary?    Chronic bilateral low back pain without sciatica Yes    Chemotherapy-induced peripheral neuropathy        Acupuncture points used:  4 ALSTON, BA VICENTE, Gb34, Li11, Sp10, Sp6, Sp9, St36 and YIN SULLIVAN + Kd1    STIM USED @ [facility]      NEEDLES IN: 26  NEEDLES OUT: 26    NEEDLES W/ STIM  AT: 8:10 AM  NEEDLES W/ STIM REMOVED AT: 8:40 AM

## 2022-02-22 ENCOUNTER — TELEPHONE (OUTPATIENT)
Dept: HEMATOLOGY/ONCOLOGY | Facility: CLINIC | Age: 72
End: 2022-02-22
Payer: MEDICARE

## 2022-02-22 NOTE — TELEPHONE ENCOUNTER
"----- Message from Ebony Trujillo sent at 2/21/2022  9:23 AM CST -----  Regarding: Reschedule Existing Appointment  Appt Date: 2/21    Type of appt : Acupuncture    Physician: Dr Jane    Reason for rescheduling? Can't make it    Caller: Sharri    Contact Preference: [phone]                   Additional Information:  "Thank you for all that you do for our patients'     "

## 2022-03-21 NOTE — TELEPHONE ENCOUNTER
MA called patient inform her of the results below, she understood and verbalized understanding. She will see Dr. Villanueva on 12/12.    (2) cough or sneeze

## 2022-05-23 NOTE — PROGRESS NOTES
Subjective:      Patient ID: Sharri Cline is a 72 y.o. female.    Chief Complaint:  No chief complaint on file.    History of Present Illness  Sharri Cline is here for follow up of thyroid nodules and evaluation/management of osteoporosis.  Previously seen by me on 5/2021.    With regards to thyroid nodule:    Found: 2018    Thyroid US   6/24/2021  1.  Thyroid gland is normal in size with heterogenous echotexture.  2.  Two nodules in the right thyroid described above.  Neither meet criteria for FNA currently.  3.  Subcentimeter left middle lobe nodule is unchanged since the previous study.  RECOMMENDATIONS:  Follow-up ultrasound in 1 year is recommended.    FNA:   10/19/18  THYROID, RIGHT LOBE NODULE, FINE-NEEDLE ASPIRATION (CYTOLOGY):  - Benign ([place] Classification System II).  - Benign follicular cells and colloid.    Lab Results   Component Value Date    TSH 0.485 06/24/2021    V8LLQFW 83 12/11/2020    FREET4 0.74 12/11/2020     Signs or Symptoms:   Difficulty breathing: Denies  Difficulty swallowing: + solids but not all the time   Voice Changes: + raspy  FH of thyroid cancer: Denies  Personal history of radiation treatment or exposure: + radiation for breast cancer    With regards to Vitamin D Deficiency:    Vit D, 25-Hydroxy   Date Value Ref Range Status   11/27/2020 27 (L) 30 - 96 ng/mL Final     Comment:     Vitamin D deficiency.........<10 ng/mL                              Vitamin D insufficiency......10-29 ng/mL       Vitamin D sufficiency........> or equal to 30 ng/mL  Vitamin D toxicity............>100 ng/mL       Current Meds: OTC Vit D3 1000iu daily.     With regards to osteoporosis:     BMD:   10/27/2021  Lumbar spine (L1-L4):BMD is 0.709 g/cm2, T-score is -3.1, and Z-score is -1.7.  Total hip:BMD is 0.724 g/cm2, T-score is -1.8, and Z-score is -0.9.  Femoral neck:  BMD is 0.663 g/cm2, T-score is -1.7, and Z-score is -0.6.  Distal 1/3 radius:  Not applicable  FRAX:  7.3% risk of a  major osteoporotic fracture in the next 10 years.  1.0% risk of hip fracture in the next 10 years.  Impression:  *Osteoporosis based on T-score below -2.5  *Fracture risk is very high due to T-score below -3.0  *Compared with previous DXA, BMD at the lumbar spine has declined by 10.1%, and the BMD at the total hip has declined by 9%.  RECOMMENDATIONS:  *Daily calcium intake 1382-2326 mg, dietary sources preferred; Vitamin D 0736-2312 IU daily.  *Weight bearing exercise and fall precautions.  *Recommend medical therapy for osteoporosis. Consider bisphosphonates (alendronate, risedronate, zoledronic acid), denosumab, teriparatide, abaloparatide or romosozumab.  *Repeat BMD in 2 years    Medications: None    Calcium intake: taking a supplement but unsure of dosage  Vit D intake: OTC Vit D3 1000iu daily    Weight bearing exercise: walking 4 or 5 days a week   Falls: Denies   Fractures: Denies  ~1987 fractured wrist (unsure which side) - fell while skating   Significant height loss (>2 inches): Denies     Family history: Denies    Menopause: early 50s  Denies HRT.     Tobacco Use: Denies  Alcohol Use: Denies  Glucocorticoid History: Denies  Anticoagulant Use: Denies  GERD/PPI Use: Denies  History of Malabsorption: Denies  Antiseizure Medications: Denies  History of Thyroid Disease: Denies  Kidney Stones/ Kidney Disease: Denies  Personal history of kidney stones: Denies  Family history of kidney stones: Denies  Family history of bone disease or fracture: Denies  Radiation treatment for breast cancer.    Denies point tenderness along spine  Denies bilateral hip tenderness  Gait - steady     Lab Results   Component Value Date    CALCIUM 10.1 11/02/2021    PHOS 2.9 09/02/2019     Vit D, 25-Hydroxy   Date Value Ref Range Status   11/27/2020 27 (L) 30 - 96 ng/mL Final     Comment:     Vitamin D deficiency.........<10 ng/mL                              Vitamin D insufficiency......10-29 ng/mL       Vitamin D  "sufficiency........> or equal to 30 ng/mL  Vitamin D toxicity............>100 ng/mL         Review of SystemsAs above    Objective:   Physical Exam  Vitals reviewed.   Neck:      Thyroid: Thyromegaly (irregular shaped gland) present.   Cardiovascular:      Rate and Rhythm: Normal rate.      Comments: No edema present  Pulmonary:      Effort: Pulmonary effort is normal.   Abdominal:      Palpations: Abdomen is soft.       Visit Vitals  BP (!) 148/78   Pulse 69   Ht 5' 3" (1.6 m)   Wt 69.7 kg (153 lb 8.8 oz)   SpO2 98%   BMI 27.20 kg/m²     Body mass index is 27.2 kg/m².    Lab Review:   Lab Results   Component Value Date    HGBA1C 5.1 08/30/2019     Lab Results   Component Value Date    CHOL 190 11/02/2021    HDL 64 11/02/2021    LDLCALC 112.6 11/02/2021    TRIG 67 11/02/2021    CHOLHDL 33.7 11/02/2021     Lab Results   Component Value Date     11/02/2021    K 4.1 11/02/2021     11/02/2021    CO2 27 11/02/2021    GLU 93 11/02/2021    BUN 10 11/02/2021    CREATININE 0.9 11/02/2021    CALCIUM 10.1 11/02/2021    PROT 7.3 11/02/2021    ALBUMIN 3.9 11/02/2021    BILITOT 1.1 (H) 11/02/2021    ALKPHOS 156 (H) 11/02/2021    AST 26 11/02/2021    ALT 14 11/02/2021    ANIONGAP 9 11/02/2021    ESTGFRAFRICA >60.0 11/02/2021    EGFRNONAA >60.0 11/02/2021    TSH 0.485 06/24/2021     Vit D, 25-Hydroxy   Date Value Ref Range Status   11/27/2020 27 (L) 30 - 96 ng/mL Final     Comment:     Vitamin D deficiency.........<10 ng/mL                              Vitamin D insufficiency......10-29 ng/mL       Vitamin D sufficiency........> or equal to 30 ng/mL  Vitamin D toxicity............>100 ng/mL       Assessment and Plan     1. Osteoporosis without current pathological fracture, unspecified osteoporosis type  Calcium, Timed Urine    Creatinine Clearance, Timed 24 Hours    PTH, Intact    Tissue Transglutaminase, IgA    Renal Function Panel    TSH    Vitamin D   2. Thyroid nodule  US Soft Tissue Head Neck Thyroid   3. Vitamin " D deficiency     4. Dysphagia, unspecified type  Ambulatory referral/consult to Gastroenterology   5. Abnormal findings on diagnostic imaging of other specified body structures   TSH     Osteoporosis without current pathological fracture  -- Risks include menopause.  -- Reviewed basics of quantity versus quality.  -- Reassuring they are not fracturing.  -- Plan work up of accelerated bone loss to include:  - RFP, PTH, Vit D 25 OH, TSH, Celiac ab  - 24 hour urine Ca and Cr   -- Recommend:  -Pharmacological therapy is recommended for patients with osteopenia if the 10 year probability of a hip fracture is >3% and 10 year probability of other major osteoporotic fractures is >20%.  Treatment options and potential side effects discussed for PO bisphosphonates, reclast, Denosumab, and Teriparatide.   -Treatment:   Discussed treatment options.   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    -Calcium and Vitamin D RDD provided.  -Exercise: recommended.  -Fall precautions made in the home.  -Alerted that if dental work needs to be done it should be done prior to initiating therapy. Dental health: UTD  -- Repeat DEXA scan 10/2023.    Thyroid nodule  -- I have reviewed management options including observation, FNA or surgery.  -- FNA procedure explained in depth.  -- Discussed indications for repeat FNA as well as surgical indications (abnormal FNA, compressive symptoms or interval change).  -- Discussed possible results of FNA- Benign, Malignant, FLUS, or insufficient cells.  Discussed results auto released to patients, but advised the ordering provider will also contact to discuss.   -- Patient is not on any blood thinners.  -- All of the patients questions were answered.  -- FNA:   10/19/18  THYROID, RIGHT LOBE NODULE, FINE-NEEDLE ASPIRATION (CYTOLOGY):  - Benign ([place] Classification System II).  - Benign follicular cells and colloid.  -- Due for thyroid US 6/2022.      Vitamin D deficiency  -- Check vitamin D.  -- CONTINUE: OTC Vit D3 1000iu daily.    Dysphagia  -- GI referral.    Follow up in about 1 year (around 5/25/2023).

## 2022-05-24 NOTE — PROGRESS NOTES
Subjective:       Patient ID: Sharri Cline is a 72 y.o. female.    Chief Complaint: No chief complaint on file.    HPI 72-year-old female who returns to clinic for follow-up of right breast cancer-ER negative.  She had a pathological complete response to neoadjuvant chemotherapy  She is a former patient of .    Overall, she is doing very well.  She has no pain.  Does give you underwent she over exerts.  Appetite is variable bowel function is normal.  Her neuropathy is somewhat better after acupuncture.  She has tingling numbness but no pain.  Her neuropathy does not limit her activity.  Continues to work at the [employer].    Onc history:   - She presented for screening mammo in 5/15/2019 which showed focal asymmetries in both left and right breast. Diagnostic mammogram and US showed no significant abn of left breast, and right breast 15 mm x 11 mm x 12 mm mass at 4:00, 5 CFN. Biopsy on 5/24/19 showed grade 3 IDC, triple negative, Ki67 70%.    - 7/3/19 - 12/10/19 completed neoadjuvant chemotherapy with four cycles of dose-dense Adriamycin and cyclophosphamide with Neulasta support followed by twelve doses of weekly paclitaxel. Dose reduced for cytopenias and neuropathy.   - 1/23/2020 - Dr Hernandez performed right breast lumpectomy with SLN Biopsy with pathology showing no residual carcinoma with 3 neg SLN.   - 4/2/2020 - 4/24/2020 - completed RT    Review of Systems   Constitutional: Negative.    Cardiovascular: Negative.    Gastrointestinal: Negative.    Musculoskeletal: Negative.    Neurological: Positive for numbness. Negative for headaches.   Psychiatric/Behavioral: Negative.          Objective:      Physical Exam  Vitals reviewed.   Constitutional:       General: She is not in acute distress.     Appearance: Normal appearance.   Cardiovascular:      Rate and Rhythm: Normal rate and regular rhythm.   Pulmonary:      Effort: Pulmonary effort is normal. No respiratory distress.      Breath sounds: Normal  breath sounds. No wheezing or rales.   Chest:   Breasts:      Right: No mass, nipple discharge or skin change.      Left: Normal.         Abdominal:      Palpations: Abdomen is soft. There is no mass.      Tenderness: There is no abdominal tenderness.   Lymphadenopathy:      Cervical: No cervical adenopathy.   Neurological:      Mental Status: She is alert and oriented to person, place, and time.   Psychiatric:         Mood and Affect: Mood normal.         Behavior: Behavior normal.         Thought Content: Thought content normal.         Judgment: Judgment normal.         Assessment:       1. Malignant neoplasm of lower-inner quadrant of right breast of female, estrogen receptor negative        Plan:     mammogram in August  [facility]  3Route Chart for Scheduling    Med Onc Chart Routing      Follow up with physician 3 months.   Follow up with [facility]    Labs    Imaging Mammogram      Pharmacy appointment    Other referrals

## 2022-05-25 ENCOUNTER — OFFICE VISIT (OUTPATIENT)
Dept: HEMATOLOGY/ONCOLOGY | Facility: CLINIC | Age: 72
End: 2022-05-25
Payer: MEDICARE

## 2022-05-25 ENCOUNTER — PATIENT MESSAGE (OUTPATIENT)
Dept: ENDOCRINOLOGY | Facility: CLINIC | Age: 72
End: 2022-05-25

## 2022-05-25 ENCOUNTER — OFFICE VISIT (OUTPATIENT)
Dept: ENDOCRINOLOGY | Facility: CLINIC | Age: 72
End: 2022-05-25
Payer: MEDICARE

## 2022-05-25 VITALS
SYSTOLIC BLOOD PRESSURE: 188 MMHG | DIASTOLIC BLOOD PRESSURE: 84 MMHG | BODY MASS INDEX: 27.21 KG/M2 | RESPIRATION RATE: 18 BRPM | TEMPERATURE: 97 F | HEART RATE: 69 BPM | OXYGEN SATURATION: 100 % | HEIGHT: 63 IN | WEIGHT: 153.56 LBS

## 2022-05-25 VITALS
HEIGHT: 63 IN | DIASTOLIC BLOOD PRESSURE: 78 MMHG | HEART RATE: 69 BPM | SYSTOLIC BLOOD PRESSURE: 148 MMHG | BODY MASS INDEX: 27.21 KG/M2 | OXYGEN SATURATION: 98 % | WEIGHT: 153.56 LBS

## 2022-05-25 DIAGNOSIS — Z17.1 MALIGNANT NEOPLASM OF LOWER-INNER QUADRANT OF RIGHT BREAST OF FEMALE, ESTROGEN RECEPTOR NEGATIVE: Primary | ICD-10-CM

## 2022-05-25 DIAGNOSIS — Z12.31 ENCOUNTER FOR SCREENING MAMMOGRAM FOR HIGH-RISK PATIENT: ICD-10-CM

## 2022-05-25 DIAGNOSIS — E04.1 THYROID NODULE: ICD-10-CM

## 2022-05-25 DIAGNOSIS — C50.311 MALIGNANT NEOPLASM OF LOWER-INNER QUADRANT OF RIGHT BREAST OF FEMALE, ESTROGEN RECEPTOR NEGATIVE: Primary | ICD-10-CM

## 2022-05-25 DIAGNOSIS — R93.89 ABNORMAL FINDINGS ON DIAGNOSTIC IMAGING OF OTHER SPECIFIED BODY STRUCTURES: ICD-10-CM

## 2022-05-25 DIAGNOSIS — G62.0 CHEMOTHERAPY-INDUCED NEUROPATHY: ICD-10-CM

## 2022-05-25 DIAGNOSIS — M81.0 OSTEOPOROSIS WITHOUT CURRENT PATHOLOGICAL FRACTURE, UNSPECIFIED OSTEOPOROSIS TYPE: Primary | ICD-10-CM

## 2022-05-25 DIAGNOSIS — R13.10 DYSPHAGIA, UNSPECIFIED TYPE: ICD-10-CM

## 2022-05-25 DIAGNOSIS — E55.9 VITAMIN D DEFICIENCY: ICD-10-CM

## 2022-05-25 DIAGNOSIS — T45.1X5A CHEMOTHERAPY-INDUCED NEUROPATHY: ICD-10-CM

## 2022-05-25 PROBLEM — R79.89 ABNORMAL TSH: Status: RESOLVED | Noted: 2018-09-26 | Resolved: 2022-05-25

## 2022-05-25 PROCEDURE — 1126F AMNT PAIN NOTED NONE PRSNT: CPT | Mod: CPTII,S$GLB,, | Performed by: INTERNAL MEDICINE

## 2022-05-25 PROCEDURE — 99999 PR PBB SHADOW E&M-EST. PATIENT-LVL III: CPT | Mod: PBBFAC,,, | Performed by: NURSE PRACTITIONER

## 2022-05-25 PROCEDURE — 99213 OFFICE O/P EST LOW 20 MIN: CPT | Mod: S$GLB,,, | Performed by: INTERNAL MEDICINE

## 2022-05-25 PROCEDURE — 99214 PR OFFICE/OUTPT VISIT, EST, LEVL IV, 30-39 MIN: ICD-10-PCS | Mod: S$GLB,,, | Performed by: NURSE PRACTITIONER

## 2022-05-25 PROCEDURE — 3008F PR BODY MASS INDEX (BMI) DOCUMENTED: ICD-10-PCS | Mod: CPTII,S$GLB,, | Performed by: NURSE PRACTITIONER

## 2022-05-25 PROCEDURE — 3008F PR BODY MASS INDEX (BMI) DOCUMENTED: ICD-10-PCS | Mod: CPTII,S$GLB,, | Performed by: INTERNAL MEDICINE

## 2022-05-25 PROCEDURE — 1159F PR MEDICATION LIST DOCUMENTED IN MEDICAL RECORD: ICD-10-PCS | Mod: CPTII,S$GLB,, | Performed by: NURSE PRACTITIONER

## 2022-05-25 PROCEDURE — 99499 UNLISTED E&M SERVICE: CPT | Mod: S$GLB,,, | Performed by: INTERNAL MEDICINE

## 2022-05-25 PROCEDURE — 3288F PR FALLS RISK ASSESSMENT DOCUMENTED: ICD-10-PCS | Mod: CPTII,S$GLB,, | Performed by: INTERNAL MEDICINE

## 2022-05-25 PROCEDURE — 3288F FALL RISK ASSESSMENT DOCD: CPT | Mod: CPTII,S$GLB,, | Performed by: INTERNAL MEDICINE

## 2022-05-25 PROCEDURE — 1159F MED LIST DOCD IN RCRD: CPT | Mod: CPTII,S$GLB,, | Performed by: NURSE PRACTITIONER

## 2022-05-25 PROCEDURE — 3079F PR MOST RECENT DIASTOLIC BLOOD PRESSURE 80-89 MM HG: ICD-10-PCS | Mod: CPTII,S$GLB,, | Performed by: INTERNAL MEDICINE

## 2022-05-25 PROCEDURE — 99213 PR OFFICE/OUTPT VISIT, EST, LEVL III, 20-29 MIN: ICD-10-PCS | Mod: S$GLB,,, | Performed by: INTERNAL MEDICINE

## 2022-05-25 PROCEDURE — 1159F PR MEDICATION LIST DOCUMENTED IN MEDICAL RECORD: ICD-10-PCS | Mod: CPTII,S$GLB,, | Performed by: INTERNAL MEDICINE

## 2022-05-25 PROCEDURE — 1160F PR REVIEW ALL MEDS BY PRESCRIBER/CLIN PHARMACIST DOCUMENTED: ICD-10-PCS | Mod: CPTII,S$GLB,, | Performed by: NURSE PRACTITIONER

## 2022-05-25 PROCEDURE — 1101F PT FALLS ASSESS-DOCD LE1/YR: CPT | Mod: CPTII,S$GLB,, | Performed by: NURSE PRACTITIONER

## 2022-05-25 PROCEDURE — 3077F SYST BP >= 140 MM HG: CPT | Mod: CPTII,S$GLB,, | Performed by: NURSE PRACTITIONER

## 2022-05-25 PROCEDURE — 3077F PR MOST RECENT SYSTOLIC BLOOD PRESSURE >= 140 MM HG: ICD-10-PCS | Mod: CPTII,S$GLB,, | Performed by: NURSE PRACTITIONER

## 2022-05-25 PROCEDURE — 3078F PR MOST RECENT DIASTOLIC BLOOD PRESSURE < 80 MM HG: ICD-10-PCS | Mod: CPTII,S$GLB,, | Performed by: NURSE PRACTITIONER

## 2022-05-25 PROCEDURE — 3008F BODY MASS INDEX DOCD: CPT | Mod: CPTII,S$GLB,, | Performed by: INTERNAL MEDICINE

## 2022-05-25 PROCEDURE — 1101F PR PT FALLS ASSESS DOC 0-1 FALLS W/OUT INJ PAST YR: ICD-10-PCS | Mod: CPTII,S$GLB,, | Performed by: INTERNAL MEDICINE

## 2022-05-25 PROCEDURE — 99999 PR PBB SHADOW E&M-EST. PATIENT-LVL III: CPT | Mod: PBBFAC,,, | Performed by: INTERNAL MEDICINE

## 2022-05-25 PROCEDURE — 1159F MED LIST DOCD IN RCRD: CPT | Mod: CPTII,S$GLB,, | Performed by: INTERNAL MEDICINE

## 2022-05-25 PROCEDURE — 99999 PR PBB SHADOW E&M-EST. PATIENT-LVL III: ICD-10-PCS | Mod: PBBFAC,,, | Performed by: NURSE PRACTITIONER

## 2022-05-25 PROCEDURE — 1126F AMNT PAIN NOTED NONE PRSNT: CPT | Mod: CPTII,S$GLB,, | Performed by: NURSE PRACTITIONER

## 2022-05-25 PROCEDURE — 1126F PR PAIN SEVERITY QUANTIFIED, NO PAIN PRESENT: ICD-10-PCS | Mod: CPTII,S$GLB,, | Performed by: NURSE PRACTITIONER

## 2022-05-25 PROCEDURE — 99499 RISK ADDL DX/OHS AUDIT: ICD-10-PCS | Mod: S$GLB,,, | Performed by: INTERNAL MEDICINE

## 2022-05-25 PROCEDURE — 3288F PR FALLS RISK ASSESSMENT DOCUMENTED: ICD-10-PCS | Mod: CPTII,S$GLB,, | Performed by: NURSE PRACTITIONER

## 2022-05-25 PROCEDURE — 1126F PR PAIN SEVERITY QUANTIFIED, NO PAIN PRESENT: ICD-10-PCS | Mod: CPTII,S$GLB,, | Performed by: INTERNAL MEDICINE

## 2022-05-25 PROCEDURE — 3079F DIAST BP 80-89 MM HG: CPT | Mod: CPTII,S$GLB,, | Performed by: INTERNAL MEDICINE

## 2022-05-25 PROCEDURE — 1160F RVW MEDS BY RX/DR IN RCRD: CPT | Mod: CPTII,S$GLB,, | Performed by: NURSE PRACTITIONER

## 2022-05-25 PROCEDURE — 3077F PR MOST RECENT SYSTOLIC BLOOD PRESSURE >= 140 MM HG: ICD-10-PCS | Mod: CPTII,S$GLB,, | Performed by: INTERNAL MEDICINE

## 2022-05-25 PROCEDURE — 99214 OFFICE O/P EST MOD 30 MIN: CPT | Mod: S$GLB,,, | Performed by: NURSE PRACTITIONER

## 2022-05-25 PROCEDURE — 99999 PR PBB SHADOW E&M-EST. PATIENT-LVL III: ICD-10-PCS | Mod: PBBFAC,,, | Performed by: INTERNAL MEDICINE

## 2022-05-25 PROCEDURE — 1101F PR PT FALLS ASSESS DOC 0-1 FALLS W/OUT INJ PAST YR: ICD-10-PCS | Mod: CPTII,S$GLB,, | Performed by: NURSE PRACTITIONER

## 2022-05-25 PROCEDURE — 3078F DIAST BP <80 MM HG: CPT | Mod: CPTII,S$GLB,, | Performed by: NURSE PRACTITIONER

## 2022-05-25 PROCEDURE — 1101F PT FALLS ASSESS-DOCD LE1/YR: CPT | Mod: CPTII,S$GLB,, | Performed by: INTERNAL MEDICINE

## 2022-05-25 PROCEDURE — 3077F SYST BP >= 140 MM HG: CPT | Mod: CPTII,S$GLB,, | Performed by: INTERNAL MEDICINE

## 2022-05-25 PROCEDURE — 3288F FALL RISK ASSESSMENT DOCD: CPT | Mod: CPTII,S$GLB,, | Performed by: NURSE PRACTITIONER

## 2022-05-25 PROCEDURE — 3008F BODY MASS INDEX DOCD: CPT | Mod: CPTII,S$GLB,, | Performed by: NURSE PRACTITIONER

## 2022-05-25 PROCEDURE — 99499 UNLISTED E&M SERVICE: CPT | Mod: S$GLB,,, | Performed by: NURSE PRACTITIONER

## 2022-05-25 PROCEDURE — 99499 RISK ADDL DX/OHS AUDIT: ICD-10-PCS | Mod: S$GLB,,, | Performed by: NURSE PRACTITIONER

## 2022-05-25 NOTE — ASSESSMENT & PLAN NOTE
-- I have reviewed management options including observation, FNA or surgery.  -- FNA procedure explained in depth.  -- Discussed indications for repeat FNA as well as surgical indications (abnormal FNA, compressive symptoms or interval change).  -- Discussed possible results of FNA- Benign, Malignant, FLUS, or insufficient cells.  Discussed results auto released to patients, but advised the ordering provider will also contact to discuss.   -- Patient is not on any blood thinners.  -- All of the patients questions were answered.  -- FNA:   10/19/18  THYROID, RIGHT LOBE NODULE, FINE-NEEDLE ASPIRATION (CYTOLOGY):  - Benign ([place] Classification System II).  - Benign follicular cells and colloid.  -- Due for thyroid US 6/2022.

## 2022-05-25 NOTE — ASSESSMENT & PLAN NOTE
-- Risks include menopause.  -- Reviewed basics of quantity versus quality.  -- Reassuring they are not fracturing.  -- Plan work up of accelerated bone loss to include:  - RFP, PTH, Vit D 25 OH, TSH, Celiac ab  - 24 hour urine Ca and Cr   -- Recommend:  -Pharmacological therapy is recommended for patients with osteopenia if the 10 year probability of a hip fracture is >3% and 10 year probability of other major osteoporotic fractures is >20%.  Treatment options and potential side effects discussed for PO bisphosphonates, reclast, Denosumab, and Teriparatide.   -Treatment:   Discussed treatment options.   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    -Calcium and Vitamin D RDD provided.  -Exercise: recommended.  -Fall precautions made in the home.  -Alerted that if dental work needs to be done it should be done prior to initiating therapy. Dental health: UTD  -- Repeat DEXA scan 10/2023.

## 2022-05-30 ENCOUNTER — LAB VISIT (OUTPATIENT)
Dept: LAB | Facility: HOSPITAL | Age: 72
End: 2022-05-30
Payer: MEDICARE

## 2022-05-30 DIAGNOSIS — R93.89 ABNORMAL FINDINGS ON DIAGNOSTIC IMAGING OF OTHER SPECIFIED BODY STRUCTURES: ICD-10-CM

## 2022-05-30 DIAGNOSIS — M81.0 OSTEOPOROSIS WITHOUT CURRENT PATHOLOGICAL FRACTURE, UNSPECIFIED OSTEOPOROSIS TYPE: ICD-10-CM

## 2022-05-30 LAB
25(OH)D3+25(OH)D2 SERPL-MCNC: 39 NG/ML (ref 30–96)
ALBUMIN SERPL BCP-MCNC: 3.6 G/DL (ref 3.5–5.2)
ANION GAP SERPL CALC-SCNC: 9 MMOL/L (ref 8–16)
BUN SERPL-MCNC: 14 MG/DL (ref 8–23)
CALCIUM 24H UR-MRATE: 3 MG/HR (ref 4–12)
CALCIUM SERPL-MCNC: 9.4 MG/DL (ref 8.7–10.5)
CALCIUM UR-MCNC: 6.2 MG/DL (ref 0–15)
CALCIUM URINE (MG/SPEC): 81 MG/SPEC
CHLORIDE SERPL-SCNC: 110 MMOL/L (ref 95–110)
CO2 SERPL-SCNC: 25 MMOL/L (ref 23–29)
CREAT CL/1.73 SQ M 12H UR+SERPL-ARVRAT: 70 ML/MIN (ref 70–110)
CREAT SERPL-MCNC: 0.8 MG/DL (ref 0.5–1.4)
CREAT SERPL-MCNC: 0.9 MG/DL (ref 0.5–1.4)
CREAT UR-MCNC: 62 MG/DL (ref 15–325)
CREATININE, URINE (MG/SPEC): 806 MG/SPEC
EST. GFR  (AFRICAN AMERICAN): >60 ML/MIN/1.73 M^2
EST. GFR  (NON AFRICAN AMERICAN): >60 ML/MIN/1.73 M^2
GLUCOSE SERPL-MCNC: 87 MG/DL (ref 70–110)
PHOSPHATE SERPL-MCNC: 4 MG/DL (ref 2.7–4.5)
POTASSIUM SERPL-SCNC: 4.1 MMOL/L (ref 3.5–5.1)
PTH-INTACT SERPL-MCNC: 94.9 PG/ML (ref 9–77)
SODIUM SERPL-SCNC: 144 MMOL/L (ref 136–145)
TSH SERPL DL<=0.005 MIU/L-ACNC: 1.82 UIU/ML (ref 0.4–4)
URINE COLLECTION DURATION: 24 HR
URINE COLLECTION DURATION: 24 HR
URINE VOLUME: 1300 ML
URINE VOLUME: 1300 ML

## 2022-05-30 PROCEDURE — 83970 ASSAY OF PARATHORMONE: CPT | Performed by: NURSE PRACTITIONER

## 2022-05-30 PROCEDURE — 82306 VITAMIN D 25 HYDROXY: CPT | Performed by: NURSE PRACTITIONER

## 2022-05-30 PROCEDURE — 84443 ASSAY THYROID STIM HORMONE: CPT | Performed by: NURSE PRACTITIONER

## 2022-05-30 PROCEDURE — 83516 IMMUNOASSAY NONANTIBODY: CPT | Performed by: NURSE PRACTITIONER

## 2022-05-30 PROCEDURE — 82340 ASSAY OF CALCIUM IN URINE: CPT | Performed by: NURSE PRACTITIONER

## 2022-05-30 PROCEDURE — 80069 RENAL FUNCTION PANEL: CPT | Performed by: NURSE PRACTITIONER

## 2022-05-30 PROCEDURE — 82575 CREATININE CLEARANCE TEST: CPT | Performed by: NURSE PRACTITIONER

## 2022-06-02 ENCOUNTER — PATIENT MESSAGE (OUTPATIENT)
Dept: ENDOCRINOLOGY | Facility: CLINIC | Age: 72
End: 2022-06-02
Payer: MEDICARE

## 2022-06-02 LAB — TTG IGA SER-ACNC: 4 UNITS

## 2022-06-02 NOTE — TELEPHONE ENCOUNTER
Component      Latest Ref Rng & Units 5/30/2022 5/30/2022 5/30/2022           8:15 AM  8:04 AM  8:04 AM   Glucose      70 - 110 mg/dL 87     Sodium      136 - 145 mmol/L 144     Potassium      3.5 - 5.1 mmol/L 4.1     Chloride      95 - 110 mmol/L 110     CO2      23 - 29 mmol/L 25     BUN      8 - 23 mg/dL 14     Calcium      8.7 - 10.5 mg/dL 9.4     Creatinine      0.5 - 1.4 mg/dL 0.9 0.8    Albumin      3.5 - 5.2 g/dL 3.6     Phosphorus      2.7 - 4.5 mg/dL 4.0     eGFR if African American      >60 mL/min/1.73 m:2 >60.0     eGFR if non African American      >60 mL/min/1.73 m:2 >60.0     Anion Gap      8 - 16 mmol/L 9     Urine Total Volume      mL  1300 1300   Urine Collection Duration      Hr  24 24   CREATININE, URINE (SEND OUT)      15.0 - 325.0 mg/dL  62.0    Creatinine Clearance      70 - 110 mL/min  70    Creatinine, Urinr (mg/spec)      mg/Spec  806.0    Calcium, Urine      0.0 - 15.0 mg/dL   6.2   Calcium, 24 Hr Urine      4 - 12 mg/Hr   3 (L)   Calcium, Urine (mg/spec)      mg/Spec   81   PTH      9.0 - 77.0 pg/mL 94.9 (H)     TTG IgA      <20 UNITS 4     TSH      0.400 - 4.000 uIU/mL 1.823     Vit D, 25-Hydroxy      30 - 96 ng/mL 39

## 2022-06-08 ENCOUNTER — OFFICE VISIT (OUTPATIENT)
Dept: URGENT CARE | Facility: CLINIC | Age: 72
End: 2022-06-08
Payer: MEDICARE

## 2022-06-08 VITALS
RESPIRATION RATE: 18 BRPM | SYSTOLIC BLOOD PRESSURE: 147 MMHG | OXYGEN SATURATION: 99 % | TEMPERATURE: 99 F | HEIGHT: 63 IN | WEIGHT: 153 LBS | BODY MASS INDEX: 27.11 KG/M2 | DIASTOLIC BLOOD PRESSURE: 74 MMHG | HEART RATE: 94 BPM

## 2022-06-08 DIAGNOSIS — Z11.52 ENCOUNTER FOR SCREENING LABORATORY TESTING FOR COVID-19 VIRUS: ICD-10-CM

## 2022-06-08 DIAGNOSIS — J30.2 SEASONAL ALLERGIES: Primary | ICD-10-CM

## 2022-06-08 LAB
CTP QC/QA: YES
SARS-COV-2 RDRP RESP QL NAA+PROBE: NEGATIVE

## 2022-06-08 PROCEDURE — 3077F SYST BP >= 140 MM HG: CPT | Mod: CPTII,S$GLB,, | Performed by: STUDENT IN AN ORGANIZED HEALTH CARE EDUCATION/TRAINING PROGRAM

## 2022-06-08 PROCEDURE — 1160F RVW MEDS BY RX/DR IN RCRD: CPT | Mod: CPTII,S$GLB,, | Performed by: STUDENT IN AN ORGANIZED HEALTH CARE EDUCATION/TRAINING PROGRAM

## 2022-06-08 PROCEDURE — 3008F BODY MASS INDEX DOCD: CPT | Mod: CPTII,S$GLB,, | Performed by: STUDENT IN AN ORGANIZED HEALTH CARE EDUCATION/TRAINING PROGRAM

## 2022-06-08 PROCEDURE — 3077F PR MOST RECENT SYSTOLIC BLOOD PRESSURE >= 140 MM HG: ICD-10-PCS | Mod: CPTII,S$GLB,, | Performed by: STUDENT IN AN ORGANIZED HEALTH CARE EDUCATION/TRAINING PROGRAM

## 2022-06-08 PROCEDURE — 99213 OFFICE O/P EST LOW 20 MIN: CPT | Mod: S$GLB,,, | Performed by: STUDENT IN AN ORGANIZED HEALTH CARE EDUCATION/TRAINING PROGRAM

## 2022-06-08 PROCEDURE — 1126F AMNT PAIN NOTED NONE PRSNT: CPT | Mod: CPTII,S$GLB,, | Performed by: STUDENT IN AN ORGANIZED HEALTH CARE EDUCATION/TRAINING PROGRAM

## 2022-06-08 PROCEDURE — U0002 COVID-19 LAB TEST NON-CDC: HCPCS | Mod: QW,S$GLB,, | Performed by: STUDENT IN AN ORGANIZED HEALTH CARE EDUCATION/TRAINING PROGRAM

## 2022-06-08 PROCEDURE — 99213 PR OFFICE/OUTPT VISIT, EST, LEVL III, 20-29 MIN: ICD-10-PCS | Mod: S$GLB,,, | Performed by: STUDENT IN AN ORGANIZED HEALTH CARE EDUCATION/TRAINING PROGRAM

## 2022-06-08 PROCEDURE — 1159F MED LIST DOCD IN RCRD: CPT | Mod: CPTII,S$GLB,, | Performed by: STUDENT IN AN ORGANIZED HEALTH CARE EDUCATION/TRAINING PROGRAM

## 2022-06-08 PROCEDURE — 1126F PR PAIN SEVERITY QUANTIFIED, NO PAIN PRESENT: ICD-10-PCS | Mod: CPTII,S$GLB,, | Performed by: STUDENT IN AN ORGANIZED HEALTH CARE EDUCATION/TRAINING PROGRAM

## 2022-06-08 PROCEDURE — 1160F PR REVIEW ALL MEDS BY PRESCRIBER/CLIN PHARMACIST DOCUMENTED: ICD-10-PCS | Mod: CPTII,S$GLB,, | Performed by: STUDENT IN AN ORGANIZED HEALTH CARE EDUCATION/TRAINING PROGRAM

## 2022-06-08 PROCEDURE — 3008F PR BODY MASS INDEX (BMI) DOCUMENTED: ICD-10-PCS | Mod: CPTII,S$GLB,, | Performed by: STUDENT IN AN ORGANIZED HEALTH CARE EDUCATION/TRAINING PROGRAM

## 2022-06-08 PROCEDURE — 1159F PR MEDICATION LIST DOCUMENTED IN MEDICAL RECORD: ICD-10-PCS | Mod: CPTII,S$GLB,, | Performed by: STUDENT IN AN ORGANIZED HEALTH CARE EDUCATION/TRAINING PROGRAM

## 2022-06-08 PROCEDURE — 3078F DIAST BP <80 MM HG: CPT | Mod: CPTII,S$GLB,, | Performed by: STUDENT IN AN ORGANIZED HEALTH CARE EDUCATION/TRAINING PROGRAM

## 2022-06-08 PROCEDURE — 3078F PR MOST RECENT DIASTOLIC BLOOD PRESSURE < 80 MM HG: ICD-10-PCS | Mod: CPTII,S$GLB,, | Performed by: STUDENT IN AN ORGANIZED HEALTH CARE EDUCATION/TRAINING PROGRAM

## 2022-06-08 PROCEDURE — U0002: ICD-10-PCS | Mod: QW,S$GLB,, | Performed by: STUDENT IN AN ORGANIZED HEALTH CARE EDUCATION/TRAINING PROGRAM

## 2022-06-08 NOTE — LETTER
[address] [facility]  [place] [address]  Phone: [phone]  Fax: [phone]          Return to Work/School    Patient: Sharri Cline  YOB: 1950   Date: 06/08/2022     To Whom It May Concern:     Sharri Cline was in contact with/seen in my office on 06/08/2022. COVID-19 is present in our communities across the [facility].      Sharri Cline has met the criteria for COVID-19 testing and has a NEGATIVE result. The employee is cleared to return to work 06/08/2022.    If you have any questions or concerns, or if I can be of further assistance, please do not hesitate to contact me.     Sincerely,      Mar Landeros MD

## 2022-06-08 NOTE — PROGRESS NOTES
"Subjective:       Patient ID: Sharri Cline is a 72 y.o. female.    Vitals:  height is 5' 3" (1.6 m) and weight is 69.4 kg (153 lb). Her temperature is 98.8 °F (37.1 °C). Her blood pressure is 147/74 (abnormal) and her pulse is 94. Her respiration is 18 and oxygen saturation is 99%.     Chief Complaint: Cough    71 yo F presents to urgent care with request for COVID testing. States she was informed by her boss that she may have been exposed. Patient has a mild cough that she has attributed to her allergies. Denies any other symptoms.     Cough  This is a new problem. The current episode started in the past 7 days. The problem has been gradually worsening. The problem occurs every few hours. The cough is non-productive. Pertinent negatives include no chills, fever or shortness of breath. Nothing aggravates the symptoms. She has tried nothing for the symptoms.       Constitution: Negative for chills and fever.   Respiratory: Positive for cough. Negative for sputum production and shortness of breath.        Objective:      Physical Exam   Constitutional: She is oriented to person, place, and time. She does not appear ill. No distress.   HENT:   Head: Normocephalic.   Pulmonary/Chest: Effort normal and breath sounds normal. No respiratory distress. She has no wheezes.   Neurological: She is alert and oriented to person, place, and time. Coordination normal.   Psychiatric: Her behavior is normal.   Nursing note and vitals reviewed.        Assessment:       1. Seasonal allergies    2. Encounter for screening laboratory testing for COVID-19 virus        Results for orders placed or performed in visit on 06/08/22   POCT COVID-19 Rapid Screening   Result Value Ref Range    POC Rapid COVID Negative Negative     Acceptable Yes        Plan:         Seasonal allergies  -     POCT COVID-19 Rapid Screening    Encounter for screening laboratory testing for COVID-19 virus  -     POCT COVID-19 Rapid " Screening      Vitals stable. No evidence of respiratory distress noted, able to speak in complete sentences without pause.    Requesting COVID-19 testing post exposure and/or given symptoms.   Tested for COVID-19 today. Rapid test was Negative. Educated on COVID-19 and COVID-19 testing. Advised on COVID-19 precautions. COVID Risk Score: 2 .     Discussed supportive care and OTC meds for symptom relief. Advised on return/follow-up precautions. Answered all patient questions. Patient verbalized understanding and voiced agreement with current treatment plan.

## 2022-06-08 NOTE — PATIENT INSTRUCTIONS
Below are suggestions for symptomatic relief of your upper respiratory symptoms:              -Salt water gargles to soothe throat pain.              -Chloroseptic spray and Cepacol lozenges also help to numb throat pain.              -Warm herbal teas with honey/lemon/mark can help soothe sore throat and hoarseness              -Nasal saline spray reduces inflammation and dryness.              -Warm face compresses to help with facial sinus pain/pressure.              -Humidifiers and steam can help with nasal dryness and congestion              -Vicks vapor rub at night.              -Flonase OTC or Nasacort OTC for nasal congestion and post-nasal drip. Ok to use twice daily for the first week, then reduce to once daily after symptoms have begun to improve.              -Afrin is a nasal spray that can give immediate relief of nasal congestion but you cannot use this medication for more than 3 days              -Simple foods like chicken noodle soup.              - Mucinex for congestion or Mucinex DM for cough during the day time. Delsym helps with coughing at night. Mucinex-D if you have sinus pressure/sinus pain or chest congestion. (caution if history of high blood pressure or palpitations). You must increase your water intake when using expectorants (Mucinex).            -Zyrtec/Claritin/Allegra/Xyzal should help with allergies.  -If you DO NOT have Hypertension or any history of palpitations, it is ok to take over the counter Sudafed or Mucinex D or Allegra-D or Claritin-D or Zyrtec-D.  -If you do take one of the above, it is ok to combine that with plain over the counter Mucinex or Allegra or Claritin or Zyrtec. If, for example, you are taking Zyrtec -D, you can combine that with Mucinex, but not Mucinex-D.  If you are taking Mucinex-D, you can combine that with plain Allegra or Claritin or Zyrtec.   -If you DO have Hypertension or palpitations, it is safe to take Coricidin HBP for relief of sinus  symptoms.

## 2022-06-15 ENCOUNTER — OFFICE VISIT (OUTPATIENT)
Dept: URGENT CARE | Facility: CLINIC | Age: 72
End: 2022-06-15
Payer: MEDICARE

## 2022-06-15 VITALS
DIASTOLIC BLOOD PRESSURE: 83 MMHG | BODY MASS INDEX: 27.11 KG/M2 | SYSTOLIC BLOOD PRESSURE: 138 MMHG | RESPIRATION RATE: 19 BRPM | OXYGEN SATURATION: 99 % | HEART RATE: 75 BPM | HEIGHT: 63 IN | WEIGHT: 153 LBS | TEMPERATURE: 98 F

## 2022-06-15 DIAGNOSIS — R05.9 COUGH: ICD-10-CM

## 2022-06-15 DIAGNOSIS — U07.1 COVID-19: Primary | ICD-10-CM

## 2022-06-15 DIAGNOSIS — U07.1 COVID-19 VIRUS DETECTED: ICD-10-CM

## 2022-06-15 LAB
CTP QC/QA: YES
SARS-COV-2 RDRP RESP QL NAA+PROBE: POSITIVE

## 2022-06-15 PROCEDURE — 3079F PR MOST RECENT DIASTOLIC BLOOD PRESSURE 80-89 MM HG: ICD-10-PCS | Mod: CPTII,S$GLB,, | Performed by: NURSE PRACTITIONER

## 2022-06-15 PROCEDURE — 1159F MED LIST DOCD IN RCRD: CPT | Mod: CPTII,S$GLB,, | Performed by: NURSE PRACTITIONER

## 2022-06-15 PROCEDURE — 3008F PR BODY MASS INDEX (BMI) DOCUMENTED: ICD-10-PCS | Mod: CPTII,S$GLB,, | Performed by: NURSE PRACTITIONER

## 2022-06-15 PROCEDURE — 99213 OFFICE O/P EST LOW 20 MIN: CPT | Mod: S$GLB,,, | Performed by: NURSE PRACTITIONER

## 2022-06-15 PROCEDURE — 3079F DIAST BP 80-89 MM HG: CPT | Mod: CPTII,S$GLB,, | Performed by: NURSE PRACTITIONER

## 2022-06-15 PROCEDURE — 1160F RVW MEDS BY RX/DR IN RCRD: CPT | Mod: CPTII,S$GLB,, | Performed by: NURSE PRACTITIONER

## 2022-06-15 PROCEDURE — U0002: ICD-10-PCS | Mod: QW,S$GLB,, | Performed by: NURSE PRACTITIONER

## 2022-06-15 PROCEDURE — 1160F PR REVIEW ALL MEDS BY PRESCRIBER/CLIN PHARMACIST DOCUMENTED: ICD-10-PCS | Mod: CPTII,S$GLB,, | Performed by: NURSE PRACTITIONER

## 2022-06-15 PROCEDURE — 1159F PR MEDICATION LIST DOCUMENTED IN MEDICAL RECORD: ICD-10-PCS | Mod: CPTII,S$GLB,, | Performed by: NURSE PRACTITIONER

## 2022-06-15 PROCEDURE — 99213 PR OFFICE/OUTPT VISIT, EST, LEVL III, 20-29 MIN: ICD-10-PCS | Mod: S$GLB,,, | Performed by: NURSE PRACTITIONER

## 2022-06-15 PROCEDURE — 3075F PR MOST RECENT SYSTOLIC BLOOD PRESS GE 130-139MM HG: ICD-10-PCS | Mod: CPTII,S$GLB,, | Performed by: NURSE PRACTITIONER

## 2022-06-15 PROCEDURE — 3075F SYST BP GE 130 - 139MM HG: CPT | Mod: CPTII,S$GLB,, | Performed by: NURSE PRACTITIONER

## 2022-06-15 PROCEDURE — U0002 COVID-19 LAB TEST NON-CDC: HCPCS | Mod: QW,S$GLB,, | Performed by: NURSE PRACTITIONER

## 2022-06-15 PROCEDURE — 3008F BODY MASS INDEX DOCD: CPT | Mod: CPTII,S$GLB,, | Performed by: NURSE PRACTITIONER

## 2022-06-15 NOTE — LETTER
[address] [facility]  [place] [address]  Phone: [phone]  Fax: [phone]          Return to Work/School    Patient: Sharri Cline  YOB: 1950   Date: 06/15/2022     To Whom It May Concern:     Sharri Cline was in contact with/seen in my office on 06/15/2022. COVID-19 is present in our communities across the state. There is limited testing for COVID at this time, so not all patients can be tested. In this situation, your employee meets the following criteria:     Sharri Cline has met the criteria for COVID-19 testing and has a POSITIVE result. She can return to work once they are asymptomatic for 24 hours without the use of fever reducing medications AND at least five days from the start of symptoms (or from the first positive result if they have no symptoms).      If you have any questions or concerns, or if I can be of further assistance, please do not hesitate to contact me.     Sincerely,    Linh Caba NP

## 2022-06-15 NOTE — PROGRESS NOTES
"Subjective:       Patient ID: Sharri Cline is a 72 y.o. female.    Vitals:  height is 5' 3" (1.6 m) and weight is 69.4 kg (153 lb). Her temperature is 97.8 °F (36.6 °C). Her blood pressure is 138/83 and her pulse is 75. Her respiration is 19 and oxygen saturation is 99%.     Chief Complaint: Cough    This is a 72 y.o. female who presents today with a chief complaint of cough and lightheadedness x 2 weeks. Severity remains unchanged since last visit 1 week ago. Has been taking vasquez seltzer cold and cough, which helps. Pt was notified of covid exposure at work.    Cough  This is a recurrent problem. The current episode started 1 to 4 weeks ago. The problem has been unchanged. The problem occurs hourly. The cough is non-productive. Associated symptoms include chills and shortness of breath. Pertinent negatives include no chest pain, ear pain, fever, headaches, nasal congestion, sore throat or sweats. Nothing aggravates the symptoms. Treatments tried: vasquez selzter. The treatment provided moderate relief. There is no history of asthma, bronchitis or pneumonia.       Constitution: Positive for chills. Negative for fever.   HENT: Negative for ear pain and sore throat.    Cardiovascular: Negative for chest pain.   Respiratory: Positive for cough and shortness of breath.    Neurological: Negative for headaches.       Objective:      Physical Exam   Constitutional: She is oriented to person, place, and time. She appears well-developed. She is cooperative.  Non-toxic appearance. She does not appear ill. No distress.   HENT:   Head: Normocephalic and atraumatic.   Ears:   Right Ear: Hearing, tympanic membrane, external ear and ear canal normal.   Left Ear: Hearing, tympanic membrane, external ear and ear canal normal.   Nose: Nose normal. No mucosal edema, rhinorrhea or nasal deformity. No epistaxis. Right sinus exhibits no maxillary sinus tenderness and no frontal sinus tenderness. Left sinus exhibits no maxillary sinus " tenderness and no frontal sinus tenderness.   Mouth/Throat: Uvula is midline, oropharynx is clear and moist and mucous membranes are normal. No trismus in the jaw. Normal dentition. No uvula swelling. No oropharyngeal exudate, posterior oropharyngeal edema or posterior oropharyngeal erythema.   Hoarse voice      Comments: Hoarse voice  Eyes: Conjunctivae and lids are normal. No scleral icterus.   Neck: Trachea normal and phonation normal. Neck supple. No edema present. No erythema present. No neck rigidity present.   Cardiovascular: Normal rate, regular rhythm, normal heart sounds and normal pulses.   Pulmonary/Chest: Effort normal and breath sounds normal. No respiratory distress. She has no decreased breath sounds. She has no rhonchi.   Abdominal: Normal appearance.   Musculoskeletal: Normal range of motion.         General: No deformity. Normal range of motion.   Neurological: She is alert and oriented to person, place, and time. She exhibits normal muscle tone. Coordination normal.   Skin: Skin is warm, dry, intact, not diaphoretic and not pale.   Psychiatric: Her speech is normal and behavior is normal. Judgment and thought content normal.   Nursing note and vitals reviewed.     Results for orders placed or performed in visit on 06/15/22   POCT COVID-19 Rapid Screening   Result Value Ref Range    POC Rapid COVID Positive (A) Negative     Acceptable Yes              Assessment:       1. COVID-19    2. Cough          Plan:         COVID-19    Cough  -     POCT COVID-19 Rapid Screening         Patient Instructions   - You must understand that you have received an Urgent Care treatment only and that you may be released before all of your medical problems are known or treated.   - You, the patient, will arrange for follow up care as instructed.   - If your condition worsens or fails to improve we recommend that you receive another evaluation at the ER immediately or contact your PCP to discuss your  concerns.   - You can call [phone] or [phone] to help schedule an appointment with the appropriate provider.    Drink plenty of fluids   Get lots of rest  Tylenol or ibuprofen for pain/fever  Mucinex DM for cough  Saline nasal rinses to irrigate sinus cavities  Warm salt water gargles for sore throat    Isolation:   Stay home for 5 days.  If you have no symptoms or your symptoms are resolving after 5 days, you can leave your house.  Continue to wear a mask around others for 5 additional days.  If you have a fever, continue to stay home until your fever resolves.

## 2022-06-15 NOTE — PATIENT INSTRUCTIONS
- You must understand that you have received an Urgent Care treatment only and that you may be released before all of your medical problems are known or treated.   - You, the patient, will arrange for follow up care as instructed.   - If your condition worsens or fails to improve we recommend that you receive another evaluation at the ER immediately or contact your PCP to discuss your concerns.   - You can call [phone] or [phone] to help schedule an appointment with the appropriate provider.    Drink plenty of fluids   Get lots of rest  Tylenol or ibuprofen for pain/fever  Mucinex DM for cough  Saline nasal rinses to irrigate sinus cavities  Warm salt water gargles for sore throat    Isolation:   Stay home for 5 days.  If you have no symptoms or your symptoms are resolving after 5 days, you can leave your house.  Continue to wear a mask around others for 5 additional days.  If you have a fever, continue to stay home until your fever resolves.

## 2022-08-05 ENCOUNTER — TELEPHONE (OUTPATIENT)
Dept: ENDOCRINOLOGY | Facility: CLINIC | Age: 72
End: 2022-08-05
Payer: MEDICARE

## 2022-08-08 ENCOUNTER — OFFICE VISIT (OUTPATIENT)
Dept: SURGERY | Facility: CLINIC | Age: 72
End: 2022-08-08
Payer: MEDICARE

## 2022-08-08 ENCOUNTER — OFFICE VISIT (OUTPATIENT)
Dept: HEMATOLOGY/ONCOLOGY | Facility: CLINIC | Age: 72
End: 2022-08-08
Payer: MEDICARE

## 2022-08-08 ENCOUNTER — HOSPITAL ENCOUNTER (OUTPATIENT)
Dept: RADIOLOGY | Facility: HOSPITAL | Age: 72
Discharge: HOME | End: 2022-08-08
Attending: INTERNAL MEDICINE
Payer: MEDICARE

## 2022-08-08 VITALS — BODY MASS INDEX: 27.28 KG/M2 | WEIGHT: 154 LBS

## 2022-08-08 VITALS
SYSTOLIC BLOOD PRESSURE: 146 MMHG | HEIGHT: 63 IN | BODY MASS INDEX: 27.64 KG/M2 | WEIGHT: 156 LBS | HEART RATE: 66 BPM | DIASTOLIC BLOOD PRESSURE: 66 MMHG

## 2022-08-08 VITALS
HEART RATE: 66 BPM | OXYGEN SATURATION: 100 % | WEIGHT: 156.31 LBS | HEIGHT: 63 IN | DIASTOLIC BLOOD PRESSURE: 66 MMHG | RESPIRATION RATE: 18 BRPM | BODY MASS INDEX: 27.7 KG/M2 | TEMPERATURE: 97 F | SYSTOLIC BLOOD PRESSURE: 146 MMHG

## 2022-08-08 DIAGNOSIS — Z12.39 ENCOUNTER FOR SCREENING BREAST EXAMINATION: ICD-10-CM

## 2022-08-08 DIAGNOSIS — Z12.31 ENCOUNTER FOR SCREENING MAMMOGRAM FOR HIGH-RISK PATIENT: ICD-10-CM

## 2022-08-08 DIAGNOSIS — C50.311 MALIGNANT NEOPLASM OF LOWER-INNER QUADRANT OF RIGHT BREAST OF FEMALE, ESTROGEN RECEPTOR NEGATIVE: Primary | ICD-10-CM

## 2022-08-08 DIAGNOSIS — Z85.3 PERSONAL HISTORY OF BREAST CANCER: Primary | ICD-10-CM

## 2022-08-08 DIAGNOSIS — Z17.1 MALIGNANT NEOPLASM OF LOWER-INNER QUADRANT OF RIGHT BREAST OF FEMALE, ESTROGEN RECEPTOR NEGATIVE: Primary | ICD-10-CM

## 2022-08-08 DIAGNOSIS — Z12.31 ENCOUNTER FOR SCREENING MAMMOGRAM FOR MALIGNANT NEOPLASM OF BREAST: ICD-10-CM

## 2022-08-08 PROCEDURE — 77063 MAMMO DIGITAL SCREENING BILAT WITH TOMO: ICD-10-PCS | Mod: 26,,, | Performed by: RADIOLOGY

## 2022-08-08 PROCEDURE — 3288F PR FALLS RISK ASSESSMENT DOCUMENTED: ICD-10-PCS | Mod: CPTII,S$GLB,, | Performed by: INTERNAL MEDICINE

## 2022-08-08 PROCEDURE — 3008F BODY MASS INDEX DOCD: CPT | Mod: CPTII,S$GLB,, | Performed by: NURSE PRACTITIONER

## 2022-08-08 PROCEDURE — 99999 PR PBB SHADOW E&M-EST. PATIENT-LVL III: CPT | Mod: PBBFAC,,, | Performed by: NURSE PRACTITIONER

## 2022-08-08 PROCEDURE — 3008F PR BODY MASS INDEX (BMI) DOCUMENTED: ICD-10-PCS | Mod: CPTII,S$GLB,, | Performed by: INTERNAL MEDICINE

## 2022-08-08 PROCEDURE — 99213 PR OFFICE/OUTPT VISIT, EST, LEVL III, 20-29 MIN: ICD-10-PCS | Mod: S$GLB,,, | Performed by: NURSE PRACTITIONER

## 2022-08-08 PROCEDURE — 77063 BREAST TOMOSYNTHESIS BI: CPT | Mod: 26,,, | Performed by: RADIOLOGY

## 2022-08-08 PROCEDURE — 1126F AMNT PAIN NOTED NONE PRSNT: CPT | Mod: CPTII,S$GLB,, | Performed by: INTERNAL MEDICINE

## 2022-08-08 PROCEDURE — 77067 SCR MAMMO BI INCL CAD: CPT | Mod: 26,,, | Performed by: RADIOLOGY

## 2022-08-08 PROCEDURE — 3008F BODY MASS INDEX DOCD: CPT | Mod: CPTII,S$GLB,, | Performed by: INTERNAL MEDICINE

## 2022-08-08 PROCEDURE — 77067 SCR MAMMO BI INCL CAD: CPT | Mod: TC

## 2022-08-08 PROCEDURE — 99213 OFFICE O/P EST LOW 20 MIN: CPT | Mod: S$GLB,,, | Performed by: NURSE PRACTITIONER

## 2022-08-08 PROCEDURE — 99999 PR PBB SHADOW E&M-EST. PATIENT-LVL III: CPT | Mod: PBBFAC,,, | Performed by: INTERNAL MEDICINE

## 2022-08-08 PROCEDURE — 1101F PR PT FALLS ASSESS DOC 0-1 FALLS W/OUT INJ PAST YR: ICD-10-PCS | Mod: CPTII,S$GLB,, | Performed by: INTERNAL MEDICINE

## 2022-08-08 PROCEDURE — 1159F MED LIST DOCD IN RCRD: CPT | Mod: CPTII,S$GLB,, | Performed by: INTERNAL MEDICINE

## 2022-08-08 PROCEDURE — 99999 PR PBB SHADOW E&M-EST. PATIENT-LVL III: ICD-10-PCS | Mod: PBBFAC,,, | Performed by: NURSE PRACTITIONER

## 2022-08-08 PROCEDURE — 3077F SYST BP >= 140 MM HG: CPT | Mod: CPTII,S$GLB,, | Performed by: NURSE PRACTITIONER

## 2022-08-08 PROCEDURE — 1101F PT FALLS ASSESS-DOCD LE1/YR: CPT | Mod: CPTII,S$GLB,, | Performed by: NURSE PRACTITIONER

## 2022-08-08 PROCEDURE — 3077F PR MOST RECENT SYSTOLIC BLOOD PRESSURE >= 140 MM HG: ICD-10-PCS | Mod: CPTII,S$GLB,, | Performed by: INTERNAL MEDICINE

## 2022-08-08 PROCEDURE — 1159F PR MEDICATION LIST DOCUMENTED IN MEDICAL RECORD: ICD-10-PCS | Mod: CPTII,S$GLB,, | Performed by: NURSE PRACTITIONER

## 2022-08-08 PROCEDURE — 99999 PR PBB SHADOW E&M-EST. PATIENT-LVL III: ICD-10-PCS | Mod: PBBFAC,,, | Performed by: INTERNAL MEDICINE

## 2022-08-08 PROCEDURE — 1126F PR PAIN SEVERITY QUANTIFIED, NO PAIN PRESENT: ICD-10-PCS | Mod: CPTII,S$GLB,, | Performed by: INTERNAL MEDICINE

## 2022-08-08 PROCEDURE — 3008F PR BODY MASS INDEX (BMI) DOCUMENTED: ICD-10-PCS | Mod: CPTII,S$GLB,, | Performed by: NURSE PRACTITIONER

## 2022-08-08 PROCEDURE — 3078F PR MOST RECENT DIASTOLIC BLOOD PRESSURE < 80 MM HG: ICD-10-PCS | Mod: CPTII,S$GLB,, | Performed by: INTERNAL MEDICINE

## 2022-08-08 PROCEDURE — 77067 MAMMO DIGITAL SCREENING BILAT WITH TOMO: ICD-10-PCS | Mod: 26,,, | Performed by: RADIOLOGY

## 2022-08-08 PROCEDURE — 3078F DIAST BP <80 MM HG: CPT | Mod: CPTII,S$GLB,, | Performed by: INTERNAL MEDICINE

## 2022-08-08 PROCEDURE — 3078F DIAST BP <80 MM HG: CPT | Mod: CPTII,S$GLB,, | Performed by: NURSE PRACTITIONER

## 2022-08-08 PROCEDURE — 3077F SYST BP >= 140 MM HG: CPT | Mod: CPTII,S$GLB,, | Performed by: INTERNAL MEDICINE

## 2022-08-08 PROCEDURE — 1159F MED LIST DOCD IN RCRD: CPT | Mod: CPTII,S$GLB,, | Performed by: NURSE PRACTITIONER

## 2022-08-08 PROCEDURE — 3288F FALL RISK ASSESSMENT DOCD: CPT | Mod: CPTII,S$GLB,, | Performed by: NURSE PRACTITIONER

## 2022-08-08 PROCEDURE — 99213 OFFICE O/P EST LOW 20 MIN: CPT | Mod: S$GLB,,, | Performed by: INTERNAL MEDICINE

## 2022-08-08 PROCEDURE — 1160F PR REVIEW ALL MEDS BY PRESCRIBER/CLIN PHARMACIST DOCUMENTED: ICD-10-PCS | Mod: CPTII,S$GLB,, | Performed by: INTERNAL MEDICINE

## 2022-08-08 PROCEDURE — 1159F PR MEDICATION LIST DOCUMENTED IN MEDICAL RECORD: ICD-10-PCS | Mod: CPTII,S$GLB,, | Performed by: INTERNAL MEDICINE

## 2022-08-08 PROCEDURE — 1101F PT FALLS ASSESS-DOCD LE1/YR: CPT | Mod: CPTII,S$GLB,, | Performed by: INTERNAL MEDICINE

## 2022-08-08 PROCEDURE — 1160F RVW MEDS BY RX/DR IN RCRD: CPT | Mod: CPTII,S$GLB,, | Performed by: INTERNAL MEDICINE

## 2022-08-08 PROCEDURE — 99213 PR OFFICE/OUTPT VISIT, EST, LEVL III, 20-29 MIN: ICD-10-PCS | Mod: S$GLB,,, | Performed by: INTERNAL MEDICINE

## 2022-08-08 PROCEDURE — 77063 BREAST TOMOSYNTHESIS BI: CPT | Mod: TC

## 2022-08-08 PROCEDURE — 3078F PR MOST RECENT DIASTOLIC BLOOD PRESSURE < 80 MM HG: ICD-10-PCS | Mod: CPTII,S$GLB,, | Performed by: NURSE PRACTITIONER

## 2022-08-08 PROCEDURE — 1101F PR PT FALLS ASSESS DOC 0-1 FALLS W/OUT INJ PAST YR: ICD-10-PCS | Mod: CPTII,S$GLB,, | Performed by: NURSE PRACTITIONER

## 2022-08-08 PROCEDURE — 3077F PR MOST RECENT SYSTOLIC BLOOD PRESSURE >= 140 MM HG: ICD-10-PCS | Mod: CPTII,S$GLB,, | Performed by: NURSE PRACTITIONER

## 2022-08-08 PROCEDURE — 3288F PR FALLS RISK ASSESSMENT DOCUMENTED: ICD-10-PCS | Mod: CPTII,S$GLB,, | Performed by: NURSE PRACTITIONER

## 2022-08-08 PROCEDURE — 3288F FALL RISK ASSESSMENT DOCD: CPT | Mod: CPTII,S$GLB,, | Performed by: INTERNAL MEDICINE

## 2022-08-08 NOTE — PROGRESS NOTES
Subjective:       Patient ID: Sharri Cline is a 72 y.o. female.    Chief Complaint: No chief complaint on file.    HPI 72-year-old female who returns to clinic for follow-up of right breast cancer-ER negative.  She had a pathological complete response to neoadjuvant chemotherapy  She is a former patient of .    She is feeling well, walks for 1 hour daily. She has no pain other than occasional sharp right breat pain.. Appetite and bowels are normal.      Onc history:   - She presented for screening mammo in 5/15/2019 which showed focal asymmetries in both left and right breast. Diagnostic mammogram and US showed no significant abn of left breast, and right breast 15 mm x 11 mm x 12 mm mass at 4:00, 5 CFN. Biopsy on 5/24/19 showed grade 3 IDC, triple negative, Ki67 70%.    - 7/3/19 - 12/10/19 completed neoadjuvant chemotherapy with four cycles of dose-dense Adriamycin and cyclophosphamide with Neulasta support followed by twelve doses of weekly paclitaxel. Dose reduced for cytopenias and neuropathy.   - 1/23/2020 - Dr Hernandez performed right breast lumpectomy with SLN Biopsy with pathology showing no residual carcinoma with 3 neg SLN.   - 4/2/2020 - 4/24/2020 - completed RT    Review of Systems   Constitutional: Negative.    Cardiovascular: Negative.    Gastrointestinal: Negative.    Musculoskeletal: Negative.    Neurological: Positive for numbness. Negative for headaches.   Psychiatric/Behavioral: Negative.          Objective:      Physical Exam  Vitals reviewed.   Constitutional:       General: She is not in acute distress.     Appearance: Normal appearance.   Cardiovascular:      Rate and Rhythm: Normal rate and regular rhythm.   Pulmonary:      Effort: Pulmonary effort is normal. No respiratory distress.      Breath sounds: Normal breath sounds. No wheezing or rales.   Chest:   Breasts:      Right: No mass, nipple discharge or skin change.      Left: Normal.         Abdominal:      Palpations: Abdomen  is soft. There is no mass.      Tenderness: There is no abdominal tenderness.   Lymphadenopathy:      Cervical: No cervical adenopathy.   Neurological:      Mental Status: She is alert and oriented to person, place, and time.   Psychiatric:         Mood and Affect: Mood normal.         Behavior: Behavior normal.         Thought Content: Thought content normal.         Judgment: Judgment normal.         Assessment:     Mammogram - negative  1. Malignant neoplasm of lower-inner quadrant of right breast of female, estrogen receptor negative        Plan:      RTC  4 M        Route Chart for Scheduling    Med Onc Chart Routing      Follow up with physician 4 months. Me or Karie   Follow up with RAJEEV    Infusion scheduling note    Injection scheduling note    Labs    Imaging    Pharmacy appointment    Other referrals

## 2022-08-08 NOTE — PROGRESS NOTES
[facility]  Department of Surgery    REFERRING PROVIDER: No referring provider defined for this encounter. Rito Segal MD  CHIEF COMPLAINT:   Chief Complaint   Patient presents with    Follow-up     cbe       DIAGNOSIS:   This is a 72 y.o. female with a history of stage  ypT0 pN0, grade 3, ER -, TN -, HER2 - IDC of the right breast.    TREATMENT:   1. right partial mastectomy with sentinel node biopsy on 2020. MIYA Hernandez M.D. Surgical Oncology. Final pathology revealed no residual invasive carcinoma is present in the breast after presurgical therapy. No lymph node metastases and no prominent fibrous scarring in the node  2. Neoadjuvant Chemotherapy, AC x4 and Taxol x 12, from 2019 to 12/10/2019. GILBERT Ayala M.D. Medical Oncology   3. Radiation therapy from 2020 to 2020. MISAEL Núñez M.D. Radiation Oncology   4. Adjuvant endocrine therapy no recommended   5. Declined vaccine trial     HISTORY OF PRESENT ILLNESS:   Sharri Cline is a 72 y.o. female comes in for oncological follow up.  She denies change in her breast self-exam specifically denying new masses, skin or nipple changes, or nipple discharge. Past medical and surgical history is updated with no new changes. There have been no changes to family history. The patient denies constitutional symptoms of night sweats, chills, weight loss, new headaches, visual changes, new back or bony pain, chest pain, or shortness of breath.        Review of Systems: See HPI/Interval History for other systems reviewed.     [id number] Mammo Digital Screening Bilat w/ Elias     History:  Patient is 72 y.o. and is seen for a screening mammogram.     Films Compared:  Prior images (if available) were compared.     Findings:  This procedure was performed using tomosynthesis. Computer-aided detection was utilized in the interpretation of this examination.  The breasts have scattered areas of fibroglandular density.      Right  There are post-surgical  "findings from a previous lumpectomy seen in the right breast. There has been no interval development of a suspicious mass, microcalcification, or architectural distortion.      Left  There is no evidence of suspicious masses, calcifications, or other abnormal findings in the left breast.      No detrimental change.      Impression:  Bilateral  There is no mammographic evidence of malignancy.     BI-RADS Category:   Overall: 2 - Benign      MEDICATIONS/ALLERGIES:     Current Outpatient Medications   Medication Sig    cholecalciferol, vitamin D3, (VITAMIN D3) 25 mcg (1,000 unit) capsule Take 1,000 Units by mouth once daily.    cyanocobalamin, vitamin B-12, (VITAMIN B-12) 1,000 mcg/mL Drop Take by mouth once daily.    fish oil-omega-3 fatty acids 300-1,000 mg capsule Take 2 g by mouth once daily.    multivitamin with minerals tablet Take 1 tablet by mouth once daily.    artificial tears (ISOPTO TEARS) 0.5 % ophthalmic solution Place 1 drop into both eyes 4 (four) times daily. (Patient not taking: No sig reported)     No current facility-administered medications for this visit.     Facility-Administered Medications Ordered in Other Visits   Medication    0.9%  NaCl infusion    lidocaine (PF) 10 mg/ml (1%) injection 10 mg    sodium chloride 0.9% flush 10 mL      Review of patient's allergies indicates:  No Known Allergies    PHYSICAL EXAM:   BP (!) 146/66 (BP Location: Left arm, Patient Position: Sitting, BP Method: Medium (Automatic))   Pulse 66   Ht 5' 3" (1.6 m)   Wt 70.8 kg (156 lb)   BMI 27.63 kg/m²     Physical Exam   Constitutional: She is oriented to person, place, and time. She appears well-developed and well-nourished.   HENT:   Head: Normocephalic and atraumatic.   Eyes: Right eye exhibits no discharge. Left eye exhibits no discharge.   Pulmonary/Chest: Effort normal. No respiratory distress. She has no wheezes. Right breast exhibits no inverted nipple, no mass, no nipple discharge, no skin change " and no tenderness. Left breast exhibits no inverted nipple, no mass, no nipple discharge, no skin change and no tenderness.       Musculoskeletal: Normal range of motion.   Lymphadenopathy:     She has no cervical adenopathy.   Neurological: She is alert and oriented to person, place, and time.   Skin: Skin is warm and dry.       Exam done in the upright and supine position     ASSESSMENT:   This is a 72 y.o. female without evidence of recurrence by exam, history or imaging.       PLAN:   1. Continue to follow up with Dr. Alexandra and Hem Onc team   2. Continue monthly self breast exams and call the clinic with any changes or problems.  3. Mammogram in 1 year .  4. Return to clinic in 1 year .    The patient is in agreement with the plan. Questions were encouraged and answered to patient's satisfaction. Sharri will call our office with any questions or concerns.

## 2022-08-12 ENCOUNTER — HOSPITAL ENCOUNTER (OUTPATIENT)
Dept: ENDOCRINOLOGY | Facility: CLINIC | Age: 72
Discharge: HOME | End: 2022-08-12
Attending: NURSE PRACTITIONER
Payer: MEDICARE

## 2022-08-12 ENCOUNTER — PATIENT MESSAGE (OUTPATIENT)
Dept: ENDOCRINOLOGY | Facility: CLINIC | Age: 72
End: 2022-08-12
Payer: MEDICARE

## 2022-08-12 DIAGNOSIS — E04.1 THYROID NODULE: ICD-10-CM

## 2022-08-12 PROCEDURE — 76536 US SOFT TISSUE HEAD NECK THYROID: ICD-10-PCS | Mod: S$GLB,,, | Performed by: INTERNAL MEDICINE

## 2022-08-12 PROCEDURE — 76536 US EXAM OF HEAD AND NECK: CPT | Mod: S$GLB,,, | Performed by: INTERNAL MEDICINE

## 2022-08-12 NOTE — TELEPHONE ENCOUNTER
Neck ultrasound dated 06/24/2021.  Our records indicate a benign FNA of the right lobe nodule in 10/2018.     Impression:     1.) Thyroid gland is normal in size with homogeneous echotexture and normal vascularity     2.) 0.6 x 0.3 x 0.4 cm solid, heterogeneous predominately hypoechoic nodule is seen in the right mid lobe     3.) 3.2 x 1.5 x 1.7 cm solid, heterogeneous predominately isoechoic nodule is seen in the right inferior pole     4.) 0.8 x 0.7 x 0.6 cm solid, hypoechoic nodule is seen in the left mid lobe     RECOMMENDATIONS:  Recommend repeat thyroid ultrasound in 1-2 years.

## 2022-09-21 ENCOUNTER — PATIENT MESSAGE (OUTPATIENT)
Dept: ENDOCRINOLOGY | Facility: CLINIC | Age: 72
End: 2022-09-21
Payer: MEDICARE

## 2022-09-21 DIAGNOSIS — M81.0 OSTEOPOROSIS WITHOUT CURRENT PATHOLOGICAL FRACTURE, UNSPECIFIED OSTEOPOROSIS TYPE: Primary | ICD-10-CM

## 2022-09-23 ENCOUNTER — CLINICAL SUPPORT (OUTPATIENT)
Dept: REHABILITATION | Facility: HOSPITAL | Age: 72
End: 2022-09-23
Payer: MEDICARE

## 2022-09-23 DIAGNOSIS — M81.0 OSTEOPOROSIS WITHOUT CURRENT PATHOLOGICAL FRACTURE, UNSPECIFIED OSTEOPOROSIS TYPE: ICD-10-CM

## 2022-09-23 DIAGNOSIS — R53.1 DECREASED STRENGTH: ICD-10-CM

## 2022-09-23 PROCEDURE — 97161 PT EVAL LOW COMPLEX 20 MIN: CPT

## 2022-09-23 NOTE — PLAN OF CARE
[facility]   Physical Therapy Initial Evaluation     Date: 9/23/2022   Name: Sharri Dennis [place]  Clinic Number: [id number]    Therapy Diagnosis:   Encounter Diagnoses   Name Primary?    Osteoporosis without current pathological fracture, unspecified osteoporosis type     Decreased strength      Physician: Michelle Nunez NP    Medical Diagnosis from Referral: M81.0 (ICD-10-CM) - Osteoporosis without current pathological fracture, unspecified osteoporosis type  Evaluation Date: 9/23/2022  Authorization Period Expiration: 9/21/23  Plan of Care Expiration: 10/28/22  Progress Note Due: 10/23/22  Visit # / Visits authorized: 1/ 1   FOTO: 1/1    Precautions: Standard and osteoporosis, hx of cancer      Time In: 7:15 am   Time Out: 7:44 am   Total Appointment Time (timed & untimed codes): 29 minutes      SUBJECTIVE     Date of onset: chronic    History of current condition - Sharri reports: that at last visit with endocrinologist she was told her osteoporosis has gotten much worse. Pt stated that she wants to learn what she can and cannot do and what exercises she can do without harming herself. Pt stated that she has been walking for years. Pt stated that MD told her she can do weighted exercises.  Pt stated that she has neuropathy in (B) feet. Pt denies pain in her arm and legs, just soreness in lower back like work out soreness, not pain.     Falls: pt denies falls       Prior Therapy: yes  Social History:  Pt stated that she lives with her .   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   Prior Level of Function: independent   Current Level of Function: independent, walks for exercise      Patients goals: pt stated that she would like to learn what she can and can't do and what exercises she can do without harming herself     Medical History:   Past Medical History:   Diagnosis Date    Breast cancer 01/23/2020    RIGHT    Helicobacter pylori  antibody positive 5/27/13    treated with clarithromycin, metronidazole, and omeprazole x 14 days  (5/27-6/3); EGD normal in July 2013       Surgical History:   Sharri Cline  has a past surgical history that includes Tubal ligation; Biopsy of liver with ultrasound guidance (N/A, 11/19/2018); Insertion of tunneled central venous catheter (CVC) with subcutaneous port (Left, 6/13/2019); Mastectomy, partial (Right, 1/23/2020); Injection for sentinel node identification (Right, 1/23/2020); [place] lymph node biopsy (Right, 1/23/2020); and Breast lumpectomy (Right).    Medications:   Sharri has a current medication list which includes the following prescription(s): artificial tears, cholecalciferol (vitamin d3), vitamin b-12, fish oil-omega-3 fatty acids, and multivitamin with minerals, and the following Facility-Administered Medications: sodium chloride 0.9%, lidocaine (pf) 10 mg/ml (1%), and sodium chloride 0.9%.    Allergies:   Review of patient's allergies indicates:  No Known Allergies       OBJECTIVE       Hip Right  Left  Pain/Dysfunction with Movement    AROM MMT AROM MMT    Flexion WFL 4+/5 WFL 4+/5    Extension WFL 4/5 WFL 4/5    Abduction WFL 4/5 WFL 4/5    External rotation WFL 4/5 WFL 4/5      Knee Right  Left  Pain/Dysfunction with Movement    AROM MMT AROM MMT    Flexion WFL 5/5 WFL 5/5    Extension WFL 5/5 WFL 5/5      Ankle Right  Left  Pain/Dysfunction with Movement    AROM MMT AROM MMT    Plantarflexion WFL 4+/5 WFL 4+/5    Dorsiflexion WFL 5/5 WFL 5/5      Shoulder Right  Left  Pain/Dysfunction with Movement    AROM MMT AROM MMT    flexion WFL 4+/5 WFL 4+/5    abduction WFL 4+/5 WFL 4+/5    Internal rotation WFL 5/5 WFL 5/5    ER at 90° abd WFL NT WFL NT    ER at 0° abd NT 4+/5 NT 4+/5      SLS: (L) = 4 seconds, (R) = 30 seconds - decreased stability noted (B)     Limitation/Restriction for FOTO  Survey - not performed             TREATMENT     Total Treatment time (time-based codes) separate  from Evaluation: 0 minutes         PATIENT EDUCATION AND HOME EXERCISES     Education provided:   - Role of PT - pt verbalized understanding  - Educated pt on importance of performing weightbearing activities and safe activities to perform such as walking. Also educated pt to avoid high impact activities such as running and jumping. Discussed with pt importance of good posture/body mechanics and avoiding forced flexion and combination flexion/rotation at back. Pt verbalized understanding.      ASSESSMENT     Sharri is a 72 y.o. female referred to outpatient Physical Therapy with a medical diagnosis of Osteoporosis without current pathological fracture, unspecified osteoporosis type. Patient presents with mild decreased (B) Hip MMT scores and decreased stability when performing SLS. Pt will benefit from skilled PT with focus on (B) UE/LE strengthening, core strengthening, postural muscle strengthening, education on proper body mechanics when performing ADLs and job tasks, and balance training in order to reduce risk of injury.     Patient prognosis is Good.   Patient will benefit from skilled outpatient Physical Therapy to address the deficits stated above and in the chart below, provide patient /family education, and to maximize patientt's level of independence.     Plan of care discussed with patient: Yes  Patient's spiritual, cultural and educational needs considered and patient is agreeable to the plan of care and goals as stated below:     Anticipated Barriers for therapy: none    Medical Necessity is demonstrated by the following  History  Co-morbidities and personal factors that may impact the plan of care Co-morbidities:   Hx of cancer    Personal Factors:   no deficits     moderate   Examination  Body Structures and Functions, activity limitations and participation restrictions that may impact the plan of care Body Regions:   lower extremities  upper extremities    Body Systems:     ROM  strength  balance  gait  transfers    Participation Restrictions:   No mentioned    Activity limitations:   Learning and applying knowledge  no deficits    General Tasks and Commands  no deficits    Communication  no deficits    Mobility  no deficits    Self care  no deficits    Domestic Life  no deficits    Interactions/Relationships  no deficits    Life Areas  no deficits    Community and Social Life  no deficits         high   Clinical Presentation stable and uncomplicated low   Decision Making/ Complexity Score: low     Goals:  Short Term Goals: 2 weeks   Pt will be independent with HEP to supplement PT with decreasing pain and improving functional mobility    Long Term Goals: 4 weeks   Pt will improve LE MMT scores by 1/2 grade where deficits noted in order to improve strength for functional tasks  Pt will demo good body mechanics when performing lifting tasks in clinic in order to reduce risk of injury when performing ADLs and job tasks  Pt will perform SLS on (L) LE for 30 seconds without UE support in order to demo improved SL balance    PLAN   Plan of care Certification: 9/23/2022 to 10/28/22.    Outpatient Physical Therapy 2 times weekly for 4 weeks to include the following interventions: Gait Training, Manual Therapy, Moist Heat/ Ice, Neuromuscular Re-ed, Patient Education, Self Care, Therapeutic Activities, Therapeutic Exercise, and IASTM, dry needling, and modalities prn .     Kathy Peterson, PT      I CERTIFY THE NEED FOR THESE SERVICES FURNISHED UNDER THIS PLAN OF TREATMENT AND WHILE UNDER MY CARE   Physician's comments:     Physician's Signature: ___________________________________________________

## 2022-09-30 ENCOUNTER — CLINICAL SUPPORT (OUTPATIENT)
Dept: REHABILITATION | Facility: HOSPITAL | Age: 72
End: 2022-09-30
Payer: MEDICARE

## 2022-09-30 DIAGNOSIS — M81.0 OSTEOPOROSIS, UNSPECIFIED OSTEOPOROSIS TYPE, UNSPECIFIED PATHOLOGICAL FRACTURE PRESENCE: Primary | ICD-10-CM

## 2022-09-30 DIAGNOSIS — R53.1 DECREASED STRENGTH: ICD-10-CM

## 2022-09-30 PROCEDURE — 97110 THERAPEUTIC EXERCISES: CPT

## 2022-09-30 PROCEDURE — 97112 NEUROMUSCULAR REEDUCATION: CPT

## 2022-09-30 NOTE — PROGRESS NOTES
"  Physical Therapy Daily Treatment Note     Name: Sharri Dennis [place]  Clinic Number: [id number]    Therapy Diagnosis:   Encounter Diagnoses   Name Primary?    Osteoporosis, unspecified osteoporosis type, unspecified pathological fracture presence Yes    Decreased strength      Physician: Michelle Nunez NP    Visit Date: 9/30/2022    Medical Diagnosis from Referral: M81.0 (ICD-10-CM) - Osteoporosis without current pathological fracture, unspecified osteoporosis type  Evaluation Date: 9/23/2022  Authorization Period Expiration: 9/21/23  Plan of Care Expiration: 12/31/22  Progress Note Due: 10/23/22  Visit # / Visits authorized: 1/ 1, 1/12  FOTO: 1/1    Time In: 7:01 am   Time Out: 7:47 am   Total Billable Time: 41 minutes    Precautions: Standard and osteoporosis, hx of cancer     Subjective     Pt reports: no pain, just soreness/stiffness in lower back.  She was not issued home exercise program.  Response to previous treatment: last session was initial evaluation  Functional change: progressing    Pain: 0/10 (B) UE and LE      Objective       Sharri received therapeutic exercises to develop strength and posture for 29 minutes including:  Nu step x 5' - supervised   Standing heel raises 2x10 3"   Standing hip abduction RTB 2x10 B  Standing hip extension RTB 2x10 B  (B) UE ER w/YTB 2x10      Sharri participated in neuromuscular re-education activities to improve: Balance for 17 minutes. The following activities were included:  SLS on airex 3x30" B  Standing marches on airex 2x10 B   Tandem stance on airex 3x30" ea  Step ups double airex 1x10 B     Home Exercises Provided and Patient Education Provided     Education provided:   -  HEP - pt was instructed to stop performing particular therex if particular therex bothers her - pt verbalized understanding    Written Home Exercises Provided: yes.  Exercises were reviewed and Sharri was able to demonstrate them prior to the end of the session.  Sharri demonstrated good  " understanding of the education provided.     See EMR under Patient Instructions for exercises provided 9/30/2022.      Assessment     Today was pt's first treatment session after initial evaluation. Pt participated in therex/activities for LE strengthening, postural muscle strengthening, and balance. Pt demo decreased stability when performing NMR activities, requiring UE support to maintain balance. Pt received VC to perform standing hip therex with slow, controlled movements and to avoid lending/bending. Pt tolerated therapy session without any adverse reactions and reported decreased stiffness in lower back at end of session.   Sharri Is progressing well towards her goals.   Pt prognosis is Good.     Pt will continue to benefit from skilled outpatient physical therapy to address the deficits listed in the problem list box on initial evaluation, provide pt/family education and to maximize pt's level of independence in the home and community environment.     Pt's spiritual, cultural and educational needs considered and pt agreeable to plan of care and goals.    Anticipated barriers to physical therapy: none    Goals:     Short Term Goals: 2 weeks   Pt will be independent with HEP to supplement PT with decreasing pain and improving functional mobility     Long Term Goals: 4 weeks   Pt will improve LE MMT scores by 1/2 grade where deficits noted in order to improve strength for functional tasks  Pt will demo good body mechanics when performing lifting tasks in clinic in order to reduce risk of injury when performing ADLs and job tasks  Pt will perform SLS on (L) LE for 30 seconds without UE support in order to demo improved SL balance    Plan     Continue per POC, progress as tolerated      Kathy Peterson, PT

## 2022-10-03 NOTE — PROGRESS NOTES
"  Physical Therapy Daily Treatment Note     Name: Sharri Dennis [place]  Clinic Number: [id number]    Therapy Diagnosis:   Encounter Diagnoses   Name Primary?    Osteoporosis, unspecified osteoporosis type, unspecified pathological fracture presence Yes    Decreased strength        Physician: Michelle Nunez NP    Visit Date: 10/4/2022    Medical Diagnosis from Referral: M81.0 (ICD-10-CM) - Osteoporosis without current pathological fracture, unspecified osteoporosis type  Evaluation Date: 9/23/2022  Authorization Period Expiration: 9/21/23  Plan of Care Expiration: 12/31/22  Progress Note Due: 10/23/22  Visit # / Visits authorized: 1/ 1, 2/12  FOTO: 1/1, 2/12    Time In: 7:10 am (pt arrived late)  Time Out: 7:45 am   Total Billable Time: 35 minutes    Precautions: Standard and osteoporosis, hx of cancer     Subjective     Pt reports: that she is feeling good today and only felt some soreness after her last session.   She was not issued home exercise program.  Response to previous treatment: minor soreness   Functional change: progressing    Pain: 0/10 (B) UE and LE      Objective       Sharri received therapeutic exercises to develop strength and posture for 23 minutes including:  Nu step x 5' - supervised   Standing heel raises 2x10 3"   Standing hip abduction RTB 2x10 B  Standing hip extension RTB 2x10 B  (B) UE ER w/YTB 2x10  Box lifts and carries (no weight) 3 laps   HSA c/ YTB 2x10   Shld ext OTB 2x10       Sharri participated in neuromuscular re-education activities to improve: Balance for 12 minutes. The following activities were included:  SLS on airex 3x30" B NP   Standing marches on airex 1 min   Tandem stance on airex 2x30" ea  Step ups double airex 1x10 B       Home Exercises Provided and Patient Education Provided     Education provided:   -  HEP - pt was instructed to stop performing particular therex if particular therex bothers her - pt verbalized understanding    Written Home Exercises Provided: " yes.  Exercises were reviewed and Sharri was able to demonstrate them prior to the end of the session.  Sharri demonstrated good  understanding of the education provided.     See EMR under Patient Instructions for exercises provided 9/30/2022.      Assessment     Pt arrived late to treatment session, therefore abbreviated session performed this session. Incorporated body safety mechanics with box lifts this session, during which patient given verbal cues and demonstrations in order to maintain upright posture and bend at knees. During NMR, pt used occasional UE support on elevated mat, however able to maintain balance with the use of ankle strategies. Plan to incorporate ankle theraband during upcoming sessions to improve this. Pt with no complaints of increased discomfort or pain during or after session completion.     Sharri Is progressing well towards her goals.   Pt prognosis is Good.     Pt will continue to benefit from skilled outpatient physical therapy to address the deficits listed in the problem list box on initial evaluation, provide pt/family education and to maximize pt's level of independence in the home and community environment.     Pt's spiritual, cultural and educational needs considered and pt agreeable to plan of care and goals.    Anticipated barriers to physical therapy: none    Goals:     Short Term Goals: 2 weeks   Pt will be independent with HEP to supplement PT with decreasing pain and improving functional mobility     Long Term Goals: 4 weeks   Pt will improve LE MMT scores by 1/2 grade where deficits noted in order to improve strength for functional tasks  Pt will demo good body mechanics when performing lifting tasks in clinic in order to reduce risk of injury when performing ADLs and job tasks  Pt will perform SLS on (L) LE for 30 seconds without UE support in order to demo improved SL balance    Plan     Continue per POC, progress as tolerated      Radha Landis, PTA

## 2022-10-04 ENCOUNTER — CLINICAL SUPPORT (OUTPATIENT)
Dept: REHABILITATION | Facility: HOSPITAL | Age: 72
End: 2022-10-04
Payer: MEDICARE

## 2022-10-04 DIAGNOSIS — R53.1 DECREASED STRENGTH: ICD-10-CM

## 2022-10-04 DIAGNOSIS — M81.0 OSTEOPOROSIS, UNSPECIFIED OSTEOPOROSIS TYPE, UNSPECIFIED PATHOLOGICAL FRACTURE PRESENCE: Primary | ICD-10-CM

## 2022-10-04 PROCEDURE — 97112 NEUROMUSCULAR REEDUCATION: CPT | Mod: CQ

## 2022-10-04 PROCEDURE — 97110 THERAPEUTIC EXERCISES: CPT | Mod: CQ

## 2022-10-10 ENCOUNTER — CLINICAL SUPPORT (OUTPATIENT)
Dept: REHABILITATION | Facility: HOSPITAL | Age: 72
End: 2022-10-10
Payer: MEDICARE

## 2022-10-10 DIAGNOSIS — R53.1 DECREASED STRENGTH: ICD-10-CM

## 2022-10-10 DIAGNOSIS — M81.0 OSTEOPOROSIS, UNSPECIFIED OSTEOPOROSIS TYPE, UNSPECIFIED PATHOLOGICAL FRACTURE PRESENCE: Primary | ICD-10-CM

## 2022-10-10 PROCEDURE — 97112 NEUROMUSCULAR REEDUCATION: CPT

## 2022-10-10 PROCEDURE — 97110 THERAPEUTIC EXERCISES: CPT

## 2022-10-10 NOTE — PROGRESS NOTES
"  Physical Therapy Daily Treatment Note     Name: Sharri Dennis [place]  Clinic Number: [id number]    Therapy Diagnosis:   Encounter Diagnoses   Name Primary?    Osteoporosis, unspecified osteoporosis type, unspecified pathological fracture presence Yes    Decreased strength      Physician: Michelle Nunez NP    Visit Date: 10/10/2022    Medical Diagnosis from Referral: M81.0 (ICD-10-CM) - Osteoporosis without current pathological fracture, unspecified osteoporosis type  Evaluation Date: 9/23/2022  Authorization Period Expiration: 9/21/23  Plan of Care Expiration: 10/28/22  Progress Note Due: 10/23/22  Visit # / Visits authorized: 1/ 1, 3/12  FOTO: 4/10    Time In: 7:18 am   Time Out: 8:03 am   Total Billable Time: 40 minutes    Precautions: Standard and osteoporosis, hx of cancer     Subjective     Pt reports: feeling good today, no soreness or pain. Pt stated that she is planning to go walk after PT session today.   She was compliant with home exercise program.  Response to previous treatment: no adverse effects  Functional change: progressing    Pain: 0/10      Objective       Sharri received therapeutic exercises to develop strength and posture for 34 minutes including:  Nu step x 5' - supervised   Standing heel raises 2x10 3"   Standing hip abduction RTB 2x10 B  Standing hip extension RTB 2x10 B  (B) UE ER w/YTB 2x10  Box lifts and carries (no weight) 2 laps   HSA c/ YTB 2x10   Static hold RTB with alt march 1x10 B  Rows RTB 2x10 3"   Mini squats with UE Support       Sharri participated in neuromuscular re-education activities to improve: Balance for 11 minutes. The following activities were included:  SLS on airex 3x30" B Not performed  Standing marches on airex x30 B  Tandem stance on airex 2x30" ea  Step ups double airex 1x10 B   Lateral walks across airex x10       Home Exercises Provided and Patient Education Provided     Education provided:   - HEP   - Educated pt on proper lifting techniques/body mechanics, " slowing down when performing lifting tasks in order to pay attention to proper body mechanics - pt verbalized/demo understanding  Written Home Exercises Provided: Patient instructed to cont prior HEP.  Exercises were reviewed and Sharri was able to demonstrate them prior to the end of the session.  Sharri demonstrated good  understanding of the education provided.     See EMR under Patient Instructions for exercises provided prior visit.      Assessment     Pt was able to tolerate additional core, periscapular, and LE strengthening therex noted above. Pt only required intermittent UE support when performing NMR activities.   Sharri Is progressing well towards her goals.   Pt prognosis is Good.     Pt will continue to benefit from skilled outpatient physical therapy to address the deficits listed in the problem list box on initial evaluation, provide pt/family education and to maximize pt's level of independence in the home and community environment.     Pt's spiritual, cultural and educational needs considered and pt agreeable to plan of care and goals.    Anticipated barriers to physical therapy: none    Goals:     Short Term Goals: 2 weeks   Pt will be independent with HEP to supplement PT with decreasing pain and improving functional mobility     Long Term Goals: 4 weeks   Pt will improve LE MMT scores by 1/2 grade where deficits noted in order to improve strength for functional tasks  Pt will demo good body mechanics when performing lifting tasks in clinic in order to reduce risk of injury when performing ADLs and job tasks  Pt will perform SLS on (L) LE for 30 seconds without UE support in order to demo improved SL balance    Plan     Continue per POC, progress as tolerated     Kathy Peterson, PT

## 2022-10-14 ENCOUNTER — CLINICAL SUPPORT (OUTPATIENT)
Dept: REHABILITATION | Facility: HOSPITAL | Age: 72
End: 2022-10-14
Payer: MEDICARE

## 2022-10-14 DIAGNOSIS — M81.0 OSTEOPOROSIS, UNSPECIFIED OSTEOPOROSIS TYPE, UNSPECIFIED PATHOLOGICAL FRACTURE PRESENCE: Primary | ICD-10-CM

## 2022-10-14 DIAGNOSIS — R53.1 DECREASED STRENGTH: ICD-10-CM

## 2022-10-14 PROCEDURE — 97112 NEUROMUSCULAR REEDUCATION: CPT

## 2022-10-14 PROCEDURE — 97110 THERAPEUTIC EXERCISES: CPT

## 2022-10-14 NOTE — PROGRESS NOTES
"  Physical Therapy Daily Treatment Note     Name: Sharri Dennis [place]  Clinic Number: [id number]    Therapy Diagnosis:   Encounter Diagnoses   Name Primary?    Osteoporosis, unspecified osteoporosis type, unspecified pathological fracture presence Yes    Decreased strength      Physician: Michelle Nunez NP    Visit Date: 10/14/2022    Medical Diagnosis from Referral: M81.0 (ICD-10-CM) - Osteoporosis without current pathological fracture, unspecified osteoporosis type  Evaluation Date: 9/23/2022  Authorization Period Expiration: 9/21/23  Plan of Care Expiration: 10/28/22  Progress Note Due: 10/23/22  Visit # / Visits authorized: 1/ 1, 4/12  FOTO: 5/10    Time In: 7:00 am   Time Out: 7:46 am   Total Billable Time: 41 minutes    Precautions: Standard and osteoporosis, hx of cancer     Subjective     Pt reports: feeling good this morning. Pt stated that she has been walking for exercise.   She was compliant with home exercise program.  Response to previous treatment: no adverse effects  Functional change: progressing    Pain: 0/10 (B) LE      Objective     Sharri received therapeutic exercises to develop strength and posture for 28 minutes including:  Nu step x 5' - supervised   Gastroc stretch 3x30" on wedge  Standing heel raises 2x10 3" not performed  Standing hip abduction RTB 2x10 B not performed  Standing hip extension RTB 2x10 B not performed  (B) UE ER w/YTB 2x10  Box lifts and carries (no weight) 2 laps not performed  HSA c/ YTB 2x10   Static hold RTB with alt march 1x10 B  Rows RTB 2x10 3"   Mini squats with UE Support not performed        Sharri participated in neuromuscular re-education activities to improve: Balance for 18 minutes. The following activities were included:  SLS on airex 3x30" B Not performed  Standing marches on airex x30 B  Tandem stance on airex 2x30" ea not performed   Step ups double airex 2x10 B fwd and lat B  Lateral walks across airex x10 not performed   Clocks x5 ea B on airex      Home " Exercises Provided and Patient Education Provided     Education provided:   - HEP    Written Home Exercises Provided: Patient instructed to cont prior HEP.   Sharri demonstrated good  understanding of the education provided.     See EMR under Patient Instructions for exercises provided prior visit.      Assessment     Pt was able to tolerate progression of NMR activities. Pt received VC for proper technique when performing periscapular strengthening therex. Pt tolerated therapy session without any adverse reactions and reported feeling like she had a workout at end of session.   Sharri Is progressing well towards her goals.   Pt prognosis is Good.     Pt will continue to benefit from skilled outpatient physical therapy to address the deficits listed in the problem list box on initial evaluation, provide pt/family education and to maximize pt's level of independence in the home and community environment.     Pt's spiritual, cultural and educational needs considered and pt agreeable to plan of care and goals.    Anticipated barriers to physical therapy: none    Goals:     Short Term Goals: 2 weeks   Pt will be independent with HEP to supplement PT with decreasing pain and improving functional mobility     Long Term Goals: 4 weeks   Pt will improve LE MMT scores by 1/2 grade where deficits noted in order to improve strength for functional tasks  Pt will demo good body mechanics when performing lifting tasks in clinic in order to reduce risk of injury when performing ADLs and job tasks  Pt will perform SLS on (L) LE for 30 seconds without UE support in order to demo improved SL balance    Plan     Continue per POC, progress as tolerated    Kathy Peterson, PT

## 2022-10-17 ENCOUNTER — IMMUNIZATION (OUTPATIENT)
Dept: INTERNAL MEDICINE | Facility: CLINIC | Age: 72
End: 2022-10-17
Payer: MEDICARE

## 2022-10-17 ENCOUNTER — CLINICAL SUPPORT (OUTPATIENT)
Dept: REHABILITATION | Facility: HOSPITAL | Age: 72
End: 2022-10-17
Payer: MEDICARE

## 2022-10-17 DIAGNOSIS — R53.1 DECREASED STRENGTH: ICD-10-CM

## 2022-10-17 DIAGNOSIS — M81.0 OSTEOPOROSIS, UNSPECIFIED OSTEOPOROSIS TYPE, UNSPECIFIED PATHOLOGICAL FRACTURE PRESENCE: Primary | ICD-10-CM

## 2022-10-17 DIAGNOSIS — Z23 NEED FOR VACCINATION: Primary | ICD-10-CM

## 2022-10-17 PROCEDURE — 90694 VACC AIIV4 NO PRSRV 0.5ML IM: CPT | Mod: S$GLB,,, | Performed by: INTERNAL MEDICINE

## 2022-10-17 PROCEDURE — 97110 THERAPEUTIC EXERCISES: CPT

## 2022-10-17 PROCEDURE — 90694 FLU VACCINE - QUADRIVALENT - ADJUVANTED: ICD-10-PCS | Mod: S$GLB,,, | Performed by: INTERNAL MEDICINE

## 2022-10-17 PROCEDURE — 91312 COVID-19, MRNA, LNP-S, BIVALENT BOOSTER, PF, 30 MCG/0.3 ML DOSE: CPT | Mod: S$GLB,,, | Performed by: INTERNAL MEDICINE

## 2022-10-17 PROCEDURE — G0008 ADMIN INFLUENZA VIRUS VAC: HCPCS | Mod: S$GLB,,, | Performed by: INTERNAL MEDICINE

## 2022-10-17 PROCEDURE — 97112 NEUROMUSCULAR REEDUCATION: CPT

## 2022-10-17 PROCEDURE — 0124A COVID-19, MRNA, LNP-S, BIVALENT BOOSTER, PF, 30 MCG/0.3 ML DOSE: CPT | Mod: CV19,PBBFAC | Performed by: INTERNAL MEDICINE

## 2022-10-17 PROCEDURE — G0008 FLU VACCINE - QUADRIVALENT - ADJUVANTED: ICD-10-PCS | Mod: S$GLB,,, | Performed by: INTERNAL MEDICINE

## 2022-10-17 PROCEDURE — 91312 COVID-19, MRNA, LNP-S, BIVALENT BOOSTER, PF, 30 MCG/0.3 ML DOSE: ICD-10-PCS | Mod: S$GLB,,, | Performed by: INTERNAL MEDICINE

## 2022-10-17 NOTE — PROGRESS NOTES
"  Physical Therapy Daily Treatment Note     Name: Sharri Dennis [place]  Clinic Number: [id number]    Therapy Diagnosis:   Encounter Diagnoses   Name Primary?    Osteoporosis, unspecified osteoporosis type, unspecified pathological fracture presence Yes    Decreased strength      Physician: Michelle Nunez NP    Visit Date: 10/17/2022    Medical Diagnosis from Referral: M81.0 (ICD-10-CM) - Osteoporosis without current pathological fracture, unspecified osteoporosis type  Evaluation Date: 9/23/2022  Authorization Period Expiration: 9/21/23  Plan of Care Expiration: 10/28/22  Progress Note Due: 10/23/22  Visit # / Visits authorized: 1/ 1, 5/12  FOTO: 6/10    Time In: 7:05 am   Time Out: 7:49 am   Total Billable Time: 39 minutes    Precautions: Standard and osteoporosis, hx of cancer     Subjective     Pt reports: feeling good today. Pt stated that she felt a little sore like she worked out after last session.   She was not compliant with home exercise program.  Response to previous treatment: sore like she worked out after last session  Functional change: progressing    Pain: 0/10       Objective       Sharri received therapeutic exercises to develop strength and posture for 29 minutes including:  Nu step x 5' - supervised   Gastroc stretch 3x30" on wedge  Standing heel raises 2x10 3" not performed  Standing hip abduction RTB 2x10 B not performed  Standing hip extension RTB 2x10 B not performed  (B) UE ER w/YTB 2x10  Box lifts and carries (no weight) 2 laps not performed  HSA c/ YTB 2x10 not performed  Static hold RTB with alt march 1x10 B not performed  Rows RTB 2x10 3"  not performed  Mini squats with UE Support 1x10   Monster walks 1 lap   Scaption raises with YTB 1x10      Sharri participated in neuromuscular re-education activities to improve: Balance for 15 minutes. The following activities were included:  SLS on airex 3x30" B Not performed  Standing marches on airex x30 B  Tandem stance on airex 2x30" ea not performed "   Step ups double airex 2x10 B fwd and lat B  Lateral walks across airex x10 not performed   Clocks x5 ea B on airex        Home Exercises Provided and Patient Education Provided     Education provided:   - HEP    Written Home Exercises Provided: Patient instructed to cont prior HEP.  Exercises were reviewed and Sharri was able to demonstrate them prior to the end of the session.  Sharri demonstrated good  understanding of the education provided.     See EMR under Patient Instructions for exercises provided prior visit.      Assessment     Provided extensive education on proper technique when performing squats. Pt demo/verbalized understanding. Pt tolerated therapy session without any adverse reactions. Sharri Is progressing well towards her goals.   Pt prognosis is Good.     Pt will continue to benefit from skilled outpatient physical therapy to address the deficits listed in the problem list box on initial evaluation, provide pt/family education and to maximize pt's level of independence in the home and community environment.     Pt's spiritual, cultural and educational needs considered and pt agreeable to plan of care and goals.    Anticipated barriers to physical therapy: none    Goals:     Short Term Goals: 2 weeks   Pt will be independent with HEP to supplement PT with decreasing pain and improving functional mobility     Long Term Goals: 4 weeks   Pt will improve LE MMT scores by 1/2 grade where deficits noted in order to improve strength for functional tasks  Pt will demo good body mechanics when performing lifting tasks in clinic in order to reduce risk of injury when performing ADLs and job tasks  Pt will perform SLS on (L) LE for 30 seconds without UE support in order to demo improved SL balance    Plan     Continue per POC, progress as tolerated    Kathy Peterson, PT

## 2022-10-19 ENCOUNTER — CLINICAL SUPPORT (OUTPATIENT)
Dept: REHABILITATION | Facility: HOSPITAL | Age: 72
End: 2022-10-19
Payer: MEDICARE

## 2022-10-19 DIAGNOSIS — M81.0 OSTEOPOROSIS, UNSPECIFIED OSTEOPOROSIS TYPE, UNSPECIFIED PATHOLOGICAL FRACTURE PRESENCE: Primary | ICD-10-CM

## 2022-10-19 DIAGNOSIS — R53.1 DECREASED STRENGTH: ICD-10-CM

## 2022-10-19 PROCEDURE — 97112 NEUROMUSCULAR REEDUCATION: CPT

## 2022-10-19 PROCEDURE — 97110 THERAPEUTIC EXERCISES: CPT

## 2022-10-19 NOTE — PROGRESS NOTES
"  Physical Therapy Daily Treatment Note     Name: Sharri Dennis [place]  Clinic Number: [id number]    Therapy Diagnosis:   Encounter Diagnoses   Name Primary?    Osteoporosis, unspecified osteoporosis type, unspecified pathological fracture presence Yes    Decreased strength      Physician: Michelle Nunez NP    Visit Date: 10/19/2022  Medical Diagnosis from Referral: M81.0 (ICD-10-CM) - Osteoporosis without current pathological fracture, unspecified osteoporosis type  Evaluation Date: 9/23/2022  Authorization Period Expiration: 9/21/23  Plan of Care Expiration: 10/28/22  Progress Note Due: 10/23/22  Visit # / Visits authorized: 1/ 1, 6/12  FOTO: 7/10      Time In: 7:12 am (late arrival)   Time Out: 7:48 am   Total Billable Time: 33 minutes    Precautions: Standard and osteoporosis, hx of cancer     Subjective     Pt reports: no pain or soreness today.   She was compliant with home exercise program.  Response to previous treatment: no adverse effects  Functional change: progressing    Pain: 0/10      Objective       Sharri received therapeutic exercises to develop strength and posture for 22 minutes including:  Nu step x 3' - supervised   Gastroc stretch 3x30" on wedge  Standing heel raises 2x10 3" not performed  Standing hip abduction RTB 2x10 B not performed  Standing hip extension RTB 2x10 B not performed  (B) UE ER w/YTB 2x10  Box lifts and carries (no weight) 2 laps not performed  HSA c/ YTB 2x10 not performed  Static hold RTB with alt march 2x10 B  Rows RTB 2x10 3"  not performed  Mini squats with UE Support 2x10   Monster walks 2 lap   Standing scaption 1x10 1#     Sharri participated in neuromuscular re-education activities to improve: Balance for 14 minutes. The following activities were included:  SLS on airex 3x30" B Not performed  Standing marches on airex x30 B not performed  Tandem stance on airex 2x30" ea not performed   Step ups double airex 2x10 B fwd and lat B  Lateral walks across airex x10 not performed "   Clocks x10 ea B on airex      Home Exercises Provided and Patient Education Provided     Education provided:   - HEP    Written Home Exercises Provided: Patient instructed to cont prior HEP.   Sharri demonstrated good  understanding of the education provided.     See EMR under Patient Instructions for exercises provided prior visit.      Assessment     Pt only required minimal cueing for proper squat technique today. Pt tolerated therapy session without any adverse reactions and reported feeling like she had a workout at end of session.   Sharri Is progressing well towards her goals.   Pt prognosis is Good.     Pt will continue to benefit from skilled outpatient physical therapy to address the deficits listed in the problem list box on initial evaluation, provide pt/family education and to maximize pt's level of independence in the home and community environment.     Pt's spiritual, cultural and educational needs considered and pt agreeable to plan of care and goals.    Anticipated barriers to physical therapy: none    Goals:     Short Term Goals: 2 weeks   Pt will be independent with HEP to supplement PT with decreasing pain and improving functional mobility     Long Term Goals: 4 weeks   Pt will improve LE MMT scores by 1/2 grade where deficits noted in order to improve strength for functional tasks  Pt will demo good body mechanics when performing lifting tasks in clinic in order to reduce risk of injury when performing ADLs and job tasks  Pt will perform SLS on (L) LE for 30 seconds without UE support in order to demo improved SL balance    Plan     Continue per POC, progress as tolerated    Kathy Peterson, PT

## 2022-10-24 ENCOUNTER — CLINICAL SUPPORT (OUTPATIENT)
Dept: REHABILITATION | Facility: HOSPITAL | Age: 72
End: 2022-10-24
Payer: MEDICARE

## 2022-10-24 DIAGNOSIS — M81.0 OSTEOPOROSIS, UNSPECIFIED OSTEOPOROSIS TYPE, UNSPECIFIED PATHOLOGICAL FRACTURE PRESENCE: Primary | ICD-10-CM

## 2022-10-24 DIAGNOSIS — R53.1 DECREASED STRENGTH: ICD-10-CM

## 2022-10-24 PROCEDURE — 97110 THERAPEUTIC EXERCISES: CPT

## 2022-10-24 PROCEDURE — 97112 NEUROMUSCULAR REEDUCATION: CPT

## 2022-10-24 NOTE — PROGRESS NOTES
"  Physical Therapy Daily Treatment Note     Name: Sharri Dennis [place]  Clinic Number: [id number]    Therapy Diagnosis:   Encounter Diagnoses   Name Primary?    Osteoporosis, unspecified osteoporosis type, unspecified pathological fracture presence Yes    Decreased strength      Physician: Michelle Nunez NP    Visit Date: 10/24/2022    Medical Diagnosis from Referral: M81.0 (ICD-10-CM) - Osteoporosis without current pathological fracture, unspecified osteoporosis type  Evaluation Date: 9/23/2022  Authorization Period Expiration: 9/21/23  Plan of Care Expiration: 10/28/22  Progress Note Due: 10/23/22  Visit # / Visits authorized: 1/ 1, 7/12  FOTO: 8/10    Time In: 7:03 am   Time Out: 7:49 am   Total Billable Time: 41 minutes    Precautions: Standard and osteoporosis, hx of cancer     Subjective     Pt reports: feeling good today. Pt stated that she would like to continue to work on her balance and proper squatting technique.   She was compliant with home exercise program.  Response to previous treatment: no adverse effects  Functional change: progressing    Pain: 0/10      Objective       Sharri received objective measurements and therapeutic exercises to develop strength and posture for 30 minutes including:  Nu step x 5' - supervised   Gastroc stretch 3x30" on wedge  Standing heel raises 2x10 3" not performed  Standing hip abduction RTB 2x10 B not performed  Standing hip extension RTB 2x10 B not performed  (B) UE ER w/YTB 2x10 not performed  Box lifts and carries (no weight) 2 laps not performed  HSA c/ YTB 2x10 not performed  Static hold RTB with alt march 2x10 B not performed  Rows RTB 2x10 3"  not performed  Mini squats with UE Support 2x10   Monster walks 2 laps  Standing scaption 1x10 1# not performed       Hip Right   Left   Pain/Dysfunction with Movement     AROM MMT AROM MMT     Flexion WFL 4+/5 WFL 4+/5     Extension WFL 4+/5 WFL 4+/5     Abduction WFL 4+/5 WFL 4+/5     External rotation WFL 4+/5 WFL 4+/5      " "  Knee Right   Left   Pain/Dysfunction with Movement     AROM MMT AROM MMT     Flexion WFL 5/5 WFL 5/5     Extension WFL 5/5 WFL 5/5        Ankle Right   Left   Pain/Dysfunction with Movement     AROM MMT AROM MMT     Plantarflexion WFL 4+/5 WFL 4+/5     Dorsiflexion WFL 5/5 WFL 5/5        Shoulder Right   Left   Pain/Dysfunction with Movement     AROM MMT AROM MMT     flexion WFL 5/5 WFL 5/5     abduction WFL 4+/5 WFL 4+/5     Internal rotation WFL 5/5 WFL 5/5     ER at 90° abd WFL NT WFL NT     ER at 0° abd NT 5/5 NT 5/5        SLS: (L) = 17 seconds, (R) = 30 seconds - decreased stability noted (B)    Sharri participated in neuromuscular re-education activities to improve: Balance for 16 minutes. The following activities were included:  SLS on airex 3x30" B Not performed  Standing marches on airex x30 B not performed  Tandem stance on airex 2x30" ea not performed   Step ups double airex 2x10 B fwd and lat B  Step downs on airex 2x10 B   Lateral walks across airex x10 not performed   Clocks x10 ea B on airex     Home Exercises Provided and Patient Education Provided     Education provided:   - HEP    Written Home Exercises Provided: yes.  Exercises were reviewed and Sharri was able to demonstrate them prior to the end of the session.  Sharri demonstrated good  understanding of the education provided.     See EMR under Patient Instructions for exercises provided 10/24/2022.      Assessment     Updated measurements taken today and pt demo improved (L) SLS and improvements in LE MMT scores compared to initial evaluation. Pt received VC and demonstration for proper squatting technique.   Sharri Is progressing well towards her goals.   Pt prognosis is Good.     Pt will continue to benefit from skilled outpatient physical therapy to address the deficits listed in the problem list box on initial evaluation, provide pt/family education and to maximize pt's level of independence in the home and community environment.     Pt's " spiritual, cultural and educational needs considered and pt agreeable to plan of care and goals.    Anticipated barriers to physical therapy: none    Goals:     Short Term Goals: 2 weeks   Pt will be independent with HEP to supplement PT with decreasing pain and improving functional mobility     Long Term Goals: 4 weeks   Pt will improve LE MMT scores by 1/2 grade where deficits noted in order to improve strength for functional tasks  Pt will demo good body mechanics when performing lifting tasks in clinic in order to reduce risk of injury when performing ADLs and job tasks  Pt will perform SLS on (L) LE for 30 seconds without UE support in order to demo improved SL balance    Plan     Continue per POC, progress as tolerated    Kathy Peterson, PT

## 2022-10-26 ENCOUNTER — CLINICAL SUPPORT (OUTPATIENT)
Dept: REHABILITATION | Facility: HOSPITAL | Age: 72
End: 2022-10-26
Payer: MEDICARE

## 2022-10-26 DIAGNOSIS — R53.1 DECREASED STRENGTH: ICD-10-CM

## 2022-10-26 DIAGNOSIS — M81.0 OSTEOPOROSIS, UNSPECIFIED OSTEOPOROSIS TYPE, UNSPECIFIED PATHOLOGICAL FRACTURE PRESENCE: Primary | ICD-10-CM

## 2022-10-26 PROCEDURE — 97112 NEUROMUSCULAR REEDUCATION: CPT

## 2022-10-26 PROCEDURE — 97110 THERAPEUTIC EXERCISES: CPT

## 2022-10-26 NOTE — PLAN OF CARE
Outpatient Therapy Updated Plan of Care     Visit Date: 10/26/2022  Name: Sharri [place]  Clinic Number: [id number]    Therapy Diagnosis:   Encounter Diagnoses   Name Primary?    Osteoporosis, unspecified osteoporosis type, unspecified pathological fracture presence Yes    Decreased strength      Physician: Michelle Nunez NP    Medical Diagnosis from Referral: M81.0 (ICD-10-CM) - Osteoporosis without current pathological fracture, unspecified osteoporosis type  Evaluation Date: 9/23/2022    Total Visits Received: 9    Current Certification Period:  9/23/22 to 10/28/22  Precautions:  Standard and osteoporosis, hx of cancer     Functional Level Prior to Evaluation:  independent    Subjective     Update: Pt stated that biggest challenge is doing squats right. Pt stated that she would also like to work more on her balance. Pt reports experiencing stiffness in her lower back this morning.     Objective     Update: Objective measurements taken on 10/24/22    Hip Right   Left   Pain/Dysfunction with Movement     AROM MMT AROM MMT     Flexion WFL 4+/5 WFL 4+/5     Extension WFL 4+/5 WFL 4+/5     Abduction WFL 4+/5 WFL 4+/5     External rotation WFL 4+/5 WFL 4+/5        Knee Right   Left   Pain/Dysfunction with Movement     AROM MMT AROM MMT     Flexion WFL 5/5 WFL 5/5     Extension WFL 5/5 WFL 5/5        Ankle Right   Left   Pain/Dysfunction with Movement     AROM MMT AROM MMT     Plantarflexion WFL 4+/5 WFL 4+/5     Dorsiflexion WFL 5/5 WFL 5/5        Shoulder Right   Left   Pain/Dysfunction with Movement     AROM MMT AROM MMT     flexion WFL 5/5 WFL 5/5     abduction WFL 4+/5 WFL 4+/5     Internal rotation WFL 5/5 WFL 5/5     ER at 90° abd WFL NT WFL NT     ER at 0° abd NT 5/5 NT 5/5        SLS: (L) = 17 seconds, (R) = 30 seconds - decreased stability noted (B)        Assessment     Update: Since initial evaluation on 9/23/22 pt has attended 8 PT follow up sessions. Pt has made progress with her PT sessions. Pt trinity  improvements in LE MMT scores and improved (L) SLS time compared to initial evaluation. Pt does demo improved ability to perform squats, however does still required intermittent VC for proper technique. Pt stated that she would like to continue to work on proper squat technique and balance. Pt will continue to benefit from skilled PT in order to reduce risk of injury when performing ADLs and job tasks.     Previous Short Term Goals Status:       Short Term Goals: 2 weeks   Pt will be independent with HEP to supplement PT with decreasing pain and improving functional mobility - Met     Long Term Goals: 4 weeks   Pt will improve LE MMT scores by 1/2 grade where deficits noted in order to improve strength for functional tasks - Progressing not met  Pt will demo good body mechanics when performing lifting tasks in clinic in order to reduce risk of injury when performing ADLs and job tasks - Met  Pt will perform SLS on (L) LE for 30 seconds without UE support in order to demo improved SL balance - progressing not met - Progressing not met    New Short Term Goals Status:   continue with LTG 1,3  Long Term Goal Status:   continue per initial plan of care.  Reasons for Recertification of Therapy:   required updated POC    Plan     Updated Certification Period: 10/26/2022 to 11/25/22  Recommended Treatment Plan: 1 times per week for 4 weeks: Gait Training, Manual Therapy, Moist Heat/ Ice, Neuromuscular Re-ed, Patient Education, Self Care, Therapeutic Activities, Therapeutic Exercise, and IASTM, dry needling, and modalities prn  Other Recommendations: n/a    Kathy Peterson, PT  10/26/2022      I CERTIFY THE NEED FOR THESE SERVICES FURNISHED UNDER THIS PLAN OF TREATMENT AND WHILE UNDER MY CARE    Physician's comments:        Physician's Signature: ___________________________________________________

## 2022-10-26 NOTE — PROGRESS NOTES
"  Physical Therapy Daily Treatment Note     Name: Sharri Dennis [place]  Clinic Number: [id number]    Therapy Diagnosis:   Encounter Diagnoses   Name Primary?    Osteoporosis, unspecified osteoporosis type, unspecified pathological fracture presence Yes    Decreased strength      Physician: Michelle Nunez NP    Visit Date: 10/26/2022    Medical Diagnosis from Referral: M81.0 (ICD-10-CM) - Osteoporosis without current pathological fracture, unspecified osteoporosis type  Evaluation Date: 9/23/2022  Authorization Period Expiration: 9/21/23  Plan of Care Expiration: 10/28/22  Progress Note Due: 10/23/22  Visit # / Visits authorized: 1/ 1, 8/12  FOTO: 9/10    Time In: 7:12 am   Time Out: 7:46 am   Total Billable Time: 34 minutes    Precautions: Standard and osteoporosis, hx of cancer    Subjective     Pt reports: that biggest challenge is doing squats right. Pt stated that she would also like to work more on her balance. Pt reports experiencing stiffness in her lower back this morning.   She was compliant with home exercise program.  Response to previous treatment: no adverse effects  Functional change: improvements in LE MMT scores, improvement in (L) SLS time    Pain: 0/10      Objective       Sharri received  therapeutic exercises to develop strength and posture for 22 minutes including:  Nu step x 3' - while taken subjective   Gastroc stretch 3x30" on wedge  Standing heel raises 2x10 3" not performed  Standing hip abduction RTB 2x10 B not performed  Standing hip extension RTB 2x10 B not performed  (B) UE ER w/YTB 2x10 not performed  Box lifts and carries (no weight) 2 laps not performed  HSA c/ YTB 2x10 not performed  Static hold RTB with alt march 2x10 B not performed  Rows RTB 2x10 3"  not performed  squats with UE Support 2x10   Monster walks 2 laps YTB  Standing scaption 1x10 1# not performed         Sharri participated in neuromuscular re-education activities to improve: Balance for 12 minutes. The following activities " "were included:  SLS on airex 3x30" B Not performed  Standing marches on airex x30 B not performed  Tandem stance on airex 2x30" ea not performed   Step ups double airex 2x10 B fwd and lat B  Step downs on airex 2x10 B   Lateral walks across airex x10 not performed   Clocks x10 ea B on airex  not performed     Home Exercises Provided and Patient Education Provided     Education provided:   - Educated pt on golfer swing technique for example when doing dishes/laundry   Written Home Exercises Provided: Patient instructed to cont prior HEP.   Sharri demonstrated good  understanding of the education provided.     See EMR under Patient Instructions for exercises provided prior visit.      Assessment     See treatment section   Sharri Is progressing well towards her goals.   Pt prognosis is Good.     Pt will continue to benefit from skilled outpatient physical therapy to address the deficits listed in the problem list box on initial evaluation, provide pt/family education and to maximize pt's level of independence in the home and community environment.     Pt's spiritual, cultural and educational needs considered and pt agreeable to plan of care and goals.    Anticipated barriers to physical therapy: none    Goals: see treatment section     Plan     See treatment section     Kathy Peterson, PT       "

## 2022-11-02 ENCOUNTER — CLINICAL SUPPORT (OUTPATIENT)
Dept: REHABILITATION | Facility: HOSPITAL | Age: 72
End: 2022-11-02
Payer: MEDICARE

## 2022-11-02 DIAGNOSIS — M81.0 OSTEOPOROSIS, UNSPECIFIED OSTEOPOROSIS TYPE, UNSPECIFIED PATHOLOGICAL FRACTURE PRESENCE: Primary | ICD-10-CM

## 2022-11-02 DIAGNOSIS — R53.1 DECREASED STRENGTH: ICD-10-CM

## 2022-11-02 PROCEDURE — 97112 NEUROMUSCULAR REEDUCATION: CPT

## 2022-11-02 PROCEDURE — 97110 THERAPEUTIC EXERCISES: CPT

## 2022-11-02 NOTE — PROGRESS NOTES
"  Physical Therapy Daily Treatment Note     Name: Sharri Dennis [place]  Clinic Number: [id number]    Therapy Diagnosis:   Encounter Diagnoses   Name Primary?    Osteoporosis, unspecified osteoporosis type, unspecified pathological fracture presence Yes    Decreased strength      Physician: Michelle Nunez NP    Visit Date: 11/2/2022    Medical Diagnosis from Referral: M81.0 (ICD-10-CM) - Osteoporosis without current pathological fracture, unspecified osteoporosis type  Evaluation Date: 9/23/2022  Authorization Period Expiration: 9/21/23  Plan of Care Expiration: 10/28/22, 11/25/22  Progress Note Due: 10/23/22  Visit # / Visits authorized: 1/ 1, 9/12  FOTO: 10/10    Time In: 7:07 am   Time Out: 7:54 am   Total Billable Time: 44 minutes    Precautions: Standard and osteoporosis, hx of cancer    Subjective     Pt reports: no new complaints.  She was compliant with home exercise program.  Response to previous treatment: no adverse effects  Functional change: improvements in LE MMT scores, improvement in (L) SLS time    Pain: 0/10  Location:     Objective     Sharri received  therapeutic exercises to develop strength and posture for 27 minutes including:  Nu step x 3' - supervised  Gastroc stretch 3x30" on wedge  Standing heel raises 2x10 3" not performed  Standing hip abduction RTB 2x10 B  Standing hip extension RTB 2x10 B  (B) UE ER w/YTB 2x10   Box lifts and carries (no weight) 2 laps not performed  HSA c/ YTB 2x10 not performed  Static hold RTB with alt march 2x10 B not performed  Rows RTB 2x10 3"  not performed  squats with UE Support 2x10   Monster walks 2 laps YTB  Standing scaption 1x10 1# not performed         Sharri participated in neuromuscular re-education activities to improve: Balance for 20 minutes. The following activities were included:  SLS on airex 3x30" B Not performed  Standing marches on airex x30 B not performed  Tandem stance on airex 2x30" ea not performed   Step ups double airex 2x10 B fwd and lat " B  Step downs on airex 2x10 B fwd and lat  Lateral walks across airex x10   Clocks x10 ea B on airex      Home Exercises Provided and Patient Education Provided     Education provided:   - HEP    Written Home Exercises Provided: Patient instructed to cont prior HEP.   Sharri demonstrated good  understanding of the education provided.  See EMR under Patient Instructions for exercises provided prior visit.      Assessment     Pt received min VC for proper technique when performing squats and monster walks. Decreased stability noted when pt performed lateral step downs on airex. Pt tolerated therapy session without any adverse reactions.   Sharri Is progressing well towards her goals.   Pt prognosis is Good.       Pt will continue to benefit from skilled outpatient physical therapy to address the deficits listed in the problem list box on initial evaluation, provide pt/family education and to maximize pt's level of independence in the home and community environment.     Pt's spiritual, cultural and educational needs considered and pt agreeable to plan of care and goals.    Anticipated barriers to physical therapy: none    Goals:     Short Term Goals: 2 weeks   Pt will be independent with HEP to supplement PT with decreasing pain and improving functional mobility - Met     Long Term Goals: 4 weeks   Pt will improve LE MMT scores by 1/2 grade where deficits noted in order to improve strength for functional tasks - Progressing not met  Pt will demo good body mechanics when performing lifting tasks in clinic in order to reduce risk of injury when performing ADLs and job tasks - Met  Pt will perform SLS on (L) LE for 30 seconds without UE support in order to demo improved SL balance - progressing not met - Progressing not met     New Short Term Goals Status:   continue with LTG 1,3    Plan     Continue per POC, progress as tolerated    Kathy Peterson, PT

## 2022-11-03 ENCOUNTER — DOCUMENTATION ONLY (OUTPATIENT)
Dept: REHABILITATION | Facility: HOSPITAL | Age: 72
End: 2022-11-03
Payer: MEDICARE

## 2022-11-03 NOTE — PROGRESS NOTES
PT/PTA met face to face to discuss pt's treatment plan and progress towards established goals. Pt will be seen by a physical therapist minimally every 6th visit or every 30 days.    Radha Landis PTA    Face to Face PTA Conference performed with Radha Landis PTA  regarding patient's current status, overall progress, and plan of care.    Kathy Peterson, PT

## 2022-11-07 ENCOUNTER — CLINICAL SUPPORT (OUTPATIENT)
Dept: REHABILITATION | Facility: HOSPITAL | Age: 72
End: 2022-11-07
Payer: MEDICARE

## 2022-11-07 DIAGNOSIS — M81.0 OSTEOPOROSIS, UNSPECIFIED OSTEOPOROSIS TYPE, UNSPECIFIED PATHOLOGICAL FRACTURE PRESENCE: Primary | ICD-10-CM

## 2022-11-07 DIAGNOSIS — R53.1 DECREASED STRENGTH: ICD-10-CM

## 2022-11-07 PROCEDURE — 97112 NEUROMUSCULAR REEDUCATION: CPT

## 2022-11-07 PROCEDURE — 97110 THERAPEUTIC EXERCISES: CPT

## 2022-11-07 NOTE — PROGRESS NOTES
"  Physical Therapy Daily Treatment Note     Name: Sharri Dennis [place]  Clinic Number: [id number]    Therapy Diagnosis:   Encounter Diagnoses   Name Primary?    Osteoporosis, unspecified osteoporosis type, unspecified pathological fracture presence Yes    Decreased strength      Physician: Michelle Nunez NP    Visit Date: 11/7/2022    Medical Diagnosis from Referral: M81.0 (ICD-10-CM) - Osteoporosis without current pathological fracture, unspecified osteoporosis type  Evaluation Date: 9/23/2022  Authorization Period Expiration: 9/21/23  Plan of Care Expiration: 10/28/22, 11/25/22  Progress Note Due: 10/23/22  Visit # / Visits authorized: 1/ 1, 10/12  FOTO: 11 NEXT      Time In: 7:01 am   Time Out: 7:45 am   Total Billable Time: 39 minutes    Precautions: Standard and osteoporosis, hx of cancer    Subjective     Pt reports: experiencing stiffness in lower back this morning.  She was compliant with home exercise program.  Response to previous treatment: no adverse effects  Functional change: improvements in LE MMT scores, improvement in (L) SLS time    Pain: 0/10     Objective     Sharri received  therapeutic exercises to develop strength, core stabilization, and posture for 33 minutes including:  Nu step x 5' - supervised  Gastroc stretch 3x30" on wedge  Standing heel raises 2x10 3" not performed  Standing hip abduction RTB 2x10 B not performed  Standing hip extension RTB 2x10 B not performed  (B) UE ER w/YTB 2x10   Box lifts and carries (no weight) 2 laps not performed  HSA c/ YTB 2x10   Static hold GTB with alt march 2x10 B   Rows RTB 2x10 3"    squats  2x10 w/o UE support   Monster walks 2 laps YTB  Standing scaption 1x10 1# not performed  Palloff punches 1x10 B RTB          Sharri participated in neuromuscular re-education activities to improve: Balance for 11 minutes. The following activities were included:  Step ups on airex 2x10 B lat   Step downs on airex 2x10 B  lat  Lateral walks across airex x10 not performed "   Clocks x5 ea B on airex  YTB    Home Exercises Provided and Patient Education Provided     Education provided:   - HEP    Written Home Exercises Provided: Patient instructed to cont prior HEP.   Sharri demonstrated good  understanding of the education provided.     See EMR under Patient Instructions for exercises provided prior visit.      Assessment     Pt again required only min VC for proper technique when performing squats today and was able to perform squats today without UE support. Added palloff punches today for additional core stabilization. Pt tolerated therapy session without any adverse reactions.   Sharir Is progressing well towards her goals.   Pt prognosis is Good.     Pt will continue to benefit from skilled outpatient physical therapy to address the deficits listed in the problem list box on initial evaluation, provide pt/family education and to maximize pt's level of independence in the home and community environment.     Pt's spiritual, cultural and educational needs considered and pt agreeable to plan of care and goals.    Anticipated barriers to physical therapy: none    Goals:     Short Term Goals: 2 weeks   Pt will be independent with HEP to supplement PT with decreasing pain and improving functional mobility - Met     Long Term Goals: 4 weeks   Pt will improve LE MMT scores by 1/2 grade where deficits noted in order to improve strength for functional tasks - Progressing not met  Pt will demo good body mechanics when performing lifting tasks in clinic in order to reduce risk of injury when performing ADLs and job tasks - Met  Pt will perform SLS on (L) LE for 30 seconds without UE support in order to demo improved SL balance - progressing not met - Progressing not met     New Short Term Goals Status:   continue with LTG 1,3    Plan     Continue per POC, progress as tolerated    Kathy Peterson, PT

## 2022-11-14 ENCOUNTER — HOSPITAL ENCOUNTER (OUTPATIENT)
Dept: CARDIOLOGY | Facility: CLINIC | Age: 72
Discharge: HOME | End: 2022-11-14
Payer: MEDICARE

## 2022-11-14 ENCOUNTER — OFFICE VISIT (OUTPATIENT)
Dept: INTERNAL MEDICINE | Facility: CLINIC | Age: 72
End: 2022-11-14
Payer: MEDICARE

## 2022-11-14 ENCOUNTER — HOSPITAL ENCOUNTER (OUTPATIENT)
Dept: RADIOLOGY | Facility: HOSPITAL | Age: 72
Discharge: HOME | End: 2022-11-14
Attending: PHYSICIAN ASSISTANT
Payer: MEDICARE

## 2022-11-14 VITALS
HEIGHT: 63 IN | DIASTOLIC BLOOD PRESSURE: 62 MMHG | WEIGHT: 158.38 LBS | HEART RATE: 67 BPM | SYSTOLIC BLOOD PRESSURE: 120 MMHG | BODY MASS INDEX: 28.06 KG/M2 | OXYGEN SATURATION: 99 %

## 2022-11-14 DIAGNOSIS — R53.83 FATIGUE, UNSPECIFIED TYPE: ICD-10-CM

## 2022-11-14 DIAGNOSIS — R09.89 OTHER SPECIFIED SYMPTOMS AND SIGNS INVOLVING THE CIRCULATORY AND RESPIRATORY SYSTEMS: ICD-10-CM

## 2022-11-14 DIAGNOSIS — E04.1 THYROID NODULE: ICD-10-CM

## 2022-11-14 DIAGNOSIS — R42 LIGHTHEADEDNESS: ICD-10-CM

## 2022-11-14 DIAGNOSIS — I70.0 AORTIC ATHEROSCLEROSIS: ICD-10-CM

## 2022-11-14 DIAGNOSIS — Z13.6 SCREENING FOR CARDIOVASCULAR CONDITION: ICD-10-CM

## 2022-11-14 DIAGNOSIS — C50.311 MALIGNANT NEOPLASM OF LOWER-INNER QUADRANT OF RIGHT BREAST OF FEMALE, ESTROGEN RECEPTOR NEGATIVE: ICD-10-CM

## 2022-11-14 DIAGNOSIS — E55.9 VITAMIN D DEFICIENCY: ICD-10-CM

## 2022-11-14 DIAGNOSIS — R42 LIGHTHEADEDNESS: Primary | ICD-10-CM

## 2022-11-14 DIAGNOSIS — M81.0 OSTEOPOROSIS, UNSPECIFIED OSTEOPOROSIS TYPE, UNSPECIFIED PATHOLOGICAL FRACTURE PRESENCE: ICD-10-CM

## 2022-11-14 DIAGNOSIS — Z17.1 MALIGNANT NEOPLASM OF LOWER-INNER QUADRANT OF RIGHT BREAST OF FEMALE, ESTROGEN RECEPTOR NEGATIVE: ICD-10-CM

## 2022-11-14 PROCEDURE — 93010 ELECTROCARDIOGRAM REPORT: CPT | Mod: S$GLB,,, | Performed by: INTERNAL MEDICINE

## 2022-11-14 PROCEDURE — 93010 EKG 12-LEAD: ICD-10-PCS | Mod: S$GLB,,, | Performed by: INTERNAL MEDICINE

## 2022-11-14 PROCEDURE — 1159F MED LIST DOCD IN RCRD: CPT | Mod: CPTII,S$GLB,, | Performed by: PHYSICIAN ASSISTANT

## 2022-11-14 PROCEDURE — 3078F PR MOST RECENT DIASTOLIC BLOOD PRESSURE < 80 MM HG: ICD-10-PCS | Mod: CPTII,S$GLB,, | Performed by: PHYSICIAN ASSISTANT

## 2022-11-14 PROCEDURE — 1101F PR PT FALLS ASSESS DOC 0-1 FALLS W/OUT INJ PAST YR: ICD-10-PCS | Mod: CPTII,S$GLB,, | Performed by: PHYSICIAN ASSISTANT

## 2022-11-14 PROCEDURE — 3288F FALL RISK ASSESSMENT DOCD: CPT | Mod: CPTII,S$GLB,, | Performed by: PHYSICIAN ASSISTANT

## 2022-11-14 PROCEDURE — 3074F PR MOST RECENT SYSTOLIC BLOOD PRESSURE < 130 MM HG: ICD-10-PCS | Mod: CPTII,S$GLB,, | Performed by: PHYSICIAN ASSISTANT

## 2022-11-14 PROCEDURE — 3074F SYST BP LT 130 MM HG: CPT | Mod: CPTII,S$GLB,, | Performed by: PHYSICIAN ASSISTANT

## 2022-11-14 PROCEDURE — 99214 OFFICE O/P EST MOD 30 MIN: CPT | Mod: S$GLB,,, | Performed by: PHYSICIAN ASSISTANT

## 2022-11-14 PROCEDURE — 93005 ELECTROCARDIOGRAM TRACING: CPT | Mod: S$GLB,,, | Performed by: PHYSICIAN ASSISTANT

## 2022-11-14 PROCEDURE — 3008F PR BODY MASS INDEX (BMI) DOCUMENTED: ICD-10-PCS | Mod: CPTII,S$GLB,, | Performed by: PHYSICIAN ASSISTANT

## 2022-11-14 PROCEDURE — 99499 RISK ADDL DX/OHS AUDIT: ICD-10-PCS | Mod: S$GLB,,, | Performed by: PHYSICIAN ASSISTANT

## 2022-11-14 PROCEDURE — 3008F BODY MASS INDEX DOCD: CPT | Mod: CPTII,S$GLB,, | Performed by: PHYSICIAN ASSISTANT

## 2022-11-14 PROCEDURE — 1160F PR REVIEW ALL MEDS BY PRESCRIBER/CLIN PHARMACIST DOCUMENTED: ICD-10-PCS | Mod: CPTII,S$GLB,, | Performed by: PHYSICIAN ASSISTANT

## 2022-11-14 PROCEDURE — 93880 EXTRACRANIAL BILAT STUDY: CPT | Mod: TC

## 2022-11-14 PROCEDURE — 99499 UNLISTED E&M SERVICE: CPT | Mod: S$GLB,,, | Performed by: PHYSICIAN ASSISTANT

## 2022-11-14 PROCEDURE — 1160F RVW MEDS BY RX/DR IN RCRD: CPT | Mod: CPTII,S$GLB,, | Performed by: PHYSICIAN ASSISTANT

## 2022-11-14 PROCEDURE — 93005 EKG 12-LEAD: ICD-10-PCS | Mod: S$GLB,,, | Performed by: PHYSICIAN ASSISTANT

## 2022-11-14 PROCEDURE — 93880 EXTRACRANIAL BILAT STUDY: CPT | Mod: 26,,, | Performed by: INTERNAL MEDICINE

## 2022-11-14 PROCEDURE — 3288F PR FALLS RISK ASSESSMENT DOCUMENTED: ICD-10-PCS | Mod: CPTII,S$GLB,, | Performed by: PHYSICIAN ASSISTANT

## 2022-11-14 PROCEDURE — 1159F PR MEDICATION LIST DOCUMENTED IN MEDICAL RECORD: ICD-10-PCS | Mod: CPTII,S$GLB,, | Performed by: PHYSICIAN ASSISTANT

## 2022-11-14 PROCEDURE — 3078F DIAST BP <80 MM HG: CPT | Mod: CPTII,S$GLB,, | Performed by: PHYSICIAN ASSISTANT

## 2022-11-14 PROCEDURE — 1126F AMNT PAIN NOTED NONE PRSNT: CPT | Mod: CPTII,S$GLB,, | Performed by: PHYSICIAN ASSISTANT

## 2022-11-14 PROCEDURE — 1126F PR PAIN SEVERITY QUANTIFIED, NO PAIN PRESENT: ICD-10-PCS | Mod: CPTII,S$GLB,, | Performed by: PHYSICIAN ASSISTANT

## 2022-11-14 PROCEDURE — 99999 PR PBB SHADOW E&M-EST. PATIENT-LVL IV: ICD-10-PCS | Mod: PBBFAC,,, | Performed by: PHYSICIAN ASSISTANT

## 2022-11-14 PROCEDURE — 1101F PT FALLS ASSESS-DOCD LE1/YR: CPT | Mod: CPTII,S$GLB,, | Performed by: PHYSICIAN ASSISTANT

## 2022-11-14 PROCEDURE — 99999 PR PBB SHADOW E&M-EST. PATIENT-LVL IV: CPT | Mod: PBBFAC,,, | Performed by: PHYSICIAN ASSISTANT

## 2022-11-14 PROCEDURE — 93880 US CAROTID BILATERAL: ICD-10-PCS | Mod: 26,,, | Performed by: INTERNAL MEDICINE

## 2022-11-14 PROCEDURE — 99214 PR OFFICE/OUTPT VISIT, EST, LEVL IV, 30-39 MIN: ICD-10-PCS | Mod: S$GLB,,, | Performed by: PHYSICIAN ASSISTANT

## 2022-11-14 NOTE — PROGRESS NOTES
Subjective:       Patient ID: Sharri Cline is a 72 y.o. female.        Chief Complaint: Dizziness, Fatigue, and Nausea    Sharri Cline is an established patient of Rito Segal MD here today for urgent care visit.    LH over past 2-3 months.  Occurs randomly, can occur with sitting or standing or lying down.  Lasts 15-45 minutes when it occurs.  It is associated at times with nausea but not consistently.  At times she will then go eat something, especially something protein rich, that seems to help.  No vomiting.  No chest pain, palpitations, shortness of breath.  No vomiting.  No diarrhea or constipation.  Drinks lots of water.  A few times possibly felt like she may pass out.      Past medical history of triple negative breast cancer in 2019 s/p lumpectomy, chemo, XRT completed 2020.    She notes CANO since her chemotherapy and had cardiology evaluation 11/2021.               Review of Systems   Constitutional:  Negative for chills, diaphoresis, fatigue and fever.   HENT:  Negative for congestion and sore throat.    Eyes:  Negative for visual disturbance.   Respiratory:  Negative for cough, chest tightness and shortness of breath.    Cardiovascular:  Negative for chest pain, palpitations and leg swelling.   Gastrointestinal:  Negative for abdominal pain, blood in stool, constipation, diarrhea, nausea and vomiting.   Genitourinary:  Negative for dysuria, frequency, hematuria and urgency.   Musculoskeletal:  Negative for arthralgias and back pain.   Skin:  Negative for rash.   Neurological:  Positive for light-headedness. Negative for dizziness, syncope, weakness and headaches.   Psychiatric/Behavioral:  Negative for dysphoric mood and sleep disturbance. The patient is not nervous/anxious.      Objective:      Physical Exam  Vitals and nursing note reviewed.   Constitutional:       Appearance: Normal appearance. She is well-developed.   HENT:      Head: Normocephalic.      Right Ear: Tympanic membrane  and external ear normal.      Left Ear: Tympanic membrane and external ear normal.      Nose: No mucosal edema or rhinorrhea.      Mouth/Throat:      Pharynx: Oropharynx is clear.   Eyes:      Pupils: Pupils are equal, round, and reactive to light.   Cardiovascular:      Rate and Rhythm: Normal rate and regular rhythm.      Heart sounds: Normal heart sounds. No murmur heard.    No friction rub. No gallop.   Pulmonary:      Effort: Pulmonary effort is normal. No respiratory distress.      Breath sounds: Normal breath sounds.   Abdominal:      Palpations: Abdomen is soft.      Tenderness: There is no abdominal tenderness.   Skin:     General: Skin is warm and dry.   Neurological:      Mental Status: She is alert.       Assessment:       1. Lightheadedness    2. Fatigue, unspecified type    3. Malignant neoplasm of lower-inner quadrant of right breast of female, estrogen receptor negative    4. Thyroid nodule    5. Vitamin D deficiency    6. Osteoporosis, unspecified osteoporosis type, unspecified pathological fracture presence    7. Screening for cardiovascular condition    8. Other specified symptoms and signs involving the circulatory and respiratory systems    9. Aortic atherosclerosis        Plan:       Sharri was seen today for dizziness, fatigue and nausea.    Diagnoses and all orders for this visit:    Lightheadedness  -     CBC Auto Differential; Future  -     Comprehensive Metabolic Panel; Future  -     US Carotid Bilateral; Future  -     SCHEDULED EKG 12-LEAD (to Muse); Future    Fatigue, unspecified type  -     CBC Auto Differential; Future  -     Comprehensive Metabolic Panel; Future  -     SCHEDULED EKG 12-LEAD (to Muse); Future    Malignant neoplasm of lower-inner quadrant of right breast of female, estrogen receptor negative - followed by Dr. Alexandra    Thyroid nodule  -     TSH; Future    Vitamin D deficiency  -     Vitamin D; Future    Osteoporosis, unspecified osteoporosis type, unspecified pathological  "fracture presence    Screening for cardiovascular condition  -     Lipid Panel; Future    Other specified symptoms and signs involving the circulatory and respiratory systems  -     US Carotid Bilateral; Future    Aortic atherosclerosis  -     Lipid Panel; Future    Schedule fasting labs, EKG, carotid US  Schedule appointment to establish care with PCP  If sx not improving, follow up sooner  Orthostatic readings negative    Pt has been given instructions populated from [employer] [facility] [place] database and has verbalized understanding of the after visit summary and information contained wherein.    Follow up with a primary care provider. May go to ER for acute shortness of breath, lightheadedness, fever, or any other emergent complaints or changes in condition.    "This note will be shared with the patient"    Future Appointments   Date Time Provider Department Center   11/14/2022  4:15 PM [facility] OIC-US1 MASTER [facility] ULTR [facility]   11/16/2022  7:45 AM Kathy Peterson PT [facility]   11/17/2022  8:00 AM LAB, SAME DAY [facility] INTMED [facility] LAB IM [address] [facility]   11/21/2022  7:00 AM Kathy Peterson PT [facility]   12/6/2022  1:30 PM Michelle Nunez, LEEANNA [facility] ENDODIA [address]   12/12/2022 10:00 AM Ahsan Alexandra MD [facility] HEMONC3 [address]   2/14/2023  3:00 PM Fern Guerrero MD [facility] IM [address] [facility]   8/9/2023  8:45 AM [facility] SELINA MAMMO2 [facility] OCTAVIANO [address]                   "

## 2022-11-16 ENCOUNTER — CLINICAL SUPPORT (OUTPATIENT)
Dept: REHABILITATION | Facility: HOSPITAL | Age: 72
End: 2022-11-16
Payer: MEDICARE

## 2022-11-16 DIAGNOSIS — R53.1 DECREASED STRENGTH: ICD-10-CM

## 2022-11-16 DIAGNOSIS — M81.0 OSTEOPOROSIS, UNSPECIFIED OSTEOPOROSIS TYPE, UNSPECIFIED PATHOLOGICAL FRACTURE PRESENCE: Primary | ICD-10-CM

## 2022-11-16 PROCEDURE — 97112 NEUROMUSCULAR REEDUCATION: CPT

## 2022-11-16 PROCEDURE — 97110 THERAPEUTIC EXERCISES: CPT

## 2022-11-16 NOTE — PROGRESS NOTES
"  Physical Therapy Daily Treatment Note     Name: Sharri Dennis [place]  Clinic Number: [id number]    Therapy Diagnosis:   Encounter Diagnoses   Name Primary?    Osteoporosis, unspecified osteoporosis type, unspecified pathological fracture presence Yes    Decreased strength      Physician: Michelle Nunez NP    Visit Date: 11/16/2022    Medical Diagnosis from Referral: M81.0 (ICD-10-CM) - Osteoporosis without current pathological fracture, unspecified osteoporosis type  Evaluation Date: 9/23/2022  Authorization Period Expiration: 9/21/23  Plan of Care Expiration: 10/28/22, 11/25/22  Progress Note Due: 10/23/22  Visit # / Visits authorized: 1/ 1, 11/12  FOTO: 12 NEXT    Time In: 7:59 am (late arrival)   Time Out: 8:30 am   Total Billable Time: 26 minutes    Precautions: Standard and osteoporosis, hx of cancer    Subjective     Pt reports: feeling ok today, just a little soreness in lower back.  She was compliant with home exercise program.  Response to previous treatment: no adverse effects  Functional change: improvements in LE MMT scores, improvement in (L) SLS time    Pain: 0/10    Objective     Sharri received  therapeutic exercises to develop strength, core stabilization, and posture for 18 minutes including:  Nu step x 5' - supervised  Gastroc stretch 3x30" on wedge  Standing heel raises 2x10 3" not performed  Standing hip abduction RTB 2x10 B   Standing hip extension RTB 2x10   (B) UE ER w/YTB 2x10 not performed   Box lifts and carries (no weight) 2 laps not performed  HSA c/ YTB 2x10 not performed  Static hold GTB with alt march 2x10 B not performed  Rows RTB 2x10 3"  not performed  squats  2x10  Monster walks 2 laps YTB not performed  Standing scaption 1x10 1# not performed  Palloff punches 1x10 B RTB not performed          Sharri participated in neuromuscular re-education activities to improve: Balance for 13 minutes. The following activities were included:  Step ups on airex 2x10 B lat   Step downs on airex 2x10 B "  lat  Lateral walks across airex x10    Clocks x10 ea B on airex  YTB      Home Exercises Provided and Patient Education Provided     Education provided:   - HEP    Written Home Exercises Provided: Patient instructed to cont prior HEP.  Exercises were reviewed and Sharri was able to demonstrate them prior to the end of the session.  Sharri demonstrated good  understanding of the education provided.     See EMR under Patient Instructions for exercises provided prior visit.      Assessment     Pt demo good squat technique. Pt tolerated therapy session without any adverse reactions. Pt should be appropriate for DC with HEP next visit.   Sharri Is progressing well towards her goals.   Pt prognosis is Good.     Pt will continue to benefit from skilled outpatient physical therapy to address the deficits listed in the problem list box on initial evaluation, provide pt/family education and to maximize pt's level of independence in the home and community environment.     Pt's spiritual, cultural and educational needs considered and pt agreeable to plan of care and goals.    Anticipated barriers to physical therapy: none    Goals:     Short Term Goals: 2 weeks   Pt will be independent with HEP to supplement PT with decreasing pain and improving functional mobility - Met     Long Term Goals: 4 weeks   Pt will improve LE MMT scores by 1/2 grade where deficits noted in order to improve strength for functional tasks - Progressing not met  Pt will demo good body mechanics when performing lifting tasks in clinic in order to reduce risk of injury when performing ADLs and job tasks - Met  Pt will perform SLS on (L) LE for 30 seconds without UE support in order to demo improved SL balance - progressing not met - Progressing not met     New Short Term Goals Status:   continue with LTG 1,3    Plan     Continue per POC, progress as tolerated    Kathy Peterson, PT

## 2022-11-17 ENCOUNTER — TELEPHONE (OUTPATIENT)
Dept: INTERNAL MEDICINE | Facility: CLINIC | Age: 72
End: 2022-11-17
Payer: MEDICARE

## 2022-11-17 ENCOUNTER — LAB VISIT (OUTPATIENT)
Dept: LAB | Facility: HOSPITAL | Age: 72
End: 2022-11-17
Payer: MEDICARE

## 2022-11-17 DIAGNOSIS — R53.83 FATIGUE, UNSPECIFIED TYPE: ICD-10-CM

## 2022-11-17 DIAGNOSIS — I65.22 LEFT CAROTID ARTERY STENOSIS: ICD-10-CM

## 2022-11-17 DIAGNOSIS — I70.0 AORTIC ATHEROSCLEROSIS: ICD-10-CM

## 2022-11-17 DIAGNOSIS — E55.9 VITAMIN D DEFICIENCY: ICD-10-CM

## 2022-11-17 DIAGNOSIS — Z13.6 SCREENING FOR CARDIOVASCULAR CONDITION: ICD-10-CM

## 2022-11-17 DIAGNOSIS — E04.1 THYROID NODULE: ICD-10-CM

## 2022-11-17 DIAGNOSIS — I65.22 CAROTID ARTERY STENOSIS, UNILATERAL, LEFT: Primary | ICD-10-CM

## 2022-11-17 DIAGNOSIS — R42 LIGHTHEADEDNESS: ICD-10-CM

## 2022-11-17 LAB
25(OH)D3+25(OH)D2 SERPL-MCNC: 34 NG/ML (ref 30–96)
ALBUMIN SERPL BCP-MCNC: 3.6 G/DL (ref 3.5–5.2)
ALP SERPL-CCNC: 141 U/L (ref 55–135)
ALT SERPL W/O P-5'-P-CCNC: 12 U/L (ref 10–44)
ANION GAP SERPL CALC-SCNC: 9 MMOL/L (ref 8–16)
AST SERPL-CCNC: 22 U/L (ref 10–40)
BASOPHILS # BLD AUTO: 0.01 K/UL (ref 0–0.2)
BASOPHILS NFR BLD: 0.2 % (ref 0–1.9)
BILIRUB SERPL-MCNC: 1.3 MG/DL (ref 0.1–1)
BUN SERPL-MCNC: 12 MG/DL (ref 8–23)
CALCIUM SERPL-MCNC: 9.5 MG/DL (ref 8.7–10.5)
CHLORIDE SERPL-SCNC: 108 MMOL/L (ref 95–110)
CHOLEST SERPL-MCNC: 191 MG/DL (ref 120–199)
CHOLEST/HDLC SERPL: 2.7 {RATIO} (ref 2–5)
CO2 SERPL-SCNC: 25 MMOL/L (ref 23–29)
CREAT SERPL-MCNC: 0.8 MG/DL (ref 0.5–1.4)
DIFFERENTIAL METHOD: ABNORMAL
EOSINOPHIL # BLD AUTO: 0.2 K/UL (ref 0–0.5)
EOSINOPHIL NFR BLD: 4.1 % (ref 0–8)
ERYTHROCYTE [DISTWIDTH] IN BLOOD BY AUTOMATED COUNT: 14.6 % (ref 11.5–14.5)
EST. GFR  (NO RACE VARIABLE): >60 ML/MIN/1.73 M^2
GLUCOSE SERPL-MCNC: 89 MG/DL (ref 70–110)
HCT VFR BLD AUTO: 39.1 % (ref 37–48.5)
HDLC SERPL-MCNC: 72 MG/DL (ref 40–75)
HDLC SERPL: 37.7 % (ref 20–50)
HGB BLD-MCNC: 12.4 G/DL (ref 12–16)
IMM GRANULOCYTES # BLD AUTO: 0.01 K/UL (ref 0–0.04)
IMM GRANULOCYTES NFR BLD AUTO: 0.2 % (ref 0–0.5)
LDLC SERPL CALC-MCNC: 107.6 MG/DL (ref 63–159)
LYMPHOCYTES # BLD AUTO: 2.1 K/UL (ref 1–4.8)
LYMPHOCYTES NFR BLD: 50.1 % (ref 18–48)
MCH RBC QN AUTO: 29.3 PG (ref 27–31)
MCHC RBC AUTO-ENTMCNC: 31.7 G/DL (ref 32–36)
MCV RBC AUTO: 92 FL (ref 82–98)
MONOCYTES # BLD AUTO: 0.4 K/UL (ref 0.3–1)
MONOCYTES NFR BLD: 8.7 % (ref 4–15)
NEUTROPHILS # BLD AUTO: 1.5 K/UL (ref 1.8–7.7)
NEUTROPHILS NFR BLD: 36.7 % (ref 38–73)
NONHDLC SERPL-MCNC: 119 MG/DL
NRBC BLD-RTO: 0 /100 WBC
PLATELET # BLD AUTO: 165 K/UL (ref 150–450)
PMV BLD AUTO: 10.4 FL (ref 9.2–12.9)
POTASSIUM SERPL-SCNC: 3.8 MMOL/L (ref 3.5–5.1)
PROT SERPL-MCNC: 6.8 G/DL (ref 6–8.4)
RBC # BLD AUTO: 4.23 M/UL (ref 4–5.4)
SODIUM SERPL-SCNC: 142 MMOL/L (ref 136–145)
TRIGL SERPL-MCNC: 57 MG/DL (ref 30–150)
TSH SERPL DL<=0.005 MIU/L-ACNC: 0.78 UIU/ML (ref 0.4–4)
WBC # BLD AUTO: 4.13 K/UL (ref 3.9–12.7)

## 2022-11-17 PROCEDURE — 36415 COLL VENOUS BLD VENIPUNCTURE: CPT | Performed by: PHYSICIAN ASSISTANT

## 2022-11-17 PROCEDURE — 84443 ASSAY THYROID STIM HORMONE: CPT | Performed by: PHYSICIAN ASSISTANT

## 2022-11-17 PROCEDURE — 80061 LIPID PANEL: CPT | Performed by: PHYSICIAN ASSISTANT

## 2022-11-17 PROCEDURE — 82306 VITAMIN D 25 HYDROXY: CPT | Performed by: PHYSICIAN ASSISTANT

## 2022-11-17 PROCEDURE — 85025 COMPLETE CBC W/AUTO DIFF WBC: CPT | Performed by: PHYSICIAN ASSISTANT

## 2022-11-17 PROCEDURE — 80053 COMPREHEN METABOLIC PANEL: CPT | Performed by: PHYSICIAN ASSISTANT

## 2022-11-17 NOTE — TELEPHONE ENCOUNTER
----- Message from Sasha Valdivia PA-C sent at 11/17/2022  1:27 PM CST -----  Please call patient.    US of her carotid arteries showed some plaque formation on the left.  Recommend consult with vascular to discuss monitoring vs follow up with PCP to discuss.

## 2022-11-17 NOTE — TELEPHONE ENCOUNTER
Called the pt to discuss test results and recommendations. I left the pt a VM asking for a call back.

## 2022-11-17 NOTE — TELEPHONE ENCOUNTER
Called and discussed test results and recommendations with the pt. The pt expressed understanding.      Pt scheduled scheduled for Vascular Surgery

## 2022-11-21 ENCOUNTER — CLINICAL SUPPORT (OUTPATIENT)
Dept: REHABILITATION | Facility: HOSPITAL | Age: 72
End: 2022-11-21
Payer: MEDICARE

## 2022-11-21 DIAGNOSIS — M81.0 OSTEOPOROSIS, UNSPECIFIED OSTEOPOROSIS TYPE, UNSPECIFIED PATHOLOGICAL FRACTURE PRESENCE: Primary | ICD-10-CM

## 2022-11-21 DIAGNOSIS — R53.1 DECREASED STRENGTH: ICD-10-CM

## 2022-11-21 PROCEDURE — 97110 THERAPEUTIC EXERCISES: CPT

## 2022-11-21 NOTE — PROGRESS NOTES
"  Physical Therapy Daily Treatment Note/ Discharge Summary     Name: Sharri Dennis [place]  Clinic Number: [id number]    Therapy Diagnosis:   Encounter Diagnoses   Name Primary?    Osteoporosis, unspecified osteoporosis type, unspecified pathological fracture presence Yes    Decreased strength      Physician: Michelle Nunez NP    Visit Date: 11/21/2022    Medical Diagnosis from Referral: M81.0 (ICD-10-CM) - Osteoporosis without current pathological fracture, unspecified osteoporosis type  Evaluation Date: 9/23/2022  Authorization Period Expiration: 9/21/23  Plan of Care Expiration: 10/28/22, 11/25/22  Progress Note Due: 10/23/22  Visit # / Visits authorized: 1/ 1, 12/12  FOTO: completed    Time In: 7:15 am  (late arrival)   Time Out: 7:45 am   Total Billable Time: 25 minutes    Precautions: Standard and osteoporosis, hx of cancer    Subjective     Pt reports: that she is ok with today being her last day of PT.  She was compliant with home exercise program.  Response to previous treatment: no adverse effects  Functional change: improvements in LE MMT scores     Pain: 0/10  Location:     Objective       Sharri received objective measurements and  therapeutic exercises to develop strength, core stabilization, and posture for 30 minutes including:  Nu step x 5' - supervised  Gastroc stretch 3x30" on wedge not performed  Standing heel raises 2x10 3" not performed  Standing hip abduction RTB 2x10 B not performed  Standing hip extension RTB 2x10 not performed  (B) UE ER w/YTB 2x10 not performed   Box lifts and carries (no weight) 2 laps not performed  HSA c/ YTB 2x10 not performed  Static hold GTB with alt march 2x10 B reviewed for HEP   Rows RTB 2x10 3"  not performed  squats  2x10  Monster walks 2 laps YTB   Standing scaption 1x10 1# not performed  Palloff punches 1x10 B RTB not performed   Dying bugs       Hip Right   Left   Pain/Dysfunction with Movement     AROM MMT AROM MMT     Flexion WFL 5/5 WFL 5/5     Extension WFL 4+/5 " WFL 4+/5     Abduction WFL 4+/5 WFL 4+/5     External rotation WFL 4+/5 WFL 4+/5        Knee Right   Left   Pain/Dysfunction with Movement     AROM MMT AROM MMT     Flexion WFL 5/5 WFL 5/5     Extension WFL 5/5 WFL 5/5        Ankle Right   Left   Pain/Dysfunction with Movement     AROM MMT AROM MMT     Plantarflexion WFL 4+/5 WFL 4+/5     Dorsiflexion WFL 5/5 WFL 5/5        Shoulder Right   Left   Pain/Dysfunction with Movement     AROM MMT AROM MMT     flexion WFL 5/5 WFL 5/5     abduction WFL 4+/5 WFL 4+/5     Internal rotation WFL 5/5 WFL 5/5     ER at 90° abd WFL NT WFL NT     ER at 0° abd NT 5/5 NT 5/5        SLS: (L) = 30 seconds, (R) = 17 seconds - decreased stability noted (B)       Home Exercises Provided and Patient Education Provided     Education provided:   - HEP - reviewed HEP with PT - pt demo/verbalized understanding    Written Home Exercises Provided: yes.  Exercises were reviewed and Sharri was able to demonstrate them prior to the end of the session.  Sharri demonstrated good  understanding of the education provided.     See EMR under Patient Instructions for exercises provided 11/21/2022.      Assessment     Since initial evaluation on 9/23/22, pt has attended 12 PT follow up sessions. Pt has made good progress with her PT sessions. Pt demo improvements in LE MMT scores compared to initial evaluation and demo good squat technique. Pt is appropriate for DC with HEP at this time.     Pt's spiritual, cultural and educational needs considered and pt agreeable to plan of care and goals.      Goals:     Short Term Goals: 2 weeks   Pt will be independent with HEP to supplement PT with decreasing pain and improving functional mobility - Met     Long Term Goals: 4 weeks   Pt will improve LE MMT scores by 1/2 grade where deficits noted in order to improve strength for functional tasks - Partially Met  Pt will demo good body mechanics when performing lifting tasks in clinic in order to reduce risk of injury  when performing ADLs and job tasks - Met  Pt will perform SLS on (L) LE for 30 seconds without UE support in order to demo improved SL balance - progressing not met - Met    Plan     Discharge from PT, continue with HEP, follow up with MD jono Peterson, PT

## 2022-12-01 NOTE — PROGRESS NOTES
Subjective:      Patient ID: Sharri Cline is a 72 y.o. female.    Chief Complaint:  No chief complaint on file.    History of Present Illness  Sharri Cline is here for follow up of thyroid nodules and of osteoporosis.  Previously seen by me on 5/2022.    With regards to thyroid nodule:    Found: 2018    Thyroid US   8/12/2022  COMPARISON:  Neck ultrasound dated 06/24/2021.  Our records indicate a benign FNA of the right lobe nodule in 10/2018.  Impression:  1.) Thyroid gland is normal in size with homogeneous echotexture and normal vascularity  2.) 0.6 x 0.3 x 0.4 cm solid, heterogeneous predominately hypoechoic nodule is seen in the right mid lobe  3.) 3.2 x 1.5 x 1.7 cm solid, heterogeneous predominately isoechoic nodule is seen in the right inferior pole  4.) 0.8 x 0.7 x 0.6 cm solid, hypoechoic nodule is seen in the left mid lobe  RECOMMENDATIONS:  Recommend repeat thyroid ultrasound in 1-2 years.    FNA:   10/19/18  THYROID, RIGHT LOBE NODULE, FINE-NEEDLE ASPIRATION (CYTOLOGY):  - Benign ([place] Classification System II).  - Benign follicular cells and colloid.    Lab Results   Component Value Date    TSH 0.775 11/17/2022    R4ECMVB 83 12/11/2020    FREET4 0.74 12/11/2020     Signs or Symptoms:   Difficulty breathing: Denies  Difficulty swallowing: + solids but not all the time   Voice Changes: + raspy  FH of thyroid cancer: Denies  Personal history of radiation treatment or exposure: + radiation for breast cancer    With regards to Vitamin D Deficiency:    Vit D, 25-Hydroxy   Date Value Ref Range Status   11/17/2022 34 30 - 96 ng/mL Final     Comment:     Vitamin D deficiency.........<10 ng/mL                              Vitamin D insufficiency......10-29 ng/mL       Vitamin D sufficiency........> or equal to 30 ng/mL  Vitamin D toxicity............>100 ng/mL       Current Meds: OTC Vit D3 1000iu daily.     With regards to osteoporosis:     BMD:   10/27/2021  Lumbar spine (L1-L4):BMD is 0.709  g/cm2, T-score is -3.1, and Z-score is -1.7.  Total hip:BMD is 0.724 g/cm2, T-score is -1.8, and Z-score is -0.9.  Femoral neck:  BMD is 0.663 g/cm2, T-score is -1.7, and Z-score is -0.6.  Distal 1/3 radius:  Not applicable  FRAX:  7.3% risk of a major osteoporotic fracture in the next 10 years.  1.0% risk of hip fracture in the next 10 years.  Impression:  *Osteoporosis based on T-score below -2.5  *Fracture risk is very high due to T-score below -3.0  *Compared with previous DXA, BMD at the lumbar spine has declined by 10.1%, and the BMD at the total hip has declined by 9%.  RECOMMENDATIONS:  *Daily calcium intake 8006-4957 mg, dietary sources preferred; Vitamin D 6595-6710 IU daily.  *Weight bearing exercise and fall precautions.  *Recommend medical therapy for osteoporosis. Consider bisphosphonates (alendronate, risedronate, zoledronic acid), denosumab, teriparatide, abaloparatide or romosozumab.  *Repeat BMD in 2 years    Medications: None    Calcium intake:  over the counter citracal petites - 2 capsules (400mg) twice daily.   Vit D intake: OTC Vit D3 1000iu daily    Weight bearing exercise: walking 4 or 5 days a week   Falls: Denies   Fractures: Denies  ~1987 fractured wrist (unsure which side) - fell while skating   Significant height loss (>2 inches): Denies     Family history: Denies    Menopause: early 50s  Denies HRT.     Tobacco Use: Denies  Alcohol Use: Denies  Glucocorticoid History: Denies  Anticoagulant Use: Denies  GERD/PPI Use: Denies  History of Malabsorption: Denies  Antiseizure Medications: Denies  History of Thyroid Disease: Denies  Kidney Stones/ Kidney Disease: Denies  Personal history of kidney stones: Denies  Family history of kidney stones: Denies  Family history of bone disease or fracture: Denies  Radiation treatment for breast cancer.    History of MI or stroke: Denies    Denies point tenderness along spine  Denies bilateral hip tenderness  Gait - steady     Lab Results   Component  "Value Date    CALCIUM 9.5 11/17/2022    PHOS 4.0 05/30/2022     Vit D, 25-Hydroxy   Date Value Ref Range Status   11/17/2022 34 30 - 96 ng/mL Final     Comment:     Vitamin D deficiency.........<10 ng/mL                              Vitamin D insufficiency......10-29 ng/mL       Vitamin D sufficiency........> or equal to 30 ng/mL  Vitamin D toxicity............>100 ng/mL         Review of SystemsAs above    Objective:   Physical Exam  Vitals reviewed.   Neck:      Thyroid: Thyromegaly (irregular shaped gland) present.   Cardiovascular:      Rate and Rhythm: Normal rate.      Comments: No edema present  Pulmonary:      Effort: Pulmonary effort is normal.   Abdominal:      Palpations: Abdomen is soft.     Visit Vitals  /62   Pulse 61   Ht 5' 3" (1.6 m)   Wt 71.6 kg (157 lb 15.4 oz)   SpO2 97%   BMI 27.98 kg/m²       Body mass index is 27.98 kg/m².    Lab Review:   Lab Results   Component Value Date    HGBA1C 5.1 08/30/2019     Lab Results   Component Value Date    CHOL 191 11/17/2022    HDL 72 11/17/2022    LDLCALC 107.6 11/17/2022    TRIG 57 11/17/2022    CHOLHDL 37.7 11/17/2022     Lab Results   Component Value Date     11/17/2022    K 3.8 11/17/2022     11/17/2022    CO2 25 11/17/2022    GLU 89 11/17/2022    BUN 12 11/17/2022    CREATININE 0.8 11/17/2022    CALCIUM 9.5 11/17/2022    PROT 6.8 11/17/2022    ALBUMIN 3.6 11/17/2022    BILITOT 1.3 (H) 11/17/2022    ALKPHOS 141 (H) 11/17/2022    AST 22 11/17/2022    ALT 12 11/17/2022    ANIONGAP 9 11/17/2022    ESTGFRAFRICA >60.0 05/30/2022    EGFRNONAA >60.0 05/30/2022    TSH 0.775 11/17/2022     Vit D, 25-Hydroxy   Date Value Ref Range Status   11/17/2022 34 30 - 96 ng/mL Final     Comment:     Vitamin D deficiency.........<10 ng/mL                              Vitamin D insufficiency......10-29 ng/mL       Vitamin D sufficiency........> or equal to 30 ng/mL  Vitamin D toxicity............>100 ng/mL       Assessment and Plan     1. Osteoporosis " without current pathological fracture, unspecified osteoporosis type  PTH, Intact    Renal Function Panel    Vitamin D      2. Thyroid nodule        3. Vitamin D deficiency          Osteoporosis without current pathological fracture  -- Risks include menopause.  -- Reviewed basics of quantity versus quality.  -- Reassuring they are not fracturing.  -- 5/2022 work up of accelerated bone loss: elevated PTH, serum calcium WNL, low urine Ca - started on Citracal petite.   -- Recommend:  -Pharmacological therapy is recommended for patients with osteopenia if the 10 year probability of a hip fracture is >3% and 10 year probability of other major osteoporotic fractures is >20%.  Treatment options and potential side effects discussed for PO bisphosphonates, reclast, Denosumab, and Teriparatide.   -Treatment:   Discussed treatment options.   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  Recommended Evenity.   Patient would like to think about it.     -Calcium and Vitamin D RDD provided.  -Exercise: recommended.  -Fall precautions made in the home.  -Alerted that if dental work needs to be done it should be done prior to initiating therapy. Dental health: UTD  -- Repeat DEXA scan 10/2023.    Thyroid nodule  -- I have reviewed management options including observation, FNA or surgery.  -- FNA procedure explained in depth.  -- Discussed indications for repeat FNA as well as surgical indications (abnormal FNA, compressive symptoms or interval change).  -- Discussed possible results of FNA- Benign, Malignant, FLUS, or insufficient cells.  Discussed results auto released to patients, but advised the ordering provider will also contact to discuss.   -- Patient is not on any blood thinners.  -- All of the patients questions were answered.  -- FNA:   10/19/18  THYROID, RIGHT LOBE NODULE, FINE-NEEDLE ASPIRATION (CYTOLOGY):  - Benign ([place] Classification System II).  - Benign  follicular cells and colloid.  -- Due for thyroid US 8/2023.     Vitamin D deficiency  -- Check vitamin D.  -- CONTINUE: OTC Vit D3 1000iu daily.    Follow up in about 6 months (around 6/6/2023).

## 2022-12-06 ENCOUNTER — LAB VISIT (OUTPATIENT)
Dept: LAB | Facility: HOSPITAL | Age: 72
End: 2022-12-06
Payer: MEDICARE

## 2022-12-06 ENCOUNTER — OFFICE VISIT (OUTPATIENT)
Dept: ENDOCRINOLOGY | Facility: CLINIC | Age: 72
End: 2022-12-06
Payer: MEDICARE

## 2022-12-06 ENCOUNTER — PATIENT MESSAGE (OUTPATIENT)
Dept: ENDOCRINOLOGY | Facility: CLINIC | Age: 72
End: 2022-12-06

## 2022-12-06 VITALS
OXYGEN SATURATION: 97 % | DIASTOLIC BLOOD PRESSURE: 62 MMHG | WEIGHT: 157.94 LBS | HEART RATE: 61 BPM | HEIGHT: 63 IN | SYSTOLIC BLOOD PRESSURE: 119 MMHG | BODY MASS INDEX: 27.98 KG/M2

## 2022-12-06 DIAGNOSIS — E04.1 THYROID NODULE: ICD-10-CM

## 2022-12-06 DIAGNOSIS — M81.0 OSTEOPOROSIS WITHOUT CURRENT PATHOLOGICAL FRACTURE, UNSPECIFIED OSTEOPOROSIS TYPE: ICD-10-CM

## 2022-12-06 DIAGNOSIS — M81.0 OSTEOPOROSIS WITHOUT CURRENT PATHOLOGICAL FRACTURE, UNSPECIFIED OSTEOPOROSIS TYPE: Primary | ICD-10-CM

## 2022-12-06 DIAGNOSIS — E55.9 VITAMIN D DEFICIENCY: ICD-10-CM

## 2022-12-06 LAB
25(OH)D3+25(OH)D2 SERPL-MCNC: 32 NG/ML (ref 30–96)
ALBUMIN SERPL BCP-MCNC: 3.7 G/DL (ref 3.5–5.2)
ANION GAP SERPL CALC-SCNC: 8 MMOL/L (ref 8–16)
BUN SERPL-MCNC: 13 MG/DL (ref 8–23)
CALCIUM SERPL-MCNC: 9.5 MG/DL (ref 8.7–10.5)
CHLORIDE SERPL-SCNC: 108 MMOL/L (ref 95–110)
CO2 SERPL-SCNC: 27 MMOL/L (ref 23–29)
CREAT SERPL-MCNC: 0.7 MG/DL (ref 0.5–1.4)
EST. GFR  (NO RACE VARIABLE): >60 ML/MIN/1.73 M^2
GLUCOSE SERPL-MCNC: 79 MG/DL (ref 70–110)
PHOSPHATE SERPL-MCNC: 3.3 MG/DL (ref 2.7–4.5)
POTASSIUM SERPL-SCNC: 4.3 MMOL/L (ref 3.5–5.1)
PTH-INTACT SERPL-MCNC: 103.2 PG/ML (ref 9–77)
SODIUM SERPL-SCNC: 143 MMOL/L (ref 136–145)

## 2022-12-06 PROCEDURE — 1126F AMNT PAIN NOTED NONE PRSNT: CPT | Mod: CPTII,S$GLB,, | Performed by: NURSE PRACTITIONER

## 2022-12-06 PROCEDURE — 3008F BODY MASS INDEX DOCD: CPT | Mod: CPTII,S$GLB,, | Performed by: NURSE PRACTITIONER

## 2022-12-06 PROCEDURE — 99999 PR PBB SHADOW E&M-EST. PATIENT-LVL III: CPT | Mod: PBBFAC,,, | Performed by: NURSE PRACTITIONER

## 2022-12-06 PROCEDURE — 82306 VITAMIN D 25 HYDROXY: CPT | Performed by: NURSE PRACTITIONER

## 2022-12-06 PROCEDURE — 3074F SYST BP LT 130 MM HG: CPT | Mod: CPTII,S$GLB,, | Performed by: NURSE PRACTITIONER

## 2022-12-06 PROCEDURE — 3078F PR MOST RECENT DIASTOLIC BLOOD PRESSURE < 80 MM HG: ICD-10-PCS | Mod: CPTII,S$GLB,, | Performed by: NURSE PRACTITIONER

## 2022-12-06 PROCEDURE — 3078F DIAST BP <80 MM HG: CPT | Mod: CPTII,S$GLB,, | Performed by: NURSE PRACTITIONER

## 2022-12-06 PROCEDURE — 3074F PR MOST RECENT SYSTOLIC BLOOD PRESSURE < 130 MM HG: ICD-10-PCS | Mod: CPTII,S$GLB,, | Performed by: NURSE PRACTITIONER

## 2022-12-06 PROCEDURE — 1160F RVW MEDS BY RX/DR IN RCRD: CPT | Mod: CPTII,S$GLB,, | Performed by: NURSE PRACTITIONER

## 2022-12-06 PROCEDURE — 83970 ASSAY OF PARATHORMONE: CPT | Performed by: NURSE PRACTITIONER

## 2022-12-06 PROCEDURE — 99999 PR PBB SHADOW E&M-EST. PATIENT-LVL III: ICD-10-PCS | Mod: PBBFAC,,, | Performed by: NURSE PRACTITIONER

## 2022-12-06 PROCEDURE — 1101F PT FALLS ASSESS-DOCD LE1/YR: CPT | Mod: CPTII,S$GLB,, | Performed by: NURSE PRACTITIONER

## 2022-12-06 PROCEDURE — 80069 RENAL FUNCTION PANEL: CPT | Performed by: NURSE PRACTITIONER

## 2022-12-06 PROCEDURE — 99214 PR OFFICE/OUTPT VISIT, EST, LEVL IV, 30-39 MIN: ICD-10-PCS | Mod: S$GLB,,, | Performed by: NURSE PRACTITIONER

## 2022-12-06 PROCEDURE — 3008F PR BODY MASS INDEX (BMI) DOCUMENTED: ICD-10-PCS | Mod: CPTII,S$GLB,, | Performed by: NURSE PRACTITIONER

## 2022-12-06 PROCEDURE — 1160F PR REVIEW ALL MEDS BY PRESCRIBER/CLIN PHARMACIST DOCUMENTED: ICD-10-PCS | Mod: CPTII,S$GLB,, | Performed by: NURSE PRACTITIONER

## 2022-12-06 PROCEDURE — 1159F MED LIST DOCD IN RCRD: CPT | Mod: CPTII,S$GLB,, | Performed by: NURSE PRACTITIONER

## 2022-12-06 PROCEDURE — 3288F PR FALLS RISK ASSESSMENT DOCUMENTED: ICD-10-PCS | Mod: CPTII,S$GLB,, | Performed by: NURSE PRACTITIONER

## 2022-12-06 PROCEDURE — 99214 OFFICE O/P EST MOD 30 MIN: CPT | Mod: S$GLB,,, | Performed by: NURSE PRACTITIONER

## 2022-12-06 PROCEDURE — 36415 COLL VENOUS BLD VENIPUNCTURE: CPT | Performed by: NURSE PRACTITIONER

## 2022-12-06 PROCEDURE — 3288F FALL RISK ASSESSMENT DOCD: CPT | Mod: CPTII,S$GLB,, | Performed by: NURSE PRACTITIONER

## 2022-12-06 PROCEDURE — 1126F PR PAIN SEVERITY QUANTIFIED, NO PAIN PRESENT: ICD-10-PCS | Mod: CPTII,S$GLB,, | Performed by: NURSE PRACTITIONER

## 2022-12-06 PROCEDURE — 1159F PR MEDICATION LIST DOCUMENTED IN MEDICAL RECORD: ICD-10-PCS | Mod: CPTII,S$GLB,, | Performed by: NURSE PRACTITIONER

## 2022-12-06 PROCEDURE — 1101F PR PT FALLS ASSESS DOC 0-1 FALLS W/OUT INJ PAST YR: ICD-10-PCS | Mod: CPTII,S$GLB,, | Performed by: NURSE PRACTITIONER

## 2022-12-06 NOTE — ASSESSMENT & PLAN NOTE
-- I have reviewed management options including observation, FNA or surgery.  -- FNA procedure explained in depth.  -- Discussed indications for repeat FNA as well as surgical indications (abnormal FNA, compressive symptoms or interval change).  -- Discussed possible results of FNA- Benign, Malignant, FLUS, or insufficient cells.  Discussed results auto released to patients, but advised the ordering provider will also contact to discuss.   -- Patient is not on any blood thinners.  -- All of the patients questions were answered.  -- FNA:   10/19/18  THYROID, RIGHT LOBE NODULE, FINE-NEEDLE ASPIRATION (CYTOLOGY):  - Benign ([place] Classification System II).  - Benign follicular cells and colloid.  -- Due for thyroid US 8/2023.

## 2022-12-06 NOTE — ASSESSMENT & PLAN NOTE
-- Risks include menopause.  -- Reviewed basics of quantity versus quality.  -- Reassuring they are not fracturing.  -- 5/2022 work up of accelerated bone loss: elevated PTH, serum calcium WNL, low urine Ca - started on Citracal petite.   -- Recommend:  -Pharmacological therapy is recommended for patients with osteopenia if the 10 year probability of a hip fracture is >3% and 10 year probability of other major osteoporotic fractures is >20%.  Treatment options and potential side effects discussed for PO bisphosphonates, reclast, Denosumab, and Teriparatide.   -Treatment:   Discussed treatment options.   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  Recommended Evenity.   Patient would like to think about it.     -Calcium and Vitamin D RDD provided.  -Exercise: recommended.  -Fall precautions made in the home.  -Alerted that if dental work needs to be done it should be done prior to initiating therapy. Dental health: UTD  -- Repeat DEXA scan 10/2023.

## 2022-12-07 ENCOUNTER — TELEPHONE (OUTPATIENT)
Dept: ENDOCRINOLOGY | Facility: CLINIC | Age: 72
End: 2022-12-07
Payer: MEDICARE

## 2022-12-07 ENCOUNTER — PATIENT MESSAGE (OUTPATIENT)
Dept: ENDOCRINOLOGY | Facility: CLINIC | Age: 72
End: 2022-12-07
Payer: MEDICARE

## 2022-12-07 DIAGNOSIS — M81.0 OSTEOPOROSIS WITHOUT CURRENT PATHOLOGICAL FRACTURE, UNSPECIFIED OSTEOPOROSIS TYPE: Primary | ICD-10-CM

## 2022-12-12 ENCOUNTER — OFFICE VISIT (OUTPATIENT)
Dept: HEMATOLOGY/ONCOLOGY | Facility: CLINIC | Age: 72
End: 2022-12-12
Payer: MEDICARE

## 2022-12-12 VITALS
DIASTOLIC BLOOD PRESSURE: 65 MMHG | OXYGEN SATURATION: 95 % | HEIGHT: 63 IN | RESPIRATION RATE: 18 BRPM | TEMPERATURE: 98 F | BODY MASS INDEX: 28.59 KG/M2 | WEIGHT: 161.38 LBS | SYSTOLIC BLOOD PRESSURE: 125 MMHG | HEART RATE: 84 BPM

## 2022-12-12 DIAGNOSIS — Z17.1 MALIGNANT NEOPLASM OF LOWER-INNER QUADRANT OF RIGHT BREAST OF FEMALE, ESTROGEN RECEPTOR NEGATIVE: Primary | ICD-10-CM

## 2022-12-12 DIAGNOSIS — C50.311 MALIGNANT NEOPLASM OF LOWER-INNER QUADRANT OF RIGHT BREAST OF FEMALE, ESTROGEN RECEPTOR NEGATIVE: Primary | ICD-10-CM

## 2022-12-12 PROCEDURE — 3074F PR MOST RECENT SYSTOLIC BLOOD PRESSURE < 130 MM HG: ICD-10-PCS | Mod: CPTII,S$GLB,, | Performed by: INTERNAL MEDICINE

## 2022-12-12 PROCEDURE — 1159F MED LIST DOCD IN RCRD: CPT | Mod: CPTII,S$GLB,, | Performed by: INTERNAL MEDICINE

## 2022-12-12 PROCEDURE — 3008F BODY MASS INDEX DOCD: CPT | Mod: CPTII,S$GLB,, | Performed by: INTERNAL MEDICINE

## 2022-12-12 PROCEDURE — 3078F DIAST BP <80 MM HG: CPT | Mod: CPTII,S$GLB,, | Performed by: INTERNAL MEDICINE

## 2022-12-12 PROCEDURE — 99999 PR PBB SHADOW E&M-EST. PATIENT-LVL III: ICD-10-PCS | Mod: PBBFAC,,, | Performed by: INTERNAL MEDICINE

## 2022-12-12 PROCEDURE — 99213 OFFICE O/P EST LOW 20 MIN: CPT | Mod: S$GLB,,, | Performed by: INTERNAL MEDICINE

## 2022-12-12 PROCEDURE — 99999 PR PBB SHADOW E&M-EST. PATIENT-LVL III: CPT | Mod: PBBFAC,,, | Performed by: INTERNAL MEDICINE

## 2022-12-12 PROCEDURE — 99213 PR OFFICE/OUTPT VISIT, EST, LEVL III, 20-29 MIN: ICD-10-PCS | Mod: S$GLB,,, | Performed by: INTERNAL MEDICINE

## 2022-12-12 PROCEDURE — 1159F PR MEDICATION LIST DOCUMENTED IN MEDICAL RECORD: ICD-10-PCS | Mod: CPTII,S$GLB,, | Performed by: INTERNAL MEDICINE

## 2022-12-12 PROCEDURE — 1101F PR PT FALLS ASSESS DOC 0-1 FALLS W/OUT INJ PAST YR: ICD-10-PCS | Mod: CPTII,S$GLB,, | Performed by: INTERNAL MEDICINE

## 2022-12-12 PROCEDURE — 1101F PT FALLS ASSESS-DOCD LE1/YR: CPT | Mod: CPTII,S$GLB,, | Performed by: INTERNAL MEDICINE

## 2022-12-12 PROCEDURE — 1126F PR PAIN SEVERITY QUANTIFIED, NO PAIN PRESENT: ICD-10-PCS | Mod: CPTII,S$GLB,, | Performed by: INTERNAL MEDICINE

## 2022-12-12 PROCEDURE — 3288F PR FALLS RISK ASSESSMENT DOCUMENTED: ICD-10-PCS | Mod: CPTII,S$GLB,, | Performed by: INTERNAL MEDICINE

## 2022-12-12 PROCEDURE — 1126F AMNT PAIN NOTED NONE PRSNT: CPT | Mod: CPTII,S$GLB,, | Performed by: INTERNAL MEDICINE

## 2022-12-12 PROCEDURE — 3078F PR MOST RECENT DIASTOLIC BLOOD PRESSURE < 80 MM HG: ICD-10-PCS | Mod: CPTII,S$GLB,, | Performed by: INTERNAL MEDICINE

## 2022-12-12 PROCEDURE — 3074F SYST BP LT 130 MM HG: CPT | Mod: CPTII,S$GLB,, | Performed by: INTERNAL MEDICINE

## 2022-12-12 PROCEDURE — 3008F PR BODY MASS INDEX (BMI) DOCUMENTED: ICD-10-PCS | Mod: CPTII,S$GLB,, | Performed by: INTERNAL MEDICINE

## 2022-12-12 PROCEDURE — 3288F FALL RISK ASSESSMENT DOCD: CPT | Mod: CPTII,S$GLB,, | Performed by: INTERNAL MEDICINE

## 2022-12-12 NOTE — PROGRESS NOTES
Subjective:       Patient ID: Sharri Cline is a 72 y.o. female.    Chief Complaint: No chief complaint on file.    HPI 72-year-old female who returns to clinic for follow-up of right breast cancer-ER negative.  She had a pathological complete response to neoadjuvant chemotherapy  She is a former patient of .    Her biggest concern has been some lightheadedness.  She saw her primary care and had some blood work done.  She is also seen endocrinology for her bone density She has an appointment coming up with Cardiology.  .    Only pain is some chronic low back pain.  She has no shortness of breath.  She does have some anxiety.      Onc history:   - She presented for screening mammo in 5/15/2019 which showed focal asymmetries in both left and right breast. Diagnostic mammogram and US showed no significant abn of left breast, and right breast 15 mm x 11 mm x 12 mm mass at 4:00, 5 CFN. Biopsy on 5/24/19 showed grade 3 IDC, triple negative, Ki67 70%.    - 7/3/19 - 12/10/19 completed neoadjuvant chemotherapy with four cycles of dose-dense Adriamycin and cyclophosphamide with Neulasta support followed by twelve doses of weekly paclitaxel. Dose reduced for cytopenias and neuropathy.   - 1/23/2020 - Dr Hernandez performed right breast lumpectomy with SLN Biopsy with pathology showing no residual carcinoma with 3 neg SLN.   - 4/2/2020 - 4/24/2020 - completed RT    Mammogram August 2022 - negative  Review of Systems   Constitutional: Negative.    Cardiovascular: Negative.    Gastrointestinal: Negative.    Musculoskeletal:  Positive for back pain.   Neurological:  Positive for light-headedness and numbness. Negative for headaches.   Psychiatric/Behavioral:  The patient is nervous/anxious.        Objective:      Physical Exam  Vitals reviewed.   Constitutional:       General: She is not in acute distress.     Appearance: Normal appearance.   Cardiovascular:      Rate and Rhythm: Normal rate and regular rhythm.    Pulmonary:      Effort: Pulmonary effort is normal. No respiratory distress.      Breath sounds: Normal breath sounds. No wheezing or rales.   Chest:   Breasts:     Right: No mass, nipple discharge or skin change.      Left: Normal.       Abdominal:      Palpations: Abdomen is soft. There is no mass.      Tenderness: There is no abdominal tenderness.   Lymphadenopathy:      Cervical: No cervical adenopathy.      Upper Body:      Right upper body: No supraclavicular or axillary adenopathy.      Left upper body: No supraclavicular or axillary adenopathy.   Neurological:      Mental Status: She is alert and oriented to person, place, and time.      Cranial Nerves: No cranial nerve deficit.   Psychiatric:         Mood and Affect: Mood normal.         Behavior: Behavior normal.         Thought Content: Thought content normal.         Judgment: Judgment normal.       Assessment:      1. Malignant neoplasm of lower-inner quadrant of right breast of female, estrogen receptor negative        Plan:      RTC  6 M    Route Chart for Scheduling    Med Onc Chart Routing      Follow up with physician 6 months.   Follow up with RAJEEV    Infusion scheduling note    Injection scheduling note    Labs    Imaging    Pharmacy appointment    Other referrals

## 2022-12-15 ENCOUNTER — INITIAL CONSULT (OUTPATIENT)
Dept: VASCULAR SURGERY | Facility: CLINIC | Age: 72
End: 2022-12-15
Attending: SURGERY
Payer: MEDICARE

## 2022-12-15 VITALS
BODY MASS INDEX: 28.13 KG/M2 | SYSTOLIC BLOOD PRESSURE: 134 MMHG | WEIGHT: 158.75 LBS | HEIGHT: 63 IN | HEART RATE: 61 BPM | DIASTOLIC BLOOD PRESSURE: 60 MMHG | TEMPERATURE: 98 F

## 2022-12-15 DIAGNOSIS — I65.22 LEFT CAROTID ARTERY STENOSIS: Primary | ICD-10-CM

## 2022-12-15 DIAGNOSIS — I65.22 CAROTID ARTERY STENOSIS, UNILATERAL, LEFT: ICD-10-CM

## 2022-12-15 PROCEDURE — 99204 OFFICE O/P NEW MOD 45 MIN: CPT | Mod: S$GLB,,, | Performed by: SURGERY

## 2022-12-15 PROCEDURE — 99999 PR PBB SHADOW E&M-EST. PATIENT-LVL III: ICD-10-PCS | Mod: PBBFAC,,, | Performed by: SURGERY

## 2022-12-15 PROCEDURE — 99999 PR PBB SHADOW E&M-EST. PATIENT-LVL III: CPT | Mod: PBBFAC,,, | Performed by: SURGERY

## 2022-12-15 PROCEDURE — 99204 PR OFFICE/OUTPT VISIT, NEW, LEVL IV, 45-59 MIN: ICD-10-PCS | Mod: S$GLB,,, | Performed by: SURGERY

## 2022-12-15 NOTE — PROGRESS NOTES
Sharri Cline  12/15/2022    HPI:  Patient is a 72 y.o. female with a h/o right breast intraductal carcinoma s/p neoadjuvant chemo and right partial mastectomy 2020, thyroid nodules, and osteoporosis who is here today for evaluation of left sided carotid stenosis found by ultrasound on 11/14. It was found to be 40-59% at that time. Her right side showed no evidence of significant stenosis. She complains of a 1 month history of light headedness and decreased appetite. She denies symptoms of stroke including weakness, numbness, change in phonation or difficulty speaking, confusion, vision, or hearing changes.    Denies MI/stroke  Denies tobacco use.    Past Medical History:   Diagnosis Date    Breast cancer 01/23/2020    RIGHT    Helicobacter pylori antibody positive 5/27/13    treated with clarithromycin, metronidazole, and omeprazole x 14 days  (5/27-6/3); EGD normal in July 2013     Past Surgical History:   Procedure Laterality Date    BIOPSY OF LIVER WITH ULTRASOUND GUIDANCE N/A 11/19/2018    Procedure: BIOPSY, LIVER, WITH US GUIDANCE;  Surgeon: [facility] Diagnostic Provider;  Location: [facility] OR [address] [place] [address];  Service: Radiology;  Laterality: N/A;  U/S GUIDED LIVER ([id number]).  11/19/2018  [facility] 7 AM  PROCEDURE 8 AM.  DR CARITO LAGOS.  DB 11/12/18 3:55P    BREAST LUMPECTOMY Right     01/23/20    INJECTION FOR SENTINEL NODE IDENTIFICATION Right 1/23/2020    Procedure: INJECTION, FOR SENTINEL NODE IDENTIFICATION;  Surgeon: Libertad Hernandez MD;  [facility];  Service: General;  Laterality: Right;    INSERTION OF TUNNELED CENTRAL VENOUS CATHETER (CVC) WITH SUBCUTANEOUS PORT Left 6/13/2019    Procedure: [id number] - CHEST;  Surgeon: Libertad Hernandez MD;  [facility];  Service: General;  Laterality: Left;    MASTECTOMY, PARTIAL Right 1/23/2020    Procedure: MASTECTOMY, PARTIAL- w/Seed Localization;  Surgeon: Libertad Hernandez MD;  Location: [facility] OR [address] [place] [address];  Service: General;  Laterality: Right;     SENTINEL LYMPH NODE BIOPSY Right 1/23/2020    Procedure: BIOPSY, LYMPH NODE, SENTINEL;  Surgeon: Libertad Hernandez MD;  Location: [facility] OR [address] [place] [address];  Service: General;  Laterality: Right;    TUBAL LIGATION       Family History   Problem Relation Age of Onset    Diabetes Father     Hypertension Mother     Miscarriages / Stillbirths Mother     ANGEL disease Mother     Eczema Mother     Parkinsonism Mother     Diabetes Brother     Diabetes Brother     Colon cancer Maternal Aunt     No Known Problems Sister     No Known Problems Maternal Uncle     No Known Problems Paternal Aunt     No Known Problems Paternal Uncle     No Known Problems Maternal Grandmother     No Known Problems Maternal Grandfather     No Known Problems Paternal Grandmother     No Known Problems Paternal Grandfather     Diabetes Brother     No Known Problems Son     No Known Problems Son     Celiac disease Neg Hx     Cirrhosis Neg Hx     Colon polyps Neg Hx     Crohn's disease Neg Hx     Cystic fibrosis Neg Hx     Esophageal cancer Neg Hx     Hemochromatosis Neg Hx     Inflammatory bowel disease Neg Hx     Irritable bowel syndrome Neg Hx     Liver cancer Neg Hx     Liver disease Neg Hx     Rectal cancer Neg Hx     Stomach cancer Neg Hx     Ulcerative colitis Neg Hx     Anthony's disease Neg Hx     Psoriasis Neg Hx     Ovarian cancer Neg Hx     Breast cancer Neg Hx     Amblyopia Neg Hx     Blindness Neg Hx     Cancer Neg Hx     Cataracts Neg Hx     Glaucoma Neg Hx     Macular degeneration Neg Hx     Retinal detachment Neg Hx     Strabismus Neg Hx     Stroke Neg Hx     Thyroid disease Neg Hx      Social History     Socioeconomic History    Marital status:      Spouse name: Rayshawn    Number of children: 2   Occupational History    Occupation:  worker     Employer: [employer]   Tobacco Use    Smoking status: Never    Smokeless tobacco: Never   Substance and Sexual Activity    Alcohol use: Not Currently     Comment: Social    Drug use:  Never    Sexual activity: Yes     Partners: Male     Birth control/protection: Post-menopausal   Other Topics Concern    Are you pregnant or think you may be? No    Breast-feeding No     Social Determinants of Health     Financial Resource Strain: Low Risk     Difficulty of Paying Living Expenses: Not hard at all   Food Insecurity: No Food Insecurity    Worried About Running Out of Food in the Last Year: Never true    Ran Out of Food in the Last Year: Never true   Transportation Needs: No Transportation Needs    Lack of Transportation (Medical): No    Lack of Transportation (Non-Medical): No   Physical Activity: Sufficiently Active    Days of Exercise per Week: 5 days    Minutes of Exercise per Session: 60 min   Stress: Stress Concern Present    Feeling of Stress : To some extent   Social Connections: Socially Integrated    Frequency of Communication with Friends and Family: More than three times a week    Frequency of Social Gatherings with Friends and Family: Three times a week    Attends [employer] Services: More than 4 times per year    Active Member of Clubs or Organizations: Yes    Attends Club or Organization Meetings: 1 to 4 times per year    Marital Status:    Housing Stability: Low Risk     Unable to Pay for Housing in the Last Year: No    Number of Places Lived in the Last Year: 1    Unstable Housing in the Last Year: No       Current Outpatient Medications:     artificial tears (ISOPTO TEARS) 0.5 % ophthalmic solution, Place 1 drop into both eyes 4 (four) times daily. (Patient not taking: Reported on 1/24/2022), Disp: , Rfl:     cholecalciferol, vitamin D3, (VITAMIN D3) 25 mcg (1,000 unit) capsule, Take 1,000 Units by mouth once daily., Disp: , Rfl:     cyanocobalamin, vitamin B-12, (VITAMIN B-12) 1,000 mcg/mL Drop, Take by mouth once daily., Disp: , Rfl:     fish oil-omega-3 fatty acids 300-1,000 mg capsule, Take 2 g by mouth once daily., Disp: , Rfl:     multivitamin with minerals tablet, Take 1  tablet by mouth once daily., Disp: , Rfl:   No current facility-administered medications for this visit.    Facility-Administered Medications Ordered in Other Visits:     0.9%  NaCl infusion, , Intravenous, Continuous, Chung Olson MD, Last Rate: 70 mL/hr at 01/23/20 0817, New Bag at 01/23/20 0817    lidocaine (PF) 10 mg/ml (1%) injection 10 mg, 1 mL, Intradermal, Once, Chung Olson MD    sodium chloride 0.9% flush 10 mL, 10 mL, Intravenous, 1 time in Clinic/HOD, Dominique Ayala MD    REVIEW OF SYSTEMS:  General: negative; ENT: negative; Allergy and Immunology: negative; Hematological and Lymphatic: Negative; Endocrine: negative; Respiratory: no cough, shortness of breath, or wheezing; Cardiovascular: no chest pain or dyspnea on exertion; Gastrointestinal: 1 month history decreased appetite, no abdominal pain/back, change in bowel habits, or bloody stools; Genito-Urinary: no dysuria, trouble voiding, or hematuria; Musculoskeletal: negative  Neurological: C/O lightheadedness after prolonged periods without eating, no TIA or stroke symptoms; Psychiatric: no nervousness, anxiety or depression.    PHYSICAL EXAM:                General appearance:  Alert, well-appearing, and in no distress.  Oriented to person, place, and time   Neurological: Normal speech, no focal findings noted; CN II - XII grossly intact           Musculoskeletal: Digits/nail without cyanosis/clubbing.  Normal muscle strength/tone.                 Neck: Supple, no significant adenopathy; thyroid is not enlarged                  Negative carotid bruit can be auscultated                Chest:  Clear to auscultation, no wheezes, rales or rhonchi, symmetric air entry     No use of accessory muscles             Cardiac: Normal rate and regular rhythm, S1 and S2 normal; PMI non-displaced          Abdomen: Soft, nontender, nondistended, no masses or organomegaly     No rebound tenderness noted; bowel sounds normal     Pulsatile aortic  mass is not palpable.     No groin adenopathy      Extremities:   2+ femoral pulses bilaterally     2+pedal pulses palpable.     Negative forpre-tibial edema     No ulcerations    LAB RESULTS:  Lab Results   Component Value Date    K 4.3 12/06/2022    K 3.8 11/17/2022    K 4.1 05/30/2022    CREATININE 0.7 12/06/2022    CREATININE 0.8 11/17/2022    CREATININE 0.9 05/30/2022     Lab Results   Component Value Date    WBC 4.13 11/17/2022    WBC 5.81 11/01/2021    WBC 3.97 02/14/2020    HCT 39.1 11/17/2022    HCT 39.6 11/01/2021    HCT 40.8 02/14/2020     11/17/2022     11/01/2021     (L) 02/14/2020     Lab Results   Component Value Date    HGBA1C 5.1 08/30/2019    HGBA1C 5.5 07/22/2014     IMAGING:  US CAROTID BILATERAL     CLINICAL HISTORY:  Dizziness and giddiness     TECHNIQUE:  Grayscale and color Doppler ultrasound examination of the carotid and vertebral artery systems bilaterally.  Stenosis estimates are per the NASCET measurement criteria.     COMPARISON:  None.     FINDINGS:  Right:     Internal Carotid Artery (ICA):     Peak systolic velocity 81 cm/sec     End diastolic velocity 25 cm/sec     ICA/CCA peak systolic ratio: 1.2     ICA/CCA end diastolic ratio: 1.67     Plaque formation: Homogeneous     Vertebral artery: Antegrade flow and normal waveform.     Left:     Internal Carotid Artery (ICA):     Peak systolic velocity 99 cm/sec     End diastolic velocity 31 cm/sec     ICA/CCA peak systolic ratio: 1.57     ICA/CCA end diastolic ratio: 2.38     Plaque formation: Heterogeneous     Vertebral artery: Antegrade flow and normal waveform.     Impression:     No evidence of a hemodynamically significant carotid bifurcation stenosis in the right internal carotid artery.     40-59% stenosis of the left internal carotid artery with heterogeneous plaque.                IMP/PLAN:     72 y.o. female with a 40-59% stenosis of left internal carotid artery by ultrasound and complains of month long  history of decreased appetite and light headedness. Pt denies history of stroke or stroke like symptoms including weakness, numbness, difficulty with speech or concentration, vision, or hearing changes.     Recommend cardiovascular work up for lightheadedness at this time  Repeat carotid duplex ultrasound study in 12 months.      Baltazar Cheung, PGY1  Vascular/Endovascular Surgery      STAFF ADDENDUM    I have reviewed the relevant data and the resident's assessment and agree with the findings and plan as outlined above.    Kvng Beal MD  Vascular & Endovascular Surgery

## 2023-01-04 ENCOUNTER — PATIENT MESSAGE (OUTPATIENT)
Dept: HEMATOLOGY/ONCOLOGY | Facility: CLINIC | Age: 73
End: 2023-01-04
Payer: MEDICARE

## 2023-01-06 ENCOUNTER — PES CALL (OUTPATIENT)
Dept: ADMINISTRATIVE | Facility: CLINIC | Age: 73
End: 2023-01-06
Payer: MEDICARE

## 2023-01-23 ENCOUNTER — OFFICE VISIT (OUTPATIENT)
Dept: INTERNAL MEDICINE | Facility: CLINIC | Age: 73
End: 2023-01-23
Payer: MEDICARE

## 2023-01-23 VITALS
OXYGEN SATURATION: 99 % | SYSTOLIC BLOOD PRESSURE: 138 MMHG | BODY MASS INDEX: 28.24 KG/M2 | HEART RATE: 62 BPM | WEIGHT: 159.38 LBS | DIASTOLIC BLOOD PRESSURE: 76 MMHG | HEIGHT: 63 IN

## 2023-01-23 DIAGNOSIS — Z85.3 HISTORY OF BREAST CANCER: ICD-10-CM

## 2023-01-23 DIAGNOSIS — I65.22 LEFT CAROTID ARTERY STENOSIS: ICD-10-CM

## 2023-01-23 DIAGNOSIS — I70.0 AORTIC ATHEROSCLEROSIS: ICD-10-CM

## 2023-01-23 DIAGNOSIS — T45.1X5A CHEMOTHERAPY-INDUCED NEUROPATHY: ICD-10-CM

## 2023-01-23 DIAGNOSIS — Z00.00 ENCOUNTER FOR PREVENTIVE HEALTH EXAMINATION: Primary | ICD-10-CM

## 2023-01-23 DIAGNOSIS — K21.9 GASTROESOPHAGEAL REFLUX DISEASE, UNSPECIFIED WHETHER ESOPHAGITIS PRESENT: ICD-10-CM

## 2023-01-23 DIAGNOSIS — D64.9 ANEMIA, UNSPECIFIED TYPE: ICD-10-CM

## 2023-01-23 DIAGNOSIS — E04.1 THYROID NODULE: ICD-10-CM

## 2023-01-23 DIAGNOSIS — E55.9 VITAMIN D DEFICIENCY: ICD-10-CM

## 2023-01-23 DIAGNOSIS — G62.0 CHEMOTHERAPY-INDUCED NEUROPATHY: ICD-10-CM

## 2023-01-23 DIAGNOSIS — M81.0 OSTEOPOROSIS WITHOUT CURRENT PATHOLOGICAL FRACTURE, UNSPECIFIED OSTEOPOROSIS TYPE: ICD-10-CM

## 2023-01-23 PROCEDURE — 3075F SYST BP GE 130 - 139MM HG: CPT | Mod: CPTII,S$GLB,, | Performed by: NURSE PRACTITIONER

## 2023-01-23 PROCEDURE — 3288F FALL RISK ASSESSMENT DOCD: CPT | Mod: CPTII,S$GLB,, | Performed by: NURSE PRACTITIONER

## 2023-01-23 PROCEDURE — 1170F PR FUNCTIONAL STATUS ASSESSED: ICD-10-PCS | Mod: CPTII,S$GLB,, | Performed by: NURSE PRACTITIONER

## 2023-01-23 PROCEDURE — 3078F DIAST BP <80 MM HG: CPT | Mod: CPTII,S$GLB,, | Performed by: NURSE PRACTITIONER

## 2023-01-23 PROCEDURE — 3288F PR FALLS RISK ASSESSMENT DOCUMENTED: ICD-10-PCS | Mod: CPTII,S$GLB,, | Performed by: NURSE PRACTITIONER

## 2023-01-23 PROCEDURE — 1101F PT FALLS ASSESS-DOCD LE1/YR: CPT | Mod: CPTII,S$GLB,, | Performed by: NURSE PRACTITIONER

## 2023-01-23 PROCEDURE — 99499 RISK ADDL DX/OHS AUDIT: ICD-10-PCS | Mod: S$GLB,,, | Performed by: NURSE PRACTITIONER

## 2023-01-23 PROCEDURE — 1101F PR PT FALLS ASSESS DOC 0-1 FALLS W/OUT INJ PAST YR: ICD-10-PCS | Mod: CPTII,S$GLB,, | Performed by: NURSE PRACTITIONER

## 2023-01-23 PROCEDURE — 3075F PR MOST RECENT SYSTOLIC BLOOD PRESS GE 130-139MM HG: ICD-10-PCS | Mod: CPTII,S$GLB,, | Performed by: NURSE PRACTITIONER

## 2023-01-23 PROCEDURE — 3008F PR BODY MASS INDEX (BMI) DOCUMENTED: ICD-10-PCS | Mod: CPTII,S$GLB,, | Performed by: NURSE PRACTITIONER

## 2023-01-23 PROCEDURE — 1170F FXNL STATUS ASSESSED: CPT | Mod: CPTII,S$GLB,, | Performed by: NURSE PRACTITIONER

## 2023-01-23 PROCEDURE — 99499 UNLISTED E&M SERVICE: CPT | Mod: S$GLB,,, | Performed by: NURSE PRACTITIONER

## 2023-01-23 PROCEDURE — G0439 PPPS, SUBSEQ VISIT: HCPCS | Mod: S$GLB,,, | Performed by: NURSE PRACTITIONER

## 2023-01-23 PROCEDURE — 3078F PR MOST RECENT DIASTOLIC BLOOD PRESSURE < 80 MM HG: ICD-10-PCS | Mod: CPTII,S$GLB,, | Performed by: NURSE PRACTITIONER

## 2023-01-23 PROCEDURE — 1159F MED LIST DOCD IN RCRD: CPT | Mod: CPTII,S$GLB,, | Performed by: NURSE PRACTITIONER

## 2023-01-23 PROCEDURE — 99999 PR PBB SHADOW E&M-EST. PATIENT-LVL IV: ICD-10-PCS | Mod: PBBFAC,,, | Performed by: NURSE PRACTITIONER

## 2023-01-23 PROCEDURE — 1160F PR REVIEW ALL MEDS BY PRESCRIBER/CLIN PHARMACIST DOCUMENTED: ICD-10-PCS | Mod: CPTII,S$GLB,, | Performed by: NURSE PRACTITIONER

## 2023-01-23 PROCEDURE — 1160F RVW MEDS BY RX/DR IN RCRD: CPT | Mod: CPTII,S$GLB,, | Performed by: NURSE PRACTITIONER

## 2023-01-23 PROCEDURE — G0439 PR MEDICARE ANNUAL WELLNESS SUBSEQUENT VISIT: ICD-10-PCS | Mod: S$GLB,,, | Performed by: NURSE PRACTITIONER

## 2023-01-23 PROCEDURE — 3008F BODY MASS INDEX DOCD: CPT | Mod: CPTII,S$GLB,, | Performed by: NURSE PRACTITIONER

## 2023-01-23 PROCEDURE — 1126F PR PAIN SEVERITY QUANTIFIED, NO PAIN PRESENT: ICD-10-PCS | Mod: CPTII,S$GLB,, | Performed by: NURSE PRACTITIONER

## 2023-01-23 PROCEDURE — 1159F PR MEDICATION LIST DOCUMENTED IN MEDICAL RECORD: ICD-10-PCS | Mod: CPTII,S$GLB,, | Performed by: NURSE PRACTITIONER

## 2023-01-23 PROCEDURE — 1126F AMNT PAIN NOTED NONE PRSNT: CPT | Mod: CPTII,S$GLB,, | Performed by: NURSE PRACTITIONER

## 2023-01-23 PROCEDURE — 99999 PR PBB SHADOW E&M-EST. PATIENT-LVL IV: CPT | Mod: PBBFAC,,, | Performed by: NURSE PRACTITIONER

## 2023-01-23 NOTE — PROGRESS NOTES
"Sharri Cline presented for a  Medicare AWV and comprehensive Health Risk Assessment today. The following components were reviewed and updated:    Medical history  Family History  Social history  Allergies and Current Medications  Health Risk Assessment  Health Maintenance  Care Team         ** See Completed Assessments for Annual Wellness Visit within the encounter summary.**         The following assessments were completed:  Living Situation  CAGE  Depression Screening  Timed Get Up and Go  Whisper Test - N/A hearing impairment  Cognitive Function Screening    Nutrition Screening  ADL Screening  PAQ Screening  Review for Opioid Screening: Pt does not have Rx for Opioids.  Review for Substance Use Disorders: Patient does not use substances.        Vitals:    01/23/23 0847   BP: 138/76   BP Location: Right arm   Pulse: 62   SpO2: 99%   Weight: 72.3 kg (159 lb 6.3 oz)   Height: 5' 3" (1.6 m)     Body mass index is 28.24 kg/m².    Physical Exam  Vitals reviewed.   Constitutional:       Appearance: Normal appearance.   HENT:      Head: Normocephalic.   Cardiovascular:      Rate and Rhythm: Normal rate.   Pulmonary:      Effort: Pulmonary effort is normal.   Abdominal:      General: Bowel sounds are normal.   Musculoskeletal:         General: Normal range of motion.      Cervical back: Normal range of motion.      Right lower leg: No edema.      Left lower leg: No edema.   Skin:     General: Skin is warm and dry.      Capillary Refill: Capillary refill takes less than 2 seconds.   Neurological:      Mental Status: She is alert and oriented to person, place, and time.   Psychiatric:         Behavior: Behavior normal.         Thought Content: Thought content normal.         Judgment: Judgment normal.             Diagnoses and health risks identified today and associated recommendations/orders:    1. Encounter for preventive health examination  Assessments completed.  HM recommendations reviewed. Discussed shingrix vaccines. " Defer appropriateness of asa and statin tx to PCP.  Establish care with new PCP as scheduled.    2. Aortic atherosclerosis  Chronic, stable on current regimen. Followed by vascular surgery. Not on statin.    3. Left carotid artery stenosis  Chronic, stable on current regimen. Followed by vascular surgery.    4. Chemotherapy-induced neuropathy  Chronic, stable on current regimen. Followed by oncology.    5. History of breast cancer  Chronic, stable on current regimen. Followed by oncology.    6. Anemia, unspecified type  Chronic, stable on current regimen. Followed by oncology.    7. Thyroid nodule  Chronic, stable on current regimen. Followed by endocrinology.    8. Vitamin D deficiency  Chronic, stable on current regimen. Followed by endocrinology.    9. Gastroesophageal reflux disease, unspecified whether esophagitis present  Chronic, stable on current regimen. Followed by PCP.    10. Osteoporosis without current pathological fracture, unspecified osteoporosis type  Chronic, stable on current regimen. Followed by endocrinology.    11. BMI 28.0-28.9,adult  Chronic, stable on current regimen. Followed by PCP.       Provided [place] with a 5-10 year written screening schedule and personal prevention plan. Recommendations were developed using the USPSTF age appropriate recommendations. Education, counseling, and referrals were provided as needed. After Visit Summary printed and given to patient which includes a list of additional screenings\tests needed.    Follow up in about 1 year (around 1/23/2024).       Lilo Gomez NP    I offered to discuss advanced care planning, including how to pick a person who would make decisions for you if you were unable to make them for yourself, called a health care power of , and what kind of decisions you might make such as use of life sustaining treatments such as ventilators and tube feeding when faced with a life limiting illness recorded on a living will that they will  need to know. (How you want to be cared for as you near the end of your natural life)     X Patient is interested in learning more about how to make advanced directives.  I provided them paperwork and offered to discuss this with them.

## 2023-01-23 NOTE — PATIENT INSTRUCTIONS
1. Appointment with Dr. Guerrero as scheduled.    2. Shingles vaccines (SHINGRIX) are available to Medicare patients at no out of pocket cost. Available at the pharmacy.    3. https://www.covid.gov/tests    Counseling and Referral of Other Preventative  (Italic type indicates deductible and co-insurance are waived)    Patient Name: Sharri Cline  Today's Date: 1/23/2023    Health Maintenance         Date Due Completion Date    Aspirin/Antiplatelet Therapy Never done ---    High Dose Statin Never done ---    Shingles Vaccine (1 of 2) 05/19/2015 3/24/2015    Colorectal Cancer Screening 07/09/2023 7/9/2013    Mammogram 08/08/2023 8/8/2022    DEXA Scan 10/27/2023 10/27/2021    Override on 10/27/2021: Done    TETANUS VACCINE 05/17/2026 5/17/2016    Lipid Panel 11/17/2027 11/17/2022    Override on 11/22/2003: Done (Repeat recommended in seven years)          No orders of the defined types were placed in this encounter.    The following information is provided to all patients.  This information is to help you find resources for any of the problems found today that may be affecting your health:                Living healthy guide: www.[facility].[place].gov      Understanding Diabetes: www.diabetes.org      Eating healthy: www.cdc.gov/healthyweight      CDC home safety checklist: www.cdc.gov/steadi/patient.html      Agency on Aging: www.goea.[place].[facility]      Alcoholics anonymous (AA): www.aa.org      Physical Activity: www.marichuy.nih.gov/zd7llnl      Tobacco use: www.quitwithusla.org

## 2023-02-14 ENCOUNTER — OFFICE VISIT (OUTPATIENT)
Dept: INTERNAL MEDICINE | Facility: CLINIC | Age: 73
End: 2023-02-14
Payer: MEDICARE

## 2023-02-14 VITALS
WEIGHT: 165.81 LBS | OXYGEN SATURATION: 96 % | HEIGHT: 63 IN | BODY MASS INDEX: 29.38 KG/M2 | HEART RATE: 70 BPM | DIASTOLIC BLOOD PRESSURE: 80 MMHG | SYSTOLIC BLOOD PRESSURE: 133 MMHG

## 2023-02-14 DIAGNOSIS — D70.1 AGRANULOCYTOSIS SECONDARY TO CANCER CHEMOTHERAPY (CODE): ICD-10-CM

## 2023-02-14 DIAGNOSIS — Z17.1 MALIGNANT NEOPLASM OF LOWER-INNER QUADRANT OF RIGHT BREAST OF FEMALE, ESTROGEN RECEPTOR NEGATIVE: ICD-10-CM

## 2023-02-14 DIAGNOSIS — Z76.89 ENCOUNTER TO ESTABLISH CARE: Primary | ICD-10-CM

## 2023-02-14 DIAGNOSIS — H91.90 HEARING LOSS, UNSPECIFIED HEARING LOSS TYPE, UNSPECIFIED LATERALITY: ICD-10-CM

## 2023-02-14 DIAGNOSIS — R26.89 IMBALANCE: ICD-10-CM

## 2023-02-14 DIAGNOSIS — C50.311 MALIGNANT NEOPLASM OF LOWER-INNER QUADRANT OF RIGHT BREAST OF FEMALE, ESTROGEN RECEPTOR NEGATIVE: ICD-10-CM

## 2023-02-14 DIAGNOSIS — R42 DIZZINESS AND GIDDINESS: ICD-10-CM

## 2023-02-14 DIAGNOSIS — Z01.00 EYE EXAM, ROUTINE: ICD-10-CM

## 2023-02-14 DIAGNOSIS — E21.3 HYPERPARATHYROIDISM: ICD-10-CM

## 2023-02-14 DIAGNOSIS — R73.09 OTHER ABNORMAL GLUCOSE: ICD-10-CM

## 2023-02-14 PROCEDURE — 3044F PR MOST RECENT HEMOGLOBIN A1C LEVEL <7.0%: ICD-10-PCS | Mod: CPTII,S$GLB,, | Performed by: INTERNAL MEDICINE

## 2023-02-14 PROCEDURE — 3075F SYST BP GE 130 - 139MM HG: CPT | Mod: CPTII,S$GLB,, | Performed by: INTERNAL MEDICINE

## 2023-02-14 PROCEDURE — 99999 PR PBB SHADOW E&M-EST. PATIENT-LVL IV: ICD-10-PCS | Mod: PBBFAC,,, | Performed by: INTERNAL MEDICINE

## 2023-02-14 PROCEDURE — 99213 OFFICE O/P EST LOW 20 MIN: CPT | Mod: S$GLB,,, | Performed by: INTERNAL MEDICINE

## 2023-02-14 PROCEDURE — 1126F AMNT PAIN NOTED NONE PRSNT: CPT | Mod: CPTII,S$GLB,, | Performed by: INTERNAL MEDICINE

## 2023-02-14 PROCEDURE — 3079F DIAST BP 80-89 MM HG: CPT | Mod: CPTII,S$GLB,, | Performed by: INTERNAL MEDICINE

## 2023-02-14 PROCEDURE — 3075F PR MOST RECENT SYSTOLIC BLOOD PRESS GE 130-139MM HG: ICD-10-PCS | Mod: CPTII,S$GLB,, | Performed by: INTERNAL MEDICINE

## 2023-02-14 PROCEDURE — 99999 PR PBB SHADOW E&M-EST. PATIENT-LVL IV: CPT | Mod: PBBFAC,,, | Performed by: INTERNAL MEDICINE

## 2023-02-14 PROCEDURE — 3288F FALL RISK ASSESSMENT DOCD: CPT | Mod: CPTII,S$GLB,, | Performed by: INTERNAL MEDICINE

## 2023-02-14 PROCEDURE — 3288F PR FALLS RISK ASSESSMENT DOCUMENTED: ICD-10-PCS | Mod: CPTII,S$GLB,, | Performed by: INTERNAL MEDICINE

## 2023-02-14 PROCEDURE — 1101F PR PT FALLS ASSESS DOC 0-1 FALLS W/OUT INJ PAST YR: ICD-10-PCS | Mod: CPTII,S$GLB,, | Performed by: INTERNAL MEDICINE

## 2023-02-14 PROCEDURE — 1126F PR PAIN SEVERITY QUANTIFIED, NO PAIN PRESENT: ICD-10-PCS | Mod: CPTII,S$GLB,, | Performed by: INTERNAL MEDICINE

## 2023-02-14 PROCEDURE — 3008F BODY MASS INDEX DOCD: CPT | Mod: CPTII,S$GLB,, | Performed by: INTERNAL MEDICINE

## 2023-02-14 PROCEDURE — 1159F PR MEDICATION LIST DOCUMENTED IN MEDICAL RECORD: ICD-10-PCS | Mod: CPTII,S$GLB,, | Performed by: INTERNAL MEDICINE

## 2023-02-14 PROCEDURE — 3008F PR BODY MASS INDEX (BMI) DOCUMENTED: ICD-10-PCS | Mod: CPTII,S$GLB,, | Performed by: INTERNAL MEDICINE

## 2023-02-14 PROCEDURE — 3079F PR MOST RECENT DIASTOLIC BLOOD PRESSURE 80-89 MM HG: ICD-10-PCS | Mod: CPTII,S$GLB,, | Performed by: INTERNAL MEDICINE

## 2023-02-14 PROCEDURE — 1159F MED LIST DOCD IN RCRD: CPT | Mod: CPTII,S$GLB,, | Performed by: INTERNAL MEDICINE

## 2023-02-14 PROCEDURE — 99213 PR OFFICE/OUTPT VISIT, EST, LEVL III, 20-29 MIN: ICD-10-PCS | Mod: S$GLB,,, | Performed by: INTERNAL MEDICINE

## 2023-02-14 PROCEDURE — 1101F PT FALLS ASSESS-DOCD LE1/YR: CPT | Mod: CPTII,S$GLB,, | Performed by: INTERNAL MEDICINE

## 2023-02-14 PROCEDURE — 3044F HG A1C LEVEL LT 7.0%: CPT | Mod: CPTII,S$GLB,, | Performed by: INTERNAL MEDICINE

## 2023-02-14 NOTE — PROGRESS NOTES
Subjective:       Patient ID: Sharri Cline is a 72 y.o. female.    Chief Complaint: Establish Care    HPI    Mrs. Cline is a 71 yo female who presents to [facility].     Has been feeling off balance for one month     Hx of breast cancer: Right breast ER negative. Dx in 2019 s/p tx with adriamycin and cyclophsohamide with  neulesta then had right breast lumpectomy, then had RT. Follows up with Dr. Alexandra every 6 months. Last mammogram was normal.     Thyroid nodules: due for thyroid US again in 2023     Osteoporosis. Met with endocrine. Discussed starting evenity.   Health Maintenance:  Colon Cancer Screening:  coloscopy done 2013 was normal. Due again this July   Mammogram: done 8/2022   DEX: 2021 showed osteoporosis due in 2023   Hep C: negative 2014  Lipids: normal 11/2022  Vaccines: up to date on all vaccines       Review of Systems   Constitutional:  Negative for activity change, appetite change and chills.   HENT:  Negative for ear pain, sinus pressure/congestion and sneezing.    Respiratory:  Negative for cough and shortness of breath.    Cardiovascular:  Negative for chest pain, palpitations and leg swelling.   Gastrointestinal:  Negative for abdominal distention, abdominal pain, constipation, diarrhea, nausea and vomiting.   Genitourinary:  Negative for dysuria and hematuria.   Musculoskeletal:  Negative for arthralgias, back pain and myalgias.   Neurological:  Negative for dizziness and headaches.   Psychiatric/Behavioral:  Negative for agitation. The patient is not nervous/anxious.          Past Medical History:   Diagnosis Date    Breast cancer 01/23/2020    RIGHT    Helicobacter pylori antibody positive 5/27/13    treated with clarithromycin, metronidazole, and omeprazole x 14 days  (5/27-6/3); EGD normal in July 2013     Past Surgical History:   Procedure Laterality Date    BIOPSY OF LIVER WITH ULTRASOUND GUIDANCE N/A 11/19/2018    Procedure: BIOPSY, LIVER, WITH US GUIDANCE;  Surgeon: [facility]  Diagnostic Provider;  Location: [facility] OR [facility];  Service: Radiology;  Laterality: N/A;  U/S GUIDED LIVER ([id number]).  11/19/2018  DOSC 7 AM  PROCEDURE 8 AM.  DR CARITO LAGOS.  DB 11/12/18 3:55P    BREAST LUMPECTOMY Right     01/23/20    INJECTION FOR SENTINEL NODE IDENTIFICATION Right 1/23/2020    Procedure: INJECTION, FOR SENTINEL NODE IDENTIFICATION;  Surgeon: Libertad Hernandez MD;  [facility];  Service: General;  Laterality: Right;    INSERTION OF TUNNELED CENTRAL VENOUS CATHETER (CVC) WITH SUBCUTANEOUS PORT Left 6/13/2019    Procedure: [id number] - CHEST;  Surgeon: Libertad Hernandez MD;  [facility];  Service: General;  Laterality: Left;    MASTECTOMY, PARTIAL Right 1/23/2020    Procedure: MASTECTOMY, PARTIAL- w/Seed Localization;  Surgeon: Libertad Hernandez MD;  [facility];  Service: General;  Laterality: Right;    SENTINEL LYMPH NODE BIOPSY Right 1/23/2020    Procedure: BIOPSY, LYMPH NODE, SENTINEL;  Surgeon: Libertad Hernandez MD;  [facility];  Service: General;  Laterality: Right;    TUBAL LIGATION        Patient Active Problem List   Diagnosis    Menopause    Atrophic vaginitis    GERD (gastroesophageal reflux disease)    Idiopathic guttate hypomelanosis    Osteoporosis without current pathological fracture    Thyroid nodule    Malignant neoplasm of lower-inner quadrant of right breast of female, estrogen receptor negative    At risk for lymphedema    Anemia    Dysphagia    Chemotherapy-induced neuropathy    Vitamin D deficiency    Aortic atherosclerosis    Osteoporosis    Decreased strength    Left carotid artery stenosis    Imbalance        Objective:      Physical Exam  Constitutional:       Appearance: Normal appearance.   HENT:      Head: Normocephalic.      Right Ear: Tympanic membrane normal.      Left Ear: Tympanic membrane normal.      Nose: Nose normal.   Cardiovascular:      Rate and Rhythm: Normal rate and regular rhythm.      Pulses:  Normal pulses.      Heart sounds: Normal heart sounds.   Pulmonary:      Effort: Pulmonary effort is normal.      Breath sounds: Normal breath sounds.   Abdominal:      General: Abdomen is flat. Bowel sounds are normal.      Palpations: Abdomen is soft.   Musculoskeletal:         General: Normal range of motion.      Cervical back: Normal range of motion and neck supple.   Skin:     General: Skin is warm and dry.   Neurological:      General: No focal deficit present.      Mental Status: She is alert and oriented to person, place, and time.   Psychiatric:         Mood and Affect: Mood normal.       Assessment:       Problem List Items Addressed This Visit          Other    Imbalance    Relevant Orders    VITAMIN B12 (Completed)    Folate (Completed)    Ambulatory referral/consult to Physical/Occupational Therapy    HEMOGLOBIN A1C (Completed)     Other Visit Diagnoses       Encounter to establish care    -  Primary    Hearing loss, unspecified hearing loss type, unspecified laterality        Relevant Orders    Ear wax removal (Completed)    Eye exam, routine        Relevant Orders    Ambulatory referral/consult to Optometry    Other abnormal glucose        Relevant Orders    HEMOGLOBIN A1C (Completed)    Dizziness and giddiness        Relevant Orders    CT Head Without Contrast (Completed)            Plan:         Sharri was seen today for establish care.    Diagnoses and all orders for this visit:    Encounter to establish care  Colon Cancer Screening:  coloscopy done 2013 was normal. Due again this July   Mammogram: done 8/2022   DEX: 2021 showed osteoporosis due in 2023   Hep C: negative 2014  Lipids: normal 11/2022  Vaccines: up to date on all vaccines     Hearing loss, unspecified hearing loss type, unspecified laterality  -     Ear wax removal    Eye exam, routine  -     Ambulatory referral/consult to Optometry; Future    Imbalance  Check labs and perform ear lavage   Check CT brain     Breast cancer:  Follow up  with Dr. Ibrahima Guerrero MD   Internal Medicine   Primary Care

## 2023-02-17 ENCOUNTER — HOSPITAL ENCOUNTER (OUTPATIENT)
Dept: RADIOLOGY | Facility: HOSPITAL | Age: 73
Discharge: HOME | End: 2023-02-17
Attending: INTERNAL MEDICINE
Payer: MEDICARE

## 2023-02-17 DIAGNOSIS — R42 DIZZINESS AND GIDDINESS: ICD-10-CM

## 2023-02-17 PROCEDURE — 70450 CT HEAD WITHOUT CONTRAST: ICD-10-PCS | Mod: 26,,, | Performed by: RADIOLOGY

## 2023-02-17 PROCEDURE — 70450 CT HEAD/BRAIN W/O DYE: CPT | Mod: 26,,, | Performed by: RADIOLOGY

## 2023-02-17 PROCEDURE — 70450 CT HEAD/BRAIN W/O DYE: CPT | Mod: TC

## 2023-02-22 ENCOUNTER — PATIENT MESSAGE (OUTPATIENT)
Dept: INTERNAL MEDICINE | Facility: CLINIC | Age: 73
End: 2023-02-22
Payer: MEDICARE

## 2023-03-01 ENCOUNTER — PATIENT MESSAGE (OUTPATIENT)
Dept: INTERNAL MEDICINE | Facility: CLINIC | Age: 73
End: 2023-03-01
Payer: MEDICARE

## 2023-03-03 ENCOUNTER — CLINICAL SUPPORT (OUTPATIENT)
Dept: REHABILITATION | Facility: HOSPITAL | Age: 73
End: 2023-03-03
Payer: MEDICARE

## 2023-03-03 DIAGNOSIS — R26.89 IMBALANCE: ICD-10-CM

## 2023-03-03 PROCEDURE — 97112 NEUROMUSCULAR REEDUCATION: CPT | Mod: PO

## 2023-03-03 PROCEDURE — 97161 PT EVAL LOW COMPLEX 20 MIN: CPT | Mod: PO

## 2023-03-03 NOTE — PROGRESS NOTES
[facility]   Physical Therapy Initial Evaluation     Date: 3/3/2023   Name: Sharri Cline  Clinic Number: [id number]    Therapy Diagnosis:   Encounter Diagnosis   Name Primary?    Imbalance      Physician: Fern Guerrero MD    Physician Orders: PT Eval and Treat   Medical Diagnosis from Referral: R26.89 (ICD-10-CM) - Imbalance  Evaluation Date: 3/3/2023  Authorization Period Expiration: 12/31/23  Plan of Care Expiration: 4/20/23  Progress Note Due: 4/3/23  Visit # / Visits authorized: 1/ 1   FOTO: 1/pending    Precautions: Standard     Time In: 745 AM  Time Out: 830 AM  Total Appointment Time (timed & untimed codes): 45 minutes      SUBJECTIVE     Date of onset: 4-6 mo    History of current condition - Sharri reports: balance disturbances of insideous onset; she denies vertigo like sxs and states that she feels her recent balance dificulties are assocaited with chemo related neuropathy. Patient states she would like to improve her overall endurance and ability to balance. Patient denies red flag sxs and is agreeable to today's eval.     Falls: near fall in Jan 2023    Imaging, bone scan films: (+) osteoporosis    Prior Therapy: yes- no prior balance training  Social History:  lives with their family  Occupation: Retired  Prior Level of Function: Independent with all ADL's without any activity restrictions  Current Level of Function: Decreased walking tolerance and balance     Pain:  Current 0/10, worst 0/10, best 0/10   Location:   hx of LBP but no pain at this time  Description: NA  Aggravating Factors: NA  Easing Factors: NA    Patients goals: improve LE strength and balance     Medical History:   Past Medical History:   Diagnosis Date    Breast cancer 01/23/2020    RIGHT    Helicobacter pylori antibody positive 5/27/13    treated with clarithromycin, metronidazole, and omeprazole x 14 days  (5/27-6/3); EGD normal in July 2013       Surgical History:   Sharri Cline  has a  "past surgical history that includes Tubal ligation; Biopsy of liver with ultrasound guidance (N/A, 11/19/2018); Insertion of tunneled central venous catheter (CVC) with subcutaneous port (Left, 6/13/2019); Mastectomy, partial (Right, 1/23/2020); Injection for sentinel node identification (Right, 1/23/2020); [place] lymph node biopsy (Right, 1/23/2020); and Breast lumpectomy (Right).    Medications:   Sharri has a current medication list which includes the following prescription(s): artificial tears, carbamide peroxide, cholecalciferol (vitamin d3), vitamin b-12, fish oil-omega-3 fatty acids, and multivitamin with minerals, and the following Facility-Administered Medications: sodium chloride 0.9%.    Allergies:   Review of patient's allergies indicates:  No Known Allergies       OBJECTIVE     Outcome Measures:    5x STS- 14 sec    TUG- 8.5 sec    Balance:  Feet Together EO- 60+ sec  Feet Together EC- 30+ sec  R Tandem Stance- 20 sec (eyes open)  L Tandem Stance- 25 sec (eyes open)  R SLS- 12 sec (eyes open)  L SLS- 17 sec (eyes open)    Lower Extremity Strength  Right LE  Left LE    Quadriceps: 4+/5 Quadriceps: 4+/5   Hamstrings: 4-/5 Hamstrings: 4-/5   Hip flexion (seated): 4/5 Hip flexion (seated ): 4+/5   Seated Hip ABD 3+/5 Seated Hip ABD 3+/5            TREATMENT     Total Treatment time (time-based codes) separate from Evaluation: 15 minutes      Sharri received the treatments listed below:      therapeutic exercises to develop strength, endurance, ROM, flexibility, and core stabilization for 05 minutes including:  S/L Clamshell  Bridges with TB      neuromuscular re-education activities to improve: Balance and Coordination for 10 minutes. The following activities were included:  Tandem Stance 3x15"  Tandem Walking 2L  SLS 3x8"        PATIENT EDUCATION AND HOME EXERCISES     Education provided:   - sxs behavior, resistance and physical activity guidelines for healthy living, (+) HEP    Written Home Exercises " Provided: yes. Exercises were reviewed and Sharri was able to demonstrate them prior to the end of the session.  Sharri demonstrated good  understanding of the education provided. See EMR under Patient Instructions for exercises provided during therapy sessions.    ASSESSMENT     Sharri is a 72 y.o. female referred to outpatient Physical Therapy with a medical diagnosis of R26.89 (ICD-10-CM) - Imbalance.     Patient presents with balance impairment and LE deconditioning secondary to chemo treatment. Patient reports her cancer is in remission and she would like to return to PLOF. She is currently unable to walk for <30 min at a time and would like guidance to return to active lifestyle. Patient will benefit from skilled PT to address above LE weakness and to improve transitional activity tolerance to reach age matched 5x STS.     Patient prognosis is Good.   Patient will benefit from skilled outpatient Physical Therapy to address the deficits stated above and in the chart below, provide patient /family education, and to maximize patientt's level of independence.     Plan of care discussed with patient: Yes  Patient's spiritual, cultural and educational needs considered and patient is agreeable to the plan of care and goals as stated below:     Anticipated Barriers for therapy: Chronicity of condition    Medical Necessity is demonstrated by the following  History  Co-morbidities and personal factors that may impact the plan of care Co-morbidities:   Hx of cancer     Personal Factors:   no deficits       moderate   Examination  Body Structures and Functions, activity limitations and participation restrictions that may impact the plan of care Body Regions:   lower extremities  upper extremities     Body Systems:    ROM  strength  balance  gait  transfers     Participation Restrictions:   No mentioned     Activity limitations:   Learning and applying knowledge  no deficits     General Tasks and Commands  no deficits      Communication  no deficits     Mobility  no deficits     Self care  no deficits     Domestic Life  no deficits     Interactions/Relationships  no deficits     Life Areas  no deficits     Community and Social Life  no deficits             high   Clinical Presentation stable and uncomplicated low   Decision Making/ Complexity Score: low      Goals:  Short Term Goals: 2 weeks   Pt will be independent with HEP to supplement PT with decreasing pain and improving functional mobility  Pt will demo a 5x STS score <13 sec to display improved transitional activity tolerance and LE strength     Long Term Goals: 4 weeks   Pt will improve LE MMT scores by 1/2 grade where deficits noted in order to improve strength for functional tasks  Pt will demo a 5x STS score of < 11 sec to display improved transitional activity tolerance and LE strength  Pt will perform SLS on BLE for 30 seconds without UE support in order to demo improved SL balance    PLAN   Plan of care Certification: 3/3/2023 to 4/20/23.    Outpatient Physical Therapy 2 times weekly for 6 weeks to include the following interventions: Gait Training, Manual Therapy, Moist Heat/ Ice, Neuromuscular Re-ed, Therapeutic Activities, and Therapeutic Exercise.     Kaushal Wade, PT      I CERTIFY THE NEED FOR THESE SERVICES FURNISHED UNDER THIS PLAN OF TREATMENT AND WHILE UNDER MY CARE   Physician's comments:     Physician's Signature: ___________________________________________________

## 2023-03-06 ENCOUNTER — CLINICAL SUPPORT (OUTPATIENT)
Dept: REHABILITATION | Facility: HOSPITAL | Age: 73
End: 2023-03-06
Payer: MEDICARE

## 2023-03-06 DIAGNOSIS — R53.1 DECREASED STRENGTH: ICD-10-CM

## 2023-03-06 DIAGNOSIS — R26.89 IMBALANCE: Primary | ICD-10-CM

## 2023-03-06 PROCEDURE — 97112 NEUROMUSCULAR REEDUCATION: CPT | Mod: PO

## 2023-03-06 PROCEDURE — 97110 THERAPEUTIC EXERCISES: CPT | Mod: PO

## 2023-03-06 NOTE — PROGRESS NOTES
"[facility]   Physical Therapy Treatment Note     Name: Sharri [place]  Clinic Number: [id number]    Therapy Diagnosis:   Encounter Diagnoses   Name Primary?    Imbalance Yes    Decreased strength      Physician: Fern Guerrero MD    Visit Date: 3/6/2023    Physician Orders: PT Eval and Treat   Medical Diagnosis from Referral: R26.89 (ICD-10-CM) - Imbalance  Evaluation Date: 3/3/2023  Authorization Period Expiration: 12/31/23  Plan of Care Expiration: 4/20/23  Progress Note Due: 4/3/23  Visit # / Visits authorized: 1/ 11   FOTO: 1/pending    PTA Visit #: 0/5     Time In: 11:25 AM (patient arrived late)  Time Out: 12:15 PM  Total Billable Time: 50 minutes    SUBJECTIVE     Pt reports: no change in status since eval Friday; she reports she has not yet tried her HEP and is agreeable to today's treatment.  She was not compliant with home exercise program.  Response to previous treatment: 1st follow up  Functional change: 1st follow up    Pain: 0/10  Location:  NA     OBJECTIVE     Objective Measures updated at progress report unless specified.     Treatment     Sharri received the treatments listed below:      therapeutic exercises to develop strength, endurance, and ROM for 20 minutes including:  SLR 2x10 2#  SAQ 2x10 2#  Standing HS Curl 2x10 2#  S/L Clamshell 2x10 RTB  Bridges 2x10 RTB      neuromuscular re-education activities to improve: Balance and Coordination for 30 minutes. The following activities were included:  Tandem Stance 3x30"  SLS 3x10" ea  Feet Togetehr EO on Airex 3x20"  Feet Together EC 3x20"  Tandem Walk x3L  Part Task Grape Vine x3L          Patient Education and Home Exercises     Home Exercises Provided and Patient Education Provided     Education provided:   - demo and cueing for all exercises performed today    Written Home Exercises Provided: Patient instructed to cont prior HEP. Exercises were reviewed and Sharri was able to demonstrate them prior to the end of " the session.  Sharri demonstrated good  understanding of the education provided. See EMR under Patient Instructions for exercises provided during therapy sessions    ASSESSMENT     Patient tolerated strengthening and balance training well without adverse sxs; she continues to fatigue quickly with hip strengthening. Patient was able to complete desired programming without LOB and CGA from therapist. Patient is progressing nicely and had no further questions at the end of treatment.     Sharri Is progressing well towards her goals.   Pt prognosis is Good.     Pt will continue to benefit from skilled outpatient physical therapy to address the deficits listed in the problem list box on initial evaluation, provide pt/family education and to maximize pt's level of independence in the home and community environment.     Pt's spiritual, cultural and educational needs considered and pt agreeable to plan of care and goals.     Anticipated barriers to physical therapy: chronicity of condition    Goals:  Short Term Goals: 2 weeks   Pt will be independent with HEP to supplement PT with decreasing pain and improving functional mobility  Pt will demo a 5x STS score <13 sec to display improved transitional activity tolerance and LE strength     Long Term Goals: 4 weeks   Pt will improve LE MMT scores by 1/2 grade where deficits noted in order to improve strength for functional tasks  Pt will demo a 5x STS score of < 11 sec to display improved transitional activity tolerance and LE strength  Pt will perform SLS on BLE for 30 seconds without UE support in order to demo improved SL balance    PLAN     Continue to progress per patient tolerance     Kaushal Wade, PT

## 2023-03-07 ENCOUNTER — LAB VISIT (OUTPATIENT)
Dept: LAB | Facility: HOSPITAL | Age: 73
End: 2023-03-07
Payer: MEDICARE

## 2023-03-07 ENCOUNTER — PATIENT MESSAGE (OUTPATIENT)
Dept: ENDOCRINOLOGY | Facility: CLINIC | Age: 73
End: 2023-03-07
Payer: MEDICARE

## 2023-03-07 ENCOUNTER — TELEPHONE (OUTPATIENT)
Dept: ENDOCRINOLOGY | Facility: CLINIC | Age: 73
End: 2023-03-07
Payer: MEDICARE

## 2023-03-07 ENCOUNTER — PATIENT MESSAGE (OUTPATIENT)
Dept: INTERNAL MEDICINE | Facility: CLINIC | Age: 73
End: 2023-03-07
Payer: MEDICARE

## 2023-03-07 DIAGNOSIS — R73.09 OTHER ABNORMAL GLUCOSE: ICD-10-CM

## 2023-03-07 DIAGNOSIS — R26.89 IMBALANCE: ICD-10-CM

## 2023-03-07 LAB
ESTIMATED AVG GLUCOSE: 97 MG/DL (ref 68–131)
FOLATE SERPL-MCNC: 10.4 NG/ML (ref 4–24)
HBA1C MFR BLD: 5 % (ref 4–5.6)
VIT B12 SERPL-MCNC: 2000 PG/ML (ref 210–950)

## 2023-03-07 PROCEDURE — 82746 ASSAY OF FOLIC ACID SERUM: CPT | Performed by: INTERNAL MEDICINE

## 2023-03-07 PROCEDURE — 82607 VITAMIN B-12: CPT | Performed by: INTERNAL MEDICINE

## 2023-03-07 PROCEDURE — 36415 COLL VENOUS BLD VENIPUNCTURE: CPT | Performed by: INTERNAL MEDICINE

## 2023-03-07 PROCEDURE — 83036 HEMOGLOBIN GLYCOSYLATED A1C: CPT | Performed by: INTERNAL MEDICINE

## 2023-03-08 ENCOUNTER — CLINICAL SUPPORT (OUTPATIENT)
Dept: INTERNAL MEDICINE | Facility: CLINIC | Age: 73
End: 2023-03-08
Payer: MEDICARE

## 2023-03-08 DIAGNOSIS — H91.90 HEARING LOSS, UNSPECIFIED HEARING LOSS TYPE, UNSPECIFIED LATERALITY: Primary | ICD-10-CM

## 2023-03-08 NOTE — PROGRESS NOTES
Used 2 pt identifiers and reviewed allergies prior to applying ear ,  then asked pt to wait 15 mins post injection for s/sx of adverse reactions.  Washed r/ear but pt had no obvious wax in or out of the ear.

## 2023-03-09 ENCOUNTER — CLINICAL SUPPORT (OUTPATIENT)
Dept: REHABILITATION | Facility: HOSPITAL | Age: 73
End: 2023-03-09
Payer: MEDICARE

## 2023-03-09 DIAGNOSIS — R26.89 IMBALANCE: Primary | ICD-10-CM

## 2023-03-09 DIAGNOSIS — R53.1 DECREASED STRENGTH: ICD-10-CM

## 2023-03-09 PROBLEM — E21.3 HYPERPARATHYROIDISM: Status: ACTIVE | Noted: 2023-03-09

## 2023-03-09 PROCEDURE — 97110 THERAPEUTIC EXERCISES: CPT | Mod: PO

## 2023-03-09 PROCEDURE — 97112 NEUROMUSCULAR REEDUCATION: CPT | Mod: PO

## 2023-03-13 ENCOUNTER — LAB VISIT (OUTPATIENT)
Dept: LAB | Facility: HOSPITAL | Age: 73
End: 2023-03-13
Payer: MEDICARE

## 2023-03-13 DIAGNOSIS — M81.0 OSTEOPOROSIS WITHOUT CURRENT PATHOLOGICAL FRACTURE, UNSPECIFIED OSTEOPOROSIS TYPE: ICD-10-CM

## 2023-03-13 PROCEDURE — 82575 CREATININE CLEARANCE TEST: CPT | Performed by: NURSE PRACTITIONER

## 2023-03-13 PROCEDURE — 82340 ASSAY OF CALCIUM IN URINE: CPT | Performed by: NURSE PRACTITIONER

## 2023-03-14 ENCOUNTER — CLINICAL SUPPORT (OUTPATIENT)
Dept: REHABILITATION | Facility: HOSPITAL | Age: 73
End: 2023-03-14
Payer: MEDICARE

## 2023-03-14 ENCOUNTER — TELEPHONE (OUTPATIENT)
Dept: ENDOCRINOLOGY | Facility: CLINIC | Age: 73
End: 2023-03-14
Payer: MEDICARE

## 2023-03-14 ENCOUNTER — PATIENT MESSAGE (OUTPATIENT)
Dept: ENDOCRINOLOGY | Facility: CLINIC | Age: 73
End: 2023-03-14
Payer: MEDICARE

## 2023-03-14 DIAGNOSIS — R53.1 DECREASED STRENGTH: ICD-10-CM

## 2023-03-14 DIAGNOSIS — R26.89 IMBALANCE: Primary | ICD-10-CM

## 2023-03-14 LAB
CALCIUM 24H UR-MRATE: 7 MG/HR (ref 4–12)
CALCIUM UR-MCNC: 9.6 MG/DL (ref 0–15)
CALCIUM URINE (MG/SPEC): 178 MG/SPEC
CREAT CL/1.73 SQ M 12H UR+SERPL-ARVRAT: 125 ML/MIN (ref 70–110)
CREAT SERPL-MCNC: 0.8 MG/DL (ref 0.5–1.4)
CREAT UR-MCNC: 78 MG/DL (ref 15–325)
CREATININE, URINE (MG/SPEC): 1443 MG/SPEC
URINE COLLECTION DURATION: 24 HR
URINE COLLECTION DURATION: 24 HR
URINE VOLUME: 1850 ML
URINE VOLUME: 1850 ML

## 2023-03-14 PROCEDURE — 97530 THERAPEUTIC ACTIVITIES: CPT | Mod: PO

## 2023-03-14 PROCEDURE — 97110 THERAPEUTIC EXERCISES: CPT | Mod: PO

## 2023-03-14 NOTE — PROGRESS NOTES
"[facility]   Physical Therapy Treatment Note     Name: Sharri Dennis [place]on  Clinic Number: [id number]    Therapy Diagnosis:   Encounter Diagnoses   Name Primary?    Imbalance Yes    Decreased strength        Physician: Fern Guerrero MD    Visit Date: 3/14/2023    Physician Orders: PT Eval and Treat   Medical Diagnosis from Referral: R26.89 (ICD-10-CM) - Imbalance  Evaluation Date: 3/3/2023  Authorization Period Expiration: 12/31/23  Plan of Care Expiration: 4/20/23  Progress Note Due: 4/3/23  Visit # / Visits authorized: 3/ 11   FOTO: 1/pending    PTA Visit #: 0/5     Time In: 7:15 AM   Time Out: 8:00 AM   Total Billable Time: 45 minutes    SUBJECTIVE     Pt reports: she was able to get out and walk for about 60 min without severe fatigue. Overall patient feels she is progressing nicely and patient is agreeable to today's treatment.   She was not compliant with home exercise program.  Response to previous treatment: improving strength  Functional change: patient is progressing    Pain: 0/10  Location:  NA     OBJECTIVE     Objective Measures updated at progress report unless specified.     Treatment     Sharri received the treatments listed below:      therapeutic exercises to develop strength, endurance, and ROM for 30 minutes including:  Bike x6min lvl 2 for improved aerobic endurance   SLR 2x10 2#  SAQ 2x10 5#  Standing HS Curl 2x10 2#  S/L Clamshell 2x10 GTB  Bridges 2x10 GTB  Leg Press 2x10 20#      neuromuscular re-education activities to improve: Balance and Coordination for 00 minutes. The following activities were included:  Tandem Stance 3x30" with cone touch reaction   SLS 3x10" ea  Feet Togetehr EO on Airex 3x20"  Feet Together EC 3x20"  Tandem Walk x3L  Part Task Grape Vine x3L    therapeutic activities to improve functional performance for 10  minutes, including:  Sit<>Stand from gold chair with 10lb KB  x2L Sled Push fwd/back 50ft x 45# for ambulatory endurance and for " load pushing/carrying          Patient Education and Home Exercises     Home Exercises Provided and Patient Education Provided     Education provided:   - demo and cueing for all exercises performed today; WHO health dietary guidelines for weight and muscle mass maintenance     Written Home Exercises Provided: Patient instructed to cont prior HEP. Exercises were reviewed and Sharri was able to demonstrate them prior to the end of the session.  Sharri demonstrated good  understanding of the education provided. See EMR under Patient Instructions for exercises provided during therapy sessions    ASSESSMENT     Patient to benefit from weekly visits being divided into a strength and balance day. During strengthening today she was progressed with therapeutic activities to improve ambulatory tolerance and loaded carrying for ADL's. Patient is progressing nicely and will benefit from next visit focusing on advanced static balance and base level dynamic balance activities. Patient continues to progress nicely with skilled PT.     Sharri Is progressing well towards her goals.   Pt prognosis is Good.     Pt will continue to benefit from skilled outpatient physical therapy to address the deficits listed in the problem list box on initial evaluation, provide pt/family education and to maximize pt's level of independence in the home and community environment.     Pt's spiritual, cultural and educational needs considered and pt agreeable to plan of care and goals.     Anticipated barriers to physical therapy: chronicity of condition    Goals:  Short Term Goals: 2 weeks   Pt will be independent with HEP to supplement PT with decreasing pain and improving functional mobility  Pt will demo a 5x STS score <13 sec to display improved transitional activity tolerance and LE strength     Long Term Goals: 4 weeks   Pt will improve LE MMT scores by 1/2 grade where deficits noted in order to improve strength for functional tasks  Pt will demo a  5x STS score of < 11 sec to display improved transitional activity tolerance and LE strength  Pt will perform SLS on BLE for 30 seconds without UE support in order to demo improved SL balance  Patient Goal: Participate in [employer]- 6.2 miles     PLAN     Continue to progress per patient tolerance     Kaushal Wade, PT

## 2023-03-14 NOTE — PROGRESS NOTES
"[facility]   Physical Therapy Treatment Note     Name: Sharri Dennis [place]wey  Clinic Number: [id number]    Therapy Diagnosis:   Encounter Diagnoses   Name Primary?    Imbalance Yes    Decreased strength        Physician: Fern Guerrero MD    Visit Date: 3/9/2023    Physician Orders: PT Eval and Treat   Medical Diagnosis from Referral: R26.89 (ICD-10-CM) - Imbalance  Evaluation Date: 3/3/2023  Authorization Period Expiration: 12/31/23  Plan of Care Expiration: 4/20/23  Progress Note Due: 4/3/23  Visit # / Visits authorized: 3/ 11   FOTO: 1/pending    PTA Visit #: 0/5     Time In: 715 AM (patient arrived late)  Time Out: 800 PM  Total Billable Time: 30 minutes (15 min double booked)    SUBJECTIVE     Pt reports: minimal soreness after last visit and state she feels like we are on the right path in regard to improving both balance and strength. Patient is agreeable to today's treatment.   She was not compliant with home exercise program.  Response to previous treatment: improving strength  Functional change: patient is progressing    Pain: 0/10  Location:  NA     OBJECTIVE     Objective Measures updated at progress report unless specified.     Treatment     Sharri received the treatments listed below:      therapeutic exercises to develop strength, endurance, and ROM for 20 minutes including:  SLR 2x10 2#  SAQ 2x10 2#  Standing HS Curl 2x10 2#  S/L Clamshell 2x10 RTB  Bridges 2x10 RTB  Leg Press 2x10 20#      neuromuscular re-education activities to improve: Balance and Coordination for 15 minutes. The following activities were included:  Tandem Stance 3x30" with cone touch reaction   SLS 3x10" ea  Feet Togetehr EO on Airex 3x20"  Feet Together EC 3x20"  Tandem Walk x3L  Part Task Grape Vine x3L          Patient Education and Home Exercises     Home Exercises Provided and Patient Education Provided     Education provided:   - demo and cueing for all exercises performed today    Written Home " Exercises Provided: Patient instructed to cont prior HEP. Exercises were reviewed and Sharri was able to demonstrate them prior to the end of the session.  Sharri demonstrated good  understanding of the education provided. See EMR under Patient Instructions for exercises provided during therapy sessions    ASSESSMENT     Patient tolerated progression to leg press for strengthening without adverse sxs. She displays good static balance and had difficulty with cone reaction activity but was able to complete task with CGA from therapist without LOB. Patient is progressing nicely thus far and had no further questions at end of visit.     Sharri Is progressing well towards her goals.   Pt prognosis is Good.     Pt will continue to benefit from skilled outpatient physical therapy to address the deficits listed in the problem list box on initial evaluation, provide pt/family education and to maximize pt's level of independence in the home and community environment.     Pt's spiritual, cultural and educational needs considered and pt agreeable to plan of care and goals.     Anticipated barriers to physical therapy: chronicity of condition    Goals:  Short Term Goals: 2 weeks   Pt will be independent with HEP to supplement PT with decreasing pain and improving functional mobility  Pt will demo a 5x STS score <13 sec to display improved transitional activity tolerance and LE strength     Long Term Goals: 4 weeks   Pt will improve LE MMT scores by 1/2 grade where deficits noted in order to improve strength for functional tasks  Pt will demo a 5x STS score of < 11 sec to display improved transitional activity tolerance and LE strength  Pt will perform SLS on BLE for 30 seconds without UE support in order to demo improved SL balance    PLAN     Continue to progress per patient tolerance     Kaushal Wade, PT

## 2023-03-16 ENCOUNTER — CLINICAL SUPPORT (OUTPATIENT)
Dept: REHABILITATION | Facility: HOSPITAL | Age: 73
End: 2023-03-16
Payer: MEDICARE

## 2023-03-16 DIAGNOSIS — M81.0 OSTEOPOROSIS WITHOUT CURRENT PATHOLOGICAL FRACTURE, UNSPECIFIED OSTEOPOROSIS TYPE: ICD-10-CM

## 2023-03-16 DIAGNOSIS — M81.0 OSTEOPOROSIS, UNSPECIFIED OSTEOPOROSIS TYPE, UNSPECIFIED PATHOLOGICAL FRACTURE PRESENCE: ICD-10-CM

## 2023-03-16 DIAGNOSIS — R26.89 IMBALANCE: Primary | ICD-10-CM

## 2023-03-16 DIAGNOSIS — R53.1 DECREASED STRENGTH: ICD-10-CM

## 2023-03-16 PROCEDURE — 97530 THERAPEUTIC ACTIVITIES: CPT | Mod: PO

## 2023-03-16 PROCEDURE — 97110 THERAPEUTIC EXERCISES: CPT | Mod: PO

## 2023-03-16 NOTE — PROGRESS NOTES
"[facility]   Physical Therapy Treatment Note     Name: Sharri Dennis Joanna  Clinic Number: [id number]    Therapy Diagnosis:   Encounter Diagnoses   Name Primary?    Imbalance Yes    Decreased strength     Osteoporosis, unspecified osteoporosis type, unspecified pathological fracture presence     Osteoporosis without current pathological fracture, unspecified osteoporosis type        Physician: Fern Guerrero MD    Visit Date: 3/16/2023    Physician Orders: PT Eval and Treat   Medical Diagnosis from Referral: R26.89 (ICD-10-CM) - Imbalance  Evaluation Date: 3/3/2023  Authorization Period Expiration: 12/31/23  Plan of Care Expiration: 4/20/23  Progress Note Due: 4/3/23  Visit # / Visits authorized: 4 / 11   FOTO: 1/pending    PTA Visit #: 0/5     Time In: 10:15 AM   Time Out: 11:00 AM   Total Billable Time: 45 minutes    SUBJECTIVE     Pt reports: feeling good. Slightly fatigued after last sesson.  She was not compliant with home exercise program.  Response to previous treatment: improving strength  Functional change: patient is progressing    Pain: 0/10  Location:  NA     OBJECTIVE     Objective Measures updated at progress report unless specified.     Treatment     Sharri received the treatments listed below:      therapeutic exercises to develop strength, endurance, and ROM for 30 minutes including:  Bike x6min lvl 2 for improved aerobic endurance   SLR 2x10 2#  LAQ 2x10 2.5#  Standing HS Curl 2x10 2#  S/L Clamshell 2x10 GTB  Bridges 2x10 GTB  Leg Press 2x10 20#    neuromuscular re-education activities to improve: Balance and Coordination for 00 minutes. The following activities were included:  Tandem Stance 3x30" with cone touch reaction   SLS 3x10" ea  Feet Togetehr EO on Airex 3x20"  Feet Together EC 3x20"  Tandem Walk x3L  Part Task Grape Vine x3L    therapeutic activities to improve functional performance for 10  minutes, including:  Sit<>Stand from gold chair with 10lb KB  x2L " Sled Push fwd/back 50ft x 45# for ambulatory endurance and for load pushing/carrying          Patient Education and Home Exercises     Home Exercises Provided and Patient Education Provided     Education provided:   - demo and cueing for all exercises performed today; WHO health dietary guidelines for weight and muscle mass maintenance     Written Home Exercises Provided: Patient instructed to cont prior HEP. Exercises were reviewed and Sharri was able to demonstrate them prior to the end of the session.  Sharri demonstrated good  understanding of the education provided. See EMR under Patient Instructions for exercises provided during therapy sessions    ASSESSMENT     Patient completed the exercise program with minor evidence of fatigue. Most notable weakness with hamstring curl exercises. Will incorporate more balance exercises next visit .    Sharri Is progressing well towards her goals.   Pt prognosis is Good.     Pt will continue to benefit from skilled outpatient physical therapy to address the deficits listed in the problem list box on initial evaluation, provide pt/family education and to maximize pt's level of independence in the home and community environment.     Pt's spiritual, cultural and educational needs considered and pt agreeable to plan of care and goals.     Anticipated barriers to physical therapy: chronicity of condition    Goals:  Short Term Goals: 2 weeks   Pt will be independent with HEP to supplement PT with decreasing pain and improving functional mobility  Pt will demo a 5x STS score <13 sec to display improved transitional activity tolerance and LE strength     Long Term Goals: 4 weeks   Pt will improve LE MMT scores by 1/2 grade where deficits noted in order to improve strength for functional tasks  Pt will demo a 5x STS score of < 11 sec to display improved transitional activity tolerance and LE strength  Pt will perform SLS on BLE for 30 seconds without UE support in order to demo  improved SL balance  Patient Goal: Participate in [employer]- 6.2 miles     PLAN     Continue to progress per patient tolerance     Ellie Loving, PT

## 2023-03-21 ENCOUNTER — CLINICAL SUPPORT (OUTPATIENT)
Dept: REHABILITATION | Facility: HOSPITAL | Age: 73
End: 2023-03-21
Payer: MEDICARE

## 2023-03-21 DIAGNOSIS — R26.89 IMBALANCE: Primary | ICD-10-CM

## 2023-03-21 DIAGNOSIS — R53.1 DECREASED STRENGTH: ICD-10-CM

## 2023-03-21 PROCEDURE — 97110 THERAPEUTIC EXERCISES: CPT | Mod: PO

## 2023-03-21 PROCEDURE — 97112 NEUROMUSCULAR REEDUCATION: CPT | Mod: PO

## 2023-03-21 NOTE — PROGRESS NOTES
"[facility]   Physical Therapy Treatment Note/ Progress Note     Name: Sharri Cline  Clinic Number: [id number]    Therapy Diagnosis:   Encounter Diagnoses   Name Primary?    Imbalance Yes    Decreased strength          Physician: Fern Guerrero MD    Visit Date: 3/21/2023    Physician Orders: PT Eval and Treat   Medical Diagnosis from Referral: R26.89 (ICD-10-CM) - Imbalance  Evaluation Date: 3/3/2023  Authorization Period Expiration: 12/31/23  Plan of Care Expiration: 4/20/23  Progress Note Due: 4/3/23  Visit # / Visits authorized: 5 / 11   FOTO: 1/pending    PTA Visit #: 0/5     Time In: 10:15 AM   Time Out: 11:00 AM   Total Billable Time: 45 minutes    SUBJECTIVE     Pt reports: she is progressing nicely with skilled PT and can tell a difference in her walking balance and endurance since starting therapy. Patient is agreeable to today's treatment.   She was not compliant with home exercise program.  Response to previous treatment: improving strength  Functional change: patient is progressing    Pain: 0/10  Location:  NA     OBJECTIVE     Objective Measures updated at progress report unless specified.     5x STS- 12 sec    Treatment     Sharri received the treatments listed below:      therapeutic exercises to develop strength, endurance, and ROM for 30 minutes including:  Bike x6min lvl 3 for improved aerobic endurance   SLR 2x10 3#    Supine Clamshell 2x10 GTB  Bridges 2x10 GTB  Bridge Iso Clamshell 2x10 GTB    Leg Press   1x10 20#  2x10 40#    neuromuscular re-education activities to improve: Balance and Coordination for 15 minutes. The following activities were included:  Tandem Stance 3x30" with cone touch reaction   SLS 3x10" ea  Feet Togetehr EO on Airex 3x20"  Feet Together EC 3x20"  Tandem Walk x3L  FullTask Grape Vine x3L  Tandem Stance Ball Toss 3x10 ea    therapeutic activities to improve functional performance for   minutes, including:  Sit<>Stand from gold chair " with 10lb KB  x2L Sled Push fwd/back 50ft x 45# for ambulatory endurance and for load pushing/carrying      Patient Education and Home Exercises     Home Exercises Provided and Patient Education Provided     Education provided:   - demo and cueing for all exercises performed today; WHO health dietary guidelines for weight and muscle mass maintenance     Written Home Exercises Provided: Patient instructed to cont prior HEP. Exercises were reviewed and Sharri was able to demonstrate them prior to the end of the session.  Sharri demonstrated good  understanding of the education provided. See EMR under Patient Instructions for exercises provided during therapy sessions    ASSESSMENT     Patient continues to benefit form one day a week linear loading program paired with dynamic and static balance progressions. She will benefit form more time spent on there act endurance and balance next visit. Overall patient is progressing nicely with skilled PT and will benefit from completion of current POC.     Sharri Is progressing well towards her goals.   Pt prognosis is Good.     Pt will continue to benefit from skilled outpatient physical therapy to address the deficits listed in the problem list box on initial evaluation, provide pt/family education and to maximize pt's level of independence in the home and community environment.     Pt's spiritual, cultural and educational needs considered and pt agreeable to plan of care and goals.     Anticipated barriers to physical therapy: chronicity of condition    Goals:  Short Term Goals: 2 weeks   Pt will be independent with HEP to supplement PT with decreasing pain and improving functional mobility MET  Pt will demo a 5x STS score <13 sec to display improved transitional activity tolerance and LE strength MET     Long Term Goals: 4 weeks   Pt will improve LE MMT scores by 1/2 grade where deficits noted in order to improve strength for functional tasks  Pt will demo a 5x STS score of <  11 sec to display improved transitional activity tolerance and LE strength  Pt will perform SLS on BLE for 30 seconds without UE support in order to demo improved SL balance  Patient Goal: Participate in [employer]- 6.2 miles     PLAN     Continue to progress per patient tolerance     Kaushal Wade, PT

## 2023-03-23 ENCOUNTER — CLINICAL SUPPORT (OUTPATIENT)
Dept: REHABILITATION | Facility: HOSPITAL | Age: 73
End: 2023-03-23
Payer: MEDICARE

## 2023-03-23 DIAGNOSIS — R53.1 DECREASED STRENGTH: ICD-10-CM

## 2023-03-23 DIAGNOSIS — M81.0 OSTEOPOROSIS WITHOUT CURRENT PATHOLOGICAL FRACTURE, UNSPECIFIED OSTEOPOROSIS TYPE: ICD-10-CM

## 2023-03-23 DIAGNOSIS — M81.0 OSTEOPOROSIS, UNSPECIFIED OSTEOPOROSIS TYPE, UNSPECIFIED PATHOLOGICAL FRACTURE PRESENCE: ICD-10-CM

## 2023-03-23 DIAGNOSIS — R26.89 IMBALANCE: Primary | ICD-10-CM

## 2023-03-23 PROCEDURE — 97110 THERAPEUTIC EXERCISES: CPT | Mod: PO

## 2023-03-23 PROCEDURE — 97112 NEUROMUSCULAR REEDUCATION: CPT | Mod: PO

## 2023-03-23 NOTE — PROGRESS NOTES
[facility]   Physical Therapy Treatment Note/ Progress Note     Name: Sharri Dennis [place]  Clinic Number: [id number]    Therapy Diagnosis:   Encounter Diagnoses   Name Primary?    Imbalance Yes    Decreased strength     Osteoporosis, unspecified osteoporosis type, unspecified pathological fracture presence     Osteoporosis without current pathological fracture, unspecified osteoporosis type          Physician: Fern Guerrero MD    Visit Date: 3/23/2023    Physician Orders: PT Eval and Treat   Medical Diagnosis from Referral: R26.89 (ICD-10-CM) - Imbalance  Evaluation Date: 3/3/2023  Authorization Period Expiration: 12/31/23  Plan of Care Expiration: 4/20/23  Progress Note Due: 4/3/23  Visit # / Visits authorized: 6 / 11   FOTO: 1/pending    PTA Visit #: 0/5     Time In: 7:15 AM   Time Out: 8:00 AM   Total Billable Time: 45 minutes    SUBJECTIVE     Pt reports: no complaints feels good this morning  She was not compliant with home exercise program.  Response to previous treatment: improving strength  Functional change: patient is progressing    Pain: 0/10  Location:  NA     OBJECTIVE     Objective Measures updated at progress report unless specified.     Treatment     Sharri received the treatments listed below:      therapeutic exercises to develop strength, endurance, and ROM for 15 minutes including:  Bike x6min lvl 3 for improved aerobic endurance   SLR 2x10 3#    Supine Clamshell 2x10 GTB  Bridges 2x10 GTB  Bridge Iso Clamshell 2x10 GTB    Leg Press   1x10 20#  2x10 40#    neuromuscular re-education activities to improve: Balance and Coordination for 23 minutes. The following activities were included:  Semi Tandem 2x30s   HHT 2x30s   VHT 2x30s    Airex Balance NBOS   EO 2x30s   HHT 2x30s    VHT 2x30s   EC 2x30s  Tandem Walk x3L  FullTask Grape Vine x3L   Walking with HHT, VHT, marching, backwards 2x20 yards    Tandem Stance Ball Toss 3x10 ea    therapeutic activities to improve  functional performance for   minutes, including:  Sit<>Stand from gold chair with 10lb KB  x2L Sled Push fwd/back 50ft x 45# for ambulatory endurance and for load pushing/carrying      Patient Education and Home Exercises     Home Exercises Provided and Patient Education Provided     Education provided:   - demo and cueing for all exercises performed today; WHO health dietary guidelines for weight and muscle mass maintenance     Written Home Exercises Provided: Patient instructed to cont prior HEP. Exercises were reviewed and Sharri was able to demonstrate them prior to the end of the session.  Sharri demonstrated good  understanding of the education provided. See EMR under Patient Instructions for exercises provided during therapy sessions    ASSESSMENT   Patient demonstrates increased difficulty with vestibular integration in static balance. Progressed home exercise program with emphasis on vestibular balance. Educated patient on 3 inputs of balance and how these can weaken without proper stimulation.    Sharri Is progressing well towards her goals.   Pt prognosis is Good.     Pt will continue to benefit from skilled outpatient physical therapy to address the deficits listed in the problem list box on initial evaluation, provide pt/family education and to maximize pt's level of independence in the home and community environment.     Pt's spiritual, cultural and educational needs considered and pt agreeable to plan of care and goals.     Anticipated barriers to physical therapy: chronicity of condition    Goals:  Short Term Goals: 2 weeks   Pt will be independent with HEP to supplement PT with decreasing pain and improving functional mobility MET  Pt will demo a 5x STS score <13 sec to display improved transitional activity tolerance and LE strength MET     Long Term Goals: 4 weeks   Pt will improve LE MMT scores by 1/2 grade where deficits noted in order to improve strength for functional tasks  Pt will demo a 5x STS  score of < 11 sec to display improved transitional activity tolerance and LE strength  Pt will perform SLS on BLE for 30 seconds without UE support in order to demo improved SL balance  Patient Goal: Participate in [employer]- 6.2 miles     PLAN     Continue to progress per patient tolerance     Ellie Loving, PT

## 2023-03-28 NOTE — NURSING
Patient here forblood draw from left chest port-accesses easily without blood return after much flushing and changing positions-Actiavse 2mg IVP into port-blood drawn peripherally from right antecubital and sent to lab.  
Right arm;

## 2023-03-30 ENCOUNTER — CLINICAL SUPPORT (OUTPATIENT)
Dept: REHABILITATION | Facility: HOSPITAL | Age: 73
End: 2023-03-30
Payer: MEDICARE

## 2023-03-30 DIAGNOSIS — R26.89 IMBALANCE: Primary | ICD-10-CM

## 2023-03-30 DIAGNOSIS — R53.1 DECREASED STRENGTH: ICD-10-CM

## 2023-03-30 PROCEDURE — 97110 THERAPEUTIC EXERCISES: CPT | Mod: PO

## 2023-03-30 PROCEDURE — 97112 NEUROMUSCULAR REEDUCATION: CPT | Mod: PO

## 2023-03-30 NOTE — PROGRESS NOTES
[facility]   Physical Therapy Treatment Note/ Discharge Summary     Name: Sharri Dennis Blackwater  Clinic Number: [id number]    Therapy Diagnosis:   Encounter Diagnoses   Name Primary?    Imbalance Yes    Decreased strength            Physician: Fern Guerrero MD    Visit Date: 3/30/2023    Physician Orders: PT Eval and Treat   Medical Diagnosis from Referral: R26.89 (ICD-10-CM) - Imbalance  Evaluation Date: 3/3/2023  Authorization Period Expiration: 12/31/23  Plan of Care Expiration: 4/20/23  Progress Note Due: 4/3/23  Visit # / Visits authorized: 7 / 11   FOTO: 1/pending    PTA Visit #: 0/5     Time In: 7:15 AM   Time Out: 8:00 AM   Total Billable Time: 35 minutes (10 min doubled)    SUBJECTIVE     Pt reports: she is feeling a little bit of generalized fatigue but otherwise feels like she has progressed nicely with skilled PT. She reports sheis ready to return to [facility] next next weekend and is agreeable with DC from skilled PT today.   She was not compliant with home exercise program.  Response to previous treatment: improving strength  Functional change: patient is progressing    Pain: 0/10  Location:  NA     OBJECTIVE     Objective Measures updated at progress report unless specified.     5x STS- 10 sec    SL Balance   R-20 sec  L- 22 sec     Treatment     Sharri received the treatments listed below:      therapeutic exercises to develop strength, endurance, and ROM for 10 minutes including:  Bike x6min lvl 3 for improved aerobic endurance   SLR 2x10 3#    Supine Clamshell 2x10 GTB  Bridges 2x10 GTB  Bridge Iso Clamshell 2x10 GTB    Leg Press   1x10 20#  2x10 40#    X10 min DC/reassessment     neuromuscular re-education activities to improve: Balance and Coordination for 20 minutes. The following activities were included:  Semi Tandem 2x30s   HHT 2x30s   VHT 2x30s    Airex Balance NBOS   EO 2x30s   HHT 2x30s    VHT 2x30s   EC 2x30s  Tandem Walk x3L  FullTask [place]  x3L   Walking with HHT, VHT, marching, backwards 2x20 yards    Tandem Stance Ball Toss 3x10 ea    therapeutic activities to improve functional performance for   minutes, including:  Sit<>Stand from gold chair with 10lb KB  x2L Sled Push fwd/back 50ft x 45# for ambulatory endurance and for load pushing/carrying      Patient Education and Home Exercises     Home Exercises Provided and Patient Education Provided     Education provided:   - demo and cueing for all exercises performed today; WHO health dietary guidelines for weight and muscle mass maintenance     Written Home Exercises Provided: Patient instructed to cont prior HEP. Exercises were reviewed and Sharri was able to demonstrate them prior to the end of the session.  Sharri demonstrated good  understanding of the education provided. See EMR under Patient Instructions for exercises provided during therapy sessions    ASSESSMENT   Patient displays significant improvement in transitional activity tolerance and strength, improved overall ambulatory tolerance, and significantly improved SL static balance. Patient has returned to PLOF and was receptive of education regarding return to activity outside of therapy. Patient is agreeable with DC from skilled PT and had no further questions at end of visit.     Sharri Is progressing well towards her goals.   Pt prognosis is Good.     Pt will continue to benefit from skilled outpatient physical therapy to address the deficits listed in the problem list box on initial evaluation, provide pt/family education and to maximize pt's level of independence in the home and community environment.     Pt's spiritual, cultural and educational needs considered and pt agreeable to plan of care and goals.     Anticipated barriers to physical therapy: chronicity of condition    Goals:  Short Term Goals: 2 weeks   Pt will be independent with HEP to supplement PT with decreasing pain and improving functional mobility MET  Pt will demo a 5x  STS score <13 sec to display improved transitional activity tolerance and LE strength MET     Long Term Goals: 4 weeks   Pt will improve LE MMT scores by 1/2 grade where deficits noted in order to improve strength for functional tasks MET  Pt will demo a 5x STS score of < 11 sec to display improved transitional activity tolerance and LE strength MET  Pt will perform SLS on BLE for 30 seconds without UE support in order to demo improved SL balance PROGRESSING  Patient Goal: Participate in Team [employer]- 6.2 miles MET    PLAN     Patient to DC from skilled PT    Kaushal Wade, PT

## 2023-04-04 ENCOUNTER — PATIENT MESSAGE (OUTPATIENT)
Dept: RESEARCH | Facility: HOSPITAL | Age: 73
End: 2023-04-04
Payer: MEDICARE

## 2023-04-19 ENCOUNTER — PATIENT MESSAGE (OUTPATIENT)
Dept: RESEARCH | Facility: HOSPITAL | Age: 73
End: 2023-04-19
Payer: MEDICARE

## 2023-06-13 ENCOUNTER — OFFICE VISIT (OUTPATIENT)
Dept: HEMATOLOGY/ONCOLOGY | Facility: CLINIC | Age: 73
End: 2023-06-13
Payer: MEDICARE

## 2023-06-13 VITALS
TEMPERATURE: 98 F | RESPIRATION RATE: 18 BRPM | HEART RATE: 72 BPM | SYSTOLIC BLOOD PRESSURE: 127 MMHG | WEIGHT: 163.13 LBS | DIASTOLIC BLOOD PRESSURE: 73 MMHG | OXYGEN SATURATION: 100 % | BODY MASS INDEX: 28.9 KG/M2 | HEIGHT: 63 IN

## 2023-06-13 DIAGNOSIS — C50.311 MALIGNANT NEOPLASM OF LOWER-INNER QUADRANT OF RIGHT BREAST OF FEMALE, ESTROGEN RECEPTOR NEGATIVE: Primary | ICD-10-CM

## 2023-06-13 DIAGNOSIS — Z17.1 MALIGNANT NEOPLASM OF LOWER-INNER QUADRANT OF RIGHT BREAST OF FEMALE, ESTROGEN RECEPTOR NEGATIVE: Primary | ICD-10-CM

## 2023-06-13 PROCEDURE — 3288F PR FALLS RISK ASSESSMENT DOCUMENTED: ICD-10-PCS | Mod: CPTII,S$GLB,, | Performed by: INTERNAL MEDICINE

## 2023-06-13 PROCEDURE — 3078F PR MOST RECENT DIASTOLIC BLOOD PRESSURE < 80 MM HG: ICD-10-PCS | Mod: CPTII,S$GLB,, | Performed by: INTERNAL MEDICINE

## 2023-06-13 PROCEDURE — 1159F PR MEDICATION LIST DOCUMENTED IN MEDICAL RECORD: ICD-10-PCS | Mod: CPTII,S$GLB,, | Performed by: INTERNAL MEDICINE

## 2023-06-13 PROCEDURE — 3008F PR BODY MASS INDEX (BMI) DOCUMENTED: ICD-10-PCS | Mod: CPTII,S$GLB,, | Performed by: INTERNAL MEDICINE

## 2023-06-13 PROCEDURE — 1159F MED LIST DOCD IN RCRD: CPT | Mod: CPTII,S$GLB,, | Performed by: INTERNAL MEDICINE

## 2023-06-13 PROCEDURE — 3074F PR MOST RECENT SYSTOLIC BLOOD PRESSURE < 130 MM HG: ICD-10-PCS | Mod: CPTII,S$GLB,, | Performed by: INTERNAL MEDICINE

## 2023-06-13 PROCEDURE — 1101F PR PT FALLS ASSESS DOC 0-1 FALLS W/OUT INJ PAST YR: ICD-10-PCS | Mod: CPTII,S$GLB,, | Performed by: INTERNAL MEDICINE

## 2023-06-13 PROCEDURE — 3008F BODY MASS INDEX DOCD: CPT | Mod: CPTII,S$GLB,, | Performed by: INTERNAL MEDICINE

## 2023-06-13 PROCEDURE — 3288F FALL RISK ASSESSMENT DOCD: CPT | Mod: CPTII,S$GLB,, | Performed by: INTERNAL MEDICINE

## 2023-06-13 PROCEDURE — 1101F PT FALLS ASSESS-DOCD LE1/YR: CPT | Mod: CPTII,S$GLB,, | Performed by: INTERNAL MEDICINE

## 2023-06-13 PROCEDURE — 1126F AMNT PAIN NOTED NONE PRSNT: CPT | Mod: CPTII,S$GLB,, | Performed by: INTERNAL MEDICINE

## 2023-06-13 PROCEDURE — 3044F HG A1C LEVEL LT 7.0%: CPT | Mod: CPTII,S$GLB,, | Performed by: INTERNAL MEDICINE

## 2023-06-13 PROCEDURE — 99213 OFFICE O/P EST LOW 20 MIN: CPT | Mod: S$GLB,,, | Performed by: INTERNAL MEDICINE

## 2023-06-13 PROCEDURE — 3044F PR MOST RECENT HEMOGLOBIN A1C LEVEL <7.0%: ICD-10-PCS | Mod: CPTII,S$GLB,, | Performed by: INTERNAL MEDICINE

## 2023-06-13 PROCEDURE — 3074F SYST BP LT 130 MM HG: CPT | Mod: CPTII,S$GLB,, | Performed by: INTERNAL MEDICINE

## 2023-06-13 PROCEDURE — 99213 PR OFFICE/OUTPT VISIT, EST, LEVL III, 20-29 MIN: ICD-10-PCS | Mod: S$GLB,,, | Performed by: INTERNAL MEDICINE

## 2023-06-13 PROCEDURE — 1126F PR PAIN SEVERITY QUANTIFIED, NO PAIN PRESENT: ICD-10-PCS | Mod: CPTII,S$GLB,, | Performed by: INTERNAL MEDICINE

## 2023-06-13 PROCEDURE — 3078F DIAST BP <80 MM HG: CPT | Mod: CPTII,S$GLB,, | Performed by: INTERNAL MEDICINE

## 2023-06-13 PROCEDURE — 99999 PR PBB SHADOW E&M-EST. PATIENT-LVL III: ICD-10-PCS | Mod: PBBFAC,,, | Performed by: INTERNAL MEDICINE

## 2023-06-13 PROCEDURE — 99999 PR PBB SHADOW E&M-EST. PATIENT-LVL III: CPT | Mod: PBBFAC,,, | Performed by: INTERNAL MEDICINE

## 2023-06-13 NOTE — PROGRESS NOTES
Subjective:       Patient ID: Sharri Cline is a 73 y.o. female.    Chief Complaint: No chief complaint on file.    HPI 73-year-old female who returns to clinic for follow-up of right breast cancer-ER negative.  She had a pathological complete response to neoadjuvant chemotherapy.    Today she reports that she is fairly well.  She has some grade days and other days she feels some fatigue and lightheadedness.    She does have some ongoing neuropathy in her feet and hands.    Appetite bowel function has been good.  She has no shortness of breath and no pain.      Onc history:   - She presented for screening mammo in 5/15/2019 which showed focal asymmetries in both left and right breast. Diagnostic mammogram and US showed no significant abn of left breast, and right breast 15 mm x 11 mm x 12 mm mass at 4:00, 5 CFN. Biopsy on 5/24/19 showed grade 3 IDC, triple negative, Ki67 70%.    - 7/3/19 - 12/10/19 completed neoadjuvant chemotherapy with four cycles of dose-dense Adriamycin and cyclophosphamide with Neulasta support followed by twelve doses of weekly paclitaxel. Dose reduced for cytopenias and neuropathy.   - 1/23/2020 - Dr Hernandez performed right breast lumpectomy with SLN Biopsy with pathology showing no residual carcinoma with 3 neg SLN.   - 4/2/2020 - 4/24/2020 - completed RT    Mammogram August 2022 - negative  Review of Systems   Constitutional:  Positive for fatigue (occasional).   Cardiovascular: Negative.    Gastrointestinal: Negative.    Musculoskeletal:  Negative for back pain.   Neurological:  Positive for light-headedness (occasional) and numbness (hands and feet). Negative for headaches.   Psychiatric/Behavioral:  The patient is not nervous/anxious.        Objective:      Physical Exam  Vitals reviewed.   Constitutional:       General: She is not in acute distress.     Appearance: Normal appearance.   Cardiovascular:      Rate and Rhythm: Normal rate and regular rhythm.   Pulmonary:      Effort:  Pulmonary effort is normal. No respiratory distress.      Breath sounds: Normal breath sounds. No wheezing or rales.   Chest:   Breasts:     Right: No mass, nipple discharge or skin change.      Left: Normal.       Abdominal:      Palpations: Abdomen is soft. There is no mass.      Tenderness: There is no abdominal tenderness.   Lymphadenopathy:      Cervical: No cervical adenopathy.      Upper Body:      Right upper body: No supraclavicular or axillary adenopathy.      Left upper body: No supraclavicular or axillary adenopathy.   Neurological:      Mental Status: She is alert and oriented to person, place, and time.      Cranial Nerves: No cranial nerve deficit.   Psychiatric:         Mood and Affect: Mood normal.         Behavior: Behavior normal.         Thought Content: Thought content normal.         Judgment: Judgment normal.       Assessment:      1. Malignant neoplasm of lower-inner quadrant of right breast of female, estrogen receptor negative        Plan:    Mammograms in August  RTC  6 M    Route Chart for Scheduling    Med Onc Chart Routing      Follow up with physician 6 months.   Follow up with [facility]JEE[facility]    Infusion scheduling note    Injection scheduling note    Labs None   Scheduling:  Preferred lab:  Lab interval:     Imaging Mammogram   August   Pharmacy appointment    Other referrals     No additional referrals needed

## 2023-06-14 ENCOUNTER — TELEPHONE (OUTPATIENT)
Dept: HEMATOLOGY/ONCOLOGY | Facility: CLINIC | Age: 73
End: 2023-06-14
Payer: MEDICARE

## 2023-06-14 NOTE — TELEPHONE ENCOUNTER
----- Message from Charlie Farooq sent at 6/14/2023  3:02 PM CDT -----  Who Called: Patient         What is the reqeust in detail: Requesting call back to discuss pt labs were cancelled for December and she is asking why they were cancelled, please call pt to verify.          Can the clinic reply by [facility]? No         Best Call Back Number: [phone]           Additional Information:

## 2023-08-08 ENCOUNTER — OFFICE VISIT (OUTPATIENT)
Dept: PODIATRY | Facility: CLINIC | Age: 73
End: 2023-08-08
Payer: MEDICARE

## 2023-08-08 VITALS — BODY MASS INDEX: 28.9 KG/M2 | HEIGHT: 63 IN

## 2023-08-08 DIAGNOSIS — L60.0 INGROWN NAIL: ICD-10-CM

## 2023-08-08 DIAGNOSIS — L60.9 DISEASE OF NAIL: Primary | ICD-10-CM

## 2023-08-08 PROCEDURE — 1126F AMNT PAIN NOTED NONE PRSNT: CPT | Mod: CPTII,S$GLB,, | Performed by: PODIATRIST

## 2023-08-08 PROCEDURE — 1159F MED LIST DOCD IN RCRD: CPT | Mod: CPTII,S$GLB,, | Performed by: PODIATRIST

## 2023-08-08 PROCEDURE — 3008F BODY MASS INDEX DOCD: CPT | Mod: CPTII,S$GLB,, | Performed by: PODIATRIST

## 2023-08-08 PROCEDURE — 1126F PR PAIN SEVERITY QUANTIFIED, NO PAIN PRESENT: ICD-10-PCS | Mod: CPTII,S$GLB,, | Performed by: PODIATRIST

## 2023-08-08 PROCEDURE — 99203 OFFICE O/P NEW LOW 30 MIN: CPT | Mod: S$GLB,,, | Performed by: PODIATRIST

## 2023-08-08 PROCEDURE — 3044F PR MOST RECENT HEMOGLOBIN A1C LEVEL <7.0%: ICD-10-PCS | Mod: CPTII,S$GLB,, | Performed by: PODIATRIST

## 2023-08-08 PROCEDURE — 99999 PR PBB SHADOW E&M-EST. PATIENT-LVL II: CPT | Mod: PBBFAC,,, | Performed by: PODIATRIST

## 2023-08-08 PROCEDURE — 99203 PR OFFICE/OUTPT VISIT, NEW, LEVL III, 30-44 MIN: ICD-10-PCS | Mod: S$GLB,,, | Performed by: PODIATRIST

## 2023-08-08 PROCEDURE — 1159F PR MEDICATION LIST DOCUMENTED IN MEDICAL RECORD: ICD-10-PCS | Mod: CPTII,S$GLB,, | Performed by: PODIATRIST

## 2023-08-08 PROCEDURE — 3044F HG A1C LEVEL LT 7.0%: CPT | Mod: CPTII,S$GLB,, | Performed by: PODIATRIST

## 2023-08-08 PROCEDURE — 99999 PR PBB SHADOW E&M-EST. PATIENT-LVL II: ICD-10-PCS | Mod: PBBFAC,,, | Performed by: PODIATRIST

## 2023-08-08 PROCEDURE — 3008F PR BODY MASS INDEX (BMI) DOCUMENTED: ICD-10-PCS | Mod: CPTII,S$GLB,, | Performed by: PODIATRIST

## 2023-08-08 RX ORDER — KETOCONAZOLE 20 MG/G
CREAM TOPICAL DAILY
Qty: 60 G | Refills: 2 | Status: SHIPPED | OUTPATIENT
Start: 2023-08-08

## 2023-08-16 ENCOUNTER — HOSPITAL ENCOUNTER (OUTPATIENT)
Dept: RADIOLOGY | Facility: HOSPITAL | Age: 73
Discharge: HOME | End: 2023-08-16
Attending: NURSE PRACTITIONER
Payer: MEDICARE

## 2023-08-16 DIAGNOSIS — Z12.31 ENCOUNTER FOR SCREENING MAMMOGRAM FOR MALIGNANT NEOPLASM OF BREAST: ICD-10-CM

## 2023-08-16 PROCEDURE — 77063 MAMMO DIGITAL SCREENING BILAT WITH TOMO: ICD-10-PCS | Mod: 26,,, | Performed by: RADIOLOGY

## 2023-08-16 PROCEDURE — 77067 SCR MAMMO BI INCL CAD: CPT | Mod: TC

## 2023-08-16 PROCEDURE — 77067 SCR MAMMO BI INCL CAD: CPT | Mod: 26,,, | Performed by: RADIOLOGY

## 2023-08-16 PROCEDURE — 77063 BREAST TOMOSYNTHESIS BI: CPT | Mod: 26,,, | Performed by: RADIOLOGY

## 2023-08-16 PROCEDURE — 77067 MAMMO DIGITAL SCREENING BILAT WITH TOMO: ICD-10-PCS | Mod: 26,,, | Performed by: RADIOLOGY

## 2023-08-16 NOTE — PROGRESS NOTES
Subjective:      Patient ID: Sharri Cline is a 73 y.o. female.    Chief Complaint:   Nail Problem (Left great toe , 2nd digit discoloration )    Sharri is a 73 y.o. female who presents to the clinic complaining of thick and discolored toenails on both feet. Sharri is inquiring about treatment options.    Review of Systems   Constitutional: Negative for chills, decreased appetite, fever and malaise/fatigue.   HENT:  Negative for congestion, hearing loss, nosebleeds and tinnitus.    Eyes:  Negative for double vision, pain, photophobia and visual disturbance.   Cardiovascular:  Negative for chest pain, claudication, cyanosis and leg swelling.   Respiratory:  Negative for cough, hemoptysis, shortness of breath and wheezing.    Endocrine: Negative for cold intolerance and heat intolerance.   Hematologic/Lymphatic: Negative for adenopathy and bleeding problem.   Skin:  Positive for color change and nail changes. Negative for dry skin, itching and suspicious lesions.   Musculoskeletal:  Positive for arthritis and stiffness. Negative for joint pain and myalgias.   Gastrointestinal:  Negative for abdominal pain, jaundice, nausea and vomiting.   Genitourinary:  Negative for dysuria, frequency and hematuria.   Neurological:  Negative for difficulty with concentration, loss of balance, numbness, paresthesias and sensory change.   Psychiatric/Behavioral:  Negative for altered mental status, hallucinations and suicidal ideas. The patient is not nervous/anxious.    Allergic/Immunologic: Negative for environmental allergies and persistent infections.         Objective:      Physical Exam  Vitals reviewed.   Constitutional:       Appearance: She is well-developed.   HENT:      Head: Normocephalic and atraumatic.   Cardiovascular:      Pulses:           Dorsalis pedis pulses are 2+ on the right side and 2+ on the left side.        Posterior tibial pulses are 2+ on the right side and 2+ on the left side.   Pulmonary:       Effort: Pulmonary effort is normal.   Musculoskeletal:         General: Normal range of motion.      Comments: Inspection and palpation of the muscles joints and bones of both lower extremities reveal that muscle strength for the anterior lateral and posterior muscle groups and intrinsic muscle groups of the foot are all 5 over 5 symmetrical.  Ankle subtalar midtarsal and digital joint range of motion are within normal limits, nonpainful, without crepitus or effusion.  Patient exhibits a normal angle and base of gait.  Palpation of the tendons reveal no defects.   Skin:     General: Skin is warm and dry.      Capillary Refill: Capillary refill takes 2 to 3 seconds.      Comments: Skin turgor is normal bilaterally.  Skin texture is well hydrated to both lower extremities.  No lesions or rashes or wounds appreciated bilaterally.     Multiple thickened, discolored  toenails 1-5 with subungual debris     Neurological:      Mental Status: She is alert and oriented to person, place, and time.      Comments: Sharp dull light touch vibratory proprioceptive sensation are intact bilaterally.  Deep tendon reflexes to patellar and Achilles tendon are symmetrical 2 over 4 bilaterally.  No ankle clonus or Babinski reflexes noted bilaterally.  Coordination is normal to both feet and lower extremities.   Psychiatric:         Behavior: Behavior normal.           Assessment:       Encounter Diagnoses   Name Primary?    Disease of nail Yes    Ingrown nail      Independent visualization of imaging was performed.  Results were reviewed in detail with patient.       Plan:       Sharri was seen today for nail problem.    Diagnoses and all orders for this visit:    Disease of nail    Ingrown nail    Other orders  -     ketoconazole (NIZORAL) 2 % cream; Apply topically once daily.      I counseled the patient on her conditions, their implications and medical management.    The nature of the condition, options for management, as well as  potential risks and complications were discussed in detail with patient. Patient was amenable to my recommendations and left my office fully informed and will follow up as instructed or sooner if necessary.      Conservative, conservative as well as topical versus oral treatment options were all discussed in detail.  She will begin topical ketoconazole follow-up in 3-6 months.

## 2023-09-12 NOTE — PROGRESS NOTES
Subjective:      Patient ID: Sharri Cline is a 73 y.o. female.    Chief Complaint:  Thyroid Nodule      History of Present Illness  Sharri Cline is here for follow up of Thyroid nodules and of Osteoporosis.  Previously seen by me on 12/2022.    Reports recent episodes of: lightheadedness, off balance. Denies new medications. Started about 1 month ago and occurs about 3 days a week.     With regards to thyroid nodule:    Found: 2018    Thyroid US   8/12/2022  COMPARISON:  Neck ultrasound dated 06/24/2021.  Our records indicate a benign FNA of the right lobe nodule in 10/2018.  Impression:  1.) Thyroid gland is normal in size with homogeneous echotexture and normal vascularity  2.) 0.6 x 0.3 x 0.4 cm solid, heterogeneous predominately hypoechoic nodule is seen in the right mid lobe  3.) 3.2 x 1.5 x 1.7 cm solid, heterogeneous predominately isoechoic nodule is seen in the right inferior pole  4.) 0.8 x 0.7 x 0.6 cm solid, hypoechoic nodule is seen in the left mid lobe  RECOMMENDATIONS:  Recommend repeat thyroid ultrasound in 1-2 years.    FNA:   10/19/18  THYROID, RIGHT LOBE NODULE, FINE-NEEDLE ASPIRATION (CYTOLOGY):  - Benign (Omro Classification System II).  - Benign follicular cells and colloid.    Lab Results   Component Value Date    TSH 0.775 11/17/2022    J3GIQDV 83 12/11/2020    FREET4 0.74 12/11/2020     Signs or Symptoms:   Difficulty breathing: Denies  Difficulty swallowing: Denies  Voice Changes: Denies   FH of thyroid cancer: Denies  Personal history of radiation treatment or exposure: + radiation for breast cancer    With regards to Vitamin D Deficiency:    Vit D, 25-Hydroxy   Date Value Ref Range Status   03/07/2023 32 30 - 96 ng/mL Final     Comment:     Vitamin D deficiency.........<10 ng/mL                              Vitamin D insufficiency......10-29 ng/mL       Vitamin D sufficiency........> or equal to 30 ng/mL  Vitamin D toxicity............>100 ng/mL       Current Meds: OTC Vit  D3 1000iu daily.     With regards to osteoporosis:     5/2022 work up of accelerated bone loss: elevated PTH, serum calcium WNL, low urine Ca - started on Citracal petite.     BMD:   10/27/2021  Lumbar spine (L1-L4):BMD is 0.709 g/cm2, T-score is -3.1, and Z-score is -1.7.  Total hip:BMD is 0.724 g/cm2, T-score is -1.8, and Z-score is -0.9.  Femoral neck:  BMD is 0.663 g/cm2, T-score is -1.7, and Z-score is -0.6.  Distal 1/3 radius:  Not applicable  FRAX:  7.3% risk of a major osteoporotic fracture in the next 10 years.  1.0% risk of hip fracture in the next 10 years.  Impression:  *Osteoporosis based on T-score below -2.5  *Fracture risk is very high due to T-score below -3.0  *Compared with previous DXA, BMD at the lumbar spine has declined by 10.1%, and the BMD at the total hip has declined by 9%.  RECOMMENDATIONS:  *Daily calcium intake 9678-2367 mg, dietary sources preferred; Vitamin D 5377-9337 IU daily.  *Weight bearing exercise and fall precautions.  *Recommend medical therapy for osteoporosis. Consider bisphosphonates (alendronate, risedronate, zoledronic acid), denosumab, teriparatide, abaloparatide or romosozumab.  *Repeat BMD in 2 years    Medications: None    Calcium intake:  over the counter citracal petites - 2 capsules (400mg) twice daily.   Vit D intake: OTC Vit D3 1000iu daily    Weight bearing exercise: walking 4 or 5 days a week   Falls: Denies   Fractures: Denies  ~1987 fractured wrist (unsure which side) - fell while skating   Significant height loss (>2 inches): Denies     Family history: Denies    Menopause: early 50s  Denies HRT.     Tobacco Use: Denies  Alcohol Use: Denies  Glucocorticoid History: Denies  Anticoagulant Use: Denies  GERD/PPI Use: Denies  History of Malabsorption: Denies  Antiseizure Medications: Denies  History of Thyroid Disease: Denies  Kidney Disease: Denies  Personal history of kidney stones: Denies  Family history of kidney stones: Denies  Family history of bone disease  or fracture: Denies  Radiation treatment for breast cancer.  History of MI or stroke: Denies  Dental Visit: Denies - dentures     Denies point tenderness along spine  Denies bilateral hip tenderness  Gait - steady     Lab Results   Component Value Date    CALCIUM 9.5 03/07/2023    PHOS 3.5 03/07/2023     Vit D, 25-Hydroxy   Date Value Ref Range Status   03/07/2023 32 30 - 96 ng/mL Final     Comment:     Vitamin D deficiency.........<10 ng/mL                              Vitamin D insufficiency......10-29 ng/mL       Vitamin D sufficiency........> or equal to 30 ng/mL  Vitamin D toxicity............>100 ng/mL        [facility] Reference Range & Units 02/02/08 08:41 07/06/09 10:10 11/27/12 08:55 07/22/14 08:56 03/23/15 16:46 06/16/15 08:53 12/28/15 07:23 02/06/17 07:03 06/19/17 10:23 07/26/18 15:48 10/24/18 16:48 05/20/19 14:49 07/02/19 14:47 07/17/19 12:52 07/31/19 12:24 08/08/19 11:47 08/09/19 06:31 08/10/19 06:34 08/11/19 07:27 08/12/19 06:38 08/21/19 13:01 08/30/19 11:37 08/31/19 05:20 09/01/19 04:48 09/02/19 04:00 09/05/19 11:58 09/18/19 12:42 09/25/19 12:51 10/01/19 14:00 10/08/19 13:23 10/15/19 09:25 10/22/19 13:06 10/29/19 10:09 11/05/19 12:42 11/12/19 10:24 11/19/19 12:46 11/26/19 09:06 12/03/19 11:57 12/05/19 07:40 12/10/19 09:58 01/10/20 10:49 01/15/20 09:31 02/14/20 08:01 07/27/20 11:03 11/02/21 08:14 05/30/22 08:15 11/17/22 08:14 12/06/22 14:30 03/07/23 08:19   Calcium 8.7 - 10.5 mg/dL 9.2 9.6 9.5 9.5  9.3 9.0 8.9  10.1 9.4 9.9 9.5 9.3 9.3 9.9 8.6 (L) 8.9 8.9 9.4 9.8 9.2 8.3 (L) 8.5 (L) 8.8 8.9 8.8 9.4 8.6 (L) 8.7 8.8 9.1 9.1 8.8 9.1 9.0 9.2 9.0  9.1 9.1 9.2 9.6 9.5 10.1 9.4 9.5 9.5 9.5   Phosphorus 2.7 - 4.5 mg/dL      3.5           2.2 (L) 2.2 (L) 2.1 (L) 2.8   2.1 (L) 2.9 2.9                     4.0  3.3 3.5   Albumin 3.5 - 5.2 g/dL 4.1 4.2 3.5 3.3 (L) 3.7 3.5 3.3 (L) 3.3 (L) 3.5 4.0 3.5 4.1 3.7 3.3 (L) 3.3 (L) 3.0 (L) 2.2 (L) 2.2 (L) 2.3 (L) 2.5 (L) 3.3 (L) 3.2 (L) 2.4 (L) 2.3 (L) 2.3 (L) 3.0 (L) 3.2 (L)  "3.5 3.3 (L) 3.4 (L) 3.3 (L) 3.5 3.4 (L) 3.4 (L) 3.4 (L) 3.4 (L) 3.4 (L) 3.4 (L) 3.6 3.2 (L) 3.6 3.6 4.0  3.9 3.6 3.6 3.7 3.6   (L): Data is abnormally low    Review of Systems  As above    Objective:   Physical Exam  Vitals reviewed.   Neck:      Thyroid: Thyromegaly (irregular shaped gland) present.   Cardiovascular:      Rate and Rhythm: Normal rate.      Comments: No edema present  Pulmonary:      Effort: Pulmonary effort is normal.   Abdominal:      Palpations: Abdomen is soft.       Visit Vitals  /72   Pulse 76   Ht 5' 3" (1.6 m)   Wt 71.6 kg (157 lb 15 oz)   SpO2 99%   BMI 27.98 kg/m²         Body mass index is 27.98 kg/m².    Lab Review:   Lab Results   Component Value Date    HGBA1C 5.0 03/07/2023     Lab Results   Component Value Date    CHOL 191 11/17/2022    HDL 72 11/17/2022    LDLCALC 107.6 11/17/2022    TRIG 57 11/17/2022    CHOLHDL 37.7 11/17/2022     Lab Results   Component Value Date     03/07/2023    K 4.4 03/07/2023     03/07/2023    CO2 28 03/07/2023    GLU 85 03/07/2023    BUN 8 03/07/2023    CREATININE 0.8 03/13/2023    CALCIUM 9.5 03/07/2023    PROT 6.8 11/17/2022    ALBUMIN 3.6 03/07/2023    BILITOT 1.3 (H) 11/17/2022    ALKPHOS 141 (H) 11/17/2022    AST 22 11/17/2022    ALT 12 11/17/2022    ANIONGAP 6 (L) 03/07/2023    ESTGFRAFRICA >60.0 05/30/2022    EGFRNONAA >60.0 05/30/2022    TSH 0.775 11/17/2022     Vit D, 25-Hydroxy   Date Value Ref Range Status   03/07/2023 32 30 - 96 ng/mL Final     Comment:     Vitamin D deficiency.........<10 ng/mL                              Vitamin D insufficiency......10-29 ng/mL       Vitamin D sufficiency........> or equal to 30 ng/mL  Vitamin D toxicity............>100 ng/mL       Assessment and Plan     1. Thyroid nodule  TSH    US Soft Tissue Head Neck Thyroid      2. Osteoporosis, unspecified osteoporosis type, unspecified pathological fracture presence  Cortisol, 8AM    Renal Function Panel    PTH, Intact    DXA Bone Density Axial " Skeleton 1 or more sites      3. Vitamin D deficiency        4. Lightheaded          Thyroid nodule  -- I have reviewed management options including observation, FNA or surgery.  -- FNA procedure explained in depth.  -- Discussed indications for repeat FNA as well as surgical indications (abnormal FNA, compressive symptoms or interval change).  -- Discussed possible results of FNA- Benign, Malignant, FLUS, or insufficient cells.  Discussed results auto released to patients, but advised the ordering provider will also contact to discuss.   -- Patient is not on any blood thinners.  -- All of the patients questions were answered.  -- FNA:   10/19/18  THYROID, RIGHT LOBE NODULE, FINE-NEEDLE ASPIRATION (CYTOLOGY):  - Benign (Bellamy Classification System II).  - Benign follicular cells and colloid.  -- Due for thyroid US.     Osteoporosis  -- Risks include menopause.  -- Reviewed basics of quantity versus quality.  -- Reassuring they are not fracturing.  -- 5/2022 work up of accelerated bone loss: elevated PTH, serum calcium WNL, low urine Ca - started on Citracal petite.   -- Recommend:  -Pharmacological therapy is recommended for patients with osteopenia if the 10 year probability of a hip fracture is >3% and 10 year probability of other major osteoporotic fractures is >20%.  Treatment options and potential side effects discussed for PO bisphosphonates, reclast, Denosumab, and Teriparatide.   -Treatment: lost to follow up.  Discussed treatment options, again.   Declined anabolic therapy.  Considering Reclast.    -Calcium and Vitamin D RDD provided.  -Exercise: recommended.  -Fall precautions made in the home.  -Alerted that if dental work needs to be done it should be done prior to initiating therapy. Dental health: UTD  -- Repeat DEXA scan 10/2023.    Vitamin D deficiency  -- Continue vitamin D supplement.     Lightheaded  -- Encouraged follow up with PCP.  -- Low suspicion of AI but will check 8am cortisol as she  would like to rule out Endocrine cause.      Follow up in about 6 months (around 3/19/2024).

## 2023-09-19 ENCOUNTER — OFFICE VISIT (OUTPATIENT)
Dept: ENDOCRINOLOGY | Facility: CLINIC | Age: 73
End: 2023-09-19
Payer: MEDICARE

## 2023-09-19 VITALS
SYSTOLIC BLOOD PRESSURE: 110 MMHG | DIASTOLIC BLOOD PRESSURE: 72 MMHG | HEIGHT: 63 IN | BODY MASS INDEX: 27.98 KG/M2 | OXYGEN SATURATION: 99 % | WEIGHT: 157.94 LBS | HEART RATE: 76 BPM

## 2023-09-19 DIAGNOSIS — R42 LIGHTHEADED: ICD-10-CM

## 2023-09-19 DIAGNOSIS — M81.0 OSTEOPOROSIS, UNSPECIFIED OSTEOPOROSIS TYPE, UNSPECIFIED PATHOLOGICAL FRACTURE PRESENCE: ICD-10-CM

## 2023-09-19 DIAGNOSIS — E55.9 VITAMIN D DEFICIENCY: ICD-10-CM

## 2023-09-19 DIAGNOSIS — E04.1 THYROID NODULE: Primary | ICD-10-CM

## 2023-09-19 PROCEDURE — 3044F PR MOST RECENT HEMOGLOBIN A1C LEVEL <7.0%: ICD-10-PCS | Mod: CPTII,S$GLB,, | Performed by: NURSE PRACTITIONER

## 2023-09-19 PROCEDURE — 1160F PR REVIEW ALL MEDS BY PRESCRIBER/CLIN PHARMACIST DOCUMENTED: ICD-10-PCS | Mod: CPTII,S$GLB,, | Performed by: NURSE PRACTITIONER

## 2023-09-19 PROCEDURE — 3074F SYST BP LT 130 MM HG: CPT | Mod: CPTII,S$GLB,, | Performed by: NURSE PRACTITIONER

## 2023-09-19 PROCEDURE — 3044F HG A1C LEVEL LT 7.0%: CPT | Mod: CPTII,S$GLB,, | Performed by: NURSE PRACTITIONER

## 2023-09-19 PROCEDURE — 1101F PT FALLS ASSESS-DOCD LE1/YR: CPT | Mod: CPTII,S$GLB,, | Performed by: NURSE PRACTITIONER

## 2023-09-19 PROCEDURE — 99214 OFFICE O/P EST MOD 30 MIN: CPT | Mod: S$GLB,,, | Performed by: NURSE PRACTITIONER

## 2023-09-19 PROCEDURE — 1101F PR PT FALLS ASSESS DOC 0-1 FALLS W/OUT INJ PAST YR: ICD-10-PCS | Mod: CPTII,S$GLB,, | Performed by: NURSE PRACTITIONER

## 2023-09-19 PROCEDURE — 3078F DIAST BP <80 MM HG: CPT | Mod: CPTII,S$GLB,, | Performed by: NURSE PRACTITIONER

## 2023-09-19 PROCEDURE — 3008F PR BODY MASS INDEX (BMI) DOCUMENTED: ICD-10-PCS | Mod: CPTII,S$GLB,, | Performed by: NURSE PRACTITIONER

## 2023-09-19 PROCEDURE — 3008F BODY MASS INDEX DOCD: CPT | Mod: CPTII,S$GLB,, | Performed by: NURSE PRACTITIONER

## 2023-09-19 PROCEDURE — 99999 PR PBB SHADOW E&M-EST. PATIENT-LVL III: ICD-10-PCS | Mod: PBBFAC,,, | Performed by: NURSE PRACTITIONER

## 2023-09-19 PROCEDURE — 99214 PR OFFICE/OUTPT VISIT, EST, LEVL IV, 30-39 MIN: ICD-10-PCS | Mod: S$GLB,,, | Performed by: NURSE PRACTITIONER

## 2023-09-19 PROCEDURE — 99999 PR PBB SHADOW E&M-EST. PATIENT-LVL III: CPT | Mod: PBBFAC,,, | Performed by: NURSE PRACTITIONER

## 2023-09-19 PROCEDURE — 1159F PR MEDICATION LIST DOCUMENTED IN MEDICAL RECORD: ICD-10-PCS | Mod: CPTII,S$GLB,, | Performed by: NURSE PRACTITIONER

## 2023-09-19 PROCEDURE — 1159F MED LIST DOCD IN RCRD: CPT | Mod: CPTII,S$GLB,, | Performed by: NURSE PRACTITIONER

## 2023-09-19 PROCEDURE — 3078F PR MOST RECENT DIASTOLIC BLOOD PRESSURE < 80 MM HG: ICD-10-PCS | Mod: CPTII,S$GLB,, | Performed by: NURSE PRACTITIONER

## 2023-09-19 PROCEDURE — 3074F PR MOST RECENT SYSTOLIC BLOOD PRESSURE < 130 MM HG: ICD-10-PCS | Mod: CPTII,S$GLB,, | Performed by: NURSE PRACTITIONER

## 2023-09-19 PROCEDURE — 3288F PR FALLS RISK ASSESSMENT DOCUMENTED: ICD-10-PCS | Mod: CPTII,S$GLB,, | Performed by: NURSE PRACTITIONER

## 2023-09-19 PROCEDURE — 3288F FALL RISK ASSESSMENT DOCD: CPT | Mod: CPTII,S$GLB,, | Performed by: NURSE PRACTITIONER

## 2023-09-19 PROCEDURE — 1160F RVW MEDS BY RX/DR IN RCRD: CPT | Mod: CPTII,S$GLB,, | Performed by: NURSE PRACTITIONER

## 2023-09-19 NOTE — ASSESSMENT & PLAN NOTE
-- Encouraged follow up with PCP.  -- Low suspicion of AI but will check 8am cortisol as she would like to rule out Endocrine cause.

## 2023-09-19 NOTE — ASSESSMENT & PLAN NOTE
-- Risks include menopause.  -- Reviewed basics of quantity versus quality.  -- Reassuring they are not fracturing.  -- 5/2022 work up of accelerated bone loss: elevated PTH, serum calcium WNL, low urine Ca - started on Citracal petite.   -- Recommend:  -Pharmacological therapy is recommended for patients with osteopenia if the 10 year probability of a hip fracture is >3% and 10 year probability of other major osteoporotic fractures is >20%.  Treatment options and potential side effects discussed for PO bisphosphonates, reclast, Denosumab, and Teriparatide.   -Treatment: lost to follow up.  Discussed treatment options, again.   Declined anabolic therapy.  Considering Reclast.    -Calcium and Vitamin D RDD provided.  -Exercise: recommended.  -Fall precautions made in the home.  -Alerted that if dental work needs to be done it should be done prior to initiating therapy. Dental health: UTD  -- Repeat DEXA scan 10/2023.

## 2023-09-19 NOTE — ASSESSMENT & PLAN NOTE
-- I have reviewed management options including observation, FNA or surgery.  -- FNA procedure explained in depth.  -- Discussed indications for repeat FNA as well as surgical indications (abnormal FNA, compressive symptoms or interval change).  -- Discussed possible results of FNA- Benign, Malignant, FLUS, or insufficient cells.  Discussed results auto released to patients, but advised the ordering provider will also contact to discuss.   -- Patient is not on any blood thinners.  -- All of the patients questions were answered.  -- FNA:   10/19/18  THYROID, RIGHT LOBE NODULE, FINE-NEEDLE ASPIRATION (CYTOLOGY):  - Benign ([place] Classification System II).  - Benign follicular cells and colloid.  -- Due for thyroid US.

## 2023-10-03 ENCOUNTER — PATIENT MESSAGE (OUTPATIENT)
Dept: ENDOCRINOLOGY | Facility: CLINIC | Age: 73
End: 2023-10-03
Payer: MEDICARE

## 2023-10-03 ENCOUNTER — LAB VISIT (OUTPATIENT)
Dept: LAB | Facility: HOSPITAL | Age: 73
End: 2023-10-03
Payer: MEDICARE

## 2023-10-03 DIAGNOSIS — M81.0 OSTEOPOROSIS, UNSPECIFIED OSTEOPOROSIS TYPE, UNSPECIFIED PATHOLOGICAL FRACTURE PRESENCE: ICD-10-CM

## 2023-10-03 DIAGNOSIS — E04.1 THYROID NODULE: ICD-10-CM

## 2023-10-03 LAB
ALBUMIN SERPL BCP-MCNC: 3.8 G/DL (ref 3.5–5.2)
ANION GAP SERPL CALC-SCNC: 10 MMOL/L (ref 8–16)
BUN SERPL-MCNC: 14 MG/DL (ref 8–23)
CALCIUM SERPL-MCNC: 9.6 MG/DL (ref 8.7–10.5)
CHLORIDE SERPL-SCNC: 107 MMOL/L (ref 95–110)
CO2 SERPL-SCNC: 24 MMOL/L (ref 23–29)
CORTIS SERPL-MCNC: 20.3 UG/DL (ref 4.3–22.4)
CREAT SERPL-MCNC: 0.8 MG/DL (ref 0.5–1.4)
EST. GFR  (NO RACE VARIABLE): >60 ML/MIN/1.73 M^2
GLUCOSE SERPL-MCNC: 94 MG/DL (ref 70–110)
PHOSPHATE SERPL-MCNC: 3.9 MG/DL (ref 2.7–4.5)
POTASSIUM SERPL-SCNC: 4.5 MMOL/L (ref 3.5–5.1)
PTH-INTACT SERPL-MCNC: 84.6 PG/ML (ref 9–77)
SODIUM SERPL-SCNC: 141 MMOL/L (ref 136–145)
TSH SERPL DL<=0.005 MIU/L-ACNC: 1.33 UIU/ML (ref 0.4–4)

## 2023-10-03 PROCEDURE — 80069 RENAL FUNCTION PANEL: CPT | Performed by: NURSE PRACTITIONER

## 2023-10-03 PROCEDURE — 84443 ASSAY THYROID STIM HORMONE: CPT | Performed by: NURSE PRACTITIONER

## 2023-10-03 PROCEDURE — 82533 TOTAL CORTISOL: CPT | Performed by: NURSE PRACTITIONER

## 2023-10-03 PROCEDURE — 36415 COLL VENOUS BLD VENIPUNCTURE: CPT | Performed by: NURSE PRACTITIONER

## 2023-10-03 PROCEDURE — 83970 ASSAY OF PARATHORMONE: CPT | Performed by: NURSE PRACTITIONER

## 2023-10-03 NOTE — TELEPHONE ENCOUNTER
Component      Latest Ref Rng 10/3/2023   Glucose      70 - 110 mg/dL 94    Sodium      136 - 145 mmol/L 141    Potassium      3.5 - 5.1 mmol/L 4.5    Chloride      95 - 110 mmol/L 107    CO2      23 - 29 mmol/L 24    BUN      8 - 23 mg/dL 14    Calcium      8.7 - 10.5 mg/dL 9.6    Creatinine      0.5 - 1.4 mg/dL 0.8    Albumin      3.5 - 5.2 g/dL 3.8    Phosphorus      2.7 - 4.5 mg/dL 3.9    eGFR      >60 mL/min/1.73 m^2 >60.0    Anion Gap      8 - 16 mmol/L 10    Cortisol, 8 AM      4.30 - 22.40 ug/dL 20.30    TSH      0.400 - 4.000 uIU/mL 1.329    PTH      9.0 - 77.0 pg/mL 84.6 (H)       Legend:  (H) High

## 2023-10-06 ENCOUNTER — PATIENT MESSAGE (OUTPATIENT)
Dept: ADMINISTRATIVE | Facility: HOSPITAL | Age: 73
End: 2023-10-06
Payer: MEDICARE

## 2023-10-19 ENCOUNTER — OFFICE VISIT (OUTPATIENT)
Dept: OPTOMETRY | Facility: CLINIC | Age: 73
End: 2023-10-19
Payer: MEDICARE

## 2023-10-19 DIAGNOSIS — H52.203 HYPEROPIA OF BOTH EYES WITH ASTIGMATISM AND PRESBYOPIA: ICD-10-CM

## 2023-10-19 DIAGNOSIS — Z01.00 EYE EXAM, ROUTINE: ICD-10-CM

## 2023-10-19 DIAGNOSIS — H52.03 HYPEROPIA OF BOTH EYES WITH ASTIGMATISM AND PRESBYOPIA: ICD-10-CM

## 2023-10-19 DIAGNOSIS — H25.13 NUCLEAR SCLEROTIC CATARACT OF BOTH EYES: Primary | ICD-10-CM

## 2023-10-19 DIAGNOSIS — H52.4 HYPEROPIA OF BOTH EYES WITH ASTIGMATISM AND PRESBYOPIA: ICD-10-CM

## 2023-10-19 PROCEDURE — 1159F PR MEDICATION LIST DOCUMENTED IN MEDICAL RECORD: ICD-10-PCS | Mod: CPTII,S$GLB,, | Performed by: OPTOMETRIST

## 2023-10-19 PROCEDURE — 1126F PR PAIN SEVERITY QUANTIFIED, NO PAIN PRESENT: ICD-10-PCS | Mod: CPTII,S$GLB,, | Performed by: OPTOMETRIST

## 2023-10-19 PROCEDURE — 1101F PR PT FALLS ASSESS DOC 0-1 FALLS W/OUT INJ PAST YR: ICD-10-PCS | Mod: CPTII,S$GLB,, | Performed by: OPTOMETRIST

## 2023-10-19 PROCEDURE — 1126F AMNT PAIN NOTED NONE PRSNT: CPT | Mod: CPTII,S$GLB,, | Performed by: OPTOMETRIST

## 2023-10-19 PROCEDURE — 3288F FALL RISK ASSESSMENT DOCD: CPT | Mod: CPTII,S$GLB,, | Performed by: OPTOMETRIST

## 2023-10-19 PROCEDURE — 1159F MED LIST DOCD IN RCRD: CPT | Mod: CPTII,S$GLB,, | Performed by: OPTOMETRIST

## 2023-10-19 PROCEDURE — 92015 DETERMINE REFRACTIVE STATE: CPT | Mod: S$GLB,,, | Performed by: OPTOMETRIST

## 2023-10-19 PROCEDURE — 99214 OFFICE O/P EST MOD 30 MIN: CPT | Mod: S$GLB,,, | Performed by: OPTOMETRIST

## 2023-10-19 PROCEDURE — 3288F PR FALLS RISK ASSESSMENT DOCUMENTED: ICD-10-PCS | Mod: CPTII,S$GLB,, | Performed by: OPTOMETRIST

## 2023-10-19 PROCEDURE — 1101F PT FALLS ASSESS-DOCD LE1/YR: CPT | Mod: CPTII,S$GLB,, | Performed by: OPTOMETRIST

## 2023-10-19 PROCEDURE — 3044F PR MOST RECENT HEMOGLOBIN A1C LEVEL <7.0%: ICD-10-PCS | Mod: CPTII,S$GLB,, | Performed by: OPTOMETRIST

## 2023-10-19 PROCEDURE — 99214 PR OFFICE/OUTPT VISIT, EST, LEVL IV, 30-39 MIN: ICD-10-PCS | Mod: S$GLB,,, | Performed by: OPTOMETRIST

## 2023-10-19 PROCEDURE — 99999 PR PBB SHADOW E&M-EST. PATIENT-LVL III: ICD-10-PCS | Mod: PBBFAC,,, | Performed by: OPTOMETRIST

## 2023-10-19 PROCEDURE — 99999 PR PBB SHADOW E&M-EST. PATIENT-LVL III: CPT | Mod: PBBFAC,,, | Performed by: OPTOMETRIST

## 2023-10-19 PROCEDURE — 3044F HG A1C LEVEL LT 7.0%: CPT | Mod: CPTII,S$GLB,, | Performed by: OPTOMETRIST

## 2023-10-19 PROCEDURE — 92015 PR REFRACTION: ICD-10-PCS | Mod: S$GLB,,, | Performed by: OPTOMETRIST

## 2023-10-19 NOTE — PROGRESS NOTES
HPI    Patient is here today for routine eye exam. Patient's last eye exam was   09/12/2018 with Dr. Aguila. Patient states she has difficulty reading small   prints. Occasional flashes. No pain or discomfort.     Eye Meds: None  Past Ocular Sx: None  Last edited by Shikha Boyle on 10/19/2023 11:22 AM.        ROS    Positive for: Endocrine, Cardiovascular  Negative for: Constitutional, Gastrointestinal, Neurological, Skin,   Genitourinary, Musculoskeletal, HENT, Eyes, Respiratory, Psychiatric,   Allergic/Imm, Heme/Lymph  Last edited by Alyssa Hull, OD on 10/19/2023 12:29 PM.        Assessment /Plan     For exam results, see Encounter Report.    Nuclear sclerotic cataract of both eyes    Eye exam, routine  -     Ambulatory referral/consult to Optometry    Hyperopia of both eyes with astigmatism and presbyopia      Educated pt on today's findings;  Mild cataracts OU that are not visual significant at this time;  Released final Rx for full-time wear;  RTC- one year for annual eye exam or sooner PRN.

## 2023-10-19 NOTE — LETTER
October 19, 2023      [facility] [address]  [address]  [facility] [address]  Phone: [phone]       Patient: Sharri Cline   YOB: 1950  Date of Visit: 10/19/2023    To Whom It May Concern:    Oseas Cline  was at [facility] on 10/19/2023. The patient may return to work/school on 10/20 with no restrictions. If you have any questions or concerns, or if I can be of further assistance, please do not hesitate to contact me.    Sincerely,

## 2023-10-31 ENCOUNTER — HOSPITAL ENCOUNTER (OUTPATIENT)
Dept: RADIOLOGY | Facility: CLINIC | Age: 73
Discharge: HOME | End: 2023-10-31
Attending: NURSE PRACTITIONER
Payer: MEDICARE

## 2023-10-31 ENCOUNTER — HOSPITAL ENCOUNTER (OUTPATIENT)
Dept: RADIOLOGY | Facility: HOSPITAL | Age: 73
Discharge: HOME | End: 2023-10-31
Attending: NURSE PRACTITIONER
Payer: MEDICARE

## 2023-10-31 DIAGNOSIS — E04.1 THYROID NODULE: ICD-10-CM

## 2023-10-31 DIAGNOSIS — M81.0 OSTEOPOROSIS, UNSPECIFIED OSTEOPOROSIS TYPE, UNSPECIFIED PATHOLOGICAL FRACTURE PRESENCE: ICD-10-CM

## 2023-10-31 PROCEDURE — 77080 DXA BONE DENSITY AXIAL SKELETON 1 OR MORE SITES: ICD-10-PCS | Mod: 26,,, | Performed by: INTERNAL MEDICINE

## 2023-10-31 PROCEDURE — 76536 US EXAM OF HEAD AND NECK: CPT | Mod: 26,,, | Performed by: INTERNAL MEDICINE

## 2023-10-31 PROCEDURE — 77080 DXA BONE DENSITY AXIAL: CPT | Mod: TC

## 2023-10-31 PROCEDURE — 76536 US SOFT TISSUE HEAD NECK THYROID: ICD-10-PCS | Mod: 26,,, | Performed by: INTERNAL MEDICINE

## 2023-10-31 PROCEDURE — 77080 DXA BONE DENSITY AXIAL: CPT | Mod: 26,,, | Performed by: INTERNAL MEDICINE

## 2023-10-31 PROCEDURE — 76536 US EXAM OF HEAD AND NECK: CPT | Mod: TC

## 2023-11-01 ENCOUNTER — PATIENT MESSAGE (OUTPATIENT)
Dept: ENDOCRINOLOGY | Facility: CLINIC | Age: 73
End: 2023-11-01
Payer: MEDICARE

## 2023-11-01 NOTE — TELEPHONE ENCOUNTER
Impression:     *Osteoporosis based on T-score below -2.5  *Fracture risk is very high due to T-score below -3.0  *Compared with previous DXA, BMD at the lumbar spine has remained stable, and BMD at the total hip has remained stable.     RECOMMENDATIONS:  *Daily calcium intake 1200 mg, dietary sources preferred; Vitamin D 800-1000 IU daily.  *Weight bearing exercise and fall precautions.  *Given very high fracture risk, would consider anabolic agent (romosozumab), denosumab or zoledronic acid as the preferred treatment options all depending on contraindications. Oral bisphosphonates can be considered as second-line therapy.  If not recently done suggest lateral thoracic lumbar spine x-rays looking for prevalent vertebral fractures.  *Repeat BMD in 2 years

## 2023-11-03 ENCOUNTER — LAB VISIT (OUTPATIENT)
Dept: LAB | Facility: HOSPITAL | Age: 73
End: 2023-11-03
Payer: MEDICARE

## 2023-11-03 ENCOUNTER — OFFICE VISIT (OUTPATIENT)
Dept: INTERNAL MEDICINE | Facility: CLINIC | Age: 73
End: 2023-11-03
Payer: MEDICARE

## 2023-11-03 VITALS
WEIGHT: 155.88 LBS | DIASTOLIC BLOOD PRESSURE: 71 MMHG | OXYGEN SATURATION: 99 % | BODY MASS INDEX: 27.62 KG/M2 | HEART RATE: 70 BPM | HEIGHT: 63 IN | SYSTOLIC BLOOD PRESSURE: 118 MMHG

## 2023-11-03 DIAGNOSIS — E04.1 THYROID NODULE: ICD-10-CM

## 2023-11-03 DIAGNOSIS — Z13.220 SCREENING FOR LIPID DISORDERS: ICD-10-CM

## 2023-11-03 DIAGNOSIS — R42 VERTIGO: ICD-10-CM

## 2023-11-03 DIAGNOSIS — H61.21 IMPACTED CERUMEN OF RIGHT EAR: ICD-10-CM

## 2023-11-03 DIAGNOSIS — Z12.12 ENCOUNTER FOR COLORECTAL CANCER SCREENING: ICD-10-CM

## 2023-11-03 DIAGNOSIS — E21.3 HYPERPARATHYROIDISM: ICD-10-CM

## 2023-11-03 DIAGNOSIS — D64.9 ANEMIA, UNSPECIFIED TYPE: ICD-10-CM

## 2023-11-03 DIAGNOSIS — R42 DIZZINESS: Primary | ICD-10-CM

## 2023-11-03 DIAGNOSIS — Z12.11 ENCOUNTER FOR COLORECTAL CANCER SCREENING: ICD-10-CM

## 2023-11-03 DIAGNOSIS — R79.9 ABNORMAL FINDING OF BLOOD CHEMISTRY, UNSPECIFIED: ICD-10-CM

## 2023-11-03 LAB
25(OH)D3+25(OH)D2 SERPL-MCNC: 29 NG/ML (ref 30–96)
ALBUMIN SERPL BCP-MCNC: 4 G/DL (ref 3.5–5.2)
ALP SERPL-CCNC: 165 U/L (ref 55–135)
ALT SERPL W/O P-5'-P-CCNC: 16 U/L (ref 10–44)
ANION GAP SERPL CALC-SCNC: 16 MMOL/L (ref 8–16)
AST SERPL-CCNC: 27 U/L (ref 10–40)
BASOPHILS # BLD AUTO: 0.01 K/UL (ref 0–0.2)
BASOPHILS NFR BLD: 0.2 % (ref 0–1.9)
BILIRUB SERPL-MCNC: 1.6 MG/DL (ref 0.1–1)
BUN SERPL-MCNC: 12 MG/DL (ref 8–23)
CALCIUM SERPL-MCNC: 9.9 MG/DL (ref 8.7–10.5)
CHLORIDE SERPL-SCNC: 106 MMOL/L (ref 95–110)
CHOLEST SERPL-MCNC: 215 MG/DL (ref 120–199)
CHOLEST/HDLC SERPL: 2.8 {RATIO} (ref 2–5)
CO2 SERPL-SCNC: 21 MMOL/L (ref 23–29)
CREAT SERPL-MCNC: 0.8 MG/DL (ref 0.5–1.4)
DIFFERENTIAL METHOD: ABNORMAL
EOSINOPHIL # BLD AUTO: 0.1 K/UL (ref 0–0.5)
EOSINOPHIL NFR BLD: 1.6 % (ref 0–8)
ERYTHROCYTE [DISTWIDTH] IN BLOOD BY AUTOMATED COUNT: 14.9 % (ref 11.5–14.5)
EST. GFR  (NO RACE VARIABLE): >60 ML/MIN/1.73 M^2
ESTIMATED AVG GLUCOSE: 97 MG/DL (ref 68–131)
GLUCOSE SERPL-MCNC: 92 MG/DL (ref 70–110)
HBA1C MFR BLD: 5 % (ref 4–5.6)
HCT VFR BLD AUTO: 42.1 % (ref 37–48.5)
HDLC SERPL-MCNC: 76 MG/DL (ref 40–75)
HDLC SERPL: 35.3 % (ref 20–50)
HGB BLD-MCNC: 13.1 G/DL (ref 12–16)
IMM GRANULOCYTES # BLD AUTO: 0 K/UL (ref 0–0.04)
IMM GRANULOCYTES NFR BLD AUTO: 0 % (ref 0–0.5)
IRON SERPL-MCNC: 123 UG/DL (ref 30–160)
LDLC SERPL CALC-MCNC: 128 MG/DL (ref 63–159)
LYMPHOCYTES # BLD AUTO: 2.1 K/UL (ref 1–4.8)
LYMPHOCYTES NFR BLD: 45.7 % (ref 18–48)
MCH RBC QN AUTO: 28.7 PG (ref 27–31)
MCHC RBC AUTO-ENTMCNC: 31.1 G/DL (ref 32–36)
MCV RBC AUTO: 92 FL (ref 82–98)
MONOCYTES # BLD AUTO: 0.4 K/UL (ref 0.3–1)
MONOCYTES NFR BLD: 8.6 % (ref 4–15)
NEUTROPHILS # BLD AUTO: 2 K/UL (ref 1.8–7.7)
NEUTROPHILS NFR BLD: 43.9 % (ref 38–73)
NONHDLC SERPL-MCNC: 139 MG/DL
NRBC BLD-RTO: 0 /100 WBC
PLATELET # BLD AUTO: 177 K/UL (ref 150–450)
PMV BLD AUTO: 11.4 FL (ref 9.2–12.9)
POTASSIUM SERPL-SCNC: 4.1 MMOL/L (ref 3.5–5.1)
PROT SERPL-MCNC: 7.7 G/DL (ref 6–8.4)
RBC # BLD AUTO: 4.56 M/UL (ref 4–5.4)
SATURATED IRON: 32 % (ref 20–50)
SODIUM SERPL-SCNC: 143 MMOL/L (ref 136–145)
T4 FREE SERPL-MCNC: 0.8 NG/DL (ref 0.71–1.51)
TOTAL IRON BINDING CAPACITY: 385 UG/DL (ref 250–450)
TRANSFERRIN SERPL-MCNC: 260 MG/DL (ref 200–375)
TRIGL SERPL-MCNC: 55 MG/DL (ref 30–150)
TSH SERPL DL<=0.005 MIU/L-ACNC: 0.89 UIU/ML (ref 0.4–4)
WBC # BLD AUTO: 4.51 K/UL (ref 3.9–12.7)

## 2023-11-03 PROCEDURE — 3288F FALL RISK ASSESSMENT DOCD: CPT | Mod: CPTII,S$GLB,, | Performed by: INTERNAL MEDICINE

## 2023-11-03 PROCEDURE — 3008F BODY MASS INDEX DOCD: CPT | Mod: CPTII,S$GLB,, | Performed by: INTERNAL MEDICINE

## 2023-11-03 PROCEDURE — 3074F SYST BP LT 130 MM HG: CPT | Mod: CPTII,S$GLB,, | Performed by: INTERNAL MEDICINE

## 2023-11-03 PROCEDURE — 1126F AMNT PAIN NOTED NONE PRSNT: CPT | Mod: CPTII,S$GLB,, | Performed by: INTERNAL MEDICINE

## 2023-11-03 PROCEDURE — 84466 ASSAY OF TRANSFERRIN: CPT | Performed by: INTERNAL MEDICINE

## 2023-11-03 PROCEDURE — 3078F DIAST BP <80 MM HG: CPT | Mod: CPTII,S$GLB,, | Performed by: INTERNAL MEDICINE

## 2023-11-03 PROCEDURE — 82306 VITAMIN D 25 HYDROXY: CPT | Performed by: INTERNAL MEDICINE

## 2023-11-03 PROCEDURE — 3078F PR MOST RECENT DIASTOLIC BLOOD PRESSURE < 80 MM HG: ICD-10-PCS | Mod: CPTII,S$GLB,, | Performed by: INTERNAL MEDICINE

## 2023-11-03 PROCEDURE — 3044F PR MOST RECENT HEMOGLOBIN A1C LEVEL <7.0%: ICD-10-PCS | Mod: CPTII,S$GLB,, | Performed by: INTERNAL MEDICINE

## 2023-11-03 PROCEDURE — 3044F HG A1C LEVEL LT 7.0%: CPT | Mod: CPTII,S$GLB,, | Performed by: INTERNAL MEDICINE

## 2023-11-03 PROCEDURE — 3288F PR FALLS RISK ASSESSMENT DOCUMENTED: ICD-10-PCS | Mod: CPTII,S$GLB,, | Performed by: INTERNAL MEDICINE

## 2023-11-03 PROCEDURE — 3074F PR MOST RECENT SYSTOLIC BLOOD PRESSURE < 130 MM HG: ICD-10-PCS | Mod: CPTII,S$GLB,, | Performed by: INTERNAL MEDICINE

## 2023-11-03 PROCEDURE — 85025 COMPLETE CBC W/AUTO DIFF WBC: CPT | Performed by: INTERNAL MEDICINE

## 2023-11-03 PROCEDURE — 83540 ASSAY OF IRON: CPT | Performed by: INTERNAL MEDICINE

## 2023-11-03 PROCEDURE — 3008F PR BODY MASS INDEX (BMI) DOCUMENTED: ICD-10-PCS | Mod: CPTII,S$GLB,, | Performed by: INTERNAL MEDICINE

## 2023-11-03 PROCEDURE — 99999 PR PBB SHADOW E&M-EST. PATIENT-LVL IV: ICD-10-PCS | Mod: PBBFAC,,, | Performed by: INTERNAL MEDICINE

## 2023-11-03 PROCEDURE — 99214 OFFICE O/P EST MOD 30 MIN: CPT | Mod: S$GLB,,, | Performed by: INTERNAL MEDICINE

## 2023-11-03 PROCEDURE — 84443 ASSAY THYROID STIM HORMONE: CPT | Performed by: INTERNAL MEDICINE

## 2023-11-03 PROCEDURE — 84439 ASSAY OF FREE THYROXINE: CPT | Performed by: INTERNAL MEDICINE

## 2023-11-03 PROCEDURE — 36415 COLL VENOUS BLD VENIPUNCTURE: CPT | Performed by: INTERNAL MEDICINE

## 2023-11-03 PROCEDURE — 83036 HEMOGLOBIN GLYCOSYLATED A1C: CPT | Performed by: INTERNAL MEDICINE

## 2023-11-03 PROCEDURE — 1101F PR PT FALLS ASSESS DOC 0-1 FALLS W/OUT INJ PAST YR: ICD-10-PCS | Mod: CPTII,S$GLB,, | Performed by: INTERNAL MEDICINE

## 2023-11-03 PROCEDURE — 1126F PR PAIN SEVERITY QUANTIFIED, NO PAIN PRESENT: ICD-10-PCS | Mod: CPTII,S$GLB,, | Performed by: INTERNAL MEDICINE

## 2023-11-03 PROCEDURE — 99999 PR PBB SHADOW E&M-EST. PATIENT-LVL IV: CPT | Mod: PBBFAC,,, | Performed by: INTERNAL MEDICINE

## 2023-11-03 PROCEDURE — 80053 COMPREHEN METABOLIC PANEL: CPT | Performed by: INTERNAL MEDICINE

## 2023-11-03 PROCEDURE — 99214 PR OFFICE/OUTPT VISIT, EST, LEVL IV, 30-39 MIN: ICD-10-PCS | Mod: S$GLB,,, | Performed by: INTERNAL MEDICINE

## 2023-11-03 PROCEDURE — 1101F PT FALLS ASSESS-DOCD LE1/YR: CPT | Mod: CPTII,S$GLB,, | Performed by: INTERNAL MEDICINE

## 2023-11-03 PROCEDURE — 80061 LIPID PANEL: CPT | Performed by: INTERNAL MEDICINE

## 2023-11-03 NOTE — PROGRESS NOTES
Post-Op Doing well no heavy lifting or straining glasses or shield at all times no eye rubbing po drops as directed. Call with any problems. Subjective:       Patient ID: Sharri Cline is a 73 y.o. female.    Chief Complaint: Dizziness    States she has been feeling dizzy again for about a month. Feels off balance, also describes feeling brain fog, things are :fuzzy,   Denies feeling like she might faint or fall.   Denies palpation, chest pain or shortness of breath.     She did have cerumen impaction in February and had ear lavage. States dizziness did improve after this.     Past medical history   Hx of breast cancer: Right breast ER negative. Dx in 2019 s/p tx with adriamycin and cyclophsohamide with  neulesta then had right breast lumpectomy, then had RT. Follows up with Dr. Alexandra every 6 months. Last mammogram was normal.      Thyroid nodules: due for thyroid US again in 2023      Osteoporosis. Met with endocrine. Considering re clast.     Vit D deficiency     Health Maintenance:  Colon Cancer Screening:  coloscopy done 2013 was normal. Due again this July   Mammogram: done 8/2022   DEX: 2021 showed osteoporosis due in 2023   Hep C: negative 2014  Lipids: normal 11/2022  Vaccines: up to date on all vaccines              Dizziness: no ear pain, no headaches, no nausea, no vomiting, no palpitations and no chest pain.    Review of Systems   Constitutional:  Negative for activity change, appetite change and chills.   HENT:  Negative for ear pain, sinus pressure/congestion and sneezing.    Respiratory:  Negative for cough and shortness of breath.    Cardiovascular:  Negative for chest pain, palpitations and leg swelling.   Gastrointestinal:  Negative for abdominal distention, abdominal pain, constipation, diarrhea, nausea and vomiting.   Genitourinary:  Negative for dysuria and hematuria.   Musculoskeletal:  Negative for arthralgias, back pain and myalgias.   Neurological:  Positive for dizziness. Negative for headaches.   Psychiatric/Behavioral:  Negative for agitation. The patient is not nervous/anxious.            Past Medical History:   Diagnosis  Date    Breast cancer 01/23/2020    RIGHT    Helicobacter pylori antibody positive 05/27/2013    treated with clarithromycin, metronidazole, and omeprazole x 14 days  (5/27-6/3); EGD normal in July 2013     Past Surgical History:   Procedure Laterality Date    BIOPSY OF LIVER WITH ULTRASOUND GUIDANCE N/A 11/19/2018    Procedure: BIOPSY, LIVER, WITH US GUIDANCE;  Surgeon: Gordon Diagnostic Provider;  [facility];  Service: Radiology;  Laterality: N/A;  U/S GUIDED LIVER ([id number]).  11/19/2018  DOSC 7 AM  PROCEDURE 8 AM.  DR CARITO LAGOS.  DB 11/12/18 3:55P    BREAST LUMPECTOMY Right     01/23/20    INJECTION FOR SENTINEL NODE IDENTIFICATION Right 01/23/2020    Procedure: INJECTION, FOR SENTINEL NODE IDENTIFICATION;  Surgeon: Libertad Hernandez MD;  [facility];  Service: General;  Laterality: Right;    INSERTION OF TUNNELED CENTRAL VENOUS CATHETER (CVC) WITH SUBCUTANEOUS PORT Left 06/13/2019    Procedure: [id number] - CHEST;  Surgeon: Libertad Hernandez MD;  [facility];  Service: General;  Laterality: Left;    MASTECTOMY, PARTIAL Right 01/23/2020    Procedure: MASTECTOMY, PARTIAL- w/Seed Localization;  Surgeon: Libertad Hernandez MD;  [facility];  Service: General;  Laterality: Right;    SENTINEL LYMPH NODE BIOPSY Right 01/23/2020    Procedure: BIOPSY, LYMPH NODE, SENTINEL;  Surgeon: Libertad Hernandez MD;  [facility];  Service: General;  Laterality: Right;    TUBAL LIGATION        Patient Active Problem List   Diagnosis    Menopause    Atrophic vaginitis    GERD (gastroesophageal reflux disease)    Idiopathic guttate hypomelanosis    Osteoporosis without current pathological fracture    Thyroid nodule    Malignant neoplasm of lower-inner quadrant of right breast of female, estrogen receptor negative    At risk for lymphedema    Anemia    Dysphagia    Chemotherapy-induced neuropathy    Vitamin D deficiency    Aortic atherosclerosis    Osteoporosis     Decreased strength    Left carotid artery stenosis    Imbalance    Hyperparathyroidism    Lightheaded        Objective:      Physical Exam  Constitutional:       Appearance: Normal appearance.   HENT:      Head: Normocephalic.   Cardiovascular:      Rate and Rhythm: Normal rate and regular rhythm.      Pulses: Normal pulses.      Heart sounds: Normal heart sounds.   Pulmonary:      Effort: Pulmonary effort is normal.      Breath sounds: Normal breath sounds.   Abdominal:      General: Abdomen is flat. Bowel sounds are normal.      Palpations: Abdomen is soft.   Musculoskeletal:         General: Normal range of motion.      Cervical back: Normal range of motion and neck supple.   Skin:     General: Skin is warm and dry.   Neurological:      General: No focal deficit present.      Mental Status: She is alert and oriented to person, place, and time.   Psychiatric:         Mood and Affect: Mood normal.         Assessment:       Problem List Items Addressed This Visit          Oncology    Anemia    Relevant Orders    CBC W/ AUTO DIFFERENTIAL    IRON AND TIBC       Endocrine    Thyroid nodule    Relevant Orders    TSH    T4, FREE    Hyperparathyroidism    Relevant Orders    Vitamin D 25 hydroxy    COMPREHENSIVE METABOLIC PANEL     Other Visit Diagnoses       Dizziness    -  Primary    Fatigue, unspecified type        Screening for lipid disorders        Relevant Orders    LIPID PANEL    HEMOGLOBIN A1C    Vertigo        Relevant Orders    Ambulatory referral/consult to ENT    Impacted cerumen of right ear        Relevant Orders    Ambulatory referral/consult to ENT    Encounter for colorectal cancer screening        Relevant Orders    Ambulatory referral/consult to Endo Procedure             Plan:         Sharri was seen today for dizziness.    Diagnoses and all orders for this visit:    Dizziness  Vertigo  Impacted cerumen of right ear  -     carbamide peroxide (DEBROX) 6.5 % otic solution; Place 5 drops into both  ears 2 (two) times daily.  -     Ambulatory referral/consult to ENT; Future  Possible due to cerumen impaction.   Check labs today, ENT referral.   CT brain in February 2023 was normal.     Hyperparathyroidism  -     Vitamin D 25 hydroxy; Future  -     COMPREHENSIVE METABOLIC PANEL; Future    Thyroid nodule  -     TSH; Future  -     T4, FREE; Future    Anemia, unspecified type  -check labs today. Hx of anemia, possible cause of dizziness?       Screening for lipid disorders  -     LIPID PANEL; Future  -     HEMOGLOBIN A1C; Future    Encounter for colorectal cancer screening  -     Ambulatory referral/consult to Endo Procedure ; Future                Fern Guerrero MD   Internal Medicine   Primary Care

## 2023-11-10 ENCOUNTER — TELEPHONE (OUTPATIENT)
Dept: ENDOSCOPY | Facility: HOSPITAL | Age: 73
End: 2023-11-10

## 2023-11-10 ENCOUNTER — CLINICAL SUPPORT (OUTPATIENT)
Dept: ENDOSCOPY | Facility: HOSPITAL | Age: 73
End: 2023-11-10
Attending: PATHOLOGY
Payer: MEDICARE

## 2023-11-10 DIAGNOSIS — Z12.11 ENCOUNTER FOR COLORECTAL CANCER SCREENING: ICD-10-CM

## 2023-11-10 DIAGNOSIS — Z12.12 ENCOUNTER FOR COLORECTAL CANCER SCREENING: ICD-10-CM

## 2023-11-10 NOTE — TELEPHONE ENCOUNTER
Spoke to pt to schedule procedure(s) Colonoscopy       Physician to perform procedure(s) Dr. MIYA Roberts  Date of Procedure (s) 3/1/24  Arrival Time 6:30 AM  Time of Procedure(s) 7:30 AM   Location of Procedure(s) [place] 4th Floor  Type of Rx Prep sent to patient: PEG  Instructions provided to patient via [facility]    Patient was informed on the following information and verbalized understanding. Screening questionnaire reviewed with patient and complete. If procedure requires anesthesia, a responsible adult needs to be present to accompany the patient home, patient cannot drive after receiving anesthesia. Appointment details are tentative, especially check-in time. Patient will receive a prep-op call 4 days prior to confirm check-in time for procedure. If applicable the patient should contact their pharmacy to verify Rx for procedure prep is ready for pick-up. Patient was advised to call the scheduling department at [phone] if pharmacy states no Rx is available. Patient was advised to call the endoscopy scheduling department if any questions or concerns arise.      [facility] Scheduling Department

## 2023-11-15 ENCOUNTER — PATIENT MESSAGE (OUTPATIENT)
Dept: INTERNAL MEDICINE | Facility: CLINIC | Age: 73
End: 2023-11-15
Payer: MEDICARE

## 2023-12-19 ENCOUNTER — OFFICE VISIT (OUTPATIENT)
Dept: HEMATOLOGY/ONCOLOGY | Facility: CLINIC | Age: 73
End: 2023-12-19
Payer: MEDICARE

## 2023-12-19 VITALS
RESPIRATION RATE: 18 BRPM | BODY MASS INDEX: 27.73 KG/M2 | HEIGHT: 63 IN | WEIGHT: 156.5 LBS | SYSTOLIC BLOOD PRESSURE: 135 MMHG | DIASTOLIC BLOOD PRESSURE: 65 MMHG | TEMPERATURE: 98 F | HEART RATE: 82 BPM

## 2023-12-19 DIAGNOSIS — C50.311 MALIGNANT NEOPLASM OF LOWER-INNER QUADRANT OF RIGHT BREAST OF FEMALE, ESTROGEN RECEPTOR NEGATIVE: Primary | ICD-10-CM

## 2023-12-19 DIAGNOSIS — Z17.1 MALIGNANT NEOPLASM OF LOWER-INNER QUADRANT OF RIGHT BREAST OF FEMALE, ESTROGEN RECEPTOR NEGATIVE: Primary | ICD-10-CM

## 2023-12-19 PROCEDURE — 3044F HG A1C LEVEL LT 7.0%: CPT | Mod: CPTII,S$GLB,, | Performed by: INTERNAL MEDICINE

## 2023-12-19 PROCEDURE — 1159F PR MEDICATION LIST DOCUMENTED IN MEDICAL RECORD: ICD-10-PCS | Mod: CPTII,S$GLB,, | Performed by: INTERNAL MEDICINE

## 2023-12-19 PROCEDURE — 3288F FALL RISK ASSESSMENT DOCD: CPT | Mod: CPTII,S$GLB,, | Performed by: INTERNAL MEDICINE

## 2023-12-19 PROCEDURE — 99999 PR PBB SHADOW E&M-EST. PATIENT-LVL III: ICD-10-PCS | Mod: PBBFAC,,, | Performed by: INTERNAL MEDICINE

## 2023-12-19 PROCEDURE — 3288F PR FALLS RISK ASSESSMENT DOCUMENTED: ICD-10-PCS | Mod: CPTII,S$GLB,, | Performed by: INTERNAL MEDICINE

## 2023-12-19 PROCEDURE — 3075F PR MOST RECENT SYSTOLIC BLOOD PRESS GE 130-139MM HG: ICD-10-PCS | Mod: CPTII,S$GLB,, | Performed by: INTERNAL MEDICINE

## 2023-12-19 PROCEDURE — 3008F PR BODY MASS INDEX (BMI) DOCUMENTED: ICD-10-PCS | Mod: CPTII,S$GLB,, | Performed by: INTERNAL MEDICINE

## 2023-12-19 PROCEDURE — 3078F DIAST BP <80 MM HG: CPT | Mod: CPTII,S$GLB,, | Performed by: INTERNAL MEDICINE

## 2023-12-19 PROCEDURE — 99213 PR OFFICE/OUTPT VISIT, EST, LEVL III, 20-29 MIN: ICD-10-PCS | Mod: S$GLB,,, | Performed by: INTERNAL MEDICINE

## 2023-12-19 PROCEDURE — 99213 OFFICE O/P EST LOW 20 MIN: CPT | Mod: S$GLB,,, | Performed by: INTERNAL MEDICINE

## 2023-12-19 PROCEDURE — 1159F MED LIST DOCD IN RCRD: CPT | Mod: CPTII,S$GLB,, | Performed by: INTERNAL MEDICINE

## 2023-12-19 PROCEDURE — 99999 PR PBB SHADOW E&M-EST. PATIENT-LVL III: CPT | Mod: PBBFAC,,, | Performed by: INTERNAL MEDICINE

## 2023-12-19 PROCEDURE — 3044F PR MOST RECENT HEMOGLOBIN A1C LEVEL <7.0%: ICD-10-PCS | Mod: CPTII,S$GLB,, | Performed by: INTERNAL MEDICINE

## 2023-12-19 PROCEDURE — 1101F PR PT FALLS ASSESS DOC 0-1 FALLS W/OUT INJ PAST YR: ICD-10-PCS | Mod: CPTII,S$GLB,, | Performed by: INTERNAL MEDICINE

## 2023-12-19 PROCEDURE — 1101F PT FALLS ASSESS-DOCD LE1/YR: CPT | Mod: CPTII,S$GLB,, | Performed by: INTERNAL MEDICINE

## 2023-12-19 PROCEDURE — 3075F SYST BP GE 130 - 139MM HG: CPT | Mod: CPTII,S$GLB,, | Performed by: INTERNAL MEDICINE

## 2023-12-19 PROCEDURE — 3008F BODY MASS INDEX DOCD: CPT | Mod: CPTII,S$GLB,, | Performed by: INTERNAL MEDICINE

## 2023-12-19 PROCEDURE — 3078F PR MOST RECENT DIASTOLIC BLOOD PRESSURE < 80 MM HG: ICD-10-PCS | Mod: CPTII,S$GLB,, | Performed by: INTERNAL MEDICINE

## 2023-12-19 NOTE — PROGRESS NOTES
Subjective:       Patient ID: Sharri Cline is a 73 y.o. female.    Chief Complaint: No chief complaint on file.    HPI 73-year-old female who returns to clinic for follow-up of right breast cancer-triple negative.  She had a pathological complete response to neoadjuvant chemotherapy.        Onc history:   - She presented for screening mammo in 5/15/2019 which showed focal asymmetries in both left and right breast. Diagnostic mammogram and US showed no significant abn of left breast, and right breast 15 mm x 11 mm x 12 mm mass at 4:00, 5 CFN. Biopsy on 5/24/19 showed grade 3 IDC, triple negative, Ki67 70%.    - 7/3/19 - 12/10/19 completed neoadjuvant chemotherapy with four cycles of dose-dense Adriamycin and cyclophosphamide with Neulasta support followed by twelve doses of weekly paclitaxel. Dose reduced for cytopenias and neuropathy.   - 1/23/2020 - Dr Hernandez performed right breast lumpectomy with SLN Biopsy with pathology showing no residual carcinoma with 3 neg SLN.   - 4/2/2020 - 4/24/2020 - completed RT    Mammogram August 2023 - negative  Review of Systems   Cardiovascular: Negative.    Gastrointestinal: Negative.    Musculoskeletal:  Negative for back pain.   Neurological:  Positive for numbness (hands and feet). Negative for headaches.   Psychiatric/Behavioral:  The patient is not nervous/anxious.          Objective:      Physical Exam  Vitals reviewed.   Constitutional:       General: She is not in acute distress.     Appearance: Normal appearance.   Cardiovascular:      Rate and Rhythm: Normal rate and regular rhythm.   Pulmonary:      Effort: Pulmonary effort is normal. No respiratory distress.      Breath sounds: Normal breath sounds. No wheezing or rales.   Chest:   Breasts:     Right: No mass, nipple discharge or skin change.      Left: Normal.       Abdominal:      Palpations: Abdomen is soft. There is no mass.      Tenderness: There is no abdominal tenderness.   Lymphadenopathy:      Cervical:  No cervical adenopathy.      Upper Body:      Right upper body: No supraclavicular or axillary adenopathy.      Left upper body: No supraclavicular or axillary adenopathy.   Neurological:      Mental Status: She is alert and oriented to person, place, and time.      Cranial Nerves: No cranial nerve deficit.   Psychiatric:         Mood and Affect: Mood normal.         Behavior: Behavior normal.         Thought Content: Thought content normal.         Judgment: Judgment normal.       Assessment:      1. Malignant neoplasm of lower-inner quadrant of right breast of female, estrogen receptor negative        Plan:    Mammograms in August  RTC  6 M    Route Chart for Scheduling    Med Onc Chart Routing      Follow up with physician 6 months.   Follow up with RAJEEV    Infusion scheduling note    Injection scheduling note    Labs None   Scheduling:  Preferred lab:  Lab interval:     Imaging None      Pharmacy appointment No pharmacy appointment needed      Other referrals no referral to Oncology Primary Care needed -  no Massage appointment needed    No additional referrals needed

## 2024-01-30 ENCOUNTER — CLINICAL SUPPORT (OUTPATIENT)
Dept: AUDIOLOGY | Facility: CLINIC | Age: 74
End: 2024-01-30
Payer: MEDICARE

## 2024-01-30 ENCOUNTER — OFFICE VISIT (OUTPATIENT)
Dept: OTOLARYNGOLOGY | Facility: CLINIC | Age: 74
End: 2024-01-30
Payer: MEDICARE

## 2024-01-30 DIAGNOSIS — R42 VERTIGO: ICD-10-CM

## 2024-01-30 DIAGNOSIS — H61.21 IMPACTED CERUMEN OF RIGHT EAR: ICD-10-CM

## 2024-01-30 DIAGNOSIS — H90.3 SENSORINEURAL HEARING LOSS OF BOTH EARS: Primary | ICD-10-CM

## 2024-01-30 DIAGNOSIS — H93.11 TINNITUS OF RIGHT EAR: ICD-10-CM

## 2024-01-30 DIAGNOSIS — Z00.00 ENCOUNTER FOR MEDICARE ANNUAL WELLNESS EXAM: ICD-10-CM

## 2024-01-30 PROCEDURE — 99499 UNLISTED E&M SERVICE: CPT | Mod: S$GLB,,, | Performed by: OTOLARYNGOLOGY

## 2024-01-30 PROCEDURE — 92567 TYMPANOMETRY: CPT | Mod: S$GLB,,, | Performed by: AUDIOLOGIST

## 2024-01-30 PROCEDURE — 99999 PR PBB SHADOW E&M-EST. PATIENT-LVL III: CPT | Mod: PBBFAC,,, | Performed by: OTOLARYNGOLOGY

## 2024-01-30 PROCEDURE — 92557 COMPREHENSIVE HEARING TEST: CPT | Mod: S$GLB,,, | Performed by: AUDIOLOGIST

## 2024-01-30 PROCEDURE — 99999 PR PBB SHADOW E&M-EST. PATIENT-LVL I: CPT | Mod: PBBFAC,,, | Performed by: AUDIOLOGIST

## 2024-01-30 PROCEDURE — 69210 REMOVE IMPACTED EAR WAX UNI: CPT | Mod: S$GLB,,, | Performed by: OTOLARYNGOLOGY

## 2024-01-30 NOTE — PROGRESS NOTES
Mrs. Sharri Cline was seen in the clinic today for an audiological evaluation.  Ms. Cline reported hissing tinnitus of the right ear.    Audiological testing revealed a mild to moderate mid to high frequency sensorineural hearing loss for the right ear and a mild to moderate mid to high frequency sensorineural hearing loss for the left ear.  A speech reception threshold was obtained at 15 dBHL for the right ear and at 20 dBHL for the left ear.  Speech discrimination was 96% for the right ear and 92% for the left ear.      Tympanometry testing revealed a Type As tympanogram for the right ear and a Type A tympanogram for the left ear.      Recommendations:  1. Otologic evaluation  2. Annual audiological evaluation  3. Hearing protection when in noise   4. Hearing aid consultation

## 2024-02-04 NOTE — PROCEDURES
Ear Cerumen Removal    Date/Time: 1/30/2024 2:00 PM    Performed by: Jake Fiore MD  Authorized by: Jake Fiore MD    Consent Done?:  Yes (Verbal)  Location details:  Right ear  Procedure type: curette    Cerumen  Removal Results:  Cerumen completely removed  Patient tolerance:  Patient tolerated the procedure well with no immediate complications

## 2024-02-23 ENCOUNTER — TELEPHONE (OUTPATIENT)
Dept: ENDOSCOPY | Facility: HOSPITAL | Age: 74
End: 2024-02-23
Payer: MEDICARE

## 2024-02-28 NOTE — PROGRESS NOTES
Physical Therapy Daily Treatment Note     Name: Sharri Dennis [place]  Clinic Number: [id number]    Therapy Diagnosis:   Encounter Diagnoses   Name Primary?    Impaired functional mobility, balance, gait, and endurance Yes    Impaired sensation      Physician: Dominique Ayala MD    Visit Date: 2/18/2020    Therapy Diagnosis:        Encounter Diagnoses   Name Primary?    Impaired functional mobility, balance, gait, and endurance      Impaired sensation        Physician: Dominique Ayala MD     Physician Orders: PT Eval and Treat for neuropathy related to chemotherapy  Medical Diagnosis from Referral: chemotherapy induced neuropathy  Evaluation Date: 2/7/2020  Authorization Period Expiration: 2/25/2020  Plan of Care Expiration: 4/7/2020  Visit # / Visits authorized: 3/ 6     Time In: 845  Time Out: 930  Total Billable Time: 45 minutes    Precautions: Standard and Fall    Subjective     Pt reports: Feels as though her feet are constantly big and heavy. Had a good but tiring weekend. Continues to endorse good HEP compliance and ambulation around track.     She was provided with home exercise program today.  Response to previous treatment: first session since evaluation  Functional change: ongoing    Pain: 0/10  Location: n/a     Objective     Sharri received therapeutic exercises to develop strength, endurance, ROM, flexibility, posture and core stabilization for 20 minutes including:  2 x 30 sec piriformis stretch  3 x 30 sec DKTC  2 x 30 sec hamstring stretch  2 x 10 ankle pumps of sciatica nerve glide   2 x 30 sec of calf stretch on wedge  10 minutes recumbent bike, lv 7, rated at 1 on Modified Suhail Scale  x15 reps of hip and knee extension on pilates box with all springs on level 1, cues to eccentrically control movement after press down    Sharri participated in neuromuscular re-education activities to improve: Balance, Coordination, Kinesthetic, Sense, Proprioception and Posture for 25 minutes. The following  ANTICOAGULATION MANAGEMENT     [place] 86 year old female is on warfarin with therapeutic INR result. (Goal INR 2.0-3.0)    Recent labs: (last 7 days)     02/28/24  0000   INR 2.1       ASSESSMENT     Source(s): Chart Review and Patient/Caregiver Call     Warfarin doses taken: Warfarin taken as instructed  Diet: Decreased greens/vitamin K in diet; plans to resume previous intake. Held greens all week because of the low number last week. Patient wants to go back to eating greens.  Medication/supplement changes: None noted  New illness, injury, or hospitalization: No  Signs or symptoms of bleeding or clotting: No  Previous result: Therapeutic last 2(+) visits  Additional findings: None and would like a voicemail and send [employer].       PLAN     Recommended plan for temporary change(s) affecting INR     Dosing Instructions: Increase your warfarin dose (7.7% change) with next INR in 1 week       Summary  As of 2/28/2024      Full warfarin instructions:  10 mg every day   Next INR check:  3/6/2024               Detailed voice message left for Aaliyah with dosing instructions and follow up date.   Sent [employer] message with dosing and follow up instructions    Patient to recheck with home meter    Education provided:   Dietary considerations: importance of consistent vitamin K intake  Reviewed and patient agrees to increase in warfarin so that she can go back to eating greens.    Plan made with [facility] Pharmacist Shannan Bennett, RN  Anticoagulation Clinic  2/28/2024    _______________________________________________________________________     Anticoagulation Episode Summary       Current INR goal:  2.0-3.0   TTR:  59.3% (1 y)   Target end date:  Indefinite   Send INR reminders to:  ANTICOAG HOME MONITORING    Indications    Atrial fibrillation (H) [I48.91]  Long term current use of anticoagulant therapy [Z79.01]  Atrial fibrillation  unspecified type (H) [I48.91]  Permanent atrial fibrillation (H)  [I48.21]             Comments:  [employer] home meter- MBE             Anticoagulation Care Providers       Provider Role Specialty Phone number    Tyler Lee MD Referring Family Medicine [phone]    Araceli Crowley NP Referring Nurse Practitioner - Family [phone]             activities were included:  2 x20 reps of alternating lg cone taps, no UE support  2 x 30 sec ea modified SLS with opposing foot on lg cone, CGA, occ UE support   2 x 30 sec tandem on foam, ea LE lead, occ UE support, CGA  2 x 30 sec on foam head turns, L/R, CGA  x30 sec on foam head turns L/R vision eliminated, CGA, pt require WBOS for this activity    Sharri participated in dynamic functional therapeutic activities to improve functional performance for 0 minutes, including:      Home Exercises Provided and Patient Education Provided     Education provided:   - HEP provided    Written Home Exercises Provided: yes.  Exercises were reviewed and Sharri was able to demonstrate them prior to the end of the session.  Sharri demonstrated good  understanding of the education provided.     See EMR under Patient Instructions for exercises provided 2/11/2020.    Assessment     Ms. Harrell tolerated session well. She reports a big difference in RPE for recumbent bike today, though continues to report numbness and tingling in her B LE. She does well with addition of more balance activities and struggled with eccentric control of B LE with balance task of no UE support on Trello box. She remains appropriate for skilled PT services.     Sharri is progressing well towards her goals.   Pt prognosis is Good.     Pt will continue to benefit from skilled outpatient physical therapy to address the deficits listed in the problem list box on initial evaluation, provide pt/family education and to maximize pt's level of independence in the home and community environment.     Pt's spiritual, cultural and educational needs considered and pt agreeable to plan of care and goals.     Anticipated barriers to physical therapy: none    Goals:  Short Term Goals: 4 weeks   1. Pt will improve score on FGA to at least 25/30 in order to decrease risk for fall. ongoing  2. Pt will be independent with established HEP for strengthening and balance. ongoing  3.  Pt will increase 30 sec chair rise score to at least 15 in order to demonstrate improved endruance and functional strength. ongoing  4. Pt will increase SLS time to at least 15 sec B in order to decrease risk for fall. ongoing     Long Term Goals: 8 weeks   1. Pt will improve score on FGA to at least 27/30 in order to demonstrate improved functional balance. ongoing  2. Pt will improve score on [facility] sensory testing condition 4 to at least 20 seconds in order to demonstrate improved balance in dim lit conditions. ongoing  3. Pt will increase SLS time to at least 20 seconds on B LE in order to decrease fall risk. ongoing  4. Pt will increase SSWS to at least 1.2 m/s in order to return to ambulation 5x/wk as she was prior to cancer. ongoing     Plan   Plan of care Certification: 2/7/2020 to 4/7/2020.    Continue with strengthening and stretching program, incorporate more high level balance and endurance as appropriate.    Patrick Pineda, PT

## 2024-03-01 ENCOUNTER — ANESTHESIA EVENT (OUTPATIENT)
Dept: ENDOSCOPY | Facility: HOSPITAL | Age: 74
End: 2024-03-01
Payer: MEDICARE

## 2024-03-01 ENCOUNTER — HOSPITAL ENCOUNTER (OUTPATIENT)
Facility: HOSPITAL | Age: 74
Discharge: HOME | End: 2024-03-01
Attending: INTERNAL MEDICINE | Admitting: INTERNAL MEDICINE
Payer: MEDICARE

## 2024-03-01 ENCOUNTER — ANESTHESIA (OUTPATIENT)
Dept: ENDOSCOPY | Facility: HOSPITAL | Age: 74
End: 2024-03-01
Payer: MEDICARE

## 2024-03-01 VITALS
HEIGHT: 63 IN | DIASTOLIC BLOOD PRESSURE: 89 MMHG | BODY MASS INDEX: 27.46 KG/M2 | OXYGEN SATURATION: 100 % | RESPIRATION RATE: 23 BRPM | SYSTOLIC BLOOD PRESSURE: 178 MMHG | WEIGHT: 155 LBS | TEMPERATURE: 98 F | HEART RATE: 23 BPM

## 2024-03-01 DIAGNOSIS — Z12.11 COLON CANCER SCREENING: ICD-10-CM

## 2024-03-01 DIAGNOSIS — Z12.11 SPECIAL SCREENING FOR MALIGNANT NEOPLASMS, COLON: Primary | ICD-10-CM

## 2024-03-01 PROCEDURE — 88305 TISSUE EXAM BY PATHOLOGIST: CPT | Performed by: PATHOLOGY

## 2024-03-01 PROCEDURE — 45385 COLONOSCOPY W/LESION REMOVAL: CPT | Mod: PT,,, | Performed by: INTERNAL MEDICINE

## 2024-03-01 PROCEDURE — 63600175 PHARM REV CODE 636 W HCPCS: Performed by: NURSE ANESTHETIST, CERTIFIED REGISTERED

## 2024-03-01 PROCEDURE — 45385 COLONOSCOPY W/LESION REMOVAL: CPT | Mod: PT | Performed by: INTERNAL MEDICINE

## 2024-03-01 PROCEDURE — E9220 PRA ENDO ANESTHESIA: HCPCS | Mod: PT,,, | Performed by: NURSE ANESTHETIST, CERTIFIED REGISTERED

## 2024-03-01 PROCEDURE — 25000003 PHARM REV CODE 250: Performed by: NURSE ANESTHETIST, CERTIFIED REGISTERED

## 2024-03-01 PROCEDURE — 88305 TISSUE EXAM BY PATHOLOGIST: CPT | Mod: 26,,, | Performed by: PATHOLOGY

## 2024-03-01 PROCEDURE — 27201012 HC FORCEPS, HOT/COLD, DISP: Performed by: INTERNAL MEDICINE

## 2024-03-01 PROCEDURE — 37000009 HC ANESTHESIA EA ADD 15 MINS: Performed by: INTERNAL MEDICINE

## 2024-03-01 PROCEDURE — 37000008 HC ANESTHESIA 1ST 15 MINUTES: Performed by: INTERNAL MEDICINE

## 2024-03-01 RX ORDER — PROPOFOL 10 MG/ML
VIAL (ML) INTRAVENOUS
Status: DISCONTINUED | OUTPATIENT
Start: 2024-03-01 | End: 2024-03-01

## 2024-03-01 RX ORDER — SODIUM CHLORIDE 9 MG/ML
INJECTION, SOLUTION INTRAVENOUS CONTINUOUS
Status: DISCONTINUED | OUTPATIENT
Start: 2024-03-01 | End: 2024-03-01 | Stop reason: HOSPADM

## 2024-03-01 RX ORDER — LIDOCAINE HYDROCHLORIDE 20 MG/ML
INJECTION INTRAVENOUS
Status: DISCONTINUED | OUTPATIENT
Start: 2024-03-01 | End: 2024-03-01

## 2024-03-01 RX ADMIN — PROPOFOL 60 MG: 10 INJECTION, EMULSION INTRAVENOUS at 07:03

## 2024-03-01 RX ADMIN — LIDOCAINE HYDROCHLORIDE 50 MG: 20 INJECTION INTRAVENOUS at 07:03

## 2024-03-01 RX ADMIN — GLYCOPYRROLATE 0.2 MG: 0.2 INJECTION, SOLUTION INTRAMUSCULAR; INTRAVENOUS at 07:03

## 2024-03-01 RX ADMIN — SODIUM CHLORIDE: 0.9 INJECTION, SOLUTION INTRAVENOUS at 07:03

## 2024-03-01 NOTE — TRANSFER OF CARE
"Anesthesia Transfer of Care Note    Patient: Sharri Cline    Procedure(s) Performed: Procedure(s) (LRB):  COLONOSCOPY (N/A)    Patient location: GI    Anesthesia Type: MAC and general    Transport from OR: Transported from OR on room air with adequate spontaneous ventilation    Post pain: adequate analgesia    Post assessment: no apparent anesthetic complications and tolerated procedure well    Post vital signs: stable    Level of consciousness: awake and alert    Nausea/Vomiting: no nausea/vomiting    Complications: none    Transfer of care protocol was followed      Last vitals: Visit Vitals  /69   Pulse 68   Temp 36.5 °C (97.7 °F) (Temporal)   Resp 18   Ht 5' 3" (1.6 m)   Wt 70.3 kg (155 lb)   SpO2 100%   Breastfeeding No   BMI 27.46 kg/m²     "

## 2024-03-01 NOTE — H&P
Short Stay Endoscopy History and Physical    PCP - Fern Guerrero MD    Procedure - Colonoscopy  ASA - per anesthesia  Mallampati - per anesthesia  History of Anesthesia problems - no  Family history Anesthesia problems - no   Plan of anesthesia - General    HPI:  This is a 73 y.o. female here for evaluation of : asymptomatic screening exam      ROS:  Constitutional: No fevers, chills, No weight loss  CV: No chest pain  Pulm: No cough, No shortness of breath  GI: see HPI  Derm: No rash    Medical History:  has a past medical history of Breast cancer (01/23/2020) and Helicobacter pylori antibody positive (05/27/2013).    Surgical History:  has a past surgical history that includes Tubal ligation; Biopsy of liver with ultrasound guidance (N/A, 11/19/2018); Insertion of tunneled central venous catheter (CVC) with subcutaneous port (Left, 06/13/2019); Mastectomy, partial (Right, 01/23/2020); Injection for sentinel node identification (Right, 01/23/2020); Justin lymph node biopsy (Right, 01/23/2020); and Breast lumpectomy (Right).    Family History: family history includes Colon cancer in her maternal aunt; Diabetes in her brother, brother, brother, and father; Eczema in her mother; ANGEL disease in her mother; Hypertension in her mother; Miscarriages / Stillbirths in her mother; No Known Problems in her maternal grandfather, maternal grandmother, maternal uncle, paternal aunt, paternal grandfather, paternal grandmother, paternal uncle, sister, son, and son; Parkinsonism in her mother.. Otherwise no colon cancer, inflammatory bowel disease, or GI malignancies.    Social History:  reports that she has never smoked. She has never used smokeless tobacco. She reports that she does not currently use alcohol. She reports that she does not use drugs.    Review of patient's allergies indicates:  No Known Allergies    Medications:   Medications Prior to Admission   Medication Sig Dispense Refill Last Dose     cholecalciferol, vitamin D3, (VITAMIN D3) 25 mcg (1,000 unit) capsule Take 1,000 Units by mouth once daily.   Past Month    cyanocobalamin, vitamin B-12, (VITAMIN B-12) 1,000 mcg/mL Drop Take by mouth once daily.   Past Month    fish oil-omega-3 fatty acids 300-1,000 mg capsule Take 2 g by mouth once daily.   Past Month    multivitamin with minerals tablet Take 1 tablet by mouth once daily.   Past Month    artificial tears (ISOPTO TEARS) 0.5 % ophthalmic solution Place 1 drop into both eyes 4 (four) times daily.       carbamide peroxide (DEBROX) 6.5 % otic solution Place 5 drops into both ears 2 (two) times daily. (Patient not taking: Reported on 1/30/2024) 15 mL 1     ketoconazole (NIZORAL) 2 % cream Apply topically once daily. 60 g 2          Physical Exam:    Vital Signs:   Vitals:    03/01/24 0732   BP: 137/69   Pulse:    Resp:    Temp:        General Appearance: Well appearing in no acute distress  Eyes:    No scleral icterus  ENT: Neck supple, Lips, mucosa, and tongue normal; teeth and gums normal  Abdomen: Soft, non tender, non distended with positive bowel sounds. No hepatosplenomegaly, ascites, or mass.  Extremities: 2+ pulses, no clubbing, cyanosis or edema  Skin: No rash      Labs:  Lab Results   Component Value Date    WBC 4.51 11/03/2023    HGB 13.1 11/03/2023    HCT 42.1 11/03/2023     11/03/2023    CHOL 215 (H) 11/03/2023    TRIG 55 11/03/2023    HDL 76 (H) 11/03/2023    ALT 16 11/03/2023    AST 27 11/03/2023     11/03/2023    K 4.1 11/03/2023     11/03/2023    CREATININE 0.8 11/03/2023    BUN 12 11/03/2023    CO2 21 (L) 11/03/2023    TSH 0.892 11/03/2023    INR 1.1 08/30/2019    HGBA1C 5.0 11/03/2023       I have explained the risks and benefits of endoscopy procedures to the patient including but not limited to bleeding, perforation, infection, and death.  The patient was asked if they understand and allowed to ask any further questions to their satisfaction.    Stuart Roberts MD

## 2024-03-01 NOTE — ANESTHESIA PREPROCEDURE EVALUATION
03/01/2024  Sharri Cline is a 73 y.o., female.    Patient Active Problem List   Diagnosis    Menopause    Atrophic vaginitis    GERD (gastroesophageal reflux disease)    Idiopathic guttate hypomelanosis    Osteoporosis without current pathological fracture    Thyroid nodule    Malignant neoplasm of lower-inner quadrant of right breast of female, estrogen receptor negative    At risk for lymphedema    Anemia    Dysphagia    Chemotherapy-induced neuropathy    Vitamin D deficiency    Aortic atherosclerosis    Osteoporosis    Decreased strength    Left carotid artery stenosis    Imbalance    Hyperparathyroidism    Lightheaded     Past Medical History:   Diagnosis Date    Breast cancer 01/23/2020    RIGHT    Helicobacter pylori antibody positive 05/27/2013    treated with clarithromycin, metronidazole, and omeprazole x 14 days  (5/27-6/3); EGD normal in July 2013     Past Surgical History:   Procedure Laterality Date    BIOPSY OF LIVER WITH ULTRASOUND GUIDANCE N/A 11/19/2018    Procedure: BIOPSY, LIVER, WITH US GUIDANCE;  Surgeon: Gordon Diagnostic Provider;  Location: [facility] OR 2ND FLR;  Service: Radiology;  Laterality: N/A;  U/S GUIDED LIVER ([id number]).  11/19/2018  DOSC 7 AM  PROCEDURE 8 AM.  DR CARITO LAGOS.  DB 11/12/18 3:55P    BREAST LUMPECTOMY Right     01/23/20    INJECTION FOR SENTINEL NODE IDENTIFICATION Right 01/23/2020    Procedure: INJECTION, FOR SENTINEL NODE IDENTIFICATION;  Surgeon: Libertad Hernandez MD;  [facility];  Service: General;  Laterality: Right;    INSERTION OF TUNNELED CENTRAL VENOUS CATHETER (CVC) WITH SUBCUTANEOUS PORT Left 06/13/2019    Procedure: [id number] - CHEST;  Surgeon: Libertad Hernandez MD;  [facility];  Service: General;  Laterality: Left;    MASTECTOMY, PARTIAL Right 01/23/2020    Procedure: MASTECTOMY, PARTIAL- w/Seed Localization;   Surgeon: Libertad Hernandez MD;  Location: [address];  Service: General;  Laterality: Right;    SENTINEL LYMPH NODE BIOPSY Right 01/23/2020    Procedure: BIOPSY, LYMPH NODE, SENTINEL;  Surgeon: Libertad Hernandez MD;  Location: [address];  Service: General;  Laterality: Right;    TUBAL LIGATION         Pre-op Assessment    I have reviewed the Patient Summary Reports.    I have reviewed the NPO Status.   I have reviewed the Medications.     Review of Systems  Anesthesia Hx:  No problems with previous Anesthesia                Social:  Non-Smoker, Social Alcohol Use       Hematology/Oncology:  Hematology Normal   Oncology Normal                                   EENT/Dental:  EENT/Dental Normal           Cardiovascular:  Cardiovascular Normal                                            Pulmonary:  Pulmonary Normal                       Hepatic/GI:     GERD             Musculoskeletal:  Musculoskeletal Normal                Neurological:    Neuromuscular Disease,                                   Endocrine:  Endocrine Normal            Dermatological:  Skin Normal    Psych:  Psychiatric Normal                    Physical Exam  General: Alert, Oriented, Cooperative and Well nourished    Airway:  Mallampati: II   Mouth Opening: Normal  TM Distance: Normal  Tongue: Normal  Neck ROM: Normal ROM    Dental:  Intact        Anesthesia Plan  Type of Anesthesia, risks & benefits discussed:    Anesthesia Type: Gen Natural Airway  Intra-op Monitoring Plan: Standard ASA Monitors  Post Op Pain Control Plan: multimodal analgesia  Induction:  IV  Informed Consent: Informed consent signed with the Patient and all parties understand the risks and agree with anesthesia plan.  All questions answered.   ASA Score: 2  Day of Surgery Review of History & Physical: H&P Update referred to the surgeon/provider.    Ready For Surgery From Anesthesia Perspective.     .

## 2024-03-01 NOTE — ANESTHESIA POSTPROCEDURE EVALUATION
Anesthesia Post Evaluation    Patient: Sharri Cline    Procedure(s) Performed: Procedure(s) (LRB):  COLONOSCOPY (N/A)    Final Anesthesia Type: general      Patient location during evaluation: GI PACU  Patient participation: Yes- Able to Participate  Level of consciousness: awake and alert  Post-procedure vital signs: reviewed and stable  Pain management: adequate  Airway patency: patent    PONV status at discharge: No PONV  Anesthetic complications: no      Cardiovascular status: hemodynamically stable  Respiratory status: unassisted and spontaneous ventilation  Hydration status: euvolemic  Follow-up not needed.              Vitals Value Taken Time   /89 03/01/24 0825   Temp 36.5 °C (97.7 °F) 03/01/24 0810   Pulse 23 03/01/24 0825   Resp 23 03/01/24 0825   SpO2 100 % 03/01/24 0825         Event Time   Out of Recovery 09:00:05         Pain/Debby Score: Debby Score: 10 (3/1/2024  8:43 AM)

## 2024-03-01 NOTE — PROVATION PATIENT INSTRUCTIONS
Discharge Summary/Instructions after an Endoscopic Procedure  Patient Name: Sharri Cline  Patient MRN: [id number]  Patient YOB: 1950 Friday, March 1, 2024  Stuart Roberts MD  Dear patient,  As a result of recent federal legislation (The Federal Cures Act), you may   receive lab or pathology results from your procedure in your [facility]   account before your physician is able to contact you. Your physician or   their representative will relay the results to you with their   recommendations at their soonest availability.  Thank you,  RESTRICTIONS:  During your procedure today, you received medications for sedation.  These   medications may affect your judgment, balance and coordination.  Therefore,   for 24 hours, you have the following restrictions:   - DO NOT drive a car, operate machinery, make legal/financial decisions,   sign important papers or drink alcohol.    ACTIVITY:  Today: no heavy lifting, straining or running due to procedural   sedation/anesthesia.  The following day: return to full activity including work.  DIET:  Eat and drink normally unless instructed otherwise.     TREATMENT FOR COMMON SIDE EFFECTS:  - Mild abdominal pain, nausea, belching, bloating or excessive gas:  rest,   eat lightly and use a heating pad.  - Sore Throat: treat with throat lozenges and/or gargle with warm salt   water.  - Because air was used during the procedure, expelling large amounts of air   from your rectum or belching is normal.  - If a bowel prep was taken, you may not have a bowel movement for 1-3 days.    This is normal.  SYMPTOMS TO WATCH FOR AND REPORT TO YOUR PHYSICIAN:  1. Abdominal pain or bloating, other than gas cramps.  2. Chest pain.  3. Back pain.  4. Signs of infection such as: chills or fever occurring within 24 hours   after the procedure.  5. Rectal bleeding, which would show as bright red, maroon, or black stools.   (A tablespoon of blood from the rectum is not serious, especially if   hemorrhoids  Subjective     Autumn Juárez is a 62 y.o. female who presents with Annual Exam (hypertension)            HPI     Here for annual exam  Medications, allergies, medical history, surgical history, social history, family history  reviewed and updated  Has been checking blood pressure twice per day running in am 130-140/70-80, and in pm 130-140/75-85 on metoprolol extended release 25 mg daily.  No change in lifestyle, still exercises regularly, still follows a healthy diet.  Exercises 4 days per week with weights and tries to eat a clean diet, avoids processed foods, coffee in am, no soda. No etoh. Sleep 6-7 hours per night. No increase in stress or anxiety. Does stretching before workouts.    No nsaids   Takes creatine 5 g daily      Current Outpatient Medications   Medication Sig Dispense Refill   • metoprolol SR (TOPROL XL) 25 MG TABLET SR 24 HR TAKE 1 TABLET BY MOUTH EVERY DAY 90 Tablet 0   • estradiol (ESTRACE) 0.1 MG/GM vaginal cream Insert 1 g into the vagina every day. Daily X 7 days then 2-3 X weekly  Indications: Vulvovaginal Atrophy 42.5 g 3   • CREATINE PO Take  by mouth.     • Multiple Vitamins-Minerals (MULTIVITAMIN PO) Take  by mouth.     • Cholecalciferol (VITAMIN D PO) Take  by mouth.     • Cetirizine HCl (ZYRTEC ALLERGY PO) Take  by mouth.     • FISH OIL by Does not apply route.     • Calcium Carbonate (CALCIUM 500 PO) Take  by mouth.       No current facility-administered medications for this visit.     abn alk phos  2/23/21 alk phos 134, bili 0.4, AST 33, ALT 26   6/11/21 alk phos 111 (), liver fraction 57 (0-94), bone fraction 54 (0-55), AMA negative     Dyslipidemia  8/10 chol 195,trig 63,hdl 62,ldl 120  8/11 chol 199,trig 63,hdl 54,ldl 132  5/13 chol 184,trig 56,hdl 58,ldl 115  4/18/14 chol 194,trig 71,hdl 60,ldl 120  5/5/14 chol 207,trig 124,hdl 57,ldl 125,LDL-P 1380,HDL-P 34,LP-IR <25; 10 year risk 2.1%  8/31/15 chol 201,trig 154,hdl 49,ldl 121  9/17/16 chol 197,trig 76,hdl 68,ldl  114  9/5/17 chol 216,trig 80,hdl 59,ldl 141  10/11/19 chol 225,trig 94,hdl 58,ldl 148; 10 year risk 5.9%   2/23/21 chol 211,trig 86,hdl 66,ldl 130; 10 year risk 4.75%    history of tonsiillar hypertrophy  12/10 ENT  note nasopharyngoscopy tonsillar hypertrophy, possible lingual tonsillitis, could not rule out underlying mass, will do course of abx and repeat nasopharyngoscopy an MRI tongue base to be done afterwards and  1/11 dr.van garrison note ENT repeat nasopharyngoscopy showed improved tonsillar hypertrophy and tonsillitis after antibiotics      Hypertension  4/28/14 on toprol and start asa  5/2/14 urine mac <0.5 on toprol 25 mg  9/17/16 urine mac <0.7 on toprol 25 mg   1/12/18 urine mac <0.7 on toprol 25 mg  10/8/19 echo normal LV function, EF 65%, no evidence of LVH continue to monitor home blood pressures on toprol notify us if systolic above 140 consistently  10/11/19 renin 0.5 and aldosterone 11.0, plasma metanephrine and normetanephrine normal on toprol 25 mg  2/23/21 urine mac<3.0 on toprol 25 mg    osteopenia  2/17/20 dexa LS-1.9,hip-2.1  2/23/21 vit d 96 on 5000 daily, change to qod     Preventative health  10/18/12 tdap  10/18/12 tdap  6/6/14 colon by [facility] diverticulosis  10/7/19 cologuard negative  10/11/19 hep c ab negative  12/5/19 shingrix second   2/16/21 sonocine negative  2/17/20 dexa LS-1.9,hip-2.1  2/23/21 vit d 96 on 5000 daily, change to qod  2/23/21 A1c 5.5%  4/15/21 covid pfizer second   10/19/21 [facility] note pap smear negative, repeat one more time in 5 years  10/26/21 mammogram heterogeneously dense breast tissue recommend screening breast ultrasound     Social anxiety disorder  11/14/14 social anxiety related to  who passed away being controlling, referral to behavioral health  12/17/14 behavioral health note                     Patient Active Problem List   Diagnosis   • Hypertension   • Preventative health care   • Dyslipidemia   • History of tonsillar  are present.)  6. Vomiting.  7. Weakness or dizziness.  GO DIRECTLY TO THE NEAREST EMERGENCY ROOM IF YOU HAVE ANY OF THE FOLLOWING:      Difficulty breathing              Chills and/or fever over 101 F   Persistent vomiting and/or vomiting blood   Severe abdominal pain   Severe chest pain   Black, tarry stools   Bleeding- more than one tablespoon   Any other symptom or condition that you feel may need urgent attention  Your doctor recommends these additional instructions:  If any biopsies were taken, your doctors clinic will contact you in 1 to 2   weeks with any results.  - Discharge patient to home (ambulatory).   - Patient has a contact number available for emergencies.  The signs and   symptoms of potential delayed complications were discussed with the   patient.  Return to normal activities tomorrow.  Written discharge   instructions were provided to the patient.   - Resume previous diet.   - Continue present medications.   - Return to primary care physician as previously scheduled.   - Repeat colonoscopy in 3 years for surveillance.  For questions, problems or results please call your physician - Stuart Roberts MD at Work:  [phone].  [facility] [place], EMERGENCY ROOM PHONE NUMBER: [phone]  IF A COMPLICATION OR EMERGENCY SITUATION ARISES AND YOU ARE UNABLE TO REACH   YOUR PHYSICIAN - GO DIRECTLY TO THE EMERGENCY ROOM.  Stuart Roberts MD  3/1/2024 8:01:54 AM  This report has been verified and signed electronically.  Dear patient,  As a result of recent federal legislation (The Federal Cures Act), you may   receive lab or pathology results from your procedure in your [facility]   account before your physician is able to contact you. Your physician or   their representative will relay the results to you with their   recommendations at their soonest availability.  Thank you,  PROVATION   "hypertrophy   • Social anxiety disorder   • Dry eye syndrome   • Vaginal atrophy   • Osteopenia   • Abnormal alkaline phosphatase test     Depression Screening  Little interest or pleasure in doing things?  0 - not at all  Feeling down, depressed , or hopeless? 0 - not at all  Patient Health Questionnaire Score: 0        Patient Care Team:  Master Dash M.D. as PCP - General      ROS           Objective     BP (!) 168/100 (BP Location: Right arm, Patient Position: Sitting, BP Cuff Size: Adult)   Pulse 80   Temp 36.8 °C (98.2 °F)   Ht 1.676 m (5' 6\")   Wt 65.3 kg (144 lb)   LMP 06/01/2011   SpO2 96%   BMI 23.24 kg/m²      Physical Exam  Vitals and nursing note reviewed.   Constitutional:       Appearance: Normal appearance.   HENT:      Head: Normocephalic and atraumatic.      Right Ear: External ear normal.      Left Ear: External ear normal.   Eyes:      Conjunctiva/sclera: Conjunctivae normal.   Cardiovascular:      Rate and Rhythm: Normal rate and regular rhythm.      Heart sounds: Normal heart sounds.   Pulmonary:      Effort: Pulmonary effort is normal.      Breath sounds: Normal breath sounds.   Abdominal:      General: There is no distension.   Musculoskeletal:         General: No swelling.   Skin:     General: Skin is warm.   Neurological:      General: No focal deficit present.      Mental Status: She is alert.   Psychiatric:         Mood and Affect: Mood normal.         Behavior: Behavior normal.           Assessment & Plan        Assessment  #! Annual exam    #2 hypertension blood pressures running high at home, she also has a component of whitecoat hypertension here but home blood pressures have been higher despite metoprolol 25 mg daily and a good nutrition and exercise program      #3 dyslipidemia diet controlled    #4 postmenopausal bone loss    #5 vaginal atrophy followed by gynecology    #6 preventative health  10/18/12 tdap  6/6/14 colon by [facility] diverticulosis  10/7/19 cologuard " negative  10/11/19 hep c ab negative  12/5/19 shingrix second   2/16/21 sonocine negative  2/17/20 dexa LS-1.9,hip-2.1  2/23/21 vit d 96 on 5000 daily, change to every qod  2/23/21 A1c 5.5%  4/15/21 covid pfizer second   [facility], repeat one more time in 5 years  10/26/21 mammogram heterogeneously dense breast tissue recommend screening breast ultrasound    Plan  #! tdap due end of this year    #2 covid booster, she can have that done after her upcoming meet in a few weeks    #3 dexa ordered for postmenopausal bone loss    #4 echo ordered to evaluate LV function    #5 labs    #6 losartan 25 mg 1 p.o. daily, continue metoprolol 25 mg daily unable to increase that medication anymore because her resting heart rate runs in the 50s, monitor for lightheadedness, dizziness, fatigue on the losartan, check blood pressures daily and send me an update in 2 weeks in my chart, follow-up in clinic 2 months    #7 continue good nutrition, regular exercise, consider stretching, adding a bit of cardiovascular exercise as well    #8 EKG sinus rhythm    #9 follow-up gynecology

## 2024-03-04 ENCOUNTER — OFFICE VISIT (OUTPATIENT)
Dept: INTERNAL MEDICINE | Facility: CLINIC | Age: 74
End: 2024-03-04
Payer: MEDICARE

## 2024-03-04 ENCOUNTER — LAB VISIT (OUTPATIENT)
Dept: LAB | Facility: HOSPITAL | Age: 74
End: 2024-03-04
Payer: MEDICARE

## 2024-03-04 VITALS
BODY MASS INDEX: 28.39 KG/M2 | DIASTOLIC BLOOD PRESSURE: 88 MMHG | SYSTOLIC BLOOD PRESSURE: 139 MMHG | WEIGHT: 160.25 LBS | HEIGHT: 63 IN | OXYGEN SATURATION: 96 % | HEART RATE: 81 BPM

## 2024-03-04 DIAGNOSIS — C50.311 MALIGNANT NEOPLASM OF LOWER-INNER QUADRANT OF RIGHT BREAST OF FEMALE, ESTROGEN RECEPTOR NEGATIVE: ICD-10-CM

## 2024-03-04 DIAGNOSIS — R17 ELEVATED BILIRUBIN: ICD-10-CM

## 2024-03-04 DIAGNOSIS — I70.0 AORTIC ATHEROSCLEROSIS: ICD-10-CM

## 2024-03-04 DIAGNOSIS — Z17.1 MALIGNANT NEOPLASM OF LOWER-INNER QUADRANT OF RIGHT BREAST OF FEMALE, ESTROGEN RECEPTOR NEGATIVE: ICD-10-CM

## 2024-03-04 DIAGNOSIS — Z00.00 ENCOUNTER FOR ANNUAL PHYSICAL EXAM: Primary | ICD-10-CM

## 2024-03-04 DIAGNOSIS — E21.3 HYPERPARATHYROIDISM: ICD-10-CM

## 2024-03-04 DIAGNOSIS — G62.0 CHEMOTHERAPY-INDUCED NEUROPATHY: ICD-10-CM

## 2024-03-04 DIAGNOSIS — T45.1X5A CHEMOTHERAPY-INDUCED NEUROPATHY: ICD-10-CM

## 2024-03-04 DIAGNOSIS — D70.1 AGRANULOCYTOSIS SECONDARY TO CANCER CHEMOTHERAPY (CODE): ICD-10-CM

## 2024-03-04 LAB
ALBUMIN SERPL BCP-MCNC: 3.6 G/DL (ref 3.5–5.2)
ALP SERPL-CCNC: 146 U/L (ref 55–135)
ALT SERPL W/O P-5'-P-CCNC: 13 U/L (ref 10–44)
AST SERPL-CCNC: 23 U/L (ref 10–40)
BILIRUB DIRECT SERPL-MCNC: 0.4 MG/DL (ref 0.1–0.3)
BILIRUB SERPL-MCNC: 1 MG/DL (ref 0.1–1)
GGT SERPL-CCNC: 23 U/L (ref 8–55)
PROT SERPL-MCNC: 6.8 G/DL (ref 6–8.4)

## 2024-03-04 PROCEDURE — 99999 PR PBB SHADOW E&M-EST. PATIENT-LVL III: CPT | Mod: PBBFAC,,, | Performed by: INTERNAL MEDICINE

## 2024-03-04 PROCEDURE — 36415 COLL VENOUS BLD VENIPUNCTURE: CPT | Performed by: INTERNAL MEDICINE

## 2024-03-04 PROCEDURE — 82977 ASSAY OF GGT: CPT | Performed by: INTERNAL MEDICINE

## 2024-03-04 PROCEDURE — 99397 PER PM REEVAL EST PAT 65+ YR: CPT | Mod: S$GLB,,, | Performed by: INTERNAL MEDICINE

## 2024-03-04 PROCEDURE — 80076 HEPATIC FUNCTION PANEL: CPT | Performed by: INTERNAL MEDICINE

## 2024-03-04 NOTE — PROGRESS NOTES
Subjective:       Patient ID: Sharri Cline is a 73 y.o. female.    Chief Complaint: Follow-up    During previous visit in November she was having vertigo and had cerumen impaction. She was given debrox and referred to ENT.     Bilirubin was 1.6 on last labs. Denies any RUQ pain.      Past medical history   Hx of breast cancer: Right breast ER negative. Dx in 2019 s/p tx with adriamycin and cyclophsohamide with  neulesta then had right breast lumpectomy, then had RT. Follows up with Dr. Alexandra every 6 months. Last mammogram was normal.      Thyroid nodules: due for thyroid US again in 2023      Osteoporosis. Met with endocrine. Considering re clast.      Vit D deficiency      Hyperparathyroid: following with endo, improved since starting Vitamin d       Health Maintenance:  Colon Cancer Screening:  coloscopy done March 1st 2023 with polyps removed. Due again in 2027.   Mammogram: normal 8/2023   DEX: done 2023 showed osteoporosis   Hep C: negative 2014  Lipids: normal 11/2022  Vaccines: up to date on all vaccines            Follow-up  Pertinent negatives include no abdominal pain, arthralgias, chest pain, chills, coughing, headaches, myalgias, nausea or vomiting.     Review of Systems   Constitutional:  Negative for activity change, appetite change and chills.   HENT:  Negative for ear pain, sinus pressure/congestion and sneezing.    Respiratory:  Negative for cough and shortness of breath.    Cardiovascular:  Negative for chest pain, palpitations and leg swelling.   Gastrointestinal:  Negative for abdominal distention, abdominal pain, constipation, diarrhea, nausea and vomiting.   Genitourinary:  Negative for dysuria and hematuria.   Musculoskeletal:  Negative for arthralgias, back pain and myalgias.   Neurological:  Positive for dizziness. Negative for headaches.   Psychiatric/Behavioral:  Negative for agitation. The patient is not nervous/anxious.            Past Medical History:   Diagnosis Date    Breast  cancer 01/23/2020    RIGHT    Helicobacter pylori antibody positive 05/27/2013    treated with clarithromycin, metronidazole, and omeprazole x 14 days  (5/27-6/3); EGD normal in July 2013     Past Surgical History:   Procedure Laterality Date    BIOPSY OF LIVER WITH ULTRASOUND GUIDANCE N/A 11/19/2018    Procedure: BIOPSY, LIVER, WITH US GUIDANCE;  Surgeon: Gordon Diagnostic Provider;  [facility];  Service: Radiology;  Laterality: N/A;  U/S GUIDED LIVER (80191).  11/19/2018  DOSC 7 AM  PROCEDURE 8 AM.  DR CARITO LAGOS.  DB 11/12/18 3:55P    BREAST LUMPECTOMY Right     01/23/20    COLONOSCOPY N/A 3/1/2024    Procedure: COLONOSCOPY;  Surgeon: Stuart Roberts MD;  Location: [facility] ENDO (4TH FLR);  Service: Endoscopy;  Laterality: N/A;  Referral: Fern Guerrero MD  PEG  Inst portal  LW  2/23-lvm for precall-MS  2/26-precall complete-Kpvt    INJECTION FOR SENTINEL NODE IDENTIFICATION Right 01/23/2020    Procedure: INJECTION, FOR SENTINEL NODE IDENTIFICATION;  Surgeon: Libertad Hernandez MD;  Location: [facility] OR [address] [place] [address];  Service: General;  Laterality: Right;    INSERTION OF TUNNELED CENTRAL VENOUS CATHETER (CVC) WITH SUBCUTANEOUS PORT Left 06/13/2019    Procedure: [id number] - CHEST;  Surgeon: Libertad Hernandez MD;  Location: [facility] OR [address] [place] [address];  Service: General;  Laterality: Left;    MASTECTOMY, PARTIAL Right 01/23/2020    Procedure: MASTECTOMY, PARTIAL- w/Seed Localization;  Surgeon: Libertad Hernandez MD;  Location: [facility] OR [address] [place] [address];  Service: General;  Laterality: Right;    SENTINEL LYMPH NODE BIOPSY Right 01/23/2020    Procedure: BIOPSY, LYMPH NODE, SENTINEL;  Surgeon: Libertad Hernandez MD;  Location: [facility] OR [address] [place] [address];  Service: General;  Laterality: Right;    TUBAL LIGATION        Patient Active Problem List   Diagnosis    Menopause    Atrophic vaginitis    GERD (gastroesophageal reflux disease)    Idiopathic guttate hypomelanosis    Osteoporosis without current pathological fracture    Thyroid nodule     Malignant neoplasm of lower-inner quadrant of right breast of female, estrogen receptor negative    At risk for lymphedema    Anemia    Dysphagia    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   HENT:      Head: Normocephalic.   Cardiovascular:      Rate and Rhythm: Normal rate and regular rhythm.      Pulses: Normal pulses.      Heart sounds: Normal heart sounds.   Pulmonary:      Effort: Pulmonary effort is normal.      Breath sounds: Normal breath sounds.   Abdominal:      General: Abdomen is flat. Bowel sounds are normal.      Palpations: Abdomen is soft.   Musculoskeletal:         General: Normal range of motion.      Cervical back: Normal range of motion and neck supple.   Skin:     General: Skin is warm and dry.   Neurological:      General: No focal deficit present.      Mental Status: She is alert and oriented to person, place, and time.   Psychiatric:         Mood and Affect: Mood normal.         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    Diagnoses and all orders for this visit:    Encounter for annual physical exam  Colon  Cancer Screening:  coloscopy done March 1st 2023 with polyps removed. Due again in 2027.   Mammogram: normal 8/2023   DEX: done 2023 showed osteoporosis   Hep C: negative 2014  Lipids: normal 11/2022  Vaccines: up to date on all vaccines   Annual wellness exam completed.     All medications, histories, and concerns reviewed, reconciled, and addressed.     Appropriate Screenings per pt's sex and age have been reviewed and discussed with pt.       Malignant neoplasm of lower-inner quadrant of right breast of female, estrogen receptor negative  Next mammogram due in August. Follow up with Dr. Alexandra in June.     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

## 2024-03-05 LAB
FINAL PATHOLOGIC DIAGNOSIS: NORMAL
GROSS: NORMAL
Lab: NORMAL

## 2024-03-06 ENCOUNTER — PATIENT MESSAGE (OUTPATIENT)
Dept: INTERNAL MEDICINE | Facility: CLINIC | Age: 74
End: 2024-03-06
Payer: MEDICARE

## 2024-06-10 ENCOUNTER — OFFICE VISIT (OUTPATIENT)
Dept: HEMATOLOGY/ONCOLOGY | Facility: CLINIC | Age: 74
End: 2024-06-10
Payer: MEDICARE

## 2024-06-10 ENCOUNTER — PATIENT MESSAGE (OUTPATIENT)
Dept: INTERNAL MEDICINE | Facility: CLINIC | Age: 74
End: 2024-06-10
Payer: MEDICARE

## 2024-06-10 VITALS
DIASTOLIC BLOOD PRESSURE: 65 MMHG | HEART RATE: 75 BPM | TEMPERATURE: 98 F | SYSTOLIC BLOOD PRESSURE: 122 MMHG | WEIGHT: 156.75 LBS | HEIGHT: 63 IN | OXYGEN SATURATION: 99 % | BODY MASS INDEX: 27.77 KG/M2

## 2024-06-10 DIAGNOSIS — Z12.31 ENCOUNTER FOR SCREENING MAMMOGRAM FOR HIGH-RISK PATIENT: ICD-10-CM

## 2024-06-10 DIAGNOSIS — C50.311 MALIGNANT NEOPLASM OF LOWER-INNER QUADRANT OF RIGHT BREAST OF FEMALE, ESTROGEN RECEPTOR NEGATIVE: Primary | ICD-10-CM

## 2024-06-10 DIAGNOSIS — Z17.1 MALIGNANT NEOPLASM OF LOWER-INNER QUADRANT OF RIGHT BREAST OF FEMALE, ESTROGEN RECEPTOR NEGATIVE: Primary | ICD-10-CM

## 2024-06-10 PROCEDURE — 3074F SYST BP LT 130 MM HG: CPT | Mod: CPTII,S$GLB,, | Performed by: INTERNAL MEDICINE

## 2024-06-10 PROCEDURE — 3078F DIAST BP <80 MM HG: CPT | Mod: CPTII,S$GLB,, | Performed by: INTERNAL MEDICINE

## 2024-06-10 PROCEDURE — 3008F BODY MASS INDEX DOCD: CPT | Mod: CPTII,S$GLB,, | Performed by: INTERNAL MEDICINE

## 2024-06-10 PROCEDURE — 1101F PT FALLS ASSESS-DOCD LE1/YR: CPT | Mod: CPTII,S$GLB,, | Performed by: INTERNAL MEDICINE

## 2024-06-10 PROCEDURE — 99213 OFFICE O/P EST LOW 20 MIN: CPT | Mod: S$GLB,,, | Performed by: INTERNAL MEDICINE

## 2024-06-10 PROCEDURE — 99999 PR PBB SHADOW E&M-EST. PATIENT-LVL III: CPT | Mod: PBBFAC,,, | Performed by: INTERNAL MEDICINE

## 2024-06-10 PROCEDURE — 1126F AMNT PAIN NOTED NONE PRSNT: CPT | Mod: CPTII,S$GLB,, | Performed by: INTERNAL MEDICINE

## 2024-06-10 PROCEDURE — 1159F MED LIST DOCD IN RCRD: CPT | Mod: CPTII,S$GLB,, | Performed by: INTERNAL MEDICINE

## 2024-06-10 PROCEDURE — 3288F FALL RISK ASSESSMENT DOCD: CPT | Mod: CPTII,S$GLB,, | Performed by: INTERNAL MEDICINE

## 2024-06-10 NOTE — PROGRESS NOTES
Subjective:       Patient ID: Sharri Cline is a 74 y.o. female.    Chief Complaint: No chief complaint on file.    HPI 74-year-old female who returns to clinic for follow-up of right breast cancer-triple negative.     Today she reports that she has been feeling well.  She is walking for exercise.  She has no unusual pain and she has no shortness of breath.  Her bowel function has been normal.        Onc history:   - She presented for screening mammo in 5/15/2019 which showed focal asymmetries in both left and right breast. Diagnostic mammogram and US showed no significant abn of left breast, and right breast 15 mm x 11 mm x 12 mm mass at 4:00, 5 CFN. Biopsy on 5/24/19 showed grade 3 IDC, triple negative, Ki67 70%.    - 7/3/19 - 12/10/19 completed neoadjuvant chemotherapy with four cycles of dose-dense Adriamycin and cyclophosphamide with Neulasta support followed by twelve doses of weekly paclitaxel. Dose reduced for cytopenias and neuropathy.   - 1/23/2020 - Dr Hernandez performed right breast lumpectomy with SLN Biopsy with pathology showing no residual carcinoma with 3 neg SLN.   - 4/2/2020 - 4/24/2020 - completed RT    Mammogram August 2023 - negative  Review of Systems   Cardiovascular: Negative.    Gastrointestinal: Negative.    Musculoskeletal:  Negative for back pain.   Neurological:  Positive for numbness (hands and feet). Negative for headaches.   Psychiatric/Behavioral:  The patient is not nervous/anxious.          Objective:      Physical Exam  Vitals reviewed.   Constitutional:       General: She is not in acute distress.     Appearance: Normal appearance.   Cardiovascular:      Rate and Rhythm: Normal rate and regular rhythm.   Pulmonary:      Effort: Pulmonary effort is normal. No respiratory distress.      Breath sounds: Normal breath sounds. No wheezing or rales.   Chest:   Breasts:     Right: No mass, nipple discharge or skin change.      Left: Normal.       Abdominal:      Palpations: Abdomen  is soft. There is no mass.      Tenderness: There is no abdominal tenderness.   Lymphadenopathy:      Cervical: No cervical adenopathy.      Upper Body:      Right upper body: No supraclavicular or axillary adenopathy.      Left upper body: No supraclavicular or axillary adenopathy.   Neurological:      Mental Status: She is alert and oriented to person, place, and time.      Cranial Nerves: No cranial nerve deficit.   Psychiatric:         Mood and Affect: Mood normal.         Behavior: Behavior normal.         Thought Content: Thought content normal.         Judgment: Judgment normal.         Assessment:      1. Malignant neoplasm of lower-inner quadrant of right breast of female, estrogen receptor negative        Plan:    Mammograms in August  [facility]  12 M    Route Chart for Scheduling    Med Onc Chart Routing      Follow up with physician . August 2025 with mammogram - me or Karie   Follow up with RAJEEV    Infusion scheduling note    Injection scheduling note    Labs None   Scheduling:  Preferred lab:  Lab interval:     Imaging None      Pharmacy appointment No pharmacy appointment needed      Other referrals no referral to Oncology Primary Care needed -  no Massage appointment needed    No additional referrals needed

## 2024-08-12 ENCOUNTER — OFFICE VISIT (OUTPATIENT)
Dept: URGENT CARE | Facility: CLINIC | Age: 74
End: 2024-08-12
Payer: MEDICARE

## 2024-08-12 VITALS
SYSTOLIC BLOOD PRESSURE: 123 MMHG | HEIGHT: 63 IN | WEIGHT: 156 LBS | TEMPERATURE: 99 F | RESPIRATION RATE: 17 BRPM | BODY MASS INDEX: 27.64 KG/M2 | HEART RATE: 66 BPM | OXYGEN SATURATION: 98 % | DIASTOLIC BLOOD PRESSURE: 77 MMHG

## 2024-08-12 DIAGNOSIS — Z20.822 CONTACT WITH AND (SUSPECTED) EXPOSURE TO COVID-19: Primary | ICD-10-CM

## 2024-08-12 DIAGNOSIS — R53.83 FATIGUE, UNSPECIFIED TYPE: ICD-10-CM

## 2024-08-12 LAB
CTP QC/QA: YES
SARS-COV-2 AG RESP QL IA.RAPID: NEGATIVE

## 2024-08-12 PROCEDURE — 87811 SARS-COV-2 COVID19 W/OPTIC: CPT | Mod: QW,S$GLB,, | Performed by: FAMILY MEDICINE

## 2024-08-12 PROCEDURE — 99213 OFFICE O/P EST LOW 20 MIN: CPT | Mod: S$GLB,,, | Performed by: FAMILY MEDICINE

## 2024-08-12 NOTE — PROGRESS NOTES
"Subjective:      Patient ID: Sharri Cline is a 74 y.o. female.    Vitals:  height is 5' 3" (1.6 m) and weight is 70.8 kg (156 lb). Her oral temperature is 98.6 °F (37 °C). Her blood pressure is 123/77 and her pulse is 66. Her respiration is 17 and oxygen saturation is 98%.     Chief Complaint: Fatigue    Patient presents c.o. fatigue.Patient has no additional symptoms. Symps began 1 week ago.Patient has not taken anything for their symptoms.    Fatigue  The current episode started in the past 7 days. The problem occurs constantly. Associated symptoms include fatigue. Pertinent negatives include no abdominal pain, arthralgias, change in bowel habit, congestion, fever, headaches, joint swelling, myalgias or nausea. Nothing aggravates the symptoms. She has tried nothing for the symptoms. The treatment provided no relief.       Constitution: Positive for fatigue. Negative for fever.   HENT:  Negative for congestion.    Gastrointestinal:  Negative for abdominal pain and nausea.   Musculoskeletal:  Negative for joint pain, joint swelling and muscle ache.   Neurological:  Negative for headaches.      Objective:     Physical Exam   Constitutional: She is oriented to person, place, and time. She appears well-developed. She is cooperative.  Non-toxic appearance. She does not appear ill. No distress.   HENT:   Head: Normocephalic and atraumatic.   Ears:   Right Ear: Hearing, tympanic membrane, external ear and ear canal normal.   Left Ear: Hearing, tympanic membrane, external ear and ear canal normal.   Nose: Nose normal. No mucosal edema, rhinorrhea or nasal deformity. No epistaxis. Right sinus exhibits no maxillary sinus tenderness and no frontal sinus tenderness. Left sinus exhibits no maxillary sinus tenderness and no frontal sinus tenderness.   Mouth/Throat: Uvula is midline, oropharynx is clear and moist and mucous membranes are normal. No trismus in the jaw. Normal dentition. No uvula swelling. No oropharyngeal " exudate, posterior oropharyngeal edema or posterior oropharyngeal erythema.   Eyes: Conjunctivae and lids are normal. No scleral icterus.   Neck: Trachea normal and phonation normal. Neck supple. No edema present. No erythema present. No neck rigidity present.   Cardiovascular: Normal rate, regular rhythm, normal heart sounds and normal pulses.   Pulmonary/Chest: Effort normal and breath sounds normal. No respiratory distress. She has no decreased breath sounds. She has no rhonchi.   Abdominal: Normal appearance.   Musculoskeletal: Normal range of motion.         General: No deformity. Normal range of motion.   Neurological: She is alert and oriented to person, place, and time. She exhibits normal muscle tone. Coordination normal.   Skin: Skin is warm, dry, intact, not diaphoretic and not pale.   Psychiatric: Her speech is normal and behavior is normal. Judgment and thought content normal.   Nursing note and vitals reviewed.    Results for orders placed or performed in visit on 08/12/24   SARS Coronavirus 2 Antigen, POCT Manual Read   Result Value Ref Range    SARS Coronavirus 2 Antigen Negative Negative     Acceptable Yes        Assessment:     1. Contact with and (suspected) exposure to covid-19    2. Fatigue, unspecified type        Plan:       Contact with and (suspected) exposure to covid-19    Fatigue, unspecified type  -     SARS Coronavirus 2 Antigen, POCT Manual Read          Medical Decision Making:   Initial Assessment:   Pt had been having fatigue and wanted to be tested since her  was exposed and having sxns.          Thank you for choosing [facility]!     Our goal in the Urgent Care is to always provide outstanding medical care. You may receive a survey by mail or e-mail in the next week regarding your experience today. We would greatly appreciate you completing and returning the survey. Your feedback provides us with a way to recognize our staff who provide very good  care, and it helps us learn how to improve when your experience was below our aspiration of excellence.       We appreciate you trusting us with your medical care. We hope you feel better soon. We will be happy to take care of you for all of your future medical needs.  You must understand that you've received an Urgent Care treatment only and that you may be released before all your medical problems are known or treated. You, the patient, will arrange for follow up care as instructed.  Follow up with your PCP or specialty clinic as directed in the next 1-2 weeks if not improved or as needed.  You can call [phone] to schedule an appointment with the appropriate provider.  Another option is to follow up with [facility] ([email or web address]) virtually for quick simple medical advice.  If your condition worsens we recommend that you receive another evaluation at the emergency room immediately or contact your primary medical clinics after hours call service to discuss your concerns.  Please return here or go to the Emergency Department for any concerns or worsening of condition.      *If you were prescribed a narcotic or controlled medication, do not drive or operate heavy equipment or machinery while taking these medications.

## 2024-08-19 ENCOUNTER — HOSPITAL ENCOUNTER (OUTPATIENT)
Dept: RADIOLOGY | Facility: HOSPITAL | Age: 74
Discharge: HOME | End: 2024-08-19
Attending: INTERNAL MEDICINE
Payer: MEDICARE

## 2024-08-19 DIAGNOSIS — Z12.31 ENCOUNTER FOR SCREENING MAMMOGRAM FOR BREAST CANCER: ICD-10-CM

## 2024-08-19 PROCEDURE — 77067 SCR MAMMO BI INCL CAD: CPT | Mod: 26,,, | Performed by: RADIOLOGY

## 2024-08-19 PROCEDURE — 77063 BREAST TOMOSYNTHESIS BI: CPT | Mod: 26,,, | Performed by: RADIOLOGY

## 2024-08-19 PROCEDURE — 77067 SCR MAMMO BI INCL CAD: CPT | Mod: TC

## 2024-08-22 NOTE — PROGRESS NOTES
Sharri Cline presented for a follow-up Medicare AWV today. The following components were reviewed and updated:  Very pleasant lady.       Medical history  Family History  Social history  Allergies and Current Medications  Health Risk Assessment  Health Maintenance  Care Team    **See Completed Assessments for Annual Wellness visit with in the encounter summary    The following assessments were completed:  Depression Screening  Cognitive function Screening      Timed Get Up Test  Whisper Test      Opioid documentation:  Patient does not have a current opioid prescription.      Wt Readings from Last 3 Encounters:   08/28/24 70.1 kg (154 lb 8.7 oz)   08/12/24 70.8 kg (156 lb)   06/10/24 71.1 kg (156 lb 12 oz)     Temp Readings from Last 3 Encounters:   08/12/24 98.6 °F (37 °C) (Oral)   06/10/24 97.9 °F (36.6 °C) (Oral)   03/01/24 97.7 °F (36.5 °C) (Tympanic)     BP Readings from Last 3 Encounters:   08/28/24 112/60   08/12/24 123/77   06/10/24 122/65     Pulse Readings from Last 3 Encounters:   08/12/24 66   06/10/24 75   03/04/24 81         Physical Exam  General: Well developed, well nourished. No distress.  HEENT: Head is normocephalic, atraumatic  Eyes: Clear conjunctiva.  Neck: Supple, symmetrical neck; trachea midline.  Extremities: No LE edema. Pulses 2+ and symmetric.   Skin: Skin color, texture, turgor normal. No rashes.  Musculoskeletal: Normal gait.   Neurologic:  No focal numbness or weakness.       Diagnoses and health risks identified today and associated recommendations/orders:  1 Encounter for preventive health examination  - Ambulatory Referral/Consult to Enhanced Annual Wellness Visit (eAWV)     2 Osteoporosis without current pathological fracture, unspecified osteoporosis type  Chronic, stable. Dexa up to date: due repeat in 10/2025. Followed by PCP and has been seen by Endocrine [facility] in 9/2023 with pending follow up.     3 Vitamin D deficiency  *Corrected; not on supplements.   Chronic,  "stable. Last level: 32. Followed by PCP.     4 Thyroid nodule  Chronic. Stable with last US being in 10/2023. Missed to follow up in 3/2024. Seen in 9/2023 by LEEANNA Nunez with Endocrine.    5 Malignant neoplasm of lower-inner quadrant of right breast of female, estrogen receptor negative  Chronic. Mammograms per her Oncologist. Followed by Dr. ANDREAS Alexandra.    6 Hyperparathyroidism  Chronic, stable. Followed by PCP    7 Aortic atherosclerosis  *US Carotids: 2022; seen by Vascular in the recent past; no longer followed. Not on a statin. Wants to 'try natural with Omega-Fish oil and other supplements"  Chronic, stable. Followed by PCP    8 Other reduced mobility  Chronic, stable. No falls. Followed by PCP    Provided Sharri with a 5-10 year written screening schedule and personal prevention plan. Recommendations were developed        Medication List            Accurate as of August 28, 2024  7:42 AM. If you have any questions, ask your nurse or doctor.                CONTINUE taking these medications      artificial tears 0.5 % ophthalmic solution  Commonly known as: ISOPTO TEARS  Place 1 drop into both eyes 4 (four) times daily.     carbamide peroxide 6.5 % otic solution  Commonly known as: DEBROX  Place 5 drops into both ears 2 (two) times daily.     cholecalciferol (vitamin D3) 25 mcg (1,000 unit) capsule  Commonly known as: VITAMIN D3     fish oil-omega-3 fatty acids 300-1,000 mg capsule     ketoconazole 2 % cream  Commonly known as: NIZORAL  Apply topically once daily.     multivitamin with minerals tablet     VITAMIN B-12 1,000 mcg/mL Drop  Generic drug: cyanocobalamin (vitamin B-12)              SINTIA Brock    I offered to discuss advanced care planning, including how to pick a person who would make decisions for you if you were unable to make them for yourself, called a health care power of , and what kind of decisions you might make such as use of life sustaining treatments such as " ventilators and tube feeding when faced with a life limiting illness recorded on a living will that they will need to know. (How you want to be cared for as you near the end of your natural life)     X Patient is interested in learning more about how to make advanced directives.  I provided them paperwork and offered to discuss this with them. (Spouse: Eloisa Rayshawn)

## 2024-08-28 ENCOUNTER — OFFICE VISIT (OUTPATIENT)
Dept: INTERNAL MEDICINE | Facility: CLINIC | Age: 74
End: 2024-08-28
Payer: MEDICARE

## 2024-08-28 VITALS
SYSTOLIC BLOOD PRESSURE: 112 MMHG | WEIGHT: 154.56 LBS | BODY MASS INDEX: 27.39 KG/M2 | HEIGHT: 63 IN | DIASTOLIC BLOOD PRESSURE: 60 MMHG

## 2024-08-28 DIAGNOSIS — Z74.09 OTHER REDUCED MOBILITY: ICD-10-CM

## 2024-08-28 DIAGNOSIS — E21.3 HYPERPARATHYROIDISM: ICD-10-CM

## 2024-08-28 DIAGNOSIS — E04.1 THYROID NODULE: ICD-10-CM

## 2024-08-28 DIAGNOSIS — C50.311 MALIGNANT NEOPLASM OF LOWER-INNER QUADRANT OF RIGHT BREAST OF FEMALE, ESTROGEN RECEPTOR NEGATIVE: ICD-10-CM

## 2024-08-28 DIAGNOSIS — M81.0 OSTEOPOROSIS WITHOUT CURRENT PATHOLOGICAL FRACTURE, UNSPECIFIED OSTEOPOROSIS TYPE: ICD-10-CM

## 2024-08-28 DIAGNOSIS — E55.9 VITAMIN D DEFICIENCY: ICD-10-CM

## 2024-08-28 DIAGNOSIS — I70.0 AORTIC ATHEROSCLEROSIS: ICD-10-CM

## 2024-08-28 DIAGNOSIS — Z17.1 MALIGNANT NEOPLASM OF LOWER-INNER QUADRANT OF RIGHT BREAST OF FEMALE, ESTROGEN RECEPTOR NEGATIVE: ICD-10-CM

## 2024-08-28 DIAGNOSIS — Z00.00 ENCOUNTER FOR PREVENTIVE HEALTH EXAMINATION: Primary | ICD-10-CM

## 2024-08-28 PROCEDURE — 3078F DIAST BP <80 MM HG: CPT | Mod: CPTII,S$GLB,, | Performed by: NURSE PRACTITIONER

## 2024-08-28 PROCEDURE — 1170F FXNL STATUS ASSESSED: CPT | Mod: CPTII,S$GLB,, | Performed by: NURSE PRACTITIONER

## 2024-08-28 PROCEDURE — 1159F MED LIST DOCD IN RCRD: CPT | Mod: CPTII,S$GLB,, | Performed by: NURSE PRACTITIONER

## 2024-08-28 PROCEDURE — 1158F ADVNC CARE PLAN TLK DOCD: CPT | Mod: CPTII,S$GLB,, | Performed by: NURSE PRACTITIONER

## 2024-08-28 PROCEDURE — 3074F SYST BP LT 130 MM HG: CPT | Mod: CPTII,S$GLB,, | Performed by: NURSE PRACTITIONER

## 2024-08-28 PROCEDURE — 99999 PR PBB SHADOW E&M-EST. PATIENT-LVL III: CPT | Mod: PBBFAC,,, | Performed by: NURSE PRACTITIONER

## 2024-08-28 PROCEDURE — G0439 PPPS, SUBSEQ VISIT: HCPCS | Mod: S$GLB,,, | Performed by: NURSE PRACTITIONER

## 2024-08-28 PROCEDURE — 1160F RVW MEDS BY RX/DR IN RCRD: CPT | Mod: CPTII,S$GLB,, | Performed by: NURSE PRACTITIONER

## 2024-08-28 PROCEDURE — 1101F PT FALLS ASSESS-DOCD LE1/YR: CPT | Mod: CPTII,S$GLB,, | Performed by: NURSE PRACTITIONER

## 2024-08-28 PROCEDURE — 3288F FALL RISK ASSESSMENT DOCD: CPT | Mod: CPTII,S$GLB,, | Performed by: NURSE PRACTITIONER

## 2024-08-28 NOTE — PATIENT INSTRUCTIONS
Counseling and Referral of Other Preventative  (Italic type indicates deductible and co-insurance are waived)    Patient Name: Sharri Cline  Today's Date: 8/28/2024    Health Maintenance       Date Due Completion Date    Aspirin/Antiplatelet Therapy Never done ---    High Dose Statin Never done ---    RSV Vaccine (Age 60+ and Pregnant patients) (1 - 1-dose 60+ series) Never done ---    Shingles Vaccine (1 of 2) 05/19/2015 3/24/2015    COVID-19 Vaccine (5 - 2023-24 season) 09/01/2023 10/17/2022    Influenza Vaccine (1) 09/01/2024 10/3/2023    Mammogram 08/19/2025 8/19/2024    DEXA Scan 10/31/2025 10/31/2023    Override on 10/27/2021: Done    TETANUS VACCINE 05/17/2026 5/17/2016    Colorectal Cancer Screening 03/01/2027 3/1/2024    Lipid Panel 11/03/2028 11/3/2023    Override on 11/22/2003: Done (Repeat recommended in seven years)        No orders of the defined types were placed in this encounter.    The following information is provided to all patients.  This information is to help you find resources for any of the problems found today that may be affecting your health:                  Living healthy guide: www.[facility].[place].gov      Understanding Diabetes: www.diabetes.org      Eating healthy: www.cdc.gov/healthyweight      CDC home safety checklist: www.cdc.gov/steadi/patient.html      Agency on Aging: www.goea.[place].gov      Alcoholics anonymous (AA): www.aa.org      Physical Activity: www.marichuy.nih.gov/vp9dtbv      Tobacco use: www.quitwithusla.org

## 2024-10-28 ENCOUNTER — OFFICE VISIT (OUTPATIENT)
Dept: URGENT CARE | Facility: CLINIC | Age: 74
End: 2024-10-28
Payer: MEDICARE

## 2024-10-28 VITALS
HEIGHT: 63 IN | HEART RATE: 75 BPM | WEIGHT: 154 LBS | BODY MASS INDEX: 27.29 KG/M2 | RESPIRATION RATE: 16 BRPM | SYSTOLIC BLOOD PRESSURE: 125 MMHG | DIASTOLIC BLOOD PRESSURE: 76 MMHG | TEMPERATURE: 98 F | OXYGEN SATURATION: 99 %

## 2024-10-28 DIAGNOSIS — R05.9 COUGH, UNSPECIFIED TYPE: ICD-10-CM

## 2024-10-28 DIAGNOSIS — J98.8 BACTERIAL RESPIRATORY INFECTION: Primary | ICD-10-CM

## 2024-10-28 DIAGNOSIS — B96.89 BACTERIAL RESPIRATORY INFECTION: Primary | ICD-10-CM

## 2024-10-28 LAB
CTP QC/QA: YES
SARS-COV-2 AG RESP QL IA.RAPID: NEGATIVE

## 2024-10-28 PROCEDURE — 87811 SARS-COV-2 COVID19 W/OPTIC: CPT | Mod: QW,S$GLB,,

## 2024-10-28 PROCEDURE — 99213 OFFICE O/P EST LOW 20 MIN: CPT | Mod: S$GLB,,,

## 2024-10-28 RX ORDER — CETIRIZINE HYDROCHLORIDE 10 MG/1
10 TABLET ORAL DAILY
Qty: 30 TABLET | Refills: 0 | Status: SHIPPED | OUTPATIENT
Start: 2024-10-28 | End: 2024-11-27

## 2024-10-28 RX ORDER — FLUTICASONE PROPIONATE 50 MCG
1 SPRAY, SUSPENSION (ML) NASAL 2 TIMES DAILY
Qty: 16 G | Refills: 0 | Status: SHIPPED | OUTPATIENT
Start: 2024-10-28

## 2024-10-28 RX ORDER — BENZONATATE 100 MG/1
100 CAPSULE ORAL 3 TIMES DAILY PRN
Qty: 30 CAPSULE | Refills: 0 | Status: SHIPPED | OUTPATIENT
Start: 2024-10-28 | End: 2024-11-07

## 2024-10-28 RX ORDER — BENZONATATE 100 MG/1
100 CAPSULE ORAL 3 TIMES DAILY PRN
Qty: 30 CAPSULE | Refills: 0 | Status: SHIPPED | OUTPATIENT
Start: 2024-10-28 | End: 2024-10-28

## 2024-10-28 RX ORDER — AZITHROMYCIN 250 MG/1
TABLET, FILM COATED ORAL
Qty: 6 TABLET | Refills: 0 | Status: SHIPPED | OUTPATIENT
Start: 2024-10-28 | End: 2024-11-02

## 2025-02-07 ENCOUNTER — TELEPHONE (OUTPATIENT)
Dept: OPTOMETRY | Facility: CLINIC | Age: 75
End: 2025-02-07
Payer: MEDICARE

## 2025-02-17 ENCOUNTER — HOSPITAL ENCOUNTER (OUTPATIENT)
Dept: RADIOLOGY | Facility: HOSPITAL | Age: 75
Discharge: HOME | End: 2025-02-17
Attending: NURSE PRACTITIONER
Payer: MEDICARE

## 2025-02-17 ENCOUNTER — IMMUNIZATION (OUTPATIENT)
Dept: INTERNAL MEDICINE | Facility: CLINIC | Age: 75
End: 2025-02-17
Payer: MEDICARE

## 2025-02-17 ENCOUNTER — OFFICE VISIT (OUTPATIENT)
Dept: INTERNAL MEDICINE | Facility: CLINIC | Age: 75
End: 2025-02-17
Payer: MEDICARE

## 2025-02-17 VITALS
BODY MASS INDEX: 27.15 KG/M2 | HEIGHT: 63 IN | WEIGHT: 153.25 LBS | HEART RATE: 65 BPM | SYSTOLIC BLOOD PRESSURE: 122 MMHG | OXYGEN SATURATION: 99 % | DIASTOLIC BLOOD PRESSURE: 64 MMHG

## 2025-02-17 DIAGNOSIS — R79.9 ABNORMAL FINDING OF BLOOD CHEMISTRY, UNSPECIFIED: ICD-10-CM

## 2025-02-17 DIAGNOSIS — E04.1 THYROID NODULE: ICD-10-CM

## 2025-02-17 DIAGNOSIS — I65.22 LEFT CAROTID ARTERY STENOSIS: ICD-10-CM

## 2025-02-17 DIAGNOSIS — I70.0 AORTIC ATHEROSCLEROSIS: Primary | ICD-10-CM

## 2025-02-17 DIAGNOSIS — E21.3 HYPERPARATHYROIDISM: ICD-10-CM

## 2025-02-17 DIAGNOSIS — Z23 NEED FOR VACCINATION: Primary | ICD-10-CM

## 2025-02-17 DIAGNOSIS — E55.9 VITAMIN D DEFICIENCY: ICD-10-CM

## 2025-02-17 DIAGNOSIS — T45.1X5A CHEMOTHERAPY-INDUCED NEUROPATHY: ICD-10-CM

## 2025-02-17 DIAGNOSIS — M81.0 OSTEOPOROSIS WITHOUT CURRENT PATHOLOGICAL FRACTURE, UNSPECIFIED OSTEOPOROSIS TYPE: ICD-10-CM

## 2025-02-17 DIAGNOSIS — G62.0 CHEMOTHERAPY-INDUCED NEUROPATHY: ICD-10-CM

## 2025-02-17 DIAGNOSIS — D70.1 AGRANULOCYTOSIS SECONDARY TO CANCER CHEMOTHERAPY (CODE): ICD-10-CM

## 2025-02-17 DIAGNOSIS — C50.311 MALIGNANT NEOPLASM OF LOWER-INNER QUADRANT OF RIGHT BREAST OF FEMALE, ESTROGEN RECEPTOR NEGATIVE: ICD-10-CM

## 2025-02-17 DIAGNOSIS — Z17.1 MALIGNANT NEOPLASM OF LOWER-INNER QUADRANT OF RIGHT BREAST OF FEMALE, ESTROGEN RECEPTOR NEGATIVE: ICD-10-CM

## 2025-02-17 PROCEDURE — 76536 US EXAM OF HEAD AND NECK: CPT | Mod: TC

## 2025-02-17 RX ORDER — CALCIUM CARBONATE 500(1250)
1 TABLET ORAL DAILY
COMMUNITY

## 2025-02-17 NOTE — PROGRESS NOTES
INTERNAL MEDICINE PROGRESS/URGENT CARE NOTE    CHIEF COMPLAINT     Chief Complaint   Patient presents with    Fatigue    Diabetes       HPI     Sharri Cline is a 74 y.o. female who presents for a follow up visit today.    PCP: Dr. Cesar Schuster of breast cancer: Right breast ER negative. Dx in 2019 s/p tx with adriamycin and cyclophsohamide with  neulesta then had right breast lumpectomy, then had RT. Follows up with Dr. Alexandra every 6 months. Last mammogram was normal.      Thyroid nodules: US 2023 was stable for prior FNA of thyroid nodule. Recommend repeat in 1 yr. Due for this.      Osteoporosis. Met with endocrine. Was considering re clast. On vit D and Ca. Was walking daily but hasn't been over the past few months due to the weather.      Vit D deficiency- on vit D otc. Taking Ca as well.      Hyperparathyroid: following with endo, improved since starting Vitamin d. Hasn't followed up with endo    Her only complaint today is that she's had some fatigue over the past 6 months. No other associated symptoms. States feels fine when she gets enough rest.       Problem List[1]     Past Medical History:  Past Medical History:   Diagnosis Date    Breast cancer 01/23/2020    RIGHT    Helicobacter pylori antibody positive 05/27/2013    treated with clarithromycin, metronidazole, and omeprazole x 14 days  (5/27-6/3); EGD normal in July 2013        Past Surgical History:  Past Surgical History:   Procedure Laterality Date    BIOPSY OF LIVER WITH ULTRASOUND GUIDANCE N/A 11/19/2018    Procedure: BIOPSY, LIVER, WITH US GUIDANCE;  Surgeon: Gordon Diagnostic Provider;  Location: [facility] OR 2ND FLR;  Service: Radiology;  Laterality: N/A;  U/S GUIDED LIVER ([id number]).  11/19/2018  DOSC 7 AM  PROCEDURE 8 AM.  DR CARITO LAGOS.  DB 11/12/18 3:55P    BREAST BIOPSY  05/24/2019    IDC    BREAST LUMPECTOMY Right     01/23/20    COLONOSCOPY N/A 03/01/2024    Procedure: COLONOSCOPY;  Surgeon: Stuart Roberts MD;  Location: [facility] (4TH FLR);   "Service: Endoscopy;  Laterality: N/A;  Referral: Fern Guerrero MD  PEG  Inst portal  LW  2/23-lvm for precall-MS  2/26-precall complete-Kpvt    INJECTION FOR SENTINEL NODE IDENTIFICATION Right 01/23/2020    Procedure: INJECTION, FOR SENTINEL NODE IDENTIFICATION;  Surgeon: Libertad Hernandez MD;  Location: [facility] OR [address] [place] [address];  Service: General;  Laterality: Right;    INSERTION OF TUNNELED CENTRAL VENOUS CATHETER (CVC) WITH SUBCUTANEOUS PORT Left 06/13/2019    Procedure: [id number]-CATH - CHEST;  Surgeon: Libertad Hernandez MD;  [facility];  Service: General;  Laterality: Left;    MASTECTOMY, PARTIAL Right 01/23/2020    Procedure: MASTECTOMY, PARTIAL- w/Seed Localization;  Surgeon: Libertad Hernandez MD;  [facility];  Service: General;  Laterality: Right;    SENTINEL LYMPH NODE BIOPSY Right 01/23/2020    Procedure: BIOPSY, LYMPH NODE, SENTINEL;  Surgeon: Libertad Hernandez MD;  Location: [facility] OR [address] [place] [address];  Service: General;  Laterality: Right;    TUBAL LIGATION          Allergies:  Review of patient's allergies indicates:  No Known Allergies    Home Medications:  Current Medications[2]     Review of Systems:  Review of Systems   Constitutional:  Positive for fatigue. Negative for chills, fever and unexpected weight change.   HENT:  Negative for congestion and sore throat.    Respiratory:  Negative for cough and shortness of breath.    Cardiovascular:  Negative for chest pain.   Gastrointestinal:  Negative for abdominal pain, nausea and vomiting.   Genitourinary:  Negative for dysuria and hematuria.   Neurological:  Negative for weakness and headaches.         PHYSICAL EXAM     Vitals:    02/17/25 0837   BP: 122/64   BP Location: Right arm   Patient Position: Sitting   Pulse: 65   SpO2: 99%   Weight: 69.5 kg (153 lb 3.5 oz)   Height: 5' 3" (1.6 m)      Body mass index is 27.14 kg/m².     Physical Exam  Vitals reviewed.   Constitutional:       Appearance: Normal appearance.   HENT:      Head: " Normocephalic.      Right Ear: Tympanic membrane and ear canal normal.      Left Ear: Tympanic membrane and ear canal normal.      Nose: No congestion.      Mouth/Throat:      Mouth: Mucous membranes are moist.      Pharynx: Oropharynx is clear.   Eyes:      Conjunctiva/sclera: Conjunctivae normal.      Pupils: Pupils are equal, round, and reactive to light.   Neck:      Vascular: No carotid bruit.   Cardiovascular:      Rate and Rhythm: Normal rate and regular rhythm.   Pulmonary:      Effort: Pulmonary effort is normal.      Breath sounds: Normal breath sounds.   Abdominal:      General: Bowel sounds are normal.      Palpations: Abdomen is soft.      Tenderness: There is no abdominal tenderness.   Musculoskeletal:      Cervical back: Neck supple.      Right lower leg: No edema.      Left lower leg: No edema.   Lymphadenopathy:      Cervical: No cervical adenopathy.   Skin:     General: Skin is warm and dry.   Neurological:      General: No focal deficit present.      Mental Status: She is alert and oriented to person, place, and time.   Psychiatric:         Mood and Affect: Mood normal.         Behavior: Behavior normal.         LABS     Lab Results   Component Value Date    HGBA1C 5.0 11/03/2023     CMP  Sodium   Date Value Ref Range Status   11/03/2023 143 136 - 145 mmol/L Final     Potassium   Date Value Ref Range Status   11/03/2023 4.1 3.5 - 5.1 mmol/L Final     Chloride   Date Value Ref Range Status   11/03/2023 106 95 - 110 mmol/L Final     CO2   Date Value Ref Range Status   11/03/2023 21 (L) 23 - 29 mmol/L Final     Glucose   Date Value Ref Range Status   11/03/2023 92 70 - 110 mg/dL Final     BUN   Date Value Ref Range Status   11/03/2023 12 8 - 23 mg/dL Final     Creatinine   Date Value Ref Range Status   11/03/2023 0.8 0.5 - 1.4 mg/dL Final     Calcium   Date Value Ref Range Status   11/03/2023 9.9 8.7 - 10.5 mg/dL Final     Total Protein   Date Value Ref Range Status   03/04/2024 6.8 6.0 - 8.4 g/dL  Final     Albumin   Date Value Ref Range Status   03/04/2024 3.6 3.5 - 5.2 g/dL Final     Total Bilirubin   Date Value Ref Range Status   03/04/2024 1.0 0.1 - 1.0 mg/dL Final     Comment:     For infants and newborns, interpretation of results should be based  on gestational age, weight and in agreement with clinical  observations.    Premature Infant recommended reference ranges:  Up to 24 hours.............<8.0 mg/dL  Up to 48 hours............<12.0 mg/dL  3-5 days..................<15.0 mg/dL  6-29 days.................<15.0 mg/dL       Alkaline Phosphatase   Date Value Ref Range Status   03/04/2024 146 (H) 55 - 135 U/L Final     AST   Date Value Ref Range Status   03/04/2024 23 10 - 40 U/L Final     ALT   Date Value Ref Range Status   03/04/2024 13 10 - 44 U/L Final     Anion Gap   Date Value Ref Range Status   11/03/2023 16 8 - 16 mmol/L Final     eGFR if    Date Value Ref Range Status   05/30/2022 >60.0 >60 mL/min/1.73 m^2 Final     eGFR if non    Date Value Ref Range Status   05/30/2022 >60.0 >60 mL/min/1.73 m^2 Final     Comment:     Calculation used to obtain the estimated glomerular filtration  rate (eGFR) is the CKD-EPI equation.        Lab Results   Component Value Date    WBC 4.51 11/03/2023    HGB 13.1 11/03/2023    HCT 42.1 11/03/2023    MCV 92 11/03/2023     11/03/2023     Lab Results   Component Value Date    CHOL 215 (H) 11/03/2023    CHOL 191 11/17/2022    CHOL 190 11/02/2021     Lab Results   Component Value Date    HDL 76 (H) 11/03/2023    HDL 72 11/17/2022    HDL 64 11/02/2021     Lab Results   Component Value Date    LDLCALC 128.0 11/03/2023    LDLCALC 107.6 11/17/2022    LDLCALC 112.6 11/02/2021     Lab Results   Component Value Date    TRIG 55 11/03/2023    TRIG 57 11/17/2022    TRIG 67 11/02/2021     Lab Results   Component Value Date    CHOLHDL 35.3 11/03/2023    CHOLHDL 37.7 11/17/2022    CHOLHDL 33.7 11/02/2021     Lab Results   Component Value Date     TSH 0.892 11/03/2023    Y6WZANC 83 12/11/2020       ASSESSMENT     1. Aortic atherosclerosis    2. Chemotherapy-induced neuropathy    3. Malignant neoplasm of lower-inner quadrant of right breast of female, estrogen receptor negative    4. Osteoporosis without current pathological fracture, unspecified osteoporosis type    5. Thyroid nodule    6. 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 Hyperparathyroidism    9. Abnormal finding of blood chemistry, unspecified    10. Left carotid artery stenosis         PLAN  Not on statin. Evidence of plaque formation in left carotid US.  She should be on a statin. She wants to get her labs back first. I will contact her regarding this.     The 10-year ASCVD risk score (Julianna STYLES, et al., 2019) is: 14.2%    Values used to calculate the score:      Age: 74 years      Sex: Female      Is Non- : Yes      Diabetic: No      Tobacco smoker: No      Systolic Blood Pressure: 122 mmHg      Is BP treated: No      HDL Cholesterol: 76 mg/dL      Total Cholesterol: 215 mg/dL     1. Chemotherapy-induced neuropathy  - Lipid Panel; Future  - Hemoglobin A1C; Future  - CBC Auto Differential; Future  - Comprehensive Metabolic Panel; Future  - TSH; Future  - Vitamin D; Future  - PTH, Intact; Future  - stable. No issues currently    2. Aortic atherosclerosis  - Lipid Panel; Future  - Hemoglobin A1C; Future  - CBC Auto Differential; Future  - Comprehensive Metabolic Panel; Future  - TSH; Future  - Vitamin D; Future  - PTH, Intact; Future  - see above    3. Malignant neoplasm of lower-inner quadrant of right breast of female, estrogen receptor negative  - Lipid Panel; Future  - Hemoglobin A1C; Future  - CBC Auto Differential; Future  - Comprehensive Metabolic Panel; Future  - TSH; Future  - Vitamin D; Future  - PTH, Intact; Future  - stable. Mmg ordered already.     4. Osteoporosis without current pathological fracture,  unspecified osteoporosis type  - Lipid Panel; Future  - Hemoglobin A1C; Future  - CBC Auto Differential; Future  - Comprehensive Metabolic Panel; Future  - TSH; Future  - Vitamin D; Future  - PTH, Intact; Future  - on vit D and Ca. Wants to hold off on meds.     5. Thyroid nodule  - Lipid Panel; Future  - Hemoglobin A1C; Future  - CBC Auto Differential; Future  - Comprehensive Metabolic Panel; Future  - TSH; Future  - Vitamin D; Future  - PTH, Intact; Future  - US Soft Tissue Head Neck; Future  - needs repeat US. Will get back to endo    6. 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 Hyperparathyroidism  - Lipid Panel; Future  - Hemoglobin A1C; Future  - CBC Auto Differential; Future  - Comprehensive Metabolic Panel; Future  - TSH; Future  - Vitamin D; Future  - PTH, Intact; Future  - check labs     9. Abnormal finding of blood chemistry, unspecified  - Hemoglobin A1C; Future    10. Left carotid artery stenosis  - US Carotid Bilateral; Future       Follow up with PCP     Patient was counseled on when to seek emergent care. Patient's plan/treatment was discussed including medications and possible side effects. Verbalized understanding of all instructions.     This note was partly generated with [employer] voice recognition software. I apologize for any possible typographical errors.          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)    Chemotherapy-induced neuropathy    Vitamin D deficiency    Aortic atherosclerosis    Osteoporosis    Decreased strength    Left carotid artery stenosis    Imbalance    Hyperparathyroidism    Lightheaded   [2]   Current Outpatient Medications:     calcium carbonate (OS-AJ) 500 mg calcium (1,250 mg) tablet, Take 1 tablet by mouth once daily., Disp: , Rfl:     cholecalciferol, vitamin D3, (VITAMIN D3) 25 mcg (1,000 unit) capsule, Take 1,000 Units by mouth once daily., Disp: , Rfl:     cyanocobalamin, vitamin B-12, (VITAMIN B-12) 1,000 mcg/mL Drop, Take by mouth once daily., Disp: , Rfl:     fish oil-omega-3 fatty acids 300-1,000 mg capsule, Take 2 g by mouth once daily., Disp: , Rfl:     multivitamin with minerals tablet, Take 1 tablet by mouth once daily., Disp: , Rfl:     artificial tears (ISOPTO TEARS) 0.5 % ophthalmic solution, Place 1 drop into both eyes 4 (four) times daily. (Patient not taking: Reported on 2/17/2025), Disp: , Rfl:     carbamide peroxide (DEBROX) 6.5 % otic solution, Place 5 drops into both ears 2 (two) times daily. (Patient not taking: Reported on 2/17/2025), Disp: 15 mL, Rfl: 1    cetirizine (ZYRTEC) 10 MG tablet, Take 1 tablet (10 mg total) by mouth once daily., Disp: 30 tablet, Rfl: 0    fluticasone propionate (FLONASE) 50 mcg/actuation nasal spray, 1 spray (50 mcg total) by Each Nostril route 2 (two) times daily. (Patient not taking: Reported on 2/17/2025), Disp: 16 g, Rfl: 0    ketoconazole (NIZORAL) 2 % cream, Apply topically once daily. (Patient not taking: Reported on 2/17/2025), Disp: 60 g, Rfl: 2  No current facility-administered medications for this  visit.    Facility-Administered Medications Ordered in Other Visits:     0.9%  NaCl infusion, , Intravenous, Continuous, Chung Whittington MD, Last Rate: 70 mL/hr at 01/23/20 0817, New Bag at 01/23/20 0817

## 2025-02-18 ENCOUNTER — RESULTS FOLLOW-UP (OUTPATIENT)
Dept: INTERNAL MEDICINE | Facility: CLINIC | Age: 75
End: 2025-02-18
Payer: MEDICARE

## 2025-02-18 DIAGNOSIS — E21.3 HYPERPARATHYROIDISM: ICD-10-CM

## 2025-02-18 DIAGNOSIS — E04.1 THYROID NODULE: Primary | ICD-10-CM

## 2025-02-18 NOTE — TELEPHONE ENCOUNTER
I spoke with the patient about her lab results and gave her the endocrinologist number to schedule her appointment. She verbalized understanding.

## 2025-03-17 ENCOUNTER — HOSPITAL ENCOUNTER (OUTPATIENT)
Dept: RADIOLOGY | Facility: HOSPITAL | Age: 75
Discharge: HOME | End: 2025-03-17
Attending: NURSE PRACTITIONER
Payer: MEDICARE

## 2025-03-17 ENCOUNTER — TELEPHONE (OUTPATIENT)
Dept: INTERNAL MEDICINE | Facility: CLINIC | Age: 75
End: 2025-03-17
Payer: MEDICARE

## 2025-03-17 DIAGNOSIS — I65.29 OCCLUSION AND STENOSIS OF UNSPECIFIED CAROTID ARTERY: Primary | ICD-10-CM

## 2025-03-17 DIAGNOSIS — I65.22 LEFT CAROTID ARTERY STENOSIS: ICD-10-CM

## 2025-03-17 PROCEDURE — 93880 EXTRACRANIAL BILAT STUDY: CPT | Mod: 26,,, | Performed by: RADIOLOGY

## 2025-03-17 PROCEDURE — 93880 EXTRACRANIAL BILAT STUDY: CPT | Mod: TC

## 2025-03-17 NOTE — TELEPHONE ENCOUNTER
Spoke with patient.  Left ICA with 60-79% stenosis recommended further eval with CTA neck.  Order placed and we will refer back to vascular.  Last office visit with them in 2022

## 2025-03-20 ENCOUNTER — TELEPHONE (OUTPATIENT)
Dept: VASCULAR SURGERY | Facility: CLINIC | Age: 75
End: 2025-03-20
Payer: MEDICARE

## 2025-03-20 NOTE — TELEPHONE ENCOUNTER
Contacted pt to schedule appt for carotid artery stenosis from referral by Shikha Lott. Appointment scheduled, pt verified. Appointment letter placed in mail.

## 2025-04-02 ENCOUNTER — OFFICE VISIT (OUTPATIENT)
Dept: OPTOMETRY | Facility: CLINIC | Age: 75
End: 2025-04-02
Payer: MEDICARE

## 2025-04-02 DIAGNOSIS — H25.813 MIXED TYPE AGE-RELATED CATARACT, BOTH EYES: ICD-10-CM

## 2025-04-02 DIAGNOSIS — H52.203 HYPEROPIA OF BOTH EYES WITH ASTIGMATISM AND PRESBYOPIA: Primary | ICD-10-CM

## 2025-04-02 DIAGNOSIS — H52.4 HYPEROPIA OF BOTH EYES WITH ASTIGMATISM AND PRESBYOPIA: Primary | ICD-10-CM

## 2025-04-02 DIAGNOSIS — H52.03 HYPEROPIA OF BOTH EYES WITH ASTIGMATISM AND PRESBYOPIA: Primary | ICD-10-CM

## 2025-04-02 PROCEDURE — 99214 OFFICE O/P EST MOD 30 MIN: CPT | Mod: S$GLB,,, | Performed by: OPTOMETRIST

## 2025-04-02 PROCEDURE — 3044F HG A1C LEVEL LT 7.0%: CPT | Mod: CPTII,S$GLB,, | Performed by: OPTOMETRIST

## 2025-04-02 PROCEDURE — 1101F PT FALLS ASSESS-DOCD LE1/YR: CPT | Mod: CPTII,S$GLB,, | Performed by: OPTOMETRIST

## 2025-04-02 PROCEDURE — 99999 PR PBB SHADOW E&M-EST. PATIENT-LVL III: CPT | Mod: PBBFAC,,, | Performed by: OPTOMETRIST

## 2025-04-02 PROCEDURE — 92015 DETERMINE REFRACTIVE STATE: CPT | Mod: S$GLB,,, | Performed by: OPTOMETRIST

## 2025-04-02 PROCEDURE — 1159F MED LIST DOCD IN RCRD: CPT | Mod: CPTII,S$GLB,, | Performed by: OPTOMETRIST

## 2025-04-02 PROCEDURE — 3288F FALL RISK ASSESSMENT DOCD: CPT | Mod: CPTII,S$GLB,, | Performed by: OPTOMETRIST

## 2025-04-02 PROCEDURE — 1126F AMNT PAIN NOTED NONE PRSNT: CPT | Mod: CPTII,S$GLB,, | Performed by: OPTOMETRIST

## 2025-04-02 NOTE — LETTER
April 2, 2025      [facility] [address]  [address]  [facility] [address]  Phone: [phone]       Patient: Sharri Cline   YOB: 1950  Date of Visit: 04/02/2025    To Whom It May Concern:    Oseas Cline  was at [facility] on 04/02/2025. The patient may return to work/school on 4/3/25 without restrictions. If you have any questions or concerns, or if I can be of further assistance, please do not hesitate to contact me.    Sincerely,

## 2025-04-02 NOTE — PROGRESS NOTES
HPI    PHONG: 10/19/2023 Dr. Hull  Last DFE: 10/19/2023  Chief complaint (CC): 74 yr old in today for Annual  Glasses? Yes  Contacts? No  H/o eye surgery, injections or laser: no  H/o eye injury: no  Known eye conditions? Nuclear sclerotic cataract of both eyes    Hyperopia of both eyes with astigmatism and presbyopia    Family h/o eye conditions? No  Eye gtts? no      (-) Flashes (+)  Floaters, OD  (-) Mucous   (-)  Tearing (-) Itching (-) Burning   (-) Headaches (-) Eye Pain/discomfort (-) Irritation   (+)  Redness (-) Double vision (+) Blurry vision    CL Exam:No  Current CL Brand: -  Rx OD     OS               Wears full-time or part-time:  Full time/Part Time     Sleeps with contact lenses:  Yes/No     CL Solution used:     How often replace CLs:      Any problem with VA with CLs?  Yes/No     Diabetic? -  A1c? Lab Results       Component                Value               Date                       HGBA1C                   5.0                 02/17/2025              Last edited by Sherlyn Wheeler on 4/2/2025 10:32 AM.            Assessment /Plan     For exam results, see Encounter Report.    Hyperopia of both eyes with astigmatism and presbyopia    Mixed type age-related cataract, both eyes      MONITOR. ED PT ON ALL EXAM FINDINGS.  RX FINAL SPEC.  MODERATE MIXED CATARACT OU; PRESURGICAL; MONITOR  RTC 1 YR//PRN FOR REE AND DFE

## 2025-04-03 ENCOUNTER — OFFICE VISIT (OUTPATIENT)
Dept: ENDOCRINOLOGY | Facility: CLINIC | Age: 75
End: 2025-04-03
Payer: MEDICARE

## 2025-04-03 VITALS
OXYGEN SATURATION: 96 % | WEIGHT: 152.88 LBS | BODY MASS INDEX: 27.09 KG/M2 | HEIGHT: 63 IN | HEART RATE: 93 BPM | SYSTOLIC BLOOD PRESSURE: 110 MMHG | DIASTOLIC BLOOD PRESSURE: 68 MMHG

## 2025-04-03 DIAGNOSIS — M81.0 OSTEOPOROSIS WITHOUT CURRENT PATHOLOGICAL FRACTURE, UNSPECIFIED OSTEOPOROSIS TYPE: ICD-10-CM

## 2025-04-03 DIAGNOSIS — R74.8 ELEVATED ALKALINE PHOSPHATASE LEVEL: ICD-10-CM

## 2025-04-03 DIAGNOSIS — E55.9 VITAMIN D DEFICIENCY: ICD-10-CM

## 2025-04-03 DIAGNOSIS — E04.1 THYROID NODULE: ICD-10-CM

## 2025-04-03 DIAGNOSIS — E05.90 SUBCLINICAL HYPERTHYROIDISM: ICD-10-CM

## 2025-04-03 DIAGNOSIS — M81.0 OSTEOPOROSIS, UNSPECIFIED OSTEOPOROSIS TYPE, UNSPECIFIED PATHOLOGICAL FRACTURE PRESENCE: Primary | ICD-10-CM

## 2025-04-03 PROCEDURE — 3074F SYST BP LT 130 MM HG: CPT | Mod: CPTII,S$GLB,,

## 2025-04-03 PROCEDURE — 3078F DIAST BP <80 MM HG: CPT | Mod: CPTII,S$GLB,,

## 2025-04-03 PROCEDURE — G2211 COMPLEX E/M VISIT ADD ON: HCPCS | Mod: S$GLB,,,

## 2025-04-03 PROCEDURE — 99214 OFFICE O/P EST MOD 30 MIN: CPT | Mod: S$GLB,,,

## 2025-04-03 PROCEDURE — 3044F HG A1C LEVEL LT 7.0%: CPT | Mod: CPTII,S$GLB,,

## 2025-04-03 PROCEDURE — 1101F PT FALLS ASSESS-DOCD LE1/YR: CPT | Mod: CPTII,S$GLB,,

## 2025-04-03 PROCEDURE — 3288F FALL RISK ASSESSMENT DOCD: CPT | Mod: CPTII,S$GLB,,

## 2025-04-03 PROCEDURE — 1126F AMNT PAIN NOTED NONE PRSNT: CPT | Mod: CPTII,S$GLB,,

## 2025-04-03 PROCEDURE — 1159F MED LIST DOCD IN RCRD: CPT | Mod: CPTII,S$GLB,,

## 2025-04-03 PROCEDURE — 99999 PR PBB SHADOW E&M-EST. PATIENT-LVL IV: CPT | Mod: PBBFAC,,,

## 2025-04-03 PROCEDURE — 3008F BODY MASS INDEX DOCD: CPT | Mod: CPTII,S$GLB,,

## 2025-04-03 NOTE — PATIENT INSTRUCTIONS
For more information on medications: https://www.nof.org/patients/treatment/medicationadherence/     Preventing Falls and Broken Bones - Bone Health & Osteoporosis Foundation     Be Bone Strong - Exercise to Stay Healthy - Bone Health & Osteoporosis Foundation     Estimated Calcium Content of Foods:  Produce  Serving Size Estimated Calcium*    [place] greens, frozen 8 oz 360 mg   Broccoli patel 8 oz 200 mg   Kale, frozen 8 oz 180 mg   Soy Beans, green, boiled 8 oz 175 mg   Bok Mago, cooked, boiled 8 oz 160 mg   Figs, dried 2 figs 65 mg   Broccoli, fresh, cooked 8 oz 60 mg   Oranges 1 whole 55 mg   Seafood Serving Size Estimated Calcium*    Sardines, canned with bones 3 oz 325 mg   [place], canned with bones 3 oz 180 mg   Shrimp, canned 3 oz 125 mg   Dairy Serving Size Estimated Calcium*    Ricotta, part-skim 4 oz 335 mg   Yogurt, plain, low-fat 6 oz 310 mg   Milk, skim, low-fat, whole 8 oz 300 mg   Yogurt with fruit, low-fat 6 oz 260 mg   Mozzarella, part-skim 1 oz 210 mg   Cheddar 1 oz 205 mg   Yogurt, Greek 6 oz 200 mg   American Cheese 1 oz 195 mg   Feta Cheese 4 oz 140 mg   Cottage Cheese, 2% 4 oz 105 mg   Frozen yogurt, vanilla 8 oz 105 mg   Ice Cream, vanilla 8 oz 85 mg   Parmesan 1 tbsp 55 mg   Fortified Food Serving Size Estimated Calcium*   [place] milk, rice milk or soy milk, fortified 8 oz 300 mg   Orange juice and other fruit juices, fortified 8 oz 300 mg   Tofu, prepared with calcium 4 oz 205 mg   Waffle, frozen, fortified 2 pieces 200 mg   Oatmeal, fortified 1 packet 140 mg   English muffin, fortified 1 muffin 100 mg   Cereal, fortified 8 oz 100-1,000 mg   Other Serving Size Estimated Calcium*   Mac & cheese, frozen 1 package 325 mg   Pizza, cheese, frozen 1 serving 115 mg   Pudding, chocolate, prepared with 2% milk 4 oz 160 mg   Beans, baked, canned 4 oz 160 mg   *The calcium content listed for most foods is estimated and can vary due to multiple factors. Check the food label to determine how much  calcium is in a particular product.  If you read the nutrition label for a food source, it lists the % calcium in that food.  For an 8 oz glass of milk, for example, the label states calcium 30%.  This is equivalent to 300 mg of calcium (multiply the listed number by 10).   **Table from the National Osteoporosis Foundation

## 2025-04-03 NOTE — ASSESSMENT & PLAN NOTE
-- Risks include menopause.  -- Reviewed basics of quantity versus quality.  -- Reassuring they are not fracturing.  -- 5/2022 work up of accelerated bone loss: elevated PTH, serum calcium WNL, low urine Ca   -- Recommend:  -Pharmacological therapy is recommended for patients with osteopenia if the 10 year probability of a hip fracture is >3% and 10 year probability of other major osteoporotic fractures is >20%.  Treatment options and potential side effects discussed for PO bisphosphonates, reclast, Denosumab, and Teriparatide.   -Treatment: recommended Reclast due to low T-score at the lumbar spine. Patient declines at this time   - Discussed risks of holding osteoporosis treatment such as increased risk of fracture if patient were to fall. Patient understands the risks and will make a decision after her next DXA in 10/2025   - Discussed that if bone density were to worsen at the next density, Reclast will not be beneficial and she would require anabolic treatment. Patient to research Reclast    -Calcium and Vitamin D RDD provided. Encouraged compliance  -Exercise: recommended.  -Fall precautions made in the home.  -Alerted that if dental work needs to be done it should be done prior to initiating therapy. Dental health: UTD  -- Repeat DEXA scan 10/2025

## 2025-04-03 NOTE — ASSESSMENT & PLAN NOTE
-- FNA: 10/19/18  THYROID, RIGHT LOBE NODULE, FINE-NEEDLE ASPIRATION (CYTOLOGY):  - Benign ([place] Classification System II).  - Benign follicular cells and colloid.  -- Thyroid US 02/2025 stable  -- Repeat thyroid US in 2027

## 2025-04-03 NOTE — ASSESSMENT & PLAN NOTE
- Low TSH noted on labs since 2018 with overall normal FT4   - TSH fluctuates between normal and decreased with lowest TSH 0.079 in 2018   - Reassuring that patient is biochemically euthyroid   - Plan to repeat TSH and FT4   - If normal, plan to monitor periodically   - Will consider treatment if patient develops symptoms or if TSH is suppressed

## 2025-04-03 NOTE — ASSESSMENT & PLAN NOTE
- Elevated alk phos noted on labs since 2019   - GGT normal   - Plan to check bone specific alk phos

## 2025-04-03 NOTE — PROGRESS NOTES
Subjective:      Patient ID: Sharri Cline is a 74 y.o. female.    Chief Complaint:  Follow-up and Thyroid Nodule      History of Present Illness  Sharri Cline is here for follow up of Thyroid nodules and of Osteoporosis.  Previously seen by MIYA Nunez NP, 09/2023      With regards to thyroid nodule:    Found: 2018    Thyroid US 02/2025   FINDINGS:  Right lobe of the thyroid measures 5.2 x 2.1 x 1.8 cm.  Similar 3.3 x 1.7 x 2.0 cm solid, hypoechoic nodule with microcalcification.  This is reportedly undergone FNA with benign results.     The isthmus measures 0.2 cm.  No nodules.     Left lobe of the thyroid measures 4.0 x 1.1 x 1.1 cm.  There are 2 subcentimeter cystic foci with internal calcification in the upper pole the left thyroid lobe favored to represent colloid cysts.  Additional stable 0.9 x 0.7 x 0.7 cm solid, predominantly hypoechoic nodule with microcalcification in the midpole of the left lobe, TR 5.     Normal thyroid parenchyma.     Cervical lymph nodes demonstrate normal morphology and size.     Impression:  1. Stable right TR 5 thyroid nodule which has previously undergone biopsy with benign results.  2. Stable left TR 5 thyroid nodule, essentially unchanged from 12/02/2020.    FNA:   10/19/18  THYROID, RIGHT LOBE NODULE, FINE-NEEDLE ASPIRATION (CYTOLOGY):  - Benign ([place] Classification System II).  - Benign follicular cells and colloid.    Lab Results   Component Value Date    TSH 0.318 (L) 02/17/2025    T1MVQKP 83 12/11/2020    FREET4 0.88 02/17/2025     Signs or Symptoms:   Difficulty breathing: Denies  Difficulty swallowing: Denies  Voice Changes: Denies   FH of thyroid cancer: Denies  Personal history of radiation treatment or exposure: + radiation for breast cancer    Denies symptoms of hyperthyroidism.    With regards to Vitamin D Deficiency:    Vit D, 25-Hydroxy   Date Value Ref Range Status   02/17/2025 27 (L) 30 - 96 ng/mL Final     Comment:     Vitamin D  deficiency.........<10 ng/mL                              Vitamin D insufficiency......10-29 ng/mL       Vitamin D sufficiency........> or equal to 30 ng/mL  Vitamin D toxicity............>100 ng/mL       Current Meds: OTC Vit D3 1000iu daily.   Does not take it daily    With regards to osteoporosis:     5/2022 work up of accelerated bone loss: elevated PTH, serum calcium WNL, low urine Ca - started on Citracal petite.     BMD:   10/31/2023  Lumbar spine (L1-L4):               BMD is 0.711 g/cm2, T-score is -3.1, and Z-score is -1.6.     Total hip:                                BMD is 0.743 g/cm2, T-score is -1.6, and Z-score is -0.7.     Femoral neck:                          BMD is 0.682 g/cm2, T-score is -1.5, and Z-score is -0.4.     Distal 1/3 radius:                      Not applicable     FRAX:     7.4% risk of a major osteoporotic fracture in the next 10 years.     1.1% risk of hip fracture in the next 10 years.     Impression:     *Osteoporosis based on T-score below -2.5  *Fracture risk is very high due to T-score below -3.0  *Compared with previous DXA, BMD at the lumbar spine has remained stable, and BMD at the total hip has remained stable.    Medications: None, does not want to take medicine     Calcium intake: os-fany 500 mg 2 tablets BID, gets dietary calcium (almond milk)  Vit D intake: OTC Vit D3 1000iu daily- not consistent    Weight bearing exercise: walking but trying to work more with improved weather  Balance: not as good but very cautious, home balance exercises  Falls: Denies   Fractures: Denies  ~1987 fractured wrist (unsure which side) - fell while skating   Significant height loss (>2 inches): Denies     Family history: Denies    Menopause: early 50s  Denies HRT.     Tobacco Use: Denies  Alcohol Use: Denies  Glucocorticoid History: Denies  Anticoagulant Use: Denies  GERD/PPI Use: Denies  History of Malabsorption: Denies  Antiseizure Medications: Denies  History of Thyroid Disease:  "Denies  Kidney Disease: Denies  Personal history of kidney stones: Denies  Family history of kidney stones: Denies  Family history of bone disease or fracture: Denies  Radiation treatment for breast cancer.  History of MI or stroke: Denies  Dental Visit: Denies - dentures       Lab Results   Component Value Date    CALCIUM 9.7 02/17/2025    PHOS 3.9 10/03/2023     Vit D, 25-Hydroxy   Date Value Ref Range Status   02/17/2025 27 (L) 30 - 96 ng/mL Final     Comment:     Vitamin D deficiency.........<10 ng/mL                              Vitamin D insufficiency......10-29 ng/mL       Vitamin D sufficiency........> or equal to 30 ng/mL  Vitamin D toxicity............>100 ng/mL       Lab Results   Component Value Date    CALCIUM 9.7 02/17/2025    CALCIUM 9.9 11/03/2023    CALCIUM 9.6 10/03/2023    ALBUMIN 3.8 02/17/2025    ALBUMIN 3.6 03/04/2024    ALBUMIN 4.0 11/03/2023    PTH 71.6 02/17/2025    PTH 84.6 (H) 10/03/2023    .0 (H) 03/07/2023       Review of Systems  As above    Objective:   Physical Exam  Constitutional:       Appearance: Normal appearance.   Cardiovascular:      Rate and Rhythm: Normal rate.      Comments: No edema  Pulmonary:      Effort: Pulmonary effort is normal.   Musculoskeletal:      Comments: Negative spinal point tenderness  Negative hip tenderness  Gait slow but steady   Neurological:      Mental Status: She is alert.   Psychiatric:         Mood and Affect: Mood normal.         Behavior: Behavior normal.       Visit Vitals  /68 (BP Location: Right arm, Patient Position: Sitting)   Pulse 93   Ht 5' 3" (1.6 m)   Wt 69.3 kg (152 lb 14.2 oz)   SpO2 96%   BMI 27.08 kg/m²       Body mass index is 27.08 kg/m².    Lab Review:   Lab Results   Component Value Date    HGBA1C 5.0 02/17/2025     Lab Results   Component Value Date    CHOL 187 02/17/2025    HDL 70 02/17/2025    LDLCALC 106.6 02/17/2025    TRIG 52 02/17/2025    CHOLHDL 37.4 02/17/2025     Lab Results   Component Value Date    NA " 140 02/17/2025    K 4.0 02/17/2025     02/17/2025    CO2 25 02/17/2025    GLU 86 02/17/2025    BUN 11 02/17/2025    CREATININE 0.7 02/17/2025    CALCIUM 9.7 02/17/2025    PROT 7.5 02/17/2025    ALBUMIN 3.8 02/17/2025    BILITOT 1.4 (H) 02/17/2025    ALKPHOS 167 (H) 02/17/2025    AST 28 02/17/2025    ALT 9 (L) 02/17/2025    ANIONGAP 8 02/17/2025    ESTGFRAFRICA >60.0 05/30/2022    EGFRNONAA >60.0 05/30/2022    TSH 0.318 (L) 02/17/2025     Vit D, 25-Hydroxy   Date Value Ref Range Status   02/17/2025 27 (L) 30 - 96 ng/mL Final     Comment:     Vitamin D deficiency.........<10 ng/mL                              Vitamin D insufficiency......10-29 ng/mL       Vitamin D sufficiency........> or equal to 30 ng/mL  Vitamin D toxicity............>100 ng/mL       Assessment and Plan     1. Osteoporosis, unspecified osteoporosis type, unspecified pathological fracture presence  DXA Bone Density Axial Skeleton 1 or more sites      2. Subclinical hyperthyroidism  TSH    T4, Free      3. Elevated alkaline phosphatase level  Alkaline phosphatase, bone specific      4. Osteoporosis without current pathological fracture, unspecified osteoporosis type        5. Thyroid nodule        6. Vitamin D deficiency            Osteoporosis without current pathological fracture  -- Risks include menopause.  -- Reviewed basics of quantity versus quality.  -- Reassuring they are not fracturing.  -- 5/2022 work up of accelerated bone loss: elevated PTH, serum calcium WNL, low urine Ca   -- Recommend:  -Pharmacological therapy is recommended for patients with osteopenia if the 10 year probability of a hip fracture is >3% and 10 year probability of other major osteoporotic fractures is >20%.  Treatment options and potential side effects discussed for PO bisphosphonates, reclast, Denosumab, and Teriparatide.   -Treatment: recommended Reclast due to low T-score at the lumbar spine. Patient declines at this time   - Discussed risks of holding  osteoporosis treatment such as increased risk of fracture if patient were to fall. Patient understands the risks and will make a decision after her next DXA in 10/2025   - Discussed that if bone density were to worsen at the next density, Reclast will not be beneficial and she would require anabolic treatment. Patient to research Reclast    -Calcium and Vitamin D RDD provided. Encouraged compliance  -Exercise: recommended.  -Fall precautions made in the home.  -Alerted that if dental work needs to be done it should be done prior to initiating therapy. Dental health: UTD  -- Repeat DEXA scan 10/2025    Thyroid nodule  -- FNA: 10/19/18  THYROID, RIGHT LOBE NODULE, FINE-NEEDLE ASPIRATION (CYTOLOGY):  - Benign ([place] Classification System II).  - Benign follicular cells and colloid.  -- Thyroid US 02/2025 stable  -- Repeat thyroid US in 2027    Vitamin D deficiency  Encouraged vitamin D compliance    Subclinical hyperthyroidism   - Low TSH noted on labs since 2018 with overall normal FT4   - TSH fluctuates between normal and decreased with lowest TSH 0.079 in 2018   - Reassuring that patient is biochemically euthyroid   - Plan to repeat TSH and FT4   - If normal, plan to monitor periodically   - Will consider treatment if patient develops symptoms or if TSH is suppressed    Elevated alkaline phosphatase level   - Elevated alk phos noted on labs since 2019   - GGT normal   - Plan to check bone specific alk phos      Follow up in about 1 year (around 4/3/2026).    Visit today included increased complexity associated with the care of the problems addressed and managing the longitudinal care of the patient due to the serious and/or complex managed problems.    Brigida Lindo PA-C

## 2025-04-17 ENCOUNTER — OFFICE VISIT (OUTPATIENT)
Dept: VASCULAR SURGERY | Facility: CLINIC | Age: 75
End: 2025-04-17
Payer: MEDICARE

## 2025-04-17 ENCOUNTER — LAB VISIT (OUTPATIENT)
Dept: LAB | Facility: HOSPITAL | Age: 75
End: 2025-04-17
Payer: MEDICARE

## 2025-04-17 VITALS
BODY MASS INDEX: 26.57 KG/M2 | TEMPERATURE: 98 F | WEIGHT: 149.94 LBS | SYSTOLIC BLOOD PRESSURE: 131 MMHG | HEART RATE: 81 BPM | HEIGHT: 63 IN | DIASTOLIC BLOOD PRESSURE: 79 MMHG

## 2025-04-17 DIAGNOSIS — I65.29 OCCLUSION AND STENOSIS OF UNSPECIFIED CAROTID ARTERY: ICD-10-CM

## 2025-04-17 DIAGNOSIS — R74.8 ELEVATED ALKALINE PHOSPHATASE LEVEL: ICD-10-CM

## 2025-04-17 DIAGNOSIS — I65.23 BILATERAL CAROTID ARTERY STENOSIS: Primary | ICD-10-CM

## 2025-04-17 DIAGNOSIS — E05.90 SUBCLINICAL HYPERTHYROIDISM: ICD-10-CM

## 2025-04-17 LAB
T4 FREE SERPL-MCNC: 0.81 NG/DL (ref 0.71–1.51)
TSH SERPL-ACNC: 1.03 UIU/ML (ref 0.4–4)

## 2025-04-17 PROCEDURE — 84443 ASSAY THYROID STIM HORMONE: CPT

## 2025-04-17 PROCEDURE — 3075F SYST BP GE 130 - 139MM HG: CPT | Mod: CPTII,S$GLB,, | Performed by: SURGERY

## 2025-04-17 PROCEDURE — 3044F HG A1C LEVEL LT 7.0%: CPT | Mod: CPTII,S$GLB,, | Performed by: SURGERY

## 2025-04-17 PROCEDURE — 36415 COLL VENOUS BLD VENIPUNCTURE: CPT

## 2025-04-17 PROCEDURE — 84075 ASSAY ALKALINE PHOSPHATASE: CPT

## 2025-04-17 PROCEDURE — 99999 PR PBB SHADOW E&M-EST. PATIENT-LVL III: CPT | Mod: PBBFAC,,, | Performed by: SURGERY

## 2025-04-17 PROCEDURE — 1101F PT FALLS ASSESS-DOCD LE1/YR: CPT | Mod: CPTII,S$GLB,, | Performed by: SURGERY

## 2025-04-17 PROCEDURE — 3008F BODY MASS INDEX DOCD: CPT | Mod: CPTII,S$GLB,, | Performed by: SURGERY

## 2025-04-17 PROCEDURE — 1126F AMNT PAIN NOTED NONE PRSNT: CPT | Mod: CPTII,S$GLB,, | Performed by: SURGERY

## 2025-04-17 PROCEDURE — 99213 OFFICE O/P EST LOW 20 MIN: CPT | Mod: S$GLB,,, | Performed by: SURGERY

## 2025-04-17 PROCEDURE — 3288F FALL RISK ASSESSMENT DOCD: CPT | Mod: CPTII,S$GLB,, | Performed by: SURGERY

## 2025-04-17 PROCEDURE — 1159F MED LIST DOCD IN RCRD: CPT | Mod: CPTII,S$GLB,, | Performed by: SURGERY

## 2025-04-17 PROCEDURE — 3078F DIAST BP <80 MM HG: CPT | Mod: CPTII,S$GLB,, | Performed by: SURGERY

## 2025-04-17 PROCEDURE — 84439 ASSAY OF FREE THYROXINE: CPT

## 2025-04-17 NOTE — PROGRESS NOTES
VASCULAR SURGERY NOTE    Patient ID: Sharri Cline is a 75 y.o. female.    I. HISTORY     Chief Complaint: left carotid stenosis    HPI: Sharri Cline is a 75 y.o. female who is here today for new patient initial appointment for carotid stenosis. She was last seen by Dr Beal in 2022 for the same issue. Patient is neurologically intact - denies numbness, weakness, tingling, difficulty with speech, or vision changes. She is active and exercises regularly.      Past Medical History:   Diagnosis Date    Breast cancer 01/23/2020    RIGHT    Helicobacter pylori antibody positive 05/27/2013    treated with clarithromycin, metronidazole, and omeprazole x 14 days  (5/27-6/3); EGD normal in July 2013        Past Surgical History:   Procedure Laterality Date    BIOPSY OF LIVER WITH ULTRASOUND GUIDANCE N/A 11/19/2018    Procedure: BIOPSY, LIVER, WITH US GUIDANCE;  Surgeon: Gordon Diagnostic Provider;  Location: [facility] OR [address] [place] [address];  Service: Radiology;  Laterality: N/A;  U/S GUIDED LIVER ([id number]).  11/19/2018  DOSC 7 AM  PROCEDURE 8 AM.  DR CARITO LAGOS.  DB 11/12/18 3:55P    BREAST BIOPSY  05/24/2019    IDC    BREAST LUMPECTOMY Right     01/23/20    COLONOSCOPY N/A 03/01/2024    Procedure: COLONOSCOPY;  Surgeon: Stuart Roberts MD;  Location: [facility] (4TH FLR);  Service: Endoscopy;  Laterality: N/A;  Referral: Fern Guerrero MD  PEG  Inst portal  LW  2/23-lvm for precall-MS  2/26-precall complete-Kpvt    INJECTION FOR SENTINEL NODE IDENTIFICATION Right 01/23/2020    Procedure: INJECTION, FOR SENTINEL NODE IDENTIFICATION;  Surgeon: Libertad Hernandez MD;  Location: [facility] OR [address] [place] [address];  Service: General;  Laterality: Right;    INSERTION OF TUNNELED CENTRAL VENOUS CATHETER (CVC) WITH SUBCUTANEOUS PORT Left 06/13/2019    Procedure: [id number] - CHEST;  Surgeon: Libertad Hernandez MD;  Location: [facility] OR [address] [place] [address];  Service: General;  Laterality: Left;    MASTECTOMY, PARTIAL Right 01/23/2020    Procedure: MASTECTOMY,  PARTIAL- w/Seed Localization;  Surgeon: Libertad Hernandez MD;  Location: [facility] OR 2ND FLR;  Service: General;  Laterality: Right;    SENTINEL LYMPH NODE BIOPSY Right 01/23/2020    Procedure: BIOPSY, LYMPH NODE, SENTINEL;  Surgeon: Libertad Hernandez MD;  Location: [facility] OR 2ND FLR;  Service: General;  Laterality: Right;    TUBAL LIGATION         Social History     Occupational History    Occupation:  worker     [employer]er: [employer]   Tobacco Use    Smoking status: Never    Smokeless tobacco: Never   Substance and Sexual Activity    Alcohol use: Not Currently     Comment: none    Drug use: Never    Sexual activity: Yes     Partners: Male     Birth control/protection: Post-menopausal         ROS      II. PHYSICAL EXAM     Physical Exam  CN intact  Alert, oriented x 3  No focal weakness - 5/5 strength throughout  SILT  No drift    III. ASSESSMENT & PLAN (MEDICAL DECISION MAKING)       Imaging Results: (I have personally reviewed all images and provided interpretation below)   Carotid Duplex: 60-79% narrowing on left, <50% on right      Assessment/Diagnosis and Plan:    1. Occlusion and stenosis of unspecified carotid artery        75 y.o. female with asymptomatic <80% left carotid stenosis, healthy and active at baseline, with no TIA or prior strokes    -Recommend daily ASA  -No need for surgical intervention  -F/u in 6 months with carotid U/S    Lucretia Liang MD (PGY-1)  [facility] - Vascular Surgery

## 2025-04-21 ENCOUNTER — RESULTS FOLLOW-UP (OUTPATIENT)
Dept: ENDOCRINOLOGY | Facility: HOSPITAL | Age: 75
End: 2025-04-21

## 2025-04-21 LAB — W ALKALINE PHOSPHATASE, BONE: 27.5 UG/L

## 2025-05-29 ENCOUNTER — TELEPHONE (OUTPATIENT)
Dept: HEMATOLOGY/ONCOLOGY | Facility: CLINIC | Age: 75
End: 2025-05-29
Payer: MEDICARE

## 2025-07-10 ENCOUNTER — OFFICE VISIT (OUTPATIENT)
Dept: ENDOCRINOLOGY | Facility: CLINIC | Age: 75
End: 2025-07-10
Payer: MEDICARE

## 2025-07-10 VITALS
BODY MASS INDEX: 26.41 KG/M2 | SYSTOLIC BLOOD PRESSURE: 128 MMHG | WEIGHT: 149.06 LBS | HEART RATE: 75 BPM | HEIGHT: 63 IN | DIASTOLIC BLOOD PRESSURE: 70 MMHG | OXYGEN SATURATION: 97 %

## 2025-07-10 DIAGNOSIS — R53.83 FATIGUE, UNSPECIFIED TYPE: ICD-10-CM

## 2025-07-10 DIAGNOSIS — E04.1 THYROID NODULE: ICD-10-CM

## 2025-07-10 DIAGNOSIS — M81.0 OSTEOPOROSIS, UNSPECIFIED OSTEOPOROSIS TYPE, UNSPECIFIED PATHOLOGICAL FRACTURE PRESENCE: Primary | ICD-10-CM

## 2025-07-10 DIAGNOSIS — R74.8 ELEVATED ALKALINE PHOSPHATASE LEVEL: ICD-10-CM

## 2025-07-10 DIAGNOSIS — E21.3 HYPERPARATHYROIDISM: ICD-10-CM

## 2025-07-10 DIAGNOSIS — E55.9 VITAMIN D DEFICIENCY: ICD-10-CM

## 2025-07-10 DIAGNOSIS — E05.90 SUBCLINICAL HYPERTHYROIDISM: ICD-10-CM

## 2025-07-10 PROCEDURE — 3288F FALL RISK ASSESSMENT DOCD: CPT | Mod: CPTII,S$GLB,,

## 2025-07-10 PROCEDURE — 99999 PR PBB SHADOW E&M-EST. PATIENT-LVL IV: CPT | Mod: PBBFAC,,,

## 2025-07-10 PROCEDURE — 1126F AMNT PAIN NOTED NONE PRSNT: CPT | Mod: CPTII,S$GLB,,

## 2025-07-10 PROCEDURE — 3044F HG A1C LEVEL LT 7.0%: CPT | Mod: CPTII,S$GLB,,

## 2025-07-10 PROCEDURE — 3008F BODY MASS INDEX DOCD: CPT | Mod: CPTII,S$GLB,,

## 2025-07-10 PROCEDURE — 3074F SYST BP LT 130 MM HG: CPT | Mod: CPTII,S$GLB,,

## 2025-07-10 PROCEDURE — 1101F PT FALLS ASSESS-DOCD LE1/YR: CPT | Mod: CPTII,S$GLB,,

## 2025-07-10 PROCEDURE — G2211 COMPLEX E/M VISIT ADD ON: HCPCS | Mod: S$GLB,,,

## 2025-07-10 PROCEDURE — 3078F DIAST BP <80 MM HG: CPT | Mod: CPTII,S$GLB,,

## 2025-07-10 PROCEDURE — 99214 OFFICE O/P EST MOD 30 MIN: CPT | Mod: S$GLB,,,

## 2025-07-10 PROCEDURE — 1159F MED LIST DOCD IN RCRD: CPT | Mod: CPTII,S$GLB,,

## 2025-07-10 NOTE — PROGRESS NOTES
Subjective:      Patient ID: Sharri Cline is a 75 y.o. female.    Chief Complaint:  Follow-up and Thyroid Nodule      History of Present Illness  Sharri Cline is here for follow up of Thyroid nodules and of Osteoporosis.  Previously seen by me 04/2025.    Interval history:  She complains of fatigue, lightheadedness, and memory problems (short term memory loss). Her  had a stroke last week. She's been more stressed and anxious. She wants to discuss lifestyle management. Medication will be last resort    With regards to thyroid nodule:    Found: 2018    Thyroid US 02/2025   FINDINGS:  Right lobe of the thyroid measures 5.2 x 2.1 x 1.8 cm.  Similar 3.3 x 1.7 x 2.0 cm solid, hypoechoic nodule with microcalcification.  This is reportedly undergone FNA with benign results.     The isthmus measures 0.2 cm.  No nodules.     Left lobe of the thyroid measures 4.0 x 1.1 x 1.1 cm.  There are 2 subcentimeter cystic foci with internal calcification in the upper pole the left thyroid lobe favored to represent colloid cysts.  Additional stable 0.9 x 0.7 x 0.7 cm solid, predominantly hypoechoic nodule with microcalcification in the midpole of the left lobe, TR 5.     Normal thyroid parenchyma.     Cervical lymph nodes demonstrate normal morphology and size.     Impression:  1. Stable right TR 5 thyroid nodule which has previously undergone biopsy with benign results.  2. Stable left TR 5 thyroid nodule, essentially unchanged from 12/02/2020.    FNA:   10/19/18  THYROID, RIGHT LOBE NODULE, FINE-NEEDLE ASPIRATION (CYTOLOGY):  - Benign ([place] Classification System II).  - Benign follicular cells and colloid.    Lab Results   Component Value Date    TSH 1.029 04/17/2025    [id number] 83 12/11/2020    FREET4 0.81 04/17/2025     Signs or Symptoms:   Difficulty breathing: Denies  Difficulty swallowing: Denies  Voice Changes: Denies   FH of thyroid cancer: Denies  Personal history of radiation treatment or exposure: +  radiation for breast cancer    Denies symptoms of hyperthyroidism.    Denies weight changes  Denies  bowel changes  Denies heat or cold intolerance   Denies hair nail or skin changes  Denies chest pain, palpitations or shortness of breath  Denies tremors    With regards to Vitamin D Deficiency:    Vit D, 25-Hydroxy   Date Value Ref Range Status   02/17/2025 27 (L) 30 - 96 ng/mL Final     Comment:     Vitamin D deficiency.........<10 ng/mL                              Vitamin D insufficiency......10-29 ng/mL       Vitamin D sufficiency........> or equal to 30 ng/mL  Vitamin D toxicity............>100 ng/mL       Current Meds: OTC Vit D3 1000iu daily.   Does not take it daily    With regards to osteoporosis:     5/2022 work up of accelerated bone loss: elevated PTH, serum calcium WNL, low urine Ca - started on Citracal petite.     BMD:   10/31/2023 [place] [place]  Lumbar spine (L1-L4):               BMD is 0.711 g/cm2, T-score is -3.1, and Z-score is -1.6.     Total hip:                                BMD is 0.743 g/cm2, T-score is -1.6, and Z-score is -0.7.     Femoral neck:                          BMD is 0.682 g/cm2, T-score is -1.5, and Z-score is -0.4.     Distal 1/3 radius:                      Not applicable     FRAX:     7.4% risk of a major osteoporotic fracture in the next 10 years.     1.1% risk of hip fracture in the next 10 years.     Impression:     *Osteoporosis based on T-score below -2.5  *Fracture risk is very high due to T-score below -3.0  *Compared with previous DXA, BMD at the lumbar spine has remained stable, and BMD at the total hip has remained stable.    Medications: None, does not want to take medicine     Calcium intake: os-fany 500 mg 2 tablets BID, gets dietary calcium (almond milk)  Vit D intake: OTC Vit D3 1000iu daily- not consistent    Weight bearing exercise: walking but trying to work more with improved weather  Balance: not as good but very cautious, home balance exercises  Falls: Denies    Fractures: Denies  ~1987 fractured wrist (unsure which side) - fell while skating   Significant height loss (>2 inches): Denies     Family history: Denies    Menopause: early 50s  Denies HRT.     Tobacco Use: Denies  Alcohol Use: Denies  Glucocorticoid History: Denies  Anticoagulant Use: Denies  GERD/PPI Use: Denies  History of Malabsorption: Denies  Antiseizure Medications: Denies  History of Thyroid Disease: Denies  Kidney Disease: Denies  Personal history of kidney stones: Denies  Family history of kidney stones: Denies  Family history of bone disease or fracture: Denies  Radiation treatment for breast cancer.  History of MI or stroke: Denies  Dental Visit: Denies - dentures       Lab Results   Component Value Date    CALCIUM 9.7 02/17/2025    PHOS 3.9 10/03/2023     Vit D, 25-Hydroxy   Date Value Ref Range Status   02/17/2025 27 (L) 30 - 96 ng/mL Final     Comment:     Vitamin D deficiency.........<10 ng/mL                              Vitamin D insufficiency......10-29 ng/mL       Vitamin D sufficiency........> or equal to 30 ng/mL  Vitamin D toxicity............>100 ng/mL       Lab Results   Component Value Date    CALCIUM 9.7 02/17/2025    CALCIUM 9.9 11/03/2023    CALCIUM 9.6 10/03/2023    ALBUMIN 3.8 02/17/2025    ALBUMIN 3.6 03/04/2024    ALBUMIN 4.0 11/03/2023    PTH 71.6 02/17/2025    PTH 84.6 (H) 10/03/2023    .0 (H) 03/07/2023       Review of Systems  As above    Objective:   Physical Exam  Constitutional:       Appearance: Normal appearance.   Neck:      Thyroid: No thyroid mass, thyromegaly or thyroid tenderness.   Cardiovascular:      Rate and Rhythm: Normal rate.      Comments: No edema  Pulmonary:      Effort: Pulmonary effort is normal.   Musculoskeletal:      Cervical back: No tenderness.   Lymphadenopathy:      Cervical: No cervical adenopathy.   Neurological:      Mental Status: She is alert.   Psychiatric:         Mood and Affect: Mood normal.         Behavior: Behavior normal.  "      Visit Vitals  /70 (BP Location: Right arm, Patient Position: Sitting)   Pulse 75   Ht 5' 3" (1.6 m)   Wt 67.6 kg (149 lb 0.5 oz)   SpO2 97%   BMI 26.40 kg/m²         Body mass index is 26.4 kg/m².    Lab Review:   Lab Results   Component Value Date    HGBA1C 5.0 02/17/2025     Lab Results   Component Value Date    CHOL 187 02/17/2025    HDL 70 02/17/2025    LDLCALC 106.6 02/17/2025    TRIG 52 02/17/2025    CHOLHDL 37.4 02/17/2025     Lab Results   Component Value Date     02/17/2025    K 4.0 02/17/2025     02/17/2025    CO2 25 02/17/2025    GLU 86 02/17/2025    BUN 11 02/17/2025    CREATININE 0.7 02/17/2025    CALCIUM 9.7 02/17/2025    PROT 7.5 02/17/2025    ALBUMIN 3.8 02/17/2025    BILITOT 1.4 (H) 02/17/2025    ALKPHOS 167 (H) 02/17/2025    AST 28 02/17/2025    ALT 9 (L) 02/17/2025    ANIONGAP 8 02/17/2025    ESTGFRAFRICA >60.0 05/30/2022    EGFRNONAA >60.0 05/30/2022    TSH 1.029 04/17/2025     Vit D, 25-Hydroxy   Date Value Ref Range Status   02/17/2025 27 (L) 30 - 96 ng/mL Final     Comment:     Vitamin D deficiency.........<10 ng/mL                              Vitamin D insufficiency......10-29 ng/mL       Vitamin D sufficiency........> or equal to 30 ng/mL  Vitamin D toxicity............>100 ng/mL       Assessment and Plan     1. Osteoporosis, unspecified osteoporosis type, unspecified pathological fracture presence  Vitamin D    DXA Bone Density Axial Skeleton 1 or more sites      2. Subclinical hyperthyroidism  TSH    T4, Free      3. Fatigue, unspecified type  Cortisol, 8AM      4. Thyroid nodule        5. Vitamin D deficiency        6. Hyperparathyroidism  Comprehensive Metabolic Panel      7. Elevated alkaline phosphatase level  Comprehensive Metabolic Panel            Thyroid nodule  -- FNA: 10/19/18  THYROID, RIGHT LOBE NODULE, FINE-NEEDLE ASPIRATION (CYTOLOGY):  - Benign ([place] Classification System II).  - Benign follicular cells and colloid.  -- Thyroid US 02/2025 stable: " 3.3 cm TR5 right thyroid nodule (benign biopsy) and 0.9 cm TR5 left thyroid nodule (unchanged since 2020)  -- Repeat thyroid US in 02/2026    Subclinical hyperthyroidism   - Low TSH noted on labs since 2018 with overall normal FT4   - TSH fluctuates between normal and decreased with lowest TSH 0.079 in 2018   - Reassuring that patient is biochemically euthyroid   - Last TSH within normal range   - Plan to repeat TSH and FT4   - If normal, plan to monitor periodically   - Will consider treatment if patient develops symptoms or if TSH is suppressed    Vitamin D deficiency  Encouraged vitamin D compliance  Discussed that low vitamin D can contribute to fatigue    Osteoporosis  -- Risks include menopause.  -- Reviewed basics of quantity versus quality.  -- Reassuring they are not fracturing.  -- 5/2022 work up of accelerated bone loss: elevated PTH, serum calcium WNL, low urine Ca - started on Citracal petite.   -- Recommend:  -Pharmacological therapy is recommended for patients with osteopenia if the 10 year probability of a hip fracture is >3% and 10 year probability of other major osteoporotic fractures is >20%.  Treatment options and potential side effects discussed for PO bisphosphonates, reclast, Denosumab, and Teriparatide.   -Treatment: declines    -Calcium and Vitamin D RDD provided.  -Exercise: recommended.  -Fall precautions made in the home.  -Alerted that if dental work needs to be done it should be done prior to initiating therapy. Dental health: UTD  -- Repeat DEXA scan 10/2025  -- If bone density worsens, will discuss osteoporosis treatment again    Hyperparathyroidism   - History of elevated PTH since 2015 with relatively stable calcium   - Last PTH and calcium were within normal range   - History of vitamin D insufficiency and supplement noncompliance   - Encouraged vitamin D compliance   - Once vitamin D normalized, will check RFP and PTH   - If labs stable, will monitor calcium and vitamin D  levels    Elevated alkaline phosphatase level   - Elevated alk phos noted on labs since 2019   - GGT normal   - Bone specific alk phos   - PTH elevated   - Previous liver biopsy 11/2018 with mild portal inflammation   - Will continue to monitor    Fatigue   - Patient reports worsening fatigue   - Discussed that fatigue is multifactorial- including stress and poor sleep health   - Will repeat TFT, CMP, vitamin D, check 8 AM cortisol   - If labs normal, encouraged patient to discuss with PCP        Follow up in about 1 year (around 7/10/2026).    Visit today included increased complexity associated with the care of the problems addressed and managing the longitudinal care of the patient due to the serious and/or complex managed problems.    Brigida Lindo PA-C

## 2025-07-10 NOTE — ASSESSMENT & PLAN NOTE
-- Risks include menopause.  -- Reviewed basics of quantity versus quality.  -- Reassuring they are not fracturing.  -- 5/2022 work up of accelerated bone loss: elevated PTH, serum calcium WNL, low urine Ca - started on Citracal petite.   -- Recommend:  -Pharmacological therapy is recommended for patients with osteopenia if the 10 year probability of a hip fracture is >3% and 10 year probability of other major osteoporotic fractures is >20%.  Treatment options and potential side effects discussed for PO bisphosphonates, reclast, Denosumab, and Teriparatide.   -Treatment: declines    -Calcium and Vitamin D RDD provided.  -Exercise: recommended.  -Fall precautions made in the home.  -Alerted that if dental work needs to be done it should be done prior to initiating therapy. Dental health: UTD  -- Repeat DEXA scan 10/2025  -- If bone density worsens, will discuss osteoporosis treatment again

## 2025-07-10 NOTE — ASSESSMENT & PLAN NOTE
- Patient reports worsening fatigue   - Discussed that fatigue is multifactorial- including stress and poor sleep health   - Will repeat TFT, CMP, vitamin D, check 8 AM cortisol   - If labs normal, encouraged patient to discuss with PCP

## 2025-07-10 NOTE — ASSESSMENT & PLAN NOTE
- History of elevated PTH since 2015 with relatively stable calcium   - Last PTH and calcium were within normal range   - History of vitamin D insufficiency and supplement noncompliance   - Encouraged vitamin D compliance   - Once vitamin D normalized, will check RFP and PTH   - If labs stable, will monitor calcium and vitamin D levels

## 2025-07-10 NOTE — ASSESSMENT & PLAN NOTE
- Elevated alk phos noted on labs since 2019   - GGT normal   - Bone specific alk phos   - PTH elevated   - Previous liver biopsy 11/2018 with mild portal inflammation   - Will continue to monitor

## 2025-07-10 NOTE — ASSESSMENT & PLAN NOTE
-- FNA: 10/19/18  THYROID, RIGHT LOBE NODULE, FINE-NEEDLE ASPIRATION (CYTOLOGY):  - Benign ([place] Classification System II).  - Benign follicular cells and colloid.  -- Thyroid US 02/2025 stable: 3.3 cm TR5 right thyroid nodule (benign biopsy) and 0.9 cm TR5 left thyroid nodule (unchanged since 2020)  -- Repeat thyroid US in 02/2026

## 2025-07-10 NOTE — ASSESSMENT & PLAN NOTE
- Low TSH noted on labs since 2018 with overall normal FT4   - TSH fluctuates between normal and decreased with lowest TSH 0.079 in 2018   - Reassuring that patient is biochemically euthyroid   - Last TSH within normal range   - Plan to repeat TSH and FT4   - If normal, plan to monitor periodically   - Will consider treatment if patient develops symptoms or if TSH is suppressed

## 2025-07-11 ENCOUNTER — LAB VISIT (OUTPATIENT)
Dept: LAB | Facility: HOSPITAL | Age: 75
End: 2025-07-11
Payer: MEDICARE

## 2025-07-11 ENCOUNTER — TELEPHONE (OUTPATIENT)
Dept: ENDOCRINOLOGY | Facility: CLINIC | Age: 75
End: 2025-07-11
Payer: MEDICARE

## 2025-07-11 DIAGNOSIS — R53.83 FATIGUE, UNSPECIFIED TYPE: ICD-10-CM

## 2025-07-11 DIAGNOSIS — E21.3 HYPERPARATHYROIDISM: ICD-10-CM

## 2025-07-11 DIAGNOSIS — R74.8 ELEVATED ALKALINE PHOSPHATASE LEVEL: ICD-10-CM

## 2025-07-11 DIAGNOSIS — E05.90 SUBCLINICAL HYPERTHYROIDISM: ICD-10-CM

## 2025-07-11 DIAGNOSIS — M81.0 OSTEOPOROSIS, UNSPECIFIED OSTEOPOROSIS TYPE, UNSPECIFIED PATHOLOGICAL FRACTURE PRESENCE: ICD-10-CM

## 2025-07-11 LAB
25(OH)D3+25(OH)D2 SERPL-MCNC: 26 NG/ML (ref 30–96)
ALBUMIN SERPL BCP-MCNC: 3.8 G/DL (ref 3.5–5.2)
ALP SERPL-CCNC: 152 UNIT/L (ref 40–150)
ALT SERPL W/O P-5'-P-CCNC: 8 UNIT/L (ref 10–44)
ANION GAP (OHS): 7 MMOL/L (ref 8–16)
AST SERPL-CCNC: 17 UNIT/L (ref 11–45)
BILIRUB SERPL-MCNC: 1.4 MG/DL (ref 0.1–1)
BUN SERPL-MCNC: 13 MG/DL (ref 8–23)
CALCIUM SERPL-MCNC: 9.2 MG/DL (ref 8.7–10.5)
CHLORIDE SERPL-SCNC: 107 MMOL/L (ref 95–110)
CO2 SERPL-SCNC: 26 MMOL/L (ref 23–29)
CORTIS SERPL-MCNC: 9.9 UG/DL (ref 4.3–22.4)
CREAT SERPL-MCNC: 0.7 MG/DL (ref 0.5–1.4)
GFR SERPLBLD CREATININE-BSD FMLA CKD-EPI: >60 ML/MIN/1.73/M2
GLUCOSE SERPL-MCNC: 81 MG/DL (ref 70–110)
POTASSIUM SERPL-SCNC: 3.8 MMOL/L (ref 3.5–5.1)
PROT SERPL-MCNC: 6.9 GM/DL (ref 6–8.4)
SODIUM SERPL-SCNC: 140 MMOL/L (ref 136–145)
T4 FREE SERPL-MCNC: 0.82 NG/DL (ref 0.71–1.51)
TSH SERPL-ACNC: 0.68 UIU/ML (ref 0.4–4)

## 2025-07-11 PROCEDURE — 82533 TOTAL CORTISOL: CPT

## 2025-07-11 PROCEDURE — 84439 ASSAY OF FREE THYROXINE: CPT

## 2025-07-11 PROCEDURE — 82306 VITAMIN D 25 HYDROXY: CPT

## 2025-07-11 PROCEDURE — 84443 ASSAY THYROID STIM HORMONE: CPT

## 2025-07-11 PROCEDURE — 84460 ALANINE AMINO (ALT) (SGPT): CPT

## 2025-07-11 PROCEDURE — 36415 COLL VENOUS BLD VENIPUNCTURE: CPT

## 2025-07-11 NOTE — TELEPHONE ENCOUNTER
----- Message from Brigida Lindo PA-C sent at 7/11/2025  3:04 PM CDT -----  Please call the patient with the following:    Vitamin D was slightly low. I recommend taking your vitamin D3 supplement consistently. I don't believe it's low enough to require a dose increase.  Metabolic panel is stable in comparison to your previous lab in February.  Thyroid levels were within normal range.   The cortisol level came back indeterminate. If you recall, we were testing for a rare condition called adrenal insufficiency which can cause fatigue, muscle weakness, loss of appetite, weight loss,   and low blood pressure. I don't believe you are at risk of it but anytime the cortisol is indeterminate, I recommend confirmation testing. This test is done by our nurses on the 6th floor clinic.   They will give you an injection of ACTH, a hormone used to stimulate cortisol, and they will collect several labs. If you choose to have this test done, I will place the order and the nurses will   contact you to schedule the test and go over the instructions.     Thanks  ----- Message -----  From: Lab, Background User  Sent: 7/11/2025  10:42 AM CDT  To: Brigida Lindo PA-C

## 2025-07-14 DIAGNOSIS — R42 LIGHTHEADED: ICD-10-CM

## 2025-07-14 DIAGNOSIS — R53.83 FATIGUE, UNSPECIFIED TYPE: Primary | ICD-10-CM

## 2025-07-14 RX ORDER — COSYNTROPIN 0.25 MG/ML
0.25 INJECTION, POWDER, FOR SOLUTION INTRAMUSCULAR; INTRAVENOUS ONCE
Status: SHIPPED | OUTPATIENT
Start: 2025-07-14

## 2025-07-18 ENCOUNTER — PATIENT MESSAGE (OUTPATIENT)
Dept: ENDOCRINOLOGY | Facility: CLINIC | Age: 75
End: 2025-07-18
Payer: MEDICARE

## 2025-07-21 ENCOUNTER — PATIENT MESSAGE (OUTPATIENT)
Dept: ADMINISTRATIVE | Facility: HOSPITAL | Age: 75
End: 2025-07-21
Payer: MEDICARE

## 2025-07-23 ENCOUNTER — TELEPHONE (OUTPATIENT)
Dept: INTERNAL MEDICINE | Facility: CLINIC | Age: 75
End: 2025-07-23
Payer: MEDICARE

## 2025-07-23 NOTE — TELEPHONE ENCOUNTER
Copied from CRM #[id number]. Topic: General Inquiry - Patient Advice  >> Jul 23, 2025 12:20 PM Ailyn wrote:  Type:  Needs Medical Advice    Who Called: Patient   Symptoms Good Afternoon, Patient need a letter that she was at the hospital with spouse. Spouse was in the hospital  06 26 2025  to  06 28 2025 at the Hospital. During July 1st to July 12th at home caring for him .  Patient need letter for her sick leave at work. Patient had to be in the hospital with spouse and stay at home with spouse for a week.     Spouse name is : Rayshawn Cline   How long has patient had these symptoms:    Pharmacy name and phone #:  na  Would the patient rather a call back or a response via [facility]? Call   Best Call Back Number:    Additional Information: Please call and speak to patient for information

## 2025-07-23 NOTE — TELEPHONE ENCOUNTER
Spoke with patient. Patient states that she needs a work note for the days she missed from work due to her  being hospitalized for stroke. Patient states she needs a note for June 26-28,2025 which was the hospital stay and July 1-12,2025 for caring for him at home.

## 2025-07-29 ENCOUNTER — TELEPHONE (OUTPATIENT)
Dept: ENDOSCOPY | Facility: HOSPITAL | Age: 75
End: 2025-07-29
Payer: MEDICARE

## 2025-07-29 NOTE — TELEPHONE ENCOUNTER
Copied from CRM #[id number]. Topic: Appointments - Appointment Access  >> Jul 29, 2025  3:32 PM Ammy wrote:  Who Called: Sharri Segura Appt  Preferred Method of Contact: Phone Call  Patient's Preferred Phone Number on File: [phone]     Additional Information: patient is calling because she needs to change her appt that is scheduled for 08/05 at 9 am she will be working and time no longer works for her

## 2025-07-30 ENCOUNTER — TELEPHONE (OUTPATIENT)
Dept: INTERNAL MEDICINE | Facility: CLINIC | Age: 75
End: 2025-07-30
Payer: MEDICARE

## 2025-07-30 ENCOUNTER — PATIENT MESSAGE (OUTPATIENT)
Dept: ENDOCRINOLOGY | Facility: CLINIC | Age: 75
End: 2025-07-30
Payer: MEDICARE

## 2025-07-30 NOTE — TELEPHONE ENCOUNTER
Advised patient that her message was forwarded to Dr. Guerrero in reference to her work statement. LVM for patient to call the office if she has any questions.

## 2025-07-30 NOTE — TELEPHONE ENCOUNTER
Copied from CRM #[id number]. Topic: General Inquiry - Patient Advice  >> Jul 30, 2025  8:34 AM Jazlyn wrote:  .[email or web address]     Patient is calling for Medical Advice regarding: pt states that she would need a return to work statement because her  had a stroke. She was off with him from June 26 until June 28 and July 1-12.  Please call and advise.     How long has patient had these symptoms:    Pharmacy name and phone#:    Patient wants a call back or thru [facility], provide patient's call back phone number:    Comments:    Please advise patient replies from provider may take up to 48 hours.

## 2025-07-30 NOTE — TELEPHONE ENCOUNTER
Copied from CRM #[id number]. Topic: General Inquiry - Patient Advice  >> Jul 30, 2025  8:34 AM Jazlyn wrote:  .[email or web address]     Patient is calling for Medical Advice regarding: pt states that she would need a return to work statement because her  had a stroke. She was off with him from June 26 until June 28 and July 1-12.  Please call and advise.     How long has patient had these symptoms:    Pharmacy name and phone#:    Patient wants a call back or thru [facility], provide patient's call back phone number:    Comments:    Please advise patient replies from provider may take up to 48 hours.

## 2025-07-31 NOTE — TELEPHONE ENCOUNTER
Pt was called and her appointment was scheduled to see Dr. Guerrero to discuss the paperwork needed to return to work

## 2025-08-04 ENCOUNTER — HOSPITAL ENCOUNTER (OUTPATIENT)
Dept: CARDIOLOGY | Facility: CLINIC | Age: 75
Discharge: HOME | End: 2025-08-04
Payer: MEDICARE

## 2025-08-04 ENCOUNTER — LAB VISIT (OUTPATIENT)
Dept: LAB | Facility: HOSPITAL | Age: 75
End: 2025-08-04
Payer: MEDICARE

## 2025-08-04 ENCOUNTER — OFFICE VISIT (OUTPATIENT)
Dept: INTERNAL MEDICINE | Facility: CLINIC | Age: 75
End: 2025-08-04
Payer: MEDICARE

## 2025-08-04 VITALS
SYSTOLIC BLOOD PRESSURE: 116 MMHG | HEART RATE: 71 BPM | HEIGHT: 63 IN | DIASTOLIC BLOOD PRESSURE: 62 MMHG | BODY MASS INDEX: 26.68 KG/M2 | WEIGHT: 150.56 LBS | OXYGEN SATURATION: 98 %

## 2025-08-04 DIAGNOSIS — R42 LIGHTHEADED: ICD-10-CM

## 2025-08-04 DIAGNOSIS — C50.311 MALIGNANT NEOPLASM OF LOWER-INNER QUADRANT OF RIGHT BREAST OF FEMALE, ESTROGEN RECEPTOR NEGATIVE: ICD-10-CM

## 2025-08-04 DIAGNOSIS — R79.9 ABNORMAL FINDING OF BLOOD CHEMISTRY, UNSPECIFIED: ICD-10-CM

## 2025-08-04 DIAGNOSIS — Z17.1 MALIGNANT NEOPLASM OF LOWER-INNER QUADRANT OF RIGHT BREAST OF FEMALE, ESTROGEN RECEPTOR NEGATIVE: ICD-10-CM

## 2025-08-04 DIAGNOSIS — E05.90 SUBCLINICAL HYPERTHYROIDISM: ICD-10-CM

## 2025-08-04 DIAGNOSIS — R42 LIGHTHEADED: Primary | ICD-10-CM

## 2025-08-04 LAB
ABSOLUTE EOSINOPHIL (OHS): 0.22 K/UL
ABSOLUTE MONOCYTE (OHS): 0.5 K/UL (ref 0.3–1)
ABSOLUTE NEUTROPHIL COUNT (OHS): 1.86 K/UL (ref 1.8–7.7)
ALBUMIN SERPL BCP-MCNC: 3.7 G/DL (ref 3.5–5.2)
ALP SERPL-CCNC: 154 UNIT/L (ref 40–150)
ALT SERPL W/O P-5'-P-CCNC: 14 UNIT/L (ref 0–55)
ANION GAP (OHS): 6 MMOL/L (ref 8–16)
AST SERPL-CCNC: 26 UNIT/L (ref 0–50)
BASOPHILS # BLD AUTO: 0.02 K/UL
BASOPHILS NFR BLD AUTO: 0.4 %
BILIRUB SERPL-MCNC: 1 MG/DL (ref 0.1–1)
BUN SERPL-MCNC: 14 MG/DL (ref 8–23)
CALCIUM SERPL-MCNC: 9.3 MG/DL (ref 8.7–10.5)
CHLORIDE SERPL-SCNC: 105 MMOL/L (ref 95–110)
CO2 SERPL-SCNC: 26 MMOL/L (ref 23–29)
CREAT SERPL-MCNC: 0.7 MG/DL (ref 0.5–1.4)
ERYTHROCYTE [DISTWIDTH] IN BLOOD BY AUTOMATED COUNT: 14.8 % (ref 11.5–14.5)
GFR SERPLBLD CREATININE-BSD FMLA CKD-EPI: >60 ML/MIN/1.73/M2
GLUCOSE SERPL-MCNC: 73 MG/DL (ref 70–110)
HCT VFR BLD AUTO: 41.6 % (ref 37–48.5)
HGB BLD-MCNC: 12.7 GM/DL (ref 12–16)
IMM GRANULOCYTES # BLD AUTO: 0.02 K/UL (ref 0–0.04)
IMM GRANULOCYTES NFR BLD AUTO: 0.4 % (ref 0–0.5)
IRON SATN MFR SERPL: 26 % (ref 20–50)
IRON SERPL-MCNC: 93 UG/DL (ref 30–160)
LYMPHOCYTES # BLD AUTO: 2.22 K/UL (ref 1–4.8)
MCH RBC QN AUTO: 28 PG (ref 27–31)
MCHC RBC AUTO-ENTMCNC: 30.5 G/DL (ref 32–36)
MCV RBC AUTO: 92 FL (ref 82–98)
NUCLEATED RBC (/100WBC) (OHS): 0 /100 WBC
OHS QRS DURATION: 66 MS
OHS QTC CALCULATION: 447 MS
PLATELET # BLD AUTO: 179 K/UL (ref 150–450)
PMV BLD AUTO: 11.5 FL (ref 9.2–12.9)
POTASSIUM SERPL-SCNC: 4 MMOL/L (ref 3.5–5.1)
PROT SERPL-MCNC: 7 GM/DL (ref 6–8.4)
RBC # BLD AUTO: 4.53 M/UL (ref 4–5.4)
RELATIVE EOSINOPHIL (OHS): 4.5 %
RELATIVE LYMPHOCYTE (OHS): 45.9 % (ref 18–48)
RELATIVE MONOCYTE (OHS): 10.3 % (ref 4–15)
RELATIVE NEUTROPHIL (OHS): 38.5 % (ref 38–73)
SODIUM SERPL-SCNC: 137 MMOL/L (ref 136–145)
TIBC SERPL-MCNC: 363 UG/DL (ref 250–450)
TRANSFERRIN SERPL-MCNC: 245 MG/DL (ref 200–375)
WBC # BLD AUTO: 4.84 K/UL (ref 3.9–12.7)

## 2025-08-04 PROCEDURE — 99999 PR PBB SHADOW E&M-EST. PATIENT-LVL IV: CPT | Mod: PBBFAC,,, | Performed by: INTERNAL MEDICINE

## 2025-08-04 PROCEDURE — 3074F SYST BP LT 130 MM HG: CPT | Mod: CPTII,S$GLB,, | Performed by: INTERNAL MEDICINE

## 2025-08-04 PROCEDURE — 3008F BODY MASS INDEX DOCD: CPT | Mod: CPTII,S$GLB,, | Performed by: INTERNAL MEDICINE

## 2025-08-04 PROCEDURE — 1126F AMNT PAIN NOTED NONE PRSNT: CPT | Mod: CPTII,S$GLB,, | Performed by: INTERNAL MEDICINE

## 2025-08-04 PROCEDURE — 85025 COMPLETE CBC W/AUTO DIFF WBC: CPT

## 2025-08-04 PROCEDURE — 93010 ELECTROCARDIOGRAM REPORT: CPT | Mod: S$GLB,,, | Performed by: INTERNAL MEDICINE

## 2025-08-04 PROCEDURE — 83540 ASSAY OF IRON: CPT

## 2025-08-04 PROCEDURE — 3044F HG A1C LEVEL LT 7.0%: CPT | Mod: CPTII,S$GLB,, | Performed by: INTERNAL MEDICINE

## 2025-08-04 PROCEDURE — 3078F DIAST BP <80 MM HG: CPT | Mod: CPTII,S$GLB,, | Performed by: INTERNAL MEDICINE

## 2025-08-04 PROCEDURE — 3288F FALL RISK ASSESSMENT DOCD: CPT | Mod: CPTII,S$GLB,, | Performed by: INTERNAL MEDICINE

## 2025-08-04 PROCEDURE — 84075 ASSAY ALKALINE PHOSPHATASE: CPT

## 2025-08-04 PROCEDURE — 36415 COLL VENOUS BLD VENIPUNCTURE: CPT

## 2025-08-04 PROCEDURE — 99214 OFFICE O/P EST MOD 30 MIN: CPT | Mod: S$GLB,,, | Performed by: INTERNAL MEDICINE

## 2025-08-04 PROCEDURE — 93000 ELECTROCARDIOGRAM COMPLETE: CPT | Mod: S$GLB,,, | Performed by: INTERNAL MEDICINE

## 2025-08-04 PROCEDURE — 1101F PT FALLS ASSESS-DOCD LE1/YR: CPT | Mod: CPTII,S$GLB,, | Performed by: INTERNAL MEDICINE

## 2025-08-13 ENCOUNTER — CLINICAL SUPPORT (OUTPATIENT)
Dept: ENDOCRINOLOGY | Facility: CLINIC | Age: 75
End: 2025-08-13
Payer: MEDICARE

## 2025-08-13 DIAGNOSIS — R53.83 FATIGUE, UNSPECIFIED TYPE: ICD-10-CM

## 2025-08-13 DIAGNOSIS — R42 LIGHTHEADED: ICD-10-CM

## 2025-08-13 LAB
CORTIS SERPL-MCNC: 11.9 UG/DL
CORTIS SERPL-MCNC: 21.7 UG/DL
CORTIS SERPL-MCNC: 26.4 UG/DL

## 2025-08-13 PROCEDURE — 82533 TOTAL CORTISOL: CPT

## 2025-08-13 PROCEDURE — 82024 ASSAY OF ACTH: CPT

## 2025-08-15 LAB — ACTH (OHS): 34 PG/ML

## 2025-08-20 ENCOUNTER — OFFICE VISIT (OUTPATIENT)
Dept: HEMATOLOGY/ONCOLOGY | Facility: CLINIC | Age: 75
End: 2025-08-20
Payer: MEDICARE

## 2025-08-20 ENCOUNTER — HOSPITAL ENCOUNTER (OUTPATIENT)
Dept: RADIOLOGY | Facility: HOSPITAL | Age: 75
Discharge: HOME | End: 2025-08-20
Attending: INTERNAL MEDICINE
Payer: MEDICARE

## 2025-08-20 VITALS
HEIGHT: 63 IN | OXYGEN SATURATION: 98 % | TEMPERATURE: 98 F | DIASTOLIC BLOOD PRESSURE: 69 MMHG | BODY MASS INDEX: 26.29 KG/M2 | SYSTOLIC BLOOD PRESSURE: 137 MMHG | WEIGHT: 148.38 LBS | RESPIRATION RATE: 16 BRPM | HEART RATE: 68 BPM

## 2025-08-20 DIAGNOSIS — Z12.31 ENCOUNTER FOR SCREENING MAMMOGRAM FOR HIGH-RISK PATIENT: ICD-10-CM

## 2025-08-20 DIAGNOSIS — Z17.1 MALIGNANT NEOPLASM OF LOWER-INNER QUADRANT OF RIGHT BREAST OF FEMALE, ESTROGEN RECEPTOR NEGATIVE: Primary | ICD-10-CM

## 2025-08-20 DIAGNOSIS — C50.311 MALIGNANT NEOPLASM OF LOWER-INNER QUADRANT OF RIGHT BREAST OF FEMALE, ESTROGEN RECEPTOR NEGATIVE: Primary | ICD-10-CM

## 2025-08-20 PROCEDURE — 3044F HG A1C LEVEL LT 7.0%: CPT | Mod: CPTII,S$GLB,, | Performed by: INTERNAL MEDICINE

## 2025-08-20 PROCEDURE — 3008F BODY MASS INDEX DOCD: CPT | Mod: CPTII,S$GLB,, | Performed by: INTERNAL MEDICINE

## 2025-08-20 PROCEDURE — 3078F DIAST BP <80 MM HG: CPT | Mod: CPTII,S$GLB,, | Performed by: INTERNAL MEDICINE

## 2025-08-20 PROCEDURE — 77063 BREAST TOMOSYNTHESIS BI: CPT | Mod: 26,,, | Performed by: RADIOLOGY

## 2025-08-20 PROCEDURE — 77067 SCR MAMMO BI INCL CAD: CPT | Mod: 26,,, | Performed by: RADIOLOGY

## 2025-08-20 PROCEDURE — 3288F FALL RISK ASSESSMENT DOCD: CPT | Mod: CPTII,S$GLB,, | Performed by: INTERNAL MEDICINE

## 2025-08-20 PROCEDURE — 1126F AMNT PAIN NOTED NONE PRSNT: CPT | Mod: CPTII,S$GLB,, | Performed by: INTERNAL MEDICINE

## 2025-08-20 PROCEDURE — 99999 PR PBB SHADOW E&M-EST. PATIENT-LVL IV: CPT | Mod: PBBFAC,,, | Performed by: INTERNAL MEDICINE

## 2025-08-20 PROCEDURE — 1101F PT FALLS ASSESS-DOCD LE1/YR: CPT | Mod: CPTII,S$GLB,, | Performed by: INTERNAL MEDICINE

## 2025-08-20 PROCEDURE — 77063 BREAST TOMOSYNTHESIS BI: CPT | Mod: TC

## 2025-08-20 PROCEDURE — 99213 OFFICE O/P EST LOW 20 MIN: CPT | Mod: S$GLB,,, | Performed by: INTERNAL MEDICINE

## 2025-08-20 PROCEDURE — 1159F MED LIST DOCD IN RCRD: CPT | Mod: CPTII,S$GLB,, | Performed by: INTERNAL MEDICINE

## 2025-08-20 PROCEDURE — 3075F SYST BP GE 130 - 139MM HG: CPT | Mod: CPTII,S$GLB,, | Performed by: INTERNAL MEDICINE

## 2025-08-22 ENCOUNTER — TELEPHONE (OUTPATIENT)
Dept: RADIOLOGY | Facility: HOSPITAL | Age: 75
End: 2025-08-22
Payer: MEDICARE

## 2025-08-25 DIAGNOSIS — Z00.00 ENCOUNTER FOR MEDICARE ANNUAL WELLNESS EXAM: ICD-10-CM

## 2025-08-26 ENCOUNTER — HOSPITAL ENCOUNTER (OUTPATIENT)
Dept: RADIOLOGY | Facility: HOSPITAL | Age: 75
Discharge: HOME | End: 2025-08-26
Attending: INTERNAL MEDICINE
Payer: MEDICARE

## 2025-08-26 DIAGNOSIS — R92.8 ABNORMAL FINDING ON BREAST IMAGING: ICD-10-CM

## 2025-08-26 PROCEDURE — 76642 ULTRASOUND BREAST LIMITED: CPT | Mod: 26,LT,, | Performed by: RADIOLOGY

## 2025-08-26 PROCEDURE — 76642 ULTRASOUND BREAST LIMITED: CPT | Mod: TC,LT

## 2025-08-27 ENCOUNTER — TELEPHONE (OUTPATIENT)
Dept: RADIOLOGY | Facility: HOSPITAL | Age: 75
End: 2025-08-27
Payer: MEDICARE

## 2025-09-02 ENCOUNTER — OFFICE VISIT (OUTPATIENT)
Dept: SURGERY | Facility: CLINIC | Age: 75
End: 2025-09-02
Payer: MEDICARE

## 2025-09-02 VITALS
SYSTOLIC BLOOD PRESSURE: 147 MMHG | DIASTOLIC BLOOD PRESSURE: 82 MMHG | WEIGHT: 150.13 LBS | HEIGHT: 63 IN | HEART RATE: 62 BPM | TEMPERATURE: 98 F | OXYGEN SATURATION: 100 % | BODY MASS INDEX: 26.6 KG/M2

## 2025-09-02 DIAGNOSIS — Z17.1 MALIGNANT NEOPLASM OF LOWER-INNER QUADRANT OF RIGHT BREAST OF FEMALE, ESTROGEN RECEPTOR NEGATIVE: ICD-10-CM

## 2025-09-02 DIAGNOSIS — R92.8 ABNORMALITY OF LEFT BREAST ON SCREENING MAMMOGRAPHY: ICD-10-CM

## 2025-09-02 DIAGNOSIS — Z08 ENCOUNTER FOR FOLLOW-UP EXAMINATION AFTER COMPLETED TREATMENT FOR MALIGNANT NEOPLASM: ICD-10-CM

## 2025-09-02 DIAGNOSIS — N64.52 NIPPLE DISCHARGE IN FEMALE: Primary | ICD-10-CM

## 2025-09-02 DIAGNOSIS — C50.311 MALIGNANT NEOPLASM OF LOWER-INNER QUADRANT OF RIGHT BREAST OF FEMALE, ESTROGEN RECEPTOR NEGATIVE: ICD-10-CM

## 2025-09-02 PROCEDURE — 3079F DIAST BP 80-89 MM HG: CPT | Mod: CPTII,S$GLB,, | Performed by: SURGERY

## 2025-09-02 PROCEDURE — 1101F PT FALLS ASSESS-DOCD LE1/YR: CPT | Mod: CPTII,S$GLB,, | Performed by: SURGERY

## 2025-09-02 PROCEDURE — 3044F HG A1C LEVEL LT 7.0%: CPT | Mod: CPTII,S$GLB,, | Performed by: SURGERY

## 2025-09-02 PROCEDURE — 3288F FALL RISK ASSESSMENT DOCD: CPT | Mod: CPTII,S$GLB,, | Performed by: SURGERY

## 2025-09-02 PROCEDURE — 99999 PR PBB SHADOW E&M-EST. PATIENT-LVL III: CPT | Mod: PBBFAC,,, | Performed by: SURGERY

## 2025-09-02 PROCEDURE — 1126F AMNT PAIN NOTED NONE PRSNT: CPT | Mod: CPTII,S$GLB,, | Performed by: SURGERY

## 2025-09-02 PROCEDURE — 3077F SYST BP >= 140 MM HG: CPT | Mod: CPTII,S$GLB,, | Performed by: SURGERY

## 2025-09-02 PROCEDURE — 99214 OFFICE O/P EST MOD 30 MIN: CPT | Mod: S$GLB,,, | Performed by: SURGERY

## 2025-09-02 PROCEDURE — 3008F BODY MASS INDEX DOCD: CPT | Mod: CPTII,S$GLB,, | Performed by: SURGERY

## 2025-09-02 PROCEDURE — 1159F MED LIST DOCD IN RCRD: CPT | Mod: CPTII,S$GLB,, | Performed by: SURGERY

## 2025-09-04 PROBLEM — N64.52 NIPPLE DISCHARGE IN FEMALE: Status: ACTIVE | Noted: 2025-09-04

## (undated) DEVICE — TRAY MINOR GEN SURG

## (undated) DEVICE — DRAPE THYROID WITH ARMBOARD

## (undated) DEVICE — EVACUATOR WOUND BULB 100CC

## (undated) DEVICE — SUT CTD VICRYL 3-0 CR/SH

## (undated) DEVICE — COVERS PROBE NR-48 STERILE

## (undated) DEVICE — SEE MEDLINE ITEM 157128

## (undated) DEVICE — SPONGE LAP 18X18 PREWASHED

## (undated) DEVICE — APPLICATOR CHLORAPREP ORN 26ML

## (undated) DEVICE — NEOGUARD COVER 4X30CM STERILE

## (undated) DEVICE — COVER PROBE NL STRL 3.6X96IN

## (undated) DEVICE — DRESSING TELFA STRL 4X3 LF

## (undated) DEVICE — SEE MEDLINE ITEM 152622

## (undated) DEVICE — ELECTRODE BLADE INSULATED 1 IN

## (undated) DEVICE — SEE MEDLINE ITEM 157117

## (undated) DEVICE — SET DECANTER MEDICHOICE

## (undated) DEVICE — BLADE SURG CARBON STEEL SZ11

## (undated) DEVICE — STOCKINET 4INX48

## (undated) DEVICE — DRESSING TRANS 4X4 TEGADERM

## (undated) DEVICE — ADHESIVE DERMABOND ADVANCED

## (undated) DEVICE — SUT VICRYL PLUS 4-0 P3 18IN

## (undated) DEVICE — SUT VICRYL 3-0 27 SH

## (undated) DEVICE — ELECTRODE REM PLYHSV RETURN 9

## (undated) DEVICE — SEE MEDLINE ITEM 152572

## (undated) DEVICE — GAUZE SPONGE 4X4 12PLY

## (undated) DEVICE — DRAPE C ARM 42 X 120 10/BX

## (undated) DEVICE — SYS LABEL CORRECT MED

## (undated) DEVICE — DRAPE INCISE IOBAN 2 23X17IN

## (undated) DEVICE — BLADE 4IN EDGE INSULATED

## (undated) DEVICE — GAUZE FLUFF XXLG 36X36 2 PLY

## (undated) DEVICE — SOL NACL 0.9% INJ PF/50151

## (undated) DEVICE — GOWN SURGICAL X-LARGE

## (undated) DEVICE — PACK UNIVERSAL SPLIT II

## (undated) DEVICE — SUT 2-0 VICRYL / SH (J417)

## (undated) DEVICE — BRA SURGICAL LG 38-40

## (undated) DEVICE — DRAPE STERI INSTRUMENT 1018

## (undated) DEVICE — NDL 18GA X1 1/2 REG BEVEL

## (undated) DEVICE — SOL 0.9% NACL IRRI.IN STERIL

## (undated) DEVICE — SEE MEDLINE ITEM 146417

## (undated) DEVICE — BANDAGE GAUZE 6PLY FLUFF 2X3

## (undated) DEVICE — SYR DISP LL 5CC

## (undated) DEVICE — DRAIN CHANNEL ROUND 19FR

## (undated) DEVICE — SUT SILK 0 STRANDS 30IN BLK

## (undated) DEVICE — BRA POST SURGICAL WHT 36-38IN

## (undated) DEVICE — SUT ETHILON 2-0 PSLX 30IN

## (undated) DEVICE — SUT CTD VICRYL 4-0 BR PS-2

## (undated) DEVICE — SHEET DRAPE FAN-FOLDED 3/4